# Patient Record
Sex: FEMALE | Race: WHITE | Employment: OTHER | URBAN - METROPOLITAN AREA
[De-identification: names, ages, dates, MRNs, and addresses within clinical notes are randomized per-mention and may not be internally consistent; named-entity substitution may affect disease eponyms.]

---

## 2017-01-04 ENCOUNTER — TELEPHONE (OUTPATIENT)
Dept: GASTROENTEROLOGY | Facility: CLINIC | Age: 72
End: 2017-01-04

## 2017-01-04 DIAGNOSIS — R19.7 DIARRHEA, UNSPECIFIED TYPE: Primary | ICD-10-CM

## 2017-01-04 NOTE — TELEPHONE ENCOUNTER
GLENYS called pt to resched appt on 3-3-17 for Dr Margie Bai- pt stated she has been having severe diarrhea since Sat and has lost 7 lbs - pt asking to be seen soon- pl call pt

## 2017-01-04 NOTE — TELEPHONE ENCOUNTER
Dr. Dov Will,     Pt contacted and she states that for the last 7 days she has had uncontrollable diarrhea to the extent where she has \"had to throw out her underwear\". She says that the episodes range from 10x per day to 3x per day.  She states she has dec

## 2017-01-05 NOTE — TELEPHONE ENCOUNTER
Pt contacted and notified that she must complete the stool test as soon as she is able to. Pt states she is 5 minutes away from the hospital and will complete that today.   She states that she is able to come in tomorrow, Friday (1/6/17) @ 10:30am.

## 2017-01-05 NOTE — TELEPHONE ENCOUNTER
She may have cdiff. Please call patient and have her obtain stool testing which I ordered. She should do that test ASAP.      I can see her this Friday at 10:30AM.

## 2017-01-06 ENCOUNTER — OFFICE VISIT (OUTPATIENT)
Dept: GASTROENTEROLOGY | Facility: CLINIC | Age: 72
End: 2017-01-06

## 2017-01-06 ENCOUNTER — TELEPHONE (OUTPATIENT)
Dept: GASTROENTEROLOGY | Facility: CLINIC | Age: 72
End: 2017-01-06

## 2017-01-06 ENCOUNTER — LAB ENCOUNTER (OUTPATIENT)
Dept: LAB | Age: 72
End: 2017-01-06
Attending: INTERNAL MEDICINE
Payer: MEDICARE

## 2017-01-06 VITALS
HEIGHT: 65.5 IN | HEART RATE: 56 BPM | SYSTOLIC BLOOD PRESSURE: 137 MMHG | WEIGHT: 275 LBS | BODY MASS INDEX: 45.27 KG/M2 | DIASTOLIC BLOOD PRESSURE: 66 MMHG

## 2017-01-06 DIAGNOSIS — K52.9 CHRONIC DIARRHEA: Primary | ICD-10-CM

## 2017-01-06 DIAGNOSIS — R19.7 DIARRHEA, UNSPECIFIED TYPE: ICD-10-CM

## 2017-01-06 DIAGNOSIS — R19.7 DIARRHEA: Primary | ICD-10-CM

## 2017-01-06 DIAGNOSIS — K21.9 GASTROESOPHAGEAL REFLUX DISEASE WITHOUT ESOPHAGITIS: ICD-10-CM

## 2017-01-06 LAB — C DIFF TOX B STL QL: NEGATIVE

## 2017-01-06 PROCEDURE — 87427 SHIGA-LIKE TOXIN AG IA: CPT

## 2017-01-06 PROCEDURE — 87077 CULTURE AEROBIC IDENTIFY: CPT

## 2017-01-06 PROCEDURE — 99214 OFFICE O/P EST MOD 30 MIN: CPT | Performed by: INTERNAL MEDICINE

## 2017-01-06 PROCEDURE — 87493 C DIFF AMPLIFIED PROBE: CPT

## 2017-01-06 PROCEDURE — 87045 FECES CULTURE AEROBIC BACT: CPT

## 2017-01-06 PROCEDURE — 87335 E COLI 0157 AG IA: CPT

## 2017-01-06 PROCEDURE — 87015 SPECIMEN INFECT AGNT CONCNTJ: CPT

## 2017-01-06 PROCEDURE — G0463 HOSPITAL OUTPT CLINIC VISIT: HCPCS | Performed by: INTERNAL MEDICINE

## 2017-01-06 PROCEDURE — 87046 STOOL CULTR AEROBIC BACT EA: CPT

## 2017-01-06 RX ORDER — DIPHENOXYLATE HYDROCHLORIDE AND ATROPINE SULFATE 2.5; .025 MG/1; MG/1
1 TABLET ORAL 3 TIMES DAILY PRN
Qty: 90 TABLET | Refills: 1 | Status: SHIPPED | OUTPATIENT
Start: 2017-01-06 | End: 2017-05-08

## 2017-01-06 RX ORDER — POLYETHYLENE GLYCOL 3350, SODIUM CHLORIDE, SODIUM BICARBONATE, POTASSIUM CHLORIDE 420; 11.2; 5.72; 1.48 G/4L; G/4L; G/4L; G/4L
POWDER, FOR SOLUTION ORAL
Qty: 1 BOTTLE | Refills: 0 | Status: SHIPPED | OUTPATIENT
Start: 2017-01-06 | End: 2017-01-17

## 2017-01-06 NOTE — TELEPHONE ENCOUNTER
Scheduled for:  Colonoscopy 45128  Date:  1/25/17  Location:  [facility]  Sedation:  MAC  Time:  1145  Prep:  Split dose Trilyte  Meds/Allergies Reconciled?:  Yes  Diagnosis with codes:  Colon cancer screening Z12.11  EM or [facility] notified?:  Notified Humera Myers in End

## 2017-01-06 NOTE — PATIENT INSTRUCTIONS
1. Schedule colonoscopy with MAC    2.  bowel prep from pharmacy     3. Continue all medications for procedure    4. Read all bowel prep instructions carefully    5.  AVOID seeds, nuts, popcorn, raw fruits and vegetables (cooked is okay) for 2-3 days

## 2017-01-06 NOTE — PROGRESS NOTES
[facility] Follow-up Visit    Deborah blood in sputum. Feels well otherwise. Lives with daughter.     Prior endoscopies:  5/2016 EGD--->hiatal hernia 9 cm, sliding type, marked reflux, LA CLass C esophagitis    High resolution esophageal manometry (7/2016)-->Due to the large hiatal hernia, ther caused   • Cancer Father      Skin cancer   • Heart Attack Mother    • Heart Disorder Mother       Social History:   Smoking Status: Former Smoker                   Packs/Day: 0.00  Years:         Comment: QUIT 32 YRS AGO    Alcohol Use: No +weight loss  EYES:  negative for change in vision  RESPIRATORY:  negative for  shortness of breath  CARDIOVASCULAR:  negative for  chest pain  GASTROINTESTINAL:  see HPI  GENITOURINARY:  negative for dysuria  INTEGUMENT/BREAST:  SKIN:  negative for  rash quite well. However over last few months had more burning in chest, feels difficulty swallowing foods, having weight loss.  I performed another EGD few weeks ago in Dec 2016 showing stasis like changes in the esophagus but no stricture in the GE junction, a Prescriptions Disp Refills    diphenoxylate-atropine (LOMOTIL) 2.5-0.025 MG Oral Tab 90 tablet 1      Sig: Take 1 tablet by mouth 3 (three) times daily as needed for Diarrhea.       PEG 3350-KCl-Na Bicarb-NaCl (TRILYTE) 420 G Oral Recon Soln 1 Bottle 0

## 2017-01-10 ENCOUNTER — TELEPHONE (OUTPATIENT)
Dept: GASTROENTEROLOGY | Facility: CLINIC | Age: 72
End: 2017-01-10

## 2017-01-10 NOTE — TELEPHONE ENCOUNTER
Attempted to notify patient with the message below. Called her home number and it just rang, no answer, no voicemail to leave a message.

## 2017-01-10 NOTE — TELEPHONE ENCOUNTER
GI staff-can you please inform Andre Sun that her stool tests are negative, no infection to treat. i will see her for her colonoscopy.

## 2017-01-12 NOTE — TELEPHONE ENCOUNTER
Second attempt to provide the patient with the information from below. No answer/no option to leave a VM.

## 2017-01-16 ENCOUNTER — TELEPHONE (OUTPATIENT)
Dept: GASTROENTEROLOGY | Facility: CLINIC | Age: 72
End: 2017-01-16

## 2017-01-16 ENCOUNTER — OFFICE VISIT (OUTPATIENT)
Dept: FAMILY MEDICINE CLINIC | Facility: CLINIC | Age: 72
End: 2017-01-16

## 2017-01-16 VITALS
BODY MASS INDEX: 45 KG/M2 | WEIGHT: 277 LBS | HEART RATE: 75 BPM | TEMPERATURE: 98 F | SYSTOLIC BLOOD PRESSURE: 110 MMHG | DIASTOLIC BLOOD PRESSURE: 70 MMHG

## 2017-01-16 DIAGNOSIS — I99.8 VASCULAR INSUFFICIENCY OF LIMB: ICD-10-CM

## 2017-01-16 DIAGNOSIS — I10 ESSENTIAL HYPERTENSION WITH GOAL BLOOD PRESSURE LESS THAN 130/80: ICD-10-CM

## 2017-01-16 DIAGNOSIS — L03.115 CELLULITIS OF RIGHT LOWER EXTREMITY: Primary | ICD-10-CM

## 2017-01-16 PROCEDURE — G0463 HOSPITAL OUTPT CLINIC VISIT: HCPCS | Performed by: FAMILY MEDICINE

## 2017-01-16 PROCEDURE — 99214 OFFICE O/P EST MOD 30 MIN: CPT | Performed by: FAMILY MEDICINE

## 2017-01-16 RX ORDER — CEFUROXIME AXETIL 500 MG/1
500 TABLET ORAL 2 TIMES DAILY
Qty: 20 TABLET | Refills: 0 | Status: SHIPPED | OUTPATIENT
Start: 2017-01-16 | End: 2017-02-06 | Stop reason: ALTCHOICE

## 2017-01-16 NOTE — PROGRESS NOTES
Started last Tues/Wed with swelling. \"I've stayed off my feet since then and its looking better. \"  \"It gets mind blowing itchy at times. \"      Hx of venous status with cellulitis recurrent. Has Colonoscopy scheduled for next wed.   Vomiting is an is insufficiency of limb  referral  - Interventional Radiology [facility]    3. Essential hypertension with goal blood pressure less than 130/80  As well controlled.

## 2017-01-16 NOTE — TELEPHONE ENCOUNTER
Pt contacted regarding her CLN scheduled 1/25/17. She states that she picked up her trilyte preparation from the pharmacy and she wants Dr. Severa Abed to know that \"there is no way\" she can drink the prep.    She states that she vomits on a daily basis from fo

## 2017-01-17 RX ORDER — POLYETHYLENE GLYCOL 3350, SODIUM CHLORIDE, SODIUM BICARBONATE, POTASSIUM CHLORIDE 420; 11.2; 5.72; 1.48 G/4L; G/4L; G/4L; G/4L
POWDER, FOR SOLUTION ORAL
Qty: 1 BOTTLE | Refills: 0 | Status: SHIPPED | OUTPATIENT
Start: 2017-01-17 | End: 2017-03-20

## 2017-01-17 RX ORDER — SUCRALFATE ORAL 1 G/10ML
1 SUSPENSION ORAL
Qty: 1 BOTTLE | Refills: 2 | Status: SHIPPED | OUTPATIENT
Start: 2017-01-17 | End: 2017-02-20

## 2017-01-17 NOTE — TELEPHONE ENCOUNTER
Spoke with Kristin Zamora. She will do as much of Trilyte as she can and space it out. She started prepping today accidentally, I informed her case is next week. Will send new Trilyte to pharmacy. She agrees to plan.

## 2017-01-23 RX ORDER — NIFEDIPINE 90 MG/1
TABLET, FILM COATED, EXTENDED RELEASE ORAL
Qty: 90 TABLET | Refills: 11 | Status: ON HOLD | OUTPATIENT
Start: 2017-01-23 | End: 2017-05-02

## 2017-01-25 ENCOUNTER — HOSPITAL ENCOUNTER (OUTPATIENT)
Facility: HOSPITAL | Age: 72
Setting detail: HOSPITAL OUTPATIENT SURGERY
Discharge: HOME | End: 2017-01-25
Attending: INTERNAL MEDICINE | Admitting: INTERNAL MEDICINE
Payer: MEDICARE

## 2017-01-25 ENCOUNTER — ANESTHESIA (OUTPATIENT)
Dept: ENDOSCOPY | Facility: HOSPITAL | Age: 72
End: 2017-01-25

## 2017-01-25 ENCOUNTER — ANESTHESIA EVENT (OUTPATIENT)
Dept: ENDOSCOPY | Facility: HOSPITAL | Age: 72
End: 2017-01-25

## 2017-01-25 ENCOUNTER — SURGERY (OUTPATIENT)
Age: 72
End: 2017-01-25

## 2017-01-25 PROCEDURE — 0DBF8ZX EXCISION OF RIGHT LARGE INTESTINE, VIA NATURAL OR ARTIFICIAL OPENING ENDOSCOPIC, DIAGNOSTIC: ICD-10-PCS | Performed by: INTERNAL MEDICINE

## 2017-01-25 PROCEDURE — 0DBB8ZX EXCISION OF ILEUM, VIA NATURAL OR ARTIFICIAL OPENING ENDOSCOPIC, DIAGNOSTIC: ICD-10-PCS | Performed by: INTERNAL MEDICINE

## 2017-01-25 PROCEDURE — 45385 COLONOSCOPY W/LESION REMOVAL: CPT | Performed by: INTERNAL MEDICINE

## 2017-01-25 PROCEDURE — 45380 COLONOSCOPY AND BIOPSY: CPT | Performed by: INTERNAL MEDICINE

## 2017-01-25 PROCEDURE — 99153 MOD SED SAME PHYS/QHP EA: CPT | Performed by: INTERNAL MEDICINE

## 2017-01-25 PROCEDURE — 0DBK8ZX EXCISION OF ASCENDING COLON, VIA NATURAL OR ARTIFICIAL OPENING ENDOSCOPIC, DIAGNOSTIC: ICD-10-PCS | Performed by: INTERNAL MEDICINE

## 2017-01-25 PROCEDURE — 0DBG8ZX EXCISION OF LEFT LARGE INTESTINE, VIA NATURAL OR ARTIFICIAL OPENING ENDOSCOPIC, DIAGNOSTIC: ICD-10-PCS | Performed by: INTERNAL MEDICINE

## 2017-01-25 PROCEDURE — 99152 MOD SED SAME PHYS/QHP 5/>YRS: CPT | Performed by: INTERNAL MEDICINE

## 2017-01-25 RX ORDER — SODIUM CHLORIDE 0.9 % (FLUSH) 0.9 %
10 SYRINGE (ML) INJECTION AS NEEDED
Status: DISCONTINUED | OUTPATIENT
Start: 2017-01-25 | End: 2017-01-25

## 2017-01-25 RX ORDER — MIDAZOLAM HYDROCHLORIDE 1 MG/ML
1 INJECTION INTRAMUSCULAR; INTRAVENOUS EVERY 5 MIN PRN
Status: DISCONTINUED | OUTPATIENT
Start: 2017-01-25 | End: 2017-01-25

## 2017-01-25 NOTE — OPERATIVE REPORT
COLONOSCOPY REPORT    Atif Agarwal     1945 Age 66year old   PCP Samuel Michelle.  Ganesh Hart DO Endoscopist Jennie Cabrera MD     Date of procedure: 2017    Procedure: Colonoscopy with hot snare polypectomy, control of bleeding and cold biops retrieved. B. 10 mm polyp in the ascending colon; sessile morphology; hot snare (30W) polypectomy and retrieved. Persistent oozing controlled with a single endoclip x1.     2. Diverticulosis: mild in the sigmoid.     3. Terminal ileum: the visualized m

## 2017-01-25 NOTE — PLAN OF CARE
Focus: CVData: PT IN A-FIB WITH A RATE OF 80. NO C/O PAIN OR DICOMFORT  Action:VICKIE BELL NOTIFIED. EKG ORDERED.   Response: 12 LEAD EKG DONE AT 1140

## 2017-01-25 NOTE — H&P
History & Physical Examination    Patient Name: Winston Schrader  MRN: [id number]  CSN: [id number]  YOB: 1945    Diagnosis: chronic diarrhea; patient found to be in afib, says she was told she has this in the past but not seen a cardiologist. She Cholecalciferol (VITAMIN D) 2000 UNITS Oral Cap Take 4,000 Units by mouth daily. Disp:  Rfl:  Not Taking       No current facility-administered medications for this encounter.     Allergies:   Adhesive Tape               Past Medical History   Diagnosis D Kate Fuentes [ Jim Breen      I have discussed the risks and benefits and alternatives of the procedure with the patient/family. They understand and agree to proceed with plan of care.    I have reviewed the History and Physical done within the last 30 day

## 2017-01-26 VITALS
SYSTOLIC BLOOD PRESSURE: 134 MMHG | HEIGHT: 66 IN | DIASTOLIC BLOOD PRESSURE: 113 MMHG | BODY MASS INDEX: 43.39 KG/M2 | RESPIRATION RATE: 22 BRPM | WEIGHT: 270 LBS | OXYGEN SATURATION: 98 % | HEART RATE: 80 BPM

## 2017-01-27 ENCOUNTER — TELEPHONE (OUTPATIENT)
Dept: GASTROENTEROLOGY | Facility: CLINIC | Age: 72
End: 2017-01-27

## 2017-01-27 RX ORDER — CHOLESTYRAMINE LIGHT 4 G/5.7G
4 POWDER, FOR SUSPENSION ORAL 2 TIMES DAILY
Qty: 60 EACH | Refills: 3 | Status: SHIPPED | OUTPATIENT
Start: 2017-01-27 | End: 2017-02-26

## 2017-01-27 NOTE — TELEPHONE ENCOUNTER
Left msg for Sabrina Pillar -- biopsies show no microscopic colitis. Tubular adenomas removed. Suspect bile-acid induced diarrhea 2/2 gallbladder removal in 2016. Start cholestyramine packets BID. Left msg.

## 2017-01-30 ENCOUNTER — TELEPHONE (OUTPATIENT)
Dept: FAMILY MEDICINE CLINIC | Facility: CLINIC | Age: 72
End: 2017-01-30

## 2017-01-30 DIAGNOSIS — I50.20 SYSTOLIC CONGESTIVE HEART FAILURE, UNSPECIFIED CONGESTIVE HEART FAILURE CHRONICITY: Primary | ICD-10-CM

## 2017-01-30 DIAGNOSIS — I48.91 ATRIAL FIBRILLATION, UNSPECIFIED TYPE (HCC): ICD-10-CM

## 2017-01-30 NOTE — TELEPHONE ENCOUNTER
Notes Recorded by Su Polk DO on 1/29/2017 at 1:34 PM  Also referral to Dr. Simona Arango dx cad chf and afib.     Notes Recorded by Su Polk DO on 1/29/2017 at 1:32 PM  Please have patient f/u Monday in office due to afib seen on ekg dur

## 2017-01-30 NOTE — TELEPHONE ENCOUNTER
----- Message from Karin Chao DO sent at 1/29/2017  1:32 PM CST -----  Please have patient f/u Monday in office due to afib seen on ekg during colonoscopy.   Repeat ekg in office upon arrival.

## 2017-01-31 NOTE — TELEPHONE ENCOUNTER
Contacted pt and has appt for right venous ultrasound on Wed and appt with Dr Mo Cuevas on Thursday. Offered [facility], but getting ready to leave to [place] due to family illness.  She will call back to schedule appt with [facility] when she retur

## 2017-02-06 ENCOUNTER — OFFICE VISIT (OUTPATIENT)
Dept: FAMILY MEDICINE CLINIC | Facility: CLINIC | Age: 72
End: 2017-02-06

## 2017-02-06 VITALS
HEART RATE: 78 BPM | TEMPERATURE: 98 F | SYSTOLIC BLOOD PRESSURE: 122 MMHG | WEIGHT: 270 LBS | DIASTOLIC BLOOD PRESSURE: 88 MMHG | BODY MASS INDEX: 44 KG/M2

## 2017-02-06 DIAGNOSIS — I48.91 ATRIAL FIBRILLATION, UNSPECIFIED TYPE (HCC): Primary | ICD-10-CM

## 2017-02-06 DIAGNOSIS — L30.9 DERMATITIS: ICD-10-CM

## 2017-02-06 DIAGNOSIS — G47.33 OBSTRUCTIVE SLEEP APNEA: ICD-10-CM

## 2017-02-06 DIAGNOSIS — R60.9 EDEMA, UNSPECIFIED TYPE: ICD-10-CM

## 2017-02-06 DIAGNOSIS — R25.1 TREMOR: ICD-10-CM

## 2017-02-06 DIAGNOSIS — K31.89 GASTRIC DYSMOTILITY: ICD-10-CM

## 2017-02-06 DIAGNOSIS — I10 ESSENTIAL HYPERTENSION WITH GOAL BLOOD PRESSURE LESS THAN 130/80: ICD-10-CM

## 2017-02-06 PROCEDURE — G0463 HOSPITAL OUTPT CLINIC VISIT: HCPCS | Performed by: FAMILY MEDICINE

## 2017-02-06 PROCEDURE — 99214 OFFICE O/P EST MOD 30 MIN: CPT | Performed by: FAMILY MEDICINE

## 2017-02-06 NOTE — PROGRESS NOTES
\"This is no life with this throat thing. I just felt full after a little dip yesterday. I just feel full and I feel like one more piece of food and I'll throw up. \"  Has gagging   Burping constantly. Coughing  Heartburn  Pain lower chest center.     Lennox Shone Tremor  Offered referral to neuro  Pt declined at the time. Tayler Nj

## 2017-02-08 ENCOUNTER — HOSPITAL ENCOUNTER (OUTPATIENT)
Dept: ULTRASOUND IMAGING | Facility: HOSPITAL | Age: 72
Discharge: HOME | End: 2017-02-08
Attending: RADIOLOGY
Payer: MEDICARE

## 2017-02-08 DIAGNOSIS — I83.899 VARICOSE VEINS WITH SWELLING: ICD-10-CM

## 2017-02-08 DIAGNOSIS — I83.813 VARICOSE VEINS WITH PAIN, BILATERAL: ICD-10-CM

## 2017-02-08 PROCEDURE — 93971 EXTREMITY STUDY: CPT

## 2017-02-20 RX ORDER — SUCRALFATE 1 G/10ML
SUSPENSION ORAL
Qty: 420 ML | Refills: 0 | OUTPATIENT
Start: 2017-02-20

## 2017-02-20 RX ORDER — SUCRALFATE 1 G/10ML
SUSPENSION ORAL
Qty: 420 ML | Refills: 0 | Status: SHIPPED | OUTPATIENT
Start: 2017-02-20 | End: 2017-04-04

## 2017-02-27 RX ORDER — ATORVASTATIN CALCIUM 40 MG/1
TABLET, FILM COATED ORAL
Qty: 90 TABLET | Refills: 0 | Status: SHIPPED | OUTPATIENT
Start: 2017-02-27 | End: 2017-05-29

## 2017-02-27 NOTE — TELEPHONE ENCOUNTER
Rx request for Atorvastatin Calcium 40 mg, PASSED Cholesterol Protocol. Rx filled per protocol.      Cholesterol Medications  Protocol Criteria:  · Appointment scheduled in the past 12 months or in the next 3 months  · ALT & LDL on file in the past 12 month

## 2017-03-20 ENCOUNTER — TELEPHONE (OUTPATIENT)
Dept: GASTROENTEROLOGY | Facility: CLINIC | Age: 72
End: 2017-03-20

## 2017-03-20 ENCOUNTER — TELEPHONE (OUTPATIENT)
Dept: FAMILY MEDICINE CLINIC | Facility: CLINIC | Age: 72
End: 2017-03-20

## 2017-03-20 ENCOUNTER — OFFICE VISIT (OUTPATIENT)
Dept: FAMILY MEDICINE CLINIC | Facility: CLINIC | Age: 72
End: 2017-03-20

## 2017-03-20 VITALS
HEART RATE: 89 BPM | BODY MASS INDEX: 41 KG/M2 | WEIGHT: 251 LBS | DIASTOLIC BLOOD PRESSURE: 86 MMHG | SYSTOLIC BLOOD PRESSURE: 149 MMHG | TEMPERATURE: 98 F

## 2017-03-20 DIAGNOSIS — N19 RENAL FAILURE: ICD-10-CM

## 2017-03-20 DIAGNOSIS — I48.91 ATRIAL FIBRILLATION, UNSPECIFIED TYPE (HCC): Primary | ICD-10-CM

## 2017-03-20 DIAGNOSIS — I10 ESSENTIAL HYPERTENSION WITH GOAL BLOOD PRESSURE LESS THAN 130/80: ICD-10-CM

## 2017-03-20 DIAGNOSIS — K31.89 GASTRIC DYSMOTILITY: ICD-10-CM

## 2017-03-20 PROCEDURE — 99214 OFFICE O/P EST MOD 30 MIN: CPT | Performed by: FAMILY MEDICINE

## 2017-03-20 PROCEDURE — G0463 HOSPITAL OUTPT CLINIC VISIT: HCPCS | Performed by: FAMILY MEDICINE

## 2017-03-20 NOTE — PROGRESS NOTES
Has been using her thigh high stockings. Has had diarrhea is bad. 5x a day. \"Every time I eat. \"  \"Everymorning I throw up a clear liquid. \"    Down another 19 lbs. Lost 59 lbs total since this summer. \"I can't live my life this way. \"    Pt co tender non distended bowel sounds were present  Heart was irregular rate and irregular rhythm. Not tachy.  normal S1-S2 no S3 no S4 there were no murmurs appreciated  Lymphatic: There were no anterior or posterior cervical adenopathy there was no supraclav

## 2017-03-20 NOTE — TELEPHONE ENCOUNTER
Spoke to Dr. Debra Carmen   Updated him with her symptoms. He reminded me she had lots of burping prior to her surgery. ? gastroparesis  Possible repeat manometry  \"Maybe she needs to have the nissen taken down, but we have to make sure we see the evidenc

## 2017-03-21 ENCOUNTER — TELEPHONE (OUTPATIENT)
Dept: GASTROENTEROLOGY | Facility: CLINIC | Age: 72
End: 2017-03-21

## 2017-03-21 NOTE — TELEPHONE ENCOUNTER
D/w Dr. Zonia Villarreal re: continued weight loss and ongoing upper GI sx. She may have underlying gastric motility disorder. Left msg for patient to call me back.

## 2017-03-22 ENCOUNTER — TELEPHONE (OUTPATIENT)
Dept: GASTROENTEROLOGY | Facility: CLINIC | Age: 72
End: 2017-03-22

## 2017-03-22 ENCOUNTER — LAB ENCOUNTER (OUTPATIENT)
Dept: LAB | Age: 72
End: 2017-03-22
Attending: FAMILY MEDICINE
Payer: MEDICARE

## 2017-03-22 DIAGNOSIS — R11.2 NAUSEA AND VOMITING, INTRACTABILITY OF VOMITING NOT SPECIFIED, UNSPECIFIED VOMITING TYPE: Primary | ICD-10-CM

## 2017-03-22 DIAGNOSIS — I10 ESSENTIAL HYPERTENSION WITH GOAL BLOOD PRESSURE LESS THAN 130/80: ICD-10-CM

## 2017-03-22 DIAGNOSIS — I48.91 ATRIAL FIBRILLATION, UNSPECIFIED TYPE (HCC): ICD-10-CM

## 2017-03-22 DIAGNOSIS — K31.89 GASTRIC DYSMOTILITY: ICD-10-CM

## 2017-03-22 LAB
ALBUMIN SERPL BCP-MCNC: 3.3 G/DL (ref 3.5–4.8)
ALBUMIN/GLOB SERPL: 1.1 {RATIO} (ref 1–2)
ALP SERPL-CCNC: 72 U/L (ref 32–100)
ALT SERPL-CCNC: 9 U/L (ref 14–54)
ANION GAP SERPL CALC-SCNC: 10 MMOL/L (ref 0–18)
AST SERPL-CCNC: 13 U/L (ref 15–41)
BASOPHILS # BLD: 0.1 K/UL (ref 0–0.2)
BASOPHILS NFR BLD: 1 %
BILIRUB SERPL-MCNC: 0.7 MG/DL (ref 0.3–1.2)
BUN SERPL-MCNC: 14 MG/DL (ref 8–20)
BUN/CREAT SERPL: 11.3 (ref 10–20)
CALCIUM SERPL-MCNC: 9.4 MG/DL (ref 8.5–10.5)
CHLORIDE SERPL-SCNC: 108 MMOL/L (ref 95–110)
CHOLEST SERPL-MCNC: 115 MG/DL (ref 110–200)
CO2 SERPL-SCNC: 26 MMOL/L (ref 22–32)
CREAT SERPL-MCNC: 1.24 MG/DL (ref 0.5–1.5)
EOSINOPHIL # BLD: 0.1 K/UL (ref 0–0.7)
EOSINOPHIL NFR BLD: 2 %
ERYTHROCYTE [DISTWIDTH] IN BLOOD BY AUTOMATED COUNT: 13.9 % (ref 11–15)
GLOBULIN PLAS-MCNC: 3 G/DL (ref 2.5–3.7)
GLUCOSE SERPL-MCNC: 86 MG/DL (ref 70–99)
HCT VFR BLD AUTO: 40 % (ref 35–48)
HDLC SERPL-MCNC: 45 MG/DL
HGB BLD-MCNC: 13.2 G/DL (ref 12–16)
LDLC SERPL CALC-MCNC: 51 MG/DL (ref 0–99)
LYMPHOCYTES # BLD: 1 K/UL (ref 1–4)
LYMPHOCYTES NFR BLD: 16 %
MAGNESIUM SERPL-MCNC: 1.7 MG/DL (ref 1.8–2.5)
MCH RBC QN AUTO: 32.3 PG (ref 27–32)
MCHC RBC AUTO-ENTMCNC: 32.9 G/DL (ref 32–37)
MCV RBC AUTO: 98.1 FL (ref 80–100)
MONOCYTES # BLD: 0.5 K/UL (ref 0–1)
MONOCYTES NFR BLD: 9 %
NEUTROPHILS # BLD AUTO: 4.3 K/UL (ref 1.8–7.7)
NEUTROPHILS NFR BLD: 71 %
NONHDLC SERPL-MCNC: 70 MG/DL
OSMOLALITY UR CALC.SUM OF ELEC: 298 MOSM/KG (ref 275–295)
PLATELET # BLD AUTO: 140 K/UL (ref 140–400)
PMV BLD AUTO: 10.7 FL (ref 7.4–10.3)
POTASSIUM SERPL-SCNC: 3.9 MMOL/L (ref 3.3–5.1)
PROT SERPL-MCNC: 6.3 G/DL (ref 5.9–8.4)
RBC # BLD AUTO: 4.08 M/UL (ref 3.7–5.4)
SODIUM SERPL-SCNC: 144 MMOL/L (ref 136–144)
TRIGL SERPL-MCNC: 96 MG/DL (ref 1–149)
TSH SERPL-ACNC: 2.4 UIU/ML (ref 0.34–5.6)
WBC # BLD AUTO: 6 K/UL (ref 4–11)

## 2017-03-22 PROCEDURE — 83735 ASSAY OF MAGNESIUM: CPT

## 2017-03-22 PROCEDURE — 36415 COLL VENOUS BLD VENIPUNCTURE: CPT

## 2017-03-22 PROCEDURE — 80061 LIPID PANEL: CPT

## 2017-03-22 PROCEDURE — 84443 ASSAY THYROID STIM HORMONE: CPT

## 2017-03-22 PROCEDURE — 85025 COMPLETE CBC W/AUTO DIFF WBC: CPT

## 2017-03-22 PROCEDURE — 80053 COMPREHEN METABOLIC PANEL: CPT

## 2017-03-22 NOTE — TELEPHONE ENCOUNTER
Discussed with Duyen Hou, she has a hard time eating, not so much dysphagia but feels food backs up easily. Additionally, she had bile-acid induced diarrhea, and alternates between 2 mushy stools to 10 mushy stools a day.  I asked her to continue cholestyramine

## 2017-03-23 NOTE — TELEPHONE ENCOUNTER
Rocky Lopez in managed care and pt does not require pre-certification to schedule this test. I contacted the pt and informed her she can go ahead and call central scheduling at [phone] to schedule at her convenience, she verbalized understanding.

## 2017-03-27 ENCOUNTER — TELEPHONE (OUTPATIENT)
Dept: FAMILY MEDICINE CLINIC | Facility: CLINIC | Age: 72
End: 2017-03-27

## 2017-03-27 DIAGNOSIS — N18.30 RENAL FAILURE, CHRONIC, STAGE 3 (MODERATE) (HCC): ICD-10-CM

## 2017-03-27 DIAGNOSIS — E78.5 HYPERLIPIDEMIA, UNSPECIFIED HYPERLIPIDEMIA TYPE: Primary | ICD-10-CM

## 2017-03-27 DIAGNOSIS — E83.42 HYPOMAGNESEMIA: ICD-10-CM

## 2017-03-27 RX ORDER — ATORVASTATIN CALCIUM 10 MG/1
10 TABLET, FILM COATED ORAL NIGHTLY
Qty: 90 TABLET | Refills: 3 | Status: SHIPPED | OUTPATIENT
Start: 2017-03-27 | End: 2017-09-28

## 2017-03-27 NOTE — TELEPHONE ENCOUNTER
Reviewed 3/22/17 lab results with pt along with Dr [facility] recommendations. Nephrology referral generated and tel# [phone] given to pt. Generated new lab orders and mailed to pts' home address on file.

## 2017-03-27 NOTE — TELEPHONE ENCOUNTER
----- Message from Mahad Jules DO sent at 3/22/2017  6:58 PM CDT -----  Dr. Rakan Burgess wrote me a letter on her o/v so let her know I am aware of their discussion. Chol excellent. Stop the 40mg lipitor switch to lipitor 10mg refills x 1 year.   Thyroid

## 2017-03-27 NOTE — TELEPHONE ENCOUNTER
----- Message from Isabel Yee DO sent at 3/22/2017  6:58 PM CDT -----  Dr. Phi Costello wrote me a letter on her o/v so let her know I am aware of their discussion. Chol excellent. Stop the 40mg lipitor switch to lipitor 10mg refills x 1 year.   Thyroid

## 2017-03-29 ENCOUNTER — HOSPITAL ENCOUNTER (OUTPATIENT)
Dept: NUCLEAR MEDICINE | Facility: HOSPITAL | Age: 72
Discharge: HOME | End: 2017-03-29
Attending: INTERNAL MEDICINE
Payer: MEDICARE

## 2017-03-29 DIAGNOSIS — R11.2 NAUSEA AND VOMITING, INTRACTABILITY OF VOMITING NOT SPECIFIED, UNSPECIFIED VOMITING TYPE: ICD-10-CM

## 2017-03-29 PROCEDURE — 78264 GASTRIC EMPTYING IMG STUDY: CPT

## 2017-03-30 ENCOUNTER — TELEPHONE (OUTPATIENT)
Dept: GASTROENTEROLOGY | Facility: CLINIC | Age: 72
End: 2017-03-30

## 2017-03-30 DIAGNOSIS — K31.89 DYSMOTILITY OF STOMACH: Primary | ICD-10-CM

## 2017-03-30 DIAGNOSIS — K22.4 ESOPHAGEAL DYSMOTILITY: Primary | ICD-10-CM

## 2017-03-30 RX ORDER — METOCLOPRAMIDE 10 MG/1
10 TABLET ORAL
Qty: 90 TABLET | Refills: 1 | Status: ON HOLD | OUTPATIENT
Start: 2017-03-30 | End: 2017-05-02

## 2017-03-30 NOTE — TELEPHONE ENCOUNTER
Please call patient   Ok to go to [facility] or [facility] wherever they accept her insurance.   [facility]

## 2017-03-30 NOTE — TELEPHONE ENCOUNTER
GI staff: can you please see if patient can be seen at [facility]/ Dr. Mansi Dunbar, or Dr. Jose Angel or Dr. Chelsea Carey has first appt. For evaluation of esophageal dysmotility after fundoplication surgery.  Please let me know when appt is ma

## 2017-03-30 NOTE — TELEPHONE ENCOUNTER
Discussed with Dez Marking re: gastric emptying scan. There is significant dysmotility of the esophagus and stomach. Unclear etiology for this dysmotility. No achalasia was seen on pre-operative manometry. I discussed w/Dr. Eboni Ramírez as well.  We are in agreement t

## 2017-03-30 NOTE — TELEPHONE ENCOUNTER
Spoke to pt regarding gastro referral as instructed by [facility]. Stated she had an upcoming appt with [facility] and she would like to discuss it further with him. No further action necessary.

## 2017-03-31 NOTE — TELEPHONE ENCOUNTER
Contacted Dr. Caitlin Fernandez office and I spoke with Benjy Mason who confirms having received the records I sent yesterday. He states that once the office reviews the records, they will call the patient and assist in scheduling the appointment.  He states that more

## 2017-04-03 ENCOUNTER — OFFICE VISIT (OUTPATIENT)
Dept: FAMILY MEDICINE CLINIC | Facility: CLINIC | Age: 72
End: 2017-04-03

## 2017-04-03 VITALS
SYSTOLIC BLOOD PRESSURE: 144 MMHG | DIASTOLIC BLOOD PRESSURE: 82 MMHG | HEART RATE: 76 BPM | BODY MASS INDEX: 40.17 KG/M2 | HEIGHT: 65.5 IN | WEIGHT: 244 LBS

## 2017-04-03 DIAGNOSIS — K31.89 GASTRIC DYSMOTILITY: ICD-10-CM

## 2017-04-03 DIAGNOSIS — I48.91 ATRIAL FIBRILLATION, UNSPECIFIED TYPE (HCC): Primary | ICD-10-CM

## 2017-04-03 DIAGNOSIS — N19 RENAL FAILURE: ICD-10-CM

## 2017-04-03 DIAGNOSIS — R63.4 WEIGHT LOSS, NON-INTENTIONAL: ICD-10-CM

## 2017-04-03 PROCEDURE — 99214 OFFICE O/P EST MOD 30 MIN: CPT | Performed by: FAMILY MEDICINE

## 2017-04-03 PROCEDURE — G0463 HOSPITAL OUTPT CLINIC VISIT: HCPCS | Performed by: FAMILY MEDICINE

## 2017-04-03 NOTE — PROGRESS NOTES
Lost 100lbs so far. Its been 25 years ago. Has appts scheduled  Kristy Del Rio. Has been eating more lately   \"Had a whole omelet. \"  Not burping as much. Spoke to Dr. Keerthi Lima. Since we last had appt.     Patient's past medical surgical f

## 2017-04-04 ENCOUNTER — TELEPHONE (OUTPATIENT)
Dept: GASTROENTEROLOGY | Facility: CLINIC | Age: 72
End: 2017-04-04

## 2017-04-04 RX ORDER — SUCRALFATE ORAL 1 G/10ML
1 SUSPENSION ORAL
Qty: 420 ML | Refills: 2 | Status: ON HOLD | OUTPATIENT
Start: 2017-04-04 | End: 2017-05-02

## 2017-04-04 NOTE — TELEPHONE ENCOUNTER
Called Dr. Caitlin Fernandez office and spoke to Rica De Jesus to check when the patient was scheduled for an appt.  She transferred me to [facility] (Dr. Ronel Watts) and I LM for her explaining to please call the patient to add her to the schedule and to call me

## 2017-04-04 NOTE — TELEPHONE ENCOUNTER
Received phone call from Susanne at [facility] and she states that she reviewed the records and referral I sent with Dr. Adali Sunshine yesterday and they recommend she schedule her consultation with Dr. Geeta Villafana.    She states that she will call the patient and see w

## 2017-04-04 NOTE — TELEPHONE ENCOUNTER
Current Outpatient Prescriptions:  CARAFATE 1 GM/10ML Oral Suspension TAKE 10 ML BY MOUTH FOUR TIMES DAILY BEFORE A MEAL AND NIGHTLY Disp: 420 mL Rfl: 0     Refill

## 2017-04-07 NOTE — TELEPHONE ENCOUNTER
[address] and spoke with Corbin Michelle who states that the patient is scheduled for an appt with Dr. Geeta Villafana on 5/19/17 @1:30pm.

## 2017-04-17 ENCOUNTER — OFFICE VISIT (OUTPATIENT)
Dept: GASTROENTEROLOGY | Facility: CLINIC | Age: 72
End: 2017-04-17

## 2017-04-17 VITALS
HEART RATE: 92 BPM | BODY MASS INDEX: 37.61 KG/M2 | DIASTOLIC BLOOD PRESSURE: 89 MMHG | SYSTOLIC BLOOD PRESSURE: 136 MMHG | WEIGHT: 236.81 LBS | HEIGHT: 66.5 IN

## 2017-04-17 DIAGNOSIS — K44.9 HIATAL HERNIA WITH GERD AND ESOPHAGITIS: Primary | ICD-10-CM

## 2017-04-17 DIAGNOSIS — K52.9 CHRONIC DIARRHEA: ICD-10-CM

## 2017-04-17 DIAGNOSIS — K21.9 GASTROESOPHAGEAL REFLUX DISEASE WITHOUT ESOPHAGITIS: ICD-10-CM

## 2017-04-17 DIAGNOSIS — K21.00 HIATAL HERNIA WITH GERD AND ESOPHAGITIS: Primary | ICD-10-CM

## 2017-04-17 DIAGNOSIS — R63.4 WEIGHT LOSS: ICD-10-CM

## 2017-04-17 PROCEDURE — 99214 OFFICE O/P EST MOD 30 MIN: CPT | Performed by: INTERNAL MEDICINE

## 2017-04-17 PROCEDURE — G0463 HOSPITAL OUTPT CLINIC VISIT: HCPCS | Performed by: INTERNAL MEDICINE

## 2017-04-17 NOTE — PROGRESS NOTES
[facility] Follow-up Visit    Deborah at the GE junction (8mm diameter), no reflux and severe esophageal dysmotility with absence of primary peristalsis, and poor drainage of barium from esophagus.      On 12/1416 I repeated an EGD to ensure the wrap was not too tight and found stasis related e Back problem    • Renal disorder    • Basal cell carcinoma of nose    • Cellulitis    • A-fib (HCC)    • Tubular adenoma of colon 1/2017     repeat c-scope 1/2020          Past Surgical History    BSO, OMENTECTOMY W/BRANDO      EXCISION OF NOSE      Comment B Rfl: 11   triamcinolone acetonide 0.1 % External Cream Apply topically 2 (two) times daily as needed. Disp: 60 g Rfl: 3   diphenoxylate-atropine (LOMOTIL) 2.5-0.025 MG Oral Tab Take 1 tablet by mouth 3 (three) times daily as needed for Diarrhea.  Disp: 90 t having movement of all 4 extremities     Labs/Imaging:     Patient's labs and imaging were reviewed and discussed with patient today.      .  ASSESSMENT/PLAN:   Marcos Jacobs is a 66year old year-old female with history of morbid obesity, depression, acid r

## 2017-04-18 ENCOUNTER — HOSPITAL ENCOUNTER (INPATIENT)
Facility: HOSPITAL | Age: 72
LOS: 14 days | Discharge: HOME HEALTH | DRG: 673 | End: 2017-05-02
Attending: HOSPITALIST | Admitting: HOSPITALIST
Payer: MEDICARE

## 2017-04-18 ENCOUNTER — TELEPHONE (OUTPATIENT)
Dept: GASTROENTEROLOGY | Facility: CLINIC | Age: 72
End: 2017-04-18

## 2017-04-18 ENCOUNTER — APPOINTMENT (OUTPATIENT)
Dept: GENERAL RADIOLOGY | Facility: HOSPITAL | Age: 72
DRG: 673 | End: 2017-04-18
Attending: INTERNAL MEDICINE
Payer: MEDICARE

## 2017-04-18 DIAGNOSIS — K55.9 MESENTERIC ISCHEMIA (HCC): ICD-10-CM

## 2017-04-18 DIAGNOSIS — K55.9 ISCHEMIC COLITIS (HCC): ICD-10-CM

## 2017-04-18 DIAGNOSIS — K25.9 MULTIPLE GASTRIC ULCERS: Primary | ICD-10-CM

## 2017-04-18 PROCEDURE — 71010 XR CHEST AP PORTABLE  (CPT=71010): CPT

## 2017-04-18 PROCEDURE — 99222 1ST HOSP IP/OBS MODERATE 55: CPT | Performed by: HOSPITALIST

## 2017-04-18 PROCEDURE — 0DH63UZ INSERTION OF FEEDING DEVICE INTO STOMACH, PERCUTANEOUS APPROACH: ICD-10-PCS | Performed by: RADIOLOGY

## 2017-04-18 PROCEDURE — 99222 1ST HOSP IP/OBS MODERATE 55: CPT | Performed by: INTERNAL MEDICINE

## 2017-04-18 RX ORDER — SODIUM CHLORIDE 9 MG/ML
INJECTION, SOLUTION INTRAVENOUS CONTINUOUS
Status: DISCONTINUED | OUTPATIENT
Start: 2017-04-18 | End: 2017-04-21

## 2017-04-18 RX ORDER — SUCRALFATE ORAL 1 G/10ML
1 SUSPENSION ORAL
Status: DISCONTINUED | OUTPATIENT
Start: 2017-04-18 | End: 2017-04-18

## 2017-04-18 RX ORDER — ONDANSETRON 2 MG/ML
4 INJECTION INTRAMUSCULAR; INTRAVENOUS EVERY 6 HOURS PRN
Status: DISCONTINUED | OUTPATIENT
Start: 2017-04-18 | End: 2017-04-18

## 2017-04-18 RX ORDER — METOPROLOL TARTRATE 50 MG/1
50 TABLET, FILM COATED ORAL 2 TIMES DAILY
Status: DISCONTINUED | OUTPATIENT
Start: 2017-04-18 | End: 2017-04-22

## 2017-04-18 RX ORDER — SUCRALFATE ORAL 1 G/10ML
1 SUSPENSION ORAL
Status: DISCONTINUED | OUTPATIENT
Start: 2017-04-18 | End: 2017-04-20

## 2017-04-18 RX ORDER — ONDANSETRON 2 MG/ML
4 INJECTION INTRAMUSCULAR; INTRAVENOUS EVERY 6 HOURS PRN
Status: DISCONTINUED | OUTPATIENT
Start: 2017-04-18 | End: 2017-04-21

## 2017-04-18 RX ORDER — DIPHENOXYLATE HYDROCHLORIDE AND ATROPINE SULFATE 2.5; .025 MG/1; MG/1
1 TABLET ORAL 3 TIMES DAILY PRN
Status: DISCONTINUED | OUTPATIENT
Start: 2017-04-18 | End: 2017-05-02

## 2017-04-18 RX ORDER — CHOLESTYRAMINE LIGHT 4 G/5.7G
4 POWDER, FOR SUSPENSION ORAL 2 TIMES DAILY
Status: DISCONTINUED | OUTPATIENT
Start: 2017-04-18 | End: 2017-04-22

## 2017-04-18 RX ORDER — HEPARIN SODIUM 5000 [USP'U]/ML
5000 INJECTION, SOLUTION INTRAVENOUS; SUBCUTANEOUS EVERY 8 HOURS
Status: DISCONTINUED | OUTPATIENT
Start: 2017-04-18 | End: 2017-04-21

## 2017-04-18 RX ORDER — METOCLOPRAMIDE 10 MG/1
5 TABLET ORAL
Status: DISCONTINUED | OUTPATIENT
Start: 2017-04-18 | End: 2017-04-22

## 2017-04-18 RX ORDER — SODIUM CHLORIDE 9 MG/ML
INJECTION, SOLUTION INTRAVENOUS
Status: COMPLETED
Start: 2017-04-18 | End: 2017-04-18

## 2017-04-18 RX ORDER — DEXTROSE MONOHYDRATE 100 MG/ML
INJECTION, SOLUTION INTRAVENOUS CONTINUOUS PRN
Status: DISCONTINUED | OUTPATIENT
Start: 2017-04-18 | End: 2017-04-26

## 2017-04-18 RX ORDER — ACETAMINOPHEN 325 MG/1
650 TABLET ORAL EVERY 6 HOURS PRN
Status: DISCONTINUED | OUTPATIENT
Start: 2017-04-18 | End: 2017-04-23

## 2017-04-18 RX ORDER — ATORVASTATIN CALCIUM 10 MG/1
10 TABLET, FILM COATED ORAL NIGHTLY
Status: DISCONTINUED | OUTPATIENT
Start: 2017-04-18 | End: 2017-04-22

## 2017-04-18 NOTE — H&P
[facility] Patient Status:  Inpatient    1945 MRN [id number]   [address] Tahir Harvey, [facility] Day # 0 PCP Julieta Horowitz.  DO Jemal     Date:  2017  Date Procedure: ESOPHAGOGASTRODUODENOSCOPY (EGD);   Surgeon: Bri Mcnally MD;  Location: [address] [place] [address] ENDOSCOPY    COLONOSCOPY N/A 1/25/2017    Comment Procedure: COLONOSCOPY;  Surgeon: Bri Mcnally MD;  Location: [address] [place] [address] ENDOSCOPY     Family History   Problem Relati negative  Respiratory: negative for cough and dyspnea on exertion  Cardiovascular: negative for chest pain and dyspnea  Gastrointestinal: pos for diarrhea and vomiting  Genitourinary:negative for dysuria and hematuria  Integument/breast: negative for rash 04/18/2017   ALKPHO 102* 04/18/2017   BILT 0.5 04/18/2017   TP 7.1 04/18/2017   AST 17 04/18/2017   ALT 15 04/18/2017   INR 1.1 04/18/2017   TSH 2.40 03/22/2017   * 06/10/2011   MG 1.7* 03/22/2017       Xr Chest Ap Portable  (cpt=71010)    4/18/2017

## 2017-04-18 NOTE — TELEPHONE ENCOUNTER
GI staff: patient needs to be directly admitted today; I already made arrangements. Can you call admitting and then patient to see when she should come in, thanks.

## 2017-04-18 NOTE — DIETARY NOTE
ADULT NUTRITION INITIAL ASSESSMENT    Pt is at high nutrition risk. Pt does not meet malnutrition criteria. RECOMMENDATIONS TO MD: RD to order and manage tube feeding. To start with bolus feeding of 1 can ensure(240 ml) TID.   Will monitor tolerance • PE (pulmonary embolism)    • Leg DVT (deep venous thromboembolism), acute ([facility] [address] 2005     Left   • Osteoarthritis    • Heel spur      Right   • Bilateral carpal tunnel syndrome    • Hiatal hernia    • Lumbar disc disease 2012   • Esophageal reflux    • Nutrient Administration:      Monitor: tolerance to enteral nutrition  - Anthropometric Measurement:      Monitor: wt and wt change   - Nutrition Goals:      PO and tube feeding to meet greater than 80% of needs, labs WNL, euglycemia and prevent skin break

## 2017-04-18 NOTE — PROGRESS NOTES
[facility] Pharmacy Note:  Renal Dose Adjustment for Metoclopramide (REGLAN)    Chanel Palma has been prescribed Metoclopramide (REGLAN) 10 mg three times daily. Estimated Creatinine Clearance: 25.5 mL/min (based on Cr of 1.81).     Her calculated creatinine cl

## 2017-04-18 NOTE — CONSULTS
[facility]   Gastroenterology Consultation Note    Panchito Landis  Patient Status:    Inpatient  Date of Admission:         4/18/2017, Hospital day #0  Attending:   Pillo Huertas DO  PCP:     Kat Berry.  DO Jemal    Reason for Cons reflux and severe esophageal dysmotility with absence of primary peristalsis, and poor drainage of barium from esophagus.      On 12/1416 I repeated an EGD to ensure the wrap was not too tight and found stasis related esophagitis changes, but no resistance narcotics, POD Bartuci    ELECTROCARDIOGRAM, COMPLETE  09/26/2013    Comment Scanned to Media Tab    CHOLECYSTECTOMY  9/28/16     EGD N/A 12/14/2016    Comment Procedure: ESOPHAGOGASTRODUODENOSCOPY (EGD);   Surgeon: Rhonda Goltz, MD;  Location: [address] [place] [address] °F (36.6 °C), temperature source Oral, resp. rate 18, height 5' 6\" (1.676 m), weight 237 lb (107.502 kg), SpO2 98 %. Body mass index is 38.27 kg/(m^2).     Gen- Patient appears fatigued, more weight loss noted on face  HEENT: the sclera appears anicteric, BID  -DVT    Discussed with Dr. Asif Gallego.      Farzaneh Leach, [address] [place] [address] Gastroenterology  4/18/2017

## 2017-04-18 NOTE — TELEPHONE ENCOUNTER
Patrick Donahue from admitting contacted and she states that the patient is physically present in the [facility].

## 2017-04-18 NOTE — TELEPHONE ENCOUNTER
Maria Dolores Harper in admitting (ext [phone]) and she states that they already have a bed available for her and are expecting the patient.      I attempted to reach the patient and LM on her VM for her to present to the hospital, go to [facility] on the

## 2017-04-19 PROCEDURE — 99232 SBSQ HOSP IP/OBS MODERATE 35: CPT | Performed by: INTERNAL MEDICINE

## 2017-04-19 PROCEDURE — 99233 SBSQ HOSP IP/OBS HIGH 50: CPT | Performed by: HOSPITALIST

## 2017-04-19 RX ORDER — MAGNESIUM OXIDE 400 MG (241.3 MG MAGNESIUM) TABLET
800 TABLET ONCE
Status: COMPLETED | OUTPATIENT
Start: 2017-04-19 | End: 2017-04-19

## 2017-04-19 NOTE — PROGRESS NOTES
[facility]     Gastroenterology Progress Note    Daly Brian Patient Status:  Inpatient    1945 MRN [id number]   Location [facility] 4W/SW/SE Attending Zain Echavarria Day # 1 PCP Kesha Arguelles.  Jemal, If she tolerates gastric feeding via [address], then can consider G-tube. If does not tolerate, then will need PICC/TPN.      # esophageal and gastric dysmotility  # weight loss  # chronic diarrhea - bile acid induced  # chronic eructation  # poor PO intake  # AK

## 2017-04-19 NOTE — PROGRESS NOTES
[facility]    Progress Note    Cheyenne Meyers Patient Status:  Inpatient    1945 MRN [id number]   Location [facility] 4W/SW/SE Attending Zain Echavarria Day # 1 PCP Eric Joann.  DO Jemal     Subjective: Results  Component Value Date   WBC 5.9 04/19/2017   HGB 11.9* 04/19/2017   HCT 37.4 04/19/2017    04/19/2017   CREATSERUM 1.38 04/19/2017   BUN 21* 04/19/2017    04/19/2017   K 4.6 04/19/2017   * 04/19/2017   CO2 18* 04/19/2017   GLU 93

## 2017-04-19 NOTE — DIETARY NOTE
NUTRITION UPDATE NOTE     PATIENT HAD 3 CANS  ENSURE BOLUS VIA FEEDING TUBE OVER PAST 24 HOURS. TOLERATING. DIET ADVANCE TO SOFT/LOW FIBER. PATIENT VISITED AFTER LUNCH. SO FAR GOOD TOLERANCE.     [address], [address] [facility]  Ext [phone]

## 2017-04-19 NOTE — PLAN OF CARE
Patient/Family Long Term Goal Not Progressing    Patient's goal is to find out why she has been losing weight, continue with ensure via keofeed TID plus a soft diet. Pt continues to have diarrhea. Plan for a CT scan tomorrow when BUN/CR improve.

## 2017-04-20 ENCOUNTER — ANESTHESIA EVENT (OUTPATIENT)
Dept: ENDOSCOPY | Facility: HOSPITAL | Age: 72
DRG: 673 | End: 2017-04-20
Payer: MEDICARE

## 2017-04-20 ENCOUNTER — APPOINTMENT (OUTPATIENT)
Dept: CT IMAGING | Facility: HOSPITAL | Age: 72
DRG: 673 | End: 2017-04-20
Attending: INTERNAL MEDICINE
Payer: MEDICARE

## 2017-04-20 PROCEDURE — 99232 SBSQ HOSP IP/OBS MODERATE 35: CPT | Performed by: HOSPITALIST

## 2017-04-20 PROCEDURE — 71260 CT THORAX DX C+: CPT

## 2017-04-20 PROCEDURE — 74177 CT ABD & PELVIS W/CONTRAST: CPT

## 2017-04-20 RX ORDER — MORPHINE SULFATE 2 MG/ML
3 INJECTION, SOLUTION INTRAMUSCULAR; INTRAVENOUS EVERY 2 HOUR PRN
Status: DISCONTINUED | OUTPATIENT
Start: 2017-04-20 | End: 2017-04-21

## 2017-04-20 RX ORDER — 0.9 % SODIUM CHLORIDE 0.9 %
VIAL (ML) INJECTION
Status: COMPLETED
Start: 2017-04-20 | End: 2017-04-20

## 2017-04-20 RX ORDER — METOCLOPRAMIDE HYDROCHLORIDE 5 MG/ML
10 INJECTION INTRAMUSCULAR; INTRAVENOUS EVERY 6 HOURS PRN
Status: DISCONTINUED | OUTPATIENT
Start: 2017-04-20 | End: 2017-05-02

## 2017-04-20 RX ORDER — MORPHINE SULFATE 2 MG/ML
2 INJECTION, SOLUTION INTRAMUSCULAR; INTRAVENOUS EVERY 2 HOUR PRN
Status: DISCONTINUED | OUTPATIENT
Start: 2017-04-20 | End: 2017-04-21

## 2017-04-20 RX ORDER — MORPHINE SULFATE 2 MG/ML
1 INJECTION, SOLUTION INTRAMUSCULAR; INTRAVENOUS EVERY 2 HOUR PRN
Status: DISCONTINUED | OUTPATIENT
Start: 2017-04-20 | End: 2017-04-21

## 2017-04-20 NOTE — PROGRESS NOTES
[facility]    Progress Note    Cheyenne Meyers Patient Status:  Inpatient    1945 MRN [id number]   Location [facility] 4W/SW/SE Attending Zain Echavarria Day # 2 PCP [facility].  DO Jemal     Subjective:  04/20/2017   CREATSERUM 1.20 04/20/2017   BUN 19 04/20/2017    04/20/2017   K 4.8 04/20/2017   * 04/20/2017   CO2 18* 04/20/2017   * 04/20/2017   CA 8.8 04/20/2017   ALB 3.6 04/18/2017   ALKPHO 102* 04/18/2017   BILT 0.5 04/18/

## 2017-04-20 NOTE — PROGRESS NOTES
04/20/17 4846   Clinical Encounter Type   Visited With Patient   Routine Visit Introduction   Continue Visiting Yes   Surgical Visit Pre-op   Patient Spiritual Encounters   Spiritual Needs Empathic listening and support.  Patient reported no needs at [facility]

## 2017-04-20 NOTE — PLAN OF CARE
Malu Reyes continues to have diarrhea/vomit/stomach cramping despite medication. CT scan showed no malignancy. Plan for PEG tube placement tomorrow morning. Will continue to monitor.

## 2017-04-20 NOTE — PLAN OF CARE
GASTROINTESTINAL - ADULT    • Minimal or absence of nausea and vomiting Not Progressing    • Maintains or returns to baseline bowel function Not Progressing          Patient/Family Goals    • Patient/Family Long Term Goal Progressing    • Patient/Family Sh

## 2017-04-21 ENCOUNTER — APPOINTMENT (OUTPATIENT)
Dept: GENERAL RADIOLOGY | Facility: HOSPITAL | Age: 72
DRG: 673 | End: 2017-04-21
Attending: HOSPITALIST
Payer: MEDICARE

## 2017-04-21 ENCOUNTER — APPOINTMENT (OUTPATIENT)
Dept: CT IMAGING | Facility: HOSPITAL | Age: 72
DRG: 673 | End: 2017-04-21
Attending: INTERNAL MEDICINE
Payer: MEDICARE

## 2017-04-21 ENCOUNTER — ANESTHESIA (OUTPATIENT)
Dept: ENDOSCOPY | Facility: HOSPITAL | Age: 72
DRG: 673 | End: 2017-04-21
Payer: MEDICARE

## 2017-04-21 PROCEDURE — 99223 1ST HOSP IP/OBS HIGH 75: CPT | Performed by: INTERNAL MEDICINE

## 2017-04-21 PROCEDURE — 74020 XR ABDOMEN, OBSTRUCTIVE SERIES (CPT=74020): CPT

## 2017-04-21 PROCEDURE — 0T9B70Z DRAINAGE OF BLADDER WITH DRAINAGE DEVICE, VIA NATURAL OR ARTIFICIAL OPENING: ICD-10-PCS | Performed by: RADIOLOGY

## 2017-04-21 PROCEDURE — 99233 SBSQ HOSP IP/OBS HIGH 50: CPT | Performed by: HOSPITALIST

## 2017-04-21 PROCEDURE — 74176 CT ABD & PELVIS W/O CONTRAST: CPT

## 2017-04-21 PROCEDURE — 71020 XR CHEST PA + LAT CHEST (CPT=71020): CPT

## 2017-04-21 PROCEDURE — 99232 SBSQ HOSP IP/OBS MODERATE 35: CPT | Performed by: INTERNAL MEDICINE

## 2017-04-21 RX ORDER — ONDANSETRON 2 MG/ML
8 INJECTION INTRAMUSCULAR; INTRAVENOUS EVERY 4 HOURS PRN
Status: DISCONTINUED | OUTPATIENT
Start: 2017-04-21 | End: 2017-05-02

## 2017-04-21 RX ORDER — HYDROMORPHONE HYDROCHLORIDE 1 MG/ML
1 INJECTION, SOLUTION INTRAMUSCULAR; INTRAVENOUS; SUBCUTANEOUS EVERY 2 HOUR PRN
Status: DISCONTINUED | OUTPATIENT
Start: 2017-04-21 | End: 2017-05-02

## 2017-04-21 RX ORDER — DEXTROSE AND SODIUM CHLORIDE 5; .45 G/100ML; G/100ML
INJECTION, SOLUTION INTRAVENOUS CONTINUOUS
Status: DISCONTINUED | OUTPATIENT
Start: 2017-04-21 | End: 2017-04-21

## 2017-04-21 RX ORDER — SODIUM CHLORIDE 9 MG/ML
INJECTION, SOLUTION INTRAVENOUS CONTINUOUS
Status: DISCONTINUED | OUTPATIENT
Start: 2017-04-21 | End: 2017-04-22

## 2017-04-21 RX ORDER — HEPARIN SODIUM 5000 [USP'U]/ML
5000 INJECTION, SOLUTION INTRAVENOUS; SUBCUTANEOUS EVERY 12 HOURS SCHEDULED
Status: DISCONTINUED | OUTPATIENT
Start: 2017-04-21 | End: 2017-04-23

## 2017-04-21 RX ORDER — HYDROMORPHONE HYDROCHLORIDE 1 MG/ML
0.5 INJECTION, SOLUTION INTRAMUSCULAR; INTRAVENOUS; SUBCUTANEOUS EVERY 2 HOUR PRN
Status: DISCONTINUED | OUTPATIENT
Start: 2017-04-21 | End: 2017-05-02

## 2017-04-21 NOTE — CONSULTS
[facility]    Report of Consultation    David Patterson Patient Status:  Inpatient    1945 MRN [id number]   Location [facility] 4W/SW/SE Attending Zain Echavarria Day # 3 PCP Sofia Joaquin,      Date of A Bilateral     OTHER SURGICAL HISTORY      Comment Injections and narcotics, POD Bartuci    ELECTROCARDIOGRAM, COMPLETE  09/26/2013    Comment Scanned to Media Tab    CHOLECYSTECTOMY  9/28/16     EGD N/A 12/14/2016    Comment Procedure: IBCYROCBERVTJMMOLHBV Intravenous Q6H PRN   cholestyramine light (PREVALITE) powder packet 4 g 4 g Oral BID   acetaminophen (TYLENOL) tab 650 mg 650 mg Oral Q6H PRN   Heparin Sodium (Porcine) 5000 UNIT/ML injection 5,000 Units 5,000 Units Subcutaneous Q8H   Atorvastatin Calcium palpitations  Respiratory: denies dyspnea, cough, wheezing, hemoptysis   GI: Right upper quadrant abdominal pain, nausea  : denies dysuria, hematuria  Musculoskeletal: denies arthralgia, myalgia  Integumentary: denies rash, itching  Neurological: denies residual contrast within the colon from recent CT examination FREE AIR:   No pneumoperitoneum. Metallic tipped feeding tube extends into the distal gastric antrum.  Surgical clips in right upper quadrant right midabdomen SOFT TISSUES: Residual bladder contr CHEST ABDOMEN PELVIS (ALL CONTRAST ONLY) (CPT=71260/39417)  COMPARISON: [facility], [facility]. for [facility], XR UPPER GI TRACT WITH KUB (AIR CONTRAST STOMACH) (CPT=74247), 12/08/2016, 7:52.   [facility], X UPPER GI H2O SOLUBLE, 9/29/2016, 7: STOMACH: There is suggestion of luminal narrowing of the consistent with a junction with evidence of prior hiatal hernia surgery. A nasogastric tube is present extending through the gastroesophageal junction with tip in the distal gastric body.   The esoph soft tissue thickening and luminal narrowing centered at the gastroesophageal junction. The majority of soft tissue thickening is also located in the proximal gastric body and fundus.   The esophagus is dilated with debris and contrast material although so for dose reduction was employed. FINDINGS: COMMENT: Evaluation of the vasculature and of the abdominal viscera for the presence of intraparenchymal pathology is suboptimal in the absence of contrast infusion. LUNG BASES: The heart is mildly enlarged.  Biat has also developed in the interim. Again seen is a linear radiodensity which appears to be embedded in the cecal wall (series 5, image 50).   Foci of mesenteric gas are present in the right lower quadrant, intervally new, and discussed in the VASCULATURE se subcutaneous edema is apparent dependently. OTHER: No definite free air is seen in the abdomen or pelvis, although there is mesenteric gas as above-described.     Dictated by (CST): Albert Austin MD on 4/21/2017 at 13:27     Approved by (CST): Margarita Chu Xr Chest Ap Portable  (cpt=71010)    4/18/2017  PROCEDURE: XR CHEST AP PORTABLE (CPT=71010) TIME: 11 AM.   COMPARISON: [facility] for [facility], XR UPPER GI TRACT WITH KUB (AIR CONTRAST STOMACH) (CPT=74247), 12/08/2016, 7:52.   [facility]. At 45 minutes into the study, there was minimal if any emptying of the gastric contents into the small intestine. The patient vomited a 45 minutes and the test was terminated.      Dictated by (CST): Edelmira Lentz M.D. on 3/29/2017 at 18:24

## 2017-04-21 NOTE — PLAN OF CARE
GASTROINTESTINAL - ADULT    • Minimal or absence of nausea and vomiting Not Progressing        PAIN - ADULT    • Verbalizes/displays adequate comfort level or patient's stated pain goal Not Progressing          GASTROINTESTINAL - ADULT    • Maintains or re

## 2017-04-21 NOTE — PROGRESS NOTES
[facility] Dosing Service  Antibiotic Dosing    Javan Lyn is a 74year old female for whom pharmacy is dosing zosyn for treatment of intra-abdominal infection.  Pharmacy to dose consult requested by [facility] MD.    Allergies: is allergic to adhesive t

## 2017-04-21 NOTE — PROGRESS NOTES
[facility] dosing service    Initial Pharmacokinetic Consult for Vancomycin Dosing     Florian Schwab is a 74year old female admitted on 4/21, who is being treated for sepsis. Pharmacy has been asked to dose Vancomycin by Dr. Javi CHAN.     She is all ug/mL.    3.  Pharmacy will need BUN/Scr daily while on Vancomycin to assess renal function. 4.  Pharmacy will follow and monitor renal function changes, toxicity and efficacy. Pharmacy will continue to follow her.   We appreciate the opportunity to a

## 2017-04-21 NOTE — CONSULTS
[address]  LZB:3/98/5492  GWX:[id number]  LOS:3    Date of Admission:  4/18/2017  Date of Consult:  4/21/2017     Reason for Consultation: Abdominal pain      History of Present Illness:  Emily Thomson EGD to ensure the wrap was not too tight and found stasis related esophagitis changes, but no resistance at the GE junction. He empirically dilated with a CRE-balloon to 15mm. Biopsies of the esophagus showed no infectious esophagitis.  She was started on c OTHER SURGICAL HISTORY      Comment Injections and narcotics, POD Bartuci    ELECTROCARDIOGRAM, COMPLETE  09/26/2013    Comment Scanned to Media Tab    CHOLECYSTECTOMY  9/28/16     EGD N/A 12/14/2016    Comment Procedure: ESOPHAGOGASTRODUODENOSCOPY (EGD PRN  •  Metoclopramide HCl (REGLAN) tab 5 mg, 5 mg, Oral, TID AC  •  Metoprolol Tartrate (LOPRESSOR) tab 50 mg, 50 mg, Oral, BID  •  NIFEdipine (PROCARDIA-XL) 24 hr tab, 90 mg, Oral, Daily  •  triamcinolone acetonide (KENALOG) 0.1 % cream, , Topical, BID P 1. 20  1.16  1.29   --    GFRAA  53*  55*  49*   --    GFRNAA  44*  46*  40*   --    CA  8.8  8.8  8.6   --    NA  141  138  138   --    K  4.8  4.1   --   4.0   CL  119*  116*  115*   --    CO2  18*  16*  16*   --          Lab Results  Component Value Simeon gastric body and fundus, correlate with endoscopy for possible gastritis or other inflammatory process. 2.  Thyroid nodules. Largest nodule is in the right lobe measuring 11 x 24 mm. Correlate with thyroid ultrasound.  3.  Stable 3 mm right upper lobe nallely No clinical changes. Will transfer to ICU for better monitoring. IVF bolus given and started on Vanco and Zosyn. D/w pt and her son. They understood possible need for surgery in am if no clinical improvement.   It is worthwhile to try conservative joseph

## 2017-04-21 NOTE — PROGRESS NOTES
[facility] [facility] - [facility]    Progress Note    Emsertreasure Aquinot Patient Status:  Inpatient    1945 MRN [id number]   Location [facility] 4W/SW/SE Attending Zain Echavarria Day # 3 PCP Liliya Rosa.  DO Jemal     Subjective: 04/21/2017    04/21/2017   K 4.0 04/21/2017   * 04/21/2017   CO2 16* 04/21/2017   * 04/21/2017   CA 8.6 04/21/2017   ALB 3.6 04/18/2017   ALKPHO 102* 04/18/2017   BILT 0.5 04/18/2017   TP 7.1 04/18/2017   AST 17 04/18/2017   ALT 15 04/18 measuring 11 x 24 mm. Correlate with thyroid ultrasound. 3.  Stable 3 mm right upper lobe micronodule, similar to prior imaging from 2006 compatible with scar. 4.  Post cholecystectomy. No biliary ductal dilatation.  5.  Colonic diverticulosis without inf

## 2017-04-21 NOTE — PROGRESS NOTES
[facility] - [facility]      Sepsis Reassessment Note    /47 mmHg  Pulse 106  Temp(Src) 97.9 °F (36.6 °C) (Oral)  Resp 20  Ht 5' 6\" (1.676 m)  Wt 251 lb (113.853 kg)  BMI 40.53 kg/m2  SpO2 99%     3:05 PM  Cor nl si s2 tachy    Lungs: clear

## 2017-04-21 NOTE — PLAN OF CARE
RN spoke with MD, Dr. Shantel Ferreira and Anthony CHAN regarding CT scan results, lactic acid results. MD wants patient to transfer to PCCU for closer supervision and for further sepsis workup. Patient started on ABX, 3.4 L bolus initiated.  VSS at this leah

## 2017-04-21 NOTE — PROGRESS NOTES
[facility]     Gastroenterology Progress Note    Florian Schwab Patient Status:  Inpatient    1945 MRN [id number]   Location [facility] 4W/SW/SE Attending Zain Echavarria [place] Day # 3 PCP Mauri Cuellar.  Jemal, via [address], then can consider G-tube. If does not tolerate, then will need PICC/TPN.      # esophageal and gastric dysmotility  # weight loss  # chronic diarrhea - bile acid induced  # chronic eructation  # poor PO intake  # TATIANA  # Luekocytosis  # Thyroid nodu 18.4* 04/21/2017   HGB 12.8 04/21/2017   HCT 39.8 04/21/2017    04/21/2017   CREATSERUM 1.29 04/21/2017   BUN 16 04/21/2017    04/21/2017   K  04/21/2017   Comment:   Test not reported due to hemolysis. Test reordered by laboratory.      CL 11

## 2017-04-21 NOTE — PROGRESS NOTES
[place] [facility] - [facility]    Progress Note    [facility] Patient Status:  Inpatient    1945 MRN [id number]   Location [facility] 2W/SW Attending Zain Echavarria Day # 3 PCP Jovany Hunt.  DO Jemal     Subjective:     HEN trevni suárez hussain, rads, nurse, ms denton and counseling pt and family  About seriousness illness; sepsis reassessment done            Results:     Lab Results  Component Value Date   WBC 18.4* 04/21/2017   HGB 12.8 04/21/2017   HCT 39.8 04/21/2017 gastric body. Correlate for possible scar/achalasia at the gastroesophageal junction.   Because there is nonspecific soft tissue thickening in the proximal gastric body and fundus, correlate with endoscopy for possible gastritis or other inflammatory proce morphology of the hepatobiliary tree are likely related to the postcholecystectomy state. 9. Distal colonic diverticulosis without CT evidence of acute complication. 10. Lesser incidental findings as above.     Results of this examination with respect to

## 2017-04-22 ENCOUNTER — APPOINTMENT (OUTPATIENT)
Dept: CT IMAGING | Facility: HOSPITAL | Age: 72
DRG: 673 | End: 2017-04-22
Attending: INTERNAL MEDICINE
Payer: MEDICARE

## 2017-04-22 ENCOUNTER — APPOINTMENT (OUTPATIENT)
Dept: GENERAL RADIOLOGY | Facility: HOSPITAL | Age: 72
DRG: 673 | End: 2017-04-22
Attending: INTERNAL MEDICINE
Payer: MEDICARE

## 2017-04-22 ENCOUNTER — APPOINTMENT (OUTPATIENT)
Dept: INTERVENTIONAL RADIOLOGY/VASCULAR | Facility: HOSPITAL | Age: 72
DRG: 673 | End: 2017-04-22
Attending: SURGERY
Payer: MEDICARE

## 2017-04-22 ENCOUNTER — APPOINTMENT (OUTPATIENT)
Dept: CV DIAGNOSTICS | Facility: HOSPITAL | Age: 72
DRG: 673 | End: 2017-04-22
Attending: HOSPITALIST
Payer: MEDICARE

## 2017-04-22 ENCOUNTER — APPOINTMENT (OUTPATIENT)
Dept: GENERAL RADIOLOGY | Facility: HOSPITAL | Age: 72
DRG: 673 | End: 2017-04-22
Attending: SURGERY
Payer: MEDICARE

## 2017-04-22 PROBLEM — K55.039 ACUTE ISCHEMIC COLITIS (HCC): Status: ACTIVE | Noted: 2017-04-22

## 2017-04-22 PROCEDURE — 93306 TTE W/DOPPLER COMPLETE: CPT

## 2017-04-22 PROCEDURE — 71010 XR CHEST AP PORTABLE  (CPT=71010): CPT

## 2017-04-22 PROCEDURE — 74174 CTA ABD&PLVS W/CONTRAST: CPT

## 2017-04-22 PROCEDURE — B4241ZZ COMPUTERIZED TOMOGRAPHY (CT SCAN) OF SUPERIOR MESENTERIC ARTERY USING LOW OSMOLAR CONTRAST: ICD-10-PCS | Performed by: RADIOLOGY

## 2017-04-22 PROCEDURE — B4211ZZ COMPUTERIZED TOMOGRAPHY (CT SCAN) OF CELIAC ARTERY USING LOW OSMOLAR CONTRAST: ICD-10-PCS | Performed by: RADIOLOGY

## 2017-04-22 PROCEDURE — 99233 SBSQ HOSP IP/OBS HIGH 50: CPT | Performed by: HOSPITALIST

## 2017-04-22 PROCEDURE — 99232 SBSQ HOSP IP/OBS MODERATE 35: CPT | Performed by: INTERNAL MEDICINE

## 2017-04-22 PROCEDURE — B4201ZZ COMPUTERIZED TOMOGRAPHY (CT SCAN) OF ABDOMINAL AORTA USING LOW OSMOLAR CONTRAST: ICD-10-PCS | Performed by: RADIOLOGY

## 2017-04-22 PROCEDURE — 02H633Z INSERTION OF INFUSION DEVICE INTO RIGHT ATRIUM, PERCUTANEOUS APPROACH: ICD-10-PCS | Performed by: HOSPITALIST

## 2017-04-22 PROCEDURE — 93306 TTE W/DOPPLER COMPLETE: CPT | Performed by: INTERNAL MEDICINE

## 2017-04-22 PROCEDURE — 99233 SBSQ HOSP IP/OBS HIGH 50: CPT | Performed by: INTERNAL MEDICINE

## 2017-04-22 PROCEDURE — B4281ZZ COMPUTERIZED TOMOGRAPHY (CT SCAN) OF BILATERAL RENAL ARTERIES USING LOW OSMOLAR CONTRAST: ICD-10-PCS | Performed by: RADIOLOGY

## 2017-04-22 RX ORDER — HEPARIN SODIUM AND DEXTROSE 10000; 5 [USP'U]/100ML; G/100ML
1000 INJECTION INTRAVENOUS ONCE
Status: DISCONTINUED | OUTPATIENT
Start: 2017-04-22 | End: 2017-04-27

## 2017-04-22 RX ORDER — SODIUM CHLORIDE 9 MG/ML
INJECTION, SOLUTION INTRAVENOUS CONTINUOUS
Status: DISCONTINUED | OUTPATIENT
Start: 2017-04-22 | End: 2017-04-23

## 2017-04-22 RX ORDER — LIDOCAINE HYDROCHLORIDE 20 MG/ML
INJECTION, SOLUTION EPIDURAL; INFILTRATION; INTRACAUDAL; PERINEURAL
Status: COMPLETED
Start: 2017-04-22 | End: 2017-04-22

## 2017-04-22 RX ORDER — HEPARIN SODIUM AND DEXTROSE 10000; 5 [USP'U]/100ML; G/100ML
INJECTION INTRAVENOUS CONTINUOUS
Status: DISCONTINUED | OUTPATIENT
Start: 2017-04-22 | End: 2017-04-27

## 2017-04-22 RX ORDER — SODIUM CHLORIDE 9 MG/ML
INJECTION, SOLUTION INTRAVENOUS
Status: DISPENSED
Start: 2017-04-22 | End: 2017-04-23

## 2017-04-22 RX ORDER — NITROGLYCERIN 0.4 MG/1
0.4 TABLET SUBLINGUAL EVERY 5 MIN PRN
Status: DISCONTINUED | OUTPATIENT
Start: 2017-04-22 | End: 2017-05-02

## 2017-04-22 RX ORDER — LIDOCAINE HYDROCHLORIDE 10 MG/ML
0.5 INJECTION, SOLUTION INFILTRATION; PERINEURAL ONCE AS NEEDED
Status: ACTIVE | OUTPATIENT
Start: 2017-04-22 | End: 2017-04-22

## 2017-04-22 RX ORDER — HEPARIN SODIUM 1000 [USP'U]/ML
5000 INJECTION, SOLUTION INTRAVENOUS; SUBCUTANEOUS ONCE
Status: COMPLETED | OUTPATIENT
Start: 2017-04-22 | End: 2017-04-22

## 2017-04-22 RX ORDER — SODIUM CHLORIDE 0.9 % (FLUSH) 0.9 %
10 SYRINGE (ML) INJECTION AS NEEDED
Status: DISCONTINUED | OUTPATIENT
Start: 2017-04-22 | End: 2017-05-02

## 2017-04-22 RX ORDER — SODIUM CHLORIDE 9 MG/ML
INJECTION, SOLUTION INTRAVENOUS
Status: COMPLETED
Start: 2017-04-22 | End: 2017-04-22

## 2017-04-22 RX ORDER — 0.9 % SODIUM CHLORIDE 0.9 %
VIAL (ML) INJECTION
Status: COMPLETED
Start: 2017-04-22 | End: 2017-04-22

## 2017-04-22 NOTE — CONSULTS
[facility]    Report of Consultation    Radha Michel Patient Status:  Inpatient    1945 MRN [id number]   [facility] 2W/SW Attending Zain Echavarriaissa Day # 4 PCP Rossana Upton.  DO Jemal     Date of Ad NOSE      Comment Basal cell carcinoma    REMOVAL OF HEEL SPUR      KNEE ARTHROSCOPY Right     CARPAL TUNNEL RELEASE Bilateral     OTHER SURGICAL HISTORY      Comment Injections and narcotics, POD Bartuci    ELECTROCARDIOGRAM, COMPLETE  09/26/2013    Comme R) 100 UNIT/ML injection 1-5 Units, 1-5 Units, Subcutaneous, Q6H  •  Piperacillin Sod-Tazobactam So (ZOSYN) 4.5 g in dextrose 5 % 100 mL IVPB, 4.5 g, Intravenous, Q8H  •  Vancomycin HCl (VANCOCIN) 1,000 mg in sodium chloride 0.9 % 250 mL IVPB add-vantage, Laboratory Data:    Lab Results  Component Value Date   WBC 14.5 04/22/2017   HGB 11.5 04/22/2017   HCT 36.0 04/22/2017    04/22/2017   CREATSERUM 1.18 04/22/2017   BUN 18 04/22/2017    04/22/2017   K 4.4 04/22/2017    04/22/2017 likely, either thromboembolism (cardiac source) or atheroembolism has occurred resulting in distal branch ischemia of the right colic artery.   In light of this and the fact that the patient cannot comfortably remain supine without inducing bilious emesis,

## 2017-04-22 NOTE — PROGRESS NOTES
[facility]    General Surgery Progress Note  Lexa Tom Green  UR  HD# 4       Subjective:   Feels same as yesterday c/o abdominal pain, no N/V  Passing flatus      Exam:     General: awake and alert and in no acute distress  Pulmona CONCLUSION:  1. Nonspecific nonobstructive abdominal gas pattern. No pneumoperitoneum 2. Small amount of residual contrast within the colon from recent CT. 3. Residual intravenous contrast in the bladder lumen.  4. Metallic tipped feeding tube extends into complication. 10. Lesser incidental findings as above. Results of this examination with respect to conclusion #1-3 were discussed with the patient's physician, Dr. Dot Morse, by Dr. Brinda Alvarado at [phone] on 04/21/2017.  These were also discussed with th

## 2017-04-22 NOTE — PROGRESS NOTES
[place] [facility] - [facility]    Progress Note    Huber Acosta Patient Status:  Inpatient    1945 MRN [id number]   Location [facility] 2W/SW Attending Zain Echavarria Day # 4 PCP Farzana Joaquin DO     Subjective:     Res seriousness illness cardiology eval now ccu  Dr Maryellen Smith primary      Results:     Lab Results  Component Value Date   WBC 14.5* 04/22/2017   HGB 11.5* 04/22/2017   HCT 36.0 04/22/2017    04/22/2017   CREATSERUM 1.18 04/22/2017   BUN 18 04/2 gastroesophageal junction. Because there is nonspecific soft tissue thickening in the proximal gastric body and fundus, correlate with endoscopy for possible gastritis or other inflammatory process. 2.  Thyroid nodules.   Largest nodule is in the right lob postcholecystectomy state. 9. Distal colonic diverticulosis without CT evidence of acute complication. 10. Lesser incidental findings as above.     Results of this examination with respect to conclusion #1-3 were discussed with the patient's physician,

## 2017-04-22 NOTE — PLAN OF CARE
GASTROINTESTINAL - ADULT    • Minimal or absence of nausea and vomiting Not Progressing    • Maintains or returns to baseline bowel function Not Progressing          Diabetes/Glucose Control    • Glucose maintained within prescribed range Progressing

## 2017-04-22 NOTE — CONSULTS
[facility]    PATIENT'S NAME: Ru DAVIS 53 PHYSICIAN: Arturo Echavarria MD   CONSULTING PHYSICIAN: Arnoldo Todd DO   PATIENT ACCOUNT#:   [de-identified]    LOCATION:  [address] [place] [address] #:   [id number]       DATE OF BIRTH:  [birth year] GENERAL:  No acute distress. VITAL SIGNS:  Blood pressure 123/65, pulse 100 and irregular. Patient is afebrile currently, yesterday up to 101. Respirations are 17.   Weight is 265 pounds, unclear if this is accurate; yesterday's weight is listed at 251 scan of abdomen with contrast and further workup for her ischemic bowel is pending. If there are no urgent procedures planned, we could start heparin weight-based protocol for now and monitor her clinical status.   If there is any confirmatory testing that

## 2017-04-22 NOTE — PROGRESS NOTES
[facility]  Progress Note    Ton Ann Patient Status:  Inpatient    1945 MRN [id number]   [facility] 2W/SW Attending Zain Echavarriaissa Day # 4 PCP Segundo Terry.  Zonia Lindstrom, DO     Subjective:  Ton Ann is a(n without nausea     Acute ischemic colitis [facility])      The patient is a 66-year-old female who has acute ischemic colitis in the cecal region. Undergoing evaluation and treatment at this time. She seems to stabilize to some extent in the ICU.   Pain is cont

## 2017-04-22 NOTE — PROGRESS NOTES
[place] [facility] - [facility]     Progress Note        Ajay Rosario Patient Status:  Inpatient    1945 MRN [id number]   Location [facility] 2W/SW Attending Zain Echavarriaissa Day # 4 PCP Anastasia Joaquin DO       Subjective: 10 mg 10 mg Intravenous Q6H PRN   acetaminophen (TYLENOL) tab 650 mg 650 mg Oral Q6H PRN   diphenoxylate-atropine (LOMOTIL) 2.5-0.025 MG per tab 1 tablet 1 tablet Oral TID PRN   triamcinolone acetonide (KENALOG) 0.1 % cream  Topical BID PRN   dextrose 10 % laminectomy changes L5-S1 OTHER: Negative. Dictated by (CST): Elmer Arango M.D. on 4/21/2017 at 9:10     Approved by (CST): Elmer Arango M.D. on 4/21/2017 at 9:15          4/21/2017  CONCLUSION:  1.  Nonspecific nonobstructive abdominal ga 10:56. [facility], [facility]. [place], [place] PF CHEST W CONTRAST*, 3/21/2006, 10:55. [facility], CT CHEST PAIN/PE W CONTRAST, 6/18/2014, 17:57. INDICATIONS: Weight loss, vomiting.   TECHNIQUE: CT images of the chest, abdomen and pelvis and contrast material.  There is nonspecific soft tissue thickening in the proximal gastric body and fundus, and the rest the stomach is distended with oral contrast material. PANCREAS: No lesion, fluid collection, ductal dilatation, or atrophy.   BILIARY: is present with tip in the distal gastric body. Correlate for possible scar/achalasia at the gastroesophageal junction.   Because there is nonspecific soft tissue thickening in the proximal gastric body and fundus, correlate with endoscopy for possible ga is dependent subsegmental atelectasis bilaterally. Scattered partially confluent reticular bibasilar opacities are likely  atelectatic. KIDNEYS:   No renal or ureteral calculi are identified. There is no hydronephrosis or hydroureter.  No perinephric or per lower quadrant, which is new from the prior exam.  A normal caliber appendix is seen without inflammatory manifestations. Extensive colonic diverticula are present throughout the descending colon and in the sigmoid colon.  There is no suspicious primary col extensive pericecal wall thickening, new pericecal fat stranding, small volume ascites, and mesenteric venous gas which is predominantly in the right lower quadrant. Collectively, these findings are highly suspicious for bowel ischemia.   2. Extensive marizol line placement confirmation. TECHNIQUE:   Single view. FINDINGS:  CARDIAC/VASC: Mild stable cardiomegaly. Central vascular congestion MEDIAST/CAMACHO:   No visible mass or adenopathy.   Atherosclerotic aorta without aneurysm LUNGS/PLEURA: Poor inspiratory e redistribution. MEDIAST/CAMACHO:   Camacho are prominent. No visible mass or adenopathy. Aorta is calcified. LUNGS/PLEURA: There are coarse perihilar and bibasilar bronchovascular lung markings, chronic. No acute infiltrates or effusions. BONES: Stable.  OTHER: Fe -------------------------- Atrial fibrillation - Diffuse nonspecific T-abnormality. Low voltage -possible pulmonary disease.  ABNORMAL When compared with ECG of 01/25/2017 11:47:36 No significant changes have occurred Electronically signed on 04/21/2017 at

## 2017-04-23 PROCEDURE — 99233 SBSQ HOSP IP/OBS HIGH 50: CPT | Performed by: INTERNAL MEDICINE

## 2017-04-23 PROCEDURE — 99232 SBSQ HOSP IP/OBS MODERATE 35: CPT | Performed by: INTERNAL MEDICINE

## 2017-04-23 RX ORDER — ACETAMINOPHEN 325 MG/1
650 TABLET ORAL EVERY 6 HOURS PRN
Status: DISCONTINUED | OUTPATIENT
Start: 2017-04-23 | End: 2017-05-02

## 2017-04-23 RX ORDER — METOPROLOL TARTRATE 50 MG/1
50 TABLET, FILM COATED ORAL
Status: DISCONTINUED | OUTPATIENT
Start: 2017-04-23 | End: 2017-05-02

## 2017-04-23 RX ORDER — DEXTROSE, SODIUM CHLORIDE, SODIUM LACTATE, POTASSIUM CHLORIDE, AND CALCIUM CHLORIDE 5; .6; .31; .03; .02 G/100ML; G/100ML; G/100ML; G/100ML; G/100ML
INJECTION, SOLUTION INTRAVENOUS CONTINUOUS
Status: DISCONTINUED | OUTPATIENT
Start: 2017-04-23 | End: 2017-04-30 | Stop reason: ALTCHOICE

## 2017-04-23 NOTE — PROGRESS NOTES
[facility] dosing service    Follow-up Pharmacokinetic Consult for Vancomycin Dosing     Rossana Jack is a 74year old female admitted on 4/18 who is being treated for pneumonia and sepsis. Patient is on day 3 of Vancomycin 1 gm IV Q 12 hours.   Goal tr chronic CHF. 3. Small effusions. Bibasilar atelectasis and or partial consolidation. Findings may reflect sequela of acute exacerbation of chronic CHF although appearance on the left is also suspicious for pneumonia. Consider aspiration pneumonia.  Jeff Bojorquez

## 2017-04-23 NOTE — PROGRESS NOTES
[facility]  Progress Note    Mindy Pantoja Patient Status:  Inpatient    1945 MRN [id number]   Location [facility] 2W/SW Attending Zain Echavarria [place] Day # 5 PCP Nina Jeff.  Bebeto Quiros DO     Subjective:  Mindy Pantoja is a(n Acute ischemic colitis [facility])      The patient is a 78-year-old female who has acute ischemic colitis in the cecal region. Undergoing evaluation and treatment at this time. She seems to stabilize to some extent in the ICU. Surgery is following.     Plan c

## 2017-04-23 NOTE — PROGRESS NOTES
[place] [facility] - [facility]     Progress Note        [facility] Patient Status:  Inpatient    1945 MRN [id number]   Location [facility] 2W/SW Attending Zain Echavarria Day # 5 PCP Julieta Horowitz.  DO Jemal       Subjective: (VANCOCIN) 1,000 mg in sodium chloride 0.9 % 250 mL IVPB add-vantage 1,000 mg Intravenous Q12H   Heparin Sodium (Porcine) 5000 UNIT/ML injection 5,000 Units 5,000 Units Subcutaneous 2 times per day   Metoclopramide HCl (REGLAN) injection 10 mg 10 mg Joy Cain abdominal gas pattern. Small amount of residual contrast within the colon from recent CT examination FREE AIR:   No pneumoperitoneum. Metallic tipped feeding tube extends into the distal gastric antrum.  Surgical clips in right upper quadrant right midabdom Only)(hll=20187/68098)    4/20/2017  PROCEDURE: CT CHEST ABDOMEN PELVIS (ALL CONTRAST ONLY) (CPT=71260/96173)  COMPARISON: [facility], [facility]. for [facility], XR UPPER GI TRACT WITH KUB (AIR CONTRAST STOMACH) (CPT=74247), 12/08/2016, 7:52.   [place] [facility] lesion. SPLEEN: No enlargement or focal lesion. STOMACH: There is suggestion of luminal narrowing of the consistent with a junction with evidence of prior hiatal hernia surgery.   A nasogastric tube is present extending through the gastroesophageal juncti gastroesophageal junction. However, there is nonspecific soft tissue thickening and luminal narrowing centered at the gastroesophageal junction. The majority of soft tissue thickening is also located in the proximal gastric body and fundus.   The esophagu on the patient's size. Iterative reconstruction technique for dose reduction was employed.   FINDINGS: COMMENT: Evaluation of the vasculature and of the abdominal viscera for the presence of intraparenchymal pathology is suboptimal in the absence of contras thickening of the cecum. Extensive pericecal fat stranding has also developed in the interim. Again seen is a linear radiodensity which appears to be embedded in the cecal wall (series 5, image 50).   Foci of mesenteric gas are present in the right lower qu is fatty atrophy of the rectus musculature. Diffuse subcutaneous edema is apparent dependently. OTHER: No definite free air is seen in the abdomen or pelvis, although there is mesenteric gas as above-described.     Dictated by (CST): Branden Dean MD on 4 Dr. Farzaneh Leach, at [phone], on the same day.        Xr Chest Ap Portable  (cpt=71010)    4/22/2017  PROCEDURE: XR CHEST AP PORTABLE (CPT=71010) TIME: 0923 hours  COMPARISON: [facility], CT ABDOMEN + PELVIS KIDNEYSTONE 2D RNDR (NO IV NO ORA STOMACH) (CPT=74247), 12/08/2016, 7:52. [facility], X CHEST PA LAT ROUTINE, 9/22/2016, 9:35. [facility], XR CHEST PA + LAT CHEST (CPT=71020), 12/19/2016, 8:52.   INDICATIONS: Dobhoff tube by (CST): Supa Fernandez M.D. on 3/29/2017 at 18:24     Approved by (CST): Supa Fernandez M.D. on 3/29/2017 at 18:39          3/29/2017  CONCLUSION: Incomplete but abnormal gastric emptying study demonstrating delayed emptying of the esophagus into the for possible underlying ischemia.  Continue empiric Zosyn and vancomycin at this time.  Lactic acidosis currently resolved .   -Antiemetic therapy  -Rate control for atrial fibrillation at this time. Will need to hold anticougulation if surgery planned.

## 2017-04-23 NOTE — PROGRESS NOTES
[facility]    General Surgery Progress Note  Mardel Market  MCE:[id number]  # 5       Subjective:   Feels better, less abdominal pain, no N/V, very thirsty  Passing flatus      Exam:     General: awake and alert and in no acute distress  [id number]  Last data filed at 04/23/17 0600   Gross per 24 hour   Intake   1257 ml   Output   1820 ml   Net   -563 ml       Xr Chest Ap Portable  (cpt=71010)    4/22/2017  CONCLUSION:  1. PICC catheter has been withdrawn.  The tip is now seen at the cavoatrial j exacerbation of chronic CHF although appearance on the left is also suspicious for pneumonia. Consider aspiration pneumonia. Followup studies are recommended.      Dictated by (CST): Bert Magana MD on 4/22/2017 at 10:18     Approved by (CST): [place] diverticulosis. 9. Previously described high density material in vaginal cuff is no longer detected. 10. Garber catheter decompressing the bladder. 11. Postcholecystectomy and hysterectomy.  12. Atherosclerotic vascular calcification including coronary arter

## 2017-04-23 NOTE — PLAN OF CARE
GASTROINTESTINAL - ADULT    • Minimal or absence of nausea and vomiting Not Progressing    • Maintains or returns to baseline bowel function Not Progressing          CARDIOVASCULAR - ADULT    • Maintains optimal cardiac output and hemodynamic stability Pro

## 2017-04-23 NOTE — PROGRESS NOTES
Patient seen in follow up. Patient denies any chest pain or sob. Denies palpitations. Denies abdominal pain. Vomting earlier of her clears.     04/23/17  1400   BP: 154/60   Pulse: 131   Temp:    Resp: 21       Intake/Output Summary (Last 24 hours) at Insulin Regular Human (NOVOLIN R) 100 UNIT/ML injection 1-5 Units 1-5 Units Subcutaneous Q6H   Piperacillin Sod-Tazobactam So (ZOSYN) 4.5 g in dextrose 5 % 100 mL IVPB 4.5 g Intravenous Q8H   Metoclopramide HCl (REGLAN) injection 10 mg 10 mg Intravenous Q6 4/22/2017  This report includes an Addendum and supersedes previous reports for this exam.    PROCEDURE: XR CHEST AP PORTABLE (CPT=71010) TIME: 0923 hours  COMPARISON: [facility], CT ABDOMEN + PELVIS KIDNEYSTONE 2D RNDR (NO IV NO ORAL) (CPT 4/22/2017  CONCLUSION:  1. Interval placement of left-sided PICC catheter with the tip in the superior vena cava. 2. Cardiomegaly. Central vascular congestion with probable chronic CHF. 3. Small effusions.  Bibasilar atelectasis and or partial consolidation HGB 10.6 04/23/2017   HCT 32.2 04/23/2017    04/23/2017   CREATSERUM 1.19 04/23/2017   BUN 19 04/23/2017    04/23/2017   K 3.6 04/23/2017    04/23/2017   CO2 17 04/23/2017    04/23/2017   CA 8.2 04/23/2017   ALB 2.1 04/23/2017   A Restart oral metoprolol for better HR control. Monitor Hgb which is downtrending. She is in CHF by exam and CXR. She is only on clear liquids recently recovering from dehydration. Will need diuresis but needs to be delayed due to above.  Will start whenever

## 2017-04-24 PROCEDURE — 99232 SBSQ HOSP IP/OBS MODERATE 35: CPT | Performed by: INTERNAL MEDICINE

## 2017-04-24 PROCEDURE — 99233 SBSQ HOSP IP/OBS HIGH 50: CPT | Performed by: INTERNAL MEDICINE

## 2017-04-24 NOTE — DISCHARGE PLANNING
PEMA following up on d/c planning for the patient. PEMA met with her and her family at bedside. She lives with her daughter and has been independent and driving. She does use a cane in the community and has a h/o outpatient therapy.   Patient is in CCU at th

## 2017-04-24 NOTE — PROGRESS NOTES
[facility]    General Surgery Progress Note  Javan Lyn  EUGENIO:[id number]  # 6       Subjective:   Feels better, less abdominal pain, no N/V, in good spirits  Passing flatus      Exam:     General: awake and alert and in no acute distress 3999   Gross per 24 hour   Intake   2465 ml   Output   1200 ml   Net   1265 ml             Results:     Recent Labs   Lab  04/22/17   1824  04/23/17   0518  04/24/17   0534   RBC  3.38*  3.30*  3.49*   HGB  10.7*  10.6*  11.3*   HCT  33.2*  32.2*  33.9*

## 2017-04-24 NOTE — PROGRESS NOTES
[facility]    Progress Note      Assessment and Plan:   1. Ischemic colitis–conservative approach is advised. The patient also has gastric motility problems and may benefit from PEG tube insertion. There is no active nausea and vomiting. 04/24/2017   PHOS 2.2 04/24/2017     Chest x-ray–mild edema    Chyrl Boas, MD  Medical Director, Critical Care, [address] [place] [address]  Medical Director, [facility]  Pager: [phone]

## 2017-04-24 NOTE — PHYSICAL THERAPY NOTE
PHYSICAL THERAPY EVALUATION - INPATIENT     Room Number: 216/216-A  Evaluation Date: 4/24/2017  Type of Evaluation: Initial  Physician Order: PT Eval and Treat    Presenting Problem: acute ischemic colitis  Reason for Therapy: Mobility Dysfunction and since has had vomiting and weight loss. She has lost 130 lbs over the last year. She also developed chronic diarrhea 4-5 x per day over the last several months.     Problem List  Principal Problem:    Acute ischemic colitis [facility])  Active Problems:    Dehydr like to get OOB\"    PHYSICAL THERAPY EXAMINATION     OBJECTIVE     Fall Risk: High fall risk    WEIGHT BEARING RESTRICTION  Weight Bearing Restriction: None                PAIN ASSESSMENT  Ratin          COGNITION  · Overall Cognitive Status:  WFL - w session. Bed Mobility: min A for bed mobility with tactile cues to lift to sitting position and self assist with bed rails to lift to sitting position at the EOB.     Transfers:CGA for transfers with assist to facilitate forward lean and hip extension

## 2017-04-24 NOTE — DIETARY NOTE
ADULT NUTRITION RE-ASSESSMENT    Pt is at high nutrition risk. Pt does not meet malnutrition criteria. RECOMMENDATIONS TO MD: RD to order and manage tube feeding. MD ordered begin at 5 ml/hr and not to advance per RN at rounds today.  Replace Mg++ and and transfer of nutrition care to new setting or provider: monitor plans    ADMITTING DIAGNOSIS: Dehydration, weight loss, Esophagitis    PERTINENT PAST MEDICAL HISTORY:   Past Medical History   Diagnosis Date   • Depression    • HTN (hypertension)    • Ac 04/22/17  1824 04/23/17  0518 04/24/17  0534   GLU  --  125* 122* 120* 98   BUN  --  18 21* 19 15   CREATSERUM  --  1.18 1.19 1.19 1.25   CA  --  8.4* 8.3* 8.2* 8.3*   MG 2.3 1.9  --  1.7*  --    NA  --  140 139 139 139   K 4.0 4.4 3.9 3.6 3.4   CL  --  11

## 2017-04-24 NOTE — PROGRESS NOTES
04/24/17 1617   Clinical Encounter Type   Visited With Patient   Routine Visit Introduction   Continue Visiting Yes   Surgical Visit Pre-op   Patient's Supportive Strategies/Resources children, grandchildren, great grandchildren, friends   Patient Dilcia Gilliam

## 2017-04-24 NOTE — PROGRESS NOTES
[facility]     Gastroenterology Progress Note    Yany Jason Patient Status:  Inpatient    1945 MRN [id number]   Location [facility] 4W/SW/SE Attending [place], [facility] Day # 6 PCP Asha Joaquin, induced  # chronic eructation  # poor PO intake  # TATIANA  # Luekocytosis  # Thyroid nodules -- needs US  # Afib/CHF    4/24 - ab pain resolved, she feels better from two days ago when I saw her. She is on heparin gtt. Lactic acid normalized.  I would Greg Mccrary cecum with ulcerations and posterior wall of the cecum associated with focal cecal pneumatosis either related to cecal ischemia or infarct, and a distal ileal ischemia similar to previous exam. 4. Small amount of generalized ascites has increased.  5. Small

## 2017-04-24 NOTE — PROGRESS NOTES
Patient seen in follow up. Patient denies any chest pain or sob. Denies palpitations. Denies abdominal pain. Vomting earlier of her clears. Discussed with nurse  Chart reviewed.    04/24/17  1200   BP: 139/78   Pulse: 96   Temp: 97.7 °F (36.5 °C)   R Insulin Regular Human (NOVOLIN R) 100 UNIT/ML injection 1-5 Units 1-5 Units Subcutaneous Q6H   Piperacillin Sod-Tazobactam So (ZOSYN) 4.5 g in dextrose 5 % 100 mL IVPB 4.5 g Intravenous Q8H   Metoclopramide HCl (REGLAN) injection 10 mg 10 mg Intravenous Q6 4/22/2017  CONCLUSION:  1. Patent celiac, superior mesenteric, inferior mesenteric arteries. Mild-moderate celiac artery origin stenosis. Mild superior mesenteric artery origin stenosis. Major SMA branches patent.  2. Resolution of portal venous gas and mes 1.      Atrial fibrillation noted first on an EKG in January 2017.  Patient was previously on metoprolol succinate 100 mg daily.  Patient is currently n.p.o.  Prior to this, the patient had dysmotility disorder and she would \"spit up\" her medications.  P Mardene Goodpasture, MD [facility] [address] [address] [facility]  Phone: [phone]  [email or web address]

## 2017-04-25 ENCOUNTER — SURGERY (OUTPATIENT)
Age: 72
End: 2017-04-25

## 2017-04-25 ENCOUNTER — APPOINTMENT (OUTPATIENT)
Dept: GENERAL RADIOLOGY | Facility: HOSPITAL | Age: 72
DRG: 673 | End: 2017-04-25
Attending: CLINICAL NURSE SPECIALIST
Payer: MEDICARE

## 2017-04-25 PROBLEM — K25.9 MULTIPLE GASTRIC ULCERS: Status: ACTIVE | Noted: 2017-04-25

## 2017-04-25 PROCEDURE — 71010 XR CHEST AP PORTABLE  (CPT=71010): CPT

## 2017-04-25 PROCEDURE — 99233 SBSQ HOSP IP/OBS HIGH 50: CPT | Performed by: INTERNAL MEDICINE

## 2017-04-25 PROCEDURE — 43239 EGD BIOPSY SINGLE/MULTIPLE: CPT | Performed by: INTERNAL MEDICINE

## 2017-04-25 PROCEDURE — 0DB68ZX EXCISION OF STOMACH, VIA NATURAL OR ARTIFICIAL OPENING ENDOSCOPIC, DIAGNOSTIC: ICD-10-PCS | Performed by: INTERNAL MEDICINE

## 2017-04-25 RX ORDER — POTASSIUM CHLORIDE 14.9 MG/ML
20 INJECTION INTRAVENOUS ONCE
Status: COMPLETED | OUTPATIENT
Start: 2017-04-25 | End: 2017-04-25

## 2017-04-25 RX ORDER — SODIUM CHLORIDE, SODIUM LACTATE, POTASSIUM CHLORIDE, CALCIUM CHLORIDE 600; 310; 30; 20 MG/100ML; MG/100ML; MG/100ML; MG/100ML
INJECTION, SOLUTION INTRAVENOUS CONTINUOUS PRN
Status: DISCONTINUED | OUTPATIENT
Start: 2017-04-25 | End: 2017-04-25 | Stop reason: SURG

## 2017-04-25 RX ORDER — NALOXONE HYDROCHLORIDE 0.4 MG/ML
80 INJECTION, SOLUTION INTRAMUSCULAR; INTRAVENOUS; SUBCUTANEOUS AS NEEDED
Status: DISCONTINUED | OUTPATIENT
Start: 2017-04-25 | End: 2017-04-25 | Stop reason: HOSPADM

## 2017-04-25 RX ORDER — LIDOCAINE HYDROCHLORIDE 10 MG/ML
INJECTION, SOLUTION EPIDURAL; INFILTRATION; INTRACAUDAL; PERINEURAL AS NEEDED
Status: DISCONTINUED | OUTPATIENT
Start: 2017-04-25 | End: 2017-04-25 | Stop reason: SURG

## 2017-04-25 RX ORDER — SODIUM CHLORIDE, SODIUM LACTATE, POTASSIUM CHLORIDE, CALCIUM CHLORIDE 600; 310; 30; 20 MG/100ML; MG/100ML; MG/100ML; MG/100ML
INJECTION, SOLUTION INTRAVENOUS CONTINUOUS
Status: DISCONTINUED | OUTPATIENT
Start: 2017-04-25 | End: 2017-04-28

## 2017-04-25 RX ORDER — ONDANSETRON 2 MG/ML
4 INJECTION INTRAMUSCULAR; INTRAVENOUS ONCE AS NEEDED
Status: DISCONTINUED | OUTPATIENT
Start: 2017-04-25 | End: 2017-04-25 | Stop reason: HOSPADM

## 2017-04-25 RX ORDER — POTASSIUM CHLORIDE 29.8 MG/ML
40 INJECTION INTRAVENOUS ONCE
Status: COMPLETED | OUTPATIENT
Start: 2017-04-25 | End: 2017-04-25

## 2017-04-25 RX ADMIN — SODIUM CHLORIDE, SODIUM LACTATE, POTASSIUM CHLORIDE, CALCIUM CHLORIDE: 600; 310; 30; 20 INJECTION, SOLUTION INTRAVENOUS at 15:30:00

## 2017-04-25 RX ADMIN — LIDOCAINE HYDROCHLORIDE 50 MG: 10 INJECTION, SOLUTION EPIDURAL; INFILTRATION; INTRACAUDAL; PERINEURAL at 15:05:00

## 2017-04-25 RX ADMIN — SODIUM CHLORIDE, SODIUM LACTATE, POTASSIUM CHLORIDE, CALCIUM CHLORIDE: 600; 310; 30; 20 INJECTION, SOLUTION INTRAVENOUS at 15:04:00

## 2017-04-25 NOTE — ANESTHESIA PREPROCEDURE EVALUATION
Anesthesia PreOp Note    HPI:     Daly Torres is a 74year old female who presents for preoperative consultation requested by: Keri Gupta MD    Date of Surgery: 4/18/2017 - 4/25/2017    Procedure(s):  ESOPHAGOGASTRODUODENOSCOPY (EGD)  PERCUTANEOUS E venous throm[facility]embolism), [facility]     Left   • Osteoarthritis    • Heel spur      Right   • Bilateral carpal tunnel syndrome    • Hiatal hernia    • Lumbar disc disease 2012   • Esophageal reflux    • High blood pressure    • High cholesterol    • Tartrate 50 MG Oral Tab Take 1 tablet (50 mg total) by mouth 2 (two) times daily. Disp: 90 tablet Rfl: 3 Taking   spironolactone 50 MG Oral Tab 1 1/2 tabs day (Patient taking differently: Take 50 mg by mouth daily.  1 1/2 tabs day ) Disp: 135 tablet Rfl: 3 200-3,000 Units/hr Intravenous Continuous Arnoldo Corado DO Stopped at 04/25/17 0800    HYDROmorphone HCl PF (DILAUDID) 1 MG/ML injection 0.5 mg 0.5 mg Intravenous Q2H PRN Lizbeth Woodruff MD 0.5 mg at 04/21/17 1416    Or         HYDROmorphone HCl PF (DIL Not on file    Comment: QUIT 26 YRS AGO    Alcohol Use: No    Comment: VERY RARELY LAST CONSUMED WAS 6 WEEKS AGO    Drug Use: No    Sexual Activity: Not on file   Not on file  Other Topics Concern    Caffeine Concern Yes    Comment: 8 cups coffee/sod daily MAC  Post-op Pain Management: IV analgesics  Informed Consent Plan and Risks Discussed With:  Patient  Discussed plan with:  Surgeon      I have informed Siri Rico  of the nature of the anesthetic plan, benefits, risks, major complications, and any alte

## 2017-04-25 NOTE — INTERVAL H&P NOTE
Pre-op Diagnosis: Dysphagia & Weight Loss    The above referenced H&P was reviewed by Deborah Bence, MD on 4/25/2017, the patient was examined and no significant changes have occurred in the patient's condition since the H&P was performed.   I discussed wi

## 2017-04-25 NOTE — H&P (VIEW-ONLY)
[facility]   Gastroenterology Consultation Note    Mindy Scarlett  Patient Status:    Inpatient  Date of Admission:         4/18/2017, Hospital day #0  Attending:   Elier Rivera DO  PCP:     Nina Aguilar.  DO Jemal    Reason for Cons reflux and severe esophageal dysmotility with absence of primary peristalsis, and poor drainage of barium from esophagus.      On 12/1416 I repeated an EGD to ensure the wrap was not too tight and found stasis related esophagitis changes, but no resistance narcotics, POD Bartuci    ELECTROCARDIOGRAM, COMPLETE  09/26/2013    Comment Scanned to Media Tab    CHOLECYSTECTOMY  9/28/16     EGD N/A 12/14/2016    Comment Procedure: ESOPHAGOGASTRODUODENOSCOPY (EGD);   Surgeon: Luc Cazares MD;  Location: [address] [place] [address] °F (36.6 °C), temperature source Oral, resp. rate 18, height 5' 6\" (1.676 m), weight 237 lb (107.502 kg), SpO2 98 %. Body mass index is 38.27 kg/(m^2).     Gen- Patient appears fatigued, more weight loss noted on face  HEENT: the sclera appears anicteric, BID  -DVT    Discussed with Dr. John Newberry.      Fay Joe, [address] [place] [address] Gastroenterology  4/18/2017

## 2017-04-25 NOTE — ANESTHESIA POSTPROCEDURE EVALUATION
Patient: Huber Acosta    Procedure Summary     Date Anesthesia Start Anesthesia Stop Room / Location    04/25/17 1504 1533 300 [facility] ENDOSCOPY 01 / 300 [facility] ENDOSCOPY       Procedure Diagnosis Surgeon Responsible Provider    ESOPHAGOGASTRODUODENOSCOPY (EGD) (N/A ) (G

## 2017-04-25 NOTE — OPERATIVE REPORT
ESOPHAGOGASTRODUODENOSCOPY (EGD) REPORT    Ingrid Nolan     1945 Age 74year old   PCP Mirna Childress.  Jesse Swenson DO Endoscopist Jennie Martínez MD     Date of procedure: 2017    Procedure: EGD w/biopsy    Pre-operative diagnosis: Weight loss, appeared necrotic. In a darkened room, transillumination was attempted however despite repeated attempts no translumination was seen through the skin. As such PEG tube was not placed as no adequate site was able to be identified.      3. Duodenum: The d

## 2017-04-25 NOTE — PROGRESS NOTES
120 [facility] dosing service    Follow-up Pharmacokinetic Consult for Vancomycin Dosing     Radha Chavarria is a 74year old female who is being treated for pneumonia and sepsis. Patient is on day 6 of Vancomycin 2 gm IV Q 24 hours.   Goal trough is 15-20 ug/ re-check Vancomycin trough levels prior to 4th dose. Goal trough level 15-20 ug/mL. 3.  Pharmacy will need BUN/Scr daily while on Vancomycin to assess renal function.     4.  Pharmacy will follow and monitor renal function changes, toxicity and efficacy

## 2017-04-25 NOTE — PROGRESS NOTES
[place] [facility]     Progress Note        Iker Khan Patient Status:  Inpatient    1945 MRN [id number]   Location [facility] 2W/SW Attending Zain Echavarria Day # 7 PCP Eliana Cedeno.  DO Jemal       Subjective: [facility]) injection 8 mg 8 mg Intravenous Q4H PRN   Insulin Regular Human (NOVOLIN R) 100 UNIT/ML injection 1-5 Units 1-5 Units Subcutaneous Q6H   Piperacillin Sod-Tazobactam So (ZOSYN) 4.5 g in dextrose 5 % 100 mL IVPB 4.5 g Intravenous Q8H   Metoclopramid gastric antrum. Surgical clips in right upper quadrant right midabdomen SOFT TISSUES: Residual bladder contrast.. CALCIFICATIONS: None significant. BONES: Dorsal lumbar spondylosis. Lumbar laminectomy changes L5-S1 OTHER: Negative.    Dictated by (CST): [place] STOMACH) (CPT=74247), 12/08/2016, 7:52. [facility], X UPPER GI H2O SOLUBLE, 9/29/2016, 7:37. [place], [place] GI GASTRIC EMTYING STUDY (OYC=33894), 3/29/2017, 10:56.   [facility], [facility] CHEST W CONTRA present extending through the gastroesophageal junction with tip in the distal gastric body.   The esophagus is dilated proximal to the gastroesophageal junction and is distended with debris and contrast material.  There is nonspecific soft tissue thickenin proximal gastric body and fundus. The esophagus is dilated with debris and contrast material although some contrast material is also present in the stomach and small bowel. Nasogastric tube  is present with tip in the distal gastric body.   Correlate for pathology is suboptimal in the absence of contrast infusion. LUNG BASES: The heart is mildly enlarged. Biatrial dilatation is noted. Aortic and mitral annular calcifications are observed. There is dependent subsegmental atelectasis bilaterally.  Scattered p mesenteric gas are present in the right lower quadrant, intervally new, and discussed in the VASCULATURE section below.  There is small alignment compared in ascites, predominantly in the right lower quadrant, which is new from the prior exam.  A normal josephine above-described. Dictated by (CST): Jamie Rogel MD on 4/21/2017 at 13:27     Approved by (CST): Jamie Rogel MD on 4/21/2017 at 13:55          4/21/2017  CONCLUSION:  1.  Interval development of extensive pericecal wall thickening, new pericecal f [facility], CT ABDOMEN + PELVIS KIDNEYSTONE 2D RNDR (NO IV NO ORAL) (CPT=741, 4/21/2017, 11:30. [facility], XR CHEST AP PORTABLE (CPT=71010), 4/18/2017, 11:00. INDICATIONS: Left side PICC line placement confirmation.   TECHNIQUE:   Single vi (CPT=71020), 12/19/2016, 8:52. INDICATIONS: Dobhoff tube placement. TECHNIQUE:   Single view. FINDINGS:  CARDIAC/VASC: There is mild cardiomegaly. There is central vascular congestion without redistribution. MEDIAST/CAMACHO:   Camacho are prominent.  No visib demonstrating delayed emptying of the esophagus into the stomach and minimal if any emptying at 45 minutes.                  Assessment   1.  Abdominal pain    2.  Lactic acidosis, resolved  3.  Chronic diarrhea  4.  Gastric dysmotility  5.  Leukocytosis  7

## 2017-04-25 NOTE — PROGRESS NOTES
Patient seen in follow up. No overnight events. Patient not feeling today due to abdominal pain. No chest pain or shortness of breath. Vital signs reviewed and stable. BP stable.  Sating 90-100s   Labs reviewed K 3 Creatinine 1.15 WBC 7.6 Hgb 10.9 Metoprolol Tartrate (LOPRESSOR) 1 MG/ML injection 2.5 mg 2.5 mg Intravenous PRN   heparin (PORCINE) 44820anmaj/250mL infusion INITIAL DOSE 1,000 Units/hr Intravenous Once   heparin (PORCINE) drip 55603jwoac/250mL infusion CONTINUOUS 200-3,000 Units/hr Intr verapamil HCl  MG Oral Tab CR Take 1 tablet (180 mg total) by mouth nightly. Xr Chest Ap Portable  (cpt=71010)    4/25/2017  CONCLUSION:  1.  Interval improvement of the chest with better aeration and clearing of the pulmonary congestion and bibas 6.      Bowel ischemia, due to dehydration. No acute cardioembolism. PLAN:     1. PEG tube planned for today. 2. On metoprolol, heparin drip. Heparin drip held for PEG tube placement. Rate controlled  3. Positive fluid balance.  CXR still showing some

## 2017-04-25 NOTE — PROGRESS NOTES
[facility]    General Surgery Progress Note  Marcos Jacobs  OUD:[id number]  HD# 7       Subjective:   Feels better, denies abdominal pain, no N/V but spits up her clear liquids, in good spirits  Passing flatus     Exam:     General: awake and Recent Labs   Lab  04/23/17   0518  04/24/17   0534  04/25/17   0605   GLU  120*  98  120*   BUN  19  15  12   CREATSERUM  1.19  1.25  1.15   GFRAA  54*  51*  56*   GFRNAA  44*  42*  46*   CA  8.2*  8.3*  8.0*   NA  139  139  137   K  3.6  3.4  3.0*

## 2017-04-25 NOTE — PLAN OF CARE
Problem: GASTROINTESTINAL - ADULT  Goal: Minimal or absence of nausea and vomiting  INTERVENTIONS:  - Maintain adequate hydration with IV or PO as ordered and tolerated  - Nasogastric tube to low intermittent suction as ordered  - Evaluate effectiveness of Problem: RESPIRATORY - ADULT  Goal: Achieves optimal ventilation and oxygenation  INTERVENTIONS:  - Assess for changes in respiratory status  - Assess for changes in mentation and behavior  - Position to facilitate oxygenation and minimize respiratory

## 2017-04-25 NOTE — PHYSICAL THERAPY NOTE
Pt refused therapy treatment today as she states that she feels \"crummy today\" The pt wants to wait until after her PEG tube placement which is schedule for this afternoon. Will attempt to see pt tomorrow as appropriate.

## 2017-04-25 NOTE — PLAN OF CARE
For pending PEG placement - heparin and tube feeding are to be turned off at 0800.  Patient is NPO at that time    Confirming that the Heparin and Tube feedings were turned off at 0800

## 2017-04-26 ENCOUNTER — ANESTHESIA (OUTPATIENT)
Dept: INTERVENTIONAL RADIOLOGY/VASCULAR | Facility: HOSPITAL | Age: 72
DRG: 673 | End: 2017-04-26
Payer: MEDICARE

## 2017-04-26 ENCOUNTER — APPOINTMENT (OUTPATIENT)
Dept: INTERVENTIONAL RADIOLOGY/VASCULAR | Facility: HOSPITAL | Age: 72
DRG: 673 | End: 2017-04-26
Attending: INTERNAL MEDICINE
Payer: MEDICARE

## 2017-04-26 PROBLEM — K55.9 MESENTERIC ISCHEMIA (HCC): Status: ACTIVE | Noted: 2017-04-26

## 2017-04-26 PROCEDURE — 04713DZ DILATION OF CELIAC ARTERY WITH INTRALUMINAL DEVICE, PERCUTANEOUS APPROACH: ICD-10-PCS | Performed by: RADIOLOGY

## 2017-04-26 PROCEDURE — 99232 SBSQ HOSP IP/OBS MODERATE 35: CPT | Performed by: INTERNAL MEDICINE

## 2017-04-26 PROCEDURE — 99233 SBSQ HOSP IP/OBS HIGH 50: CPT | Performed by: INTERNAL MEDICINE

## 2017-04-26 PROCEDURE — 0DH67UZ INSERTION OF FEEDING DEVICE INTO STOMACH, VIA NATURAL OR ARTIFICIAL OPENING: ICD-10-PCS | Performed by: RADIOLOGY

## 2017-04-26 PROCEDURE — B4141ZZ FLUOROSCOPY OF SUPERIOR MESENTERIC ARTERY USING LOW OSMOLAR CONTRAST: ICD-10-PCS | Performed by: RADIOLOGY

## 2017-04-26 RX ORDER — MORPHINE SULFATE 2 MG/ML
2 INJECTION, SOLUTION INTRAMUSCULAR; INTRAVENOUS EVERY 10 MIN PRN
Status: DISCONTINUED | OUTPATIENT
Start: 2017-04-26 | End: 2017-04-28

## 2017-04-26 RX ORDER — ONDANSETRON 2 MG/ML
4 INJECTION INTRAMUSCULAR; INTRAVENOUS ONCE AS NEEDED
Status: ACTIVE | OUTPATIENT
Start: 2017-04-26 | End: 2017-04-26

## 2017-04-26 RX ORDER — 0.9 % SODIUM CHLORIDE 0.9 %
VIAL (ML) INJECTION
Status: DISPENSED
Start: 2017-04-26 | End: 2017-04-26

## 2017-04-26 RX ORDER — ROCURONIUM BROMIDE 10 MG/ML
INJECTION, SOLUTION INTRAVENOUS AS NEEDED
Status: DISCONTINUED | OUTPATIENT
Start: 2017-04-26 | End: 2017-04-26 | Stop reason: SURG

## 2017-04-26 RX ORDER — MIDAZOLAM HYDROCHLORIDE 1 MG/ML
INJECTION INTRAMUSCULAR; INTRAVENOUS
Status: COMPLETED
Start: 2017-04-26 | End: 2017-04-26

## 2017-04-26 RX ORDER — METOPROLOL TARTRATE 5 MG/5ML
2.5 INJECTION INTRAVENOUS ONCE
Status: DISCONTINUED | OUTPATIENT
Start: 2017-04-26 | End: 2017-04-28

## 2017-04-26 RX ORDER — HYDROCODONE BITARTRATE AND ACETAMINOPHEN 5; 325 MG/1; MG/1
1 TABLET ORAL AS NEEDED
Status: DISCONTINUED | OUTPATIENT
Start: 2017-04-26 | End: 2017-04-28

## 2017-04-26 RX ORDER — CHLORHEXIDINE GLUCONATE 0.12 MG/ML
15 RINSE ORAL
Status: DISCONTINUED | OUTPATIENT
Start: 2017-04-26 | End: 2017-05-01

## 2017-04-26 RX ORDER — HYDROMORPHONE HYDROCHLORIDE 1 MG/ML
0.4 INJECTION, SOLUTION INTRAMUSCULAR; INTRAVENOUS; SUBCUTANEOUS EVERY 5 MIN PRN
Status: DISCONTINUED | OUTPATIENT
Start: 2017-04-26 | End: 2017-04-28

## 2017-04-26 RX ORDER — HYDROMORPHONE HYDROCHLORIDE 1 MG/ML
0.6 INJECTION, SOLUTION INTRAMUSCULAR; INTRAVENOUS; SUBCUTANEOUS EVERY 5 MIN PRN
Status: DISCONTINUED | OUTPATIENT
Start: 2017-04-26 | End: 2017-04-28

## 2017-04-26 RX ORDER — HEPARIN SODIUM 1000 [USP'U]/ML
3000 INJECTION, SOLUTION INTRAVENOUS; SUBCUTANEOUS ONCE
Status: COMPLETED | OUTPATIENT
Start: 2017-04-26 | End: 2017-04-26

## 2017-04-26 RX ORDER — HYDROCODONE BITARTRATE AND ACETAMINOPHEN 5; 325 MG/1; MG/1
2 TABLET ORAL AS NEEDED
Status: DISCONTINUED | OUTPATIENT
Start: 2017-04-26 | End: 2017-04-28

## 2017-04-26 RX ORDER — LIDOCAINE HYDROCHLORIDE 10 MG/ML
INJECTION, SOLUTION EPIDURAL; INFILTRATION; INTRACAUDAL; PERINEURAL AS NEEDED
Status: DISCONTINUED | OUTPATIENT
Start: 2017-04-26 | End: 2017-04-26 | Stop reason: SURG

## 2017-04-26 RX ORDER — DEXAMETHASONE SODIUM PHOSPHATE 4 MG/ML
VIAL (ML) INJECTION AS NEEDED
Status: DISCONTINUED | OUTPATIENT
Start: 2017-04-26 | End: 2017-04-26 | Stop reason: SURG

## 2017-04-26 RX ORDER — SODIUM CHLORIDE, SODIUM LACTATE, POTASSIUM CHLORIDE, CALCIUM CHLORIDE 600; 310; 30; 20 MG/100ML; MG/100ML; MG/100ML; MG/100ML
INJECTION, SOLUTION INTRAVENOUS CONTINUOUS
Status: DISCONTINUED | OUTPATIENT
Start: 2017-04-26 | End: 2017-04-28

## 2017-04-26 RX ORDER — DEXTROSE MONOHYDRATE 100 MG/ML
INJECTION, SOLUTION INTRAVENOUS CONTINUOUS PRN
Status: DISCONTINUED | OUTPATIENT
Start: 2017-04-26 | End: 2017-04-29

## 2017-04-26 RX ORDER — SODIUM CHLORIDE, SODIUM LACTATE, POTASSIUM CHLORIDE, CALCIUM CHLORIDE 600; 310; 30; 20 MG/100ML; MG/100ML; MG/100ML; MG/100ML
INJECTION, SOLUTION INTRAVENOUS CONTINUOUS PRN
Status: DISCONTINUED | OUTPATIENT
Start: 2017-04-26 | End: 2017-04-26 | Stop reason: SURG

## 2017-04-26 RX ORDER — MIDAZOLAM HYDROCHLORIDE 1 MG/ML
1 INJECTION INTRAMUSCULAR; INTRAVENOUS ONCE
Status: COMPLETED | OUTPATIENT
Start: 2017-04-26 | End: 2017-04-26

## 2017-04-26 RX ORDER — LIDOCAINE HYDROCHLORIDE 20 MG/ML
INJECTION, SOLUTION EPIDURAL; INFILTRATION; INTRACAUDAL; PERINEURAL
Status: COMPLETED
Start: 2017-04-26 | End: 2017-04-26

## 2017-04-26 RX ORDER — SODIUM CHLORIDE 9 MG/ML
INJECTION, SOLUTION INTRAVENOUS
Status: COMPLETED
Start: 2017-04-26 | End: 2017-04-26

## 2017-04-26 RX ORDER — HYDROMORPHONE HYDROCHLORIDE 1 MG/ML
0.2 INJECTION, SOLUTION INTRAMUSCULAR; INTRAVENOUS; SUBCUTANEOUS EVERY 5 MIN PRN
Status: DISCONTINUED | OUTPATIENT
Start: 2017-04-26 | End: 2017-04-28

## 2017-04-26 RX ORDER — HEPARIN SODIUM 5000 [USP'U]/ML
5000 INJECTION, SOLUTION INTRAVENOUS; SUBCUTANEOUS EVERY 8 HOURS
Status: DISCONTINUED | OUTPATIENT
Start: 2017-04-26 | End: 2017-04-28

## 2017-04-26 RX ORDER — HEPARIN SODIUM 1000 [USP'U]/ML
INJECTION, SOLUTION INTRAVENOUS; SUBCUTANEOUS
Status: COMPLETED
Start: 2017-04-26 | End: 2017-04-26

## 2017-04-26 RX ORDER — MORPHINE SULFATE 10 MG/ML
6 INJECTION, SOLUTION INTRAMUSCULAR; INTRAVENOUS EVERY 10 MIN PRN
Status: DISCONTINUED | OUTPATIENT
Start: 2017-04-26 | End: 2017-04-28

## 2017-04-26 RX ORDER — NALOXONE HYDROCHLORIDE 0.4 MG/ML
80 INJECTION, SOLUTION INTRAMUSCULAR; INTRAVENOUS; SUBCUTANEOUS AS NEEDED
Status: ACTIVE | OUTPATIENT
Start: 2017-04-26 | End: 2017-04-26

## 2017-04-26 RX ORDER — FUROSEMIDE 10 MG/ML
20 INJECTION INTRAMUSCULAR; INTRAVENOUS ONCE
Status: COMPLETED | OUTPATIENT
Start: 2017-04-26 | End: 2017-04-26

## 2017-04-26 RX ORDER — ONDANSETRON 2 MG/ML
INJECTION INTRAMUSCULAR; INTRAVENOUS AS NEEDED
Status: DISCONTINUED | OUTPATIENT
Start: 2017-04-26 | End: 2017-04-26 | Stop reason: SURG

## 2017-04-26 RX ORDER — SUCCINYLCHOLINE CHLORIDE 20 MG/ML
INJECTION INTRAMUSCULAR; INTRAVENOUS AS NEEDED
Status: DISCONTINUED | OUTPATIENT
Start: 2017-04-26 | End: 2017-04-26 | Stop reason: SURG

## 2017-04-26 RX ORDER — MORPHINE SULFATE 4 MG/ML
4 INJECTION, SOLUTION INTRAMUSCULAR; INTRAVENOUS EVERY 10 MIN PRN
Status: DISCONTINUED | OUTPATIENT
Start: 2017-04-26 | End: 2017-04-28

## 2017-04-26 RX ADMIN — DEXAMETHASONE SODIUM PHOSPHATE 4 MG: 4 MG/ML VIAL (ML) INJECTION at 09:28:00

## 2017-04-26 RX ADMIN — ONDANSETRON 4 MG: 2 INJECTION INTRAMUSCULAR; INTRAVENOUS at 10:33:00

## 2017-04-26 RX ADMIN — SUCCINYLCHOLINE CHLORIDE 120 MG: 20 INJECTION INTRAMUSCULAR; INTRAVENOUS at 09:24:00

## 2017-04-26 RX ADMIN — SODIUM CHLORIDE, SODIUM LACTATE, POTASSIUM CHLORIDE, CALCIUM CHLORIDE: 600; 310; 30; 20 INJECTION, SOLUTION INTRAVENOUS at 11:01:00

## 2017-04-26 RX ADMIN — SODIUM CHLORIDE, SODIUM LACTATE, POTASSIUM CHLORIDE, CALCIUM CHLORIDE: 600; 310; 30; 20 INJECTION, SOLUTION INTRAVENOUS at 09:21:00

## 2017-04-26 RX ADMIN — SODIUM CHLORIDE, SODIUM LACTATE, POTASSIUM CHLORIDE, CALCIUM CHLORIDE: 600; 310; 30; 20 INJECTION, SOLUTION INTRAVENOUS at 11:00:00

## 2017-04-26 RX ADMIN — ROCURONIUM BROMIDE 10 MG: 10 INJECTION, SOLUTION INTRAVENOUS at 09:23:00

## 2017-04-26 RX ADMIN — LIDOCAINE HYDROCHLORIDE 50 MG: 10 INJECTION, SOLUTION EPIDURAL; INFILTRATION; INTRACAUDAL; PERINEURAL at 09:23:00

## 2017-04-26 NOTE — PROGRESS NOTES
[facility]    General Surgery Progress Note  Ton Ann  AOD:[id number]  HD# 8       Subjective:    Intubated post IR procedure - had mesenteric angiogram with stenting of celiac artery due to gradient present     Exam:     General: in no a CL  113*  115*  116*   CO2  20*  20*  20*         Lab Results  Component Value Date   WBC 6.5 04/26/2017   HGB 10.8 04/26/2017   HCT 33.1 04/26/2017    04/26/2017    04/26/2017   K 3.7 04/26/2017   K 3.7 04/26/2017    04/26/2017   CO2

## 2017-04-26 NOTE — PROGRESS NOTES
[facility]     Gastroenterology Progress Note    Yanyjr Jason Patient Status:  Inpatient    1945 MRN [id number]   Location [facility] 4W/SW/SE Attending Zain Echavarria [place] Day # 8 PCP Asha Joaquin, patent. EGD/PEG attempted on 4/25; there were two large ulcerations in the stomach, stress ulcer vs ischemic ulceration. No stricture noted at GE junction. PEG attempted but due to body habitus, unable to place (lack of transillumination).  D/w IR re: me

## 2017-04-26 NOTE — PROGRESS NOTES
1700 [facility]    CDI Prediction Tool Protocol    OVP (oral vancomycin prophylaxis) 125 mg PO BID is being started in this patient based on a score of 13.   This patient is currently at high risk for developing CDI due to his/her score being >/=13 po

## 2017-04-26 NOTE — DIETARY NOTE
NUTRITION NOTE UPDATE:   Pt to begin TPN: - Parenteral Nutrition: 1600 ml volume, 90g protein, 1020non protein calories (700 calories  from dextrose and 320calories from fat).   Provides 1380 total calories with 9

## 2017-04-26 NOTE — PHYSICAL THERAPY NOTE
The pt received a mesenteric angiogram with stenting of celiac artery on 4/25 and intubated after procedure. The pt will require new orders to resume PT activity when appropriate. RN notified.

## 2017-04-26 NOTE — ANESTHESIA POSTPROCEDURE EVALUATION
Patient: Marcos Jacobs    Procedure Summary     Date Anesthesia Start Anesthesia Stop Room / Location    04/26/17 0921 [address] [facility] Interventional Suites       Procedure Diagnosis Scheduled Providers Responsible Provider    IR ARTERIOGRAM MESENTER

## 2017-04-26 NOTE — ANESTHESIA PREPROCEDURE EVALUATION
Anesthesia PreOp Note    HPI:     Sarmad Fan is a 74year old female who presents for preoperative consultation requested by: * No surgeons listed *    Date of Surgery: 4/26/2017    * No procedures listed *  Indication: * No pre-op diagnosis entered * [facility]embol[facility]), [facility]     Left   • Osteoarthritis    • Heel spur      Right   • Bilateral carpal tunnel syndrome    • Hiatal hernia    • Lumbar disc disease 2012   • Esophageal reflux    • High blood pressure    • High cholesterol    • Visual MG Oral Tab Take 1 tablet (50 mg total) by mouth 2 (two) times daily. Disp: 90 tablet Rfl: 3 Taking   spironolactone 50 MG Oral Tab 1 1/2 tabs day (Patient taking differently: Take 50 mg by mouth daily.  1 1/2 tabs day ) Disp: 135 tablet Rfl: 3 Taking   manuelito (LOPRESSOR) tab 50 mg 50 mg Oral 2x Daily(Beta Blocker) Arnoldo Corado DO 50 mg at 04/26/17 0640    Normal Saline Flush 0.9 % injection 10 mL 10 mL Intravenous PRN Lin Brenner MD 10 mL at 04/25/17 0550    Atropine Sulfate 0.1 MG/ML injection 0.5 mg 1 tablet at 04/20/17 1434    triamcinolone acetonide (KENALOG) 0.1 % cream  Topical BID PRN Uriel Grimes DO     dextrose 10 % infusion  Intravenous Continuous PRN Wendy ELY DO Last Rate: 42 mL/hr at 04/23/17 0600      No current Epic-ordered 97.5 °F (36.4 °C). Her blood pressure is 146/69 and her pulse is 91. Her respiration is 17 and oxygen saturation is 97%.     04/26/17  0300 04/26/17  0400 04/26/17  0600 04/26/17  0700   BP:  150/76 145/93 146/69   Pulse:  86 88 91   Temp:  98.2 °F (36.8 °C

## 2017-04-26 NOTE — PROGRESS NOTES
Patient seen in follow up. POD #0 Stent to celiac artery   Vitals reviewed BP stable   Feeling better   Labs reviewed K 3.7, Creatinine 1.18, Hgb 10.8   Feeling much better.  Currently no chest pain or shortness of breath      04/26/17  1300   BP: 1 Atropine Sulfate 0.1 MG/ML injection 0.5 mg 0.5 mg Intravenous PRN   ondansetron HCl (ZOFRAN) injection 4 mg 4 mg Intravenous Once PRN   Prochlorperazine Edisylate (COMPAZINE) injection 5 mg 5 mg Intravenous Once PRN   Naloxone HCl (NARCAN) 0.4 MG/ML injec Metoprolol Tartrate (LOPRESSOR) 1 MG/ML injection 5 mg 5 mg Intravenous PRN   Or      Metoprolol Tartrate (LOPRESSOR) 1 MG/ML injection 2.5 mg 2.5 mg Intravenous PRN   heparin (PORCINE) 04373lobya/250mL infusion INITIAL DOSE 1,000 Units/hr Intravenous Once verapamil HCl  MG Oral Tab CR Take 1 tablet (180 mg total) by mouth nightly. Xr Chest Ap Portable  (cpt=71010)    4/25/2017  CONCLUSION:  1.  Interval improvement of the chest with better aeration and clearing of the pulmonary congestion and bibas 4.      History of acute renal failure due to dehydration.  Spironolactone was held. 5.      Anemia. Progressive, monitor while on antiocoagulation. 6.      Bowel ischemia, due to dehydration. No acute cardioembolism. PLAN:      1.  POD #0 stent to eboni

## 2017-04-26 NOTE — PROGRESS NOTES
[facility]     Progress Note        [facility] Patient Status:  Inpatient    1945 MRN [id number]   Location [facility] 2W/SW Attending Zain Echavarria Day # 8 PCP Kiya Waldron.  DO Jemal       Subjective: (DILAUDID) 1 MG/ML injection 0.5 mg 0.5 mg Intravenous Q2H PRN   Or      HYDROmorphone HCl PF (DILAUDID) 1 MG/ML injection 1 mg 1 mg Intravenous Q2H PRN   ondansetron HCl (ZOFRAN) injection 8 mg 8 mg Intravenous Q4H PRN   Insulin Regular Human (NOVOLIN R) 04/26/2017   CA 8.4 04/26/2017   ALB 2.1 04/26/2017   ALKPHO 70 04/26/2017   BILT 0.6 04/26/2017   TP 4.8 04/26/2017   AST 16 04/26/2017   ALT 14 04/26/2017   PTT 35.2 04/26/2017   INR 1.1 04/25/2017       Xr Abdomen, Obstructive Series (cpt=74020)    4/21 LUNGS/PLEURA: Minimal bibasilar atelectasis/scarring. Small bibasilar effusions. No pneumoperitoneum BONES: Mild scoliosis with mild degenerative disc disease and spondylosis.   OTHER: Feeding tube extends into the stomach  Dictated by (CST): Harriett Hoyt hilar adenopathy. LUNGS/PLEURA: Central airways are patent. Minimal dependent subsegmental atelectasis in the lung bases. 3 mm micronodule in the left upper lobe anterior segment, stable since prior CT from 3/21/06, compatible with scar.  No pleural effus collection, lymphadenopathy, or free air. ABDOMINAL WALL: Small fat containing umbilical hernia. Pelvis: URINARY BLADDER: No visible focal wall thickening, lesion, or calculus. PELVIC NODES: No adenopathy. PELVIC ORGANS: Post hysterectomy.   No adnexal m (PEW=[id number])  COMPARISON: [facility], [place] GI GASTRIC EMTYING STUDY (IXO=[id number]), 3/29/2017, 10:56. 1619 K 66*, 8/09/2005, 15:03.   [facility], CT CHEST+ABDOMEN+PELVIS(ALL CNTRST ONLY)(CPT=7 Negative unenhanced appearance for fluid collection or ductal dilatation. SPLEEN: No enlargement. ADRENALS:   No defined mass or abnormal enlargement. Nonspecific left adrenal thickening is noted.  GI/MESENTERY:  The distal esophagus is markedly dilated a day previously (series 2, images 72, 80, and 83; series 5, images 40, 45, 49). Curvilinear gas is also seen in the mesenteric venous branches adjacent to the greater curvature of the stomach.   As discussed above, there is portal venous gas throughout the disease is most likely atelectatic, although the possibility of aspiration pneumonitis in the setting of extensive esophageal debris cannot be excluded with certainty. 8. Status post cholecystectomy.  The caliber and morphology of the hepatobiliary tree ar 4/22/2017  CONCLUSION:  1. Interval placement of left-sided PICC catheter with the tip in the superior vena cava. 2. Cardiomegaly. Central vascular congestion with probable chronic CHF. 3. Small effusions.  Bibasilar atelectasis and or partial consolida 12/08/2016, 7:52.   INDICATIONS: Vomiting and constant burping status post Nissen fundoplication  TECHNIQUE: After obtaining the patient's consent, images were obtained subsequent to the oral administration of 1.0 millicuries of [id number] sulfur colloi

## 2017-04-26 NOTE — RESPIRATORY THERAPY NOTE
KENNETH ASSESSMENT:    Pt DOES NOT have a previous diagnosis of KENNETH. Patient does not routinely use a CPAP device at home.

## 2017-04-27 ENCOUNTER — APPOINTMENT (OUTPATIENT)
Dept: GENERAL RADIOLOGY | Facility: HOSPITAL | Age: 72
DRG: 673 | End: 2017-04-27
Attending: INTERNAL MEDICINE
Payer: MEDICARE

## 2017-04-27 PROCEDURE — 71010 XR CHEST AP PORTABLE  (CPT=71010): CPT

## 2017-04-27 PROCEDURE — 99232 SBSQ HOSP IP/OBS MODERATE 35: CPT | Performed by: INTERNAL MEDICINE

## 2017-04-27 RX ORDER — POTASSIUM CHLORIDE 29.8 MG/ML
40 INJECTION INTRAVENOUS ONCE
Status: DISCONTINUED | OUTPATIENT
Start: 2017-04-27 | End: 2017-04-27

## 2017-04-27 RX ORDER — MAGNESIUM SULFATE HEPTAHYDRATE 40 MG/ML
2 INJECTION, SOLUTION INTRAVENOUS ONCE
Status: COMPLETED | OUTPATIENT
Start: 2017-04-27 | End: 2017-04-27

## 2017-04-27 RX ORDER — 0.9 % SODIUM CHLORIDE 0.9 %
VIAL (ML) INJECTION
Status: DISPENSED
Start: 2017-04-27 | End: 2017-04-28

## 2017-04-27 RX ORDER — DEXTROSE MONOHYDRATE 25 G/50ML
INJECTION, SOLUTION INTRAVENOUS
Status: COMPLETED
Start: 2017-04-27 | End: 2017-04-27

## 2017-04-27 RX ORDER — POTASSIUM CHLORIDE 29.8 MG/ML
40 INJECTION INTRAVENOUS ONCE
Status: COMPLETED | OUTPATIENT
Start: 2017-04-27 | End: 2017-04-27

## 2017-04-27 RX ORDER — SODIUM CHLORIDE 9 MG/ML
INJECTION, SOLUTION INTRAVENOUS
Status: COMPLETED
Start: 2017-04-27 | End: 2017-04-27

## 2017-04-27 NOTE — PROGRESS NOTES
[facility]     Gastroenterology Progress Note    Yanyjr Jason Patient Status:  Inpatient    1945 MRN [id number]   Location [facility] 4W/SW/SE Attending Zain Echavarria [place] Day # 9 PCP Asha Joaquin, there were two large ulcerations in the stomach, stress ulcer vs ischemic ulceration. No stricture noted at GE junction. PEG attempted but due to body habitus, unable to place (lack of transillumination).      # esophageal and gastric dysmotility  # weight

## 2017-04-27 NOTE — DIETARY NOTE
Nutrition Note Update: Marlene TPN well. Blood sugar 70, RN infused D5. Labs & meds reviewed. RD increased TPN to full nutrition.  Orders as follows  1600 ml vol, 100 g protein, 950 kcal dextrose and 320 kcal lipid= 1670 total kcal. Additives/Trini

## 2017-04-27 NOTE — PLAN OF CARE
Blood sugar at noon was 70. This RN administered D50 amp X 1. Took blood sugar 15 minutes later, per protocol, blood sugar up to 143. Notified Dr. Maria De Jesus López and Dr. Wen Hyde. No further orders at this time. Will continue to monitor. Addendem: [id number]:   Too

## 2017-04-27 NOTE — PROGRESS NOTES
[facility]    General Surgery Progress Note  Miguel Guerra  [id number]  # 9       Subjective:     Awake and alert, in good spirits, denies abdominal pain  Tolerating full liquid diet with no spitting or burping, wants to eat soft boiled 142  141  139   K  3.6  3.6  3.7  3.7  3.5   CL  115*  116*  111*   CO2  20*  20*  22         Lab Results  Component Value Date   WBC 7.4 04/27/2017   HGB 10.9 04/27/2017   HCT 33.0 04/27/2017    04/27/2017    04/27/2017   K 3.5 04/27/2017

## 2017-04-27 NOTE — PROGRESS NOTES
Patient seen in follow up. No overnight events. Feeling better   Vital signs reviewed. BP stable sating in 94-96%  Labs reviewed.  K 3.5, Creatinine 1.16, Hgb 10.9   On heparin drip      04/27/17  1300   BP: 131/71   Pulse: 77   Temp: 97.9 °F (36.6 °C morphINE sulfate (PF) 10 MG/ML injection 6 mg 6 mg Intravenous Q10 Min PRN   metoprolol Tartrate (LOPRESSOR) 5 MG/5ML injection 2.5 mg 2.5 mg Intravenous Once   dextrose 10 % infusion  Intravenous Continuous PRN   adult 3 in 1 TPN  Intravenous Continuous T HYDROmorphone HCl PF (DILAUDID) 1 MG/ML injection 0.5 mg 0.5 mg Intravenous Q2H PRN   Or      HYDROmorphone HCl PF (DILAUDID) 1 MG/ML injection 1 mg 1 mg Intravenous Q2H PRN   ondansetron HCl (ZOFRAN) injection 8 mg 8 mg Intravenous Q4H PRN   Insulin Regul  04/27/2017   K 3.5 04/27/2017    04/27/2017   CO2 22 04/27/2017    04/27/2017   CA 8.7 04/27/2017   ALB 2.2 04/27/2017   ALKPHO 72 04/27/2017   BILT 0.6 04/27/2017   TP 5.2 04/27/2017   AST 20 04/27/2017   ALT 19 04/27/2017   PTT 71.2 0

## 2017-04-27 NOTE — PROGRESS NOTES
[facility]     Progress Note        Marcos Jacobs Patient Status:  Inpatient    1945 MRN [id number]   Location [facility] 2W/SW Attending Zain Echavarria Day # 9 PCP Rina Rivera.  DO Jemal       Subjective: (LOPRESSOR) 5 MG/5ML injection 2.5 mg 2.5 mg Intravenous Once   dextrose 10 % infusion  Intravenous Continuous PRN   adult 3 in 1 TPN  Intravenous Continuous TPN   fentaNYL citrate (SUBLIMAZE) 0.05 MG/ML injection 25 mcg 25 mcg Intravenous Q30 Min PRN   Or CONTINUOUS 200-3,000 Units/hr Intravenous Continuous   HYDROmorphone HCl PF (DILAUDID) 1 MG/ML injection 0.5 mg 0.5 mg Intravenous Q2H PRN   Or      HYDROmorphone HCl PF (DILAUDID) 1 MG/ML injection 1 mg 1 mg Intravenous Q2H PRN   ondansetron HCl (ZOFRAN) Obstructive Series (cpt=74020)    4/21/2017  PROCEDURE: XR ABDOMEN, OBSTRUCTIVE SERIES (CPT=74020)  COMPARISON: [facility], CT CHEST+ABDOMEN+PELVIS(ALL CNTRST ONLY)(CPT=71260/48607), 4/20/2017, 11:39.   INDICATIONS: Abdominal pain and vomiti Dictated by (CST): Sammy Parekh M.D. on 4/21/2017 at 9:05     Approved by (CST): Sammy Parekh M.D. on 4/21/2017 at 9:10          4/21/2017  CONCLUSION:  1. Borderline cardiomegaly. Prominent central vasculature. 2. Hypoinflation.  Bibasilar compatible with scar. No pleural effusion or pneumothorax. VASCULATURE: Main pulmonary artery is not enlarged. No central pulmonary embolus. There is calcific aortic atherosclerosis, without aneurysm. CHEST WALL: No axillary or clavicular lymphadenopathy. hysterectomy. No adnexal mass. Abdomen and pelvis: BONES: Scattered mild degenerative endplate changes in the visualized thoracolumbar spine. OTHER: Negative.    Dictated by (CST): Yuliana Lackey MD on 4/20/2017 at 12:35     Approved by (CST): Yuliana Lackey, CHEST+ABDOMEN+PELVIS(ALL CNTRST ONLY)(CPT=71260/45535), 4/20/2017, 11:39. INDICATIONS: Right lower quadrant abdominal pain with rebound and guarding on physical examination; leukocytosis.   TECHNIQUE: Multidetector CT images of the abdomen and pelvis were The distal esophagus is markedly dilated and fluid-filled. There is pronounced focal narrowing of the gastroesophageal junction with hyperdense material at the GE junction.  A weighted tipped Dobbhoff type feeding tube is seen with tip extending into the d there is portal venous gas throughout the left hepatic lobe. RETROPERITONEUM: No mass or lymphadenopathy is apparent. BONES:   Multilevel degenerative changes are seen throughout the spine. Advanced vacuum disc phenomenon is appreciated.  There is severe v morphology of the hepatobiliary tree are likely related to the postcholecystectomy state. 9. Distal colonic diverticulosis without CT evidence of acute complication. 10. Lesser incidental findings as above.     Results of this examination with respect to atelectasis and or partial consolidation. Findings may reflect sequela of acute exacerbation of chronic CHF although appearance on the left is also suspicious for pneumonia. Consider aspiration pneumonia. Followup studies are recommended.          Xr Chest of Technetium-99m sulfur colloid labeled to a standard meal of scrambled eggs and toast followed by Elvis How. water. The patient vomited at 45 minutes into the study. FINDINGS:  STOMACH: There was significant retention of radiotracer/food in the esophagus.  At

## 2017-04-28 ENCOUNTER — ANESTHESIA EVENT (OUTPATIENT)
Dept: INTERVENTIONAL RADIOLOGY/VASCULAR | Facility: HOSPITAL | Age: 72
DRG: 673 | End: 2017-04-28
Payer: MEDICARE

## 2017-04-28 ENCOUNTER — APPOINTMENT (OUTPATIENT)
Dept: INTERVENTIONAL RADIOLOGY/VASCULAR | Facility: HOSPITAL | Age: 72
DRG: 673 | End: 2017-04-28
Attending: INTERNAL MEDICINE
Payer: MEDICARE

## 2017-04-28 PROCEDURE — 99232 SBSQ HOSP IP/OBS MODERATE 35: CPT | Performed by: INTERNAL MEDICINE

## 2017-04-28 PROCEDURE — 99233 SBSQ HOSP IP/OBS HIGH 50: CPT | Performed by: HOSPITALIST

## 2017-04-28 RX ORDER — HALOPERIDOL 5 MG/ML
0.25 INJECTION INTRAMUSCULAR ONCE AS NEEDED
Status: ACTIVE | OUTPATIENT
Start: 2017-04-28 | End: 2017-04-28

## 2017-04-28 RX ORDER — HYDROMORPHONE HYDROCHLORIDE 1 MG/ML
0.2 INJECTION, SOLUTION INTRAMUSCULAR; INTRAVENOUS; SUBCUTANEOUS EVERY 5 MIN PRN
Status: DISCONTINUED | OUTPATIENT
Start: 2017-04-28 | End: 2017-04-30 | Stop reason: HOSPADM

## 2017-04-28 RX ORDER — NALOXONE HYDROCHLORIDE 0.4 MG/ML
80 INJECTION, SOLUTION INTRAMUSCULAR; INTRAVENOUS; SUBCUTANEOUS AS NEEDED
Status: ACTIVE | OUTPATIENT
Start: 2017-04-28 | End: 2017-04-28

## 2017-04-28 RX ORDER — DEXTROSE MONOHYDRATE 25 G/50ML
50 INJECTION, SOLUTION INTRAVENOUS AS NEEDED
Status: DISCONTINUED | OUTPATIENT
Start: 2017-04-28 | End: 2017-05-02

## 2017-04-28 RX ORDER — SODIUM CHLORIDE, SODIUM LACTATE, POTASSIUM CHLORIDE, CALCIUM CHLORIDE 600; 310; 30; 20 MG/100ML; MG/100ML; MG/100ML; MG/100ML
INJECTION, SOLUTION INTRAVENOUS CONTINUOUS
Status: DISCONTINUED | OUTPATIENT
Start: 2017-04-28 | End: 2017-04-30 | Stop reason: HOSPADM

## 2017-04-28 RX ORDER — LIDOCAINE HYDROCHLORIDE 10 MG/ML
INJECTION, SOLUTION EPIDURAL; INFILTRATION; INTRACAUDAL; PERINEURAL AS NEEDED
Status: DISCONTINUED | OUTPATIENT
Start: 2017-04-28 | End: 2017-04-28 | Stop reason: SURG

## 2017-04-28 RX ORDER — METOPROLOL TARTRATE 5 MG/5ML
2.5 INJECTION INTRAVENOUS ONCE
Status: DISCONTINUED | OUTPATIENT
Start: 2017-04-28 | End: 2017-05-02

## 2017-04-28 RX ORDER — MORPHINE SULFATE 4 MG/ML
4 INJECTION, SOLUTION INTRAMUSCULAR; INTRAVENOUS EVERY 10 MIN PRN
Status: DISCONTINUED | OUTPATIENT
Start: 2017-04-28 | End: 2017-04-30 | Stop reason: HOSPADM

## 2017-04-28 RX ORDER — FUROSEMIDE 10 MG/ML
20 INJECTION INTRAMUSCULAR; INTRAVENOUS
Status: DISCONTINUED | OUTPATIENT
Start: 2017-04-28 | End: 2017-04-29

## 2017-04-28 RX ORDER — SODIUM CHLORIDE 9 MG/ML
INJECTION, SOLUTION INTRAVENOUS
Status: COMPLETED
Start: 2017-04-28 | End: 2017-04-28

## 2017-04-28 RX ORDER — HYDROMORPHONE HYDROCHLORIDE 1 MG/ML
0.4 INJECTION, SOLUTION INTRAMUSCULAR; INTRAVENOUS; SUBCUTANEOUS EVERY 5 MIN PRN
Status: DISCONTINUED | OUTPATIENT
Start: 2017-04-28 | End: 2017-04-30 | Stop reason: HOSPADM

## 2017-04-28 RX ORDER — HYDROMORPHONE HYDROCHLORIDE 1 MG/ML
0.6 INJECTION, SOLUTION INTRAMUSCULAR; INTRAVENOUS; SUBCUTANEOUS EVERY 5 MIN PRN
Status: DISCONTINUED | OUTPATIENT
Start: 2017-04-28 | End: 2017-04-30 | Stop reason: HOSPADM

## 2017-04-28 RX ORDER — SODIUM CHLORIDE 9 MG/ML
INJECTION, SOLUTION INTRAVENOUS CONTINUOUS PRN
Status: DISCONTINUED | OUTPATIENT
Start: 2017-04-28 | End: 2017-04-28 | Stop reason: SURG

## 2017-04-28 RX ORDER — MORPHINE SULFATE 2 MG/ML
2 INJECTION, SOLUTION INTRAMUSCULAR; INTRAVENOUS EVERY 10 MIN PRN
Status: DISCONTINUED | OUTPATIENT
Start: 2017-04-28 | End: 2017-04-30 | Stop reason: HOSPADM

## 2017-04-28 RX ORDER — HYDROCODONE BITARTRATE AND ACETAMINOPHEN 5; 325 MG/1; MG/1
2 TABLET ORAL AS NEEDED
Status: DISCONTINUED | OUTPATIENT
Start: 2017-04-28 | End: 2017-04-30 | Stop reason: HOSPADM

## 2017-04-28 RX ORDER — LIDOCAINE HYDROCHLORIDE 20 MG/ML
INJECTION, SOLUTION EPIDURAL; INFILTRATION; INTRACAUDAL; PERINEURAL
Status: COMPLETED
Start: 2017-04-28 | End: 2017-04-28

## 2017-04-28 RX ORDER — HYDROCODONE BITARTRATE AND ACETAMINOPHEN 5; 325 MG/1; MG/1
1 TABLET ORAL AS NEEDED
Status: DISCONTINUED | OUTPATIENT
Start: 2017-04-28 | End: 2017-04-30 | Stop reason: HOSPADM

## 2017-04-28 RX ORDER — MORPHINE SULFATE 10 MG/ML
6 INJECTION, SOLUTION INTRAMUSCULAR; INTRAVENOUS EVERY 10 MIN PRN
Status: DISCONTINUED | OUTPATIENT
Start: 2017-04-28 | End: 2017-04-30 | Stop reason: HOSPADM

## 2017-04-28 RX ORDER — ONDANSETRON 2 MG/ML
4 INJECTION INTRAMUSCULAR; INTRAVENOUS ONCE AS NEEDED
Status: ACTIVE | OUTPATIENT
Start: 2017-04-28 | End: 2017-04-28

## 2017-04-28 RX ADMIN — SODIUM CHLORIDE: 9 INJECTION, SOLUTION INTRAVENOUS at 15:16:00

## 2017-04-28 RX ADMIN — SODIUM CHLORIDE: 9 INJECTION, SOLUTION INTRAVENOUS at 14:55:00

## 2017-04-28 RX ADMIN — LIDOCAINE HYDROCHLORIDE 50 MG: 10 INJECTION, SOLUTION EPIDURAL; INFILTRATION; INTRACAUDAL; PERINEURAL at 15:00:00

## 2017-04-28 NOTE — PROGRESS NOTES
[facility]     Gastroenterology Progress Note    Elian Toscano Patient Status:  Inpatient    1945 MRN [id number]   Location [facility] 4W/SW/SE Attending Zain Echavarria [place] Day # 10 PCP Maria Esther Joaquin superior mesenteric artery origin stenosis. Major SMA branches patent. EGD/PEG attempted on 4/25; there were two large ulcerations in the stomach, stress ulcer vs ischemic ulceration. No stricture noted at GE junction.  PEG attempted but due to body habi

## 2017-04-28 NOTE — PLAN OF CARE
Problem: Patient/Family Goals  Goal: Patient/Family Long Term Goal  Patient’s Long Term Goal: ’figure out what’s going on’    Interventions:  - CT scan  -md rounding    - See additional Care Plan goals for specific interventions   Outcome: Progressing  Get activity based on assessment  - Modify environment to reduce risk of injury  - Provide assistive devices as appropriate  - Consider OT/PT consult to assist with strengthening/mobility  - Encourage toileting schedule   Outcome: Progressing  No falls, pt sampson coronary artery perfusion - ex.  Angina  - Evaluate fluid balance, assess for edema, trend weights   Outcome: Progressing  Stable hemodynamically  Goal: Absence of cardiac arrhythmias or at baseline  INTERVENTIONS:  - Continuous cardiac monitoring, monitor

## 2017-04-28 NOTE — ANESTHESIA POSTPROCEDURE EVALUATION
Patient: Alfonso Situ    Procedure Summary     Date Anesthesia Start Anesthesia Stop Room / Location    04/28/17 [address] [place] [address] Interventional Suites       Procedure Diagnosis Scheduled Providers Responsible Provider    IR G-TUBE PROCEDURE (IS

## 2017-04-28 NOTE — PROGRESS NOTES
[facility]    General Surgery Progress Note  Ly Lee  [id number]:[id number]  # 10       Subjective:     Awake and alert, in good spirits, denies abdominal pain  Tolerated clear liquids yesterday     Exam:     General: in no acute distress  P 55*  58*   GFRNAA  45*  46*  48*   CA  8.4*  8.7  8.4*   NA  141  139  139   K  3.7  3.7  3.5  3.8  3.8   CL  116*  111*  110   CO2  20*  22  24         Lab Results  Component Value Date   WBC 6.5 04/28/2017   HGB 11.2 04/28/2017   HCT 33.7 04/28/2017   PL

## 2017-04-28 NOTE — PROGRESS NOTES
[facility] - [facility]     Progress Note        [facility] Patient Status:  Inpatient    1945 MRN [id number]   [facility] 2W/SW Attending Zain Echavarria Day # 10 PCP Kiya Waldron.  DO Jemal       Subjective: 2.5 mg Intravenous Once   dextrose 10 % infusion  Intravenous Continuous PRN   fentaNYL citrate (SUBLIMAZE) 0.05 MG/ML injection 25 mcg 25 mcg Intravenous Q30 Min PRN   Or      fentaNYL citrate (SUBLIMAZE) 0.05 MG/ML injection 50 mcg 50 mcg Intravenous Q30 Intravenous Q4H PRN   Insulin Regular Human (NOVOLIN R) 100 UNIT/ML injection 1-5 Units 1-5 Units Subcutaneous Q6H   Piperacillin Sod-Tazobactam So (ZOSYN) 4.5 g in dextrose 5 % 100 mL IVPB 4.5 g Intravenous Q8H   Metoclopramide HCl (REGLAN) injection 10 m performed. FINDINGS:  BOWEL GAS PATTERN: Nonspecific nonobstructive abdominal gas pattern. Small amount of residual contrast within the colon from recent CT examination FREE AIR:   No pneumoperitoneum.  Metallic tipped feeding tube extends into the distal pneumoperitoneum        Ct Chest+abdomen+pelvis(all Cntrst Only)(cpt=71260/21262)    4/20/2017  PROCEDURE: CT CHEST ABDOMEN PELVIS (ALL CONTRAST ONLY) (CPT=71260/06860)  COMPARISON: [facility], XR UPPER GI TRACT WITH KUB (AIR CONTR enlargement, atrophy, abnormal density, or significant focal lesion. SPLEEN: No enlargement or focal lesion. STOMACH: There is suggestion of luminal narrowing of the consistent with a junction with evidence of prior hiatal hernia surgery.   A nasogastric fundoplication as there are clips/sutures around the gastroesophageal junction. However, there is nonspecific soft tissue thickening and luminal narrowing centered at the gastroesophageal junction.   The majority of soft tissue thickening is also located i used. Adjustment of the mA and/or kV was done based on the patient's size. Iterative reconstruction technique for dose reduction was employed.   FINDINGS: COMMENT: Evaluation of the vasculature and of the abdominal viscera for the presence of intraparenchym performed 1 day previously, there is extensive wall thickening of the cecum. Extensive pericecal fat stranding has also developed in the interim. Again seen is a linear radiodensity which appears to be embedded in the cecal wall (series 5, image 50).   Foci changes of the hips bilaterally. ABDOMINAL WALL: There is fatty atrophy of the rectus musculature. Diffuse subcutaneous edema is apparent dependently.  OTHER: No definite free air is seen in the abdomen or pelvis, although there is mesenteric gas as above-d also discussed with the patient's gastroenterologist, Dr. Aga Cyr, at [phone], on the same day.        Xr Chest Ap Portable  (cpt=71010)    4/22/2017  PROCEDURE: XR CHEST AP PORTABLE (CPT=71010) TIME: 0923 hours  COMPARISON: [facility], XR UPPER GI TRACT WITH KUB (AIR CONTRAST STOMACH) (CPT=74247), 12/08/2016, 7:52. [facility] [place], X CHEST PA LAT ROUTINE, 9/22/2016, 9:35.   [facility] [place], XR CHEST PA + LAT CHEST (CPT=7102 minutes and the test was terminated.      Dictated by (CST): Marquis Reynaldo M.D. on 3/29/2017 at 18:24     Approved by (HERLINDA): Marquis Reynaldo M.D. on 3/29/2017 at 18:39          3/29/2017  CONCLUSION: Incomplete but abnormal gastric emptying study basilio

## 2017-04-28 NOTE — ANESTHESIA PREPROCEDURE EVALUATION
Anesthesia PreOp Note    HPI:     Radha Michel is a 74year old female who presents for preoperative consultation requested by: * No surgeons listed *    Date of Surgery: 4/28/2017    * No procedures listed *  Indication: * No pre-op diagnosis entered * • PE (pulmonary embolism)    • Leg DVT (deep venous thromboembolism), acute ([facility] [address] 2005     Left   • Osteoarthritis    • Heel spur      Right   • Bilateral carpal tunnel syndrome    • Hiatal hernia    • Lumbar disc disease 2012   • Esophageal reflux    • Disp: 90 tablet Rfl: 1 Taking   Metoprolol Tartrate 50 MG Oral Tab Take 1 tablet (50 mg total) by mouth 2 (two) times daily.  Disp: 90 tablet Rfl: 3 Taking   spironolactone 50 MG Oral Tab 1 1/2 tabs day (Patient taking differently: Take 50 mg by mouth daily Intravenous Q10 Min PRN Channing Duarte MD     morphINE sulfate (PF) 4 MG/ML injection 4 mg 4 mg Intravenous Q10 Min PRN Channing Duarte MD     morphINE sulfate (PF) 10 MG/ML injection 6 mg 6 mg Intravenous Q10 Min PRN Channing Duarte MD per day Lukas Márquez MD 40 mg at 04/28/17 0912    dextrose 5 % /lactated ringers infusion  Intravenous Continuous Siavelis, Jessica Yancey MD Last Rate: 75 mL/hr at 04/24/17 2216    acetaminophen (TYLENOL) tab 650 mg 650 mg Per NG Tube Q6H PRN Siavelis, Anjali triamcinolone acetonide (KENALOG) 0.1 % cream  Topical BID PRN Cherelle Book, DO       No current [facility]-ordered outpatient prescriptions on file.       Adhesive Tape               Family History   Problem Relation Age of Onset   • Hypertension       Fam 04/28/17  1200 04/28/17  1300 04/28/17  1343   BP: 137/72 147/73 119/66 128/88   Pulse: 79 91 85 79   Temp:  98.7 °F (37.1 °C)  97.6 °F (36.4 °C)   TempSrc:  Temporal  Oral   Resp: 21 23 21 19   Height:       Weight:       SpO2: 100%  95% 96%        Anesth

## 2017-04-28 NOTE — PROGRESS NOTES
[facility]    Progress Note    Yany Jason Patient Status:  Inpatient    1945 MRN [id number]   Location [facility] Attending Uriel Grimes, [address] [facility] Day # 10 PCP Asha Mrax.  Jemal, DO       Subject PRN   metoprolol Tartrate (LOPRESSOR) 5 MG/5ML injection 2.5 mg 2.5 mg Intravenous Once   Atropine Sulfate 0.1 MG/ML injection 0.5 mg 0.5 mg Intravenous PRN   ondansetron HCl (ZOFRAN) injection 4 mg 4 mg Intravenous Once PRN   Prochlorperazine Edisylate (C (LOPRESSOR) tab 50 mg 50 mg Oral 2x Daily(Beta Blocker)   Normal Saline Flush 0.9 % injection 10 mL 10 mL Intravenous PRN   Atropine Sulfate 0.1 MG/ML injection 0.5 mg 0.5 mg Intravenous PRN   nitroGLYCERIN (NITROSTAT) SL tab 0.4 mg 0.4 mg Sublingual Q5 Mi Results  Component Value Date   HGB 11.2* 04/28/2017   HGB 10.9* 04/27/2017   HGB 10.8* 04/26/2017      Lab Results  Component Value Date    04/28/2017    04/27/2017    04/26/2017       Recent Labs   Lab  04/26/17   0415  04/27/17   05

## 2017-04-29 ENCOUNTER — APPOINTMENT (OUTPATIENT)
Dept: GENERAL RADIOLOGY | Facility: HOSPITAL | Age: 72
DRG: 673 | End: 2017-04-29
Attending: RADIOLOGY
Payer: MEDICARE

## 2017-04-29 PROCEDURE — 99232 SBSQ HOSP IP/OBS MODERATE 35: CPT | Performed by: INTERNAL MEDICINE

## 2017-04-29 PROCEDURE — 74000 XR ABDOMEN (1 VIEW) (CPT=74000): CPT

## 2017-04-29 PROCEDURE — 99233 SBSQ HOSP IP/OBS HIGH 50: CPT | Performed by: HOSPITALIST

## 2017-04-29 RX ORDER — LEVOFLOXACIN 500 MG/1
500 TABLET, FILM COATED ORAL DAILY
Status: DISCONTINUED | OUTPATIENT
Start: 2017-04-30 | End: 2017-04-29

## 2017-04-29 RX ORDER — DEXTROSE MONOHYDRATE 100 MG/ML
INJECTION, SOLUTION INTRAVENOUS CONTINUOUS PRN
Status: DISCONTINUED | OUTPATIENT
Start: 2017-04-29 | End: 2017-05-02

## 2017-04-29 RX ORDER — FUROSEMIDE 10 MG/ML
20 INJECTION INTRAMUSCULAR; INTRAVENOUS DAILY
Status: DISCONTINUED | OUTPATIENT
Start: 2017-04-30 | End: 2017-05-01

## 2017-04-29 RX ORDER — LEVOFLOXACIN 750 MG/1
750 TABLET ORAL
Status: DISCONTINUED | OUTPATIENT
Start: 2017-04-29 | End: 2017-05-02

## 2017-04-29 RX ORDER — PANTOPRAZOLE SODIUM 40 MG/1
40 TABLET, DELAYED RELEASE ORAL
Status: DISCONTINUED | OUTPATIENT
Start: 2017-04-30 | End: 2017-05-02

## 2017-04-29 NOTE — DIETARY NOTE
NUTRITION NOTE UPDATE:   Tube Feed via G-tube to start at low rate 20 ml/hr Promote formula; do not advance- to montor and MD increase 4/20 if tolerates well.  Goal TF; PROMOTE 75 ML/HR if no or very limted po [place]

## 2017-04-29 NOTE — PROGRESS NOTES
[facility]    Progress Note    Mindy Pantoja Patient Status:  Inpatient    1945 MRN [id number]   Location [facility] Attending Elier Rivera, [address] Day # 6 PCP Nina Living.  Jemal, DO       Subject Q5 Min PRN   morphINE sulfate (PF) 2 MG/ML injection 2 mg 2 mg Intravenous Q10 Min PRN   morphINE sulfate (PF) 4 MG/ML injection 4 mg 4 mg Intravenous Q10 Min PRN   morphINE sulfate (PF) 10 MG/ML injection 6 mg 6 mg Intravenous Q10 Min PRN   metoprolol Tar mg 2.5 mg Intravenous PRN   HYDROmorphone HCl PF (DILAUDID) 1 MG/ML injection 0.5 mg 0.5 mg Intravenous Q2H PRN   Or      HYDROmorphone HCl PF (DILAUDID) 1 MG/ML injection 1 mg 1 mg Intravenous Q2H PRN   ondansetron HCl (ZOFRAN) injection 8 mg 8 mg Estee Dobson 202 04/28/2017    04/27/2017       Recent Labs   Lab  04/27/17   0550  04/28/17   0509  04/29/17   0538   GLU  132*  130*  118*   BUN  10  13  19   CREATSERUM  1.16  1.12  1.15   GFRAA  55*  58*  56*   GFRNAA  46*  48*  46*   CA  8.7  8.4*  8.4*   N

## 2017-04-29 NOTE — PROGRESS NOTES
[facility] Pharmacy Note:  Renal Adjustment for Levaquin (levofloxacin)    Cheyenne Meyers is a 74year old female who has been prescribed Levaquin (levofloxacin) 500 mg every 24 hrs. CrCl is estimated creatinine clearance is 40.2 mL/min (based on Cr of 1.15).  so t

## 2017-04-29 NOTE — PROGRESS NOTES
[facility]    General Surgery Progress Note  Ky Li  [id number]  # 11       Subjective:     Awake and alert, in good spirits, denies abdominal pain, c/o burping  Insertion of g tube yesterday     Exam:     General: in no acute di 55*  58*  56*   GFRNAA  46*  48*  46*   CA  8.7  8.4*  8.4*   NA  139  139  142   K  3.5  3.8  3.8  4.3   CL  111*  110  106   CO2  22  24  28         Lab Results  Component Value Date   WBC 9.0 04/29/2017   HGB 11.6 04/29/2017   HCT 35.3 04/29/2017   PLT

## 2017-04-29 NOTE — PROGRESS NOTES
[facility]     Gastroenterology Progress Note    Balbina Barrientos Patient Status:  Inpatient    1945 MRN [id number]   Location [facility] 4W/SW/SE Attending Zain Echavarria [place] Day # 6 PCP Delisa Khan.  Jemal attempted on 4/25; there were two large ulcerations in the stomach, stress ulcer vs ischemic ulceration. No stricture noted at GE junction. PEG attempted but due to body habitus, unable to place (lack of transillumination).      Mesenteric angiogram done on G-tube Procedure    4/28/2017  CONCLUSION:  Successful placement of balloon retention gastrostomy tube. The gastrostomy tube is not to be used until it has been checked by IR in approximately 24 hours.

## 2017-04-29 NOTE — PROGRESS NOTES
Patient seen in follow up. Edema improving. Feeling bloated. G tube checked and ok for use. \  Vital signs reviewed. BP elevated this morning but patient reports that she was having incision pain from the site.    Labs reviewed K 4.3, Creatinine 1.15, HYDROmorphone HCl PF (DILAUDID) 1 MG/ML injection 0.6 mg 0.6 mg Intravenous Q5 Min PRN   morphINE sulfate (PF) 2 MG/ML injection 2 mg 2 mg Intravenous Q10 Min PRN   morphINE sulfate (PF) 4 MG/ML injection 4 mg 4 mg Intravenous Q10 Min PRN   morphINE sulfat Metoprolol Tartrate (LOPRESSOR) 1 MG/ML injection 5 mg 5 mg Intravenous PRN   Or      Metoprolol Tartrate (LOPRESSOR) 1 MG/ML injection 2.5 mg 2.5 mg Intravenous PRN   HYDROmorphone HCl PF (DILAUDID) 1 MG/ML injection 0.5 mg 0.5 mg Intravenous Q2H PRN   Or 4/27/2017  CONCLUSION:  1. Successful placement of nasogastric feeding tube into the stomach. Ir G-tube Procedure    4/28/2017  CONCLUSION:  Successful placement of balloon retention gastrostomy tube.   The gastrostomy tube is not to be used until i 6.      Bowel ischemia, due to dehydration. No acute cardioembolism. PLAN:      1. POD stent to celiac artery   2. On metoprolol for rate control for atrial fibrillation. On Eliquis 2.5mg BID for anticoagulation. Hemoglobin stable.    3. Monitor BP if co

## 2017-04-29 NOTE — RESPIRATORY THERAPY NOTE
Talked to the patient about the BIPAP and patient said never had CPAP/BIPAP before, so do not want to use.

## 2017-04-29 NOTE — PROGRESS NOTES
[place] [facility]  Progress Note      Ky Li Patient Status:  Inpatient    1945 MRN [id number]   Location [facility] 3W/SW Attending Urmila Garcia, [address] [facility] Day # 6 PCP Mario Cao.  DO Jemal       Subjective:   Denies ab

## 2017-04-29 NOTE — PROGRESS NOTES
[facility]     Progress Note        Yomaira Schmid Patient Status:  Inpatient    1945 MRN [id number]   [facility] 2W/SW Attending Zain Echavarria Day # 11 PCP Yemi Schwab.  DO Jemal       Subjective: chloride 0.9 % 500 mL IVPB 1,750 mg Intravenous Q24H   dextrose injection 50 mL 50 mL Intravenous PRN   Glucose-Vitamin C (DEX-4) 4-0.006 g chewable tab 4 tablet 4 tablet Oral Q15 Min PRN   apixaban (ELIQUIS) tab 2.5 mg 2.5 mg Oral BID   vancomycin (FIRST- HYDROmorphone HCl PF (DILAUDID) 1 MG/ML injection 0.5 mg 0.5 mg Intravenous Q2H PRN   Or      HYDROmorphone HCl PF (DILAUDID) 1 MG/ML injection 1 mg 1 mg Intravenous Q2H PRN   ondansetron HCl (ZOFRAN) injection 8 mg 8 mg Intravenous Q4H PRN   Insulin Reg 11:39.  INDICATIONS: Abdominal pain and vomiting. Evaluate free air  TECHNIQUE: Supine, prone, and upright (or decubitus) radiographs of the abdomen were performed. FINDINGS:  BOWEL GAS PATTERN: Nonspecific nonobstructive abdominal gas pattern.  Small zeynep vasculature. 2. Hypoinflation. Bibasilar atelectatic changes have developed. Small bilateral effusions. 3. Feeding tube extends into the stomach 4.  No pneumoperitoneum        Ct Chest+abdomen+pelvis(all Cntrst Only)(cpt=71260/99525)    4/20/2017  PROCEDURE axillary or clavicular lymphadenopathy. BONES: Scattered mild degenerative endplate changes in the visualized thoracolumbar spine. Abdomen: LIVER: No enlargement, atrophy, abnormal density, or significant focal lesion.   SPLEEN: No enlargement or focal le 12:35     Approved by (CST): Gordo Acosta MD on 4/20/2017 at 13:04          4/20/2017  CONCLUSION:  1. There is history of prior hiatal hernia repair and fundoplication as there are clips/sutures around the gastroesophageal junction.   However, there is no the abdomen and pelvis were obtained without intravenous contrast material. No oral contrast was ingested. Automated exposure control for dose reduction was used. Adjustment of the mA and/or kV was done based on the patient's size.  Iterative reconstruction tip extending into the distal gastric body. The stomach is mildly distended. There is no evidence of bowel obstruction. Intervally, since the examination performed 1 day previously, there is extensive wall thickening of the cecum.  Extensive pericecal fat appreciated. There is severe vacuum phenomenon at the sacroiliac joints bilaterally. Pubic symphyseal degeneration is seen, and there are moderate degenerative changes of the hips bilaterally.  ABDOMINAL WALL: There is fatty atrophy of the rectus musculatur examination with respect to conclusion #1-3 were discussed with the patient's physician, Dr. Ebony Carmen, by Dr. Andrew Ellis at [phone] on 04/21/2017.  These were also discussed with the patient's gastroenterologist, Dr. Laron Miramontes, at [phone], on the same d recommended.          Xr Chest Ap Portable  (cpt=71010)    4/18/2017  PROCEDURE: XR CHEST AP PORTABLE (CPT=71010) TIME: 11 AM.   COMPARISON: [address] [place] [address], XR UPPER GI TRACT WITH KUB (AIR CONTRAST STOMACH) (CPT=74247), 12/08/2016, 7:52 radiotracer/food in the esophagus. At 45 minutes into the study, there was minimal if any emptying of the gastric contents into the small intestine. The patient vomited a 45 minutes and the test was terminated.      Dictated by (CST): Edelmira Lentz M.D.

## 2017-04-30 PROCEDURE — 99232 SBSQ HOSP IP/OBS MODERATE 35: CPT | Performed by: INTERNAL MEDICINE

## 2017-04-30 PROCEDURE — 99233 SBSQ HOSP IP/OBS HIGH 50: CPT | Performed by: HOSPITALIST

## 2017-04-30 NOTE — PROGRESS NOTES
[facility]     Gastroenterology Progress Note    Chanel Palma Patient Status:  Inpatient    1945 MRN [id number]   Location [facility] 4W/SW/SE Attending Zain Echavarriaissa Day # 12 PCP Lauro Vogt.  Jemal superior mesenteric artery origin stenosis. Major SMA branches patent. EGD/PEG attempted on 4/25; there were two large ulcerations in the stomach, stress ulcer vs ischemic ulceration. No stricture noted at GE junction.  PEG attempted but due to body habi Ir G-tube Procedure    4/28/2017  CONCLUSION:  Successful placement of balloon retention gastrostomy tube. The gastrostomy tube is not to be used until it has been checked by IR in approximately 24 hours.

## 2017-04-30 NOTE — PROGRESS NOTES
Patient seen in follow up. Vitals reviewed. BP better this afternoon. Sating 98%   Labs reviewed K 4.1, Creatinine 1.06, WBC 8.9, Hgb 10.8, plt 218  Diuresing well. On lasix 20mg IV daily  No chest pain or shortness of breath.  Still having abdomina Normal Saline Flush 0.9 % injection 10 mL 10 mL Intravenous PRN   nitroGLYCERIN (NITROSTAT) SL tab 0.4 mg 0.4 mg Sublingual Q5 Min PRN   Metoprolol Tartrate (LOPRESSOR) 1 MG/ML injection 5 mg 5 mg Intravenous PRN   Or      Metoprolol Tartrate (LOPRESSOR) 1 4/29/2017  CONCLUSION: Technically suboptimal view of the abdomen, with extensive motion artifact. Ir G-tube Procedure    4/28/2017  CONCLUSION:  Successful placement of balloon retention gastrostomy tube.   The gastrostomy tube is not to be used un 2. On metoprolol for rate control for atrial fibrillation. On Eliquis 2.5mg BID for anticoagulation. Hemoglobin stable. 3. Monitor BP  4. Diuresing very well. On lasix 20mg IV daily. Will continue. Creatinine stable.  Electrolytes stable       Mohammad Sh

## 2017-04-30 NOTE — PROGRESS NOTES
[facility]    Progress Note    [facility] Patient Status:  Inpatient    1945 MRN [id number]   Location [facility] Attending Tahir Harvey, [address] [facility] Day # 12 PCP Julieta Horowitz.  Jemal, DO       Subject solution 15 mL 15 mL Mouth/Throat [email or web address]   acetaminophen (TYLENOL) tab 650 mg 650 mg Per NG Tube Q6H PRN   Metoprolol Tartrate (LOPRESSOR) tab 50 mg 50 mg Oral 2x Daily(Beta Blocker)   Normal Saline Flush 0.9 % injection 10 mL 10 mL Intravenous PRN 04/30/2017   HGB 11.6* 04/29/2017   HGB 11.2* 04/28/2017        Lab Results  Component Value Date    04/30/2017    04/29/2017    04/28/2017       Recent Labs   Lab  04/28/17   0509  04/29/17   0538  04/30/17   0544   GLU  130*  118*  1

## 2017-04-30 NOTE — PROGRESS NOTES
[facility]    General Surgery Progress Note  Lexa Carey  BCA:[id number]  HD# 12       Subjective:     Awake and alert, denies abdominal pain, high residuals with bolus TFs     Exam:     General: in no acute distress  Pulmonary:lungs clear 142  138   K  3.8  3.8  4.3  4.1   CL  110  106  103   CO2  24  28  30         Lab Results  Component Value Date   WBC 8.9 04/30/2017   HGB 10.8 04/30/2017   HCT 32.2 04/30/2017    04/30/2017    04/30/2017   K 4.1 04/30/2017    04/30/201

## 2017-04-30 NOTE — DIETARY NOTE
NUTRITION NOTE UPDATE:  Pt received 1 can Ensure via bolus/g-tube yesterday 4/29 and 200 ml h20 flush BID total for day.  Prior to -Gtube feeds pt was taking clear liquids and having dry heaving, nausea but r

## 2017-04-30 NOTE — PHYSICAL THERAPY NOTE
PHYSICAL THERAPY EVALUATION - INPATIENT     Room Number: 347/347-A  Evaluation Date: 4/30/2017  Type of Evaluation: Initial  Physician Order: PT Eval and Treat    Presenting Problem: acute ischemic colitis and celiac artery stent placed as well as G tu ulcers    Mesenteric ischemia [facility])      Past Medical History  Past Medical History   Diagnosis Date   • Depression    • HTN (hypertension)    • Acid reflux    • PE (pulmonary embolism)    • Leg DVT (deep venous thromboembolism), acute ([facility] [address] 2005     Left ASSESSMENT  Ratin  Location: G tube incision site  Management Techniques: Activity promotion; Body mechanics;Repositioning    COGNITION  · Overall Cognitive Status:  WFL - within functional limits    RANGE OF MOTION AND STRENGTH ASSESSMENT    Lower extr mechanics  Gait training  Transfer training    Patient End of Session: Up in chair;Needs met;Call light within reach;RN aware of session/findings; All patient questions and concerns addressed    CURRENT GOALS    Goals to be met by: 5/10/17    Patient Goal P

## 2017-05-01 PROCEDURE — 99232 SBSQ HOSP IP/OBS MODERATE 35: CPT | Performed by: INTERNAL MEDICINE

## 2017-05-01 PROCEDURE — 99233 SBSQ HOSP IP/OBS HIGH 50: CPT | Performed by: HOSPITALIST

## 2017-05-01 RX ORDER — DEXTROSE MONOHYDRATE 25 G/50ML
INJECTION, SOLUTION INTRAVENOUS
Status: COMPLETED
Start: 2017-05-01 | End: 2017-05-01

## 2017-05-01 RX ORDER — 0.9 % SODIUM CHLORIDE 0.9 %
VIAL (ML) INJECTION
Status: DISPENSED
Start: 2017-05-01 | End: 2017-05-02

## 2017-05-01 RX ORDER — FUROSEMIDE 10 MG/ML
20 INJECTION INTRAMUSCULAR; INTRAVENOUS
Status: DISCONTINUED | OUTPATIENT
Start: 2017-05-01 | End: 2017-05-02

## 2017-05-01 NOTE — DISCHARGE PLANNING
5/1CM-MD orders received in regards to discharge planning and HHC. The Patient was seen at beside in regards to discharge planning. The Patient declined rehab and plans on discharging home with no needs.  This Writer was informed that the Patient's will nee

## 2017-05-01 NOTE — DIETARY NOTE
ADULT NUTRITION RE-ASSESSMENT    Pt is at high nutrition risk. Pt does not meet malnutrition criteria. RECOMMENDATIONS TO MD: RD to order and manage tube feeding. Discussed nocturnal feeding with Dr Kayleigh Keys .   He is requesting feeding rate no higher 12 hours will provide 600 kcal, 31 gm protein & 420 ml free fluid. This will only meet 37% of kcal & 35% of minimum protein needs.     NUTRITION INTERVENTION:  - Meals and snacks:  Low fiber/soft  - Enteral Nutrition:   Promote via G-Tube/PEG @ 40 ml/hr x 119.523 kg (263 lb 8 oz)   04/30/17 0521 120.067 kg (264 lb 11.2 oz)   04/29/17 0611 124.966 kg (275 lb 8 oz)   04/28/17 1800 126.3 kg (278 lb 7.1 oz)   04/28/17 0600 126.962 kg (279 lb 14.4 oz)   04/26/17 0300 127. 053 kg (280 lb 1.6 oz)   04/24/17 0500 12 low fiber/soft and TF.   Nutrition needs:   Calories: 1605 calories/day (15 calories per kg for safe weight loss)  Protein:  grams protein/day (1.5-2.0 grams protein per kg IBW)  Estimated Fluid needs: 7750-9459 (~1ml per kcal)    MONITOR AND EVALUATE

## 2017-05-01 NOTE — PROGRESS NOTES
Patient seen in follow up. No chest pain or shortness of breath.    Feels better today  Chart reviewed  Discussed with nurse     05/01/17  0855   BP: 119/60   Pulse: 95   Temp: 98.4 °F (36.9 °C)   Resp: 18       Intake/Output Summary (Last 24 hours) HYDROmorphone HCl PF (DILAUDID) 1 MG/ML injection 1 mg 1 mg Intravenous Q2H PRN   ondansetron HCl (ZOFRAN) injection 8 mg 8 mg Intravenous Q4H PRN   Insulin Regular Human (NOVOLIN R) 100 UNIT/ML injection 1-5 Units 1-5 Units Subcutaneous Q6H   Metocloprami 1.      Atrial fibrillation noted first on an EKG in January 2017.  Patient was previously on metoprolol succinate 100 mg daily.  Patient is currently n.p.o.  Prior to this, the patient had dysmotility disorder and she would \"spit up\" her medications.  P

## 2017-05-01 NOTE — CM/SW NOTE
Met with patient to deliver IM. Per patient, still not sure on medical/supplemental feeding plan. Patient currently on TPN and attempting to eat more solid food. Patient states she wants to go home on discharge and does not want rehab.   CM/SW will jonny

## 2017-05-01 NOTE — PROGRESS NOTES
[facility]    General Surgery Progress Note  [facility]  NFS:[id number]  # 13       Subjective:     Awake and alert, denies abdominal pain, high residuals with bolus TFs but now tolerating continuous TFs - not at goal rate yet  States no 28  30  27         Lab Results  Component Value Date   WBC 9.0 05/01/2017   HGB 10.1 05/01/2017   HCT 30.3 05/01/2017    05/01/2017    05/01/2017   K 4.1 05/01/2017    05/01/2017   CO2 27 05/01/2017   BUN 22 05/01/2017   CREATSERUM 1.15

## 2017-05-01 NOTE — PROGRESS NOTES
[facility]    Progress Note    Mracos Jacobs Patient Status:  Inpatient    1945 MRN [id number]   Location [facility] Attending Maryln [place], [address] [facility] [address] Day # 15 PCP Rina Rivera.  DO Jemal       Subject 5 mg Intravenous PRN   Or      Metoprolol Tartrate (LOPRESSOR) 1 MG/ML injection 2.5 mg 2.5 mg Intravenous PRN   HYDROmorphone HCl PF (DILAUDID) 1 MG/ML injection 0.5 mg 0.5 mg Intravenous Q2H PRN   Or      HYDROmorphone HCl PF (DILAUDID) 1 MG/ML injection Abdominal pain  - 2/2 mesenteric ischemia . S/p celiac stent. Abd pain now resolved  - cont abx - vanco and zosyn changed to levaquin for 3 days   - tolerating tube feeds. Change to nightly.  If tolerates, plan for DC tomorrow with HH.   - monitor blood sug

## 2017-05-01 NOTE — PROGRESS NOTES
[facility]     Gastroenterology Progress Note    Iker Khan Patient Status:  Inpatient    1945 MRN [id number]   Location [facility] 4W/SW/SE Attending Zain Echavarria [place] Day # 15 PCP Eliana Cedeno.  Jemal attempted on 4/25; there were two large ulcerations in the stomach, stress ulcer vs ischemic ulceration. No stricture noted at GE junction. PEG attempted but due to body habitus, unable to place (lack of transillumination).      Mesenteric angiogram done on

## 2017-05-02 VITALS
DIASTOLIC BLOOD PRESSURE: 86 MMHG | WEIGHT: 267.81 LBS | BODY MASS INDEX: 43.04 KG/M2 | SYSTOLIC BLOOD PRESSURE: 118 MMHG | RESPIRATION RATE: 19 BRPM | OXYGEN SATURATION: 9 % | HEIGHT: 66 IN | TEMPERATURE: 98 F

## 2017-05-02 PROCEDURE — 99232 SBSQ HOSP IP/OBS MODERATE 35: CPT | Performed by: INTERNAL MEDICINE

## 2017-05-02 PROCEDURE — 99239 HOSP IP/OBS DSCHRG MGMT >30: CPT | Performed by: HOSPITALIST

## 2017-05-02 RX ORDER — FUROSEMIDE 40 MG/1
40 TABLET ORAL DAILY
Status: DISCONTINUED | OUTPATIENT
Start: 2017-05-02 | End: 2017-05-02

## 2017-05-02 RX ORDER — LEVOFLOXACIN 750 MG/1
750 TABLET ORAL
Qty: 2 TABLET | Refills: 0 | Status: SHIPPED | OUTPATIENT
Start: 2017-05-02 | End: 2017-05-08 | Stop reason: ALTCHOICE

## 2017-05-02 RX ORDER — FUROSEMIDE 40 MG/1
40 TABLET ORAL DAILY
Qty: 30 TABLET | Refills: 0 | Status: SHIPPED | OUTPATIENT
Start: 2017-05-02 | End: 2017-06-11

## 2017-05-02 RX ORDER — 0.9 % SODIUM CHLORIDE 0.9 %
VIAL (ML) INJECTION
Status: DISCONTINUED
Start: 2017-05-02 | End: 2017-05-02

## 2017-05-02 RX ORDER — PANTOPRAZOLE SODIUM 40 MG/1
40 TABLET, DELAYED RELEASE ORAL
Qty: 30 TABLET | Refills: 0 | Status: SHIPPED | OUTPATIENT
Start: 2017-05-02 | End: 2017-06-11

## 2017-05-02 RX ORDER — METOCLOPRAMIDE 10 MG/1
10 TABLET ORAL
Qty: 90 TABLET | Refills: 1 | Status: SHIPPED | OUTPATIENT
Start: 2017-05-02 | End: 2017-06-01

## 2017-05-02 NOTE — PROGRESS NOTES
Patient seen in follow up. Vitals reviewed. BP stable. Sating 95-99%, Heart rate controlled   Labs reviewed creatinine 1.15, K 4.1, hemoglobin 10.1, platelet 599  +I/O, but diuresing well.  Weight stable      05/02/17  0757   BP: 118/86   Pulse: 90 HYDROmorphone HCl PF (DILAUDID) 1 MG/ML injection 1 mg 1 mg Intravenous Q2H PRN   ondansetron HCl (ZOFRAN) injection 8 mg 8 mg Intravenous Q4H PRN   Insulin Regular Human (NOVOLIN R) 100 UNIT/ML injection 1-5 Units 1-5 Units Subcutaneous Q6H   Metocloprami 1.      Atrial fibrillation noted first on an EKG in January 2017.  Patient was previously on metoprolol succinate 100 mg daily.  Patient is currently n.p.o.  Prior to this, the patient had dysmotility disorder and she would \"spit up\" her medications.  P

## 2017-05-02 NOTE — DISCHARGE PLANNING
5/2CM-The Patient's RN informed this Writer that the Patient is medically stable for discharge.  This Writer informed the Patient's RN that the I left the Enteral Feeding Prescription that is on the Patient's chart and will need to be completed prior to del

## 2017-05-02 NOTE — PROGRESS NOTES
[facility]    General Surgery Progress Note  Atif Agarwal  OEU:[id number]  # 14       Subjective:     Awake and alert, denies abdominal pain, in good spirits  Tolerating continuous TFs  Tolerating full liquids      Exam:     General: in no --    CA  8.5  8.4*   --    --    --    NA  138  138   --    --    --    K  4.1  4.1   --    --    --    CL  103  102   --    --    --    CO2  30  27   --    --    --          Lab Results  Component Value Date    05/02/2017     Roselia Holley MD FA

## 2017-05-02 NOTE — PHYSICAL THERAPY NOTE
Chart reviewed    RN approve participation    Pt received on tricia   Pt states to therapist   \"I dont need any PT  I am leaving\"    \"I don't need your help\"   \"I am not concerned about the stairs entering my home\" ( as therapy notes 5 stairs to ente

## 2017-05-02 NOTE — PLAN OF CARE
1430; left msg for dr Dagoberto Shrestha he need to fill out form for d/c with fee info, will then call jas to confirm so to arrange for home feedings Pavel Richardson

## 2017-05-02 NOTE — DIETARY NOTE
NUTRITION NOTE    Oral intake 5/1/17: 1008 kcal & 34 gm protein. Tolerated well. Est needs: 1605 kcal &  gm protein  Met ~63% of kcal and ~39% of minimum protein needs. TF provides 600 kcal 31 gm protein, 420 ml free fluid & no fiber.     If patien

## 2017-05-02 NOTE — PROGRESS NOTES
[facility]     Gastroenterology Progress Note    Ton Ann Patient Status:  Inpatient    1945 MRN [id number]   Location [facility] 4W/SW/SE Attending Zain Echavarria [place] Day # 15 PCP Segundo Joaquin 4/25; there were two large ulcerations in the stomach, stress ulcer vs ischemic ulceration. No stricture noted at GE junction. PEG attempted but due to body habitus, unable to place (lack of transillumination).      Mesenteric angiogram done on 4/26 showing

## 2017-05-02 NOTE — DISCHARGE SUMMARY
[facility]    Discharge Summary    Siri Rico Patient Status:  Inpatient    1945 MRN [id number]   Location [facility] 3W/SW Attending Anil Schafer, [address] [facility] Day # 15 PCP Justin Green.  DO Jemal     Date of Admission: Mesenteric ischemia ([facility] [address]      Reason for Admission:nausea and vomiting    Physical Exam:   General appearance: alert, appears stated age and cooperative  Pulmonary:  clear to auscultation bilaterally  Cardiovascular: S1, S2 normal, no murmur, click, rub o rescanning showed improvement of portal gas and resolution of ab pain. Lactic acid normalized. Course c/b Afib and CHF.  Echo showed no thrombus. CTA shows patent celiac, SMA, ABE. Mild-moderate celiac artery origin stenosis.  Mild superior mesenteric arter daily.    Quantity:  60 tablet   Refills:  0       furosemide 40 MG Tabs   Last time this was given:  40 mg on 5/2/2017  2:04 PM   Commonly known as:  LASIX        Take 1 tablet (40 mg total) by mouth daily.     Quantity:  30 tablet   Refills:  0       levo ER 90 MG Tb24   Commonly known as:  ADALAT CC           spironolactone 50 MG Tabs   Commonly known as:  ALDACTONE           sucralfate 1 GM/10ML Susp   Commonly known as:  CARAFATE           verapamil HCl  MG Tbcr   Commonly known as:  CALAN-SR

## 2017-05-02 NOTE — DISCHARGE PLANNING
5/2CM- [facility] informed this Writer that Medicare will not cover the Patient's tube feeding because she is eating. This Writer met with the Patient and the Patient's MD at bedside in regards to the above.  The Patient understood stating that he

## 2017-05-03 ENCOUNTER — PATIENT OUTREACH (OUTPATIENT)
Dept: FAMILY MEDICINE CLINIC | Facility: CLINIC | Age: 72
End: 2017-05-03

## 2017-05-03 RX ORDER — ACETAMINOPHEN 325 MG/1
325 TABLET ORAL EVERY 4 HOURS PRN
COMMUNITY
End: 2017-05-08

## 2017-05-03 NOTE — PROGRESS NOTES
Call back to pt to inform her that she will be receiving Home Health through [address] arranged by [facility] at hospital.  Pt verbalized understanding.

## 2017-05-03 NOTE — PROGRESS NOTES
Initial Post Discharge Follow Up   Discharge Date: 5/2/17  Contact Date: 5/3/2017    Consent Verification:  Assessment Completed With: Patient  HIPAA Verified? Yes    1.  Tell me why you were in the hospital?  \"Rather complicated, my esophagus and stomach Do you ever stop taking your medicine because you feel worse after taking it? No    8. Which issues keep you from taking your medicine as prescribed? No issues keep me from taking my medicine    9.  Does the doctor who prescribed the medicine know about her peg site, and her needing to drink the ensure. Referral/Contact:  TCM placed call to hospital discharge planner, Roni Lane,  to inquire about referral to home health for visiting nurse to see pt.      TCM spoke with Crystal Peraza and she will inve

## 2017-05-04 ENCOUNTER — TELEPHONE (OUTPATIENT)
Dept: FAMILY MEDICINE CLINIC | Facility: CLINIC | Age: 72
End: 2017-05-04

## 2017-05-04 NOTE — DISCHARGE PLANNING
5/3CM- This Writer received a call from Roseanna Weston RN in regards to following up with the Patient. Emanuel Pandya inquired about [facility] for the Patient because of pain feet and around her Gtube. Emanuel Pandya also informed this Writer that the Patient stated that she was given

## 2017-05-05 ENCOUNTER — TELEPHONE (OUTPATIENT)
Dept: GASTROENTEROLOGY | Facility: CLINIC | Age: 72
End: 2017-05-05

## 2017-05-05 ENCOUNTER — TELEPHONE (OUTPATIENT)
Dept: FAMILY MEDICINE CLINIC | Facility: CLINIC | Age: 72
End: 2017-05-05

## 2017-05-05 RX ORDER — TRAMADOL HYDROCHLORIDE 50 MG/1
25 TABLET ORAL EVERY 6 HOURS PRN
Qty: 5 TABLET | Refills: 0 | OUTPATIENT
Start: 2017-05-05 | End: 2017-06-27 | Stop reason: ALTCHOICE

## 2017-05-05 NOTE — TELEPHONE ENCOUNTER
Pt states just got out of hospital Tuesday and still having severe pain near cut in stomach and need [facility] to prescribe some pain medicine. Please advise, transferred to RN.

## 2017-05-05 NOTE — TELEPHONE ENCOUNTER
Actions Requested: Pt calling to request pain medication but during out conversation stated she will just contact Dr Chavez [facility] office since they will know what is going on.  Routed to [facility] and  to note/advise  Situation/Background   Problem: severe pain in G

## 2017-05-05 NOTE — TELEPHONE ENCOUNTER
Spoke with Amira Martinez--- she had pain last night from G-tube site, likely moving around more and causing pain. Still fresh incision. Her dtr is a RN and says no erythema or drainage. Will give tramadol prn 25mg BID for pain. Called into pharmacy.

## 2017-05-05 NOTE — TELEPHONE ENCOUNTER
Need to clarify if pain at gube site or abdominal pain/gas pains in general.  Any drainage or irritation at the site?     Intense gas pains across her back    Last night 7-8/10    Moving more and every time she moves she states the tube rubs and causes pain

## 2017-05-05 NOTE — TELEPHONE ENCOUNTER
Pt asking  If pain medication can be prescribed. Pt states when she moves incision pain gets aggravated  gone up to 7 , pt states severe pain started last night  Pt states is  Across her back, believes is gas pain, still holds a lot of fluids.  Please Advi

## 2017-05-05 NOTE — TELEPHONE ENCOUNTER
Abbey Vazquez from [facility] contacted and MD message below relayed to her regarding approval for Speech Therapy. Norma verbalized understanding.

## 2017-05-08 ENCOUNTER — OFFICE VISIT (OUTPATIENT)
Dept: FAMILY MEDICINE CLINIC | Facility: CLINIC | Age: 72
End: 2017-05-08

## 2017-05-08 VITALS
HEART RATE: 92 BPM | DIASTOLIC BLOOD PRESSURE: 70 MMHG | SYSTOLIC BLOOD PRESSURE: 139 MMHG | TEMPERATURE: 98 F | BODY MASS INDEX: 41 KG/M2 | WEIGHT: 256 LBS

## 2017-05-08 DIAGNOSIS — K31.89 GASTRIC DYSMOTILITY: ICD-10-CM

## 2017-05-08 DIAGNOSIS — K12.0 CANKER SORE: ICD-10-CM

## 2017-05-08 DIAGNOSIS — R60.9 EDEMA, UNSPECIFIED TYPE: ICD-10-CM

## 2017-05-08 DIAGNOSIS — T85.848A PAIN AROUND PEG TUBE SITE, INITIAL ENCOUNTER: ICD-10-CM

## 2017-05-08 DIAGNOSIS — R63.4 WEIGHT LOSS, NON-INTENTIONAL: ICD-10-CM

## 2017-05-08 DIAGNOSIS — I48.91 ATRIAL FIBRILLATION, UNSPECIFIED TYPE (HCC): Primary | ICD-10-CM

## 2017-05-08 DIAGNOSIS — E83.42 HYPOMAGNESEMIA: ICD-10-CM

## 2017-05-08 DIAGNOSIS — N18.30 STAGE 3 CHRONIC KIDNEY DISEASE (HCC): ICD-10-CM

## 2017-05-08 PROCEDURE — 99215 OFFICE O/P EST HI 40 MIN: CPT | Performed by: FAMILY MEDICINE

## 2017-05-08 PROCEDURE — G0463 HOSPITAL OUTPT CLINIC VISIT: HCPCS | Performed by: FAMILY MEDICINE

## 2017-05-08 RX ORDER — ARIPIPRAZOLE 15 MG/1
20 TABLET ORAL DAILY
Qty: 473 ML | Refills: 11 | Status: SHIPPED | OUTPATIENT
Start: 2017-05-08 | End: 2017-06-27 | Stop reason: ALTCHOICE

## 2017-05-08 NOTE — PROGRESS NOTES
Burping is down  wretching is better  Has g tube (blocked today.)  \"I can't use it and it hurts. \"    Has ulcers on tongue \"I get I always get canker sores when I get stressed\" she had a history of a DVT.   patient reports she started getting ulcers in h dysmotility  Stable. 5. Weight loss, non-intentional  stable    6. Edema, unspecified type  Add potassium  Continue lasix  Check labs. - CBC W Differential W Platelet [E]; Future  - Comp Metabolic Panel (14) [E]; Future      7.  Pain around PEG tube sit

## 2017-05-08 NOTE — TELEPHONE ENCOUNTER
I was called by Dr. Carlos Reno regarding patient G-tube dysfunction. She is able to tolerate some oral nutrition and liquids. Dr. Ra Rodriguez would like a sooner appointment with Dr. Albert Choi, preferably Wednesday or Thursday of this week.   Please schedul

## 2017-05-09 NOTE — TELEPHONE ENCOUNTER
[facility] called pt and offered appt to see SDK on Friday, 5/12/2017 at 10am - pt accepted appt. [facility] informed SDK's RN - since appt ok'ed per SDK. THank you.

## 2017-05-09 NOTE — TELEPHONE ENCOUNTER
Dr Brenda Augusitn RN called and requested CC to contact pt to add pt to [facility] schedule for Friday, 5/12/2017 at 10am.

## 2017-05-09 NOTE — TELEPHONE ENCOUNTER
Dr. Isabella Felty,    Pt contacted and she states that since Saturday she was unable to administer her ensure through her g-tube b/c when she flushed it w/ water to clean it, it was very painful, the water \"did not move\", and it is clogged.    She states for 1 we

## 2017-05-09 NOTE — TELEPHONE ENCOUNTER
Isis, can you find out what the issue with the G_tube is? It was placed by IR and if there is an issue I may have to reach out to them.

## 2017-05-12 ENCOUNTER — OFFICE VISIT (OUTPATIENT)
Dept: GASTROENTEROLOGY | Facility: CLINIC | Age: 72
End: 2017-05-12

## 2017-05-12 ENCOUNTER — TELEPHONE (OUTPATIENT)
Dept: FAMILY MEDICINE CLINIC | Facility: CLINIC | Age: 72
End: 2017-05-12

## 2017-05-12 VITALS
WEIGHT: 255 LBS | DIASTOLIC BLOOD PRESSURE: 73 MMHG | BODY MASS INDEX: 40.98 KG/M2 | SYSTOLIC BLOOD PRESSURE: 140 MMHG | HEART RATE: 89 BPM | HEIGHT: 66 IN

## 2017-05-12 DIAGNOSIS — K55.039 ACUTE ISCHEMIC COLITIS (HCC): Primary | ICD-10-CM

## 2017-05-12 DIAGNOSIS — T50.2X5A DIURETICS CAUSING ADVERSE EFFECT IN THERAPEUTIC USE, INITIAL ENCOUNTER: Primary | ICD-10-CM

## 2017-05-12 DIAGNOSIS — R63.4 WEIGHT LOSS: ICD-10-CM

## 2017-05-12 PROCEDURE — 99213 OFFICE O/P EST LOW 20 MIN: CPT | Performed by: INTERNAL MEDICINE

## 2017-05-12 PROCEDURE — G0463 HOSPITAL OUTPT CLINIC VISIT: HCPCS | Performed by: INTERNAL MEDICINE

## 2017-05-12 NOTE — TELEPHONE ENCOUNTER
[facility]. [facility], please see message below and advise.

## 2017-05-12 NOTE — PROGRESS NOTES
[facility] Follow-up Visit    Deborah Tubular adenoma of colon 1/2017     repeat c-scope 1/2020          Past Surgical History    BSO, OMENTECTOMY W/BRANDO      EXCISION OF NOSE      Comment Basal cell carcinoma    REMOVAL OF HEEL SPUR      KNEE ARTHROSCOPY Right     CARPAL TUNNEL RELEASE Bilater Metoprolol Tartrate 50 MG Oral Tab Take 1 tablet (50 mg total) by mouth 2 (two) times daily. Disp: 90 tablet Rfl: 3   potassium chloride 40 meq/30 ml (10%) Oral Solution 15 mL (20 mEq total) by Per G Tube route daily.  Disp: 473 mL Rfl: 11   TraMADol HCl morbid obesity, depression, acid reflux, basal cell skin cancer, hx of PE, OA, hiatal hernia s/p fundoplication (4130), GERD, who was admitted for weight loss due to inability to tolerate PO easily. She may have achalasia vs esophageal dysmotility issue.  Jaswinder Ceron Thyroid nodules  # Afib/CHF - on apixaban   # Gastric ulcers (ischemic)    Recommend:  -G-tube: will d/w IR re: tube-check and possibly replacement of G-tube   -she has appointment at [facility] with motility specialist (GI) Dr. Radha Page on 5/19/17 @1:30p

## 2017-05-12 NOTE — TELEPHONE ENCOUNTER
Rosie Chaudhari from pharmacy is calling to inform MD pt's medication Potassium Chloride interacts with spironolactone.  Please advise

## 2017-05-15 ENCOUNTER — HOSPITAL ENCOUNTER (OUTPATIENT)
Dept: INTERVENTIONAL RADIOLOGY/VASCULAR | Facility: HOSPITAL | Age: 72
Discharge: HOME | End: 2017-05-15
Attending: INTERNAL MEDICINE | Admitting: INTERNAL MEDICINE
Payer: MEDICARE

## 2017-05-15 ENCOUNTER — TELEPHONE (OUTPATIENT)
Dept: FAMILY MEDICINE CLINIC | Facility: CLINIC | Age: 72
End: 2017-05-15

## 2017-05-15 VITALS
HEART RATE: 89 BPM | OXYGEN SATURATION: 95 % | DIASTOLIC BLOOD PRESSURE: 58 MMHG | SYSTOLIC BLOOD PRESSURE: 132 MMHG | HEIGHT: 66 IN | WEIGHT: 254 LBS | BODY MASS INDEX: 40.82 KG/M2 | RESPIRATION RATE: 16 BRPM

## 2017-05-15 DIAGNOSIS — E46 MALNUTRITION (HCC): ICD-10-CM

## 2017-05-15 DIAGNOSIS — R63.4 UNINTENTIONAL WEIGHT LOSS: Primary | ICD-10-CM

## 2017-05-15 PROCEDURE — 49465 FLUORO EXAM OF G/COLON TUBE: CPT

## 2017-05-15 PROCEDURE — 0DW63UZ REVISION OF FEEDING DEVICE IN STOMACH, PERCUTANEOUS APPROACH: ICD-10-PCS | Performed by: RADIOLOGY

## 2017-05-15 RX ORDER — LIDOCAINE HYDROCHLORIDE 20 MG/ML
INJECTION, SOLUTION EPIDURAL; INFILTRATION; INTRACAUDAL; PERINEURAL
Status: COMPLETED
Start: 2017-05-15 | End: 2017-05-15

## 2017-05-15 NOTE — TELEPHONE ENCOUNTER
for Joey Aguiar [address] calling to inform [facility] there is a interaction with pt medication. She was prescribed Potassium Chloride interacts with Spironolactone . Please Advise Spironolactone retains potassium will pt be monitor closely?

## 2017-05-15 NOTE — TELEPHONE ENCOUNTER
[id number]  89[facility], [address], [place] [facility], verbal order given for intial dietary assessment, will send f/u fax to [facility] for further orders

## 2017-05-15 NOTE — PROCEDURES
[facility]  Procedure Note    Balbina Barrientos Patient Status:  Outpatient in a Bed    1945 MRN [id number]   Location [facility] Attending Lukas Márquez, [address] [facility] Day # 0 PCP Delisa Khan.  DO Jemal     Pr

## 2017-05-15 NOTE — TELEPHONE ENCOUNTER
[facility] states pt has delay in care last week due to scheduling conflics. Requesting verbal orders to see pt this week for unintentional weight loss issue.

## 2017-05-15 NOTE — TELEPHONE ENCOUNTER
[facility] please sign pending [facility] dietician referral initial assessment, unintentional weight loss

## 2017-05-16 NOTE — TELEPHONE ENCOUNTER
[facility] I spoke to pt and she stated that there is no way she can come in today to have the blood work done. She stated that she does not have a home health RN and her knee gave out today.  She can come tomorrow and have the blood work done as she has a ride

## 2017-05-16 NOTE — TELEPHONE ENCOUNTER
Spoke to Kris Freeman, gave her order for the BMP, she was unable to see the patient for first visit on Monday, patient went to [address] [place] [address] and had the gtube repositioned, she is waiting for a call back from the patient to reschedule home visit

## 2017-05-17 ENCOUNTER — TELEPHONE (OUTPATIENT)
Dept: GASTROENTEROLOGY | Facility: CLINIC | Age: 72
End: 2017-05-17

## 2017-05-17 ENCOUNTER — LAB ENCOUNTER (OUTPATIENT)
Dept: LAB | Age: 72
End: 2017-05-17
Attending: FAMILY MEDICINE
Payer: MEDICARE

## 2017-05-17 DIAGNOSIS — N18.30 STAGE 3 CHRONIC KIDNEY DISEASE (HCC): ICD-10-CM

## 2017-05-17 DIAGNOSIS — E83.42 HYPOMAGNESEMIA: ICD-10-CM

## 2017-05-17 DIAGNOSIS — N19 RENAL FAILURE: ICD-10-CM

## 2017-05-17 DIAGNOSIS — R60.9 EDEMA, UNSPECIFIED TYPE: ICD-10-CM

## 2017-05-17 DIAGNOSIS — E78.5 HYPERLIPIDEMIA, UNSPECIFIED HYPERLIPIDEMIA TYPE: ICD-10-CM

## 2017-05-17 DIAGNOSIS — T50.2X5A DIURETICS CAUSING ADVERSE EFFECT IN THERAPEUTIC USE, INITIAL ENCOUNTER: ICD-10-CM

## 2017-05-17 PROCEDURE — 80061 LIPID PANEL: CPT

## 2017-05-17 PROCEDURE — 36415 COLL VENOUS BLD VENIPUNCTURE: CPT

## 2017-05-17 PROCEDURE — 83735 ASSAY OF MAGNESIUM: CPT

## 2017-05-17 PROCEDURE — 80053 COMPREHEN METABOLIC PANEL: CPT

## 2017-05-17 PROCEDURE — 85025 COMPLETE CBC W/AUTO DIFF WBC: CPT

## 2017-05-17 NOTE — TELEPHONE ENCOUNTER
Fany Brown - can you please fax this letter to Dr. Radha Luis (GI at [facility]); I don't know his fax, but his contact number is [phone]. Thx    I faxed over letter to Dr Radha Luis as per Dr Albert Choi request @ [facility],   fax number [phone].  Office numb

## 2017-05-19 ENCOUNTER — TELEPHONE (OUTPATIENT)
Dept: FAMILY MEDICINE CLINIC | Facility: CLINIC | Age: 72
End: 2017-05-19

## 2017-05-19 NOTE — TELEPHONE ENCOUNTER
Pharm is calling state that there is a drug interaction with medication potassium chloride 40 meq/30 ml (10%) Oral Solution and Spironolactone

## 2017-05-22 ENCOUNTER — TELEPHONE (OUTPATIENT)
Dept: GASTROENTEROLOGY | Facility: CLINIC | Age: 72
End: 2017-05-22

## 2017-05-22 ENCOUNTER — TELEPHONE (OUTPATIENT)
Dept: FAMILY MEDICINE CLINIC | Facility: CLINIC | Age: 72
End: 2017-05-22

## 2017-05-22 NOTE — TELEPHONE ENCOUNTER
Dr [facility]- please see message below and advise if ok for pt to be taking both?

## 2017-05-22 NOTE — TELEPHONE ENCOUNTER
Received office notes from Dr Jeremy Morales @ [facility].  Placed on MD desk for review

## 2017-05-23 ENCOUNTER — TELEPHONE (OUTPATIENT)
Dept: FAMILY MEDICINE CLINIC | Facility: CLINIC | Age: 72
End: 2017-05-23

## 2017-05-23 DIAGNOSIS — N18.9 ACUTE RENAL FAILURE SUPERIMPOSED ON CHRONIC KIDNEY DISEASE, UNSPECIFIED CKD STAGE, UNSPECIFIED ACUTE RENAL FAILURE TYPE (HCC): Primary | ICD-10-CM

## 2017-05-23 DIAGNOSIS — N17.9 ACUTE RENAL FAILURE SUPERIMPOSED ON CHRONIC KIDNEY DISEASE, UNSPECIFIED CKD STAGE, UNSPECIFIED ACUTE RENAL FAILURE TYPE (HCC): Primary | ICD-10-CM

## 2017-05-23 NOTE — TELEPHONE ENCOUNTER
Dr. Mirna Joaquin would like patient to be seen sooner due to decreasing kidney function. Patient is scheduled to see Dr. Flori Obando on 06/16/2017. Please advise.

## 2017-05-23 NOTE — TELEPHONE ENCOUNTER
Mynor Villasenor from [facility] is calling stating that she contacted the pt to schedule for tomorrow. Pt is refusing HH for nursing.  Pt stts that she does not need the service any longer

## 2017-05-24 NOTE — TELEPHONE ENCOUNTER
The 1PM appointment has been taken. Will try to reach patient to move appointment to 5/31/17 as Dr. Sandra Daniel advised. Tried to call patient again today. Phone rang this time but no answer and no vm--unable to leave a message.

## 2017-05-24 NOTE — TELEPHONE ENCOUNTER
Patient contacted.  Appointment moved up per Dr. Ilas Mullen request to Wednesday 5/31/17 at 4:40PM.

## 2017-05-29 RX ORDER — ATORVASTATIN CALCIUM 40 MG/1
TABLET, FILM COATED ORAL
Qty: 90 TABLET | Refills: 0 | Status: SHIPPED | OUTPATIENT
Start: 2017-05-29 | End: 2017-07-19 | Stop reason: DRUGHIGH

## 2017-05-29 NOTE — TELEPHONE ENCOUNTER
Cholesterol Medications: Refilled per protocol    Protocol Criteria:  · Appointment scheduled in the past 12 months or in the next 3 months  · ALT & LDL on file in the past 12 months  · ALT result < 80  · LDL result <130   Recent Visits       Provider Emily

## 2017-05-31 ENCOUNTER — TELEPHONE (OUTPATIENT)
Dept: GASTROENTEROLOGY | Facility: CLINIC | Age: 72
End: 2017-05-31

## 2017-05-31 ENCOUNTER — OFFICE VISIT (OUTPATIENT)
Dept: NEPHROLOGY | Facility: CLINIC | Age: 72
End: 2017-05-31

## 2017-05-31 VITALS
BODY MASS INDEX: 37.71 KG/M2 | WEIGHT: 234.63 LBS | HEART RATE: 84 BPM | DIASTOLIC BLOOD PRESSURE: 86 MMHG | SYSTOLIC BLOOD PRESSURE: 146 MMHG | TEMPERATURE: 98 F | HEIGHT: 66 IN

## 2017-05-31 DIAGNOSIS — D64.9 ANEMIA, UNSPECIFIED TYPE: ICD-10-CM

## 2017-05-31 DIAGNOSIS — N18.30 CKD (CHRONIC KIDNEY DISEASE) STAGE 3, GFR 30-59 ML/MIN (HCC): Primary | ICD-10-CM

## 2017-05-31 PROCEDURE — 99205 OFFICE O/P NEW HI 60 MIN: CPT | Performed by: INTERNAL MEDICINE

## 2017-05-31 PROCEDURE — G0463 HOSPITAL OUTPT CLINIC VISIT: HCPCS | Performed by: INTERNAL MEDICINE

## 2017-05-31 NOTE — PROGRESS NOTES
05/31/2017        Patient: Jonathan Goldman   YOB: 1945   Date of Visit: 5/31/2017       Dear  Dr. Helen Andrade,      Thank you for referring Jonathan Goldman to my practice. Please find my assessment and plan below.       As you know she is a 72-ye history past with kidney stones but otherwise no other renal pathology. Review of systems her primary concern is that of dysphagia. Denies any chest pain, shortness of breath, GI or urinary tract symptoms.   She has trace lower extremity edema which is st please feel free to call.            Sincerely,   Georgia Ram MD   [facility]  [address] [address]    Document electronically generated by:  Georgia Ram

## 2017-05-31 NOTE — TELEPHONE ENCOUNTER
Reviewed letter from Dr. David Miller at [facility] (GI)--- he feels belching is primary complaint and likely supragastric in nature, esophageal outflow obstruction possibly exacerbation.  Second complaint is dysphagia--\"possibly related to wrap, no clear failur

## 2017-06-12 ENCOUNTER — TELEPHONE (OUTPATIENT)
Dept: GASTROENTEROLOGY | Facility: CLINIC | Age: 72
End: 2017-06-12

## 2017-06-12 DIAGNOSIS — R13.10 DYSPHAGIA, UNSPECIFIED TYPE: Primary | ICD-10-CM

## 2017-06-12 DIAGNOSIS — Z48.03 ENCOUNTER FOR CHANGE OR REMOVAL OF DRAINS: ICD-10-CM

## 2017-06-12 DIAGNOSIS — IMO0001 REGURGITATION: ICD-10-CM

## 2017-06-12 RX ORDER — PANTOPRAZOLE SODIUM 40 MG/1
TABLET, DELAYED RELEASE ORAL
Qty: 30 TABLET | Refills: 11 | Status: SHIPPED | OUTPATIENT
Start: 2017-06-12 | End: 2017-07-19

## 2017-06-12 RX ORDER — FUROSEMIDE 40 MG/1
TABLET ORAL
Qty: 30 TABLET | Refills: 11 | Status: SHIPPED | OUTPATIENT
Start: 2017-06-12 | End: 2017-06-27 | Stop reason: ALTCHOICE

## 2017-06-12 NOTE — TELEPHONE ENCOUNTER
Going on vacation middle of July and would like this done before she leaves. Call transferred to me. SHe is doing well and eating normally by mouth. No issues swallowing (she states she never had issues).   She states she did go to [facility] and when

## 2017-06-13 NOTE — TELEPHONE ENCOUNTER
Contacted Kimberly (mabel) in IR and she asked me to send a referral for IR for the g-tube removal. I sent the referral and she confirmed she received it. She will call the patient and help her arrange the g-tube removal procedure.      I contacted the pt

## 2017-06-13 NOTE — TELEPHONE ENCOUNTER
Spoke with Sabrina Weldon:    1. She had EGD at [facility], had fluid in lower third of esophagus. She will bring me report. 2. She is to get a upper GI at [address] [place] [address] (ordered).   3. She wants G-tube removed, not using it, Her last weight was 232 and says she is able to

## 2017-06-14 ENCOUNTER — HOSPITAL ENCOUNTER (OUTPATIENT)
Dept: ULTRASOUND IMAGING | Age: 72
Discharge: HOME | End: 2017-06-14
Attending: INTERNAL MEDICINE
Payer: MEDICARE

## 2017-06-14 ENCOUNTER — LAB ENCOUNTER (OUTPATIENT)
Dept: LAB | Age: 72
End: 2017-06-14
Attending: INTERNAL MEDICINE
Payer: MEDICARE

## 2017-06-14 DIAGNOSIS — N18.30 CKD (CHRONIC KIDNEY DISEASE) STAGE 3, GFR 30-59 ML/MIN (HCC): ICD-10-CM

## 2017-06-14 DIAGNOSIS — D64.9 ANEMIA, UNSPECIFIED TYPE: ICD-10-CM

## 2017-06-14 PROCEDURE — 86431 RHEUMATOID FACTOR QUANT: CPT

## 2017-06-14 PROCEDURE — 86140 C-REACTIVE PROTEIN: CPT

## 2017-06-14 PROCEDURE — 84466 ASSAY OF TRANSFERRIN: CPT

## 2017-06-14 PROCEDURE — 76770 US EXAM ABDO BACK WALL COMP: CPT | Performed by: INTERNAL MEDICINE

## 2017-06-14 PROCEDURE — 36415 COLL VENOUS BLD VENIPUNCTURE: CPT

## 2017-06-14 PROCEDURE — 86160 COMPLEMENT ANTIGEN: CPT

## 2017-06-14 PROCEDURE — 83540 ASSAY OF IRON: CPT

## 2017-06-14 PROCEDURE — 84165 PROTEIN E-PHORESIS SERUM: CPT

## 2017-06-14 PROCEDURE — 86039 ANTINUCLEAR ANTIBODIES (ANA): CPT

## 2017-06-14 PROCEDURE — 80069 RENAL FUNCTION PANEL: CPT

## 2017-06-14 PROCEDURE — 82728 ASSAY OF FERRITIN: CPT

## 2017-06-14 PROCEDURE — 85652 RBC SED RATE AUTOMATED: CPT

## 2017-06-14 PROCEDURE — 86038 ANTINUCLEAR ANTIBODIES: CPT

## 2017-06-15 ENCOUNTER — LAB ENCOUNTER (OUTPATIENT)
Dept: LAB | Age: 72
End: 2017-06-15
Attending: INTERNAL MEDICINE
Payer: MEDICARE

## 2017-06-15 ENCOUNTER — TELEPHONE (OUTPATIENT)
Dept: NEPHROLOGY | Facility: CLINIC | Age: 72
End: 2017-06-15

## 2017-06-15 DIAGNOSIS — D64.9 ANEMIA, UNSPECIFIED TYPE: ICD-10-CM

## 2017-06-15 DIAGNOSIS — N28.9 ABNORMAL KIDNEY FUNCTION: Primary | ICD-10-CM

## 2017-06-15 DIAGNOSIS — N18.30 CKD (CHRONIC KIDNEY DISEASE) STAGE 3, GFR 30-59 ML/MIN (HCC): ICD-10-CM

## 2017-06-15 PROCEDURE — 84166 PROTEIN E-PHORESIS/URINE/CSF: CPT

## 2017-06-15 PROCEDURE — 81003 URINALYSIS AUTO W/O SCOPE: CPT

## 2017-06-15 PROCEDURE — 86335 IMMUNFIX E-PHORSIS/URINE/CSF: CPT

## 2017-06-15 PROCEDURE — 82570 ASSAY OF URINE CREATININE: CPT

## 2017-06-15 PROCEDURE — 82043 UR ALBUMIN QUANTITATIVE: CPT

## 2017-06-15 NOTE — TELEPHONE ENCOUNTER
Patient contacted. Results message from Dr. Denis Catherine read. Patient knows to stop taking furosemide and Potassium Chloride. She will follow a low salt diet carefully. She has the urine at home and will bring it in to the lab today or tomorrow (she was not able to urinate at the time she got the blood drawn. She will do Renal panel in 1 weeks Dr. Denis Catherine as advised (ordered). Appointment booked for 6/26/17 at 2:40PM. She will weigh herself daily and bring in the readings to the office visit.

## 2017-06-15 NOTE — TELEPHONE ENCOUNTER
Kidney function continues to get worse. She needs to place Lasix and potassium chloride on hold. Follow low-salt diet carefully. The ultrasound was okay. Not all of the blood tests are in yet and it looks like she did not do the urine studies. She needs to do the urine studies now and  in 1 week repeat a renal panel and then see me for follow-up. Weigh herself daily and bring in her weights.

## 2017-06-23 ENCOUNTER — APPOINTMENT (OUTPATIENT)
Dept: LAB | Age: 72
End: 2017-06-23
Attending: INTERNAL MEDICINE
Payer: MEDICARE

## 2017-06-23 DIAGNOSIS — N28.9 ABNORMAL KIDNEY FUNCTION: ICD-10-CM

## 2017-06-23 PROCEDURE — 36415 COLL VENOUS BLD VENIPUNCTURE: CPT

## 2017-06-23 PROCEDURE — 80069 RENAL FUNCTION PANEL: CPT

## 2017-06-26 ENCOUNTER — OFFICE VISIT (OUTPATIENT)
Dept: NEPHROLOGY | Facility: CLINIC | Age: 72
End: 2017-06-26

## 2017-06-26 ENCOUNTER — TELEPHONE (OUTPATIENT)
Dept: ADMINISTRATIVE | Age: 72
End: 2017-06-26

## 2017-06-26 VITALS
WEIGHT: 225 LBS | HEIGHT: 66 IN | DIASTOLIC BLOOD PRESSURE: 88 MMHG | SYSTOLIC BLOOD PRESSURE: 154 MMHG | BODY MASS INDEX: 36.16 KG/M2 | HEART RATE: 81 BPM

## 2017-06-26 DIAGNOSIS — N18.30 CKD (CHRONIC KIDNEY DISEASE) STAGE 3, GFR 30-59 ML/MIN (HCC): Primary | ICD-10-CM

## 2017-06-26 PROCEDURE — 99213 OFFICE O/P EST LOW 20 MIN: CPT | Performed by: INTERNAL MEDICINE

## 2017-06-26 PROCEDURE — G0463 HOSPITAL OUTPT CLINIC VISIT: HCPCS | Performed by: INTERNAL MEDICINE

## 2017-06-26 RX ORDER — METOPROLOL SUCCINATE 100 MG/1
TABLET, EXTENDED RELEASE ORAL
Refills: 3 | COMMUNITY
Start: 2017-06-10 | End: 2017-10-15

## 2017-06-26 NOTE — TELEPHONE ENCOUNTER
Received EGD discharge summary for pt from [facility] 6/9/17.      Placed on MD desk to review

## 2017-06-26 NOTE — TELEPHONE ENCOUNTER
Good afternoon Dr. Kirill Domingo form pending in [place] for patient's daughter, Norma Saini. She is requesting intermittent time off, 1-3 days per month to take patient to doctor's appts and tests, do you approve? Please advise.     Thanks  Miguel Antonio

## 2017-06-27 ENCOUNTER — OFFICE VISIT (OUTPATIENT)
Dept: FAMILY MEDICINE CLINIC | Facility: CLINIC | Age: 72
End: 2017-06-27

## 2017-06-27 VITALS
BODY MASS INDEX: 35.68 KG/M2 | DIASTOLIC BLOOD PRESSURE: 81 MMHG | HEART RATE: 69 BPM | HEIGHT: 66 IN | WEIGHT: 222 LBS | SYSTOLIC BLOOD PRESSURE: 140 MMHG

## 2017-06-27 DIAGNOSIS — R76.8 ANA POSITIVE: Primary | ICD-10-CM

## 2017-06-27 DIAGNOSIS — N18.30 STAGE 3 CHRONIC KIDNEY DISEASE (HCC): ICD-10-CM

## 2017-06-27 DIAGNOSIS — F32.1 MAJOR DEPRESSIVE DISORDER, SINGLE EPISODE, MODERATE (HCC): ICD-10-CM

## 2017-06-27 DIAGNOSIS — R63.4 UNINTENTIONAL WEIGHT LOSS: ICD-10-CM

## 2017-06-27 DIAGNOSIS — K31.89 GASTRIC DYSMOTILITY: ICD-10-CM

## 2017-06-27 PROCEDURE — 99214 OFFICE O/P EST MOD 30 MIN: CPT | Performed by: FAMILY MEDICINE

## 2017-06-27 PROCEDURE — G0463 HOSPITAL OUTPT CLINIC VISIT: HCPCS | Performed by: FAMILY MEDICINE

## 2017-06-27 RX ORDER — FLUOXETINE 10 MG/1
10 CAPSULE ORAL DAILY
Qty: 30 CAPSULE | Refills: 1 | Status: SHIPPED | OUTPATIENT
Start: 2017-06-27 | End: 2017-08-15

## 2017-06-27 NOTE — PATIENT INSTRUCTIONS
Please repeat your kidney blood test in 1 month.   Let me know if your weight should increase significantly

## 2017-06-27 NOTE — PROGRESS NOTES
06/26/17        Patient: Alfonso Gomez   YOB: 1945   Date of Visit: 6/26/2017       Dear  Dr. Usman Baugh,      Thank you for referring Alfonso Gomez to my practice. Please find my assessment and plan below.       As you know she is a 69-year Unfortunately she does eat out a fair amount. Leg elevation and support hose were also recommended. Daily weights. Recommend repeating a CBC and renal panel in 1 month. The patient also has this positive JESSICA with an anticentromere pattern.   This is s

## 2017-06-27 NOTE — PROGRESS NOTES
Pt still with weightloss  Pos belching   \"Its when I eat. \"    \"So its better than all the time. \"    No palpitations. No sob    Lost another 25 lbs    Added beef to her diet. \"I've learned what I can eat.   I would like to be under 200lbs they recomme loss  Stable. 3. Stage 3 chronic kidney disease  Stable. 4. Gastric dysmotility  F/u gi  - RHEUMATOLOGY - EXTERNAL    5. Major depressive disorder, single episode, moderate (HCC)  Restart meds  F/u 1 mo.

## 2017-06-28 ENCOUNTER — HOSPITAL ENCOUNTER (OUTPATIENT)
Dept: GENERAL RADIOLOGY | Facility: HOSPITAL | Age: 72
Discharge: HOME | End: 2017-06-28
Attending: INTERNAL MEDICINE
Payer: MEDICARE

## 2017-06-28 DIAGNOSIS — IMO0001 REGURGITATION: ICD-10-CM

## 2017-06-28 DIAGNOSIS — R13.10 DYSPHAGIA, UNSPECIFIED TYPE: ICD-10-CM

## 2017-06-28 PROCEDURE — 74246 X-RAY XM UPR GI TRC 2CNTRST: CPT | Performed by: INTERNAL MEDICINE

## 2017-06-29 NOTE — TELEPHONE ENCOUNTER
Spoke with Prabhakar Barton re: UGI xray- no significant improvements noted. JESSICA that Dr. Enmanuel Soliz ordered noted to be positive. Possible rheumatologic connection to her dysphagia? Scleroderma? She is scheduled to see rheumatology.     She has G-tube schedule for remova

## 2017-07-19 ENCOUNTER — OFFICE VISIT (OUTPATIENT)
Dept: RHEUMATOLOGY | Facility: CLINIC | Age: 72
End: 2017-07-19

## 2017-07-19 VITALS
HEIGHT: 66 IN | HEART RATE: 77 BPM | WEIGHT: 201 LBS | BODY MASS INDEX: 32.3 KG/M2 | SYSTOLIC BLOOD PRESSURE: 118 MMHG | DIASTOLIC BLOOD PRESSURE: 80 MMHG

## 2017-07-19 DIAGNOSIS — M79.10 MYALGIA: ICD-10-CM

## 2017-07-19 DIAGNOSIS — R76.8 POSITIVE ANA (ANTINUCLEAR ANTIBODY): Primary | ICD-10-CM

## 2017-07-19 DIAGNOSIS — M34.1 CREST SYNDROME (HCC): ICD-10-CM

## 2017-07-19 PROCEDURE — 99204 OFFICE O/P NEW MOD 45 MIN: CPT | Performed by: INTERNAL MEDICINE

## 2017-07-19 PROCEDURE — G0463 HOSPITAL OUTPT CLINIC VISIT: HCPCS | Performed by: INTERNAL MEDICINE

## 2017-07-19 NOTE — PROGRESS NOTES
Ajay Rosario is a 74year old female who presents for Patient presents with:  Consult: lab results  . HPI:     I had the pleasure of seeing Ajay Rosario on 7/19/2017 for evaluation. She sees Dr. Tex Naidu and Dr. Haroon Wallace.  She recnetly had a posistive esophageal outflow obstruction - . She thkns she had a motility study in [place] but not able to lay down. She also has diarrhea. She has trouble with Raynaud's. Her fingers turn blue, and purple. aobut 6 months she has noticed that.    She can't r History:  No date: BSO, OMENTECTOMY W/BRANDO  No date: CARPAL TUNNEL RELEASE Bilateral  9/28/16 : CHOLECYSTECTOMY  1/25/2017: COLONOSCOPY N/A      Comment: Procedure: COLONOSCOPY;  Surgeon: Shayy Young MD;  Location: [facility] ENDOSCOPY  12/14/2 pain, no temple pain  Eyes: No visual changes,   CVS: No chest pain, no heart disease  RS: No SOB, has dry  Cough, No Pleurtic pain,   GI: No nausea, no vomiiting, no abominal pain, no hx of ulcer, no gastritis, no heartburn, has dyshpagia, no BRBPR or rayne 4.7 mg/dL 3.9   BUN/CREA RATIO      10.0 - 20.0 19.5   ANION GAP      0 - 18 mmol/L 11   CALCULATED OSMOLALITY      275 - 295 mOsm/kg 297 (H)   GFR, Non-      >=60 29 (L)   GFR, -American      >=60 35 (L)   URINE-COLOR      Yellow mg/dL    Albumin      3.5 - 4.8 g/dL    PHOSPHORUS      2.4 - 4.7 mg/dL    BUN/CREA RATIO      10.0 - 20.0    ANION GAP      0 - 18 mmol/L    CALCULATED OSMOLALITY      275 - 295 mOsm/kg    GFR, Non-      >=60    GFR, -American cavity size was below normal. Wall thickness was     increased in a pattern of mild LVH. Systolic function was normal. The     estimated ejection fraction was 65-70%. Wall motion was normal; there were     no regional wall motion abnormalities.  Doppler par -given to pt. 5. Knee surgeries in sept/ocotber - righ tknee and then left knee. - no knee pain - she has severe arthirtis in both knees - she had synvisc injection 3-4 weeks. - - haven't had them b/c of cortisone shots -     Summary:  1.  Check labs wit

## 2017-07-19 NOTE — PATIENT INSTRUCTIONS
1. Check labs with next blood draw - ok to get it with dr. Muriel Cast  2. Information on rayanud's and CREST  3.  Follow up in 6 months

## 2017-08-07 ENCOUNTER — HOSPITAL ENCOUNTER (OUTPATIENT)
Dept: INTERVENTIONAL RADIOLOGY/VASCULAR | Facility: HOSPITAL | Age: 72
Discharge: HOME | End: 2017-08-07
Attending: INTERNAL MEDICINE | Admitting: INTERNAL MEDICINE
Payer: MEDICARE

## 2017-08-07 VITALS
OXYGEN SATURATION: 97 % | WEIGHT: 222 LBS | DIASTOLIC BLOOD PRESSURE: 94 MMHG | BODY MASS INDEX: 36 KG/M2 | RESPIRATION RATE: 22 BRPM | SYSTOLIC BLOOD PRESSURE: 166 MMHG | HEART RATE: 119 BPM

## 2017-08-07 DIAGNOSIS — Z48.03 ENCOUNTER FOR CHANGE OR REMOVAL OF DRAINS: ICD-10-CM

## 2017-08-07 PROCEDURE — 99211 OFF/OP EST MAY X REQ PHY/QHP: CPT

## 2017-08-07 RX ORDER — SODIUM CHLORIDE 9 MG/ML
INJECTION, SOLUTION INTRAVENOUS
Status: COMPLETED
Start: 2017-08-07 | End: 2017-08-07

## 2017-08-07 RX ORDER — SODIUM CHLORIDE 9 MG/ML
INJECTION, SOLUTION INTRAVENOUS CONTINUOUS
Status: DISCONTINUED | OUTPATIENT
Start: 2017-08-07 | End: 2017-08-07

## 2017-08-07 RX ORDER — DOXEPIN HYDROCHLORIDE 50 MG/1
1 CAPSULE ORAL DAILY
COMMUNITY

## 2017-08-07 RX ORDER — LIDOCAINE HYDROCHLORIDE 20 MG/ML
INJECTION, SOLUTION EPIDURAL; INFILTRATION; INTRACAUDAL; PERINEURAL
Status: COMPLETED
Start: 2017-08-07 | End: 2017-08-07

## 2017-08-07 RX ORDER — MIDAZOLAM HYDROCHLORIDE 1 MG/ML
INJECTION INTRAMUSCULAR; INTRAVENOUS
Status: DISCONTINUED
Start: 2017-08-07 | End: 2017-08-07 | Stop reason: WASHOUT

## 2017-08-07 RX ORDER — SODIUM CHLORIDE 9 MG/ML
INJECTION, SOLUTION INTRAVENOUS
Status: DISCONTINUED
Start: 2017-08-07 | End: 2017-08-07

## 2017-08-07 RX ADMIN — SODIUM CHLORIDE: 9 INJECTION, SOLUTION INTRAVENOUS at 08:00:00

## 2017-08-08 RX ORDER — METOPROLOL SUCCINATE 100 MG/1
TABLET, EXTENDED RELEASE ORAL
Qty: 180 TABLET | Refills: 0 | Status: SHIPPED | OUTPATIENT
Start: 2017-08-08 | End: 2017-08-15

## 2017-08-08 NOTE — TELEPHONE ENCOUNTER
Hypertensive Medications: Refilled per protocol    Protocol Criteria:  · Appointment scheduled in the past 6 months or in the next 3 months  · BMP or CMP in the past 12 months  · Creatinine result < 2  Recent Outpatient Visits            2 weeks ago Positi

## 2017-08-11 DIAGNOSIS — E78.5 HYPERLIPIDEMIA, UNSPECIFIED HYPERLIPIDEMIA TYPE: ICD-10-CM

## 2017-08-11 RX ORDER — ATORVASTATIN CALCIUM 10 MG/1
10 TABLET, FILM COATED ORAL NIGHTLY
Qty: 90 TABLET | Refills: 3 | Status: CANCELLED
Start: 2017-08-11

## 2017-08-15 ENCOUNTER — OFFICE VISIT (OUTPATIENT)
Dept: FAMILY MEDICINE CLINIC | Facility: CLINIC | Age: 72
End: 2017-08-15

## 2017-08-15 VITALS
SYSTOLIC BLOOD PRESSURE: 126 MMHG | BODY MASS INDEX: 35.52 KG/M2 | HEART RATE: 74 BPM | HEIGHT: 66 IN | WEIGHT: 221 LBS | DIASTOLIC BLOOD PRESSURE: 78 MMHG

## 2017-08-15 DIAGNOSIS — M34.1 CREST SYNDROME (HCC): ICD-10-CM

## 2017-08-15 DIAGNOSIS — K31.89 GASTRIC DYSMOTILITY: ICD-10-CM

## 2017-08-15 DIAGNOSIS — F32.1 MAJOR DEPRESSIVE DISORDER, SINGLE EPISODE, MODERATE (HCC): ICD-10-CM

## 2017-08-15 DIAGNOSIS — M17.11 PRIMARY OSTEOARTHRITIS OF RIGHT KNEE: ICD-10-CM

## 2017-08-15 DIAGNOSIS — N18.30 STAGE 3 CHRONIC KIDNEY DISEASE (HCC): ICD-10-CM

## 2017-08-15 DIAGNOSIS — I50.20 SYSTOLIC CONGESTIVE HEART FAILURE, UNSPECIFIED CONGESTIVE HEART FAILURE CHRONICITY: ICD-10-CM

## 2017-08-15 DIAGNOSIS — E78.5 HYPERLIPIDEMIA, UNSPECIFIED HYPERLIPIDEMIA TYPE: Primary | ICD-10-CM

## 2017-08-15 PROCEDURE — 99214 OFFICE O/P EST MOD 30 MIN: CPT | Performed by: FAMILY MEDICINE

## 2017-08-15 PROCEDURE — G0463 HOSPITAL OUTPT CLINIC VISIT: HCPCS | Performed by: FAMILY MEDICINE

## 2017-08-15 NOTE — PROGRESS NOTES
Has recently had gtube removed. Cataracts have been removed. \"When I am feeling better I still have to watch what I eat. If I overeat I feel tired and my throat backs up. \"  One meal a day. Then a light snack. Right knee is still very painful. chronicity ([facility] [address]  Recheck ekg  - EKG 12-LEAD; Future  - PTT, ACTIVATED; Future  - PROTHROMBIN TIME (PT); Future    5. Primary osteoarthritis of right knee  F/u for clearance. - MRSA CULTURE ONLY; Future  - PTT, ACTIVATED;  Future  - PROTHROMBIN TIME (PT);

## 2017-08-15 NOTE — TELEPHONE ENCOUNTER
From: Marcos Jacobs  Sent: 8/11/2017 2:46 PM CDT  Subject: Medication Renewal Request    Marcos Jacobs would like a refill of the following medications:  Atorvastatin Calcium 10 MG Oral Tab [Tommy Loja    Preferred pharmacy: [facility]

## 2017-08-25 NOTE — PROGRESS NOTES
Patient has continues nausea  and vomiting. She vomiting out clear liquids and saliva. But refusing to take her prn medications Zofran and Reglan. Patient also has more residuals via G-tube. Therefore RN held  her G- tube feed ( ensure CAN)  Temporary and weight-bearing as tolerated

## 2017-08-30 ENCOUNTER — TELEPHONE (OUTPATIENT)
Dept: FAMILY MEDICINE CLINIC | Facility: CLINIC | Age: 72
End: 2017-08-30

## 2017-08-30 NOTE — TELEPHONE ENCOUNTER
Spoke to Dr. Neena Brown  Pt with afib in office   Wanted to start on anticoag. I agreed. Has f/u o/v with me soon.   valerie started    fyi to Dr. Osei Catherine

## 2017-08-30 NOTE — TELEPHONE ENCOUNTER
Was to be on atorvastatin 10mg according to my records confirm with pt. Renew the 10 for a year or the 40 of atorvastatin if that's what she's on   Let her know I spoke with Dr. Shani Cabrera. Will need appt soon. Check labs before.

## 2017-09-05 ENCOUNTER — LAB ENCOUNTER (OUTPATIENT)
Dept: LAB | Age: 72
End: 2017-09-05
Attending: INTERNAL MEDICINE
Payer: MEDICARE

## 2017-09-05 DIAGNOSIS — N18.30 CKD (CHRONIC KIDNEY DISEASE) STAGE 3, GFR 30-59 ML/MIN (HCC): ICD-10-CM

## 2017-09-05 DIAGNOSIS — I48.91 ATRIAL FIBRILLATION (HCC): Primary | ICD-10-CM

## 2017-09-05 DIAGNOSIS — M34.1 CREST SYNDROME (HCC): ICD-10-CM

## 2017-09-05 DIAGNOSIS — E78.5 HYPERLIPIDEMIA, UNSPECIFIED HYPERLIPIDEMIA TYPE: ICD-10-CM

## 2017-09-05 DIAGNOSIS — R76.8 POSITIVE ANA (ANTINUCLEAR ANTIBODY): ICD-10-CM

## 2017-09-05 DIAGNOSIS — M79.10 MYALGIA: ICD-10-CM

## 2017-09-05 LAB
ALBUMIN SERPL BCP-MCNC: 3.1 G/DL (ref 3.5–4.8)
ANION GAP SERPL CALC-SCNC: 7 MMOL/L (ref 0–18)
BASOPHILS # BLD: 0 K/UL (ref 0–0.2)
BASOPHILS NFR BLD: 1 %
BUN SERPL-MCNC: 32 MG/DL (ref 8–20)
BUN/CREAT SERPL: 24.4 (ref 10–20)
CALCIUM SERPL-MCNC: 8.8 MG/DL (ref 8.5–10.5)
CHLORIDE SERPL-SCNC: 109 MMOL/L (ref 95–110)
CK SERPL-CCNC: 20 U/L (ref 38–234)
CO2 SERPL-SCNC: 24 MMOL/L (ref 22–32)
CREAT SERPL-MCNC: 1.31 MG/DL (ref 0.5–1.5)
EOSINOPHIL # BLD: 0.2 K/UL (ref 0–0.7)
EOSINOPHIL NFR BLD: 4 %
ERYTHROCYTE [DISTWIDTH] IN BLOOD BY AUTOMATED COUNT: 13.2 % (ref 11–15)
GLUCOSE SERPL-MCNC: 89 MG/DL (ref 70–99)
HCT VFR BLD AUTO: 35.7 % (ref 35–48)
HGB BLD-MCNC: 11.8 G/DL (ref 12–16)
LYMPHOCYTES # BLD: 0.8 K/UL (ref 1–4)
LYMPHOCYTES NFR BLD: 19 %
MCH RBC QN AUTO: 32.9 PG (ref 27–32)
MCHC RBC AUTO-ENTMCNC: 33 G/DL (ref 32–37)
MCV RBC AUTO: 99.8 FL (ref 80–100)
MONOCYTES # BLD: 0.3 K/UL (ref 0–1)
MONOCYTES NFR BLD: 8 %
NEUTROPHILS # BLD AUTO: 2.8 K/UL (ref 1.8–7.7)
NEUTROPHILS NFR BLD: 69 %
OSMOLALITY UR CALC.SUM OF ELEC: 296 MOSM/KG (ref 275–295)
PHOSPHATE SERPL-MCNC: 4.2 MG/DL (ref 2.4–4.7)
PLATELET # BLD AUTO: 241 K/UL (ref 140–400)
PMV BLD AUTO: 8.9 FL (ref 7.4–10.3)
POTASSIUM SERPL-SCNC: 4.5 MMOL/L (ref 3.3–5.1)
RBC # BLD AUTO: 3.58 M/UL (ref 3.7–5.4)
SODIUM SERPL-SCNC: 140 MMOL/L (ref 136–144)
WBC # BLD AUTO: 4.1 K/UL (ref 4–11)

## 2017-09-05 PROCEDURE — 36415 COLL VENOUS BLD VENIPUNCTURE: CPT

## 2017-09-05 PROCEDURE — 86235 NUCLEAR ANTIGEN ANTIBODY: CPT

## 2017-09-05 PROCEDURE — 82550 ASSAY OF CK (CPK): CPT

## 2017-09-05 PROCEDURE — 82085 ASSAY OF ALDOLASE: CPT

## 2017-09-05 PROCEDURE — 80069 RENAL FUNCTION PANEL: CPT

## 2017-09-05 PROCEDURE — 86225 DNA ANTIBODY NATIVE: CPT

## 2017-09-05 PROCEDURE — 85025 COMPLETE CBC W/AUTO DIFF WBC: CPT

## 2017-09-05 RX ORDER — ATORVASTATIN CALCIUM 40 MG/1
TABLET, FILM COATED ORAL
Qty: 90 TABLET | Refills: 11 | OUTPATIENT
Start: 2017-09-05

## 2017-09-05 RX ORDER — ATORVASTATIN CALCIUM 10 MG/1
10 TABLET, FILM COATED ORAL NIGHTLY
Qty: 90 TABLET | Refills: 3 | Status: CANCELLED
Start: 2017-09-05

## 2017-09-06 LAB
ALDOLASE, SERUM: 4.6 U/L
DSDNA AB TITR SER: <10 {TITER}
SCLERODERMA (SCL-70) (ENA) AB, IGG: 0 AU/ML

## 2017-09-07 ENCOUNTER — MED REC SCAN ONLY (OUTPATIENT)
Dept: FAMILY MEDICINE CLINIC | Facility: CLINIC | Age: 72
End: 2017-09-07

## 2017-09-07 NOTE — TELEPHONE ENCOUNTER
Cholesterol Medications  Protocol Criteria:  · Appointment scheduled in the past 12 months or in the next 3 months  · ALT & LDL on file in the past 12 months  · ALT result < 80  · LDL result <130   Recent Outpatient Visits            3 weeks ago Hyperlipid

## 2017-09-07 NOTE — TELEPHONE ENCOUNTER
From: Lexa Carey  Sent: 9/5/2017 10:11 AM CDT  Subject: Medication Renewal Request    Lexa Carey would like a refill of the following medications:  Atorvastatin Calcium 10 MG Oral Tab [Tommy Valdes    Preferred pharmacy: [place] [id number]

## 2017-09-12 LAB
ENA SM IGG SER QL: NEGATIVE
ENA SM+RNP AB SER QL: NEGATIVE
ENA SS-A AB SER QL IA: NEGATIVE
ENA SS-B AB SER QL IA: NEGATIVE

## 2017-09-14 DIAGNOSIS — E78.5 HYPERLIPIDEMIA, UNSPECIFIED HYPERLIPIDEMIA TYPE: ICD-10-CM

## 2017-09-15 NOTE — TELEPHONE ENCOUNTER
Needs to be on 10mg not 40 of atorvastatin  Check with patient. May have refills x1 yr of atorvastatin 10mg 1 po qday.

## 2017-09-18 NOTE — TELEPHONE ENCOUNTER
Dr [facility],    Chart reviewed and atorvastatin 10mg nightly was refilled on 3/27/17 for quantity 90+3 refills good until 3/27/18 by you.

## 2017-09-19 RX ORDER — ATORVASTATIN CALCIUM 40 MG/1
TABLET, FILM COATED ORAL
Qty: 90 TABLET | Refills: 11 | OUTPATIENT
Start: 2017-09-19

## 2017-09-21 ENCOUNTER — MED REC SCAN ONLY (OUTPATIENT)
Dept: FAMILY MEDICINE CLINIC | Facility: CLINIC | Age: 72
End: 2017-09-21

## 2017-09-28 ENCOUNTER — OFFICE VISIT (OUTPATIENT)
Dept: FAMILY MEDICINE CLINIC | Facility: CLINIC | Age: 72
End: 2017-09-28

## 2017-09-28 VITALS
HEART RATE: 90 BPM | WEIGHT: 222 LBS | HEIGHT: 66 IN | DIASTOLIC BLOOD PRESSURE: 82 MMHG | BODY MASS INDEX: 35.68 KG/M2 | SYSTOLIC BLOOD PRESSURE: 148 MMHG | TEMPERATURE: 98 F

## 2017-09-28 DIAGNOSIS — I26.99 PULMONARY EMBOLISM AND INFARCTION (HCC): ICD-10-CM

## 2017-09-28 DIAGNOSIS — K25.9 MULTIPLE GASTRIC ULCERS: ICD-10-CM

## 2017-09-28 DIAGNOSIS — Z87.19 HISTORY OF ISCHEMIC COLITIS: ICD-10-CM

## 2017-09-28 DIAGNOSIS — K55.9 MESENTERIC ISCHEMIA (HCC): ICD-10-CM

## 2017-09-28 DIAGNOSIS — Z23 NEED FOR VACCINATION: ICD-10-CM

## 2017-09-28 DIAGNOSIS — E78.5 HYPERLIPIDEMIA, UNSPECIFIED HYPERLIPIDEMIA TYPE: Primary | ICD-10-CM

## 2017-09-28 DIAGNOSIS — R11.11 VOMITING WITHOUT NAUSEA, INTRACTABILITY OF VOMITING NOT SPECIFIED, UNSPECIFIED VOMITING TYPE: ICD-10-CM

## 2017-09-28 DIAGNOSIS — F32.1 MAJOR DEPRESSIVE DISORDER, SINGLE EPISODE, MODERATE (HCC): ICD-10-CM

## 2017-09-28 DIAGNOSIS — N18.4 STAGE 4 CHRONIC KIDNEY DISEASE (HCC): ICD-10-CM

## 2017-09-28 DIAGNOSIS — I48.20 CHRONIC ATRIAL FIBRILLATION (HCC): ICD-10-CM

## 2017-09-28 DIAGNOSIS — E55.9 VITAMIN D DEFICIENCY: ICD-10-CM

## 2017-09-28 DIAGNOSIS — M17.11 PRIMARY OSTEOARTHRITIS OF RIGHT KNEE: ICD-10-CM

## 2017-09-28 DIAGNOSIS — G47.33 OBSTRUCTIVE SLEEP APNEA SYNDROME: ICD-10-CM

## 2017-09-28 DIAGNOSIS — R63.4 WEIGHT LOSS: ICD-10-CM

## 2017-09-28 DIAGNOSIS — K43.9 VENTRAL HERNIA WITHOUT OBSTRUCTION OR GANGRENE: ICD-10-CM

## 2017-09-28 DIAGNOSIS — I73.9 PERIPHERAL VASCULAR DISEASE (HCC): ICD-10-CM

## 2017-09-28 PROBLEM — K55.039 ACUTE ISCHEMIC COLITIS (HCC): Status: RESOLVED | Noted: 2017-04-22 | Resolved: 2017-09-28

## 2017-09-28 PROCEDURE — G0463 HOSPITAL OUTPT CLINIC VISIT: HCPCS | Performed by: FAMILY MEDICINE

## 2017-09-28 PROCEDURE — 99214 OFFICE O/P EST MOD 30 MIN: CPT | Performed by: FAMILY MEDICINE

## 2017-09-28 PROCEDURE — 90653 IIV ADJUVANT VACCINE IM: CPT | Performed by: FAMILY MEDICINE

## 2017-09-28 PROCEDURE — G0008 ADMIN INFLUENZA VIRUS VAC: HCPCS | Performed by: FAMILY MEDICINE

## 2017-09-28 RX ORDER — ATORVASTATIN CALCIUM 10 MG/1
10 TABLET, FILM COATED ORAL NIGHTLY
Qty: 90 TABLET | Refills: 3 | Status: SHIPPED | OUTPATIENT
Start: 2017-09-28 | End: 2018-10-09

## 2017-09-28 NOTE — PROGRESS NOTES
Pre op  Dr. Susi aLdd   Right knee total arthroscopy. Feels bad due to diarrhea  Nausea  Had for about 5 weeks    Extensive bruising rt arm. \"I carry bags on my left arm. I don't have bruising on the right. \"      Card nuc test for Monday left arm. Assessment/ Plan    1. Hyperlipidemia, unspecified hyperlipidemia type  Stable. - atorvastatin 10 MG Oral Tab; Take 1 tablet (10 mg total) by mouth nightly. Dispense: 90 tablet; Refill: 3    2. Chronic atrial fibrillation (HCC)  Stable.     3

## 2017-10-02 ENCOUNTER — TELEPHONE (OUTPATIENT)
Dept: GASTROENTEROLOGY | Facility: CLINIC | Age: 72
End: 2017-10-02

## 2017-10-02 ENCOUNTER — HOSPITAL ENCOUNTER (OUTPATIENT)
Dept: NUCLEAR MEDICINE | Facility: HOSPITAL | Age: 72
Discharge: HOME | End: 2017-10-02
Attending: INTERNAL MEDICINE
Payer: MEDICARE

## 2017-10-02 ENCOUNTER — HOSPITAL ENCOUNTER (OUTPATIENT)
Dept: CV DIAGNOSTICS | Facility: HOSPITAL | Age: 72
Discharge: HOME | End: 2017-10-02
Attending: INTERNAL MEDICINE
Payer: MEDICARE

## 2017-10-02 DIAGNOSIS — Z01.810 PRE-OPERATIVE CARDIOVASCULAR EXAMINATION: ICD-10-CM

## 2017-10-02 PROCEDURE — 93017 CV STRESS TEST TRACING ONLY: CPT | Performed by: INTERNAL MEDICINE

## 2017-10-02 PROCEDURE — 78452 HT MUSCLE IMAGE SPECT MULT: CPT | Performed by: INTERNAL MEDICINE

## 2017-10-02 PROCEDURE — 93018 CV STRESS TEST I&R ONLY: CPT | Performed by: INTERNAL MEDICINE

## 2017-10-02 PROCEDURE — 93016 CV STRESS TEST SUPVJ ONLY: CPT | Performed by: INTERNAL MEDICINE

## 2017-10-02 RX ORDER — 0.9 % SODIUM CHLORIDE 0.9 %
VIAL (ML) INJECTION
Status: COMPLETED
Start: 2017-10-02 | End: 2017-10-02

## 2017-10-02 RX ADMIN — 0.9 % SODIUM CHLORIDE 10 ML: 0.9 % VIAL (ML) INJECTION at 10:13:00

## 2017-10-02 NOTE — TELEPHONE ENCOUNTER
Fax received from [facility] (A:[phone]) requesting colonoscopy operative report and pathology with pt signed consent to release. I faxed [facility]N operative report and surgical pathology as requested to [phone].  Sent t

## 2017-10-03 ENCOUNTER — HOSPITAL ENCOUNTER (OUTPATIENT)
Dept: GENERAL RADIOLOGY | Age: 72
Discharge: HOME | End: 2017-10-03
Attending: FAMILY MEDICINE
Payer: MEDICARE

## 2017-10-03 ENCOUNTER — LAB ENCOUNTER (OUTPATIENT)
Dept: LAB | Age: 72
End: 2017-10-03
Attending: FAMILY MEDICINE
Payer: MEDICARE

## 2017-10-03 DIAGNOSIS — M19.90 OSTEOARTHRITIS: ICD-10-CM

## 2017-10-03 DIAGNOSIS — I50.20 SYSTOLIC CONGESTIVE HEART FAILURE, UNSPECIFIED CONGESTIVE HEART FAILURE CHRONICITY: ICD-10-CM

## 2017-10-03 DIAGNOSIS — E78.5 HYPERLIPIDEMIA, UNSPECIFIED HYPERLIPIDEMIA TYPE: ICD-10-CM

## 2017-10-03 DIAGNOSIS — M17.11 PRIMARY OSTEOARTHRITIS OF RIGHT KNEE: ICD-10-CM

## 2017-10-03 PROCEDURE — 86900 BLOOD TYPING SEROLOGIC ABO: CPT

## 2017-10-03 PROCEDURE — 87641 MR-STAPH DNA AMP PROBE: CPT

## 2017-10-03 PROCEDURE — 71020 XR CHEST PA + LAT CHEST (CPT=71020): CPT | Performed by: FAMILY MEDICINE

## 2017-10-03 PROCEDURE — 86850 RBC ANTIBODY SCREEN: CPT

## 2017-10-03 PROCEDURE — 85730 THROMBOPLASTIN TIME PARTIAL: CPT

## 2017-10-03 PROCEDURE — 80061 LIPID PANEL: CPT

## 2017-10-03 PROCEDURE — 86901 BLOOD TYPING SEROLOGIC RH(D): CPT

## 2017-10-03 PROCEDURE — 85610 PROTHROMBIN TIME: CPT

## 2017-10-03 PROCEDURE — 36415 COLL VENOUS BLD VENIPUNCTURE: CPT

## 2017-10-05 ENCOUNTER — TELEPHONE (OUTPATIENT)
Dept: FAMILY MEDICINE CLINIC | Facility: CLINIC | Age: 72
End: 2017-10-05

## 2017-10-05 ENCOUNTER — APPOINTMENT (OUTPATIENT)
Dept: PHYSICAL THERAPY | Facility: HOSPITAL | Age: 72
End: 2017-10-05
Attending: ORTHOPAEDIC SURGERY
Payer: MEDICARE

## 2017-10-05 DIAGNOSIS — J98.4 SCARRING OF LUNG: Primary | ICD-10-CM

## 2017-10-05 NOTE — TELEPHONE ENCOUNTER
----- Message from Rosy Whitlock DO sent at 10/3/2017  9:15 AM CDT -----  Chest xray showed old scarring. Repeat cxr in 3 mo. Dx scarring of lung. Wont prevent surgery.   Awaiting labs etc.

## 2017-10-12 ENCOUNTER — ANESTHESIA EVENT (OUTPATIENT)
Dept: SURGERY | Facility: HOSPITAL | Age: 72
DRG: 470 | End: 2017-10-12
Payer: MEDICARE

## 2017-10-12 ENCOUNTER — ANESTHESIA (OUTPATIENT)
Dept: SURGERY | Facility: HOSPITAL | Age: 72
DRG: 470 | End: 2017-10-12
Payer: MEDICARE

## 2017-10-12 ENCOUNTER — HOSPITAL ENCOUNTER (INPATIENT)
Facility: HOSPITAL | Age: 72
LOS: 3 days | Discharge: NURSING, REHAB OR LONG-TERM CARE | DRG: 470 | End: 2017-10-15
Attending: ORTHOPAEDIC SURGERY | Admitting: ORTHOPAEDIC SURGERY
Payer: MEDICARE

## 2017-10-12 ENCOUNTER — SURGERY (OUTPATIENT)
Age: 72
End: 2017-10-12

## 2017-10-12 ENCOUNTER — APPOINTMENT (OUTPATIENT)
Dept: GENERAL RADIOLOGY | Facility: HOSPITAL | Age: 72
DRG: 470 | End: 2017-10-12
Attending: ORTHOPAEDIC SURGERY
Payer: MEDICARE

## 2017-10-12 DIAGNOSIS — M19.90 OSTEOARTHRITIS: Primary | ICD-10-CM

## 2017-10-12 PROBLEM — M17.11 OSTEOARTHRITIS OF RIGHT KNEE: Status: ACTIVE | Noted: 2017-10-12

## 2017-10-12 PROCEDURE — 3E0T3BZ INTRODUCTION OF ANESTHETIC AGENT INTO PERIPHERAL NERVES AND PLEXI, PERCUTANEOUS APPROACH: ICD-10-PCS | Performed by: ANESTHESIOLOGY

## 2017-10-12 PROCEDURE — 73560 X-RAY EXAM OF KNEE 1 OR 2: CPT | Performed by: ORTHOPAEDIC SURGERY

## 2017-10-12 PROCEDURE — 99232 SBSQ HOSP IP/OBS MODERATE 35: CPT | Performed by: HOSPITALIST

## 2017-10-12 PROCEDURE — 0SRC0J9 REPLACEMENT OF RIGHT KNEE JOINT WITH SYNTHETIC SUBSTITUTE, CEMENTED, OPEN APPROACH: ICD-10-PCS | Performed by: ORTHOPAEDIC SURGERY

## 2017-10-12 DEVICE — PSN TIB STM 5 DEG SZ E R: Type: IMPLANTABLE DEVICE | Site: KNEE | Status: FUNCTIONAL

## 2017-10-12 DEVICE — PSN FEM PS CMT CCR NRW SZ8 R: Type: IMPLANTABLE DEVICE | Site: KNEE | Status: FUNCTIONAL

## 2017-10-12 DEVICE — CEMENT BONE ZIM PALICOS R: Type: IMPLANTABLE DEVICE | Site: KNEE | Status: FUNCTIONAL

## 2017-10-12 DEVICE — PSN ASF PS 10MM PLY R 6-9EF: Type: IMPLANTABLE DEVICE | Site: KNEE | Status: FUNCTIONAL

## 2017-10-12 DEVICE — PSN ALL POLY PAT PLY 29MM: Type: IMPLANTABLE DEVICE | Site: KNEE | Status: FUNCTIONAL

## 2017-10-12 RX ORDER — SODIUM CHLORIDE 0.9 % (FLUSH) 0.9 %
10 SYRINGE (ML) INJECTION AS NEEDED
Status: DISCONTINUED | OUTPATIENT
Start: 2017-10-12 | End: 2017-10-15

## 2017-10-12 RX ORDER — OXYCODONE HYDROCHLORIDE 5 MG/1
5 TABLET ORAL EVERY 4 HOURS PRN
Status: ACTIVE | OUTPATIENT
Start: 2017-10-12 | End: 2017-10-13

## 2017-10-12 RX ORDER — FAMOTIDINE 20 MG/1
20 TABLET ORAL ONCE
Status: COMPLETED | OUTPATIENT
Start: 2017-10-12 | End: 2017-10-12

## 2017-10-12 RX ORDER — POLYETHYLENE GLYCOL 3350 17 G/17G
17 POWDER, FOR SOLUTION ORAL DAILY PRN
Status: DISCONTINUED | OUTPATIENT
Start: 2017-10-12 | End: 2017-10-15

## 2017-10-12 RX ORDER — DOCUSATE SODIUM 100 MG/1
100 CAPSULE, LIQUID FILLED ORAL 2 TIMES DAILY
Status: DISCONTINUED | OUTPATIENT
Start: 2017-10-12 | End: 2017-10-15

## 2017-10-12 RX ORDER — ATORVASTATIN CALCIUM 10 MG/1
10 TABLET, FILM COATED ORAL NIGHTLY
Status: DISCONTINUED | OUTPATIENT
Start: 2017-10-12 | End: 2017-10-15

## 2017-10-12 RX ORDER — SENNOSIDES 8.6 MG
17.2 TABLET ORAL NIGHTLY
Status: DISCONTINUED | OUTPATIENT
Start: 2017-10-12 | End: 2017-10-15

## 2017-10-12 RX ORDER — HYDROMORPHONE HYDROCHLORIDE 1 MG/ML
0.2 INJECTION, SOLUTION INTRAMUSCULAR; INTRAVENOUS; SUBCUTANEOUS EVERY 5 MIN PRN
Status: DISCONTINUED | OUTPATIENT
Start: 2017-10-12 | End: 2017-10-12 | Stop reason: HOSPADM

## 2017-10-12 RX ORDER — METOCLOPRAMIDE 10 MG/1
10 TABLET ORAL ONCE
Status: COMPLETED | OUTPATIENT
Start: 2017-10-12 | End: 2017-10-12

## 2017-10-12 RX ORDER — SODIUM PHOSPHATE, DIBASIC AND SODIUM PHOSPHATE, MONOBASIC 7; 19 G/133ML; G/133ML
1 ENEMA RECTAL ONCE AS NEEDED
Status: DISCONTINUED | OUTPATIENT
Start: 2017-10-12 | End: 2017-10-15

## 2017-10-12 RX ORDER — DIPHENHYDRAMINE HYDROCHLORIDE 50 MG/ML
25 INJECTION INTRAMUSCULAR; INTRAVENOUS ONCE AS NEEDED
Status: ACTIVE | OUTPATIENT
Start: 2017-10-12 | End: 2017-10-12

## 2017-10-12 RX ORDER — HALOPERIDOL 5 MG/ML
0.25 INJECTION INTRAMUSCULAR ONCE AS NEEDED
Status: DISCONTINUED | OUTPATIENT
Start: 2017-10-12 | End: 2017-10-12 | Stop reason: HOSPADM

## 2017-10-12 RX ORDER — METOCLOPRAMIDE HYDROCHLORIDE 5 MG/ML
10 INJECTION INTRAMUSCULAR; INTRAVENOUS EVERY 6 HOURS PRN
Status: ACTIVE | OUTPATIENT
Start: 2017-10-12 | End: 2017-10-14

## 2017-10-12 RX ORDER — SODIUM CHLORIDE, SODIUM LACTATE, POTASSIUM CHLORIDE, CALCIUM CHLORIDE 600; 310; 30; 20 MG/100ML; MG/100ML; MG/100ML; MG/100ML
INJECTION, SOLUTION INTRAVENOUS CONTINUOUS
Status: DISCONTINUED | OUTPATIENT
Start: 2017-10-12 | End: 2017-10-13

## 2017-10-12 RX ORDER — DIPHENHYDRAMINE HCL 25 MG
25 CAPSULE ORAL EVERY 4 HOURS PRN
Status: DISCONTINUED | OUTPATIENT
Start: 2017-10-12 | End: 2017-10-15

## 2017-10-12 RX ORDER — NALOXONE HYDROCHLORIDE 0.4 MG/ML
80 INJECTION, SOLUTION INTRAMUSCULAR; INTRAVENOUS; SUBCUTANEOUS AS NEEDED
Status: DISCONTINUED | OUTPATIENT
Start: 2017-10-12 | End: 2017-10-12 | Stop reason: HOSPADM

## 2017-10-12 RX ORDER — HYDROMORPHONE HYDROCHLORIDE 1 MG/ML
0.4 INJECTION, SOLUTION INTRAMUSCULAR; INTRAVENOUS; SUBCUTANEOUS EVERY 5 MIN PRN
Status: DISCONTINUED | OUTPATIENT
Start: 2017-10-12 | End: 2017-10-12 | Stop reason: HOSPADM

## 2017-10-12 RX ORDER — MORPHINE SULFATE 4 MG/ML
4 INJECTION, SOLUTION INTRAMUSCULAR; INTRAVENOUS EVERY 2 HOUR PRN
Status: ACTIVE | OUTPATIENT
Start: 2017-10-12 | End: 2017-10-13

## 2017-10-12 RX ORDER — HYDROMORPHONE HYDROCHLORIDE 1 MG/ML
0.6 INJECTION, SOLUTION INTRAMUSCULAR; INTRAVENOUS; SUBCUTANEOUS EVERY 5 MIN PRN
Status: DISCONTINUED | OUTPATIENT
Start: 2017-10-12 | End: 2017-10-12 | Stop reason: HOSPADM

## 2017-10-12 RX ORDER — MORPHINE SULFATE 15 MG/1
15 TABLET ORAL EVERY 4 HOURS PRN
Status: ACTIVE | OUTPATIENT
Start: 2017-10-12 | End: 2017-10-13

## 2017-10-12 RX ORDER — BISACODYL 10 MG
10 SUPPOSITORY, RECTAL RECTAL
Status: DISCONTINUED | OUTPATIENT
Start: 2017-10-12 | End: 2017-10-15

## 2017-10-12 RX ORDER — OXYCODONE HYDROCHLORIDE 5 MG/1
10 TABLET ORAL EVERY 4 HOURS PRN
Status: DISPENSED | OUTPATIENT
Start: 2017-10-12 | End: 2017-10-13

## 2017-10-12 RX ORDER — ONDANSETRON 2 MG/ML
4 INJECTION INTRAMUSCULAR; INTRAVENOUS ONCE AS NEEDED
Status: DISCONTINUED | OUTPATIENT
Start: 2017-10-12 | End: 2017-10-12 | Stop reason: HOSPADM

## 2017-10-12 RX ORDER — ONDANSETRON 2 MG/ML
INJECTION INTRAMUSCULAR; INTRAVENOUS AS NEEDED
Status: DISCONTINUED | OUTPATIENT
Start: 2017-10-12 | End: 2017-10-12 | Stop reason: SURG

## 2017-10-12 RX ORDER — ACETAMINOPHEN 500 MG
1000 TABLET ORAL EVERY 8 HOURS
Status: DISCONTINUED | OUTPATIENT
Start: 2017-10-12 | End: 2017-10-13

## 2017-10-12 RX ORDER — DIPHENHYDRAMINE HYDROCHLORIDE 50 MG/ML
12.5 INJECTION INTRAMUSCULAR; INTRAVENOUS EVERY 4 HOURS PRN
Status: DISCONTINUED | OUTPATIENT
Start: 2017-10-12 | End: 2017-10-15

## 2017-10-12 RX ORDER — DEXAMETHASONE SODIUM PHOSPHATE 4 MG/ML
VIAL (ML) INJECTION AS NEEDED
Status: DISCONTINUED | OUTPATIENT
Start: 2017-10-12 | End: 2017-10-12 | Stop reason: SURG

## 2017-10-12 RX ORDER — MORPHINE SULFATE 2 MG/ML
2 INJECTION, SOLUTION INTRAMUSCULAR; INTRAVENOUS EVERY 10 MIN PRN
Status: DISCONTINUED | OUTPATIENT
Start: 2017-10-12 | End: 2017-10-12 | Stop reason: HOSPADM

## 2017-10-12 RX ORDER — PREDNISOLONE ACETATE 10 MG/ML
1 SUSPENSION/ DROPS OPHTHALMIC 3 TIMES DAILY
Status: DISCONTINUED | OUTPATIENT
Start: 2017-10-12 | End: 2017-10-15

## 2017-10-12 RX ORDER — PREDNISOLONE ACETATE 10 MG/ML
1 SUSPENSION/ DROPS OPHTHALMIC 3 TIMES DAILY
COMMUNITY
End: 2017-11-16 | Stop reason: ALTCHOICE

## 2017-10-12 RX ORDER — HYDROCODONE BITARTRATE AND ACETAMINOPHEN 5; 325 MG/1; MG/1
2 TABLET ORAL AS NEEDED
Status: DISCONTINUED | OUTPATIENT
Start: 2017-10-12 | End: 2017-10-12 | Stop reason: HOSPADM

## 2017-10-12 RX ORDER — HYDROCODONE BITARTRATE AND ACETAMINOPHEN 5; 325 MG/1; MG/1
1 TABLET ORAL AS NEEDED
Status: DISCONTINUED | OUTPATIENT
Start: 2017-10-12 | End: 2017-10-12 | Stop reason: HOSPADM

## 2017-10-12 RX ORDER — SODIUM CHLORIDE, SODIUM LACTATE, POTASSIUM CHLORIDE, CALCIUM CHLORIDE 600; 310; 30; 20 MG/100ML; MG/100ML; MG/100ML; MG/100ML
INJECTION, SOLUTION INTRAVENOUS CONTINUOUS
Status: DISCONTINUED | OUTPATIENT
Start: 2017-10-12 | End: 2017-10-15

## 2017-10-12 RX ORDER — ACETAMINOPHEN 325 MG/1
650 TABLET ORAL ONCE
Status: COMPLETED | OUTPATIENT
Start: 2017-10-12 | End: 2017-10-12

## 2017-10-12 RX ORDER — MORPHINE SULFATE 4 MG/ML
4 INJECTION, SOLUTION INTRAMUSCULAR; INTRAVENOUS EVERY 10 MIN PRN
Status: DISCONTINUED | OUTPATIENT
Start: 2017-10-12 | End: 2017-10-12 | Stop reason: HOSPADM

## 2017-10-12 RX ORDER — MORPHINE SULFATE 10 MG/ML
6 INJECTION, SOLUTION INTRAMUSCULAR; INTRAVENOUS EVERY 10 MIN PRN
Status: DISCONTINUED | OUTPATIENT
Start: 2017-10-12 | End: 2017-10-12 | Stop reason: HOSPADM

## 2017-10-12 RX ORDER — MORPHINE SULFATE 2 MG/ML
2 INJECTION, SOLUTION INTRAMUSCULAR; INTRAVENOUS EVERY 2 HOUR PRN
Status: DISPENSED | OUTPATIENT
Start: 2017-10-12 | End: 2017-10-13

## 2017-10-12 RX ORDER — ONDANSETRON 2 MG/ML
4 INJECTION INTRAMUSCULAR; INTRAVENOUS EVERY 4 HOURS PRN
Status: DISCONTINUED | OUTPATIENT
Start: 2017-10-12 | End: 2017-10-15

## 2017-10-12 RX ORDER — METOPROLOL SUCCINATE 100 MG/1
100 TABLET, EXTENDED RELEASE ORAL
Status: DISCONTINUED | OUTPATIENT
Start: 2017-10-12 | End: 2017-10-12

## 2017-10-12 RX ORDER — MORPHINE SULFATE 4 MG/ML
6 INJECTION, SOLUTION INTRAMUSCULAR; INTRAVENOUS EVERY 2 HOUR PRN
Status: ACTIVE | OUTPATIENT
Start: 2017-10-12 | End: 2017-10-13

## 2017-10-12 RX ADMIN — DEXAMETHASONE SODIUM PHOSPHATE 4 MG: 4 MG/ML VIAL (ML) INJECTION at 07:57:00

## 2017-10-12 RX ADMIN — ONDANSETRON 4 MG: 2 INJECTION INTRAMUSCULAR; INTRAVENOUS at 07:57:00

## 2017-10-12 RX ADMIN — SODIUM CHLORIDE, SODIUM LACTATE, POTASSIUM CHLORIDE, CALCIUM CHLORIDE: 600; 310; 30; 20 INJECTION, SOLUTION INTRAVENOUS at 07:35:00

## 2017-10-12 RX ADMIN — SODIUM CHLORIDE, SODIUM LACTATE, POTASSIUM CHLORIDE, CALCIUM CHLORIDE: 600; 310; 30; 20 INJECTION, SOLUTION INTRAVENOUS at 09:06:00

## 2017-10-12 NOTE — ANESTHESIA PROCEDURE NOTES
Peripheral Block    Anesthesiologist:  Meli Garcia  Performed by:   Anesthesiologist  Patient Location:  PACU  Start Time:  10/12/2017 7:00 AM  End Time:  10/12/2017 7:34 AM  Site Identification: ultrasound guided, real time ultrasound guided, static ultras

## 2017-10-12 NOTE — HISTORICAL OFFICE NOTE
Zenaida Mark Anthony  [phone]  : 1945  Admission: 05  Discharge: 05       DISCHARGE SUMMARY:  This is a 57-year old patient of Dr. Mitchell Lopez admitted to [facility] on 05 with a large bilaterally pulmonary embolus.   An echocardiog

## 2017-10-12 NOTE — H&P
[facility] Patient Status:  Surgery Admit    1945 MRN [id number]   Location [facility] Attending Giovanna Berry MD   Hosp Day # 0 PCP Kiya Waldron.  DO Jemal     Date of ARTHROSCOPY Right  No date: OTHER SURGICAL HISTORY      Comment: Injections and narcotics, POD Bartuci  No date: REMOVAL OF HEEL SPUR  Family History   Problem Relation Age of Onset   • Other[other] [OTHER] Father 80     old age,unknown caused   • Cancer F palpitation. Full range of motion to flexion and extension, lateral rotation and lateral flexion of cervical spine. No JVD. Supple. Lungs: Clear to auscultation bilaterally. Cardiac: Regular rate and rhythm. No murmur.   Abdomen:  Soft, non-distended,

## 2017-10-12 NOTE — OPERATIVE REPORT
[facility]    PATIENT'S NAME: Nimesh Sanz   ATTENDING PHYSICIAN: Jose Bonilla MD   OPERATING PHYSICIAN: Rogelio Landaverde MD   PATIENT ACCOUNT#:   [id number]    LOCATION:  [address] [place] [address] RECORD #:   [id number]       DATE OF BIRTH:  0 aspect, and the oscillating saw was used to resect the articular surface. Bone was removed. The articular surface shown to be severely degenerative. Attention was made to the femur. Supracondylar flare was debrided of soft tissue.   Center hole was made placed exiting out the anterolateral thigh. Medial parapatellar arthrotomy was repaired with 1 Ethibond suture. Then 0 Vicryl and 2-0 Vicryl were used to close the subcutaneous tissue. Staples used on the skin. Sterile dressing was applied.     The mau

## 2017-10-12 NOTE — PROGRESS NOTES
[facility]    Progress Note    [place] Kalp[place] Patient Status:  Inpatient    1945 MRN [id number]   Location [facility] 4W/SW/SE Attending Lynn Martin MD   Hosp Day # 0 PCP Julieta Horowitz.  DO Jemal     Subjective:     Sonia hypertension  CONT HOME MEDS, MONITOR. A-fib ([address]  STABLE, CONT HOME MEDS. CKD (chronic kidney disease) stage 3, GFR 30-59 ml/min  MONITOR POST OP.      I WAS NOTIFIED BY TELE THAT PATIENT HAS MULTIPLE PAUSES, FOR FEW SECONDS EACH, WILL HOLD ME

## 2017-10-12 NOTE — PHYSICAL THERAPY NOTE
PHYSICAL THERAPY KNEE EVALUATION - INPATIENT     Room Number: 427/427-A  Evaluation Date: 10/12/2017  Type of Evaluation: Initial  Physician Order: PT Eval and Treat    Presenting Problem: R TKA  Reason for Therapy: Mobility Dysfunction and Discharge Plann right knee  Active Problems:    Essential hypertension    A-fib (HCC)    CKD (chronic kidney disease) stage 3, GFR 30-59 ml/min      Past Medical History  Past Medical History:   Diagnosis Date   • A-fib (HCC)    • Acid reflux    • Arrhythmia     afib   • your hands near me because I am not sure what you are going to do\"    PHYSICAL THERAPY EXAMINATION     OBJECTIVE  Precautions: None  Fall Risk: High fall risk    WEIGHT BEARING RESTRICTION  Weight Bearing Restriction: R lower extremity        R Lower Extr training    Patient End of Session: Up in chair;Needs met;Call light within reach; All patient questions and concerns addressed;RN aware of session/findings    CURRENT GOALS    Goals to be met by: 10/25/17  Patient Goal Patient's self-stated goal is: to go

## 2017-10-12 NOTE — ANESTHESIA PREPROCEDURE EVALUATION
Anesthesia PreOp Note    HPI:     Yany Jason is a 74year old female who presents for preoperative consultation requested by: Michelle Law MD    Date of Surgery: 10/12/2017    Procedure(s):  KNEE TOTAL REPLACEMENT  Indication: Osteoarthritis      Hist Cellulitis    • Deep vein thrombosis (HCC)    • Esophageal reflux    • Heel spur     Right   • Hiatal hernia    • High blood pressure    • High cholesterol    • HTN (hypertension)    • Lumbar disc disease 2012   • Osteoarthritis    • PE (pulmonary embolism (CYKLOKAPRON) 1,000 mg in sodium chloride 0.9 % 60 mL IVPB 1,000 mg Intravenous Once Amor Oh MD     No current [facility]-ordered outpatient prescriptions on file.       Adhesive Tape               Comment:Skin off    Family History   Problem Relation Age respiration is 18 and oxygen saturation is 98%.     10/02/17  1845 10/12/17  0601 10/12/17  0652   BP:  152/80 156/92   BP Location:  Right arm Right arm   Pulse:  81 80   Resp:  18 18   Temp:  (!) 97.1 °F (36.2 °C)    TempSrc:  Oral    SpO2:  98% 98%   Clint Walden

## 2017-10-12 NOTE — PLAN OF CARE
CARDIOVASCULAR - ADULT    • Maintains optimal cardiac output and hemodynamic stability Progressing    • Absence of cardiac arrhythmias or at baseline Progressing        DISCHARGE PLANNING    • Discharge to home or other facility with appropriate resources Patient's room tidy and clear from potential fall risks.

## 2017-10-12 NOTE — ANESTHESIA POSTPROCEDURE EVALUATION
Patient: Miguel Guerra    Procedure Summary     Date:  10/12/17 Room / Location:  [address] [place] [address] MAIN OR 12 / [address] [place] [address] MAIN OR    Anesthesia Start:  9715 Anesthesia Stop:      Procedure:  KNEE TOTAL REPLACEMENT (Right Knee) Diagnosis:  (Osteoarthritis)    Surgeon:  Alexx Oneill

## 2017-10-12 NOTE — PLAN OF CARE
Mult calls from Tele. PVC's as well as v couplet and single V-triplet. Called  on 3 (telemetry). Discussed possible reasons for ectopy and what they mean.   Advised to keep monitoring and not worrisome r/t patient's a-fib diagnosis

## 2017-10-12 NOTE — BRIEF OP NOTE
Pre-Operative Diagnosis: Osteoarthritis     Post-Operative Diagnosis: Osteoarthritis     Procedure Performed:   Procedure(s):  Right total knee arthroplasty    Surgeon(s) and Role:     Clemente Alvarenga MD - Primary    Assistant(s):  Surgical Assistant.

## 2017-10-13 ENCOUNTER — PATIENT OUTREACH (OUTPATIENT)
Dept: FAMILY MEDICINE CLINIC | Facility: CLINIC | Age: 72
End: 2017-10-13

## 2017-10-13 PROCEDURE — 99233 SBSQ HOSP IP/OBS HIGH 50: CPT | Performed by: HOSPITALIST

## 2017-10-13 RX ORDER — HYDROCODONE BITARTRATE AND ACETAMINOPHEN 5; 325 MG/1; MG/1
1 TABLET ORAL EVERY 4 HOURS PRN
Status: DISCONTINUED | OUTPATIENT
Start: 2017-10-13 | End: 2017-10-15

## 2017-10-13 RX ORDER — HYDROCODONE BITARTRATE AND ACETAMINOPHEN 10; 325 MG/1; MG/1
1 TABLET ORAL EVERY 6 HOURS PRN
Status: DISCONTINUED | OUTPATIENT
Start: 2017-10-13 | End: 2017-10-15

## 2017-10-13 NOTE — PHYSICAL THERAPY NOTE
Spoke with RN Shanna who approves participation in therapy. However, upon therapist entering room patient has just received her breakfast tray and requesting to eat.  Checked back 30 min later and RN reports transport is coming now to take patient to [address] [place] [address]

## 2017-10-13 NOTE — PLAN OF CARE
CARDIOVASCULAR - ADULT    • Maintains optimal cardiac output and hemodynamic stability Progressing    • Absence of cardiac arrhythmias or at baseline Progressing        Patient is Afib and vital signs are stable, no pauses or letha episodes.    Will continu

## 2017-10-13 NOTE — CONSULTS
[facility]    PATIENT'S NAME: Leroy Cornejo   ATTENDING PHYSICIAN: Katelyn Burleson MD   CONSULTING PHYSICIAN: Arnoldo [address] [place] [address],    PATIENT ACCOUNT#:   [de-identified]    LOCATION:  [address] [place] [address] RECORD #:   [id number]       DATE OF BIRTH:  [birth year]. NEUROLOGIC:  Alert and appropriate. PSYCHIATRIC:  The patient is not delusional.    Telemetry strips are reviewed, demonstrating pauses, the longest one at 12 seconds.   Microperfusion imaging stress test in 10/2017 with normal myocardial perfu

## 2017-10-13 NOTE — PROGRESS NOTES
Cardiology consult dictated. Patient known to me. Imp: Long pauses during episodes of nausea. Patient on large dose of metoprolol at home. Rec: PCU monitoring. Pacer pad. Dobutamine if needed. Monitor after metoprolol washes out for further pauses.  No

## 2017-10-13 NOTE — PROGRESS NOTES
[facility]    Progress Note    Miguel Guerra Patient Status:  Inpatient    1945 MRN [id number]   Location [facility] 2W/SW Attending Rigo Medina MD   Hosp Day # 1 PCP Re Correia.  Pam Lin DO     Subjective:  Miguel Guerra planning.         [facility]  10/13/2017  8:44 AM

## 2017-10-13 NOTE — ANESTHESIA POST-OP FOLLOW-UP NOTE
Day one after R TKA under GA and R Fem. Nerve block. Adequate pain control, no residual motor block.   Placed in ICU for rate control, better now

## 2017-10-13 NOTE — PROGRESS NOTES
Patient seen in follow up. Patient transferred to ICU last night for prolong pauses.   ICU tele showed no more significant pauses  Patient with no cp, sob, dizziness or N/V     10/13/17  0800   BP: 131/66   Pulse:    Resp:    Temp: 98.4 °F (36.9 °C) magnesium hydroxide (MILK OF MAGNESIA) 400 MG/5ML suspension 30 mL 30 mL Oral Daily PRN   bisacodyl (DULCOLAX) rectal suppository 10 mg 10 mg Rectal Daily PRN   FLEET ENEMA (FLEET) 7-19 GM/118ML enema 133 mL 1 enema Rectal Once PRN   ondansetron HCl (ZOFRA Ventricular pause, the longest being 12 seconds, occurring during episodes of nausea, other episodes with the patient feeling sick to her stomach but not with nausea. No previous history of syncope or near-syncope.   The patient is on metoprolol 100 mg twi

## 2017-10-13 NOTE — PROGRESS NOTES
[facility]  Progress Note     Zuleika Duffy  : 1945    Status: Inpatient  Day #: 1    Attending: Carlos Eduardo Howe MD  PCP: Vitaliy Joaquin DO      Assessment and Plan     Osteoarthritis of the right knee status post right knee arthropla -------------------------- Atrial fibrillation - Diffuse nonspecific T-abnormality. Poor R Wave Progression  Low voltage -possible pulmonary disease.  ABNORMAL When compared with ECG of 04/22/2017 12:09:58 Heart rate has decreased Electronically signed on 1

## 2017-10-13 NOTE — PHYSICAL THERAPY NOTE
PHYSICAL THERAPY KNEE TREATMENT NOTE - INPATIENT     Room Number: 961/153-U             Presenting Problem: R TKA    Problem List  Principal Problem:    Osteoarthritis of right knee  Active Problems:    Essential hypertension    A-fib (HCC)    CKD (chroni care/supervision    PLAN  PT Treatment Plan: Bed mobility; Patient education;Gait training;Neuromuscular re-educate;Range of motion;Strengthening;Stair training;Transfer training;Balance training    SUBJECTIVE  \"i am ready to get up\"    OBJECTIVE  Precaut Information: patient reports with Raynaud's disease it is sometimes difficult to assess HR/SaO2 using pulse oximeter.  No problems during today's session    Exercises AM Session PM Session   Ankle Pumps reps 20 reps   Quad Sets reps 20 reps   Glut Sets  rep performs home exercise program for ROM/strengthening per the instructions provided in preparation for discharge.    Goal #6  Current Status  Progressing, continued training today, will need further training

## 2017-10-14 PROCEDURE — 99232 SBSQ HOSP IP/OBS MODERATE 35: CPT | Performed by: HOSPITALIST

## 2017-10-14 NOTE — PROGRESS NOTES
Patient seen in follow up. Tele no pauses overnight   Heart rate elevated   Vitals reviewed  SBP   130-150  Labs reviewed K 4.7, creatinine 1.34, Hgb 104., Platelet  633       75/49/03  0956   BP: 139/75   Pulse: 102   Resp: 18   Temp: 98.8 °F (37. Prescriptions Prior to Admission:  prednisoLONE acetate 1 % Ophthalmic Suspension Place 1 drop into the right eye 3 (three) times daily. TID today. Start BID tomorrow. Weaning down per MD orders   apixaban (ELIQUIS) 2.5 MG Oral Tab Take 2.5 mg by mouth 2 (tw

## 2017-10-14 NOTE — PROGRESS NOTES
[facility]    Progress Note    Cheyenne Meyers Patient Status:  Inpatient    1945 MRN [id number]   Location [facility] 4W/SW/SE Attending Jesus Stuart MD   Hosp Day # 2 PCP [facility].  Mimi Brito DO     Subjective:  Cheyenne Meyers

## 2017-10-14 NOTE — OCCUPATIONAL THERAPY NOTE
OCCUPATIONAL THERAPY EVALUATION - INPATIENT      Room Number: 849/198-H  Evaluation Date: 10/14/2017  Type of Evaluation: Initial  Presenting Problem: R TKA    Physician Order: IP Consult to Occupational Therapy  Reason for Therapy: ADL/IADL Dysfunction an colon 1/2017    repeat c-scope 1/2020   • Visual impairment     glasses       Past Surgical History  Past Surgical History:  No date: BSO, OMENTECTOMY W/BRANOD  No date: CARPAL TUNNEL RELEASE Bilateral  9/28/16 : CHOLECYSTECTOMY  1/25/2017: COLONOSCOPY N/A is within functional limits     STRENGTH ASSESSMENT  Upper extremity strength is 4/5, patient reports arthritis in shoulders    ADDITIONAL TESTS                                    NEUROLOGICAL FINDINGS                   ACTIVITIES OF DAILY LIVING ASSESSMEN MOD I   Comment:    Patient will verbally recall transfer techniques for shower/tub.    Comment:         Goals  on:  Frequency:

## 2017-10-14 NOTE — PROGRESS NOTES
[place] [facility]  Progress Note     Lexa Carey  : 1945    Status: Inpatient  Day #: 2    Attending: Andre Hayden MD  PCP: Eva Comment.  Jemal,       Assessment and Plan     Osteoarthritis of the right knee status post right knee arthrop Low voltage -possible pulmonary disease. ABNORMAL When compared with ECG of 04/22/2017 12:09:58 Heart rate has decreased Electronically signed on 10/13/2017 at 07:56 by Ghassan Arredondo      Medications     • Senna  17.2 mg Oral Nightly   • docusate sodium

## 2017-10-14 NOTE — PHYSICAL THERAPY NOTE
PHYSICAL THERAPY KNEE TREATMENT NOTE - INPATIENT     Room Number: 615/277-R             Presenting Problem: R TKA    Problem List  Principal Problem:    Osteoarthritis of right knee  Active Problems:    Essential hypertension    A-fib (HCC)    CKD (chroni Approx Degree of Impairment Score: 57.7%   Standardized Score (AM-PAC Scale): 39.45   CMS Modifier (G-Code): CK    FUNCTIONAL ABILITY STATUS  Gait Assessment   Gait Assistance: Minimum assistance  Distance (ft): 20' x 2  Assistive Device: Rolling walker  P extension to 90 degrees flexion     Goal #5   Current Status  Progressin-86* today, pt able to bend knee with minimal increase in pain   Goal #6 Patient independently performs home exercise program for ROM/strengthening per the instructions provided in

## 2017-10-15 VITALS
HEIGHT: 66 IN | BODY MASS INDEX: 35.52 KG/M2 | WEIGHT: 221 LBS | SYSTOLIC BLOOD PRESSURE: 125 MMHG | OXYGEN SATURATION: 93 % | TEMPERATURE: 98 F | DIASTOLIC BLOOD PRESSURE: 69 MMHG | RESPIRATION RATE: 18 BRPM | HEART RATE: 90 BPM

## 2017-10-15 PROCEDURE — 99239 HOSP IP/OBS DSCHRG MGMT >30: CPT | Performed by: HOSPITALIST

## 2017-10-15 RX ORDER — METOPROLOL TARTRATE 50 MG/1
50 TABLET, FILM COATED ORAL
Status: DISCONTINUED | OUTPATIENT
Start: 2017-10-15 | End: 2017-10-15

## 2017-10-15 RX ORDER — HYDROCODONE BITARTRATE AND ACETAMINOPHEN 5; 325 MG/1; MG/1
1-2 TABLET ORAL EVERY 4 HOURS PRN
Qty: 40 TABLET | Refills: 0 | Status: SHIPPED | OUTPATIENT
Start: 2017-10-15 | End: 2017-11-16

## 2017-10-15 RX ORDER — PSEUDOEPHEDRINE HCL 30 MG
100 TABLET ORAL 2 TIMES DAILY
Qty: 60 CAPSULE | Refills: 0 | Status: SHIPPED
Start: 2017-10-15 | End: 2017-11-16

## 2017-10-15 NOTE — DISCHARGE SUMMARY
[facility]  Discharge Summary     Heidi Abraham  : 1945    Status: Inpatient  Day #: 3    Attending: Gayle Jenkins MD  PCP: Dickson Boyd.  DO Jemal     Date of Admission: 10/12/2017  Date of Discharge: 10/15/2017     [facility] Discharge Kervin Omer sounds  Musculoskeletal:  No joint swelling  Extremities:  No edema, no cyanosis, no clubbing  Neurologic:  nonfocal  Psychiatric:  Normal affect, calm and appropriate  Skin:  No rash, no lesion         Discharge Medications      START taking these medicat medications  · HYDROcodone-acetaminophen 5-325 MG Tabs     Information about where to get these medications is not yet available    Ask your nurse or doctor about these medications  · docusate sodium 100 MG Caps  · metoprolol Tartrate 25 MG Tabs  · Sennosi

## 2017-10-15 NOTE — CM/SW NOTE
SW was informed that pt is able to d/c today and is requesting to go to [facility]. SW met w/ pt to inform that [facility] currently did not have any beds available. Pt requested referral to [facility]. PEMA contacted on-call DON screener for DON screen.  PEMA loi

## 2017-10-15 NOTE — PROGRESS NOTES
Patient seen in follow up. No overnight events.    No chest pain or shortness of breath   Still having pain   Vitals reviewed  Labs reviewed    10/15/17  1347   BP: 125/69   Pulse: 90   Resp: 18   Temp: 98.2 °F (36.8 °C)       Intake/Output Summary (L prednisoLONE acetate (PRED FORTE) 1 % ophthalmic suspension 1 drop 1 drop Right Eye TID       Prescriptions Prior to Admission:  prednisoLONE acetate 1 % Ophthalmic Suspension Place 1 drop into the right eye 3 (three) times daily. TID today. Start BID tomor

## 2017-10-15 NOTE — PROGRESS NOTES
[place] [facility] [place] [facility]  Progress Note     Iker Khan  : 1945    Status: Inpatient  Day #: 3    Attending: Simon Antoine MD  PCP: Eliana Cedeno.  DO Jemal      Assessment and Plan     Osteoarthritis of the right knee status post right knee arthropla Oral 2x Daily(Beta Blocker)   • Senna  17.2 mg Oral Nightly   • docusate sodium  100 mg Oral BID   • apixaban  2.5 mg Oral BID   • atorvastatin  10 mg Oral Nightly   • prednisoLONE acetate  1 drop Right Eye TID      PRN Meds: HYDROcodone-acetaminophen, HYD

## 2017-10-15 NOTE — OCCUPATIONAL THERAPY NOTE
OCCUPATIONAL THERAPY TREATMENT NOTE - INPATIENT     Room Number: 192/796-O         Presenting Problem:  (RT TKA)    Problem List  Principal Problem:    Osteoarthritis of right knee  Active Problems:    Essential hypertension    A-fib (HCC)    CKD (chronic : None  -   Putting on and taking off regular upper body clothing?: None  -   Taking care of personal grooming such as brushing teeth?: None  -   Eating meals?: None    AM-PAC Score:  Score: 22  Approx Degree of Impairment: 25.8%  Standardized Score (AM-PA

## 2017-10-15 NOTE — PROGRESS NOTES
[facility]    Progress Note    Marcos Jacobs Patient Status:  Inpatient    1945 MRN [id number]   Location [facility] 4W/SW/SE Attending Ganga Edward MD   Hosp Day # 3 PCP Rina Rivera.  Jessica Robin DO     Subjective:  Marcos Jacobs is

## 2017-10-17 NOTE — CM/SW NOTE
CTL note:  Late entry:  Pt was discharged to [facility] on 10/15/17. CTL did not enroll in [facility] program prior to discharge on Sunday. Addendum:  [facility] did not have available bed. Pt went to [facility].     BPCI eligible under

## 2017-10-18 PROCEDURE — 99307 SBSQ NF CARE SF MDM 10: CPT | Performed by: NURSE PRACTITIONER

## 2017-10-22 ENCOUNTER — SNF VISIT (OUTPATIENT)
Dept: INTERNAL MEDICINE CLINIC | Facility: SKILLED NURSING FACILITY | Age: 72
End: 2017-10-22

## 2017-10-22 DIAGNOSIS — M17.11 PRIMARY OSTEOARTHRITIS OF RIGHT KNEE: ICD-10-CM

## 2017-10-22 DIAGNOSIS — Z96.651 S/P TOTAL KNEE ARTHROPLASTY, RIGHT: ICD-10-CM

## 2017-10-22 DIAGNOSIS — I48.20 CHRONIC ATRIAL FIBRILLATION (HCC): ICD-10-CM

## 2017-10-22 NOTE — PROGRESS NOTES
APN visit 10/18/17 at [facility]    Patient has been enrolled in the [facility] BPCI program for Right Knee Replacement    Patient was seen at bedside and during therapy for progression of care and discharge planning.  She is tolerating therapy well in no acute dis

## 2017-10-23 ENCOUNTER — TELEPHONE (OUTPATIENT)
Dept: INTERNAL MEDICINE CLINIC | Facility: CLINIC | Age: 72
End: 2017-10-23

## 2017-10-24 ENCOUNTER — TELEPHONE (OUTPATIENT)
Dept: INTERNAL MEDICINE CLINIC | Facility: CLINIC | Age: 72
End: 2017-10-24

## 2017-10-24 NOTE — TELEPHONE ENCOUNTER
Dr. Wendy Dupree message relayed to pharmacy and pt, understanding verbalized. Pharmacy will call [facility] to  who wrote Rx. Pt has appt with ortho on Thursday - what is she to do in the meanitime?  Advised pt to call Dr. Camron Hui

## 2017-10-24 NOTE — TELEPHONE ENCOUNTER
Nursing please call the pharmacy, I think she was supposed to take a leftover medicine from [facility], I did not write her a prescription for the [address].  The nurse practitioner may have, try to get your hands on the prescription to investigate, call the

## 2017-10-24 NOTE — TELEPHONE ENCOUNTER
Patient was given a script at [facility] for Laineyhemant Comer, but it is not dated. They will not fill it until we verify date. Patient left script at pharmacy. Call Silas in [facility] at [phone].       Patient has not had any pain meds since s

## 2017-10-26 ENCOUNTER — OFFICE VISIT (OUTPATIENT)
Dept: FAMILY MEDICINE CLINIC | Facility: CLINIC | Age: 72
End: 2017-10-26

## 2017-10-26 VITALS
HEIGHT: 66 IN | SYSTOLIC BLOOD PRESSURE: 159 MMHG | HEART RATE: 105 BPM | WEIGHT: 216 LBS | DIASTOLIC BLOOD PRESSURE: 84 MMHG | BODY MASS INDEX: 34.72 KG/M2 | TEMPERATURE: 98 F

## 2017-10-26 DIAGNOSIS — R63.4 WEIGHT LOSS: ICD-10-CM

## 2017-10-26 DIAGNOSIS — R11.11 VOMITING WITHOUT NAUSEA, INTRACTABILITY OF VOMITING NOT SPECIFIED, UNSPECIFIED VOMITING TYPE: ICD-10-CM

## 2017-10-26 DIAGNOSIS — I48.20 CHRONIC ATRIAL FIBRILLATION (HCC): Primary | ICD-10-CM

## 2017-10-26 DIAGNOSIS — M79.89 RIGHT LEG SWELLING: ICD-10-CM

## 2017-10-26 PROCEDURE — G0463 HOSPITAL OUTPT CLINIC VISIT: HCPCS | Performed by: FAMILY MEDICINE

## 2017-10-26 PROCEDURE — 99214 OFFICE O/P EST MOD 30 MIN: CPT | Performed by: FAMILY MEDICINE

## 2017-10-26 NOTE — PROGRESS NOTES
\"I burped so much in rehab [facility] for rehab ) for 1 week. I think it's because they used spices. \"  Has improved since going home. I'm not nauseous I just cough and I know I'm going to spit up. \"    Status post right knee replacement.   Has

## 2017-11-08 RX ORDER — METOPROLOL SUCCINATE 100 MG/1
TABLET, EXTENDED RELEASE ORAL
Qty: 180 TABLET | Refills: 0 | Status: SHIPPED | OUTPATIENT
Start: 2017-11-08 | End: 2017-11-16 | Stop reason: ALTCHOICE

## 2017-11-16 ENCOUNTER — OFFICE VISIT (OUTPATIENT)
Dept: FAMILY MEDICINE CLINIC | Facility: CLINIC | Age: 72
End: 2017-11-16

## 2017-11-16 VITALS
HEIGHT: 66 IN | DIASTOLIC BLOOD PRESSURE: 88 MMHG | HEART RATE: 105 BPM | WEIGHT: 213 LBS | BODY MASS INDEX: 34.23 KG/M2 | SYSTOLIC BLOOD PRESSURE: 154 MMHG

## 2017-11-16 DIAGNOSIS — K44.9 HIATAL HERNIA WITH GERD AND ESOPHAGITIS: ICD-10-CM

## 2017-11-16 DIAGNOSIS — M34.1 CREST SYNDROME (HCC): ICD-10-CM

## 2017-11-16 DIAGNOSIS — I10 HYPERTENSION, BENIGN: ICD-10-CM

## 2017-11-16 DIAGNOSIS — R63.4 WEIGHT LOSS: ICD-10-CM

## 2017-11-16 DIAGNOSIS — E78.00 HIGH CHOLESTEROL: ICD-10-CM

## 2017-11-16 DIAGNOSIS — I48.20 CHRONIC ATRIAL FIBRILLATION (HCC): Primary | ICD-10-CM

## 2017-11-16 DIAGNOSIS — R11.11 VOMITING WITHOUT NAUSEA, INTRACTABILITY OF VOMITING NOT SPECIFIED, UNSPECIFIED VOMITING TYPE: ICD-10-CM

## 2017-11-16 DIAGNOSIS — N18.30 STAGE 3 CHRONIC KIDNEY DISEASE (HCC): ICD-10-CM

## 2017-11-16 DIAGNOSIS — K21.00 HIATAL HERNIA WITH GERD AND ESOPHAGITIS: ICD-10-CM

## 2017-11-16 PROCEDURE — 99214 OFFICE O/P EST MOD 30 MIN: CPT | Performed by: FAMILY MEDICINE

## 2017-11-16 PROCEDURE — G0463 HOSPITAL OUTPT CLINIC VISIT: HCPCS | Performed by: FAMILY MEDICINE

## 2017-11-16 NOTE — PROGRESS NOTES
Still with the stomach. The burping is back. Eye issues \"Ther is film over my eyes. \"    Weigh loss continues.

## 2017-11-19 DIAGNOSIS — E78.5 HYPERLIPIDEMIA, UNSPECIFIED HYPERLIPIDEMIA TYPE: ICD-10-CM

## 2017-11-19 RX ORDER — ATORVASTATIN CALCIUM 10 MG/1
TABLET, FILM COATED ORAL
Qty: 30 TABLET | Refills: 0 | OUTPATIENT
Start: 2017-11-19

## 2017-12-20 ENCOUNTER — OFFICE VISIT (OUTPATIENT)
Dept: RHEUMATOLOGY | Facility: CLINIC | Age: 72
End: 2017-12-20

## 2017-12-20 ENCOUNTER — TELEPHONE (OUTPATIENT)
Dept: RHEUMATOLOGY | Facility: CLINIC | Age: 72
End: 2017-12-20

## 2017-12-20 VITALS
BODY MASS INDEX: 33.43 KG/M2 | HEART RATE: 73 BPM | SYSTOLIC BLOOD PRESSURE: 137 MMHG | DIASTOLIC BLOOD PRESSURE: 85 MMHG | WEIGHT: 208 LBS | HEIGHT: 66 IN

## 2017-12-20 DIAGNOSIS — M34.1 CREST SYNDROME (HCC): Primary | ICD-10-CM

## 2017-12-20 DIAGNOSIS — E55.9 VITAMIN D DEFICIENCY: ICD-10-CM

## 2017-12-20 DIAGNOSIS — I73.01 RAYNAUD'S DISEASE WITH GANGRENE (HCC): ICD-10-CM

## 2017-12-20 DIAGNOSIS — Z51.81 THERAPEUTIC DRUG MONITORING: ICD-10-CM

## 2017-12-20 PROCEDURE — G0463 HOSPITAL OUTPT CLINIC VISIT: HCPCS | Performed by: INTERNAL MEDICINE

## 2017-12-20 PROCEDURE — 99214 OFFICE O/P EST MOD 30 MIN: CPT | Performed by: INTERNAL MEDICINE

## 2017-12-20 RX ORDER — AMLODIPINE BESYLATE 10 MG/1
10 TABLET ORAL DAILY
Qty: 90 TABLET | Refills: 1 | Status: SHIPPED | OUTPATIENT
Start: 2017-12-20 | End: 2018-03-02

## 2017-12-20 RX ORDER — SILDENAFIL CITRATE 20 MG/1
20 TABLET ORAL 3 TIMES DAILY
Qty: 90 TABLET | Refills: 1 | Status: SHIPPED | OUTPATIENT
Start: 2017-12-20 | End: 2018-03-19

## 2017-12-20 RX ORDER — APIXABAN 5 MG/1
TABLET, FILM COATED ORAL
Refills: 2 | Status: ON HOLD | COMMUNITY
Start: 2017-12-07 | End: 2019-02-03

## 2017-12-20 RX ORDER — AMLODIPINE BESYLATE 5 MG/1
TABLET ORAL DAILY
Refills: 1 | COMMUNITY
Start: 2017-12-10 | End: 2018-03-02

## 2017-12-20 NOTE — PATIENT INSTRUCTIONS
1. Increase amlodipine to 10mg a day   2. Plan to start on sildenifile 20mg three times  Day   3. Return to clinic in 1 -2month -  4. Cont.  Eating with tums and gas x  - just check phosphorous with next labs     Sildenafil tablets (Revatio)  Brand Name: Saniya · antiviral medicines for HIV or AIDS  · bosentan  · certain medicines for benign prostatic hyperplasia (BPH)  · certain medicines for blood pressure  · certain medicines for fungal infections like ketoconazole and itraconazole  · cimetidine  · erythromyci NOTE:This sheet is a summary. It may not cover all possible information. If you have questions about this medicine, talk to your doctor, pharmacist, or health care provider.  Copyright© 2017 [employer]vier

## 2017-12-20 NOTE — PROGRESS NOTES
Carlotta Mesa is a 74year old female who presents for Patient presents with:  Osteoarthritis: hand and finger pain  Weight Loss: Crest syndrome  . HPI:     I had the pleasure of seeing Carlotta Mesa on 7/19/2017 for evaluation.      She sees Dr. Aubrey menchaca possibly vagal nerve surgery causing diarrhea. And esophageal outflow obstruction - . She thkns she had a motility study in [place] but not able to lay down. She also has diarrhea. She has trouble with Raynaud's. Her fingers turn blue, and purple. atorvastatin 10 MG Oral Tab Take 1 tablet (10 mg total) by mouth nightly. Disp: 90 tablet Rfl: 3   multivitamin Oral Tab Take 1 tablet by mouth daily.  Disp:  Rfl:    triamcinolone acetonide 0.1 % External Cream Apply topically 2 (two) times daily as need Family H/O   • Arthritis Other      Close relative  unsure which type      Social History:  Smoking status: Former Smoker                                                              Packs/day: 0.00      Years: 0.00      Smokeless tobacco: Never Used ulcers, EOM intact, clear sclear, PERRLA, pleasant, no acute distress, no CAD, no neck tendnerness, good ROM,   No rashes  CVS: RRR, no murmurs  RS: CTAB, no crackles, no rhonchi  ABD: Soft Non tender, no HSM felt, BS positive  Joint exam:   No joint tendn FINAL DIAGNOSIS           CLINICAL INFORMATION           GROSS DESCRIPTION           Interpretation           Anti-Sjogren's A      Negative  Negative   Anti-Sjogren's B      Negative  Negative   PT      11.8 - 14.5 seconds 12.8    INR      0.9 - 1.2 1.0 1.00 g/dL    BETA GLOBULINS      0.70 - 1.30 g/dL    GAMMA GLOBULINS      0.50 - 1.70 g/dL    ALBUMIN/GLOBULIN RATIO      1.00 - 2.00    SPE INTERPRETATION          Reviewed By:          Iron, Serum      28 - 170 mcg/dL    Iron Bind. Cap.(TIBC)      228 - 4 Atherosclerosis. ASSESSMENT AND PLAN:   Ky Li is a 74year old female who presents for Patient presents with:  Osteoarthritis: hand and finger pain  Weight Loss: Crest syndrome      1.  JESSICA 1:2560 centromere -c/w CREST -  Has severe issues with Esph

## 2017-12-20 NOTE — TELEPHONE ENCOUNTER
Can we get revatio covered for this patient - she has mod pulmonary hypertension and rayanud's -   Will send to local pharmacy -

## 2018-02-10 RX ORDER — METOPROLOL SUCCINATE 100 MG/1
TABLET, EXTENDED RELEASE ORAL
Qty: 180 TABLET | Refills: 0 | Status: SHIPPED | OUTPATIENT
Start: 2018-02-10 | End: 2018-03-02

## 2018-02-10 NOTE — TELEPHONE ENCOUNTER
Hypertensive Medications  Protocol Criteria:  · Appointment scheduled in the past 6 months or in the next 3 months  · BMP or CMP in the past 12 months  · Creatinine result < 2  Recent Outpatient Visits            1 month ago CREST syndrome ([facility] [address]    Carey

## 2018-02-28 ENCOUNTER — LAB ENCOUNTER (OUTPATIENT)
Dept: LAB | Age: 73
End: 2018-02-28
Attending: FAMILY MEDICINE
Payer: MEDICARE

## 2018-02-28 DIAGNOSIS — I48.91 ATRIAL FIBRILLATION (HCC): ICD-10-CM

## 2018-02-28 DIAGNOSIS — E78.00 HIGH CHOLESTEROL: ICD-10-CM

## 2018-02-28 DIAGNOSIS — I48.91 A-FIB (HCC): Primary | ICD-10-CM

## 2018-02-28 DIAGNOSIS — E55.9 VITAMIN D DEFICIENCY: ICD-10-CM

## 2018-02-28 DIAGNOSIS — I48.20 CHRONIC ATRIAL FIBRILLATION (HCC): ICD-10-CM

## 2018-02-28 DIAGNOSIS — Z51.81 THERAPEUTIC DRUG MONITORING: ICD-10-CM

## 2018-02-28 DIAGNOSIS — N18.30 CKD (CHRONIC KIDNEY DISEASE) STAGE 3, GFR 30-59 ML/MIN (HCC): ICD-10-CM

## 2018-02-28 LAB
ALBUMIN SERPL BCP-MCNC: 3.9 G/DL (ref 3.5–4.8)
ALBUMIN/GLOB SERPL: 1.2 {RATIO} (ref 1–2)
ALP SERPL-CCNC: 48 U/L (ref 32–100)
ALT SERPL-CCNC: 16 U/L (ref 14–54)
ANION GAP SERPL CALC-SCNC: 9 MMOL/L (ref 0–18)
AST SERPL-CCNC: 19 U/L (ref 15–41)
BASOPHILS # BLD: 0 K/UL (ref 0–0.2)
BASOPHILS NFR BLD: 1 %
BILIRUB SERPL-MCNC: 0.7 MG/DL (ref 0.3–1.2)
BUN SERPL-MCNC: 34 MG/DL (ref 8–20)
BUN/CREAT SERPL: 29.8 (ref 10–20)
CALCIUM SERPL-MCNC: 10.2 MG/DL (ref 8.5–10.5)
CHLORIDE SERPL-SCNC: 108 MMOL/L (ref 95–110)
CHOLEST SERPL-MCNC: 168 MG/DL (ref 110–200)
CO2 SERPL-SCNC: 26 MMOL/L (ref 22–32)
CREAT SERPL-MCNC: 1.14 MG/DL (ref 0.5–1.5)
EOSINOPHIL # BLD: 0 K/UL (ref 0–0.7)
EOSINOPHIL NFR BLD: 1 %
ERYTHROCYTE [DISTWIDTH] IN BLOOD BY AUTOMATED COUNT: 14 % (ref 11–15)
GLOBULIN PLAS-MCNC: 3.2 G/DL (ref 2.5–3.7)
GLUCOSE SERPL-MCNC: 96 MG/DL (ref 70–99)
HCT VFR BLD AUTO: 35.1 % (ref 35–48)
HDLC SERPL-MCNC: 73 MG/DL
HGB BLD-MCNC: 11.9 G/DL (ref 12–16)
LDLC SERPL CALC-MCNC: 85 MG/DL (ref 0–99)
LYMPHOCYTES # BLD: 0.8 K/UL (ref 1–4)
LYMPHOCYTES NFR BLD: 16 %
MCH RBC QN AUTO: 32.8 PG (ref 27–32)
MCHC RBC AUTO-ENTMCNC: 33.9 G/DL (ref 32–37)
MCV RBC AUTO: 96.7 FL (ref 80–100)
MONOCYTES # BLD: 0.3 K/UL (ref 0–1)
MONOCYTES NFR BLD: 6 %
NEUTROPHILS # BLD AUTO: 3.7 K/UL (ref 1.8–7.7)
NEUTROPHILS NFR BLD: 77 %
NONHDLC SERPL-MCNC: 95 MG/DL
OSMOLALITY UR CALC.SUM OF ELEC: 303 MOSM/KG (ref 275–295)
PATIENT FASTING: YES
PHOSPHATE SERPL-MCNC: 3.4 MG/DL (ref 2.4–4.7)
PLATELET # BLD AUTO: 191 K/UL (ref 140–400)
PMV BLD AUTO: 9.1 FL (ref 7.4–10.3)
POTASSIUM SERPL-SCNC: 4.8 MMOL/L (ref 3.3–5.1)
PROT SERPL-MCNC: 7.1 G/DL (ref 5.9–8.4)
RBC # BLD AUTO: 3.63 M/UL (ref 3.7–5.4)
SODIUM SERPL-SCNC: 143 MMOL/L (ref 136–144)
TRIGL SERPL-MCNC: 52 MG/DL (ref 1–149)
WBC # BLD AUTO: 4.9 K/UL (ref 4–11)

## 2018-02-28 PROCEDURE — 80061 LIPID PANEL: CPT

## 2018-02-28 PROCEDURE — 80053 COMPREHEN METABOLIC PANEL: CPT

## 2018-02-28 PROCEDURE — 36415 COLL VENOUS BLD VENIPUNCTURE: CPT

## 2018-02-28 PROCEDURE — 82306 VITAMIN D 25 HYDROXY: CPT

## 2018-02-28 PROCEDURE — 84100 ASSAY OF PHOSPHORUS: CPT

## 2018-02-28 PROCEDURE — 85025 COMPLETE CBC W/AUTO DIFF WBC: CPT

## 2018-03-02 ENCOUNTER — OFFICE VISIT (OUTPATIENT)
Dept: FAMILY MEDICINE CLINIC | Facility: CLINIC | Age: 73
End: 2018-03-02

## 2018-03-02 VITALS
HEIGHT: 66 IN | BODY MASS INDEX: 31.18 KG/M2 | HEART RATE: 87 BPM | DIASTOLIC BLOOD PRESSURE: 76 MMHG | WEIGHT: 194 LBS | SYSTOLIC BLOOD PRESSURE: 146 MMHG

## 2018-03-02 DIAGNOSIS — I48.20 CHRONIC ATRIAL FIBRILLATION (HCC): Primary | ICD-10-CM

## 2018-03-02 DIAGNOSIS — K44.9 HIATAL HERNIA WITH GERD AND ESOPHAGITIS: ICD-10-CM

## 2018-03-02 DIAGNOSIS — R19.7 DIARRHEA, UNSPECIFIED TYPE: ICD-10-CM

## 2018-03-02 DIAGNOSIS — I73.00 RAYNAUD'S DISEASE WITHOUT GANGRENE: ICD-10-CM

## 2018-03-02 DIAGNOSIS — K21.00 HIATAL HERNIA WITH GERD AND ESOPHAGITIS: ICD-10-CM

## 2018-03-02 DIAGNOSIS — E55.9 VITAMIN D DEFICIENCY: ICD-10-CM

## 2018-03-02 DIAGNOSIS — I10 HYPERTENSION, BENIGN: ICD-10-CM

## 2018-03-02 DIAGNOSIS — M34.1 CREST SYNDROME (HCC): ICD-10-CM

## 2018-03-02 DIAGNOSIS — Z23 NEED FOR VACCINATION: ICD-10-CM

## 2018-03-02 LAB — 25(OH)D3 SERPL-MCNC: 45.2 NG/ML

## 2018-03-02 PROCEDURE — 99215 OFFICE O/P EST HI 40 MIN: CPT | Performed by: FAMILY MEDICINE

## 2018-03-02 PROCEDURE — G0463 HOSPITAL OUTPT CLINIC VISIT: HCPCS | Performed by: FAMILY MEDICINE

## 2018-03-02 PROCEDURE — G0009 ADMIN PNEUMOCOCCAL VACCINE: HCPCS | Performed by: FAMILY MEDICINE

## 2018-03-02 PROCEDURE — 90670 PCV13 VACCINE IM: CPT | Performed by: FAMILY MEDICINE

## 2018-03-02 RX ORDER — AMLODIPINE BESYLATE 10 MG/1
10 TABLET ORAL DAILY
Qty: 90 TABLET | Refills: 1 | Status: SHIPPED | OUTPATIENT
Start: 2018-03-02 | End: 2019-01-21

## 2018-03-02 RX ORDER — HYDROCHLOROTHIAZIDE 12.5 MG/1
12.5 TABLET ORAL DAILY
Qty: 90 TABLET | Refills: 3 | Status: SHIPPED | OUTPATIENT
Start: 2018-03-02 | End: 2019-01-24

## 2018-03-02 RX ORDER — DIPHENOXYLATE HYDROCHLORIDE AND ATROPINE SULFATE 2.5; .025 MG/1; MG/1
1 TABLET ORAL 4 TIMES DAILY PRN
Qty: 20 TABLET | Refills: 1 | Status: SHIPPED | OUTPATIENT
Start: 2018-03-02 | End: 2019-02-14

## 2018-03-02 NOTE — PROGRESS NOTES
Diarrhea x 2 weeks  \"My stomach sucks. \"  No sob  Weight continues to loose. No fever  No blood in stool  Anemia improved.     Renal failure improving.    htn still there  bblockers no more than 25 mb metoprolol bid        Patient's past medical surgical STIM HORMONE; Future  - VITAMIN D, 25-HYDROXY; Future    3. Hiatal hernia with GERD and esophagitis  Stable. - LIPID PANEL; Future  - COMP METABOLIC PANEL (14); Future  - CBC WITH DIFFERENTIAL WITH PLATELET;  Future  - ASSAY, THYROID STIM HORMONE; Future

## 2018-03-16 ENCOUNTER — OFFICE VISIT (OUTPATIENT)
Dept: RHEUMATOLOGY | Facility: CLINIC | Age: 73
End: 2018-03-16

## 2018-03-16 ENCOUNTER — TELEPHONE (OUTPATIENT)
Dept: RHEUMATOLOGY | Facility: CLINIC | Age: 73
End: 2018-03-16

## 2018-03-16 VITALS
BODY MASS INDEX: 31.18 KG/M2 | SYSTOLIC BLOOD PRESSURE: 171 MMHG | DIASTOLIC BLOOD PRESSURE: 81 MMHG | HEART RATE: 79 BPM | HEIGHT: 66 IN | WEIGHT: 194 LBS

## 2018-03-16 DIAGNOSIS — I27.20 PULMONARY HYPERTENSION (HCC): ICD-10-CM

## 2018-03-16 DIAGNOSIS — M34.1 CREST SYNDROME (HCC): Primary | ICD-10-CM

## 2018-03-16 DIAGNOSIS — I73.01 RAYNAUD'S DISEASE WITH GANGRENE (HCC): ICD-10-CM

## 2018-03-16 PROCEDURE — G0463 HOSPITAL OUTPT CLINIC VISIT: HCPCS | Performed by: INTERNAL MEDICINE

## 2018-03-16 PROCEDURE — 99214 OFFICE O/P EST MOD 30 MIN: CPT | Performed by: INTERNAL MEDICINE

## 2018-03-16 RX ORDER — FLUOXETINE HYDROCHLORIDE 20 MG/1
20 CAPSULE ORAL DAILY
Qty: 30 CAPSULE | Refills: 1 | Status: SHIPPED | OUTPATIENT
Start: 2018-03-16 | End: 2018-06-18

## 2018-03-16 NOTE — PATIENT INSTRUCTIONS
1. I amlodipine 10mg a day   2.  sildenifile 20mg three times  Day   3. Return to clinic in 1-2  month -  4. Start fluoxetine 20mg a day   5.  Will talk to dr. Rachel Bright about topical nitroglycerin for fingers or losartan

## 2018-03-16 NOTE — PROGRESS NOTES
Iker Khan is a 74year old female who presents for Patient presents with:  Raynaud's Syndrome  . HPI:     I had the pleasure of seeing Iker Khan on 7/19/2017 for evaluation. She sees Dr. Emily Hartley and Dr. Shraddha Coto.  She recnetly had a posistive AN And esophageal outflow obstruction - . She thkns she had a motility study in [place] but not able to lay down. She also has diarrhea. She has trouble with Raynaud's. Her fingers turn blue, and purple. aobut 6 months she has noticed that.    She can Gerard Nieves wants her weight down. She gets violent diarrhea where she can't go out of the hourse. bryce has stomahc issues. - esophageal dysmoliity. She hsa ups and downs with what she can eat. No recent echo -   Able to climb a few stairs without sob.    He • Tubular adenoma of colon 1/2017    repeat c-scope 1/2020   • Visual impairment     glasses      Past Surgical History:  No date: BSO, OMENTECTOMY W/BRANDO  No date: CARPAL TUNNEL RELEASE Bilateral  9/28/16 : CHOLECYSTECTOMY  1/25/2017: COLONOSCOPY N/A sun exposure. No skin tightness -   Has itching a lot. Gets little bumps on hands. - telengectasa on legs and palms occl.    HEENT: No dry eyes, no dry mouth, Raynaud's, no nasal ulcers, no parotid swelling, no neck pain, no jaw pain, no temple pain  Eyes infeciton   Fingers cool to touch  EXTREMITIES: has cyanosis,   No clubbing or edema    Component      Latest Ref Rng & Units 10/15/2017 10/14/2017 10/13/2017   Glucose      70 - 99 mg/dL   110 (H)   Sodium      136 - 144 mmol/L   140   Potassium      3.3 IgG      0 - 40 AU/mL  0   Anti-Ocampo/RNP Antibody      Negative  Negative   Anti Double Strand DNA      <10  <10   APTT      23.2 - 35.3 seconds 30.3    MRSA SCREEN BY PCR      Negative Negative    ANTIBODY SCREEN       Negative      Component      Latest , <40    RAND URINE BENCE HAILE PROTEIN          SED RATE      0 - 30 mm/Hr    RHEUMATOID FACTOR      <11 IU/mL    COMPLEMENT C3      88 - 201 mg/dL    COMPLEMENT C4      18 - 55 mg/dL    C-REACTIVE PROTEIN      0.0 - 0.9 mg/dL    JESSICA SCREEN      Negative HDL Cholesterol      mg/dL 73   CHOLESTEROL, TOTAL      110 - 200 mg/dL 168   Triglycerides      1 - 149 mg/dL 52   NON HDL CHOL      <130 mg/dL 95   LDL Cholesterol Calc      0 - 99 mg/dL 85   Vitamin D, 25OH, Total      ng/mL 45.2   PHOSPHORUS      2.4 the day starting in 1/2016 and multiple complications - has had [facility] 2nd opinion in 5/2017 -   - overall she's off PPI - shes' on tums and gasx -    - her rayauds' is really bad with digial ulceration forming in right 3rd finger  ,, ulceration i

## 2018-03-17 ENCOUNTER — TELEPHONE (OUTPATIENT)
Dept: FAMILY MEDICINE CLINIC | Facility: CLINIC | Age: 73
End: 2018-03-17

## 2018-03-17 NOTE — TELEPHONE ENCOUNTER
Spoke with patient and states she saw Dr. Anupam Osborn yesterday-bp was high despite taking Amlodipine 3 tabs.        After Visit Summary (Printed 3/16/2018)   Additional Documentation     Vitals:    BP  171/81 (BP Location: Right arm, Patient Position: Sitt

## 2018-03-17 NOTE — TELEPHONE ENCOUNTER
Pt calling because her prescription was filled incorrectly. Pt states that Dr. Nasim Garza ordered medication (amlodipine) and the script states that she is to take one per day. Pt states that her script Dr. Nick Hernandez states 3 per day.  Pt states that

## 2018-03-19 RX ORDER — SILDENAFIL CITRATE 20 MG/1
20 TABLET ORAL 3 TIMES DAILY
Qty: 90 TABLET | Refills: 1 | Status: SHIPPED | OUTPATIENT
Start: 2018-03-19 | End: 2018-06-16

## 2018-03-19 NOTE — TELEPHONE ENCOUNTER
LOV:3-16  Last Filled:12-20, #90 with 1 refill  Labs:   Future Appointments  Date Time Provider Zain Lyons   4/16/2018 8:40 AM Carlos Saldana MD [facility]       Please Advise

## 2018-04-16 ENCOUNTER — OFFICE VISIT (OUTPATIENT)
Dept: RHEUMATOLOGY | Facility: CLINIC | Age: 73
End: 2018-04-16

## 2018-04-16 VITALS
WEIGHT: 196 LBS | DIASTOLIC BLOOD PRESSURE: 73 MMHG | SYSTOLIC BLOOD PRESSURE: 125 MMHG | HEIGHT: 66 IN | HEART RATE: 80 BPM | BODY MASS INDEX: 31.5 KG/M2

## 2018-04-16 DIAGNOSIS — I27.20 PULMONARY HYPERTENSION (HCC): ICD-10-CM

## 2018-04-16 DIAGNOSIS — I73.01 RAYNAUD'S DISEASE WITH GANGRENE (HCC): Primary | ICD-10-CM

## 2018-04-16 DIAGNOSIS — M34.1 CREST SYNDROME (HCC): ICD-10-CM

## 2018-04-16 PROCEDURE — 99214 OFFICE O/P EST MOD 30 MIN: CPT | Performed by: INTERNAL MEDICINE

## 2018-04-16 PROCEDURE — G0463 HOSPITAL OUTPT CLINIC VISIT: HCPCS | Performed by: INTERNAL MEDICINE

## 2018-04-16 RX ORDER — METOPROLOL SUCCINATE 100 MG/1
TABLET, EXTENDED RELEASE ORAL
Refills: 0 | COMMUNITY
Start: 2018-04-13 | End: 2018-08-10

## 2018-04-16 NOTE — PATIENT INSTRUCTIONS
1. Cont.  amlodipine to 10mg a day   2. Cont. sildenifile 20mg three times  Day   3. Return to clinic in 3-4  Months   4. Cont. fluoxetine 20mg a day   5. appt.  With cards in - please let us know any contraindications to use   topicl nitrogylcerain ointme

## 2018-04-16 NOTE — PROGRESS NOTES
Alfonso Gomez is a 74year old female who presents for Patient presents with:  Raynaud's Syndrome  . HPI:     I had the pleasure of seeing Alfonso Gomez on 7/19/2017 for evaluation. She sees Dr. Catarina Morrissey and Dr. Reinaldo Preciado.  She recnetly had a posistive AN And esophageal outflow obstruction - . She thkns she had a motility study in [place] but not able to lay down. She also has diarrhea. She has trouble with Raynaud's. Her fingers turn blue, and purple. aobut 6 months she has noticed that.    She can Katrina Lv wants her weight down. She gets violent diarrhea where she can't go out of the hourse. bryce has stomahc issues. - esophageal dysmoliity. She hsa ups and downs with what she can eat. No recent echo -   Able to climb a few stairs without sob.    He mouth daily. Disp: 90 tablet Rfl: 3   ELIQUIS 5 MG Oral Tab TK 1 T PO  BID Disp:  Rfl: 2   atorvastatin 10 MG Oral Tab Take 1 tablet (10 mg total) by mouth nightly. Disp: 90 tablet Rfl: 3   multivitamin Oral Tab Take 1 tablet by mouth daily.  Disp:  Rfl: Jimmie Client Father 80     old age,unknown caused   • Cancer Father      Skin cancer   • Heart Attack Mother    • Heart Disorder Mother    • Hypertension Other      Family H/O   • Cancer Other      Lymphoma  Family H/O   • Heart Disease Other      Family H/O hands, no joint pains. Gets leg swelling with humidity. Mostly the right,   All other ROS are negative.      EXAM:   /73 (BP Location: Right arm, Patient Position: Sitting, Cuff Size: adult)   Pulse 80   Ht 5' 6\" (1.676 m)   Wt 196 lb (88.9 kg)   B pg 32.8 (H) 32.5 (H) 32.7 (H)   MCHC      32.0 - 37.0 g/dl 33.5 32.9 33.1   RDW      11.0 - 15.0 % 14.2 14.7 14.5   Platelet Count      876 - 400 K/ 169 171   MEAN PLATELET VOLUME      7.4 - 10.3 fL 9.2 9.1 9.2     Component      Latest Ref Rng & Uni PH, URINE      5.0 - 8.0    PROTEIN (URINE DIPSTICK)      Negative mg/dL    GLUCOSE (URINE DIPSTICK)      Negative mg/dL    KETONES (URINE DIPSTICK)      Negative mg/dL    BILIRUBIN      Negative    OCCULT BLOOD      Negative    NITRITE, URINE      Nega 1. 2   ANION GAP      0 - 18 mmol/L 9   BUN/CREA RATIO      10.0 - 20.0 29.8 (H)   CALCULATED OSMOLALITY      275 - 295 mOsm/kg 303 (H)   GFR, Non-      >=60 47 (L)   GFR, -American      >=60 54 (L)   Patient Fasting?        Yes   WBC junction status post fundoplication which appears somewhat longer and more narrower than December 8, 2016.  2. Mild to moderate distention of the proximal esophagus with limited peristaltic activity.   3. Delayed in gastric emptying with extensive food and had them b/c of cortisone shots -   6. righ tknee dvt - on eliquis -   7. Was on fluoxetine in the past for depression - restart for her raynau'ds  8. afib - on eliqus   9.  GERD - she will follow up with dr. Desmond Brown to restart H2 blocer - or PPI -   -     Ibarra

## 2018-04-16 NOTE — PROCEDURES
[facility]  Procedure Note    Balbina Barrientos Patient Status:  Inpatient    1945 MRN [id number]   Location [facility] Attending Zain Echavarria Day # 8 PCP Delisa Khan.  DO Jemal     Proced
[facility]  Procedure Note    Ajay Rosario Patient Status:  Inpatient    1945 MRN [id number]   Location [facility] Attending Zora Monreal, [address] [facility] Day # 10 PCP Anastasia Joaquin DO     Procedure:
No

## 2018-05-14 RX ORDER — METOPROLOL SUCCINATE 100 MG/1
TABLET, EXTENDED RELEASE ORAL
Qty: 180 TABLET | Refills: 11 | Status: SHIPPED | OUTPATIENT
Start: 2018-05-14 | End: 2018-08-10

## 2018-05-14 NOTE — TELEPHONE ENCOUNTER
please advise as does not meet RN protocol for refill criteria    Only listed as historical.  Hypertensive Medications  Protocol Criteria:  · Appointment scheduled in the past 6 months or in the next 3 months  · BMP or CMP in the past 12 months  · Creatin

## 2018-06-16 RX ORDER — SILDENAFIL CITRATE 20 MG/1
20 TABLET ORAL 3 TIMES DAILY
Qty: 90 TABLET | Refills: 5 | Status: SHIPPED | OUTPATIENT
Start: 2018-06-16 | End: 2020-03-09

## 2018-06-16 NOTE — TELEPHONE ENCOUNTER
LOV:4-16  Last Filled:3-19, #90 with 1 refill  Labs:   Future Appointments  Date Time Provider Zain Lyons   9/17/2018 8:40 AM Ju Ulloa MD 2014 [facility]       Please Advise

## 2018-06-18 RX ORDER — FLUOXETINE HYDROCHLORIDE 20 MG/1
20 CAPSULE ORAL DAILY
Qty: 30 CAPSULE | Refills: 3 | Status: SHIPPED | OUTPATIENT
Start: 2018-06-18 | End: 2019-01-21

## 2018-06-18 NOTE — TELEPHONE ENCOUNTER
LOV:4-16  Last Filled:3-16, #30 with 1 refill  Labs:   Future Appointments  Date Time Provider Zain Lyons   9/17/2018 8:40 AM Segundo Grady MD 2014 [facility]       Please Advise

## 2018-07-20 ENCOUNTER — HOSPITAL ENCOUNTER (EMERGENCY)
Facility: HOSPITAL | Age: 73
Discharge: HOME | End: 2018-07-20
Attending: EMERGENCY MEDICINE
Payer: MEDICARE

## 2018-07-20 ENCOUNTER — APPOINTMENT (OUTPATIENT)
Dept: GENERAL RADIOLOGY | Facility: HOSPITAL | Age: 73
End: 2018-07-20
Attending: EMERGENCY MEDICINE
Payer: MEDICARE

## 2018-07-20 VITALS
BODY MASS INDEX: 32.14 KG/M2 | HEIGHT: 66 IN | OXYGEN SATURATION: 100 % | WEIGHT: 200 LBS | RESPIRATION RATE: 19 BRPM | HEART RATE: 60 BPM | DIASTOLIC BLOOD PRESSURE: 69 MMHG | SYSTOLIC BLOOD PRESSURE: 178 MMHG | TEMPERATURE: 98 F

## 2018-07-20 DIAGNOSIS — I48.91 ATRIAL FIBRILLATION WITH CONTROLLED VENTRICULAR RESPONSE (HCC): ICD-10-CM

## 2018-07-20 DIAGNOSIS — R00.2 PALPITATIONS: Primary | ICD-10-CM

## 2018-07-20 LAB
ANION GAP SERPL CALC-SCNC: 6 MMOL/L (ref 0–18)
BACTERIA UR QL AUTO: NEGATIVE /HPF
BASOPHILS # BLD: 0.1 K/UL (ref 0–0.2)
BASOPHILS NFR BLD: 1 %
BILIRUB UR QL: NEGATIVE
BUN SERPL-MCNC: 37 MG/DL (ref 8–20)
BUN/CREAT SERPL: 28.7 (ref 10–20)
CALCIUM SERPL-MCNC: 9.5 MG/DL (ref 8.5–10.5)
CHLORIDE SERPL-SCNC: 107 MMOL/L (ref 95–110)
CLARITY UR: CLEAR
CO2 SERPL-SCNC: 27 MMOL/L (ref 22–32)
COLOR UR: YELLOW
CREAT SERPL-MCNC: 1.29 MG/DL (ref 0.5–1.5)
EOSINOPHIL # BLD: 0.1 K/UL (ref 0–0.7)
EOSINOPHIL NFR BLD: 3 %
ERYTHROCYTE [DISTWIDTH] IN BLOOD BY AUTOMATED COUNT: 13.9 % (ref 11–15)
GLUCOSE SERPL-MCNC: 119 MG/DL (ref 70–99)
GLUCOSE UR-MCNC: NEGATIVE MG/DL
HCT VFR BLD AUTO: 38.6 % (ref 35–48)
HGB BLD-MCNC: 12.5 G/DL (ref 12–16)
HGB UR QL STRIP.AUTO: NEGATIVE
KETONES UR-MCNC: NEGATIVE MG/DL
LYMPHOCYTES # BLD: 1.1 K/UL (ref 1–4)
LYMPHOCYTES NFR BLD: 25 %
MAGNESIUM SERPL-MCNC: 1.9 MG/DL (ref 1.8–2.5)
MCH RBC QN AUTO: 32.9 PG (ref 27–32)
MCHC RBC AUTO-ENTMCNC: 32.5 G/DL (ref 32–37)
MCV RBC AUTO: 101.1 FL (ref 80–100)
MONOCYTES # BLD: 0.4 K/UL (ref 0–1)
MONOCYTES NFR BLD: 8 %
NEUTROPHILS # BLD AUTO: 2.9 K/UL (ref 1.8–7.7)
NEUTROPHILS NFR BLD: 63 %
NITRITE UR QL STRIP.AUTO: NEGATIVE
OSMOLALITY UR CALC.SUM OF ELEC: 300 MOSM/KG (ref 275–295)
PH UR: 6 [PH] (ref 5–8)
PLATELET # BLD AUTO: 182 K/UL (ref 140–400)
PMV BLD AUTO: 8.1 FL (ref 7.4–10.3)
POTASSIUM SERPL-SCNC: 4 MMOL/L (ref 3.3–5.1)
PROT UR-MCNC: NEGATIVE MG/DL
RBC # BLD AUTO: 3.81 M/UL (ref 3.7–5.4)
RBC #/AREA URNS AUTO: 1 /HPF
SODIUM SERPL-SCNC: 140 MMOL/L (ref 136–144)
SP GR UR STRIP: 1.01 (ref 1–1.03)
TROPONIN I SERPL-MCNC: 0 NG/ML (ref ?–0.03)
TROPONIN I SERPL-MCNC: 0.01 NG/ML (ref ?–0.03)
TSH SERPL-ACNC: 1.3 UIU/ML (ref 0.45–5.33)
UROBILINOGEN UR STRIP-ACNC: <2
VIT C UR-MCNC: NEGATIVE MG/DL
WBC # BLD AUTO: 4.6 K/UL (ref 4–11)
WBC #/AREA URNS AUTO: 1 /HPF

## 2018-07-20 PROCEDURE — 83735 ASSAY OF MAGNESIUM: CPT | Performed by: EMERGENCY MEDICINE

## 2018-07-20 PROCEDURE — 93005 ELECTROCARDIOGRAM TRACING: CPT

## 2018-07-20 PROCEDURE — 85025 COMPLETE CBC W/AUTO DIFF WBC: CPT | Performed by: EMERGENCY MEDICINE

## 2018-07-20 PROCEDURE — 81001 URINALYSIS AUTO W/SCOPE: CPT | Performed by: EMERGENCY MEDICINE

## 2018-07-20 PROCEDURE — 93010 ELECTROCARDIOGRAM REPORT: CPT | Performed by: EMERGENCY MEDICINE

## 2018-07-20 PROCEDURE — 96361 HYDRATE IV INFUSION ADD-ON: CPT

## 2018-07-20 PROCEDURE — 80048 BASIC METABOLIC PNL TOTAL CA: CPT

## 2018-07-20 PROCEDURE — 84484 ASSAY OF TROPONIN QUANT: CPT

## 2018-07-20 PROCEDURE — 99285 EMERGENCY DEPT VISIT HI MDM: CPT

## 2018-07-20 PROCEDURE — 80048 BASIC METABOLIC PNL TOTAL CA: CPT | Performed by: EMERGENCY MEDICINE

## 2018-07-20 PROCEDURE — 84484 ASSAY OF TROPONIN QUANT: CPT | Performed by: EMERGENCY MEDICINE

## 2018-07-20 PROCEDURE — 84443 ASSAY THYROID STIM HORMONE: CPT | Performed by: EMERGENCY MEDICINE

## 2018-07-20 PROCEDURE — 71045 X-RAY EXAM CHEST 1 VIEW: CPT | Performed by: EMERGENCY MEDICINE

## 2018-07-20 PROCEDURE — 85025 COMPLETE CBC W/AUTO DIFF WBC: CPT

## 2018-07-20 PROCEDURE — 96360 HYDRATION IV INFUSION INIT: CPT

## 2018-07-20 NOTE — CONSULTS
[facility]    Report of Consultation    Iker Khan Patient Status:  Emergency    1945 MRN [id number]   Location [address] Attending [address] # 0 PCP Yelena Joaquin DO Injections and narcotics, POD Bartuci  No date: REMOVAL OF HEEL SPUR    Family History  Family History   Problem Relation Age of Onset   • Other[other] [OTHER] Father 80     old age,unknown caused   • Cancer Father      Skin cancer   • Heart Attack Mother WBC 4.6 07/20/2018   HGB 12.5 07/20/2018   HCT 38.6 07/20/2018    07/20/2018   CREATSERUM 1.29 07/20/2018   BUN 37 (H) 07/20/2018    07/20/2018   K 4.0 07/20/2018    07/20/2018   CO2 27 07/20/2018    (H) 07/20/2018   CA 9.5 07/2

## 2018-07-20 NOTE — ED PROVIDER NOTES
Patient Seen in: [facility] AND [facility] Emergency Department    History   Patient presents with:  Arrythmia/Palpitations (cardiovascular)    Stated Complaint: hx afib; \"feels shaky\" SOB    HPI    77-year-old female presents for complaint of palpitations and date: OTHER SURGICAL HISTORY      Comment: Injections and narcotics, POD Bartuci  No date: REMOVAL OF HEEL SPUR        Smoking status: Former Smoker                                                              Packs/day: 0.00      Years: 0.00      Smokeles and oriented to person, place, and time. No cranial nerve deficit. Coordination normal.   Skin: Skin is warm and dry. Psychiatric: She has a normal mood and affect. Her behavior is normal.   Nursing note and vitals reviewed.             ED Course     Labs flutter with no STEMI. She is rate controlled. TSH is normal.  She has elevated BUN and creatinine as well as her osmolality, I suspect that she is probably dehydrated. She is given IV fluids.   She is seen by cardiology who states that as long as her re precautions were given. Patient voices understanding and agreement with the treatment plan. All questions were addressed and answered.                   Disposition and Plan     Clinical Impression:  Palpitations  (primary encounter diagnosis)  Atrial fibri

## 2018-08-10 ENCOUNTER — OFFICE VISIT (OUTPATIENT)
Dept: FAMILY MEDICINE CLINIC | Facility: CLINIC | Age: 73
End: 2018-08-10
Payer: MEDICARE

## 2018-08-10 VITALS
TEMPERATURE: 98 F | HEART RATE: 102 BPM | SYSTOLIC BLOOD PRESSURE: 129 MMHG | WEIGHT: 201 LBS | DIASTOLIC BLOOD PRESSURE: 83 MMHG | BODY MASS INDEX: 32 KG/M2

## 2018-08-10 DIAGNOSIS — I10 HYPERTENSION, BENIGN: ICD-10-CM

## 2018-08-10 DIAGNOSIS — N18.30 CKD (CHRONIC KIDNEY DISEASE) STAGE 3, GFR 30-59 ML/MIN (HCC): ICD-10-CM

## 2018-08-10 DIAGNOSIS — L57.0 ACTINIC KERATOSIS: ICD-10-CM

## 2018-08-10 DIAGNOSIS — I48.20 CHRONIC ATRIAL FIBRILLATION (HCC): Primary | ICD-10-CM

## 2018-08-10 DIAGNOSIS — M34.1 CREST SYNDROME (HCC): ICD-10-CM

## 2018-08-10 DIAGNOSIS — E55.9 VITAMIN D DEFICIENCY: ICD-10-CM

## 2018-08-10 DIAGNOSIS — K25.9 MULTIPLE GASTRIC ULCERS: ICD-10-CM

## 2018-08-10 PROCEDURE — 99215 OFFICE O/P EST HI 40 MIN: CPT | Performed by: FAMILY MEDICINE

## 2018-08-10 PROCEDURE — G0463 HOSPITAL OUTPT CLINIC VISIT: HCPCS | Performed by: FAMILY MEDICINE

## 2018-08-10 RX ORDER — METOPROLOL TARTRATE 50 MG/1
50 TABLET, FILM COATED ORAL 2 TIMES DAILY
Qty: 180 TABLET | Refills: 3 | Status: SHIPPED | OUTPATIENT
Start: 2018-08-10 | End: 2021-05-04 | Stop reason: ALTCHOICE

## 2018-08-10 NOTE — PROGRESS NOTES
Pt with more stress due to granddaughter moving in with great grandchild  3 dogs are fighting    Had palpaitonas  Resolved  Er w/u reviewed  Labs showed kidney decreased. ekg was afib. \"I can eat better. So now I have to deal with overeating. \"  H A. fib is stable continue on metoprolol 50s 1 twice a day    (I10) Hypertension, benign  Plan: Metoprolol Tartrate 50 MG Oral Tab        Hypertension is stable    (N18.3) CKD (chronic kidney disease) stage 3, GFR 30-59 ml/min ([facility])  Plan: NEPHROLOGY - INT

## 2018-09-12 ENCOUNTER — OFFICE VISIT (OUTPATIENT)
Dept: INTERNAL MEDICINE CLINIC | Facility: CLINIC | Age: 73
End: 2018-09-12
Payer: MEDICARE

## 2018-09-12 VITALS
HEART RATE: 80 BPM | SYSTOLIC BLOOD PRESSURE: 145 MMHG | RESPIRATION RATE: 17 BRPM | TEMPERATURE: 99 F | HEIGHT: 66 IN | DIASTOLIC BLOOD PRESSURE: 80 MMHG | BODY MASS INDEX: 32.3 KG/M2 | WEIGHT: 201 LBS

## 2018-09-12 DIAGNOSIS — J02.0 PHARYNGITIS DUE TO STREPTOCOCCUS SPECIES: Primary | ICD-10-CM

## 2018-09-12 DIAGNOSIS — I10 HYPERTENSION, BENIGN: ICD-10-CM

## 2018-09-12 PROCEDURE — 99213 OFFICE O/P EST LOW 20 MIN: CPT | Performed by: INTERNAL MEDICINE

## 2018-09-12 PROCEDURE — G0463 HOSPITAL OUTPT CLINIC VISIT: HCPCS | Performed by: INTERNAL MEDICINE

## 2018-09-12 RX ORDER — BENZONATATE 100 MG/1
100 CAPSULE ORAL 2 TIMES DAILY PRN
Qty: 10 CAPSULE | Refills: 0 | Status: SHIPPED | OUTPATIENT
Start: 2018-09-12 | End: 2019-01-21

## 2018-09-12 RX ORDER — AMOXICILLIN 875 MG/1
875 TABLET, COATED ORAL 2 TIMES DAILY
Qty: 14 TABLET | Refills: 0 | Status: SHIPPED | OUTPATIENT
Start: 2018-09-12 | End: 2018-09-19

## 2018-09-12 NOTE — PROGRESS NOTES
Iker Khan is a 74year old female.   Patient presents with:  Sore Throat      HPI:   Pt comes as an urgent visit   C/c sore throat   C/o sore throat x3 days , real bad last night --, dysphagia  But has odynophagia and lost her voice   Ros as below     Pr Bilateral carpal tunnel syndrome    • Cataract    • Cellulitis    • Deep vein thrombosis (HCC)    • Esophageal reflux    • Heel spur     Right   • Hiatal hernia    • High blood pressure    • High cholesterol    • HTN (hypertension)    • Lumbar disc disease but no frontal sinus tenderness  NECK: supple,mild submandibular adenopathy, nontender  LUNGS: clear to auscultation except for some fine crackles at the bases, good air entry and no no wheeze  CARDIO: RRR irregular       ASSESSMENT AND PLAN:   Diagnoses a

## 2018-09-17 ENCOUNTER — OFFICE VISIT (OUTPATIENT)
Dept: RHEUMATOLOGY | Facility: CLINIC | Age: 73
End: 2018-09-17
Payer: MEDICARE

## 2018-09-17 VITALS
HEIGHT: 66 IN | WEIGHT: 202 LBS | SYSTOLIC BLOOD PRESSURE: 143 MMHG | BODY MASS INDEX: 32.47 KG/M2 | DIASTOLIC BLOOD PRESSURE: 83 MMHG | HEART RATE: 73 BPM

## 2018-09-17 DIAGNOSIS — I27.20 PULMONARY HYPERTENSION (HCC): ICD-10-CM

## 2018-09-17 DIAGNOSIS — M34.1 CREST SYNDROME (HCC): Primary | ICD-10-CM

## 2018-09-17 DIAGNOSIS — I73.01 RAYNAUD'S DISEASE WITH GANGRENE (HCC): ICD-10-CM

## 2018-09-17 DIAGNOSIS — Z51.81 THERAPEUTIC DRUG MONITORING: ICD-10-CM

## 2018-09-17 PROCEDURE — G0463 HOSPITAL OUTPT CLINIC VISIT: HCPCS | Performed by: INTERNAL MEDICINE

## 2018-09-17 PROCEDURE — 99214 OFFICE O/P EST MOD 30 MIN: CPT | Performed by: INTERNAL MEDICINE

## 2018-09-17 NOTE — PATIENT INSTRUCTIONS
1. Cont.  amlodipine 10mg a day   2. Cont. sildenifile 20mg three times  Day   3. Return to clinic in 4-6  Months   4. Cont. fluoxetine 20mg a day   5. appt.  With cards in - please let us know any contraindications to use   topicl nitrogylcerain ointment

## 2018-09-17 NOTE — PROGRESS NOTES
Zuleika Duffy is a 74year old female who presents for Patient presents with:  Raynaud's Syndrome  . HPI:     I had the pleasure of seeing Zuleika Duffy on 7/19/2017 for evaluation. She sees Dr. German Castillo and Dr. Tameka Heart.  She recnetly had a posistive AN And esophageal outflow obstruction - . She thkns she had a motility study in [place] but not able to lay down. She also has diarrhea. She has trouble with Raynaud's. Her fingers turn blue, and purple. aobut 6 months she has noticed that.    She can Rosalia Aguilar wants her weight down. She gets violent diarrhea where she can't go out of the hourse. seh has stomahc issues. - esophageal dysmoliity. She hsa ups and downs with what she can eat. No recent echo -   Able to climb a few stairs without sob.    He Citrate 20 MG Oral Tab Take 1 tablet (20 mg total) by mouth 3 (three) times daily. Disp: 90 tablet Rfl: 5   Omega-3 Fatty Acids (FISH OIL OR) Take by mouth. Disp:  Rfl:    AmLODIPine Besylate 10 MG Oral Tab Take 1 tablet (10 mg total) by mouth daily.  Disp: ENDOSCOPY  12/14/2016: EGD; N/A      Comment:  Procedure: ESOPHAGOGASTRODUODENOSCOPY (EGD);   Surgeon:                Janine Pike MD;  Location: [facility] ENDOSCOPY  09/26/2013: ELECTROCARDIOGRAM, COMPLETE      Comment:  Scanned to Media Tab  4/25/2017: Naomi Victor Has itching a lot. Gets little bumps on hands. - telengectasa on legs and palms occl.    HEENT: No dry eyes, no dry mouth, Raynaud's, no nasal ulcers, no parotid swelling, no neck pain, no jaw pain, no temple pain  Eyes: No visual changes,   CVS: No chest clubbing or edema    Component      Latest Ref Rng & Units 10/15/2017 10/14/2017 10/13/2017   Glucose      70 - 99 mg/dL   110 (H)   Sodium      136 - 144 mmol/L   140   Potassium      3.3 - 5.1 mmol/L   4.7   Chloride      95 - 110 mmol/L   106   Carbon D Negative   Anti Double Strand DNA      <10  <10   APTT      23.2 - 35.3 seconds 30.3    MRSA SCREEN BY PCR      Negative Negative    ANTIBODY SCREEN       Negative      Component      Latest Ref Rng & Units 6/23/2017             Glucose      70 - 99 mg/dL - 30 mm/Hr    RHEUMATOID FACTOR      <11 IU/mL    COMPLEMENT C3      88 - 201 mg/dL    COMPLEMENT C4      18 - 55 mg/dL    C-REACTIVE PROTEIN      0.0 - 0.9 mg/dL    JESSICA SCREEN      Negative    FERRITIN      11 - 307 ng/mL    TOTAL PROTEIN URINE RANDOM Chloride      95 - 110 mmol/L   107   Carbon Dioxide, Total      22 - 32 mmol/L   27   BUN      8 - 20 mg/dL   37 (H)   CREATININE      0.50 - 1.50 mg/dL   1.29   CALCIUM      8.5 - 10.5 mg/dL   9.5   BUN/CREA RATIO      10.0 - 20.0   28.7 (H)   ANION GA Also she has  Raynaud's and  Few telengecatasias  Hx of PEG tube in 4/2017 and removed in 8/2017.    Moderated pulmonary hypertension - consider follow up with her cardiolligst - sildenfil 20mg tid -   She is manageing her esophageal dysmotilty with eating to.       Belinda Blake MD  9/17/2018   9:01 AM  - Reviewed [place]- information  through Epic

## 2018-10-09 DIAGNOSIS — E78.5 HYPERLIPIDEMIA, UNSPECIFIED HYPERLIPIDEMIA TYPE: ICD-10-CM

## 2018-10-09 RX ORDER — ATORVASTATIN CALCIUM 10 MG/1
10 TABLET, FILM COATED ORAL NIGHTLY
Qty: 90 TABLET | Refills: 0 | Status: SHIPPED | OUTPATIENT
Start: 2018-10-09 | End: 2019-01-19

## 2018-10-09 NOTE — TELEPHONE ENCOUNTER
Cholesterol Medication Protocol [id number]/9 4:33 PM   ALT in past 12 months    LDL in past 12 months    Last ALT < 80    Last LDL < 130    Appointment in past 12 or next 3 months     .   Cholesterol Medications  Protocol Criteria:  · Appointment scheduled in

## 2018-12-19 ENCOUNTER — TELEPHONE (OUTPATIENT)
Dept: RHEUMATOLOGY | Facility: CLINIC | Age: 73
End: 2018-12-19

## 2018-12-19 NOTE — TELEPHONE ENCOUNTER
Fax received from [address] [place] [address],[address] (t: [phone]) for Sildenafil 20mg enrollment form. Form completed and placed on Dr. Nina Becerra desk for signature, will fax once signed (A:[phone]).

## 2019-01-19 DIAGNOSIS — E78.5 HYPERLIPIDEMIA, UNSPECIFIED HYPERLIPIDEMIA TYPE: ICD-10-CM

## 2019-01-19 RX ORDER — ATORVASTATIN CALCIUM 10 MG/1
TABLET, FILM COATED ORAL
Qty: 90 TABLET | Refills: 0 | Status: SHIPPED | OUTPATIENT
Start: 2019-01-19 | End: 2019-05-23

## 2019-01-21 ENCOUNTER — OFFICE VISIT (OUTPATIENT)
Dept: RHEUMATOLOGY | Facility: CLINIC | Age: 74
End: 2019-01-21
Payer: MEDICARE

## 2019-01-21 ENCOUNTER — TELEPHONE (OUTPATIENT)
Dept: RHEUMATOLOGY | Facility: CLINIC | Age: 74
End: 2019-01-21

## 2019-01-21 VITALS
WEIGHT: 218 LBS | HEART RATE: 79 BPM | BODY MASS INDEX: 35.03 KG/M2 | DIASTOLIC BLOOD PRESSURE: 79 MMHG | HEIGHT: 66 IN | SYSTOLIC BLOOD PRESSURE: 134 MMHG

## 2019-01-21 DIAGNOSIS — I27.20 PULMONARY HYPERTENSION (HCC): ICD-10-CM

## 2019-01-21 DIAGNOSIS — Z51.81 THERAPEUTIC DRUG MONITORING: ICD-10-CM

## 2019-01-21 DIAGNOSIS — M34.1 CREST SYNDROME (HCC): Primary | ICD-10-CM

## 2019-01-21 PROCEDURE — G0463 HOSPITAL OUTPT CLINIC VISIT: HCPCS | Performed by: INTERNAL MEDICINE

## 2019-01-21 PROCEDURE — 99214 OFFICE O/P EST MOD 30 MIN: CPT | Performed by: INTERNAL MEDICINE

## 2019-01-21 RX ORDER — AMLODIPINE BESYLATE 10 MG/1
10 TABLET ORAL DAILY
Qty: 90 TABLET | Refills: 1 | Status: SHIPPED | OUTPATIENT
Start: 2019-01-21 | End: 2019-02-14

## 2019-01-21 RX ORDER — APIXABAN 2.5 MG/1
TABLET, FILM COATED ORAL
Refills: 1 | Status: ON HOLD | COMMUNITY
Start: 2018-10-30 | End: 2019-02-03

## 2019-01-21 RX ORDER — ATORVASTATIN CALCIUM 40 MG/1
TABLET, FILM COATED ORAL
COMMUNITY
End: 2019-01-24 | Stop reason: DRUGHIGH

## 2019-01-21 RX ORDER — FLUOXETINE HYDROCHLORIDE 20 MG/1
20 CAPSULE ORAL 2 TIMES DAILY
Qty: 180 CAPSULE | Refills: 1 | Status: SHIPPED | OUTPATIENT
Start: 2019-01-21 | End: 2019-01-24

## 2019-01-21 NOTE — PROGRESS NOTES
Marcos Jacobs is a 74year old female who presents for Patient presents with:  Raynaud's Syndrome  . HPI:     I had the pleasure of seeing Marcos Jacobs on 7/19/2017 for evaluation. She sees Dr. Jessica Robin and Dr. Haley Purcell.  She recnetly had a posistive AN And esophageal outflow obstruction - . She thkns she had a motility study in [place] but not able to lay down. She also has diarrhea. She has trouble with Raynaud's. Her fingers turn blue, and purple. aobut 6 months she has noticed that.    She can Bigg Lin wants her weight down. She gets violent diarrhea where she can't go out of the hourse. bryce has stomahc issues. - esophageal dysmoliity. She hsa ups and downs with what she can eat. No recent echo -   Able to climb a few stairs without sob.    He Current Outpatient Medications:  ELIQUIS 2.5 MG Oral Tab TK 1 T PO BID Disp:  Rfl: 1   atorvastatin 40 MG Oral Tab atorvastatin 40 mg tablet Take 1 tablet every day by oral route at bedtime.  Disp:  Rfl:    Metoprolol Tartrate 50 MG Oral Tab Take 1 tabl OMENTECTOMY W/BRANDO     • CARPAL TUNNEL RELEASE Bilateral    • CHOLECYSTECTOMY  9/28/16    • COLONOSCOPY N/A 1/25/2017    Performed by Alexx Oconnor MD at [facility] ENDOSCOPY   • ELECTROCARDIOGRAM, COMPLETE  09/26/2013    Scanned to Media Tab   • ESOPHAGOGASTRO occl.   HEENT: No dry eyes, no dry mouth, Raynaud's, no nasal ulcers, no parotid swelling, no neck pain, no jaw pain, no temple pain  Eyes: No visual changes,   CVS: No chest pain, no heart disease  RS: No SOB, has dry  Cough, No Pleurtic pain,   GI: No na Glucose      70 - 99 mg/dL   110 (H)   Sodium      136 - 144 mmol/L   140   Potassium      3.3 - 5.1 mmol/L   4.7   Chloride      95 - 110 mmol/L   106   Carbon Dioxide, Total      22 - 32 mmol/L   27   BUN      8 - 20 mg/dL   21 (H)   CREATININE      0. SCREEN BY PCR      Negative Negative    ANTIBODY SCREEN       Negative      Component      Latest Ref Rng & Units 6/23/2017             Glucose      70 - 99 mg/dL 96   Sodium      136 - 144 mmol/L 140   Potassium      3.3 - 5.1 mmol/L 4.9   Chloride      9 COMPLEMENT C4      18 - 55 mg/dL    C-REACTIVE PROTEIN      0.0 - 0.9 mg/dL    JESSICA SCREEN      Negative    FERRITIN      11 - 307 ng/mL    TOTAL PROTEIN URINE RANDOM      mg/dL      Component      Latest Ref Rng & Units 7/20/2018 7/20/2018 7/20/2018 BUN      8 - 20 mg/dL   37 (H)   CREATININE      0.50 - 1.50 mg/dL   1.29   CALCIUM      8.5 - 10.5 mg/dL   9.5   BUN/CREA RATIO      10.0 - 20.0   28.7 (H)   ANION GAP      0 - 18 mmol/L   6   CALCULATED OSMOLALITY      275 - 295 mOsm/kg   300 (H)   GFR looking into leaving for [facility] in the winter. Has severe issues with Esphageal dysmotility. Also she has  Raynaud's and  Few telengecatasias  Hx of PEG tube in 4/2017 and removed in 8/2017.    Moderated pulmonary hypertension - consider follow up with eloisa three times  Day   3. Return to clinic in 4-6  Months   4. Cont. fluoxetine 20mg a day - increase to twice a day   5. appt.  With cards - please let us know any contraindications to use   topicl nitrogylcerain ointment or losaratan for Raynau'ds if we need

## 2019-01-21 NOTE — PATIENT INSTRUCTIONS
1. Cont.  amlodipine  10mg a day   2. Cont. sildenifile 20mg three times  Day   3. Return to clinic in 4-6  Months   4. Cont. fluoxetine 20mg a day - increase to twice a day   5. appt.  With cards - please let us know any contraindications to use   topicl

## 2019-01-24 ENCOUNTER — LAB ENCOUNTER (OUTPATIENT)
Dept: LAB | Age: 74
End: 2019-01-24
Attending: FAMILY MEDICINE
Payer: MEDICARE

## 2019-01-24 ENCOUNTER — OFFICE VISIT (OUTPATIENT)
Dept: FAMILY MEDICINE CLINIC | Facility: CLINIC | Age: 74
End: 2019-01-24
Payer: MEDICARE

## 2019-01-24 VITALS
HEIGHT: 72 IN | HEART RATE: 79 BPM | WEIGHT: 213 LBS | DIASTOLIC BLOOD PRESSURE: 73 MMHG | TEMPERATURE: 98 F | SYSTOLIC BLOOD PRESSURE: 124 MMHG | BODY MASS INDEX: 28.85 KG/M2

## 2019-01-24 DIAGNOSIS — I10 HYPERTENSION, BENIGN: ICD-10-CM

## 2019-01-24 DIAGNOSIS — K44.9 HIATAL HERNIA WITH GERD AND ESOPHAGITIS: ICD-10-CM

## 2019-01-24 DIAGNOSIS — F33.1 MODERATE EPISODE OF RECURRENT MAJOR DEPRESSIVE DISORDER (HCC): ICD-10-CM

## 2019-01-24 DIAGNOSIS — I73.00 RAYNAUD'S DISEASE WITHOUT GANGRENE: ICD-10-CM

## 2019-01-24 DIAGNOSIS — N18.30 CKD (CHRONIC KIDNEY DISEASE) STAGE 3, GFR 30-59 ML/MIN (HCC): ICD-10-CM

## 2019-01-24 DIAGNOSIS — K21.00 HIATAL HERNIA WITH GERD AND ESOPHAGITIS: ICD-10-CM

## 2019-01-24 DIAGNOSIS — Z87.19 HISTORY OF ISCHEMIC COLITIS: ICD-10-CM

## 2019-01-24 DIAGNOSIS — K63.5 POLYP OF COLON, UNSPECIFIED PART OF COLON, UNSPECIFIED TYPE: ICD-10-CM

## 2019-01-24 DIAGNOSIS — M34.1 CREST SYNDROME (HCC): ICD-10-CM

## 2019-01-24 DIAGNOSIS — I48.91 ATRIAL FIBRILLATION, UNSPECIFIED TYPE (HCC): ICD-10-CM

## 2019-01-24 DIAGNOSIS — M17.12 PRIMARY OSTEOARTHRITIS OF LEFT KNEE: Primary | ICD-10-CM

## 2019-01-24 DIAGNOSIS — E55.9 VITAMIN D DEFICIENCY: ICD-10-CM

## 2019-01-24 LAB
ALBUMIN SERPL BCP-MCNC: 3.8 G/DL (ref 3.5–4.8)
ALBUMIN/GLOB SERPL: 1.4 {RATIO} (ref 1–2)
ALP SERPL-CCNC: 53 U/L (ref 32–100)
ALT SERPL-CCNC: 15 U/L (ref 14–54)
ANION GAP SERPL CALC-SCNC: 10 MMOL/L (ref 0–18)
AST SERPL-CCNC: 23 U/L (ref 15–41)
BASOPHILS # BLD: 0 K/UL (ref 0–0.2)
BASOPHILS NFR BLD: 1 %
BILIRUB SERPL-MCNC: 0.7 MG/DL (ref 0.3–1.2)
BUN SERPL-MCNC: 26 MG/DL (ref 8–20)
BUN/CREAT SERPL: 22 (ref 10–20)
CALCIUM SERPL-MCNC: 9.5 MG/DL (ref 8.5–10.5)
CHLORIDE SERPL-SCNC: 106 MMOL/L (ref 95–110)
CHOLEST SERPL-MCNC: 153 MG/DL (ref 110–200)
CO2 SERPL-SCNC: 24 MMOL/L (ref 22–32)
CREAT SERPL-MCNC: 1.18 MG/DL (ref 0.5–1.5)
EOSINOPHIL # BLD: 0.2 K/UL (ref 0–0.7)
EOSINOPHIL NFR BLD: 5 %
ERYTHROCYTE [DISTWIDTH] IN BLOOD BY AUTOMATED COUNT: 13.2 % (ref 11–15)
GLOBULIN PLAS-MCNC: 2.8 G/DL (ref 2.5–3.7)
GLUCOSE SERPL-MCNC: 88 MG/DL (ref 70–99)
HCT VFR BLD AUTO: 37.8 % (ref 35–48)
HDLC SERPL-MCNC: 67 MG/DL
HGB BLD-MCNC: 12.6 G/DL (ref 12–16)
LDLC SERPL CALC-MCNC: 66 MG/DL (ref 0–99)
LYMPHOCYTES # BLD: 0.9 K/UL (ref 1–4)
LYMPHOCYTES NFR BLD: 30 %
MCH RBC QN AUTO: 33.1 PG (ref 27–32)
MCHC RBC AUTO-ENTMCNC: 33.3 G/DL (ref 32–37)
MCV RBC AUTO: 99.6 FL (ref 80–100)
MONOCYTES # BLD: 0.3 K/UL (ref 0–1)
MONOCYTES NFR BLD: 11 %
NEUTROPHILS # BLD AUTO: 1.7 K/UL (ref 1.8–7.7)
NEUTROPHILS NFR BLD: 53 %
NONHDLC SERPL-MCNC: 86 MG/DL
OSMOLALITY UR CALC.SUM OF ELEC: 294 MOSM/KG (ref 275–295)
PATIENT FASTING: YES
PLATELET # BLD AUTO: 190 K/UL (ref 140–400)
PMV BLD AUTO: 9.2 FL (ref 7.4–10.3)
POTASSIUM SERPL-SCNC: 4.1 MMOL/L (ref 3.3–5.1)
PROT SERPL-MCNC: 6.6 G/DL (ref 5.9–8.4)
RBC # BLD AUTO: 3.8 M/UL (ref 3.7–5.4)
SODIUM SERPL-SCNC: 140 MMOL/L (ref 136–144)
TRIGL SERPL-MCNC: 98 MG/DL (ref 1–149)
TSH SERPL-ACNC: 3.06 UIU/ML (ref 0.45–5.33)
WBC # BLD AUTO: 3.2 K/UL (ref 4–11)

## 2019-01-24 PROCEDURE — 36415 COLL VENOUS BLD VENIPUNCTURE: CPT

## 2019-01-24 PROCEDURE — 85025 COMPLETE CBC W/AUTO DIFF WBC: CPT

## 2019-01-24 PROCEDURE — G0008 ADMIN INFLUENZA VIRUS VAC: HCPCS | Performed by: FAMILY MEDICINE

## 2019-01-24 PROCEDURE — 80053 COMPREHEN METABOLIC PANEL: CPT

## 2019-01-24 PROCEDURE — 99215 OFFICE O/P EST HI 40 MIN: CPT | Performed by: FAMILY MEDICINE

## 2019-01-24 PROCEDURE — 80061 LIPID PANEL: CPT

## 2019-01-24 PROCEDURE — G0463 HOSPITAL OUTPT CLINIC VISIT: HCPCS | Performed by: FAMILY MEDICINE

## 2019-01-24 PROCEDURE — 84443 ASSAY THYROID STIM HORMONE: CPT

## 2019-01-24 PROCEDURE — 90653 IIV ADJUVANT VACCINE IM: CPT | Performed by: FAMILY MEDICINE

## 2019-01-24 PROCEDURE — 82306 VITAMIN D 25 HYDROXY: CPT

## 2019-01-24 RX ORDER — FLUOXETINE HYDROCHLORIDE 60 MG/1
60 TABLET, FILM COATED ORAL; ORAL DAILY
Qty: 30 TABLET | Refills: 2 | Status: SHIPPED | OUTPATIENT
Start: 2019-01-24 | End: 2019-02-23

## 2019-01-24 NOTE — PROGRESS NOTES
I can eat at times and other times I get diarrhea and bloating an burping and stomach cramps oh man! \"    Knee replacement with Dr. Regan Angelo.   Left knee   Had the right knee done 10/2017    Surgery scheduled for 1/31/2019    Ramana Melton and was releised for exceptional nevi. Dress was appropriate. Speech:  rate normal, volume was appropriate articulation normal, patient was coherent.    Language:  no paucity of language no perseveration     Thought  Processes: rate normal content normal.     No hallucinati

## 2019-01-25 LAB — 25(OH)D3 SERPL-MCNC: 28.5 NG/ML (ref 30–100)

## 2019-01-28 RX ORDER — ERGOCALCIFEROL 1.25 MG/1
50000 CAPSULE ORAL
Qty: 12 CAPSULE | Refills: 3 | Status: SHIPPED | OUTPATIENT
Start: 2019-01-28 | End: 2020-03-18 | Stop reason: ALTCHOICE

## 2019-01-29 ENCOUNTER — LAB ENCOUNTER (OUTPATIENT)
Dept: LAB | Age: 74
DRG: 470 | End: 2019-01-29
Attending: FAMILY MEDICINE
Payer: MEDICARE

## 2019-01-29 ENCOUNTER — APPOINTMENT (OUTPATIENT)
Dept: LAB | Age: 74
DRG: 470 | End: 2019-01-29
Attending: FAMILY MEDICINE
Payer: MEDICARE

## 2019-01-29 ENCOUNTER — HOSPITAL ENCOUNTER (OUTPATIENT)
Dept: GENERAL RADIOLOGY | Age: 74
Discharge: HOME | DRG: 470 | End: 2019-01-29
Attending: FAMILY MEDICINE
Payer: MEDICARE

## 2019-01-29 DIAGNOSIS — I48.91 ATRIAL FIBRILLATION, UNSPECIFIED TYPE (HCC): ICD-10-CM

## 2019-01-29 DIAGNOSIS — I10 HYPERTENSION, BENIGN: ICD-10-CM

## 2019-01-29 DIAGNOSIS — Z01.818 PREOP TESTING: ICD-10-CM

## 2019-01-29 DIAGNOSIS — M17.12 PRIMARY OSTEOARTHRITIS OF LEFT KNEE: ICD-10-CM

## 2019-01-29 LAB
ANTIBODY SCREEN: NEGATIVE
APTT PPP: 30 SECONDS (ref 23.2–35.3)
RH BLOOD TYPE: NEGATIVE

## 2019-01-29 PROCEDURE — 85730 THROMBOPLASTIN TIME PARTIAL: CPT

## 2019-01-29 PROCEDURE — 93010 ELECTROCARDIOGRAM REPORT: CPT | Performed by: FAMILY MEDICINE

## 2019-01-29 PROCEDURE — 86901 BLOOD TYPING SEROLOGIC RH(D): CPT

## 2019-01-29 PROCEDURE — 86850 RBC ANTIBODY SCREEN: CPT

## 2019-01-29 PROCEDURE — 93005 ELECTROCARDIOGRAM TRACING: CPT

## 2019-01-29 PROCEDURE — 71046 X-RAY EXAM CHEST 2 VIEWS: CPT | Performed by: FAMILY MEDICINE

## 2019-01-29 PROCEDURE — 86900 BLOOD TYPING SEROLOGIC ABO: CPT

## 2019-01-29 PROCEDURE — 87081 CULTURE SCREEN ONLY: CPT

## 2019-01-29 PROCEDURE — 36415 COLL VENOUS BLD VENIPUNCTURE: CPT

## 2019-01-30 ENCOUNTER — ANESTHESIA EVENT (OUTPATIENT)
Dept: SURGERY | Facility: HOSPITAL | Age: 74
DRG: 470 | End: 2019-01-30
Payer: MEDICARE

## 2019-01-31 ENCOUNTER — HOSPITAL ENCOUNTER (INPATIENT)
Facility: HOSPITAL | Age: 74
LOS: 3 days | Discharge: NURSING, REHAB OR LONG-TERM CARE | DRG: 470 | End: 2019-02-03
Attending: ORTHOPAEDIC SURGERY | Admitting: ORTHOPAEDIC SURGERY
Payer: MEDICARE

## 2019-01-31 ENCOUNTER — APPOINTMENT (OUTPATIENT)
Dept: GENERAL RADIOLOGY | Facility: HOSPITAL | Age: 74
DRG: 470 | End: 2019-01-31
Attending: ORTHOPAEDIC SURGERY
Payer: MEDICARE

## 2019-01-31 ENCOUNTER — ANESTHESIA (OUTPATIENT)
Dept: SURGERY | Facility: HOSPITAL | Age: 74
DRG: 470 | End: 2019-01-31
Payer: MEDICARE

## 2019-01-31 DIAGNOSIS — Z01.818 PREOP TESTING: Primary | ICD-10-CM

## 2019-01-31 PROBLEM — K55.1 MESENTERIC ISCHEMIA, CHRONIC (HCC): Status: ACTIVE | Noted: 2017-04-26

## 2019-01-31 PROBLEM — M17.12 OSTEOARTHRITIS OF LEFT KNEE: Status: ACTIVE | Noted: 2019-01-31

## 2019-01-31 PROCEDURE — 99232 SBSQ HOSP IP/OBS MODERATE 35: CPT | Performed by: HOSPITALIST

## 2019-01-31 PROCEDURE — 3E0T3BZ INTRODUCTION OF ANESTHETIC AGENT INTO PERIPHERAL NERVES AND PLEXI, PERCUTANEOUS APPROACH: ICD-10-PCS | Performed by: ANESTHESIOLOGY

## 2019-01-31 PROCEDURE — 73560 X-RAY EXAM OF KNEE 1 OR 2: CPT | Performed by: ORTHOPAEDIC SURGERY

## 2019-01-31 PROCEDURE — 0SRD0J9 REPLACEMENT OF LEFT KNEE JOINT WITH SYNTHETIC SUBSTITUTE, CEMENTED, OPEN APPROACH: ICD-10-PCS | Performed by: ORTHOPAEDIC SURGERY

## 2019-01-31 DEVICE — PSN ASF PS 11MM PLY L 6-9EF: Type: IMPLANTABLE DEVICE | Site: KNEE | Status: FUNCTIONAL

## 2019-01-31 DEVICE — PERSONA CEM FEM/CEM TIB/STD: Type: IMPLANTABLE DEVICE | Status: FUNCTIONAL

## 2019-01-31 DEVICE — PSN FEM PS CMT CCR NRW SZ9 L: Type: IMPLANTABLE DEVICE | Site: KNEE | Status: FUNCTIONAL

## 2019-01-31 DEVICE — BONE CEMENT HI VISCOSITY R: Type: IMPLANTABLE DEVICE | Site: KNEE | Status: FUNCTIONAL

## 2019-01-31 DEVICE — PSN TIB STM 5 DEG SZ E L: Type: IMPLANTABLE DEVICE | Site: KNEE | Status: FUNCTIONAL

## 2019-01-31 DEVICE — PSN ALL POLY PAT PLY 32MM: Type: IMPLANTABLE DEVICE | Site: KNEE | Status: FUNCTIONAL

## 2019-01-31 RX ORDER — MIDAZOLAM HYDROCHLORIDE 1 MG/ML
INJECTION INTRAMUSCULAR; INTRAVENOUS AS NEEDED
Status: DISCONTINUED | OUTPATIENT
Start: 2019-01-31 | End: 2019-01-31 | Stop reason: SURG

## 2019-01-31 RX ORDER — DEXAMETHASONE SODIUM PHOSPHATE 10 MG/ML
INJECTION, SOLUTION INTRAMUSCULAR; INTRAVENOUS AS NEEDED
Status: DISCONTINUED | OUTPATIENT
Start: 2019-01-31 | End: 2019-01-31 | Stop reason: SURG

## 2019-01-31 RX ORDER — ACETAMINOPHEN 325 MG/1
650 TABLET ORAL EVERY 4 HOURS PRN
Status: DISCONTINUED | OUTPATIENT
Start: 2019-01-31 | End: 2019-02-03

## 2019-01-31 RX ORDER — NALOXONE HYDROCHLORIDE 0.4 MG/ML
80 INJECTION, SOLUTION INTRAMUSCULAR; INTRAVENOUS; SUBCUTANEOUS AS NEEDED
Status: ACTIVE | OUTPATIENT
Start: 2019-01-31 | End: 2019-02-01

## 2019-01-31 RX ORDER — METOPROLOL TARTRATE 50 MG/1
50 TABLET, FILM COATED ORAL
Status: DISCONTINUED | OUTPATIENT
Start: 2019-01-31 | End: 2019-01-31 | Stop reason: SDUPTHER

## 2019-01-31 RX ORDER — ACETAMINOPHEN 500 MG
1000 TABLET ORAL EVERY 8 HOURS
Status: DISPENSED | OUTPATIENT
Start: 2019-01-31 | End: 2019-02-01

## 2019-01-31 RX ORDER — DIPHENOXYLATE HYDROCHLORIDE AND ATROPINE SULFATE 2.5; .025 MG/1; MG/1
1 TABLET ORAL 4 TIMES DAILY PRN
Status: DISCONTINUED | OUTPATIENT
Start: 2019-01-31 | End: 2019-02-03

## 2019-01-31 RX ORDER — NALOXONE HYDROCHLORIDE 0.4 MG/ML
80 INJECTION, SOLUTION INTRAMUSCULAR; INTRAVENOUS; SUBCUTANEOUS AS NEEDED
Status: ACTIVE | OUTPATIENT
Start: 2019-01-31 | End: 2019-01-31

## 2019-01-31 RX ORDER — NEOSTIGMINE METHYLSULFATE 0.5 MG/ML
INJECTION INTRAVENOUS AS NEEDED
Status: DISCONTINUED | OUTPATIENT
Start: 2019-01-31 | End: 2019-01-31 | Stop reason: SURG

## 2019-01-31 RX ORDER — DEXAMETHASONE SODIUM PHOSPHATE 4 MG/ML
VIAL (ML) INJECTION AS NEEDED
Status: DISCONTINUED | OUTPATIENT
Start: 2019-01-31 | End: 2019-01-31 | Stop reason: SURG

## 2019-01-31 RX ORDER — NALOXONE HYDROCHLORIDE 0.4 MG/ML
80 INJECTION, SOLUTION INTRAMUSCULAR; INTRAVENOUS; SUBCUTANEOUS AS NEEDED
Status: DISCONTINUED | OUTPATIENT
Start: 2019-01-31 | End: 2019-01-31

## 2019-01-31 RX ORDER — DIPHENHYDRAMINE HYDROCHLORIDE 50 MG/ML
25 INJECTION INTRAMUSCULAR; INTRAVENOUS ONCE AS NEEDED
Status: ACTIVE | OUTPATIENT
Start: 2019-01-31 | End: 2019-01-31

## 2019-01-31 RX ORDER — MORPHINE SULFATE 4 MG/ML
4 INJECTION, SOLUTION INTRAMUSCULAR; INTRAVENOUS EVERY 10 MIN PRN
Status: DISCONTINUED | OUTPATIENT
Start: 2019-01-31 | End: 2019-01-31

## 2019-01-31 RX ORDER — MORPHINE SULFATE 4 MG/ML
4 INJECTION, SOLUTION INTRAMUSCULAR; INTRAVENOUS EVERY 2 HOUR PRN
Status: DISCONTINUED | OUTPATIENT
Start: 2019-01-31 | End: 2019-02-03

## 2019-01-31 RX ORDER — HALOPERIDOL 5 MG/ML
0.25 INJECTION INTRAMUSCULAR ONCE AS NEEDED
Status: ACTIVE | OUTPATIENT
Start: 2019-01-31 | End: 2019-01-31

## 2019-01-31 RX ORDER — ROPIVACAINE HYDROCHLORIDE 5 MG/ML
INJECTION, SOLUTION EPIDURAL; INFILTRATION; PERINEURAL AS NEEDED
Status: DISCONTINUED | OUTPATIENT
Start: 2019-01-31 | End: 2019-01-31 | Stop reason: SURG

## 2019-01-31 RX ORDER — HALOPERIDOL 5 MG/ML
0.25 INJECTION INTRAMUSCULAR ONCE AS NEEDED
Status: DISCONTINUED | OUTPATIENT
Start: 2019-01-31 | End: 2019-01-31 | Stop reason: HOSPADM

## 2019-01-31 RX ORDER — SODIUM PHOSPHATE, DIBASIC AND SODIUM PHOSPHATE, MONOBASIC 7; 19 G/133ML; G/133ML
1 ENEMA RECTAL ONCE AS NEEDED
Status: DISCONTINUED | OUTPATIENT
Start: 2019-01-31 | End: 2019-02-03

## 2019-01-31 RX ORDER — FAMOTIDINE 20 MG/1
20 TABLET ORAL ONCE
Status: COMPLETED | OUTPATIENT
Start: 2019-01-31 | End: 2019-01-31

## 2019-01-31 RX ORDER — ONDANSETRON 2 MG/ML
INJECTION INTRAMUSCULAR; INTRAVENOUS AS NEEDED
Status: DISCONTINUED | OUTPATIENT
Start: 2019-01-31 | End: 2019-01-31 | Stop reason: SURG

## 2019-01-31 RX ORDER — ACETAMINOPHEN 500 MG
1000 TABLET ORAL ONCE
Status: COMPLETED | OUTPATIENT
Start: 2019-01-31 | End: 2019-01-31

## 2019-01-31 RX ORDER — MORPHINE SULFATE 4 MG/ML
6 INJECTION, SOLUTION INTRAMUSCULAR; INTRAVENOUS EVERY 10 MIN PRN
Status: ACTIVE | OUTPATIENT
Start: 2019-01-31 | End: 2019-01-31

## 2019-01-31 RX ORDER — HYDROCODONE BITARTRATE AND ACETAMINOPHEN 5; 325 MG/1; MG/1
2 TABLET ORAL EVERY 4 HOURS PRN
Status: DISCONTINUED | OUTPATIENT
Start: 2019-01-31 | End: 2019-02-03

## 2019-01-31 RX ORDER — METOPROLOL TARTRATE 50 MG/1
50 TABLET, FILM COATED ORAL 2 TIMES DAILY
Status: DISCONTINUED | OUTPATIENT
Start: 2019-01-31 | End: 2019-02-03

## 2019-01-31 RX ORDER — SODIUM CHLORIDE, SODIUM LACTATE, POTASSIUM CHLORIDE, CALCIUM CHLORIDE 600; 310; 30; 20 MG/100ML; MG/100ML; MG/100ML; MG/100ML
INJECTION, SOLUTION INTRAVENOUS CONTINUOUS
Status: DISCONTINUED | OUTPATIENT
Start: 2019-01-31 | End: 2019-02-03

## 2019-01-31 RX ORDER — MORPHINE SULFATE 2 MG/ML
2 INJECTION, SOLUTION INTRAMUSCULAR; INTRAVENOUS EVERY 10 MIN PRN
Status: ACTIVE | OUTPATIENT
Start: 2019-01-31 | End: 2019-01-31

## 2019-01-31 RX ORDER — SODIUM CHLORIDE, SODIUM LACTATE, POTASSIUM CHLORIDE, CALCIUM CHLORIDE 600; 310; 30; 20 MG/100ML; MG/100ML; MG/100ML; MG/100ML
INJECTION, SOLUTION INTRAVENOUS CONTINUOUS
Status: DISCONTINUED | OUTPATIENT
Start: 2019-01-31 | End: 2019-01-31

## 2019-01-31 RX ORDER — OXYCODONE HYDROCHLORIDE 5 MG/1
10 TABLET ORAL EVERY 4 HOURS PRN
Status: DISPENSED | OUTPATIENT
Start: 2019-01-31 | End: 2019-02-01

## 2019-01-31 RX ORDER — SENNOSIDES 8.6 MG
17.2 TABLET ORAL NIGHTLY
Status: DISCONTINUED | OUTPATIENT
Start: 2019-01-31 | End: 2019-02-03

## 2019-01-31 RX ORDER — SODIUM CHLORIDE 0.9 % (FLUSH) 0.9 %
10 SYRINGE (ML) INJECTION AS NEEDED
Status: DISCONTINUED | OUTPATIENT
Start: 2019-01-31 | End: 2019-02-03

## 2019-01-31 RX ORDER — MORPHINE SULFATE 2 MG/ML
2 INJECTION, SOLUTION INTRAMUSCULAR; INTRAVENOUS EVERY 2 HOUR PRN
Status: DISPENSED | OUTPATIENT
Start: 2019-01-31 | End: 2019-02-01

## 2019-01-31 RX ORDER — HALOPERIDOL 5 MG/ML
0.25 INJECTION INTRAMUSCULAR ONCE AS NEEDED
Status: DISCONTINUED | OUTPATIENT
Start: 2019-01-31 | End: 2019-01-31

## 2019-01-31 RX ORDER — OXYCODONE HYDROCHLORIDE 5 MG/1
5 TABLET ORAL EVERY 4 HOURS PRN
Status: ACTIVE | OUTPATIENT
Start: 2019-01-31 | End: 2019-02-01

## 2019-01-31 RX ORDER — AMLODIPINE BESYLATE 10 MG/1
10 TABLET ORAL DAILY
Status: DISCONTINUED | OUTPATIENT
Start: 2019-02-01 | End: 2019-02-03

## 2019-01-31 RX ORDER — MORPHINE SULFATE 2 MG/ML
1 INJECTION, SOLUTION INTRAMUSCULAR; INTRAVENOUS EVERY 2 HOUR PRN
Status: DISCONTINUED | OUTPATIENT
Start: 2019-01-31 | End: 2019-02-03

## 2019-01-31 RX ORDER — CEFAZOLIN SODIUM/WATER 2 G/20 ML
2 SYRINGE (ML) INTRAVENOUS EVERY 8 HOURS
Status: COMPLETED | OUTPATIENT
Start: 2019-01-31 | End: 2019-01-31

## 2019-01-31 RX ORDER — MORPHINE SULFATE 4 MG/ML
6 INJECTION, SOLUTION INTRAMUSCULAR; INTRAVENOUS EVERY 10 MIN PRN
Status: DISCONTINUED | OUTPATIENT
Start: 2019-01-31 | End: 2019-01-31

## 2019-01-31 RX ORDER — HYDROCODONE BITARTRATE AND ACETAMINOPHEN 5; 325 MG/1; MG/1
1 TABLET ORAL EVERY 4 HOURS PRN
Status: DISCONTINUED | OUTPATIENT
Start: 2019-01-31 | End: 2019-02-03

## 2019-01-31 RX ORDER — CYCLOBENZAPRINE HCL 5 MG
5 TABLET ORAL EVERY 8 HOURS PRN
Status: DISCONTINUED | OUTPATIENT
Start: 2019-01-31 | End: 2019-02-03

## 2019-01-31 RX ORDER — CEFAZOLIN SODIUM/WATER 2 G/20 ML
SYRINGE (ML) INTRAVENOUS AS NEEDED
Status: DISCONTINUED | OUTPATIENT
Start: 2019-01-31 | End: 2019-01-31 | Stop reason: SURG

## 2019-01-31 RX ORDER — MORPHINE SULFATE 4 MG/ML
6 INJECTION, SOLUTION INTRAMUSCULAR; INTRAVENOUS EVERY 2 HOUR PRN
Status: ACTIVE | OUTPATIENT
Start: 2019-01-31 | End: 2019-02-01

## 2019-01-31 RX ORDER — ATORVASTATIN CALCIUM 10 MG/1
10 TABLET, FILM COATED ORAL NIGHTLY
Status: DISCONTINUED | OUTPATIENT
Start: 2019-01-31 | End: 2019-02-03

## 2019-01-31 RX ORDER — ROCURONIUM BROMIDE 10 MG/ML
INJECTION, SOLUTION INTRAVENOUS AS NEEDED
Status: DISCONTINUED | OUTPATIENT
Start: 2019-01-31 | End: 2019-01-31 | Stop reason: SURG

## 2019-01-31 RX ORDER — MORPHINE SULFATE 4 MG/ML
2 INJECTION, SOLUTION INTRAMUSCULAR; INTRAVENOUS EVERY 10 MIN PRN
Status: DISCONTINUED | OUTPATIENT
Start: 2019-01-31 | End: 2019-01-31 | Stop reason: HOSPADM

## 2019-01-31 RX ORDER — MORPHINE SULFATE 4 MG/ML
4 INJECTION, SOLUTION INTRAMUSCULAR; INTRAVENOUS EVERY 10 MIN PRN
Status: DISCONTINUED | OUTPATIENT
Start: 2019-01-31 | End: 2019-01-31 | Stop reason: HOSPADM

## 2019-01-31 RX ORDER — METOPROLOL TARTRATE 5 MG/5ML
2.5 INJECTION INTRAVENOUS ONCE
Status: DISCONTINUED | OUTPATIENT
Start: 2019-01-31 | End: 2019-01-31 | Stop reason: HOSPADM

## 2019-01-31 RX ORDER — MORPHINE SULFATE 15 MG/1
15 TABLET ORAL EVERY 4 HOURS PRN
Status: ACTIVE | OUTPATIENT
Start: 2019-01-31 | End: 2019-02-01

## 2019-01-31 RX ORDER — MORPHINE SULFATE 4 MG/ML
4 INJECTION, SOLUTION INTRAMUSCULAR; INTRAVENOUS EVERY 2 HOUR PRN
Status: ACTIVE | OUTPATIENT
Start: 2019-01-31 | End: 2019-02-01

## 2019-01-31 RX ORDER — GLYCOPYRROLATE 0.2 MG/ML
INJECTION INTRAMUSCULAR; INTRAVENOUS AS NEEDED
Status: DISCONTINUED | OUTPATIENT
Start: 2019-01-31 | End: 2019-01-31 | Stop reason: SURG

## 2019-01-31 RX ORDER — MORPHINE SULFATE 10 MG/ML
6 INJECTION, SOLUTION INTRAMUSCULAR; INTRAVENOUS EVERY 10 MIN PRN
Status: DISCONTINUED | OUTPATIENT
Start: 2019-01-31 | End: 2019-01-31 | Stop reason: HOSPADM

## 2019-01-31 RX ORDER — ONDANSETRON 2 MG/ML
4 INJECTION INTRAMUSCULAR; INTRAVENOUS ONCE AS NEEDED
Status: ACTIVE | OUTPATIENT
Start: 2019-01-31 | End: 2019-01-31

## 2019-01-31 RX ORDER — CEFAZOLIN SODIUM/WATER 2 G/20 ML
2 SYRINGE (ML) INTRAVENOUS ONCE
Status: DISCONTINUED | OUTPATIENT
Start: 2019-01-31 | End: 2019-01-31 | Stop reason: HOSPADM

## 2019-01-31 RX ORDER — METOCLOPRAMIDE HYDROCHLORIDE 5 MG/ML
10 INJECTION INTRAMUSCULAR; INTRAVENOUS EVERY 6 HOURS PRN
Status: DISCONTINUED | OUTPATIENT
Start: 2019-01-31 | End: 2019-02-02

## 2019-01-31 RX ORDER — DIPHENHYDRAMINE HYDROCHLORIDE 50 MG/ML
12.5 INJECTION INTRAMUSCULAR; INTRAVENOUS EVERY 4 HOURS PRN
Status: DISCONTINUED | OUTPATIENT
Start: 2019-01-31 | End: 2019-02-03

## 2019-01-31 RX ORDER — SILDENAFIL CITRATE 20 MG/1
20 TABLET ORAL 3 TIMES DAILY
Status: DISCONTINUED | OUTPATIENT
Start: 2019-01-31 | End: 2019-02-03

## 2019-01-31 RX ORDER — DOCUSATE SODIUM 100 MG/1
100 CAPSULE, LIQUID FILLED ORAL 2 TIMES DAILY
Status: DISCONTINUED | OUTPATIENT
Start: 2019-01-31 | End: 2019-02-03

## 2019-01-31 RX ORDER — ONDANSETRON 2 MG/ML
4 INJECTION INTRAMUSCULAR; INTRAVENOUS ONCE AS NEEDED
Status: DISCONTINUED | OUTPATIENT
Start: 2019-01-31 | End: 2019-01-31 | Stop reason: HOSPADM

## 2019-01-31 RX ORDER — ONDANSETRON 2 MG/ML
4 INJECTION INTRAMUSCULAR; INTRAVENOUS ONCE AS NEEDED
Status: DISCONTINUED | OUTPATIENT
Start: 2019-01-31 | End: 2019-01-31

## 2019-01-31 RX ORDER — LABETALOL HYDROCHLORIDE 5 MG/ML
5 INJECTION, SOLUTION INTRAVENOUS EVERY 10 MIN PRN
Status: DISCONTINUED | OUTPATIENT
Start: 2019-01-31 | End: 2019-01-31 | Stop reason: HOSPADM

## 2019-01-31 RX ORDER — POLYETHYLENE GLYCOL 3350 17 G/17G
17 POWDER, FOR SOLUTION ORAL DAILY PRN
Status: DISCONTINUED | OUTPATIENT
Start: 2019-01-31 | End: 2019-02-03

## 2019-01-31 RX ORDER — BISACODYL 10 MG
10 SUPPOSITORY, RECTAL RECTAL
Status: DISCONTINUED | OUTPATIENT
Start: 2019-01-31 | End: 2019-02-03

## 2019-01-31 RX ORDER — LIDOCAINE HYDROCHLORIDE 10 MG/ML
INJECTION, SOLUTION EPIDURAL; INFILTRATION; INTRACAUDAL; PERINEURAL AS NEEDED
Status: DISCONTINUED | OUTPATIENT
Start: 2019-01-31 | End: 2019-01-31 | Stop reason: SURG

## 2019-01-31 RX ORDER — MORPHINE SULFATE 4 MG/ML
4 INJECTION, SOLUTION INTRAMUSCULAR; INTRAVENOUS EVERY 10 MIN PRN
Status: ACTIVE | OUTPATIENT
Start: 2019-01-31 | End: 2019-01-31

## 2019-01-31 RX ORDER — MORPHINE SULFATE 2 MG/ML
2 INJECTION, SOLUTION INTRAMUSCULAR; INTRAVENOUS EVERY 10 MIN PRN
Status: DISCONTINUED | OUTPATIENT
Start: 2019-01-31 | End: 2019-01-31

## 2019-01-31 RX ORDER — MORPHINE SULFATE 2 MG/ML
2 INJECTION, SOLUTION INTRAMUSCULAR; INTRAVENOUS EVERY 2 HOUR PRN
Status: DISCONTINUED | OUTPATIENT
Start: 2019-01-31 | End: 2019-02-03

## 2019-01-31 RX ORDER — ONDANSETRON 2 MG/ML
4 INJECTION INTRAMUSCULAR; INTRAVENOUS EVERY 4 HOURS PRN
Status: DISCONTINUED | OUTPATIENT
Start: 2019-01-31 | End: 2019-02-02

## 2019-01-31 RX ORDER — HYDROCODONE BITARTRATE AND ACETAMINOPHEN 5; 325 MG/1; MG/1
2 TABLET ORAL AS NEEDED
Status: COMPLETED | OUTPATIENT
Start: 2019-01-31 | End: 2019-01-31

## 2019-01-31 RX ORDER — HYDROCODONE BITARTRATE AND ACETAMINOPHEN 5; 325 MG/1; MG/1
1 TABLET ORAL AS NEEDED
Status: COMPLETED | OUTPATIENT
Start: 2019-01-31 | End: 2019-01-31

## 2019-01-31 RX ORDER — DIPHENHYDRAMINE HCL 25 MG
25 CAPSULE ORAL EVERY 4 HOURS PRN
Status: DISCONTINUED | OUTPATIENT
Start: 2019-01-31 | End: 2019-02-03

## 2019-01-31 RX ORDER — FLUOXETINE HYDROCHLORIDE 20 MG/5ML
60 LIQUID ORAL DAILY
Status: DISCONTINUED | OUTPATIENT
Start: 2019-01-31 | End: 2019-02-01

## 2019-01-31 RX ADMIN — SODIUM CHLORIDE, SODIUM LACTATE, POTASSIUM CHLORIDE, CALCIUM CHLORIDE: 600; 310; 30; 20 INJECTION, SOLUTION INTRAVENOUS at 06:47:00

## 2019-01-31 RX ADMIN — ROPIVACAINE HYDROCHLORIDE 30 ML: 5 INJECTION, SOLUTION EPIDURAL; INFILTRATION; PERINEURAL at 07:15:00

## 2019-01-31 RX ADMIN — GLYCOPYRROLATE 0.6 MG: 0.2 INJECTION INTRAMUSCULAR; INTRAVENOUS at 08:35:00

## 2019-01-31 RX ADMIN — DEXAMETHASONE SODIUM PHOSPHATE 10 MG: 10 INJECTION, SOLUTION INTRAMUSCULAR; INTRAVENOUS at 07:15:00

## 2019-01-31 RX ADMIN — ONDANSETRON 4 MG: 2 INJECTION INTRAMUSCULAR; INTRAVENOUS at 08:34:00

## 2019-01-31 RX ADMIN — LIDOCAINE HYDROCHLORIDE 50 MG: 10 INJECTION, SOLUTION EPIDURAL; INFILTRATION; INTRACAUDAL; PERINEURAL at 08:15:00

## 2019-01-31 RX ADMIN — LIDOCAINE HYDROCHLORIDE 50 MG: 10 INJECTION, SOLUTION EPIDURAL; INFILTRATION; INTRACAUDAL; PERINEURAL at 07:42:00

## 2019-01-31 RX ADMIN — LIDOCAINE HYDROCHLORIDE 1 ML: 10 INJECTION, SOLUTION EPIDURAL; INFILTRATION; INTRACAUDAL; PERINEURAL at 07:05:00

## 2019-01-31 RX ADMIN — LIDOCAINE HYDROCHLORIDE 4 ML: 10 INJECTION, SOLUTION EPIDURAL; INFILTRATION; INTRACAUDAL; PERINEURAL at 07:12:00

## 2019-01-31 RX ADMIN — NEOSTIGMINE METHYLSULFATE 4 MG: 0.5 INJECTION INTRAVENOUS at 08:35:00

## 2019-01-31 RX ADMIN — ROCURONIUM BROMIDE 40 MG: 10 INJECTION, SOLUTION INTRAVENOUS at 07:42:00

## 2019-01-31 RX ADMIN — SODIUM CHLORIDE, SODIUM LACTATE, POTASSIUM CHLORIDE, CALCIUM CHLORIDE: 600; 310; 30; 20 INJECTION, SOLUTION INTRAVENOUS at 08:40:00

## 2019-01-31 RX ADMIN — MIDAZOLAM HYDROCHLORIDE 1 MG: 1 INJECTION INTRAMUSCULAR; INTRAVENOUS at 07:00:00

## 2019-01-31 RX ADMIN — DEXAMETHASONE SODIUM PHOSPHATE 4 MG: 4 MG/ML VIAL (ML) INJECTION at 07:52:00

## 2019-01-31 RX ADMIN — CEFAZOLIN SODIUM/WATER 2 G: 2 G/20 ML SYRINGE (ML) INTRAVENOUS at 07:50:00

## 2019-01-31 RX ADMIN — SODIUM CHLORIDE, SODIUM LACTATE, POTASSIUM CHLORIDE, CALCIUM CHLORIDE: 600; 310; 30; 20 INJECTION, SOLUTION INTRAVENOUS at 09:15:00

## 2019-01-31 NOTE — ANESTHESIA PROCEDURE NOTES
ANESTHESIA INTUBATION  Date/Time: 1/31/2019 7:45 AM  Urgency: elective    Airway not difficult    General Information and Staff    Patient location during procedure: OR  Anesthesiologist: Vangie Roach MD  Resident/CRNA: Cody Cid CRNA  Performed: Ghassan De Jesus

## 2019-01-31 NOTE — ANESTHESIA POSTPROCEDURE EVALUATION
Patient: Panchito Landis    Procedure Summary     Date:  01/31/19 Room / Location:  [address] [place] [address] MAIN OR 06 / [address] [place] [address] MAIN OR    Anesthesia Start:  2929 Anesthesia Stop:  9768    Procedure:  KNEE TOTAL REPLACEMENT (Left Knee) Diagnosis:  (Osteoarthritis)    Surgeon:  Esthela Robles

## 2019-01-31 NOTE — ANESTHESIA PREPROCEDURE EVALUATION
Anesthesia PreOp Note    HPI:     Iker Khan is a 74year old female who presents for preoperative consultation requested by: Ellyn Dunham MD    Date of Surgery: 1/31/2019    Procedure(s):  KNEE TOTAL REPLACEMENT  Indication: Osteoarthritis    Kathi Mcleod Noted: 06/20/2011      Depressive disorder         Date Noted: 01/09/2006      Hypertension, benign         Date Noted: 11/18/2005      Pulmonary embolism and infarction [facility])         Date Noted: 09/07/2005        Past Medical History:   Diagnosis Date   • ATORVASTATIN 10 MG Oral Tab TAKE 1 TABLET(10 MG) BY MOUTH EVERY NIGHT Disp: 90 tablet Rfl: 0 1/30/2019 at 2100   Metoprolol Tartrate 50 MG Oral Tab Take 1 tablet (50 mg total) by mouth 2 (two) times daily.  Disp: 180 tablet Rfl: 3 1/31/2019 at 0500   Sild Number of children: Not on file      Years of education: Not on file      Highest education level: Not on file    Social Needs      Financial resource strain: Not on file      Food insecurity - worry: Not on file      Food insecurity - inability: Not on fi Her blood pressure is 163/106 (abnormal) and her pulse is 87. Her respiration is 14 and oxygen saturation is 98%.     01/31/19  0710 01/31/19  0720 01/31/19  0730 01/31/19  0916   BP: 141/70 140/82 139/62 (!) 163/106   BP Location:       Pulse: 65 62 62 87

## 2019-01-31 NOTE — PROGRESS NOTES
[facility] HOSP - [facility]    Progress Note    Neto Dick Patient Status:  Surgery Admit    1945 MRN [id number]   Location [address] [facility] Attending Sandra Truong MD   Hosp Day # 0 PCP Jayne Baez.  [address] [place] [address] HOME MEDS. Mesenteric ischemia, chronic (HCC)  HISTORY OF CELIAC ARTERY STENT. CKD (chronic kidney disease) stage 3, GFR 30-59 ml/min ([facility])  CONT TO MONITOR. Raynaud's disease without gangrene  CONT HOME MEDS.              Results:     Lab

## 2019-01-31 NOTE — H&P
[facility] Patient Status:  Surgery Admit    1945 MRN [id number]   Location [facility] UNIT Attending Jj Church MD   Hosp Day # 0 PCP Mauri Cuellar.  DO Jemal TOTAL REPLACEMENT Right 10/12/2017    Performed by Ellyn Dunham MD at [facility] MAIN OR   • OTHER SURGICAL HISTORY      Injections and narcotics, POD Bartuci   • REMOVAL OF HEEL SPUR       Family History   Problem Relation Age of Onset   • Cancer Father Oral Cap Take 1 capsule (50,000 Units total) by mouth every 7 days. Disp: 12 capsule Rfl: 3    ELIQUIS 5 MG Oral Tab TK 1 T PO  BID Disp:  Rfl: 2 Unknown   triamcinolone acetonide 0.1 % External Cream Apply topically 2 (two) times daily as needed.  Disp: 60 deficiency     Essential hypertension     Edema     Ventral hernia without obstruction or gangrene     Renal failure     Hiatal hernia with GERD and esophagitis     Gastroesophageal reflux disease without esophagitis     Chronic diarrhea     Cellulitis

## 2019-01-31 NOTE — OPERATIVE REPORT
[facility]    PATIENT'S NAME: Nura Latham   ATTENDING PHYSICIAN: Lee Calhoun MD   OPERATING PHYSICIAN: Lee Calhoun MD   PATIENT ACCOUNT#:   [de-identified]    LOCATION:  [facility] [address] PACU [address] [place] [address]  MEDICAL RECORD #:   [id number]       [address] [facility] aspect, and the oscillating saw was used to resect the articular surface. Bone was removed. Osteophytes were removed. Attention was made to the femur. Supracondylar flare was debrided of soft tissue.   Center hole was made anterior to the intercondylar size 11 poly was locked into position. The knee was then irrigated. A drain was placed exiting out the anterolateral thigh deep to extensor mechanism. Medial parapatellar arthrotomy was repaired with 1 Ethibond suture.   Then, 0 Vicryl and 2-0 Vicryl wer

## 2019-01-31 NOTE — ANESTHESIA PROCEDURE NOTES
Peripheral Block    Anesthesiologist:  Kamila Roach MD  Performed by:   Anesthesiologist  Patient Location:  PACU  Start Time:  1/31/2019 7:00 AM  End Time:  1/31/2019 7:15 AM  Site Identification: ultrasound guided, real time ultrasound guided and IMAG

## 2019-01-31 NOTE — BRIEF OP NOTE
Pre-Operative Diagnosis: Osteoarthritis     Post-Operative Diagnosis: Osteoarthritis      Procedure Performed:   Procedure(s):  Left total knee arthroplasty    Surgeon(s) and Role:     Junior Carmona MD - Primary    Assistant(s):  Surgical Assistant.: B

## 2019-02-01 LAB
DEPRECATED RDW RBC AUTO: 46.6 FL (ref 35.1–46.3)
ERYTHROCYTE [DISTWIDTH] IN BLOOD BY AUTOMATED COUNT: 12.4 % (ref 11–15)
HCT VFR BLD AUTO: 39.2 % (ref 35–48)
HGB BLD-MCNC: 12.5 G/DL (ref 12–16)
MCH RBC QN AUTO: 32.4 PG (ref 26–34)
MCHC RBC AUTO-ENTMCNC: 31.9 G/DL (ref 31–37)
MCV RBC AUTO: 101.6 FL (ref 80–100)
PLATELET # BLD AUTO: 200 10(3)UL (ref 150–450)
RBC # BLD AUTO: 3.86 X10(6)UL (ref 3.8–5.3)
WBC # BLD AUTO: 7.6 X10(3) UL (ref 4–11)

## 2019-02-01 PROCEDURE — 99233 SBSQ HOSP IP/OBS HIGH 50: CPT | Performed by: HOSPITALIST

## 2019-02-01 RX ORDER — CALCIUM CARBONATE 200(500)MG
500 TABLET,CHEWABLE ORAL
Status: DISCONTINUED | OUTPATIENT
Start: 2019-02-01 | End: 2019-02-03

## 2019-02-01 RX ORDER — FLUOXETINE HYDROCHLORIDE 20 MG/1
20 CAPSULE ORAL 3 TIMES DAILY
Status: DISCONTINUED | OUTPATIENT
Start: 2019-02-01 | End: 2019-02-03

## 2019-02-01 RX ORDER — HYDROCODONE BITARTRATE AND ACETAMINOPHEN 5; 325 MG/1; MG/1
2 TABLET ORAL EVERY 4 HOURS PRN
Qty: 40 TABLET | Refills: 0 | Status: SHIPPED | OUTPATIENT
Start: 2019-02-01 | End: 2019-03-28

## 2019-02-01 NOTE — OCCUPATIONAL THERAPY NOTE
OCCUPATIONAL THERAPY EVALUATION - INPATIENT      Room Number: 431/431-A  Evaluation Date: 2/1/2019  Type of Evaluation: Initial  Presenting Problem: (Juan Butler)    Physician Order: IP Consult to Occupational Therapy  Reason for Therapy: ADL/IADL Dysfunction and discharge to maximize independence, improve participation in [place], standing tolerance and balance retraining at optimal level  . DISCHARGE RECOMMENDATIONS  OT Discharge Recommendations: ([facility])       PLAN  OT Treatment Plan: Balance activities; Energy con [facility] ENDOSCOPY   • EXCISION OF NOSE      Basal cell carcinoma   • KNEE REPLACEMENT SURGERY Right    • KNEE TOTAL REPLACEMENT Left 1/31/2019    Performed by Jj Church MD at [facility] OR   • KNEE TOTAL REPLACEMENT Right 10/12/2017    Performed by Toby Nelson meals?: None    AM-PAC Score:  Score: 21  Approx Degree of Impairment: 32.79%  Standardized Score (AM-PAC Scale): 44.27  CMS Modifier (G-Code): CJ    FUNCTIONAL TRANSFER ASSESSMENT  Supine to Sit : (cga)  Sit to Stand: (cga)  Toilet Transfer: Tati FELIPE

## 2019-02-01 NOTE — PLAN OF CARE
DISCHARGE PLANNING    • Discharge to home or other facility with appropriate resources Progressing        HEMATOLOGIC - ADULT    • Maintains hematologic stability Progressing    • Free from bleeding injury Progressing        Impaired Activities of Daily Stefanie Jackson

## 2019-02-01 NOTE — CM/SW NOTE
Therapy indicates pt is ambulatory with assistance. Pt would be most appropriate for family transport vs [facility] at [facility]. SW completed PCS form, copy on chart for transport, copy for medical records.     HOLA Nichole, [address] [place] [address]

## 2019-02-01 NOTE — PLAN OF CARE
DISCHARGE PLANNING    • Discharge to home or other facility with appropriate resources Progressing        HEMATOLOGIC - ADULT    • Maintains hematologic stability Progressing    • Free from bleeding injury Progressing        Impaired Activities of Daily Juanito Patrick

## 2019-02-01 NOTE — PHYSICAL THERAPY NOTE
PHYSICAL THERAPY KNEE EVALUATION - INPATIENT       Room Number: 431/431-A  Evaluation Date: 2/1/2019  Type of Evaluation: Initial  Physician Order: PT Eval and Treat    Presenting Problem: L TKR  Reason for Therapy: Mobility Dysfunction and Discharge Plann [facility]PT vs. [facility]. Pt wanting to d/c to [facility] at this time-she would benefit from con'td PT at this level to increase her independence prior to going home.      DISCHARGE RECOMMENDATIONS  PT Discharge Recommendations: Sub-acute rehabilitation    PLAN  PT Treatment Mahesh Garay MD at [facility]   • EXCISION OF NOSE      Basal cell carcinoma   • KNEE REPLACEMENT SURGERY Right    • KNEE TOTAL REPLACEMENT Left 1/31/2019    Performed by Mikel Person MD at [address] [place] [address] OR   • [address] [place] [address] Right 10/12/2017 MOBILITY  How much difficulty does the patient currently have. ..  -   Turning over in bed (including adjusting bedclothes, sheets and blankets)?: A Little   -   Sitting down on and standing up from a chair with arms (e.g., wheelchair, bedside commode, etc. will negotiate 5 stairs/one curb w/ assistive device and supervision   Goal #4   Current Status    Goal #5  AROM 0 degrees extension to 95 degrees flexion     Goal #5   Current Status    Goal #6 Patient independently performs home exercise program for ROM/

## 2019-02-01 NOTE — CM/SW NOTE
SW met with the patient at bedside. The patient lives in 1 level home with her family . The patient responds being independent prior to admission with all ADL's, ambulation and driving. Patient denies use of any DME.      Patient would like to go to rehab u

## 2019-02-01 NOTE — ANESTHESIA POST-OP FOLLOW-UP NOTE
S/P L TKA under GA and saphenous block. More or less comfortable through the night, tolerable pain now. Site of block intact, no signs of bleeding or infection.   Will be out of bed today, VSS

## 2019-02-01 NOTE — PROGRESS NOTES
[facility]    Progress Note    Heidi Abraham Patient Status:  Inpatient    1945 MRN [id number]   Location [facility] 4W/SW/SE Attending Lyric Garcia, [address] [facility] Day # 1 PCP Dickson Boyd.  Radhames Wade DO     Subjective:  Nia Root

## 2019-02-01 NOTE — CM/SW NOTE
Care Management/BPCI:    Met with patient at bedside to explain the BPCI/Medicare program and determine her discharge planning needs. . Patient did not agree with phone f/u for 3 months from [facility] discharge from [facility].  However, Patient

## 2019-02-02 LAB
ANION GAP SERPL CALC-SCNC: 9 MMOL/L (ref 0–18)
BASOPHILS # BLD AUTO: 0.02 X10(3) UL (ref 0–0.2)
BASOPHILS NFR BLD AUTO: 0.3 %
BUN SERPL-MCNC: 17 MG/DL (ref 8–20)
BUN/CREAT SERPL: 17 (ref 10–20)
CALCIUM SERPL-MCNC: 9.4 MG/DL (ref 8.5–10.5)
CHLORIDE SERPL-SCNC: 100 MMOL/L (ref 95–110)
CO2 SERPL-SCNC: 25 MMOL/L (ref 22–32)
CREAT SERPL-MCNC: 1 MG/DL (ref 0.5–1.5)
DEPRECATED RDW RBC AUTO: 47.8 FL (ref 35.1–46.3)
EOSINOPHIL # BLD AUTO: 0.02 X10(3) UL (ref 0–0.7)
EOSINOPHIL NFR BLD AUTO: 0.3 %
ERYTHROCYTE [DISTWIDTH] IN BLOOD BY AUTOMATED COUNT: 12.6 % (ref 11–15)
GLUCOSE SERPL-MCNC: 107 MG/DL (ref 70–99)
HCT VFR BLD AUTO: 38.4 % (ref 35–48)
HGB BLD-MCNC: 12.2 G/DL (ref 12–16)
IMM GRANULOCYTES # BLD AUTO: 0.02 X10(3) UL (ref 0–1)
IMM GRANULOCYTES NFR BLD: 0.3 %
LYMPHOCYTES # BLD AUTO: 0.99 X10(3) UL (ref 1–4)
LYMPHOCYTES NFR BLD AUTO: 14.4 %
MCH RBC QN AUTO: 32.8 PG (ref 26–34)
MCHC RBC AUTO-ENTMCNC: 31.8 G/DL (ref 31–37)
MCV RBC AUTO: 103.2 FL (ref 80–100)
MONOCYTES # BLD AUTO: 0.81 X10(3) UL (ref 0.1–1)
MONOCYTES NFR BLD AUTO: 11.8 %
NEUTROPHILS # BLD AUTO: 5.02 X10 (3) UL (ref 1.5–7.7)
NEUTROPHILS # BLD AUTO: 5.02 X10(3) UL (ref 1.5–7.7)
NEUTROPHILS NFR BLD AUTO: 72.9 %
OSMOLALITY UR CALC.SUM OF ELEC: 280 MOSM/KG (ref 275–295)
PLATELET # BLD AUTO: 196 10(3)UL (ref 150–450)
POTASSIUM SERPL-SCNC: 4.2 MMOL/L (ref 3.3–5.1)
RBC # BLD AUTO: 3.72 X10(6)UL (ref 3.8–5.3)
SODIUM SERPL-SCNC: 134 MMOL/L (ref 136–144)
WBC # BLD AUTO: 6.9 X10(3) UL (ref 4–11)

## 2019-02-02 PROCEDURE — 99233 SBSQ HOSP IP/OBS HIGH 50: CPT | Performed by: HOSPITALIST

## 2019-02-02 NOTE — PROGRESS NOTES
Sarmad Fan        Wt Readings from Last 6 Encounters:  01/31/19 : 213 lb (96.6 kg)  01/24/19 : 213 lb (96.6 kg)  01/21/19 : 218 lb (98.9 kg)  09/17/18 : 202 lb (91.6 kg)  09/12/18 : 201 lb (91.2 kg)  08/10/18 : 201 lb (91.2 kg)    74year old  Surgical/P current facility-administered medications on file prior to encounter. Current Outpatient Medications on File Prior to Encounter:  Metoprolol Tartrate 50 MG Oral Tab Take 1 tablet (50 mg total) by mouth 2 (two) times daily.  Disp: 180 tablet Rfl: 3   Park Asked        Stress Concern: Not Asked        Weight Concern: Not Asked        Special Diet: Not Asked        Back Care: Not Asked        Exercise: Not Asked        Bike Helmet: Not Asked        Seat Belt: Not Asked        Self-Exams: Not Asked    Social H ELOISA 2-3 WEEKS  PAIN SCRIPT IN CHART

## 2019-02-02 NOTE — OCCUPATIONAL THERAPY NOTE
OCCUPATIONAL THERAPY TREATMENT NOTE - INPATIENT    Room Number: 431/431-A         Presenting Problem: (LTKA)     Problem List  Principal Problem:    Osteoarthritis of left knee  Active Problems:    Essential hypertension    A-fib (HCC)    Mesenteric ischem Activity Short Form  How much help from another person does the patient currently need…  -   Putting on and taking off regular lower body clothing?: A Little  -   Bathing (including washing, rinsing, drying)?: A Little  -   Toileting, which includes using

## 2019-02-02 NOTE — PHYSICAL THERAPY NOTE
PHYSICAL THERAPY KNEE TREATMENT NOTE - INPATIENT     Room Number: 431/431-A             Presenting Problem: L TKR    Problem List  Principal Problem:    Osteoarthritis of left knee  Active Problems:    Essential hypertension    A-fib (HCC)    Mesenteric is lower extremity           L Lower Extremity: Weight Bearing as Tolerated    PAIN ASSESSMENT   Rating: (did not rate)  Location: LLE  Management Techniques: Activity promotion; Body mechanics;Repositioning    BALANCE  Static Sitting: Good  Dynamic Sitting: F independent      Goal #2  Current Status SBA   Goal #3 Patient is able to ambulate 300 feet with assistive device at assistance level: modified independent    Goal #3   Current Status Amb 250 ft w/ RW & supervision   Goal #4 Patient will negotiate 5 stairs

## 2019-02-02 NOTE — PROGRESS NOTES
[facility]    Progress Note    Cheyenne Meyers Patient Status:  Inpatient    1945 MRN [id number]   Location [facility] 4W/SW/SE Attending Rigoberto Oh, [address] [facility] Day # [address] [facility].  [address] [facility],        Subjective:   Laura Bonner DiphenhydrAMINE HCl (BENADRYL) injection 12.5 mg 12.5 mg Intravenous Q4H PRN   acetaminophen (TYLENOL) tab 650 mg 650 mg Oral Q4H PRN   Or      HYDROcodone-acetaminophen (NORCO) 5-325 MG per tab 1 tablet 1 tablet Oral Q4H PRN   Or      HYDROcodone-acetam place. * No obvious complication. Dictated by (CST): Helena Oliver MD on 1/31/2019 at 9:40     Approved by (CST):  Helena Oliver MD on 1/31/2019 at 9:45                Results:     CBC:    Lab Results   Component Value Date    WBC 7.6 02/01/2019

## 2019-02-02 NOTE — PLAN OF CARE
DISCHARGE PLANNING    • Discharge to home or other facility with appropriate resources Progressing        HEMATOLOGIC - ADULT    • Maintains hematologic stability Progressing    • Free from bleeding injury Progressing        Impaired Activities of Daily Natan Bazzi

## 2019-02-02 NOTE — PROGRESS NOTES
[facility]    Progress Note    Huber Acosta Patient Status:  Inpatient    1945 MRN [id number]   Location [facility] 4W/SW/SE Attending Maged Whitten, [address] [facility] Day # 2 PCP Farzana Mcdonnell.  Emily Calvillo,        Subjective:   Harmony Lin injection 1 mg 1 mg Intravenous Q2H PRN   Or      morphINE sulfate (PF) 2 MG/ML injection 2 mg 2 mg Intravenous Q2H PRN   Or      morphINE sulfate (PF) 4 MG/ML injection 4 mg 4 mg Intravenous Q2H PRN   Cyclobenzaprine HCl (FLEXERIL) tab 5 mg 5 mg Oral Q8H L KNEE ARTHROPLASTY post-op day #2, PAIN CONTROL, NEURO VASCULAR CHECKS, CONT ELIQUIS FOR DVT PROPHYLAXIS, PT/OT        Essential hypertension  CONT HOME MEDS, MONITOR.        A-fib (HCC)  CONT HOME MEDS.        Mesenteric ischemia, chronic (HCC)  HISTORY

## 2019-02-02 NOTE — PLAN OF CARE
DISCHARGE PLANNING    • Discharge to home or other facility with appropriate resources Progressing        HEMATOLOGIC - ADULT    • Maintains hematologic stability Progressing    • Free from bleeding injury Progressing        Impaired Activities of Daily Maya Lose

## 2019-02-03 VITALS
WEIGHT: 213 LBS | DIASTOLIC BLOOD PRESSURE: 50 MMHG | BODY MASS INDEX: 34.23 KG/M2 | SYSTOLIC BLOOD PRESSURE: 119 MMHG | OXYGEN SATURATION: 97 % | TEMPERATURE: 98 F | HEART RATE: 67 BPM | RESPIRATION RATE: 18 BRPM | HEIGHT: 66 IN

## 2019-02-03 LAB
DEPRECATED RDW RBC AUTO: 47.8 FL (ref 35.1–46.3)
ERYTHROCYTE [DISTWIDTH] IN BLOOD BY AUTOMATED COUNT: 12.8 % (ref 11–15)
HCT VFR BLD AUTO: 34.5 % (ref 35–48)
HGB BLD-MCNC: 10.9 G/DL (ref 12–16)
MCH RBC QN AUTO: 32.4 PG (ref 26–34)
MCHC RBC AUTO-ENTMCNC: 31.6 G/DL (ref 31–37)
MCV RBC AUTO: 102.7 FL (ref 80–100)
PLATELET # BLD AUTO: 192 10(3)UL (ref 150–450)
RBC # BLD AUTO: 3.36 X10(6)UL (ref 3.8–5.3)
WBC # BLD AUTO: 5.8 X10(3) UL (ref 4–11)

## 2019-02-03 PROCEDURE — 99239 HOSP IP/OBS DSCHRG MGMT >30: CPT | Performed by: HOSPITALIST

## 2019-02-03 NOTE — CM/SW NOTE
Pt able to d/c today    [facility] requesting 2p d/c time  RN aware of d/c time.  Per RN, pt will take Medicar  Pt aware of d/c and Medicar  [facility] contacted [facility] for [address] (39$)    [facility] # [id number] , [facility] [address]

## 2019-02-03 NOTE — PROGRESS NOTES
Florian Schwab        Wt Readings from Last 6 Encounters:  01/31/19 : 213 lb (96.6 kg)  01/24/19 : 213 lb (96.6 kg)  01/21/19 : 218 lb (98.9 kg)  09/17/18 : 202 lb (91.6 kg)  09/12/18 : 201 lb (91.2 kg)  08/10/18 : 201 lb (91.2 kg)    74year old  Surgical/P current facility-administered medications on file prior to encounter. Current Outpatient Medications on File Prior to Encounter:  Metoprolol Tartrate 50 MG Oral Tab Take 1 tablet (50 mg total) by mouth 2 (two) times daily.  Disp: 180 tablet Rfl: 3   Park Asked        Stress Concern: Not Asked        Weight Concern: Not Asked        Special Diet: Not Asked        Back Care: Not Asked        Exercise: Not Asked        Bike Helmet: Not Asked        Seat Belt: Not Asked        Self-Exams: Not Asked    Social H

## 2019-02-03 NOTE — PHYSICAL THERAPY NOTE
PHYSICAL THERAPY KNEE TREATMENT NOTE - INPATIENT     Room Number: 431/431-A             Presenting Problem: L TKR    Problem List  Principal Problem:    Osteoarthritis of left knee  Active Problems:    Essential hypertension    A-fib (HCC)    Mesenteric is INPATIENT SHORT FORM - BASIC MOBILITY  How much difficulty does the patient currently have. ..  -   Turning over in bed (including adjusting bedclothes, sheets and blankets)?: A Little   -   Sitting down on and standing up from a chair with arms (e.g., whee degrees flexion     Goal #5   Current Status IN PROGRESS   Goal #6 Patient independently performs home exercise program for ROM/strengthening per the instructions provided in preparation for discharge.    Goal #6  Current Status IN PROGRESS

## 2019-02-03 NOTE — PLAN OF CARE
PAIN - ADULT    • Verbalizes/displays adequate comfort level or patient's stated pain goal Progressing    Pain is controlled with [place] PRN.     Patient Centered Care    • Patient preferences are identified and integrated in the patient's plan of care Progr

## 2019-02-04 ENCOUNTER — TELEPHONE (OUTPATIENT)
Dept: OTHER | Age: 74
End: 2019-02-04

## 2019-02-04 NOTE — TELEPHONE ENCOUNTER
Received call from [address] at this facility- Rep need to know if Pt received Prevnar - advised 13 3/2/18- not 23-stated will offer to Pt

## 2019-02-12 ENCOUNTER — PATIENT OUTREACH (OUTPATIENT)
Dept: CASE MANAGEMENT | Age: 74
End: 2019-02-12

## 2019-02-12 ENCOUNTER — PATIENT MESSAGE (OUTPATIENT)
Dept: CASE MANAGEMENT | Age: 74
End: 2019-02-12

## 2019-02-12 DIAGNOSIS — Z02.9 ENCOUNTERS FOR ADMINISTRATIVE PURPOSE: ICD-10-CM

## 2019-02-12 NOTE — PROGRESS NOTES
Attempted to contact the patient for SNF follow up. Phone rang for over a minute with no option to leave a VM. NCM will try again later.

## 2019-02-14 ENCOUNTER — OFFICE VISIT (OUTPATIENT)
Dept: FAMILY MEDICINE CLINIC | Facility: CLINIC | Age: 74
End: 2019-02-14
Payer: MEDICARE

## 2019-02-14 VITALS
WEIGHT: 209.19 LBS | DIASTOLIC BLOOD PRESSURE: 80 MMHG | BODY MASS INDEX: 34 KG/M2 | SYSTOLIC BLOOD PRESSURE: 144 MMHG | HEART RATE: 73 BPM

## 2019-02-14 DIAGNOSIS — Z87.19 HISTORY OF ISCHEMIC COLITIS: ICD-10-CM

## 2019-02-14 DIAGNOSIS — I10 HYPERTENSION, BENIGN: ICD-10-CM

## 2019-02-14 DIAGNOSIS — N18.9 ANEMIA DUE TO CHRONIC KIDNEY DISEASE, UNSPECIFIED CKD STAGE: ICD-10-CM

## 2019-02-14 DIAGNOSIS — D63.1 ANEMIA DUE TO CHRONIC KIDNEY DISEASE, UNSPECIFIED CKD STAGE: ICD-10-CM

## 2019-02-14 DIAGNOSIS — M17.12 PRIMARY OSTEOARTHRITIS OF LEFT KNEE: Primary | ICD-10-CM

## 2019-02-14 DIAGNOSIS — N18.30 CKD (CHRONIC KIDNEY DISEASE) STAGE 3, GFR 30-59 ML/MIN (HCC): ICD-10-CM

## 2019-02-14 DIAGNOSIS — I48.91 ATRIAL FIBRILLATION, UNSPECIFIED TYPE (HCC): ICD-10-CM

## 2019-02-14 PROCEDURE — 1111F DSCHRG MED/CURRENT MED MERGE: CPT | Performed by: FAMILY MEDICINE

## 2019-02-14 PROCEDURE — 99214 OFFICE O/P EST MOD 30 MIN: CPT | Performed by: FAMILY MEDICINE

## 2019-02-14 PROCEDURE — G0463 HOSPITAL OUTPT CLINIC VISIT: HCPCS | Performed by: FAMILY MEDICINE

## 2019-02-14 RX ORDER — DILTIAZEM HYDROCHLORIDE 120 MG/1
CAPSULE, EXTENDED RELEASE ORAL
COMMUNITY
Start: 2019-02-08 | End: 2020-03-09

## 2019-02-14 NOTE — PROGRESS NOTES
Had a lot of swelling left leg initially   Now resolving  \"It felt like carring a watermelon around. \"    bp has been ok. Patient's past medical surgical family social history was reviewed.       Review of Systems    Allergic: no environmental aller

## 2019-02-20 NOTE — PROGRESS NOTES
Several attempts made to reach the patient with no return call. Patient completed HFU on 2/14/2019. Closing encounter.

## 2019-02-21 NOTE — DISCHARGE SUMMARY
[facility]    Discharge Summary    Panchito Landis Patient Status:  Inpatient    1945 MRN [id number]   Location [facility] 4W/SW/SE Attending No att. providers found   Hosp Day # 3 PCP Kat Berry.  Jemal,      Date of Admiss auscultation bilaterally  Cardiovascular: S1, S2 normal, no murmur, click, rub or gallop, regular rate and rhythm  Abdominal: soft, non-tender; bowel sounds normal; no masses,  no organomegaly  Extremities: extremities normal, atraumatic, no cyanosis or ed mouth every 4 (four) hours as needed. Quantity:  40 tablet  Refills:  0        CHANGE how you take these medications      Instructions Prescription details   ELIQUIS 2.5 MG Tabs  Generic drug:  apixaban  What changed:    · See the new instructions.   · An

## 2019-03-22 ENCOUNTER — LAB ENCOUNTER (OUTPATIENT)
Dept: LAB | Age: 74
End: 2019-03-22
Attending: FAMILY MEDICINE
Payer: MEDICARE

## 2019-03-22 DIAGNOSIS — D63.1 ANEMIA DUE TO CHRONIC KIDNEY DISEASE, UNSPECIFIED CKD STAGE: ICD-10-CM

## 2019-03-22 DIAGNOSIS — N18.9 ANEMIA DUE TO CHRONIC KIDNEY DISEASE, UNSPECIFIED CKD STAGE: ICD-10-CM

## 2019-03-22 DIAGNOSIS — I10 HYPERTENSION, BENIGN: ICD-10-CM

## 2019-03-22 DIAGNOSIS — I48.91 ATRIAL FIBRILLATION, UNSPECIFIED TYPE (HCC): ICD-10-CM

## 2019-03-22 LAB
ALBUMIN SERPL-MCNC: 3.5 G/DL (ref 3.4–5)
ALBUMIN/GLOB SERPL: 1 {RATIO} (ref 1–2)
ALP LIVER SERPL-CCNC: 72 U/L (ref 55–142)
ALT SERPL-CCNC: 21 U/L (ref 13–56)
ANION GAP SERPL CALC-SCNC: 6 MMOL/L (ref 0–18)
AST SERPL-CCNC: 16 U/L (ref 15–37)
BASOPHILS # BLD AUTO: 0.06 X10(3) UL (ref 0–0.2)
BASOPHILS NFR BLD AUTO: 1.2 %
BILIRUB SERPL-MCNC: 0.4 MG/DL (ref 0.1–2)
BUN BLD-MCNC: 33 MG/DL (ref 7–18)
BUN/CREAT SERPL: 28 (ref 10–20)
CALCIUM BLD-MCNC: 9.4 MG/DL (ref 8.5–10.1)
CHLORIDE SERPL-SCNC: 108 MMOL/L (ref 98–107)
CO2 SERPL-SCNC: 30 MMOL/L (ref 21–32)
CREAT BLD-MCNC: 1.18 MG/DL (ref 0.55–1.02)
DEPRECATED RDW RBC AUTO: 54.2 FL (ref 35.1–46.3)
EOSINOPHIL # BLD AUTO: 0.18 X10(3) UL (ref 0–0.7)
EOSINOPHIL NFR BLD AUTO: 3.6 %
ERYTHROCYTE [DISTWIDTH] IN BLOOD BY AUTOMATED COUNT: 14 % (ref 11–15)
GLOBULIN PLAS-MCNC: 3.6 G/DL (ref 2.8–4.4)
GLUCOSE BLD-MCNC: 98 MG/DL (ref 70–99)
HCT VFR BLD AUTO: 42.8 % (ref 35–48)
HGB BLD-MCNC: 13 G/DL (ref 12–16)
IMM GRANULOCYTES # BLD AUTO: 0.01 X10(3) UL (ref 0–1)
IMM GRANULOCYTES NFR BLD: 0.2 %
LYMPHOCYTES # BLD AUTO: 1.04 X10(3) UL (ref 1–4)
LYMPHOCYTES NFR BLD AUTO: 20.6 %
M PROTEIN MFR SERPL ELPH: 7.1 G/DL (ref 6.4–8.2)
MCH RBC QN AUTO: 32.1 PG (ref 26–34)
MCHC RBC AUTO-ENTMCNC: 30.4 G/DL (ref 31–37)
MCV RBC AUTO: 105.7 FL (ref 80–100)
MONOCYTES # BLD AUTO: 0.42 X10(3) UL (ref 0.1–1)
MONOCYTES NFR BLD AUTO: 8.3 %
NEUTROPHILS # BLD AUTO: 3.33 X10 (3) UL (ref 1.5–7.7)
NEUTROPHILS # BLD AUTO: 3.33 X10(3) UL (ref 1.5–7.7)
NEUTROPHILS NFR BLD AUTO: 66.1 %
OSMOLALITY SERPL CALC.SUM OF ELEC: 305 MOSM/KG (ref 275–295)
PLATELET # BLD AUTO: 235 10(3)UL (ref 150–450)
POTASSIUM SERPL-SCNC: 4.6 MMOL/L (ref 3.5–5.1)
RBC # BLD AUTO: 4.05 X10(6)UL (ref 3.8–5.3)
SODIUM SERPL-SCNC: 144 MMOL/L (ref 136–145)
WBC # BLD AUTO: 5 X10(3) UL (ref 4–11)

## 2019-03-22 PROCEDURE — 36415 COLL VENOUS BLD VENIPUNCTURE: CPT

## 2019-03-22 PROCEDURE — 85025 COMPLETE CBC W/AUTO DIFF WBC: CPT

## 2019-03-22 PROCEDURE — 80053 COMPREHEN METABOLIC PANEL: CPT

## 2019-03-23 ENCOUNTER — TELEPHONE (OUTPATIENT)
Dept: OTHER | Age: 74
End: 2019-03-23

## 2019-03-23 DIAGNOSIS — N18.30 CHRONIC RENAL FAILURE IN PEDIATRIC PATIENT, STAGE 3 (MODERATE) (HCC): Primary | ICD-10-CM

## 2019-03-23 NOTE — PROGRESS NOTES
See TE 3-23-19.     Future Appointments  6/17/2019  10:20 AM   Steph Mann MD    [address] [place] [address]

## 2019-03-23 NOTE — TELEPHONE ENCOUNTER
Pt informed of lab result & MD recommendation, pt stated understanding. Pt would like to see Dr Maude Joaquin next Tuesday 3-26-19, can we squeeze pt in on that day, only available slot is for 10 mins viist?   Pt is going to [address] [place] [address] next Thursday.    pls advise,

## 2019-03-26 NOTE — TELEPHONE ENCOUNTER
Per patient she is unable to come in today, requesting an appt for Thursday at [facility]. Appt booked.

## 2019-03-28 ENCOUNTER — OFFICE VISIT (OUTPATIENT)
Dept: FAMILY MEDICINE CLINIC | Facility: CLINIC | Age: 74
End: 2019-03-28
Payer: MEDICARE

## 2019-03-28 VITALS
TEMPERATURE: 97 F | HEIGHT: 66 IN | SYSTOLIC BLOOD PRESSURE: 160 MMHG | DIASTOLIC BLOOD PRESSURE: 83 MMHG | BODY MASS INDEX: 34.23 KG/M2 | HEART RATE: 73 BPM | WEIGHT: 213 LBS

## 2019-03-28 DIAGNOSIS — I10 ESSENTIAL HYPERTENSION: ICD-10-CM

## 2019-03-28 DIAGNOSIS — Z85.828 HISTORY OF BASAL CELL CARCINOMA OF SKIN: ICD-10-CM

## 2019-03-28 DIAGNOSIS — N18.30 STAGE 3 CHRONIC KIDNEY DISEASE (HCC): ICD-10-CM

## 2019-03-28 DIAGNOSIS — E86.0 DEHYDRATION: Primary | ICD-10-CM

## 2019-03-28 PROCEDURE — G0463 HOSPITAL OUTPT CLINIC VISIT: HCPCS | Performed by: FAMILY MEDICINE

## 2019-03-28 PROCEDURE — 99214 OFFICE O/P EST MOD 30 MIN: CPT | Performed by: FAMILY MEDICINE

## 2019-03-28 NOTE — PROGRESS NOTES
Has been drink  Kidney function was donw  Had diarhea yest and last week  And then abd syptoms per usual    Leaving for [facility] for 2 weeks tomorrow.     Gfr 55 2/2 and 45 2/22    Shakes in the left hand are back    bp up. 160 /83      Exam  Has shakes in r

## 2019-05-10 ENCOUNTER — HOSPITAL ENCOUNTER (EMERGENCY)
Facility: HOSPITAL | Age: 74
Discharge: HOME | End: 2019-05-10
Attending: EMERGENCY MEDICINE
Payer: MEDICARE

## 2019-05-10 ENCOUNTER — APPOINTMENT (OUTPATIENT)
Dept: GENERAL RADIOLOGY | Facility: HOSPITAL | Age: 74
End: 2019-05-10
Attending: EMERGENCY MEDICINE
Payer: MEDICARE

## 2019-05-10 VITALS
HEIGHT: 66 IN | WEIGHT: 210 LBS | SYSTOLIC BLOOD PRESSURE: 144 MMHG | HEART RATE: 69 BPM | TEMPERATURE: 97 F | OXYGEN SATURATION: 96 % | BODY MASS INDEX: 33.75 KG/M2 | DIASTOLIC BLOOD PRESSURE: 100 MMHG | RESPIRATION RATE: 17 BRPM

## 2019-05-10 DIAGNOSIS — E16.2 HYPOGLYCEMIA: Primary | ICD-10-CM

## 2019-05-10 PROCEDURE — 99284 EMERGENCY DEPT VISIT MOD MDM: CPT

## 2019-05-10 PROCEDURE — 84484 ASSAY OF TROPONIN QUANT: CPT | Performed by: EMERGENCY MEDICINE

## 2019-05-10 PROCEDURE — 96361 HYDRATE IV INFUSION ADD-ON: CPT

## 2019-05-10 PROCEDURE — 85025 COMPLETE CBC W/AUTO DIFF WBC: CPT | Performed by: EMERGENCY MEDICINE

## 2019-05-10 PROCEDURE — 93005 ELECTROCARDIOGRAM TRACING: CPT

## 2019-05-10 PROCEDURE — 96360 HYDRATION IV INFUSION INIT: CPT

## 2019-05-10 PROCEDURE — 81001 URINALYSIS AUTO W/SCOPE: CPT | Performed by: EMERGENCY MEDICINE

## 2019-05-10 PROCEDURE — 82962 GLUCOSE BLOOD TEST: CPT

## 2019-05-10 PROCEDURE — 71045 X-RAY EXAM CHEST 1 VIEW: CPT | Performed by: EMERGENCY MEDICINE

## 2019-05-10 PROCEDURE — 80048 BASIC METABOLIC PNL TOTAL CA: CPT | Performed by: EMERGENCY MEDICINE

## 2019-05-10 PROCEDURE — 93010 ELECTROCARDIOGRAM REPORT: CPT | Performed by: EMERGENCY MEDICINE

## 2019-05-10 NOTE — ED PROVIDER NOTES
Patient Seen in: [facility] AND [facility] Emergency Department    History   Patient presents with:  Hypoglycemia (metabolic)    Stated Complaint: hypoglycemia     HPI    67 yo F with PMH VTE/afib on eliquis therapy, HTN, HL presenting for evaluation of dyspnea/ge REPLACEMENT Left 1/31/2019    Performed by Estrella Adkins MD at [address] [place] [address] MAIN OR   • KNEE TOTAL REPLACEMENT Right 10/12/2017    Performed by Estrella Adkins MD at [address] [place] [address] OR   • OTHER SURGICAL HISTORY      Injections and narcotics, POD Bartuci   • REMOVAL OF swelling and arthralgias. Positive for stated complaint: hypoglycemia  Other systems are as noted in HPI. Constitutional and vital signs reviewed. All other systems reviewed and negative except as noted above.     PSFH elements reviewed from today Neutrophil Absolute Prelim 7.74 (*)     Neutrophil Absolute 7.74 (*)     Lymphocyte Absolute 0.75 (*)     All other components within normal limits   TROPONIN I - Normal   CBC WITH DIFFERENTIAL WITH PLATELET    Narrative:      The following orders were c history and physical exam differential diagnosis includes but is not limited to hypoglycemia, UTI, PNA, ACS.     Pulse ox: 98%:Normal on RA, as interpreted by myself    Cardiac Monitor Interpretation: Pulse Readings from Last 1 Encounters:  05/10/19 : 76  ,

## 2019-05-10 NOTE — ED INITIAL ASSESSMENT (HPI)
Via EMS from home, patient felt weak and diaphoretic. Patient thought her afib was acting up--wearing a heart monitor from Dr. Agnes Serrano office    Blood sugar 30 per medics , glucose gel given.  Patient always Maddison Amy   Denies being diabetic

## 2019-05-23 DIAGNOSIS — E78.5 HYPERLIPIDEMIA, UNSPECIFIED HYPERLIPIDEMIA TYPE: ICD-10-CM

## 2019-05-23 RX ORDER — ATORVASTATIN CALCIUM 10 MG/1
TABLET, FILM COATED ORAL
Qty: 90 TABLET | Refills: 1 | Status: SHIPPED | OUTPATIENT
Start: 2019-05-23 | End: 2019-06-20

## 2019-05-30 ENCOUNTER — APPOINTMENT (OUTPATIENT)
Dept: LAB | Age: 74
End: 2019-05-30
Attending: FAMILY MEDICINE
Payer: MEDICARE

## 2019-05-30 DIAGNOSIS — N18.30 CHRONIC RENAL FAILURE IN PEDIATRIC PATIENT, STAGE 3 (MODERATE) (HCC): ICD-10-CM

## 2019-05-30 PROCEDURE — 36415 COLL VENOUS BLD VENIPUNCTURE: CPT

## 2019-05-30 PROCEDURE — 80048 BASIC METABOLIC PNL TOTAL CA: CPT

## 2019-06-03 ENCOUNTER — OFFICE VISIT (OUTPATIENT)
Dept: FAMILY MEDICINE CLINIC | Facility: CLINIC | Age: 74
End: 2019-06-03
Payer: MEDICARE

## 2019-06-03 VITALS
WEIGHT: 218 LBS | SYSTOLIC BLOOD PRESSURE: 134 MMHG | BODY MASS INDEX: 35 KG/M2 | DIASTOLIC BLOOD PRESSURE: 71 MMHG | TEMPERATURE: 98 F | HEART RATE: 79 BPM

## 2019-06-03 DIAGNOSIS — I26.99 PULMONARY EMBOLISM AND INFARCTION (HCC): ICD-10-CM

## 2019-06-03 DIAGNOSIS — E55.9 VITAMIN D DEFICIENCY: ICD-10-CM

## 2019-06-03 DIAGNOSIS — N18.30 CKD (CHRONIC KIDNEY DISEASE) STAGE 3, GFR 30-59 ML/MIN (HCC): ICD-10-CM

## 2019-06-03 DIAGNOSIS — K21.00 HIATAL HERNIA WITH GERD AND ESOPHAGITIS: ICD-10-CM

## 2019-06-03 DIAGNOSIS — I10 ESSENTIAL HYPERTENSION: Primary | ICD-10-CM

## 2019-06-03 DIAGNOSIS — K44.9 HIATAL HERNIA WITH GERD AND ESOPHAGITIS: ICD-10-CM

## 2019-06-03 DIAGNOSIS — R19.05 PERIUMBILICAL MASS: ICD-10-CM

## 2019-06-03 DIAGNOSIS — F32.1 MAJOR DEPRESSIVE DISORDER, SINGLE EPISODE, MODERATE (HCC): ICD-10-CM

## 2019-06-03 PROCEDURE — 99215 OFFICE O/P EST HI 40 MIN: CPT | Performed by: FAMILY MEDICINE

## 2019-06-03 PROCEDURE — G0463 HOSPITAL OUTPT CLINIC VISIT: HCPCS | Performed by: FAMILY MEDICINE

## 2019-06-03 RX ORDER — CARVEDILOL 12.5 MG/1
TABLET ORAL
COMMUNITY
End: 2020-03-09

## 2019-06-03 NOTE — PROGRESS NOTES
\"May 10th had low blood sugar of 30  Since begin of April I've had diarrhea stomach cramps burping and gas and gaining weight. Yesterday I had 3 eggs that was it. And today I gained a pound. \"  Cristy Allred July 15th.     Stopped eating most gluten   Not complet ct.    - CT ABDOMEN (W+WO)/PELVIS (CNTRST ONLY) (CPT=74178);  Future

## 2019-06-10 ENCOUNTER — HOSPITAL ENCOUNTER (OUTPATIENT)
Dept: CT IMAGING | Age: 74
Discharge: HOME | End: 2019-06-10
Attending: FAMILY MEDICINE
Payer: MEDICARE

## 2019-06-10 DIAGNOSIS — K21.00 HIATAL HERNIA WITH GERD AND ESOPHAGITIS: ICD-10-CM

## 2019-06-10 DIAGNOSIS — R19.05 PERIUMBILICAL MASS: ICD-10-CM

## 2019-06-10 DIAGNOSIS — K44.9 HIATAL HERNIA WITH GERD AND ESOPHAGITIS: ICD-10-CM

## 2019-06-10 PROCEDURE — 82565 ASSAY OF CREATININE: CPT

## 2019-06-10 PROCEDURE — 74177 CT ABD & PELVIS W/CONTRAST: CPT | Performed by: FAMILY MEDICINE

## 2019-06-20 DIAGNOSIS — E78.5 HYPERLIPIDEMIA, UNSPECIFIED HYPERLIPIDEMIA TYPE: ICD-10-CM

## 2019-06-21 RX ORDER — ATORVASTATIN CALCIUM 10 MG/1
TABLET, FILM COATED ORAL
Qty: 90 TABLET | Refills: 3 | Status: SHIPPED | OUTPATIENT
Start: 2019-06-21 | End: 2020-02-12

## 2019-07-26 ENCOUNTER — TELEPHONE (OUTPATIENT)
Dept: GASTROENTEROLOGY | Facility: CLINIC | Age: 74
End: 2019-07-26

## 2019-07-26 ENCOUNTER — OFFICE VISIT (OUTPATIENT)
Dept: GASTROENTEROLOGY | Facility: CLINIC | Age: 74
End: 2019-07-26
Payer: MEDICARE

## 2019-07-26 VITALS
SYSTOLIC BLOOD PRESSURE: 129 MMHG | HEART RATE: 86 BPM | HEIGHT: 66 IN | BODY MASS INDEX: 36.16 KG/M2 | WEIGHT: 225 LBS | DIASTOLIC BLOOD PRESSURE: 79 MMHG

## 2019-07-26 DIAGNOSIS — R10.9 ABDOMINAL CRAMPING: ICD-10-CM

## 2019-07-26 DIAGNOSIS — Z86.010 ENCOUNTER FOR COLONOSCOPY DUE TO HISTORY OF ADENOMATOUS COLONIC POLYPS: ICD-10-CM

## 2019-07-26 DIAGNOSIS — R63.5 WEIGHT GAIN: ICD-10-CM

## 2019-07-26 DIAGNOSIS — Z86.010 HISTORY OF COLON POLYPS: ICD-10-CM

## 2019-07-26 DIAGNOSIS — R13.10 DYSPHAGIA, UNSPECIFIED TYPE: ICD-10-CM

## 2019-07-26 DIAGNOSIS — Z86.010 ENCOUNTER FOR COLONOSCOPY DUE TO HISTORY OF COLONIC POLYP: ICD-10-CM

## 2019-07-26 DIAGNOSIS — R19.5 LOOSE STOOLS: ICD-10-CM

## 2019-07-26 DIAGNOSIS — Z12.11 ENCOUNTER FOR COLONOSCOPY DUE TO HISTORY OF ADENOMATOUS COLONIC POLYPS: ICD-10-CM

## 2019-07-26 DIAGNOSIS — R12 HEARTBURN: Primary | ICD-10-CM

## 2019-07-26 DIAGNOSIS — Z12.11 ENCOUNTER FOR COLONOSCOPY DUE TO HISTORY OF COLONIC POLYP: ICD-10-CM

## 2019-07-26 PROCEDURE — 99215 OFFICE O/P EST HI 40 MIN: CPT | Performed by: PHYSICIAN ASSISTANT

## 2019-07-26 PROCEDURE — G0463 HOSPITAL OUTPT CLINIC VISIT: HCPCS | Performed by: PHYSICIAN ASSISTANT

## 2019-07-26 RX ORDER — POLYETHYLENE GLYCOL 3350, SODIUM CHLORIDE, SODIUM BICARBONATE, POTASSIUM CHLORIDE 420; 11.2; 5.72; 1.48 G/4L; G/4L; G/4L; G/4L
POWDER, FOR SOLUTION ORAL
Qty: 1 BOTTLE | Refills: 0 | Status: SHIPPED | OUTPATIENT
Start: 2019-07-26 | End: 2020-03-18 | Stop reason: ALTCHOICE

## 2019-07-26 RX ORDER — DICYCLOMINE HYDROCHLORIDE 10 MG/1
10 CAPSULE ORAL
Qty: 120 CAPSULE | Refills: 0 | Status: SHIPPED | OUTPATIENT
Start: 2019-07-26 | End: 2020-03-09

## 2019-07-26 RX ORDER — PANTOPRAZOLE SODIUM 40 MG/1
40 TABLET, DELAYED RELEASE ORAL
Qty: 90 TABLET | Refills: 0 | Status: SHIPPED | OUTPATIENT
Start: 2019-07-26 | End: 2019-11-11

## 2019-07-26 NOTE — PROGRESS NOTES
[address] [facility] Follow-up Visit    Deborah sildenafil for Raynaud's   - going to [facility] for relief of her Raynaud's for a few months  - seems as if the symptoms have been chronic but they are bothering her more now  - cannot eat fruit because it \"gets clogged in my system\" - unsure what this Colonel Benjamin MD at [facility] ENDOSCOPY   • ESOPHAGOGASTRODUODENOSCOPY (EGD) N/A 12/14/2016    Performed by Colonel Benjamin MD at [facility] ENDOSCOPY   • EXCISION OF NOSE      Basal cell carcinoma   • HERNIA SURGERY     • HYSTERECTOMY     • KNEE REPLACEMENT SURGER twice a day by oral route. Disp:  Rfl:    CARTIA  MG Oral Capsule SR 24 Hr  Disp:  Rfl:    apixaban (ELIQUIS) 2.5 MG Oral Tab Take 2 tablets (5 mg total) by mouth 2 (two) times daily.  Disp:  Rfl: 0   Metoprolol Tartrate 50 MG Oral Tab Take 1 tablet ( and discussed with patient today. ASSESSMENT/PLAN:   Miguel Guerra is a 66year old female patient known to Dr. Abhi Gusman of our GI clinic.  Complex PMHx including hx of morbid obesity, a-fib (on ELIQUIS), depression, acid reflux, basal cell skin cancer, hx Dysphagia: start with UGI with esophagogram. Appears patient seen in 2018 by [facility] and recommended for motility scans/studies but it is unclear if the patient complied with recommendations at that time.  I recommend starting PPI once daily and awaiting imaging Signed Prescriptions Disp Refills   • Dicyclomine HCl 10 MG Oral Cap 120 capsule 0     Sig: Take 1 capsule (10 mg total) by mouth 4 (four) times daily before meals and nightly.    • PEG 3350-KCl-Na Bicarb-NaCl (TRILYTE) 420 g Oral Recon Soln 1 Bottle 0

## 2019-07-26 NOTE — PATIENT INSTRUCTIONS
1. Schedule colonoscopy with Dr. Chelsie Ghotra with MAC sedation @ [facility] or [facility] with random biopsies to exclude microscopic colitis. 2.  bowel prep from pharmacy - I have prescribed Trilyte split dose preparation    3.  Medication Changes:  Recommendations

## 2019-07-26 NOTE — TELEPHONE ENCOUNTER
Request to hold eliquis and qty of days faxed to Dr. Osuna File at [phone], fax confirmation received 7/26/19 @ 8244. [facility] 9/18/19.    -Awaiting orders at this time.

## 2019-07-26 NOTE — TELEPHONE ENCOUNTER
Please obtain recommendations from either Dr. Anival Dickson or Shani Cabrera re: Shelbi Burnett for CLN with random bxs with MAC sedation with Dr. Jhonathan Coronado in the near future.     Thank you

## 2019-07-26 NOTE — TELEPHONE ENCOUNTER
Scheduled for:  Colonoscopy- [address]  Provider Name:  Dorcas Barajas  Date:  09/18/19  Location:  [facility]  Sedation:  Mac  Time:  10:00 -----Arrival 0900  Prep: Jeoffrey Mortimer  Meds/Allergies Reconciled?:  Physician reviewed   Diagnosis with codes:  Heartburn R12   Weight ga

## 2019-08-02 NOTE — TELEPHONE ENCOUNTER
Fax received from Dr. Matt Root office stating the following: \"She may hold Eliquis 2 days prior to the procedure. Resume the next day. \" Sent to scanning. LMTCB need to review with patient.

## 2019-08-22 NOTE — TELEPHONE ENCOUNTER
Multiple attempts made to reach pt- no answer and no VM set up.  Left another message for pt daughter, Etta Shipley requesting she inform pt to c/b to review med instructions below for her procedure on 9/18/19, otherwise her procedure will be cancelled/reschedul

## 2019-09-05 NOTE — TELEPHONE ENCOUNTER
Yes, please. Ms. Urmila Aviles described quite a bit of change in her GI health at our last [address] [facility] appointment. Please have her see me in December/January.  Thank you

## 2019-09-05 NOTE — TELEPHONE ENCOUNTER
VAMSHI    Pt contacted and reviewed Dr. Hurst Speaks orders to hold Eliquis x 2 days prior to Naz Valencia 63 on 9/18/19. Pt apologized for missing our phone calls and not calling the office, states she had an issue with her voicemail.      She states she was planning on

## 2019-10-07 ENCOUNTER — LAB ENCOUNTER (OUTPATIENT)
Dept: LAB | Age: 74
End: 2019-10-07
Attending: FAMILY MEDICINE
Payer: MEDICARE

## 2019-10-07 DIAGNOSIS — E11.9 DIABETES MELLITUS TYPE 2, DIET-CONTROLLED (HCC): ICD-10-CM

## 2019-10-07 DIAGNOSIS — N18.30 CKD (CHRONIC KIDNEY DISEASE) STAGE 3, GFR 30-59 ML/MIN (HCC): ICD-10-CM

## 2019-10-07 PROCEDURE — 36415 COLL VENOUS BLD VENIPUNCTURE: CPT

## 2019-10-07 PROCEDURE — 80061 LIPID PANEL: CPT

## 2019-10-07 PROCEDURE — 80053 COMPREHEN METABOLIC PANEL: CPT

## 2019-10-07 PROCEDURE — 83036 HEMOGLOBIN GLYCOSYLATED A1C: CPT

## 2019-10-07 PROCEDURE — 84100 ASSAY OF PHOSPHORUS: CPT

## 2019-11-12 ENCOUNTER — TELEPHONE (OUTPATIENT)
Dept: GASTROENTEROLOGY | Facility: CLINIC | Age: 74
End: 2019-11-12

## 2019-11-12 RX ORDER — PANTOPRAZOLE SODIUM 40 MG/1
TABLET, DELAYED RELEASE ORAL
Qty: 90 TABLET | Refills: 0 | Status: SHIPPED | OUTPATIENT
Start: 2019-11-12 | End: 2020-02-12

## 2019-11-12 NOTE — TELEPHONE ENCOUNTER
Requested Prescriptions     Pending Prescriptions Disp Refills   • PANTOPRAZOLE SODIUM 40 MG Oral Tab EC [Pharmacy Med Name: PANTOPRAZOLE 40MG TABLETS] 90 tablet 0     Sig: TAKE 1 TABLET(40 MG) BY MOUTH EVERY MORNING BEFORE BREAKFAST FOR 90 DOSES     Lr: 7

## 2019-11-12 NOTE — TELEPHONE ENCOUNTER
Please see 7/26 OV recommendations - RN to call patient and check in for 1) overdue imaging 2) colonoscopy cancelled for what reason 3) how is she doing?

## 2019-12-03 ENCOUNTER — TELEPHONE (OUTPATIENT)
Dept: GASTROENTEROLOGY | Facility: CLINIC | Age: 74
End: 2019-12-03

## 2019-12-03 NOTE — TELEPHONE ENCOUNTER
----- Message from Dulce Quach RN sent at 9/5/2019  9:46 AM CDT -----  Regarding: OV/CLN recall  Entered into Epic: Recall for OV/CLN 1/2020 with PB d/t hx of polyps.

## 2020-01-23 ENCOUNTER — TELEPHONE (OUTPATIENT)
Dept: GASTROENTEROLOGY | Facility: CLINIC | Age: 75
End: 2020-01-23

## 2020-01-23 NOTE — TELEPHONE ENCOUNTER
Patient returning nurse call and received letter, patient was out of town and will be leaving out of town again. Patient not able to schedule appointment at this time, ansley

## 2020-02-12 DIAGNOSIS — E78.5 HYPERLIPIDEMIA, UNSPECIFIED HYPERLIPIDEMIA TYPE: ICD-10-CM

## 2020-02-12 RX ORDER — PANTOPRAZOLE SODIUM 40 MG/1
TABLET, DELAYED RELEASE ORAL
Qty: 90 TABLET | Refills: 0 | Status: SHIPPED | OUTPATIENT
Start: 2020-02-12 | End: 2020-05-15

## 2020-02-12 RX ORDER — ATORVASTATIN CALCIUM 10 MG/1
TABLET, FILM COATED ORAL
Qty: 90 TABLET | Refills: 0 | Status: SHIPPED | OUTPATIENT
Start: 2020-02-12 | End: 2020-10-19

## 2020-02-12 NOTE — TELEPHONE ENCOUNTER
Patient has 3 months of medication. This is ample time for her to schedule an appointment to see us - she is overdue in regards to multiple recommendations. If no GI follow up, advise patient to please obtain refills from PCP.  Thanks

## 2020-02-12 NOTE — TELEPHONE ENCOUNTER
Requested Prescriptions     Pending Prescriptions Disp Refills   • PANTOPRAZOLE SODIUM 40 MG Oral Tab EC [Pharmacy Med Name: PANTOPRAZOLE 40MG TABLETS] 90 tablet 0     Sig: TAKE 1 TABLET(40 MG) BY MOUTH EVERY MORNING BEFORE BREAKFAST     Lr:11/12/2019

## 2020-02-13 NOTE — TELEPHONE ENCOUNTER
Routed to [facility] to schedule f/u.   Pt does not check mychart messages (per misc reports) Thank you

## 2020-03-09 ENCOUNTER — OFFICE VISIT (OUTPATIENT)
Dept: FAMILY MEDICINE CLINIC | Facility: CLINIC | Age: 75
End: 2020-03-09
Payer: MEDICARE

## 2020-03-09 VITALS — TEMPERATURE: 98 F | SYSTOLIC BLOOD PRESSURE: 149 MMHG | DIASTOLIC BLOOD PRESSURE: 84 MMHG | HEART RATE: 80 BPM

## 2020-03-09 DIAGNOSIS — F33.1 MODERATE EPISODE OF RECURRENT MAJOR DEPRESSIVE DISORDER (HCC): ICD-10-CM

## 2020-03-09 DIAGNOSIS — K44.9 HIATAL HERNIA WITH GERD AND ESOPHAGITIS: ICD-10-CM

## 2020-03-09 DIAGNOSIS — N18.30 CKD (CHRONIC KIDNEY DISEASE) STAGE 3, GFR 30-59 ML/MIN (HCC): Primary | ICD-10-CM

## 2020-03-09 DIAGNOSIS — K21.00 HIATAL HERNIA WITH GERD AND ESOPHAGITIS: ICD-10-CM

## 2020-03-09 DIAGNOSIS — I48.20 CHRONIC ATRIAL FIBRILLATION (HCC): ICD-10-CM

## 2020-03-09 PROCEDURE — 99215 OFFICE O/P EST HI 40 MIN: CPT | Performed by: FAMILY MEDICINE

## 2020-03-09 PROCEDURE — G0463 HOSPITAL OUTPT CLINIC VISIT: HCPCS | Performed by: FAMILY MEDICINE

## 2020-03-09 RX ORDER — SERTRALINE HYDROCHLORIDE 25 MG/1
25 TABLET, FILM COATED ORAL DAILY
Qty: 30 TABLET | Refills: 1 | Status: SHIPPED | OUTPATIENT
Start: 2020-03-09 | End: 2020-04-21

## 2020-03-09 RX ORDER — DILTIAZEM HYDROCHLORIDE 180 MG/1
180 CAPSULE, COATED, EXTENDED RELEASE ORAL DAILY
Qty: 90 CAPSULE | Refills: 1 | Status: SHIPPED | OUTPATIENT
Start: 2020-03-09 | End: 2021-06-08

## 2020-03-09 NOTE — PROGRESS NOTES
Laly Prescott  \"I need something for depression. Has to support her daughter grand daughter and great granddaughter. \"  daughter has psoriatic arthritis and misses a lot of work. Has been able to eat more    Weight up. Dress was appropriate.     Speech:  rat rales  Heart was regular rate and rhythm normal S1-S2 no S3 no S4 there were no murmurs appreciated  Abdomen was soft non tender non distended bowel sounds were present  Extremities there was no cyanosis or edema  Pulses the dorsalis pedis pulse the poplit

## 2020-03-17 ENCOUNTER — NURSE TRIAGE (OUTPATIENT)
Dept: FAMILY MEDICINE CLINIC | Facility: CLINIC | Age: 75
End: 2020-03-17

## 2020-03-17 NOTE — TELEPHONE ENCOUNTER
Advised patient of Stephanie Padilla PA-C note. Patient verbalized understanding  Patient will call tomorrow morning with blood pressure reading.

## 2020-03-17 NOTE — TELEPHONE ENCOUNTER
Please advise patient continue to monitor BP at this time. Will schedule OV if high BP tomorrow and remind patient to bring BP machine at home.

## 2020-03-17 NOTE — TELEPHONE ENCOUNTER
Action Requested: Summary for Provider     []  Critical Lab, Recommendations Needed  [x] Need Additional Advice  []   FYI    []   Need Orders  [] Need Medications Sent to Pharmacy  []  Other     SUMMARY: Patient reporting high BP for the last 2 days, yeste

## 2020-03-18 ENCOUNTER — OFFICE VISIT (OUTPATIENT)
Dept: FAMILY MEDICINE CLINIC | Facility: CLINIC | Age: 75
End: 2020-03-18
Payer: MEDICARE

## 2020-03-18 VITALS
HEART RATE: 76 BPM | HEIGHT: 66 IN | RESPIRATION RATE: 20 BRPM | BODY MASS INDEX: 36 KG/M2 | DIASTOLIC BLOOD PRESSURE: 84 MMHG | SYSTOLIC BLOOD PRESSURE: 156 MMHG

## 2020-03-18 DIAGNOSIS — I10 ESSENTIAL HYPERTENSION: Primary | ICD-10-CM

## 2020-03-18 PROCEDURE — G0463 HOSPITAL OUTPT CLINIC VISIT: HCPCS | Performed by: PHYSICIAN ASSISTANT

## 2020-03-18 PROCEDURE — 99213 OFFICE O/P EST LOW 20 MIN: CPT | Performed by: PHYSICIAN ASSISTANT

## 2020-03-18 RX ORDER — LISINOPRIL 10 MG/1
10 TABLET ORAL DAILY
Qty: 90 TABLET | Refills: 1 | Status: SHIPPED | OUTPATIENT
Start: 2020-03-18 | End: 2020-09-22

## 2020-03-18 NOTE — PROGRESS NOTES
HPI:     HPI  66year-old female is here in the office complaining of elevated BP for the past 3 days. Patient states that she has mild headache. Patient takes her medications as directed.   Patient denies of chest pain, SOB, N/V/C/D, fever, dizziness, sync reflux    • Heel spur     Right   • Hiatal hernia    • High blood pressure    • High cholesterol    • Incontinence    • Lumbar disc disease 2012   • Osteoarthritis    • Pulmonary embolism (HCC)    • Renal disorder    • Tubular adenoma of colon 1/2017    re file      Transportation needs:        Medical: Not on file        Non-medical: Not on file    Tobacco Use      Smoking status: Former Smoker        Quit date:         Years since quittin.2      Smokeless tobacco: Never Used    Substance and Sexua of breath and wheezing. Cardiovascular: Negative for chest pain and palpitations. Gastrointestinal: Negative for nausea, vomiting, abdominal pain, diarrhea, constipation, blood in stool and abdominal distention. Skin: Negative for rash.    Neurologic

## 2020-03-18 NOTE — TELEPHONE ENCOUNTER
Patient calling ,reported  early morning NT=836/101 and now down to QV=610/83 HR=72, just took metoprolol and diltiazem,asymptomatic. Cal Patricia=do you want office visit today?     Please reply to pool: ALLEN Hirsch

## 2020-03-18 NOTE — TELEPHONE ENCOUNTER
Patient contacted office. Informed of message below. Appointment scheduled for today at 145pm.      Routed as ANDREE.

## 2020-04-21 ENCOUNTER — TELEPHONE (OUTPATIENT)
Dept: FAMILY MEDICINE CLINIC | Facility: CLINIC | Age: 75
End: 2020-04-21

## 2020-04-21 DIAGNOSIS — F33.1 MODERATE EPISODE OF RECURRENT MAJOR DEPRESSIVE DISORDER (HCC): ICD-10-CM

## 2020-04-21 RX ORDER — SERTRALINE HYDROCHLORIDE 25 MG/1
25 TABLET, FILM COATED ORAL DAILY
Qty: 30 TABLET | Refills: 0 | Status: SHIPPED | OUTPATIENT
Start: 2020-04-21 | End: 2020-05-28

## 2020-04-21 NOTE — TELEPHONE ENCOUNTER
Refill request:    Current Outpatient Medications   Medication Sig Dispense Refill   • Sertraline HCl 25 MG Oral Tab Take 1 tablet (25 mg total) by mouth daily.  30 tablet 1

## 2020-05-15 RX ORDER — PANTOPRAZOLE SODIUM 40 MG/1
40 TABLET, DELAYED RELEASE ORAL
Qty: 90 TABLET | Refills: 0 | Status: SHIPPED | OUTPATIENT
Start: 2020-05-15 | End: 2020-08-26

## 2020-05-15 NOTE — TELEPHONE ENCOUNTER
Please advise on refill request below. Thank you. LV 07/26/19 with Lynn Grider    LR 02/12/20 #90 with 0 refill    No future appointment noted in system-    Per 02/12/2020 refill note from [facility]-  Patient has 3 months of medication.  This is ample ti

## 2020-05-15 NOTE — TELEPHONE ENCOUNTER
I have reviewed - patient should have PCP. I will refill for 3 months however if no follow up, I am unable to assist going forward.

## 2020-05-15 NOTE — TELEPHONE ENCOUNTER
Current Outpatient Medications   • PANTOPRAZOLE SODIUM 40 MG Oral Tab EC TAKE 1 TABLET(40 MG) BY MOUTH EVERY MORNING BEFORE BREAKFAST 90 tablet 0

## 2020-05-19 NOTE — PROGRESS NOTES
[facility] Telemedicine Visit    Parent/Caregiver verbally consents to a Telephone Check-In service on 5/20/2020    Patient understands and accepts financial responsibility for any deductible, co-insurance and/or co-pays associated with this service.     This visi pain  + \"I do not feel sick\"  - no CP or SOB  - no fevers/chills  + has an intermittent dry cough which she attributes to her acid reflux    7/26/2019 OV  - reports loose stools at least 3-4 times at maximum - muddy BMs, no overt diarrhea; some days wher Biopsies were obtained from the ulcers. These ulcers may be stress ulcers vs ischemic ulcers. No ulcers were noted on EGD in Dec 2016.  3. Despite repeated attempts at transillumination, an adequate site for PEG placement could not be identified.  PEG was n CHOLECYSTECTOMY  9/28/16    • COLONOSCOPY N/A 1/25/2017    Performed by Bri Mcnally MD at [address] [place] [address], COMPLETE  09/26/2013    Scanned to Media Tab   • ESOPHAGOGASTRODUODENOSCOPY (EGD) N/A 4/25/2017    Performed by Nazia Monreal 1   • dilTIAZem HCl ER Coated Beads (CARTIA XT) 180 MG Oral Capsule SR 24 Hr Take 1 capsule (180 mg total) by mouth daily.  90 capsule 1   • ATORVASTATIN 10 MG Oral Tab TAKE 1 TABLET(10 MG) BY MOUTH EVERY NIGHT 90 tablet 0   • apixaban (ELIQUIS) 2.5 MG Oral inconsistent bowel habits, similar to her 7/2019 office visit.  She reports \"I know I need to come in to have tests done\" however her daughter with psoriatic arthritis who lives with her has COVID-19 and the patient is unable to come into the hospital cur colonoscopy  - complete TSH (thyroid test) ordered by PCP  - consideration for dynamic imaging at next appointment with me   - see above     Colonoscopy consent: I have discussed the risks, benefits, and alternatives to colonoscopy with the patient [who de

## 2020-05-20 ENCOUNTER — VIRTUAL PHONE E/M (OUTPATIENT)
Dept: GASTROENTEROLOGY | Facility: CLINIC | Age: 75
End: 2020-05-20
Payer: MEDICARE

## 2020-05-20 ENCOUNTER — TELEPHONE (OUTPATIENT)
Dept: GASTROENTEROLOGY | Facility: CLINIC | Age: 75
End: 2020-05-20

## 2020-05-20 DIAGNOSIS — Z86.010 HISTORY OF COLON POLYPS: ICD-10-CM

## 2020-05-20 DIAGNOSIS — Z09 FOLLOW UP: ICD-10-CM

## 2020-05-20 DIAGNOSIS — Z79.899 CURRENT USE OF PROTON PUMP INHIBITOR: ICD-10-CM

## 2020-05-20 DIAGNOSIS — Z86.010 HX OF COLONIC POLYPS: Primary | ICD-10-CM

## 2020-05-20 DIAGNOSIS — R19.4 ALTERED BOWEL HABITS: ICD-10-CM

## 2020-05-20 DIAGNOSIS — R12 HEARTBURN: ICD-10-CM

## 2020-05-20 DIAGNOSIS — Z12.11 COLON CANCER SCREENING: ICD-10-CM

## 2020-05-20 DIAGNOSIS — Z12.11 ENCOUNTER FOR SCREENING COLONOSCOPY: Primary | ICD-10-CM

## 2020-05-20 PROCEDURE — 99214 OFFICE O/P EST MOD 30 MIN: CPT | Performed by: PHYSICIAN ASSISTANT

## 2020-05-20 NOTE — TELEPHONE ENCOUNTER
Recommend:  -Schedule colonoscopy with MAC sedation with Dr. Osei Catherine for July 2020 (Dx: altered bowel habits, history colon polyps, screening colonoscopy)  -Prep: Trilyte split dose preparation   -Medication Changes:  +obtain recommendations for ELIQUIS   **

## 2020-05-20 NOTE — PATIENT INSTRUCTIONS
Recommend:  -Schedule colonoscopy with MAC sedation with Dr. Isabella Felty for July 2020 (Dx: altered bowel habits, history colon polyps, screening colonoscopy)  -Prep: Trilyte split dose preparation   -Medication Changes:  +obtain recommendations for ELIQUIS   **

## 2020-05-27 DIAGNOSIS — F33.1 MODERATE EPISODE OF RECURRENT MAJOR DEPRESSIVE DISORDER (HCC): ICD-10-CM

## 2020-05-27 NOTE — TELEPHONE ENCOUNTER
This is being sent to you without review by the Triage staff due to the high call volumes created by the COVID-19 virus, per the email sent by Dr. Abbie Carpio on 3/19/20. Thank you for your support.     Centralized Nurse Triage Team

## 2020-05-28 RX ORDER — SERTRALINE HYDROCHLORIDE 25 MG/1
25 TABLET, FILM COATED ORAL DAILY
Qty: 90 TABLET | Refills: 1 | Status: SHIPPED | OUTPATIENT
Start: 2020-05-28 | End: 2020-08-26

## 2020-06-04 NOTE — TELEPHONE ENCOUNTER
Patient will call us back late Summer in August to schedule her Procedure for Colonoscopy with  ,leaving to [place] until further noticed.  [email or web address] .

## 2020-07-22 ENCOUNTER — TELEPHONE (OUTPATIENT)
Dept: FAMILY MEDICINE CLINIC | Facility: CLINIC | Age: 75
End: 2020-07-22

## 2020-07-22 ENCOUNTER — NURSE TRIAGE (OUTPATIENT)
Dept: FAMILY MEDICINE CLINIC | Facility: CLINIC | Age: 75
End: 2020-07-22

## 2020-07-22 NOTE — TELEPHONE ENCOUNTER
SEE MESSAGE BELOW. PLEASE CONTACT PT TO ESTABLISH CARE WITH NEW PROVIDER-NEEDS PHYSICAL    PT CAN SEE:  Shan MUSE,TA-Y  [address] [facility]

## 2020-07-22 NOTE — TELEPHONE ENCOUNTER
JUAN Mcintyre routed conversation to [facility] Lpn/Cma 2 minutes ago (11:22 AM)      JUAN Mcintyre 2 minutes ago (11:22 AM)         ptis due for bp check and Annual Wellness Visit     Please schedule appt w [facility]

## 2020-08-06 NOTE — TELEPHONE ENCOUNTER
I contacted patient and scheduled her CLN. Patient denies taking Lisinopril.     Scheduled for:  Colonoscopy [address]  Provider Name: Dr Cristy Allred  Date: Wed 10/07/2020   Location: [facility]  Sedation: MAC   Time: 9:00 am  Prep:  Split dose trilyte  Meds/Allergies Recon

## 2020-08-06 NOTE — TELEPHONE ENCOUNTER
Patient is scheduled for a CLN with Dr Shirin Cabrera on 10/07/2020 @ [address] [place] [address] and is Eliquis, per pt her cardiologist is Dr Shlomo Dockery [phone] see note below.       +obtain recommendations for [facility]

## 2020-08-06 NOTE — TELEPHONE ENCOUNTER
Request for Eliquis orders faxed to Dr. Corado/Cardiology at , confirmations received 8/6/2020 @ [phone].     -Awaiting orders at this time. DOS 10/7/2020.

## 2020-08-26 ENCOUNTER — OFFICE VISIT (OUTPATIENT)
Dept: FAMILY MEDICINE CLINIC | Facility: CLINIC | Age: 75
End: 2020-08-26
Payer: MEDICARE

## 2020-08-26 ENCOUNTER — TELEPHONE (OUTPATIENT)
Dept: GASTROENTEROLOGY | Facility: CLINIC | Age: 75
End: 2020-08-26

## 2020-08-26 VITALS
BODY MASS INDEX: 38.87 KG/M2 | HEART RATE: 75 BPM | SYSTOLIC BLOOD PRESSURE: 139 MMHG | HEIGHT: 66 IN | TEMPERATURE: 98 F | WEIGHT: 241.88 LBS | DIASTOLIC BLOOD PRESSURE: 81 MMHG

## 2020-08-26 DIAGNOSIS — I48.11 LONGSTANDING PERSISTENT ATRIAL FIBRILLATION (HCC): ICD-10-CM

## 2020-08-26 DIAGNOSIS — I10 ESSENTIAL HYPERTENSION: ICD-10-CM

## 2020-08-26 DIAGNOSIS — I50.9 CONGESTIVE HEART FAILURE, UNSPECIFIED HF CHRONICITY, UNSPECIFIED HEART FAILURE TYPE (HCC): Primary | ICD-10-CM

## 2020-08-26 DIAGNOSIS — E16.2 HYPOGLYCEMIA: ICD-10-CM

## 2020-08-26 DIAGNOSIS — E66.01 CLASS 2 SEVERE OBESITY DUE TO EXCESS CALORIES WITH SERIOUS COMORBIDITY AND BODY MASS INDEX (BMI) OF 39.0 TO 39.9 IN ADULT (HCC): ICD-10-CM

## 2020-08-26 PROCEDURE — G0463 HOSPITAL OUTPT CLINIC VISIT: HCPCS | Performed by: FAMILY MEDICINE

## 2020-08-26 PROCEDURE — 99214 OFFICE O/P EST MOD 30 MIN: CPT | Performed by: FAMILY MEDICINE

## 2020-08-26 RX ORDER — PANTOPRAZOLE SODIUM 40 MG/1
40 TABLET, DELAYED RELEASE ORAL
Qty: 30 TABLET | Refills: 1 | Status: SHIPPED | OUTPATIENT
Start: 2020-08-26 | End: 2020-10-22

## 2020-08-26 NOTE — TELEPHONE ENCOUNTER
This is Lynn AYERS and Dr Sarah Armas patient. Karina Terence no longer doing outpatient. Forwarded to [facility]. Patient has colon with Dr Sarah Armas 10/7 and last televisit with Lynn 5/20. Would you be able to refill below? Last filled at [facility]. Thanks.

## 2020-08-26 NOTE — TELEPHONE ENCOUNTER
Current Outpatient Medications   Medication Sig Dispense Refill   • Pantoprazole Sodium 40 MG Oral Tab EC Take 1 tablet (40 mg total) by mouth every morning before breakfast. 90 tablet 0

## 2020-08-26 NOTE — PROGRESS NOTES
Lupe Lucas is a 66year old female. HPI:   Patient here for follow up on blood pressure.  States she has been compliant with prescriptions as provided, she denies any chest pains, no palpitations, has noted increasing edema lower extremity and mild decre • Pulmonary embolism (HCC)    • Renal disorder    • Tubular adenoma of colon 1/2017    repeat c-scope 1/2020   • Visual impairment     glasses      Social History:  Social History    Tobacco Use      Smoking status: Former Smoker        Quit date: 1997 CARD ECHO 2D DOPPLER (CPT=93306); Future    2. Longstanding persistent atrial fibrillation ([address]  Rate controlled, follow-up with cardiology    3.  Essential hypertension  Controlled, continue present medication, patient to discuss with cardiology medicatio

## 2020-08-26 NOTE — PATIENT INSTRUCTIONS
Low Blood Sugar (Hypoglycemia)     Fast-acting sugar can be a glass of nonfat milk.        Having too little sugar (glucose) in your blood is called low blood sugar (hypoglycemia). Low blood sugar often means anything lower than 70 mg/dL.  Talk with your · Once your blood sugar is back at target range, eat a snack or meal.  Preventing low blood sugar  Things you can do include the following:   · If your condition needs a strict treatment plan, eat meals and snacks at the same times each day.  Don’t skip je © 4022-0902 [employer] [address]. [address] [facility], [facility] [place]. All rights reserved. This information is not intended as a substitute for professional medical care. Always follow your healthcare professional's instructions.

## 2020-08-31 NOTE — TELEPHONE ENCOUNTER
Request for Eliquis orders faxed to Dr. Corado/Cardiology at , confirmations received 8/31/2020 @ 0940.      -Awaiting orders at this time. DOS 10/7/2020.

## 2020-09-04 ENCOUNTER — LAB ENCOUNTER (OUTPATIENT)
Dept: LAB | Age: 75
End: 2020-09-04
Attending: FAMILY MEDICINE
Payer: MEDICARE

## 2020-09-04 DIAGNOSIS — I10 ESSENTIAL HYPERTENSION: ICD-10-CM

## 2020-09-04 DIAGNOSIS — E16.2 HYPOGLYCEMIA: ICD-10-CM

## 2020-09-04 LAB
ALBUMIN SERPL-MCNC: 3.4 G/DL (ref 3.4–5)
ALBUMIN/GLOB SERPL: 0.9 {RATIO} (ref 1–2)
ALP LIVER SERPL-CCNC: 61 U/L (ref 55–142)
ALT SERPL-CCNC: 18 U/L (ref 13–56)
ANION GAP SERPL CALC-SCNC: 5 MMOL/L (ref 0–18)
AST SERPL-CCNC: 15 U/L (ref 15–37)
BILIRUB SERPL-MCNC: 0.5 MG/DL (ref 0.1–2)
BUN BLD-MCNC: 31 MG/DL (ref 7–18)
BUN/CREAT SERPL: 24.6 (ref 10–20)
CALCIUM BLD-MCNC: 9.3 MG/DL (ref 8.5–10.1)
CHLORIDE SERPL-SCNC: 111 MMOL/L (ref 98–112)
CHOLEST SMN-MCNC: 177 MG/DL (ref ?–200)
CO2 SERPL-SCNC: 28 MMOL/L (ref 21–32)
CREAT BLD-MCNC: 1.26 MG/DL (ref 0.55–1.02)
EST. AVERAGE GLUCOSE BLD GHB EST-MCNC: 120 MG/DL (ref 68–126)
GLOBULIN PLAS-MCNC: 3.7 G/DL (ref 2.8–4.4)
GLUCOSE BLD-MCNC: 95 MG/DL (ref 70–99)
HBA1C MFR BLD HPLC: 5.8 % (ref ?–5.7)
HDLC SERPL-MCNC: 64 MG/DL (ref 40–59)
LDLC SERPL CALC-MCNC: 91 MG/DL (ref ?–100)
M PROTEIN MFR SERPL ELPH: 7.1 G/DL (ref 6.4–8.2)
NONHDLC SERPL-MCNC: 113 MG/DL (ref ?–130)
OSMOLALITY SERPL CALC.SUM OF ELEC: 304 MOSM/KG (ref 275–295)
PATIENT FASTING Y/N/NP: YES
PATIENT FASTING Y/N/NP: YES
POTASSIUM SERPL-SCNC: 4.8 MMOL/L (ref 3.5–5.1)
SODIUM SERPL-SCNC: 144 MMOL/L (ref 136–145)
TRIGL SERPL-MCNC: 108 MG/DL (ref 30–149)
TSI SER-ACNC: 2.64 MIU/ML (ref 0.36–3.74)
VLDLC SERPL CALC-MCNC: 22 MG/DL (ref 0–30)

## 2020-09-04 PROCEDURE — 83036 HEMOGLOBIN GLYCOSYLATED A1C: CPT

## 2020-09-04 PROCEDURE — 80053 COMPREHEN METABOLIC PANEL: CPT

## 2020-09-04 PROCEDURE — 36415 COLL VENOUS BLD VENIPUNCTURE: CPT

## 2020-09-04 PROCEDURE — 84443 ASSAY THYROID STIM HORMONE: CPT

## 2020-09-04 PROCEDURE — 80061 LIPID PANEL: CPT

## 2020-09-14 NOTE — TELEPHONE ENCOUNTER
Request for Eliquis orders faxed to Dr. Corado/Cardiology at , confirmations received 9/14/2020 @ 1406.      -Awaiting orders at this time. DOS 10/7/2020.

## 2020-09-17 NOTE — TELEPHONE ENCOUNTER
Received fax from Dr Kassy Castillo office below, stating patient has to be seen in office first. Their office currently closed--patient contacted, she just found out yesterday that her granddaughter who lives with her has Covid 19 so she is quarantined for 15

## 2020-09-18 NOTE — TELEPHONE ENCOUNTER
Left message on both voicemails to call back as soon as she has spoken to Dr Hannah Diamond office per below conversation.

## 2020-09-22 RX ORDER — LISINOPRIL 10 MG/1
TABLET ORAL
Qty: 90 TABLET | Refills: 1 | Status: SHIPPED | OUTPATIENT
Start: 2020-09-22 | End: 2021-03-17

## 2020-10-01 RX ORDER — SERTRALINE HYDROCHLORIDE 25 MG/1
25 TABLET, FILM COATED ORAL DAILY
COMMUNITY
End: 2020-11-23

## 2020-10-02 ENCOUNTER — MED REC SCAN ONLY (OUTPATIENT)
Dept: FAMILY MEDICINE CLINIC | Facility: CLINIC | Age: 75
End: 2020-10-02

## 2020-10-02 NOTE — TELEPHONE ENCOUNTER
Spoke to [employer] at Dr. Flex Lynne office ([phone]) and reviewed we are awaiting cardiac clearance and eliquis orders at this time. She states to fax the request to [phone].      I faxed again and emphasized we need to know ASAP as the

## 2020-10-02 NOTE — TELEPHONE ENCOUNTER
CSS PLS TRANSFER TO  RN, TY! Attempted to reach Dr. Simona Arango staff but answering service states on lunch. LM for pt to confirm she has received Eliquis orders from Dr. Simona Arango at her [address] on 10/1/2020.    -Awaiting c/b at this time. DOS 10/7/2020.

## 2020-10-04 ENCOUNTER — APPOINTMENT (OUTPATIENT)
Dept: LAB | Facility: HOSPITAL | Age: 75
End: 2020-10-04
Attending: INTERNAL MEDICINE
Payer: MEDICARE

## 2020-10-04 DIAGNOSIS — Z01.818 PREOP TESTING: ICD-10-CM

## 2020-10-05 NOTE — TELEPHONE ENCOUNTER
Eliquis orders to hold 2 days prior to CLN were received from Dr. Kayla Miller via fax. However, he checked \"no cardiac clearance. \" I spoke to [facility] ([phone]) to clarify and she spoke to MD and he states he meant to check the \"yes\" to cardiac clearan

## 2020-10-05 NOTE — TELEPHONE ENCOUNTER
Swapnil Smith MD routed conversation to [facility] Clinical Staff Just now (10:56 AM)      Swapnil Smith MD Just now (10:56 AM)        Noted patient has procedure Wednesday this week, awaiting to see if Eliquis can be held for 2 days before procedure.

## 2020-10-05 NOTE — TELEPHONE ENCOUNTER
Updated eliquis orders and cardiac clearance received from Dr. Corado/Cardiology office via fax, sent to scanning. Pt may proceed as scheduled.

## 2020-10-07 ENCOUNTER — ANESTHESIA EVENT (OUTPATIENT)
Dept: ENDOSCOPY | Facility: HOSPITAL | Age: 75
End: 2020-10-07
Payer: MEDICARE

## 2020-10-07 ENCOUNTER — HOSPITAL ENCOUNTER (OUTPATIENT)
Facility: HOSPITAL | Age: 75
Setting detail: HOSPITAL OUTPATIENT SURGERY
Discharge: HOME | End: 2020-10-07
Attending: INTERNAL MEDICINE | Admitting: INTERNAL MEDICINE
Payer: MEDICARE

## 2020-10-07 ENCOUNTER — ANESTHESIA (OUTPATIENT)
Dept: ENDOSCOPY | Facility: HOSPITAL | Age: 75
End: 2020-10-07
Payer: MEDICARE

## 2020-10-07 DIAGNOSIS — Z12.11 COLON CANCER SCREENING: ICD-10-CM

## 2020-10-07 DIAGNOSIS — R19.4 ALTERED BOWEL HABITS: ICD-10-CM

## 2020-10-07 DIAGNOSIS — Z01.818 PREOP TESTING: Primary | ICD-10-CM

## 2020-10-07 DIAGNOSIS — Z86.010 HX OF COLONIC POLYPS: ICD-10-CM

## 2020-10-07 PROCEDURE — 0DBE8ZX EXCISION OF LARGE INTESTINE, VIA NATURAL OR ARTIFICIAL OPENING ENDOSCOPIC, DIAGNOSTIC: ICD-10-PCS | Performed by: INTERNAL MEDICINE

## 2020-10-07 PROCEDURE — 45385 COLONOSCOPY W/LESION REMOVAL: CPT | Performed by: INTERNAL MEDICINE

## 2020-10-07 RX ORDER — SODIUM CHLORIDE, SODIUM LACTATE, POTASSIUM CHLORIDE, CALCIUM CHLORIDE 600; 310; 30; 20 MG/100ML; MG/100ML; MG/100ML; MG/100ML
INJECTION, SOLUTION INTRAVENOUS CONTINUOUS
Status: DISCONTINUED | OUTPATIENT
Start: 2020-10-07 | End: 2020-10-07

## 2020-10-07 RX ORDER — NALOXONE HYDROCHLORIDE 0.4 MG/ML
80 INJECTION, SOLUTION INTRAMUSCULAR; INTRAVENOUS; SUBCUTANEOUS AS NEEDED
Status: DISCONTINUED | OUTPATIENT
Start: 2020-10-07 | End: 2020-10-07

## 2020-10-07 RX ORDER — LIDOCAINE HYDROCHLORIDE 10 MG/ML
INJECTION, SOLUTION EPIDURAL; INFILTRATION; INTRACAUDAL; PERINEURAL AS NEEDED
Status: DISCONTINUED | OUTPATIENT
Start: 2020-10-07 | End: 2020-10-07 | Stop reason: SURG

## 2020-10-07 RX ADMIN — SODIUM CHLORIDE, SODIUM LACTATE, POTASSIUM CHLORIDE, CALCIUM CHLORIDE: 600; 310; 30; 20 INJECTION, SOLUTION INTRAVENOUS at 09:33:00

## 2020-10-07 RX ADMIN — LIDOCAINE HYDROCHLORIDE 50 MG: 10 INJECTION, SOLUTION EPIDURAL; INFILTRATION; INTRACAUDAL; PERINEURAL at 09:11:00

## 2020-10-07 RX ADMIN — SODIUM CHLORIDE, SODIUM LACTATE, POTASSIUM CHLORIDE, CALCIUM CHLORIDE: 600; 310; 30; 20 INJECTION, SOLUTION INTRAVENOUS at 09:07:00

## 2020-10-07 NOTE — OPERATIVE REPORT
COLONOSCOPY REPORT    Rossana Jack     1945 Age 66year old   PCP Jovany Hunt.  [address] [facility] Endoscopist Jennie Mckeon MD     Date of procedure: 10/07/20    Procedure: Colonoscopy w/cold biopsy    Pre-operative diagnosis: Screening, chronic britney without evidence of angioectasias or inflammation. 6. ERYN: normal rectal tone, no masses palpated. Impression:   · Normal colonoscopy to the terminal ileum, other than small internal hemorrhoids and pan-colonic diverticulosis.   · No colitis or ilei

## 2020-10-07 NOTE — ANESTHESIA POSTPROCEDURE EVALUATION
Patient: Panchito Landis    Procedure Summary     Date: 10/07/20 Room / Location: [address] [place] [address] ENDOSCOPY 01 / [address] [place] [address] ENDOSCOPY    Anesthesia Start: 0172 Anesthesia Stop: 0640    Procedure: COLONOSCOPY (N/A ) Diagnosis:       Hx of colonic polyps      Altered bowel habits

## 2020-10-07 NOTE — ANESTHESIA PREPROCEDURE EVALUATION
Anesthesia PreOp Note    HPI:     Marcos Jacobs is a 66year old female who presents for preoperative consultation requested by: Bri Mcnally MD    Date of Surgery: 10/7/2020    Procedure(s):  COLONOSCOPY  Indication: Hx of colonic polyps   Altered prudence disorder, single episode, moderate ([facility] [address]         Date Noted: 06/20/2011      Vitamin D deficiency         Date Noted: 06/20/2011      Depressive disorder         Date Noted: 01/09/2006      Hypertension, benign         Date Noted: 11/18/2005        Past Me Pantoprazole Sodium 40 MG Oral Tab EC, Take 1 tablet (40 mg total) by mouth every morning before breakfast., Disp: 30 tablet, Rfl: 1    •  apixaban (ELIQUIS) 2.5 MG Oral Tab, Take 2 tablets (5 mg total) by mouth 2 (two) times daily. , Disp: , Rfl: 0    • Used    Substance and Sexual Activity      Alcohol use: No        Alcohol/week: 0.0 standard drinks        Comment: VERY RARELY LAST CONSUMED WAS 6 WEEKS AGO      Drug use: No      Sexual activity: Not on file    Lifestyle      Physical activity        Day I  TM distance: >3 FB  Neck ROM: full  Dental          Pulmonary - normal exam   (+) sleep apnea,     ROS comment: Former smoker, PE  Cardiovascular - normal exam  (+) hypertension, dysrhythmias, CHF,     ROS comment: Afib, saw cards and was cleared for pr

## 2020-10-07 NOTE — H&P
History & Physical Examination    Patient Name: Jonathan Goldman  MRN: [id number]  CSN: [id number]  YOB: 1945    Diagnosis: screening for colon cancer      •  Sertraline HCl 25 MG Oral Tab, Take 25 mg by mouth daily. , Disp: , Rfl: , 10/5/2020 Pulmonary embolism (HCC)    • Raynaud disease    • Renal disorder    • Tubular adenoma of colon 1/2017    repeat c-scope 1/2020   • Visual impairment     glasses     Past Surgical History:   Procedure Laterality Date   • APPENDECTOMY     • ARTHROSCOPY OF J HEART Noreen.Azinab ] [ Leo Sorto.Zainab ] [ Liam Nageotte Juliette.Zainab ] [ Andres Sorto.Zainab ] [ Lanre David        I have discussed the risks and benefits and alternatives of the procedure with the patient/family. They understand and agree to proceed with plan of care.

## 2020-10-08 VITALS
HEIGHT: 67 IN | OXYGEN SATURATION: 100 % | TEMPERATURE: 97 F | SYSTOLIC BLOOD PRESSURE: 148 MMHG | WEIGHT: 240 LBS | HEART RATE: 74 BPM | BODY MASS INDEX: 37.67 KG/M2 | RESPIRATION RATE: 16 BRPM | DIASTOLIC BLOOD PRESSURE: 72 MMHG

## 2020-10-09 ENCOUNTER — TELEPHONE (OUTPATIENT)
Dept: GASTROENTEROLOGY | Facility: CLINIC | Age: 75
End: 2020-10-09

## 2020-10-09 DIAGNOSIS — K52.9 CHRONIC DIARRHEA: Primary | ICD-10-CM

## 2020-10-09 NOTE — TELEPHONE ENCOUNTER
Discussed with Michelle Navas regarding colon biopsy showing no microscopic colitis. We will need to obtain further stool testing to evaluate for eval chronic diarrhea. Stool testing ordered.

## 2020-10-19 DIAGNOSIS — E78.5 HYPERLIPIDEMIA, UNSPECIFIED HYPERLIPIDEMIA TYPE: ICD-10-CM

## 2020-10-20 DIAGNOSIS — E78.5 HYPERLIPIDEMIA, UNSPECIFIED HYPERLIPIDEMIA TYPE: ICD-10-CM

## 2020-10-20 RX ORDER — ATORVASTATIN CALCIUM 10 MG/1
10 TABLET, FILM COATED ORAL NIGHTLY
Qty: 90 TABLET | Refills: 3 | Status: SHIPPED | OUTPATIENT
Start: 2020-10-20 | End: 2021-10-11

## 2020-10-21 ENCOUNTER — TELEPHONE (OUTPATIENT)
Dept: NEPHROLOGY | Facility: CLINIC | Age: 75
End: 2020-10-21

## 2020-10-21 RX ORDER — ATORVASTATIN CALCIUM 10 MG/1
TABLET, FILM COATED ORAL
Qty: 90 TABLET | Refills: 0 | OUTPATIENT
Start: 2020-10-21

## 2020-10-21 NOTE — TELEPHONE ENCOUNTER
Forwarded to Sean Chris CHAN--would you be able to refill below. Last filed by Stefani AYERS 8/26. Colon per Dr Wen Hyde 10/7. Thanks.

## 2020-10-21 NOTE — TELEPHONE ENCOUNTER
•  Pantoprazole Sodium 40 MG Oral Tab EC, Take 1 tablet (40 mg total) by mouth every morning before breakfast., Disp: 30 tablet, Rfl: 1

## 2020-10-22 RX ORDER — PANTOPRAZOLE SODIUM 40 MG/1
40 TABLET, DELAYED RELEASE ORAL
Qty: 30 TABLET | Refills: 3 | Status: SHIPPED | OUTPATIENT
Start: 2020-10-22 | End: 2020-11-18

## 2020-10-22 NOTE — TELEPHONE ENCOUNTER
I left Jasmyne's message below on voicemail, instructed to call if any further questions, and that Sherlyn Friedman had sent a mychart message regarding this.

## 2020-10-22 NOTE — TELEPHONE ENCOUNTER
rx refilled. She can follow-up with me in clinic going forward if she is okay with that. I sent her a my-chart message.  Thanks,  C

## 2020-11-15 NOTE — PROGRESS NOTES
[address] [facility] [address] Gastroenterology                                                                                                               Reason for consult: med refills    Requesting physician or provider: No primary care provider on file.     Luciano Huerta carcinoma of nose    • Bilateral carpal tunnel syndrome    • Cataract    • Cellulitis    • Deep vein thrombosis (HCC)     unsure which leg, possibly left   • Depression    • Esophageal reflux    • Heel spur     Right   • Hiatal hernia    • High blood press Close relative  unsure which type   • No Known Problems Brother       Social History: Social History    Tobacco Use      Smoking status: Former Smoker        Quit date:         Years since quittin.8      Smokeless tobacco: Never Used    Alc myalgias  NEUROLOGICAL: Negative for dizziness and headaches  BEHAVIOR/PSYCH: Negative for anxiety and poor appetite    PHYSICAL EXAM:   Blood pressure 146/84, pulse 81, height 5' 7\" (1.702 m), weight 241 lb (109.3 kg).     GEN: WD/WN, NAD  HEENT: Supple s Discontinue Dobhoff tube. 2. Restart heparin gtt. 3. Start IV protonix 40mg BID  4. Clears diet. 5. Start TPN via PICC line. 6. Await pathology - I am concerned that these ulcers are ischemic in nature.  I will discuss with IR re: mesenteric angiogra Review. Collected:  10/4/2020  8:05 AM Status:  Final result Dx:  Preop testing  Specimen Information: Nares;  Other        Component   Ref Range & Units    SARS-CoV-2 (COVID-19)   Not Detected Not Detected            Component   Ref Range & Units 5/10/ etiology of complaints and encouraged her to do so now. Consider empiric trial of xifaxan vs bile acid sequestrant if stool studies negative and on-going issues. Suggested benefiber once daily until stool studies completed.   -benefiber once daily  -avoid d

## 2020-11-18 ENCOUNTER — OFFICE VISIT (OUTPATIENT)
Dept: GASTROENTEROLOGY | Facility: CLINIC | Age: 75
End: 2020-11-18
Payer: MEDICARE

## 2020-11-18 VITALS
HEART RATE: 81 BPM | WEIGHT: 241 LBS | DIASTOLIC BLOOD PRESSURE: 84 MMHG | BODY MASS INDEX: 37.83 KG/M2 | HEIGHT: 67 IN | SYSTOLIC BLOOD PRESSURE: 146 MMHG

## 2020-11-18 DIAGNOSIS — K21.9 GASTROESOPHAGEAL REFLUX DISEASE, UNSPECIFIED WHETHER ESOPHAGITIS PRESENT: ICD-10-CM

## 2020-11-18 DIAGNOSIS — R19.7 DIARRHEA, UNSPECIFIED TYPE: Primary | ICD-10-CM

## 2020-11-18 PROCEDURE — 99214 OFFICE O/P EST MOD 30 MIN: CPT | Performed by: NURSE PRACTITIONER

## 2020-11-18 PROCEDURE — G0463 HOSPITAL OUTPT CLINIC VISIT: HCPCS | Performed by: NURSE PRACTITIONER

## 2020-11-18 RX ORDER — METOPROLOL SUCCINATE 100 MG/1
TABLET, EXTENDED RELEASE ORAL
COMMUNITY

## 2020-11-18 RX ORDER — AMLODIPINE BESYLATE 10 MG/1
TABLET ORAL
COMMUNITY
End: 2021-06-08

## 2020-11-18 RX ORDER — PANTOPRAZOLE SODIUM 40 MG/1
40 TABLET, DELAYED RELEASE ORAL
Qty: 30 TABLET | Refills: 3 | Status: SHIPPED | OUTPATIENT
Start: 2020-11-18 | End: 2021-09-22

## 2020-11-18 NOTE — PATIENT INSTRUCTIONS
-pantoprazole 40 mg/daily  -benefiber once daily  -stool studies  -avoid dairy, wheat  Tips to Control Acid Reflux    To control acid reflux, you’ll need to make some basic diet and lifestyle changes.  The simple steps outlined below may be all you’ll need

## 2020-11-23 RX ORDER — SERTRALINE HYDROCHLORIDE 25 MG/1
25 TABLET, FILM COATED ORAL DAILY
Qty: 90 TABLET | Refills: 1 | Status: SHIPPED | OUTPATIENT
Start: 2020-11-23 | End: 2021-06-30

## 2021-01-08 ENCOUNTER — TELEPHONE (OUTPATIENT)
Dept: GASTROENTEROLOGY | Facility: CLINIC | Age: 76
End: 2021-01-08

## 2021-01-08 NOTE — TELEPHONE ENCOUNTER
I mailed out 1st overdue labs letter/ Mychart message sent to patient.       CDIFFICILE TOXIGENIC PCR (OPT)   GIARDIA + CRYPTO ANTIGEN, STOOL   PANCREATIC ELASTASE, FECAL   STOOL CULTURE W/SHIGATOXIN-

## 2021-02-01 DIAGNOSIS — Z23 NEED FOR VACCINATION: ICD-10-CM

## 2021-03-17 RX ORDER — LISINOPRIL 10 MG/1
10 TABLET ORAL DAILY
Qty: 90 TABLET | Refills: 1 | Status: SHIPPED | OUTPATIENT
Start: 2021-03-17 | End: 2021-08-23

## 2021-03-17 NOTE — TELEPHONE ENCOUNTER
Fax received.  Refill request:    •  LISINOPRIL 10 MG Oral Tab, TAKE 1 TABLET(10 MG) BY MOUTH DAILY, Disp: 90 tablet, Rfl: 1

## 2021-03-19 NOTE — TELEPHONE ENCOUNTER
Zain Santana requesting refill for Pantoprazole 40 MG. Please review and sign pended orders if appropriate.      LOV: 11/18/2020 (reason for consult: Pantoprazole refill)   LR: 11/18/2020   Future Appts: None     Pantoprazole Sodium 40 MG Oral Tab

## 2021-03-19 NOTE — TELEPHONE ENCOUNTER
Current Outpatient Medications   Medication Sig Dispense Refill   • Pantoprazole Sodium 40 MG Oral Tab EC Take 1 tablet (40 mg total) by mouth every morning before breakfast. 30 tablet 3

## 2021-03-21 RX ORDER — PANTOPRAZOLE SODIUM 40 MG/1
40 TABLET, DELAYED RELEASE ORAL
Qty: 90 TABLET | Refills: 1 | Status: SHIPPED | OUTPATIENT
Start: 2021-03-21 | End: 2021-04-20

## 2021-03-25 ENCOUNTER — NURSE TRIAGE (OUTPATIENT)
Dept: FAMILY MEDICINE CLINIC | Facility: CLINIC | Age: 76
End: 2021-03-25

## 2021-03-25 DIAGNOSIS — J39.9 UPPER RESPIRATORY DISEASE: Primary | ICD-10-CM

## 2021-03-25 NOTE — TELEPHONE ENCOUNTER
Spoke with patient ( verified) and relayed Dr. Curran Backer message below and scheduling # for lab appt--patient verbalizes understanding and agreement. No further questions/concerns at this time.

## 2021-03-25 NOTE — TELEPHONE ENCOUNTER
Action Requested: Summary for Provider     []  Critical Lab, Recommendations Needed  [] Need Additional Advice  []   FYI    []   Need Orders  [] Need Medications Sent to Pharmacy  []  Other     SUMMARY: Patient reports common cold symptoms x one week--patito

## 2021-04-01 ENCOUNTER — LAB ENCOUNTER (OUTPATIENT)
Dept: LAB | Age: 76
End: 2021-04-01
Attending: INTERNAL MEDICINE
Payer: MEDICARE

## 2021-04-01 DIAGNOSIS — K52.9 CHRONIC DIARRHEA: ICD-10-CM

## 2021-04-01 PROCEDURE — 82656 EL-1 FECAL QUAL/SEMIQ: CPT | Performed by: INTERNAL MEDICINE

## 2021-04-01 PROCEDURE — 87046 STOOL CULTR AEROBIC BACT EA: CPT

## 2021-04-01 PROCEDURE — 87493 C DIFF AMPLIFIED PROBE: CPT

## 2021-04-01 PROCEDURE — 87077 CULTURE AEROBIC IDENTIFY: CPT

## 2021-04-01 PROCEDURE — 87427 SHIGA-LIKE TOXIN AG IA: CPT

## 2021-04-01 PROCEDURE — 87329 GIARDIA AG IA: CPT

## 2021-04-01 PROCEDURE — 87045 FECES CULTURE AEROBIC BACT: CPT

## 2021-04-01 PROCEDURE — 87272 CRYPTOSPORIDIUM AG IF: CPT

## 2021-04-19 ENCOUNTER — IMMUNIZATION (OUTPATIENT)
Dept: LAB | Facility: HOSPITAL | Age: 76
End: 2021-04-19
Attending: EMERGENCY MEDICINE
Payer: MEDICARE

## 2021-04-19 DIAGNOSIS — Z23 NEED FOR VACCINATION: Primary | ICD-10-CM

## 2021-04-19 PROCEDURE — 0011A SARSCOV2 VAC 100MCG/0.5ML IM: CPT

## 2021-05-04 ENCOUNTER — OFFICE VISIT (OUTPATIENT)
Dept: FAMILY MEDICINE CLINIC | Facility: CLINIC | Age: 76
End: 2021-05-04
Payer: MEDICARE

## 2021-05-04 ENCOUNTER — LAB ENCOUNTER (OUTPATIENT)
Dept: LAB | Age: 76
End: 2021-05-04
Attending: FAMILY MEDICINE
Payer: MEDICARE

## 2021-05-04 VITALS
SYSTOLIC BLOOD PRESSURE: 115 MMHG | HEIGHT: 67 IN | WEIGHT: 240 LBS | RESPIRATION RATE: 17 BRPM | HEART RATE: 71 BPM | DIASTOLIC BLOOD PRESSURE: 76 MMHG | BODY MASS INDEX: 37.67 KG/M2

## 2021-05-04 DIAGNOSIS — I48.11 LONGSTANDING PERSISTENT ATRIAL FIBRILLATION (HCC): ICD-10-CM

## 2021-05-04 DIAGNOSIS — E66.01 CLASS 2 SEVERE OBESITY DUE TO EXCESS CALORIES WITH SERIOUS COMORBIDITY AND BODY MASS INDEX (BMI) OF 39.0 TO 39.9 IN ADULT (HCC): ICD-10-CM

## 2021-05-04 DIAGNOSIS — Z78.0 POSTMENOPAUSAL: ICD-10-CM

## 2021-05-04 DIAGNOSIS — E11.9 TYPE 2 DIABETES MELLITUS WITHOUT COMPLICATION, WITHOUT LONG-TERM CURRENT USE OF INSULIN (HCC): ICD-10-CM

## 2021-05-04 DIAGNOSIS — R63.5 ABNORMAL WEIGHT GAIN: ICD-10-CM

## 2021-05-04 DIAGNOSIS — N18.30 STAGE 3 CHRONIC KIDNEY DISEASE, UNSPECIFIED WHETHER STAGE 3A OR 3B CKD (HCC): ICD-10-CM

## 2021-05-04 DIAGNOSIS — F33.1 MODERATE EPISODE OF RECURRENT MAJOR DEPRESSIVE DISORDER (HCC): ICD-10-CM

## 2021-05-04 DIAGNOSIS — I50.9 CONGESTIVE HEART FAILURE, UNSPECIFIED HF CHRONICITY, UNSPECIFIED HEART FAILURE TYPE (HCC): ICD-10-CM

## 2021-05-04 DIAGNOSIS — Z00.00 ENCOUNTER FOR ANNUAL HEALTH EXAMINATION: Primary | ICD-10-CM

## 2021-05-04 DIAGNOSIS — K55.1 MESENTERIC ISCHEMIA, CHRONIC (HCC): ICD-10-CM

## 2021-05-04 DIAGNOSIS — Z13.1 SCREENING FOR DIABETES MELLITUS (DM): ICD-10-CM

## 2021-05-04 DIAGNOSIS — Z13.6 SCREENING FOR CARDIOVASCULAR CONDITION: ICD-10-CM

## 2021-05-04 DIAGNOSIS — M77.50 ANKLE TENDINITIS: ICD-10-CM

## 2021-05-04 PROBLEM — R63.4 WEIGHT LOSS: Status: RESOLVED | Noted: 2017-04-17 | Resolved: 2021-05-04

## 2021-05-04 PROBLEM — Z01.818 PREOP TESTING: Status: RESOLVED | Noted: 2019-01-31 | Resolved: 2021-05-04

## 2021-05-04 PROBLEM — K25.9 MULTIPLE GASTRIC ULCERS: Status: RESOLVED | Noted: 2017-04-25 | Resolved: 2021-05-04

## 2021-05-04 PROCEDURE — 84443 ASSAY THYROID STIM HORMONE: CPT

## 2021-05-04 PROCEDURE — 83036 HEMOGLOBIN GLYCOSYLATED A1C: CPT

## 2021-05-04 PROCEDURE — 80061 LIPID PANEL: CPT

## 2021-05-04 PROCEDURE — G0439 PPPS, SUBSEQ VISIT: HCPCS | Performed by: FAMILY MEDICINE

## 2021-05-04 PROCEDURE — 80053 COMPREHEN METABOLIC PANEL: CPT

## 2021-05-04 PROCEDURE — 36415 COLL VENOUS BLD VENIPUNCTURE: CPT

## 2021-05-04 RX ORDER — APIXABAN 5 MG/1
TABLET, FILM COATED ORAL
COMMUNITY
Start: 2021-05-03

## 2021-05-04 NOTE — PATIENT INSTRUCTIONS
Berna Ocampo's SCREENING SCHEDULE   Tests on this list are recommended by your physician but may not be covered, or covered at this frequency, by your insurer. Please check with your insurance carrier before scheduling to verify coverage.    PREVENTATIVE up to Age 76     Colonoscopy Screen   Covered every 10 years- more often if abnormal Colonoscopy due on 10/07/2023 Update [facility] if applicable    Flex Sigmoidoscopy Screen  Covered every 5 years No results found for this or any previous visit. Orders placed or performed in visit on 03/02/18   • PNEUMOCOCCAL VACC, 13 ERICKSON IM    Please get once after your 65th birthday    Pneumococcal 23 (Pneumovax)  Covered Once after 65 No orders found for this or any previous visit.  Please get once after your 65

## 2021-05-17 ENCOUNTER — IMMUNIZATION (OUTPATIENT)
Dept: LAB | Facility: HOSPITAL | Age: 76
End: 2021-05-17
Attending: EMERGENCY MEDICINE
Payer: MEDICARE

## 2021-05-17 DIAGNOSIS — Z23 NEED FOR VACCINATION: Primary | ICD-10-CM

## 2021-05-17 PROCEDURE — 0012A SARSCOV2 VAC 100MCG/0.5ML IM: CPT

## 2021-05-17 RX ORDER — SERTRALINE HYDROCHLORIDE 25 MG/1
TABLET, FILM COATED ORAL
Qty: 90 TABLET | Refills: 1 | OUTPATIENT
Start: 2021-05-17

## 2021-05-25 ENCOUNTER — HOSPITAL ENCOUNTER (OUTPATIENT)
Dept: ENDOCRINOLOGY | Facility: HOSPITAL | Age: 76
Discharge: HOME | End: 2021-05-25
Attending: FAMILY MEDICINE
Payer: MEDICARE

## 2021-05-25 DIAGNOSIS — E11.9 TYPE 2 DIABETES MELLITUS WITHOUT COMPLICATION, WITHOUT LONG-TERM CURRENT USE OF INSULIN (HCC): ICD-10-CM

## 2021-05-25 NOTE — PROGRESS NOTES
Patient states she does not have diabetes. She is interested in talking with Dietician regarding dairy and citrus intolerance. Informed her that Medicare does not provide coverage for pre diabetes.  Provided her with [employer] chronic disease pro

## 2021-06-01 ENCOUNTER — HOSPITAL ENCOUNTER (OUTPATIENT)
Dept: BONE DENSITY | Age: 76
Discharge: HOME | End: 2021-06-01
Attending: FAMILY MEDICINE
Payer: MEDICARE

## 2021-06-01 DIAGNOSIS — Z78.0 POSTMENOPAUSAL: ICD-10-CM

## 2021-06-01 PROCEDURE — 77080 DXA BONE DENSITY AXIAL: CPT | Performed by: FAMILY MEDICINE

## 2021-06-08 ENCOUNTER — OFFICE VISIT (OUTPATIENT)
Dept: RHEUMATOLOGY | Facility: CLINIC | Age: 76
End: 2021-06-08
Payer: MEDICARE

## 2021-06-08 VITALS
BODY MASS INDEX: 37.98 KG/M2 | HEART RATE: 81 BPM | RESPIRATION RATE: 16 BRPM | DIASTOLIC BLOOD PRESSURE: 84 MMHG | WEIGHT: 242 LBS | HEIGHT: 67 IN | SYSTOLIC BLOOD PRESSURE: 141 MMHG

## 2021-06-08 DIAGNOSIS — I73.01 RAYNAUD'S DISEASE WITH GANGRENE (HCC): ICD-10-CM

## 2021-06-08 DIAGNOSIS — I27.20 PULMONARY HYPERTENSION (HCC): ICD-10-CM

## 2021-06-08 DIAGNOSIS — M34.1 CREST SYNDROME (HCC): Primary | ICD-10-CM

## 2021-06-08 PROCEDURE — 99214 OFFICE O/P EST MOD 30 MIN: CPT | Performed by: INTERNAL MEDICINE

## 2021-06-08 RX ORDER — AMLODIPINE BESYLATE 10 MG/1
10 TABLET ORAL DAILY
Qty: 30 TABLET | Refills: 1 | Status: SHIPPED | OUTPATIENT
Start: 2021-06-08 | End: 2021-08-09

## 2021-06-08 NOTE — PATIENT INSTRUCTIONS
1. Cont.  amlodipine  10mg a day   2. Plan to restart . sildenifile 20mg three times  Day but will get 2decho first   3. Return to clinic in 1 month. 4. Cont.   Sertraline

## 2021-06-08 NOTE — PROGRESS NOTES
Chanel Palma is a 64year old female who presents for Patient presents with: Follow - Up: CREST  Neck Pain  . HPI:     I had the pleasure of seeing Chanel Palma on 7/19/2017 for evaluation. She sees Dr. Gaston Franklin and Dr. Cheryl Pickard.  She recnetly had a p diarrhea. And esophageal outflow obstruction - . She thkns she had a motility study in [place] but not able to lay down. She also has diarrhea. She has trouble with Raynaud's. Her fingers turn blue, and purple. aobut 6 months she has noticed that. weight. Dr. Woodrow Mittal wants her weight down. She gets violent diarrhea where she can't go out of the hourse. seh has stomahc issues. - esophageal dysmoliity. She hsa ups and downs with what she can eat.    No recent echo -   Able to climb a few stairs witho really terrible for her Raynaud's. She had infections in her fingers. Her right 3rd finer as well. It was mostly her righ thand. Her right and left thumbs. The cuticles get so dry . Then they crack and her nails are brittle.    If a fan blows on her fisher Hiatal hernia    • High blood pressure    • High cholesterol    • Incontinence    • Lumbar disc disease 2012   • Multiple gastric ulcers 4/25/2017   • Osteoarthritis    • Pulmonary embolism (HCC)    • Raynaud disease    • Renal disorder    • Tubular adenom Cannabis      Comment: edibles occasionally for sleep          REVIEW OF SYSTEMS:   Review Of Systems:  Fatigue  Constitutional:No fever, no change in weight or appetitie  Derm: No rashes, she was getting mouth ulcers in apirl 2-17, - but they went away, d have good cap refill but +2=3 sec. In hands and feet  telengctasia noted over fingers - yuniel left fingers.    Left 5th finger senstive to touch as well - no ganrene - no paronhychia better -   Fingers cool to touchf -   Fingers white   Feet purpslish disclor 1. 0    ABO GROUPING       A    RH FACTOR       Negative    ALDOLASE, SERUM      1.5 - 8.1 U/L  4.6   CK      38 - 234 U/L  20 (L)   Anti-Smith Antibody      Negative  Negative   Scleroderma (Scl-70) (REDD) Antibody, IgG      0 - 40 AU/mL  0   Anti-Smith/RNP - 428 mcg/dL    IRON SATURATION      15 - 50 %    TRANSFERRIN      192 - 382 mg/dL    CREATININE UR RANDOM      mg/dL    MALB URINE      0.0 - 1.8 mg/dL    MALB/CRE CALC      0.0 - 20.0    JESSICA Titer/Pattern      40 , <40    RAND URINE BENCE HAILE PROTEIN Negative mg/dL  Negative    SQUAM EPI CELLS UR      /HPF  Few    WBC      0 - 5 /HPF  1    RBC      0 - 3 /HPF  1    BACTERIA      None Seen /HPF  Negative    Glucose      70 - 99 mg/dL   119 (H)   Sodium      136 - 144 mmol/L   140   Potassium      3.3 quadrant. 9. Atherosclerosis. ASSESSMENT AND PLAN:   Neto Dick is a 64year old female who presents for Patient presents with: Follow - Up: CREST  Neck Pain      1.  JESSICA 1:2560 centromere -c/w CREST -  rAyandu's not well controlled -   On amlodipine 1 knee in 11/2018 -   6. righ tknee dvt - on eliquis -   7. Was on fluoxetine in the past for depression - restarted for her raynau'ds  8. afib - on eliqus   9. GERD - she will follow up with dr. Aaron Hyde to restart H2 blocer - or PPI -    -     Summary:  1.  Co

## 2021-06-30 PROBLEM — R63.5 ABNORMAL WEIGHT GAIN: Status: ACTIVE | Noted: 2021-06-30

## 2021-06-30 PROBLEM — R73.03 PREDIABETES: Status: ACTIVE | Noted: 2021-06-30

## 2021-06-30 NOTE — PATIENT INSTRUCTIONS
Behavioral Health Assessment and Treatment    SETTING UP A BEHAVIORAL HEALTH ASSESSMENT    To schedule an assessment at any of our below locations call:   [phone]. Emergency walk-in assessments are available 24/7 at our [facility].

## 2021-06-30 NOTE — PROGRESS NOTES
Preet Martin is a 64year old female.   HPI:   Is here to discuss worsening of her depression, she does report that over the last few months she has somebody new living at home that is producing a lot of stress, this person is not able to live her house a Right   • Hiatal hernia    • High blood pressure    • High cholesterol    • Incontinence    • Lumbar disc disease 2012   • Multiple gastric ulcers 4/25/2017   • Osteoarthritis    • Pulmonary embolism (HCC)    • Raynaud disease    • Renal disorder    • T counseling as well, patient agree, referral for [facility] provided  - Sertraline HCl 50 MG Oral Tab; Take 1 tablet (50 mg total) by mouth daily. Dispense: 30 tablet; Refill: 0  -  NAVIGATOR    2.  Abnormal weight gain  Likely related to increased depression, em

## 2021-07-06 ENCOUNTER — TELEPHONE (OUTPATIENT)
Dept: GASTROENTEROLOGY | Facility: CLINIC | Age: 76
End: 2021-07-06

## 2021-07-06 NOTE — TELEPHONE ENCOUNTER
Reviewed recommendations provided below per aprn. Patient voiced understanding on instructions given with no further questions or concerns at this time.

## 2021-07-06 NOTE — TELEPHONE ENCOUNTER
Pt states that acid reflux has been worse in last week and acid is coming up into her throat and it is swollen. Please call.

## 2021-07-06 NOTE — TELEPHONE ENCOUNTER
Sneha Brown-    Patient last seen in clinic 11/18/2020. Christina Lima states reflex is not controlled with utilization of pantoprazole 40mg/daily. Relfex occurring intermittently and not aggravated by food or lying down.  Currently throat is swollen and acid uncont

## 2021-07-06 NOTE — TELEPHONE ENCOUNTER
Recommend:  Reflux diet modification  May add pepcid at night    If throat swelling, lip swelling, sob, etc-report to er  F/u as scheduled.

## 2021-07-20 DIAGNOSIS — R63.5 ABNORMAL WEIGHT GAIN: ICD-10-CM

## 2021-07-20 DIAGNOSIS — R73.03 PREDIABETES: ICD-10-CM

## 2021-07-20 DIAGNOSIS — F32.1 MAJOR DEPRESSIVE DISORDER, SINGLE EPISODE, MODERATE (HCC): ICD-10-CM

## 2021-07-21 ENCOUNTER — OFFICE VISIT (OUTPATIENT)
Dept: FAMILY MEDICINE CLINIC | Facility: CLINIC | Age: 76
End: 2021-07-21
Payer: MEDICARE

## 2021-07-21 VITALS
SYSTOLIC BLOOD PRESSURE: 111 MMHG | HEART RATE: 61 BPM | HEIGHT: 67 IN | RESPIRATION RATE: 17 BRPM | WEIGHT: 237 LBS | BODY MASS INDEX: 37.2 KG/M2 | DIASTOLIC BLOOD PRESSURE: 57 MMHG

## 2021-07-21 DIAGNOSIS — K21.9 GASTROESOPHAGEAL REFLUX DISEASE WITHOUT ESOPHAGITIS: ICD-10-CM

## 2021-07-21 DIAGNOSIS — F32.1 MAJOR DEPRESSIVE DISORDER, SINGLE EPISODE, MODERATE (HCC): ICD-10-CM

## 2021-07-21 DIAGNOSIS — R73.03 PREDIABETES: Primary | ICD-10-CM

## 2021-07-21 PROCEDURE — 99214 OFFICE O/P EST MOD 30 MIN: CPT | Performed by: FAMILY MEDICINE

## 2021-07-21 RX ORDER — SEMAGLUTIDE 1.34 MG/ML
INJECTION, SOLUTION SUBCUTANEOUS
Qty: 1.5 ML | Refills: 0 | OUTPATIENT
Start: 2021-07-21

## 2021-07-21 RX ORDER — METOCLOPRAMIDE 5 MG/1
5 TABLET ORAL NIGHTLY
Qty: 30 TABLET | Refills: 0 | Status: SHIPPED | OUTPATIENT
Start: 2021-07-21 | End: 2021-08-20

## 2021-07-21 NOTE — PROGRESS NOTES
Zuleika Duffy is a 64year old female.   HPI:   Here to follow-up in last visit, she reports that she is doing much better with medication adjustment, she feels more motivated in the morning to get up, she also reports that she has been tolerating Ozempic we hernia    • High blood pressure    • High cholesterol    • Incontinence    • Lumbar disc disease 2012   • Multiple gastric ulcers 4/25/2017   • Osteoarthritis    • Pulmonary embolism (HCC)    • Raynaud disease    • Renal disorder    • Tubular adenoma of co start trial of Reglan nightly, continue PPI, follow-up with GI       The patient indicates understanding of these issues and agrees to the plan. The patient is asked to return in 5w.      The above note was creating using Dragon speech recognition technolo

## 2021-07-29 ENCOUNTER — HOSPITAL ENCOUNTER (OUTPATIENT)
Dept: CV DIAGNOSTICS | Facility: HOSPITAL | Age: 76
Discharge: HOME | End: 2021-07-29
Attending: INTERNAL MEDICINE
Payer: MEDICARE

## 2021-07-29 DIAGNOSIS — I27.20 PULMONARY HYPERTENSION (HCC): ICD-10-CM

## 2021-07-29 DIAGNOSIS — I73.01 RAYNAUD'S DISEASE WITH GANGRENE (HCC): ICD-10-CM

## 2021-07-29 PROCEDURE — 93306 TTE W/DOPPLER COMPLETE: CPT | Performed by: INTERNAL MEDICINE

## 2021-08-09 RX ORDER — AMLODIPINE BESYLATE 10 MG/1
10 TABLET ORAL DAILY
Qty: 30 TABLET | Refills: 5 | Status: SHIPPED | OUTPATIENT
Start: 2021-08-09

## 2021-08-09 NOTE — TELEPHONE ENCOUNTER
Requested Prescriptions     Pending Prescriptions Disp Refills   • AMLODIPINE BESYLATE 10 MG Oral Tab [Pharmacy Med Name: AMLODIPINE BESYLATE 10MG TABLETS] 30 tablet 1     Sig: TAKE 1 TABLET(10 MG) BY MOUTH DAILY     LF: 6/8/21 #30 TAB W/ 1 RF  LOV: 6/8/21

## 2021-08-21 DIAGNOSIS — E11.9 TYPE 2 DIABETES MELLITUS WITHOUT COMPLICATION, WITHOUT LONG-TERM CURRENT USE OF INSULIN (HCC): Primary | ICD-10-CM

## 2021-08-21 DIAGNOSIS — R73.03 PREDIABETES: ICD-10-CM

## 2021-08-22 NOTE — TELEPHONE ENCOUNTER
Please review. Protocol failed or does not have a protocol.      Requested Prescriptions   Pending Prescriptions Disp Refills    OZEMPIC, 0.25 OR 0.5 MG/DOSE, 2 MG/1.5ML Subcutaneous Solution Pen-injector [Pharmacy Med Name: OZEMPIC 0.25 OR 0.5MG/DOS 1X2MG PEN] 1.5 mL 0     Sig: INJECT 0.5 MG INTO THE SKIN ONCE A WEEK FOR 4 DOSES        Diabetes Medication Protocol Failed - 8/21/2021  8:05 PM        Failed - GFR Non- > 50     Lab Results   Component Value Date    GFRNAA 38 (L) 05/04/2021               Passed - Last A1C < 7.5 and within past 6 months     Lab Results   Component Value Date    A1C 5.9 (H) 05/04/2021             Passed - Appointment in past 6 or next 3 months        Passed - GFR in the past 12 months           LISINOPRIL 10 MG Oral Tab [Pharmacy Med Name: LISINOPRIL 10MG TABLETS] 90 tablet 1     Sig: TAKE 1 TABLET(10 MG) BY MOUTH DAILY        Hypertensive Medications Protocol Failed - 8/21/2021  8:05 PM        Failed - GFR Non- > 50     Lab Results   Component Value Date    GFRNAA 38 (L) 05/04/2021               Passed - CMP or BMP in past 12 months        Passed - Appointment in past 6 or next 3 months              Recent Outpatient Visits              1 month ago Prediabetes    [address] MD    Office Visit    1 month ago Major depressive disorder, single episode, moderate [facility])    [address] MD    Office Visit    2 months ago CREST syndrome [facility])    [facility], [address], Olga Toscano MD    Office Visit    3 months ago Encounter for annual health examination    [address] MD    Office Visit    9 months ago Diarrhea, unspecified type    [facility], [address] [facility], Bro Zeng APRN    Office Visit

## 2021-08-23 RX ORDER — LISINOPRIL 10 MG/1
TABLET ORAL
Qty: 90 TABLET | Refills: 1 | Status: SHIPPED | OUTPATIENT
Start: 2021-08-23 | End: 2022-03-09

## 2021-08-23 RX ORDER — SEMAGLUTIDE 1.34 MG/ML
INJECTION, SOLUTION SUBCUTANEOUS
Qty: 1.5 ML | Refills: 0 | Status: SHIPPED | OUTPATIENT
Start: 2021-08-23 | End: 2021-09-20

## 2021-09-01 ENCOUNTER — TELEPHONE (OUTPATIENT)
Dept: GASTROENTEROLOGY | Facility: CLINIC | Age: 76
End: 2021-09-01

## 2021-09-01 ENCOUNTER — OFFICE VISIT (OUTPATIENT)
Dept: GASTROENTEROLOGY | Facility: CLINIC | Age: 76
End: 2021-09-01
Payer: MEDICARE

## 2021-09-01 VITALS
HEIGHT: 67 IN | DIASTOLIC BLOOD PRESSURE: 70 MMHG | HEART RATE: 66 BPM | WEIGHT: 239 LBS | BODY MASS INDEX: 37.51 KG/M2 | SYSTOLIC BLOOD PRESSURE: 110 MMHG

## 2021-09-01 DIAGNOSIS — R05.9 COUGH: ICD-10-CM

## 2021-09-01 DIAGNOSIS — K21.9 GASTROESOPHAGEAL REFLUX DISEASE, UNSPECIFIED WHETHER ESOPHAGITIS PRESENT: Primary | ICD-10-CM

## 2021-09-01 DIAGNOSIS — R19.7 DIARRHEA, UNSPECIFIED TYPE: ICD-10-CM

## 2021-09-01 DIAGNOSIS — R49.0 VOICE HOARSENESS: ICD-10-CM

## 2021-09-01 DIAGNOSIS — R05.9 COUGH: Primary | ICD-10-CM

## 2021-09-01 DIAGNOSIS — K21.9 GASTROESOPHAGEAL REFLUX DISEASE WITHOUT ESOPHAGITIS: ICD-10-CM

## 2021-09-01 DIAGNOSIS — R49.0 HOARSENESS: ICD-10-CM

## 2021-09-01 PROCEDURE — 99215 OFFICE O/P EST HI 40 MIN: CPT | Performed by: NURSE PRACTITIONER

## 2021-09-01 RX ORDER — CHLORHEXIDINE GLUCONATE 0.12 MG/ML
RINSE ORAL
COMMUNITY
Start: 2021-08-31

## 2021-09-01 RX ORDER — AMOXICILLIN 500 MG/1
TABLET, FILM COATED ORAL
COMMUNITY
Start: 2021-08-31 | End: 2021-10-11

## 2021-09-01 NOTE — TELEPHONE ENCOUNTER
Nursing:  Planning for egd w/ Dr. Lisa Grant  She had echo ordered by Dr. Kassandra Corbin and then Dr. Christa Haile. Exam 7/2021 remarkable for multiple findings reported to represent worsening.  The aortic stenosis is new versus 2017 echocardiogram.   The pulmonary artery

## 2021-09-01 NOTE — PATIENT INSTRUCTIONS
-imodium as needed for diarrhea  -avoid dairy, wheat  -increased pantoprazole to 40 mg twice daily x 4 weeks  -then attempt to reduce dose back to once daily  -reflux diet modification  egd w/ mac w/ Dr. Norman Hanson  Dx: gerd, vocal hoarseness, cough  Hold lisin

## 2021-09-01 NOTE — TELEPHONE ENCOUNTER
Dr. Chrystal Paniagua,     Patient is scheduled for egd with Dr. Sierra Chambers 11/10/2021. Please see aprn message below in regards to recent cardiac testing/results and clearance being requested.      Does patient have cardiac clearance (based on w/u) to proceed with sche

## 2021-09-01 NOTE — TELEPHONE ENCOUNTER
Scheduled for:  EGD [id number]  Provider Name:  Dr Reina Grewal  Date:  11/10/21  Location:  [facility] - due to health history   Sedation:  MAC  Time:  9:00am (pt is aware to arrive at 8:00am)  Prep:  NPO after midnight  Meds/Allergies Reconciled?:  Jasmyne/NP reviewed.

## 2021-09-01 NOTE — PROGRESS NOTES
[address] [facility] [address] Gastroenterology                                                                                                               Reason for consult: f (108.4 kg)  07/21/21 : 237 lb (107.5 kg)  06/08/21 : 242 lb (109.8 kg)  05/04/21 : 240 lb (108.9 kg)  11/18/20 : 241 lb (109.3 kg)  10/07/20 : 240 lb (108.9 kg)       History, Medications, Allergies, ROS:      Past Medical History:   Diagnosis Date   • Arr Mother    • Hypertension Other         Family H/O   • Cancer Other         Lymphoma  Family H/O   • Heart Disease Other         Family H/O   • Arthritis Other         Close relative  unsure which type   • No Known Problems Brother       Social History: Soc frequency  MUSCULOSKELETAL: Negative for arthralgias and myalgias  NEUROLOGICAL: Negative for dizziness and headaches  BEHAVIOR/PSYCH: Negative for anxiety and poor appetite    PHYSICAL EXAM:   Blood pressure 110/70, pulse 66, height 5' 7\" (1.702 m), wilfrido attempt to reduce dose back to once daily  -reflux diet modification  egd w/ mac w/ Dr. Kayleigh Keys  Dx: gerd, vocal hoarseness, cough  Hold lisinopril day of procedure  can continue eliquis    Orders This Visit:  No orders of the defined types were placed in th

## 2021-09-02 NOTE — TELEPHONE ENCOUNTER
Reviewed echo, patient was supposed to follow-up with cardiology last month. Called patient, left voicemail. She will need to have a cardiac clearance for her GI procedure, please inform patient.

## 2021-09-08 NOTE — TELEPHONE ENCOUNTER
No phone response letter sent to patient via [address] [facility] mail.

## 2021-09-08 NOTE — TELEPHONE ENCOUNTER
Contacted Yamini to follow up. Verified . Reviewed Dr. Villagran  message below and the importance of seeing cardiology. In order to not cause any delays in proceeding with GI procedure 2/2 cardiac clearance required.      Understands explanation give

## 2021-09-10 NOTE — TELEPHONE ENCOUNTER
Contacted [facility] directly to follow up on cardiac clearance request. P: [phone]. Spoke with Latanya Wright. Verified patient name and .      Relayed reasoning for clearance and IF risk low from cardiac standpoint (based on recent cardiac

## 2021-09-14 ENCOUNTER — LAB ENCOUNTER (OUTPATIENT)
Dept: LAB | Age: 76
End: 2021-09-14
Attending: FAMILY MEDICINE
Payer: MEDICARE

## 2021-09-14 DIAGNOSIS — E11.9 TYPE 2 DIABETES MELLITUS WITHOUT COMPLICATION, WITHOUT LONG-TERM CURRENT USE OF INSULIN (HCC): ICD-10-CM

## 2021-09-14 LAB
ALBUMIN SERPL-MCNC: 3.5 G/DL (ref 3.4–5)
ALBUMIN/GLOB SERPL: 1 {RATIO} (ref 1–2)
ALP LIVER SERPL-CCNC: 63 U/L
ALT SERPL-CCNC: 15 U/L
ANION GAP SERPL CALC-SCNC: 3 MMOL/L (ref 0–18)
AST SERPL-CCNC: 15 U/L (ref 15–37)
BILIRUB SERPL-MCNC: 0.5 MG/DL (ref 0.1–2)
BUN BLD-MCNC: 29 MG/DL (ref 7–18)
BUN/CREAT SERPL: 21.6 (ref 10–20)
CALCIUM BLD-MCNC: 8.7 MG/DL (ref 8.5–10.1)
CHLORIDE SERPL-SCNC: 112 MMOL/L (ref 98–112)
CO2 SERPL-SCNC: 28 MMOL/L (ref 21–32)
CREAT BLD-MCNC: 1.34 MG/DL
EST. AVERAGE GLUCOSE BLD GHB EST-MCNC: 114 MG/DL (ref 68–126)
GLOBULIN PLAS-MCNC: 3.4 G/DL (ref 2.8–4.4)
GLUCOSE BLD-MCNC: 92 MG/DL (ref 70–99)
HBA1C MFR BLD HPLC: 5.6 % (ref ?–5.7)
OSMOLALITY SERPL CALC.SUM OF ELEC: 301 MOSM/KG (ref 275–295)
PATIENT FASTING Y/N/NP: YES
POTASSIUM SERPL-SCNC: 4.8 MMOL/L (ref 3.5–5.1)
PROT SERPL-MCNC: 6.9 G/DL (ref 6.4–8.2)
SODIUM SERPL-SCNC: 143 MMOL/L (ref 136–145)

## 2021-09-14 PROCEDURE — 36415 COLL VENOUS BLD VENIPUNCTURE: CPT

## 2021-09-14 PROCEDURE — 80053 COMPREHEN METABOLIC PANEL: CPT

## 2021-09-14 PROCEDURE — 83036 HEMOGLOBIN GLYCOSYLATED A1C: CPT

## 2021-09-15 NOTE — TELEPHONE ENCOUNTER
Returned [employer] Reviewed below eliquis adjustment instructions. Patient read-back correctly and wrote down on prep instructions as reference. Patient understands medication and clearance with no further questions/concerns.

## 2021-09-15 NOTE — TELEPHONE ENCOUNTER
Received below clearance letter dictated on behalf of Dr. Martina Telles (cardiology). Patient granted cardiac clearance and OK to hold eliquis 2 days prior to egd scheduled with Dr. Omid Winn 11/10/2021.      Albin Wood directly to review the following and taylor

## 2021-09-19 DIAGNOSIS — E11.9 TYPE 2 DIABETES MELLITUS WITHOUT COMPLICATION, WITHOUT LONG-TERM CURRENT USE OF INSULIN (HCC): ICD-10-CM

## 2021-09-20 ENCOUNTER — TELEPHONE (OUTPATIENT)
Dept: FAMILY MEDICINE CLINIC | Facility: CLINIC | Age: 76
End: 2021-09-20

## 2021-09-20 RX ORDER — SEMAGLUTIDE 1.34 MG/ML
INJECTION, SOLUTION SUBCUTANEOUS
Qty: 1.5 ML | Refills: 0 | Status: SHIPPED | OUTPATIENT
Start: 2021-09-20 | End: 2021-10-27

## 2021-09-20 NOTE — TELEPHONE ENCOUNTER
Please review Protocol Failed/No Protocol        Requested Prescriptions   Pending Prescriptions Disp Refills    OZEMPIC, 0.25 OR 0.5 MG/DOSE, 2 MG/1.5ML Subcutaneous Solution Pen-injector [Pharmacy Med Name: OZEMPIC 0.25 OR 0.5MG/DOS 1X2MG PEN] 1.5 mL 0

## 2021-09-20 NOTE — TELEPHONE ENCOUNTER
Message from Plan  This medication or product is on your plan's list of covered drugs. Prior authorization is not required at this time.  If your pharmacy has questions regarding the processing of your prescription, please have them call the [facility] pharmac

## 2021-09-20 NOTE — TELEPHONE ENCOUNTER
Prior authorization for ozempic has been initiated through [employer] using keycode: [id number] It takes about 1-5 business days for a decision to come back.

## 2021-09-20 NOTE — TELEPHONE ENCOUNTER
Fax request for prior authorization for      OZEMPIC, 0.25 OR 0.5 MG/DOSE, 2 MG/1.5ML  Pen injectors    Key: [id number]  Last name: Tyrone Rodriguez  : 1945

## 2021-09-22 ENCOUNTER — TELEPHONE (OUTPATIENT)
Dept: GASTROENTEROLOGY | Facility: CLINIC | Age: 76
End: 2021-09-22

## 2021-09-22 RX ORDER — PANTOPRAZOLE SODIUM 40 MG/1
40 TABLET, DELAYED RELEASE ORAL
Qty: 60 TABLET | Refills: 3 | Status: SHIPPED | OUTPATIENT
Start: 2021-09-22 | End: 2022-02-03

## 2021-09-22 NOTE — TELEPHONE ENCOUNTER
Codey Douglas--    Patient requesting new prescription for pantoprazole since she was instructed at tov--40mg/bid X 4 weeks and ran out of medication. Symptoms are under control with above regimen and curious if she can continue with bid dosage.      Pended

## 2021-10-06 ENCOUNTER — HOSPITAL ENCOUNTER (EMERGENCY)
Facility: HOSPITAL | Age: 76
Discharge: HOME | End: 2021-10-06
Attending: EMERGENCY MEDICINE
Payer: MEDICARE

## 2021-10-06 ENCOUNTER — APPOINTMENT (OUTPATIENT)
Dept: GENERAL RADIOLOGY | Facility: HOSPITAL | Age: 76
End: 2021-10-06
Attending: EMERGENCY MEDICINE
Payer: MEDICARE

## 2021-10-06 VITALS
OXYGEN SATURATION: 98 % | TEMPERATURE: 97 F | BODY MASS INDEX: 37 KG/M2 | RESPIRATION RATE: 18 BRPM | DIASTOLIC BLOOD PRESSURE: 78 MMHG | WEIGHT: 235 LBS | SYSTOLIC BLOOD PRESSURE: 146 MMHG | HEART RATE: 76 BPM

## 2021-10-06 DIAGNOSIS — S61.452A DOG BITE OF LEFT HAND, INITIAL ENCOUNTER: Primary | ICD-10-CM
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
## 2021-10-06 PROCEDURE — 12004 RPR S/N/AX/GEN/TRK7.6-12.5CM: CPT

## 2021-10-06 PROCEDURE — 73110 X-RAY EXAM OF WRIST: CPT | Performed by: EMERGENCY MEDICINE

## 2021-10-06 PROCEDURE — 12001 RPR S/N/AX/GEN/TRNK 2.5CM/<: CPT

## 2021-10-06 PROCEDURE — 99283 EMERGENCY DEPT VISIT LOW MDM: CPT

## 2021-10-06 RX ORDER — LIDOCAINE HYDROCHLORIDE AND EPINEPHRINE 20; 5 MG/ML; UG/ML
INJECTION, SOLUTION EPIDURAL; INFILTRATION; INTRACAUDAL; PERINEURAL
Status: COMPLETED
Start: 2021-10-06 | End: 2021-10-06

## 2021-10-06 RX ORDER — LIDOCAINE HYDROCHLORIDE AND EPINEPHRINE 20; 5 MG/ML; UG/ML
10 INJECTION, SOLUTION EPIDURAL; INFILTRATION; INTRACAUDAL; PERINEURAL ONCE
Status: COMPLETED | OUTPATIENT
Start: 2021-10-06 | End: 2021-10-06

## 2021-10-06 RX ORDER — AMOXICILLIN AND CLAVULANATE POTASSIUM 875; 125 MG/1; MG/1
1 TABLET, FILM COATED ORAL 2 TIMES DAILY
Qty: 20 TABLET | Refills: 0 | Status: SHIPPED | OUTPATIENT
Start: 2021-10-06 | End: 2021-10-11

## 2021-10-06 RX ORDER — HYDROCODONE BITARTRATE AND ACETAMINOPHEN 5; 325 MG/1; MG/1
2 TABLET ORAL ONCE
Status: COMPLETED | OUTPATIENT
Start: 2021-10-06 | End: 2021-10-06

## 2021-10-06 RX ORDER — HYDROCODONE BITARTRATE AND ACETAMINOPHEN 5; 325 MG/1; MG/1
2 TABLET ORAL EVERY 6 HOURS PRN
Qty: 16 TABLET | Refills: 0 | Status: SHIPPED | OUTPATIENT
Start: 2021-10-06 | End: 2021-12-14 | Stop reason: ALTCHOICE

## 2021-10-06 NOTE — ED QUICK NOTES
Steri strips and tefla dressing applies, gauze wrapped, pt understand to follow up with PMD for suture removal

## 2021-10-06 NOTE — ED PROVIDER NOTES
Patient Seen in: [facility] AND [facility] Emergency Department      History   Patient presents with:  Laceration/Abrasion    Stated Complaint: scratched by dog, laceration on Lt forearm     Subjective:   HPI    64year old female with a past medical history of N/A 12/14/2016    Procedure: ESOPHAGOGASTRODUODENOSCOPY (EGD);   Surgeon: Keri Gupta MD;  Location: [address] [place] [address] ENDOSCOPY   • ELECTROCARDIOGRAM, COMPLETE  09/26/2013    Scanned to Media Tab   • EXCISION OF NOSE      Basal cell carcinoma   • HERNIA SURGERY and dry. Findings: No rash. Neurological:      Mental Status: She is alert and oriented to person, place, and time.    Psychiatric:         Behavior: Behavior normal.           ED Course   Labs Reviewed - No data to display           MDM      Pulse O initial encounter  (primary encounter diagnosis)     Disposition:  Discharge  10/6/2021  3:25 pm    Follow-up:  Tara Rowell MD  [address] [place] [address]  [phone]    Schedule an appointment as soon as possible for a visit

## 2021-10-06 NOTE — ED QUICK NOTES
64year old female here with laceration to left wrist after playing with dog, went to reach for toy and dog put her paw on patient and pulled back, pt on eliquis

## 2021-10-10 DIAGNOSIS — E78.5 HYPERLIPIDEMIA, UNSPECIFIED HYPERLIPIDEMIA TYPE: ICD-10-CM

## 2021-10-11 ENCOUNTER — OFFICE VISIT (OUTPATIENT)
Dept: FAMILY MEDICINE CLINIC | Facility: CLINIC | Age: 76
End: 2021-10-11
Payer: MEDICARE

## 2021-10-11 VITALS
SYSTOLIC BLOOD PRESSURE: 134 MMHG | BODY MASS INDEX: 37.37 KG/M2 | RESPIRATION RATE: 17 BRPM | HEIGHT: 67 IN | HEART RATE: 87 BPM | DIASTOLIC BLOOD PRESSURE: 76 MMHG | WEIGHT: 238.13 LBS

## 2021-10-11 DIAGNOSIS — S61.412D LACERATION OF LEFT HAND, FOREIGN BODY PRESENCE UNSPECIFIED, SUBSEQUENT ENCOUNTER: Primary | ICD-10-CM

## 2021-10-11 PROCEDURE — 99213 OFFICE O/P EST LOW 20 MIN: CPT | Performed by: FAMILY MEDICINE

## 2021-10-11 RX ORDER — ATORVASTATIN CALCIUM 10 MG/1
TABLET, FILM COATED ORAL
Qty: 90 TABLET | Refills: 3 | Status: SHIPPED | OUTPATIENT
Start: 2021-10-11

## 2021-10-11 RX ORDER — AMOXICILLIN AND CLAVULANATE POTASSIUM 875; 125 MG/1; MG/1
1 TABLET, FILM COATED ORAL 2 TIMES DAILY
Qty: 8 TABLET | Refills: 0 | Status: SHIPPED | OUTPATIENT
Start: 2021-10-11 | End: 2021-10-15

## 2021-10-11 NOTE — PROGRESS NOTES
Ingrid Nolan is a 64year old female.   HPI:   Patient is here to follow-up after laceration that happened 5 days ago after her fully vaccinated dog it scratched her with her fall, patient has been compliant taking Augmentin as prescribed, she reports that Multiple gastric ulcers 4/25/2017   • Osteoarthritis    • Pulmonary embolism (HCC)    • Raynaud disease    • Renal disorder    • Tubular adenoma of colon 1/2017    repeat c-scope 1/2020   • Visual impairment     glasses      Social History:  Social History

## 2021-10-25 DIAGNOSIS — E11.9 TYPE 2 DIABETES MELLITUS WITHOUT COMPLICATION, WITHOUT LONG-TERM CURRENT USE OF INSULIN (HCC): ICD-10-CM

## 2021-10-25 DIAGNOSIS — F32.1 MAJOR DEPRESSIVE DISORDER, SINGLE EPISODE, MODERATE (HCC): ICD-10-CM

## 2021-10-26 NOTE — TELEPHONE ENCOUNTER
Please review; protocol failed/no protocol    Sertraline not on current medication list--now     Please send, if appropriate      Requested Prescriptions   Pending Prescriptions Disp Refills    SERTRALINE 50 MG Oral Tab [Pharmacy Med Name: Phill Cowan Provider Department Appt Notes    In 2 weeks VICKIE, RANCHO Alcocer. Henry [address] GI PROCEDURE [email or web address]

## 2021-10-27 RX ORDER — SEMAGLUTIDE 1.34 MG/ML
INJECTION, SOLUTION SUBCUTANEOUS
Qty: 1.5 ML | Refills: 0 | Status: SHIPPED | OUTPATIENT
Start: 2021-10-27 | End: 2021-11-22

## 2021-11-07 ENCOUNTER — LAB ENCOUNTER (OUTPATIENT)
Dept: LAB | Facility: HOSPITAL | Age: 76
End: 2021-11-07
Attending: INTERNAL MEDICINE
Payer: MEDICARE

## 2021-11-07 DIAGNOSIS — Z01.818 PRE-OP TESTING: ICD-10-CM

## 2021-11-10 ENCOUNTER — ANESTHESIA EVENT (OUTPATIENT)
Dept: ENDOSCOPY | Facility: HOSPITAL | Age: 76
End: 2021-11-10
Payer: MEDICARE

## 2021-11-10 ENCOUNTER — ANESTHESIA (OUTPATIENT)
Dept: ENDOSCOPY | Facility: HOSPITAL | Age: 76
End: 2021-11-10
Payer: MEDICARE

## 2021-11-10 ENCOUNTER — HOSPITAL ENCOUNTER (OUTPATIENT)
Facility: HOSPITAL | Age: 76
Setting detail: HOSPITAL OUTPATIENT SURGERY
Discharge: HOME | End: 2021-11-10
Attending: INTERNAL MEDICINE | Admitting: INTERNAL MEDICINE
Payer: MEDICARE

## 2021-11-10 VITALS
SYSTOLIC BLOOD PRESSURE: 118 MMHG | TEMPERATURE: 98 F | BODY MASS INDEX: 36.88 KG/M2 | HEIGHT: 67 IN | OXYGEN SATURATION: 88 % | WEIGHT: 235 LBS | HEART RATE: 66 BPM | DIASTOLIC BLOOD PRESSURE: 74 MMHG | RESPIRATION RATE: 15 BRPM

## 2021-11-10 DIAGNOSIS — K21.9 GASTROESOPHAGEAL REFLUX DISEASE WITHOUT ESOPHAGITIS: ICD-10-CM

## 2021-11-10 DIAGNOSIS — Z01.818 PRE-OP TESTING: Primary | ICD-10-CM

## 2021-11-10 DIAGNOSIS — R49.0 HOARSENESS: ICD-10-CM

## 2021-11-10 DIAGNOSIS — R05.9 COUGH: ICD-10-CM

## 2021-11-10 PROCEDURE — 43239 EGD BIOPSY SINGLE/MULTIPLE: CPT | Performed by: INTERNAL MEDICINE

## 2021-11-10 PROCEDURE — 0DB38ZX EXCISION OF LOWER ESOPHAGUS, VIA NATURAL OR ARTIFICIAL OPENING ENDOSCOPIC, DIAGNOSTIC: ICD-10-PCS | Performed by: INTERNAL MEDICINE

## 2021-11-10 PROCEDURE — 0DB68ZX EXCISION OF STOMACH, VIA NATURAL OR ARTIFICIAL OPENING ENDOSCOPIC, DIAGNOSTIC: ICD-10-PCS | Performed by: INTERNAL MEDICINE

## 2021-11-10 RX ORDER — SODIUM CHLORIDE, SODIUM LACTATE, POTASSIUM CHLORIDE, CALCIUM CHLORIDE 600; 310; 30; 20 MG/100ML; MG/100ML; MG/100ML; MG/100ML
INJECTION, SOLUTION INTRAVENOUS CONTINUOUS
Status: DISCONTINUED | OUTPATIENT
Start: 2021-11-10 | End: 2021-11-10

## 2021-11-10 RX ORDER — LIDOCAINE HYDROCHLORIDE 10 MG/ML
INJECTION, SOLUTION EPIDURAL; INFILTRATION; INTRACAUDAL; PERINEURAL AS NEEDED
Status: DISCONTINUED | OUTPATIENT
Start: 2021-11-10 | End: 2021-11-10 | Stop reason: SURG

## 2021-11-10 RX ADMIN — SODIUM CHLORIDE, SODIUM LACTATE, POTASSIUM CHLORIDE, CALCIUM CHLORIDE: 600; 310; 30; 20 INJECTION, SOLUTION INTRAVENOUS at 08:58:00

## 2021-11-10 RX ADMIN — LIDOCAINE HYDROCHLORIDE 50 MG: 10 INJECTION, SOLUTION EPIDURAL; INFILTRATION; INTRACAUDAL; PERINEURAL at 09:03:00

## 2021-11-10 NOTE — ANESTHESIA POSTPROCEDURE EVALUATION
Patient: Javan Lyn    Procedure Summary     Date: 11/10/21 Room / Location: [address] [place] [address] ENDOSCOPY 01 / [address] [place] [address] ENDOSCOPY    Anesthesia Start: 4115 Anesthesia Stop: 4321    Procedure: ESOPHAGOGASTRODUODENOSCOPY (N/A ) Diagnosis:       Cough      Hoarseness      Rod

## 2021-11-10 NOTE — ANESTHESIA PREPROCEDURE EVALUATION
Anesthesia PreOp Note    HPI:     Miguel Guerra is a 64year old female who presents for preoperative consultation requested by: aMrc Lee MD    Date of Surgery: 11/10/2021    Procedure(s):  ESOPHAGOGASTRODUODENOSCOPY  Indication: Cough, Hoarseness, obesity due to excess calories with serious comorbidity and body mass index (BMI) of 39.0 to 39.9 in adult [facility])         Date Noted: 03/05/2009        Past Medical History:   Diagnosis Date   • Arrhythmia     afib   • Back problem     lumbar disc disease MG Oral Tab, TAKE 1 TABLET(10 MG) BY MOUTH EVERY NIGHT, Disp: 90 tablet, Rfl: 3  HYDROcodone-acetaminophen 5-325 MG Oral Tab, Take 2 tablets by mouth every 6 (six) hours as needed for Pain (severe pain). , Disp: 16 tablet, Rfl: 0  pantoprazole 40 MG Oral Ta 0.0 standard drinks        Comment: rare      Drug use: Yes        Types: Cannabis        Comment: edibles occasionally for sleep      Sexual activity: Not on file    Other Topics      Concerns:         Service: Not Asked        Blood Transfusions: cavity size was normal. Wall thickness was      at the upper limits of normal. Systolic function was normal. The      estimated ejection fraction was 55-60%, by biplane method of      disks.  Wall motion was normal; there were no regional wall      motion a

## 2021-11-10 NOTE — OPERATIVE REPORT
ESOPHAGOGASTRODUODENOSCOPY (EGD) REPORT    Radha Co     1945 Age 64year old   PCP Zeus Pugh.  Aubrey Mendoza MD Endoscopist Amadeo Welch MD     Date of procedure: 11/10/21    Procedure: EGD w/cold biopsy    Pre-operative diagnosis: Dysp duodenum. Impression:  1. Suspect dysphagia is secondary to candida esophagitis. 2. Mild gastric erythema. 3. No stricture or mass noted. Recommend:  1. Await pathology. 2. Soft foods. 3. Restart Eliquis tomorrow.  Monitor for melena or GI bleedi

## 2021-11-10 NOTE — H&P
History & Physical Examination    Patient Name: Atif Agarwal  MRN: [id number]  CSN: [id number]  YOB: 1945    Diagnosis: Dysphagia, GERD    SERTRALINE 50 MG Oral Tab, TAKE 1 TABLET(50 MG) BY MOUTH DAILY, Disp: 90 tablet, Rfl: 0, 11/10/2021 at • Multiple gastric ulcers 4/25/2017   • Osteoarthritis    • Pulmonary embolism (HCC)    • Raynaud disease    • Renal disorder    • Tubular adenoma of colon 1/2017    repeat c-scope 1/2020   • Visual impairment     glasses     Past Surgical History:   Pro EXTREMITIES Kishore.Clinmarc ] [ X]    OTHER        I have discussed the risks and benefits and alternatives of the procedure with the patient/family. They understand and agree to proceed with plan of care.    I have reviewed the History and Physical done within the l

## 2021-11-11 ENCOUNTER — TELEPHONE (OUTPATIENT)
Dept: GASTROENTEROLOGY | Facility: CLINIC | Age: 76
End: 2021-11-11

## 2021-11-11 RX ORDER — FLUCONAZOLE 200 MG/1
200 TABLET ORAL DAILY
Qty: 14 TABLET | Refills: 0 | Status: SHIPPED | OUTPATIENT
Start: 2021-11-11 | End: 2021-11-25

## 2021-11-11 NOTE — TELEPHONE ENCOUNTER
Contacted patient and reviewed medication instructions, she verbalized understanding. Patient states she took atorvastatin today, I advised patient to start fluconazole tomorrow.      Contacted pharmacist and informed her I reviewed instructions with ruel

## 2021-11-11 NOTE — TELEPHONE ENCOUNTER
D/w patient re: path -- no obvious fungus noted but high suspicion for fungal infection. Plan for diflucan 200mg/day 14 days. She will notify me how she is doing.

## 2021-11-11 NOTE — TELEPHONE ENCOUNTER
Noted, please notify patient to HOLD atorvastatin while on diflucan and resume after diflucan is finished. Thanks.

## 2021-11-11 NOTE — TELEPHONE ENCOUNTER
Pharmacy called in stating there may be a drug interaction with medication fluconazole 200 MG Oral Tab and atorvastatin Please follow up

## 2021-11-11 NOTE — TELEPHONE ENCOUNTER
Dr. Nikki Herbert,    Per pharmacy, there is an interaction between atorvastatin and fluconazole. Please advise, thank you.

## 2021-11-15 ENCOUNTER — TELEPHONE (OUTPATIENT)
Dept: GASTROENTEROLOGY | Facility: CLINIC | Age: 76
End: 2021-11-15

## 2021-11-21 DIAGNOSIS — E11.9 TYPE 2 DIABETES MELLITUS WITHOUT COMPLICATION, WITHOUT LONG-TERM CURRENT USE OF INSULIN (HCC): ICD-10-CM

## 2021-11-22 RX ORDER — SEMAGLUTIDE 1.34 MG/ML
INJECTION, SOLUTION SUBCUTANEOUS
Qty: 1.5 ML | Refills: 0 | Status: SHIPPED | OUTPATIENT
Start: 2021-11-22 | End: 2021-12-17

## 2021-12-14 ENCOUNTER — LAB ENCOUNTER (OUTPATIENT)
Dept: LAB | Age: 76
End: 2021-12-14
Attending: FAMILY MEDICINE
Payer: MEDICARE

## 2021-12-14 ENCOUNTER — OFFICE VISIT (OUTPATIENT)
Dept: FAMILY MEDICINE CLINIC | Facility: CLINIC | Age: 76
End: 2021-12-14
Payer: MEDICARE

## 2021-12-14 ENCOUNTER — HOSPITAL ENCOUNTER (OUTPATIENT)
Dept: GENERAL RADIOLOGY | Age: 76
Discharge: HOME | End: 2021-12-14
Attending: FAMILY MEDICINE
Payer: MEDICARE

## 2021-12-14 VITALS
HEIGHT: 67 IN | DIASTOLIC BLOOD PRESSURE: 75 MMHG | WEIGHT: 230.5 LBS | RESPIRATION RATE: 18 BRPM | HEART RATE: 73 BPM | SYSTOLIC BLOOD PRESSURE: 112 MMHG | BODY MASS INDEX: 36.18 KG/M2

## 2021-12-14 DIAGNOSIS — N18.32 TYPE 2 DIABETES MELLITUS WITH STAGE 3B CHRONIC KIDNEY DISEASE, WITHOUT LONG-TERM CURRENT USE OF INSULIN (HCC): ICD-10-CM

## 2021-12-14 DIAGNOSIS — E11.22 TYPE 2 DIABETES MELLITUS WITH STAGE 3B CHRONIC KIDNEY DISEASE, WITHOUT LONG-TERM CURRENT USE OF INSULIN (HCC): ICD-10-CM

## 2021-12-14 DIAGNOSIS — E78.5 HYPERLIPIDEMIA, UNSPECIFIED HYPERLIPIDEMIA TYPE: ICD-10-CM

## 2021-12-14 DIAGNOSIS — N18.32 STAGE 3B CHRONIC KIDNEY DISEASE (HCC): ICD-10-CM

## 2021-12-14 DIAGNOSIS — R09.82 POSTNASAL DRIP: ICD-10-CM

## 2021-12-14 DIAGNOSIS — R05.9 COUGH: Primary | ICD-10-CM

## 2021-12-14 DIAGNOSIS — R05.9 COUGH: ICD-10-CM

## 2021-12-14 PROCEDURE — 71046 X-RAY EXAM CHEST 2 VIEWS: CPT | Performed by: FAMILY MEDICINE

## 2021-12-14 PROCEDURE — 83036 HEMOGLOBIN GLYCOSYLATED A1C: CPT

## 2021-12-14 PROCEDURE — 99214 OFFICE O/P EST MOD 30 MIN: CPT | Performed by: FAMILY MEDICINE

## 2021-12-14 PROCEDURE — 36415 COLL VENOUS BLD VENIPUNCTURE: CPT

## 2021-12-14 PROCEDURE — 80053 COMPREHEN METABOLIC PANEL: CPT

## 2021-12-14 PROCEDURE — 80061 LIPID PANEL: CPT

## 2021-12-14 RX ORDER — FLUTICASONE PROPIONATE 50 MCG
2 SPRAY, SUSPENSION (ML) NASAL DAILY
Qty: 16 G | Refills: 0 | Status: SHIPPED | OUTPATIENT
Start: 2021-12-14 | End: 2022-01-13

## 2021-12-14 RX ORDER — BENZONATATE 100 MG/1
100 CAPSULE ORAL 3 TIMES DAILY PRN
Qty: 30 CAPSULE | Refills: 0 | Status: SHIPPED | OUTPATIENT
Start: 2021-12-14

## 2021-12-14 RX ORDER — METOCLOPRAMIDE 5 MG/1
1 TABLET ORAL DAILY
COMMUNITY

## 2021-12-14 NOTE — PROGRESS NOTES
Iker Khan is a 64year old female.   HPI:   Patient presenting with 2-week history of cough with sputum production, she also reports that she has pain having sinus pressure but no significant pain, no shortness of breath reported, no fever reported, she disease   • Basal cell carcinoma of nose    • Bilateral carpal tunnel syndrome    • Cataract    • Cellulitis    • Deep vein thrombosis (HCC)     unsure which leg, possibly left   • Depression    • Esophageal reflux    • Heel spur     Right   • Hiatal herni regular rhythm. Pulses: Normal pulses. Heart sounds: S1 normal and S2 normal. Murmur heard. Pulmonary:      Effort: Pulmonary effort is normal. No respiratory distress. Breath sounds: Rales (scattered ) present.    Abdominal:      [employer] recognition technology. Please excuse any typos.

## 2021-12-16 DIAGNOSIS — E11.9 TYPE 2 DIABETES MELLITUS WITHOUT COMPLICATION, WITHOUT LONG-TERM CURRENT USE OF INSULIN (HCC): ICD-10-CM

## 2021-12-17 RX ORDER — SEMAGLUTIDE 1.34 MG/ML
0.5 INJECTION, SOLUTION SUBCUTANEOUS WEEKLY
Qty: 1.5 ML | Refills: 2 | Status: SHIPPED | OUTPATIENT
Start: 2021-12-17 | End: 2022-11-08 | Stop reason: ALTCHOICE

## 2022-01-12 DIAGNOSIS — R09.82 POSTNASAL DRIP: ICD-10-CM

## 2022-01-13 RX ORDER — FLUTICASONE PROPIONATE 50 MCG
SPRAY, SUSPENSION (ML) NASAL
Qty: 16 G | Refills: 0 | Status: SHIPPED | OUTPATIENT
Start: 2022-01-13

## 2022-01-23 DIAGNOSIS — F32.1 MAJOR DEPRESSIVE DISORDER, SINGLE EPISODE, MODERATE (HCC): ICD-10-CM

## 2022-01-24 NOTE — TELEPHONE ENCOUNTER
Refill passed per [employer] protocol.     Requested Prescriptions   Pending Prescriptions Disp Refills    SERTRALINE 50 MG Oral Tab [Pharmacy Med Name: SERTRALINE 50MG TABLETS] 90 tablet 0     Sig: TAKE 1 TABLET(50 MG) BY MOUTH DAILY        Psychiatric Non-Scheduled (Anti-Anxiety) Passed - 1/23/2022  5:53 PM        Passed - Appointment in last 6 or next 3 months                  Recent Outpatient Visits              1 month ago Cough    [address] Tony Phelan MD    Office Visit    3 months ago Laceration of left hand, foreign body presence unspecified, subsequent encounter    [address] Tony Phelan MD    Office Visit    4 months ago Gastroesophageal reflux disease, unspecified whether esophagitis present    [place] [employer], [address] [facility], Lanre Zeng APRN    Office Visit    6 months ago Prediabetes    [address] [facility] Marie Burton MD    Office Visit    6 months ago Major depressive disorder, single episode, moderate [facility])    [address] [facility] Marie Burton MD    Office Visit

## 2022-02-03 ENCOUNTER — TELEPHONE (OUTPATIENT)
Dept: GASTROENTEROLOGY | Facility: CLINIC | Age: 77
End: 2022-02-03

## 2022-02-03 NOTE — TELEPHONE ENCOUNTER
Please advise on refill request below. Thank you. LV EGD with Dr. Jennifer Jang on 11/10/2021, virtual visit with Uriel Bocanegra on 05/20/2020    LR 09/22/2021 #60 with 3 refills    No future visit scheduled    Rx pended.

## 2022-02-07 RX ORDER — PANTOPRAZOLE SODIUM 40 MG/1
40 TABLET, DELAYED RELEASE ORAL
Qty: 60 TABLET | Refills: 1 | Status: SHIPPED | OUTPATIENT
Start: 2022-02-07

## 2022-02-07 NOTE — TELEPHONE ENCOUNTER
Spoke with Socorro Frank. Explained rationale for call and need for follow up given x 2 years since seen last.     Understands reasoning but is currently undergoing dental work and [facility] of appointments\". Planning to check out her schedule and set up follow up via [employer]. Aware to contact our office if she needs assistance with scheduling. No further questions or concerns.

## 2022-02-07 NOTE — TELEPHONE ENCOUNTER
Nursing:  Rx approved. Do recommend f/u in clinic to see how she is doing. Please have her schedule with me.    Thanks,  Rodríguez Ferguson

## 2022-02-08 RX ORDER — FLUTICASONE PROPIONATE 50 MCG
2 SPRAY, SUSPENSION (ML) NASAL DAILY
Qty: 16 G | Refills: 1 | Status: SHIPPED | OUTPATIENT
Start: 2022-02-08

## 2022-02-08 NOTE — TELEPHONE ENCOUNTER
Refill passed per [facility] protocol   Requested Prescriptions   Pending Prescriptions Disp Refills    FLUTICASONE PROPIONATE 50 MCG/ACT Nasal Suspension [Pharmacy Med Name: FLUTICASONE 50MCG NASAL SP (120) RX] 16 g 0     Sig: SHAKE LIQUID AND USE 2 SPRAYS IN EACH NOSTRIL DAILY FOR 10 DAYS        Allergy Medication Protocol Passed - 2/8/2022  1:35 PM        Passed - Appoinment in past 12 or next 3 months

## 2022-03-09 ENCOUNTER — TELEPHONE (OUTPATIENT)
Dept: RHEUMATOLOGY | Facility: CLINIC | Age: 77
End: 2022-03-09

## 2022-03-09 RX ORDER — AMLODIPINE BESYLATE 10 MG/1
TABLET ORAL
Qty: 30 TABLET | Refills: 5 | Status: SHIPPED | OUTPATIENT
Start: 2022-03-09

## 2022-03-09 NOTE — TELEPHONE ENCOUNTER
Please review. Protocol failed / No Protocol.     Requested Prescriptions   Pending Prescriptions Disp Refills    LISINOPRIL 10 MG Oral Tab [Pharmacy Med Name: LISINOPRIL 10MG TABLETS] 90 tablet 1     Sig: TAKE 1 TABLET(10 MG) BY MOUTH DAILY        Hypertensive Medications Protocol Failed - 3/9/2022  8:23 AM        Failed - GFR Non- > 50     Lab Results   Component Value Date    GFRNAA 34 (L) 12/14/2021                 Passed - CMP or BMP in past 12 months        Passed - Appointment in past 6 or next 3 months               Recent Outpatient Visits              2 months ago Cough    [address] Michelle Locke MD    Office Visit    4 months ago Laceration of left hand, foreign body presence unspecified, subsequent encounter    [address] [facility] Alfonzo Garcia MD    Office Visit    6 months ago Gastroesophageal reflux disease, unspecified whether esophagitis present    [facility], [address] [facility] JUAN Cardoza    Office Visit    7 months ago Prediabetes    [address] [facility] Alfonzo Garcia MD    Office Visit    8 months ago Major depressive disorder, single episode, [facility]    [address] [facility] Alfonzo Garcia MD    Office Visit              Future Appointments         Provider Department Appt Notes    In 2 weeks JUAN Delagdo [facility], [address] [facility] problems with acid in thoart

## 2022-03-09 NOTE — TELEPHONE ENCOUNTER
Left message for Diego Healy,   She was supposed to have started sildenafil - and return in July. But no return made,   Refilled amlodipine - asked her to make a follow up appt.

## 2022-03-10 RX ORDER — LISINOPRIL 10 MG/1
10 TABLET ORAL DAILY
Qty: 90 TABLET | Refills: 1 | Status: SHIPPED | OUTPATIENT
Start: 2022-03-10

## 2022-03-10 NOTE — TELEPHONE ENCOUNTER
Called and Left patient a , inform patient to please call office and schedule a follow up visit. Per Dr. Ronald Edmondson  She was supposed to have started sildenafil - and return in July. But no return made,   Refilled amlodipine - asked her to make a follow up appt. CSS, please assist patient with appt.

## 2022-03-29 ENCOUNTER — OFFICE VISIT (OUTPATIENT)
Dept: GASTROENTEROLOGY | Facility: CLINIC | Age: 77
End: 2022-03-29
Payer: MEDICARE

## 2022-03-29 VITALS
BODY MASS INDEX: 35.16 KG/M2 | SYSTOLIC BLOOD PRESSURE: 128 MMHG | HEART RATE: 68 BPM | HEIGHT: 67 IN | WEIGHT: 224 LBS | DIASTOLIC BLOOD PRESSURE: 64 MMHG

## 2022-03-29 DIAGNOSIS — R05.9 COUGH: ICD-10-CM

## 2022-03-29 DIAGNOSIS — K21.00 GASTROESOPHAGEAL REFLUX DISEASE WITH ESOPHAGITIS WITHOUT HEMORRHAGE: Primary | ICD-10-CM

## 2022-03-29 DIAGNOSIS — K31.A0 INTESTINAL METAPLASIA OF STOMACH: ICD-10-CM

## 2022-03-29 PROCEDURE — 99215 OFFICE O/P EST HI 40 MIN: CPT | Performed by: NURSE PRACTITIONER

## 2022-03-29 RX ORDER — FLUCONAZOLE 200 MG/1
200 TABLET ORAL DAILY
Qty: 14 TABLET | Refills: 0 | Status: SHIPPED | OUTPATIENT
Start: 2022-03-29 | End: 2022-04-12

## 2022-03-29 RX ORDER — METOPROLOL TARTRATE 50 MG/1
50 TABLET, FILM COATED ORAL 2 TIMES DAILY
COMMUNITY
Start: 2022-02-21

## 2022-04-11 RX ORDER — PANTOPRAZOLE SODIUM 40 MG/1
TABLET, DELAYED RELEASE ORAL
Qty: 60 TABLET | Refills: 1 | Status: SHIPPED | OUTPATIENT
Start: 2022-04-11

## 2022-04-11 RX ORDER — FLUTICASONE PROPIONATE 50 MCG
2 SPRAY, SUSPENSION (ML) NASAL DAILY
Qty: 16 G | Refills: 3 | Status: SHIPPED | OUTPATIENT
Start: 2022-04-11

## 2022-04-11 NOTE — TELEPHONE ENCOUNTER
Refill passed per [place] [employer] [facility] protocol.   Requested Prescriptions   Pending Prescriptions Disp Refills    FLUTICASONE PROPIONATE 50 MCG/ACT Nasal Suspension [Pharmacy Med Name: FLUTICASONE 50MCG NASAL SP (120) RX] 16 g 1     Sig: SHAKE LIQUID AND USE 2 SPRAYS IN EACH NOSTRIL DAILY        Allergy Medication Protocol Passed - 4/10/2022 11:15 AM        Passed - Appoinment in past 12 or next 3 months            Recent Outpatient Visits              1 week ago Gastroesophageal reflux disease with esophagitis without hemorrhage    [facility] [facility], Azucena Weiss Arlee Garter, APRN    Office Visit    3 months ago Cough    [address] Eugenie Stoll MD    Office Visit    6 months ago Laceration of left hand, foreign body presence unspecified, subsequent encounter    1200 [facility] Allyssa Engle MD    Office Visit    7 months ago Gastroesophageal reflux disease, unspecified whether esophagitis present    [facility] [facility], Azucena Weiss Arlee Garter, APRN    Office Visit    8 months ago Prediabetes    1200 [facility] Allyssa Engle MD    Office Visit          Future Appointments         Provider Department Appt Notes    Tomorrow Allyssa Engle MD [address] [facility],[address] Floor Family Medicine legs are swelling    In 1 month Ruben Chase MD [facility], [address], [place] review meds   (policy informed)

## 2022-04-12 ENCOUNTER — LAB ENCOUNTER (OUTPATIENT)
Dept: LAB | Age: 77
End: 2022-04-12
Attending: FAMILY MEDICINE
Payer: MEDICARE

## 2022-04-12 ENCOUNTER — OFFICE VISIT (OUTPATIENT)
Dept: FAMILY MEDICINE CLINIC | Facility: CLINIC | Age: 77
End: 2022-04-12
Payer: MEDICARE

## 2022-04-12 ENCOUNTER — HOSPITAL ENCOUNTER (OUTPATIENT)
Dept: ULTRASOUND IMAGING | Facility: HOSPITAL | Age: 77
Discharge: HOME | End: 2022-04-12
Attending: FAMILY MEDICINE
Payer: MEDICARE

## 2022-04-12 VITALS
BODY MASS INDEX: 34.06 KG/M2 | SYSTOLIC BLOOD PRESSURE: 113 MMHG | HEART RATE: 71 BPM | RESPIRATION RATE: 17 BRPM | HEIGHT: 67 IN | WEIGHT: 217 LBS | DIASTOLIC BLOOD PRESSURE: 61 MMHG

## 2022-04-12 DIAGNOSIS — M34.1 CREST SYNDROME (HCC): ICD-10-CM

## 2022-04-12 DIAGNOSIS — J01.00 ACUTE NON-RECURRENT MAXILLARY SINUSITIS: ICD-10-CM

## 2022-04-12 DIAGNOSIS — R60.0 EDEMA OF LEFT LOWER EXTREMITY: Primary | ICD-10-CM

## 2022-04-12 DIAGNOSIS — K55.1 MESENTERIC ISCHEMIA, CHRONIC (HCC): ICD-10-CM

## 2022-04-12 DIAGNOSIS — E11.22 TYPE 2 DIABETES MELLITUS WITH STAGE 3B CHRONIC KIDNEY DISEASE, WITHOUT LONG-TERM CURRENT USE OF INSULIN (HCC): ICD-10-CM

## 2022-04-12 DIAGNOSIS — R60.0 EDEMA OF LEFT LOWER EXTREMITY: ICD-10-CM

## 2022-04-12 DIAGNOSIS — D53.9 MACROCYTIC ANEMIA: ICD-10-CM

## 2022-04-12 DIAGNOSIS — N18.32 TYPE 2 DIABETES MELLITUS WITH STAGE 3B CHRONIC KIDNEY DISEASE, WITHOUT LONG-TERM CURRENT USE OF INSULIN (HCC): ICD-10-CM

## 2022-04-12 DIAGNOSIS — R42 LIGHTHEADED: ICD-10-CM

## 2022-04-12 DIAGNOSIS — N18.32 STAGE 3B CHRONIC KIDNEY DISEASE (HCC): ICD-10-CM

## 2022-04-12 DIAGNOSIS — I27.20 PULMONARY HYPERTENSION (HCC): ICD-10-CM

## 2022-04-12 DIAGNOSIS — I48.11 LONGSTANDING PERSISTENT ATRIAL FIBRILLATION (HCC): ICD-10-CM

## 2022-04-12 DIAGNOSIS — E66.01 CLASS 2 SEVERE OBESITY DUE TO EXCESS CALORIES WITH SERIOUS COMORBIDITY AND BODY MASS INDEX (BMI) OF 37.0 TO 37.9 IN ADULT (HCC): ICD-10-CM

## 2022-04-12 LAB
ALBUMIN SERPL-MCNC: 4.2 G/DL (ref 3.4–5)
ALBUMIN/GLOB SERPL: 1.4 {RATIO} (ref 1–2)
ALP LIVER SERPL-CCNC: 80 U/L
ALT SERPL-CCNC: 19 U/L
ANION GAP SERPL CALC-SCNC: 7 MMOL/L (ref 0–18)
AST SERPL-CCNC: 22 U/L (ref 15–37)
BASOPHILS # BLD AUTO: 0.04 X10(3) UL (ref 0–0.2)
BASOPHILS NFR BLD AUTO: 1.2 %
BILIRUB SERPL-MCNC: 0.5 MG/DL (ref 0.1–2)
BUN BLD-MCNC: 23 MG/DL (ref 7–18)
BUN/CREAT SERPL: 17.4 (ref 10–20)
CALCIUM BLD-MCNC: 9.4 MG/DL (ref 8.5–10.1)
CHLORIDE SERPL-SCNC: 107 MMOL/L (ref 98–112)
CO2 SERPL-SCNC: 26 MMOL/L (ref 21–32)
CREAT BLD-MCNC: 1.32 MG/DL
DEPRECATED RDW RBC AUTO: 57.3 FL (ref 35.1–46.3)
EOSINOPHIL # BLD AUTO: 0.06 X10(3) UL (ref 0–0.7)
EOSINOPHIL NFR BLD AUTO: 1.8 %
ERYTHROCYTE [DISTWIDTH] IN BLOOD BY AUTOMATED COUNT: 14.9 % (ref 11–15)
FASTING STATUS PATIENT QL REPORTED: YES
FOLATE SERPL-MCNC: >20 NG/ML (ref 8.7–?)
GLOBULIN PLAS-MCNC: 3.1 G/DL (ref 2.8–4.4)
GLUCOSE BLD-MCNC: 89 MG/DL (ref 70–99)
HCT VFR BLD AUTO: 38.7 %
HGB BLD-MCNC: 11.9 G/DL
IMM GRANULOCYTES # BLD AUTO: 0.01 X10(3) UL (ref 0–1)
IMM GRANULOCYTES NFR BLD: 0.3 %
LYMPHOCYTES # BLD AUTO: 0.73 X10(3) UL (ref 1–4)
LYMPHOCYTES NFR BLD AUTO: 21.6 %
MCH RBC QN AUTO: 31.8 PG (ref 26–34)
MCHC RBC AUTO-ENTMCNC: 30.7 G/DL (ref 31–37)
MCV RBC AUTO: 103.5 FL
MONOCYTES # BLD AUTO: 0.4 X10(3) UL (ref 0.1–1)
MONOCYTES NFR BLD AUTO: 11.8 %
NEUTROPHILS # BLD AUTO: 2.14 X10 (3) UL (ref 1.5–7.7)
NEUTROPHILS # BLD AUTO: 2.14 X10(3) UL (ref 1.5–7.7)
NEUTROPHILS NFR BLD AUTO: 63.3 %
OSMOLALITY SERPL CALC.SUM OF ELEC: 293 MOSM/KG (ref 275–295)
PLATELET # BLD AUTO: 237 10(3)UL (ref 150–450)
POTASSIUM SERPL-SCNC: 4.3 MMOL/L (ref 3.5–5.1)
PROT SERPL-MCNC: 7.3 G/DL (ref 6.4–8.2)
RBC # BLD AUTO: 3.74 X10(6)UL
SODIUM SERPL-SCNC: 140 MMOL/L (ref 136–145)
VIT B12 SERPL-MCNC: 200 PG/ML (ref 193–986)
WBC # BLD AUTO: 3.4 X10(3) UL (ref 4–11)

## 2022-04-12 PROCEDURE — 93971 EXTREMITY STUDY: CPT | Performed by: FAMILY MEDICINE

## 2022-04-12 PROCEDURE — 82607 VITAMIN B-12: CPT

## 2022-04-12 PROCEDURE — 85025 COMPLETE CBC W/AUTO DIFF WBC: CPT

## 2022-04-12 PROCEDURE — 99214 OFFICE O/P EST MOD 30 MIN: CPT | Performed by: FAMILY MEDICINE

## 2022-04-12 PROCEDURE — 36415 COLL VENOUS BLD VENIPUNCTURE: CPT

## 2022-04-12 PROCEDURE — 82746 ASSAY OF FOLIC ACID SERUM: CPT

## 2022-04-12 PROCEDURE — 80053 COMPREHEN METABOLIC PANEL: CPT

## 2022-04-12 RX ORDER — AMOXICILLIN AND CLAVULANATE POTASSIUM 875; 125 MG/1; MG/1
1 TABLET, FILM COATED ORAL 2 TIMES DAILY
Qty: 14 TABLET | Refills: 0 | Status: SHIPPED | OUTPATIENT
Start: 2022-04-12 | End: 2022-04-19

## 2022-04-12 RX ORDER — METOPROLOL SUCCINATE 100 MG/1
100 TABLET, EXTENDED RELEASE ORAL 2 TIMES DAILY
Qty: 180 TABLET | Refills: 0 | Status: SHIPPED | OUTPATIENT
Start: 2022-04-12

## 2022-04-12 RX ORDER — APIXABAN 5 MG/1
5 TABLET, FILM COATED ORAL 2 TIMES DAILY
Qty: 180 TABLET | Refills: 0 | Status: SHIPPED | OUTPATIENT
Start: 2022-04-12

## 2022-04-20 ENCOUNTER — PATIENT MESSAGE (OUTPATIENT)
Dept: FAMILY MEDICINE CLINIC | Facility: CLINIC | Age: 77
End: 2022-04-20

## 2022-04-21 ENCOUNTER — LAB ENCOUNTER (OUTPATIENT)
Dept: LAB | Age: 77
End: 2022-04-21
Attending: FAMILY MEDICINE
Payer: MEDICARE

## 2022-04-21 DIAGNOSIS — D53.9 MACROCYTIC ANEMIA: ICD-10-CM

## 2022-04-21 LAB
BASOPHILS # BLD AUTO: 0.06 X10(3) UL (ref 0–0.2)
BASOPHILS NFR BLD AUTO: 1.5 %
DEPRECATED RDW RBC AUTO: 55.5 FL (ref 35.1–46.3)
EOSINOPHIL # BLD AUTO: 0.07 X10(3) UL (ref 0–0.7)
EOSINOPHIL NFR BLD AUTO: 1.8 %
ERYTHROCYTE [DISTWIDTH] IN BLOOD BY AUTOMATED COUNT: 14.6 % (ref 11–15)
HCT VFR BLD AUTO: 39.1 %
HGB BLD-MCNC: 11.9 G/DL
IMM GRANULOCYTES # BLD AUTO: 0.01 X10(3) UL (ref 0–1)
IMM GRANULOCYTES NFR BLD: 0.3 %
LYMPHOCYTES # BLD AUTO: 0.86 X10(3) UL (ref 1–4)
LYMPHOCYTES NFR BLD AUTO: 22.2 %
MCH RBC QN AUTO: 31.4 PG (ref 26–34)
MCHC RBC AUTO-ENTMCNC: 30.4 G/DL (ref 31–37)
MCV RBC AUTO: 103.2 FL
MONOCYTES # BLD AUTO: 0.42 X10(3) UL (ref 0.1–1)
MONOCYTES NFR BLD AUTO: 10.8 %
NEUTROPHILS # BLD AUTO: 2.46 X10 (3) UL (ref 1.5–7.7)
NEUTROPHILS # BLD AUTO: 2.46 X10(3) UL (ref 1.5–7.7)
NEUTROPHILS NFR BLD AUTO: 63.4 %
PLATELET # BLD AUTO: 246 10(3)UL (ref 150–450)
RBC # BLD AUTO: 3.79 X10(6)UL
WBC # BLD AUTO: 3.9 X10(3) UL (ref 4–11)

## 2022-04-21 PROCEDURE — 36415 COLL VENOUS BLD VENIPUNCTURE: CPT

## 2022-04-21 PROCEDURE — 85025 COMPLETE CBC W/AUTO DIFF WBC: CPT

## 2022-04-21 NOTE — TELEPHONE ENCOUNTER
Called and left message for patient to call back regarding her Eliquis. Please transfer [phone].

## 2022-04-21 NOTE — TELEPHONE ENCOUNTER
Any anticoagulation alternatives that insurance will cover, does she have medication or she's missing 7 days only? Please note I'm out of the office, if patient need medication adjustment today please contact [address] who is my covering provider, thanks.

## 2022-04-21 NOTE — TELEPHONE ENCOUNTER
From: Candido Hernandez  To: Patsy Patel.  Royal Jay MD  Sent: 4/20/2022 5:31 PM CDT  Subject: eliquis    my insurance company will not pay for eliquis until 4/28

## 2022-04-22 NOTE — TELEPHONE ENCOUNTER
Please note Min:   I spoke to patient to make sure how she is currently taking her Elquis 5mg. She is taking 5mg BID (picked up 4/5/22 #60)  She is aware she was to increase to 10mg BID as of 4/12/22 result notes: HOWEVER, patient did not make any changes yet because she didn't want any problems running out. Patient is still taking 5mg BID. She asked if you can send a new RX with different instructions to be taking 10mg BID for one week only and then resume 5mg tablets BID.

## 2022-04-24 DIAGNOSIS — F32.1 MAJOR DEPRESSIVE DISORDER, SINGLE EPISODE, MODERATE (HCC): ICD-10-CM

## 2022-04-25 NOTE — TELEPHONE ENCOUNTER
Refill passed per [address] [place] [address] protocol. Requested Prescriptions   Pending Prescriptions Disp Refills    SERTRALINE 50 MG Oral Tab [Pharmacy Med Name: SERTRALINE 50MG TABLETS] 90 tablet 0     Sig: TAKE 1 TABLET(50 MG) BY MOUTH DAILY        Psychiatric Non-Scheduled (Anti-Anxiety) Passed - 4/24/2022  5:10 PM        Passed - Appointment in last 6 or next 3 months              Recent Outpatient Visits              1 week ago Edema of left lower extremity    [address], Author MD Ayaka    Office Visit    3 weeks ago Gastroesophageal reflux disease with esophagitis without hemorrhage    [address] [place] [address], Azucena Weiss Daivd Burrs, APRN    Office Visit    4 months ago Cough    [address] Brandon Goyal MD    Office Visit    6 months ago Laceration of left hand, foreign body presence unspecified, subsequent encounter    [address] [facility] Lauri Blake MD    Office Visit    7 months ago Gastroesophageal reflux disease, unspecified whether esophagitis present    [address] [place], [place] [address]Olivia Olney, Daivd Burrs, APRN    Office Visit            Future Appointments         Provider Department Appt Notes    Tomorrow Tobin Campos 19 Hematology Oncology consult Macrocytic anemia Odette Bazan MD REFERRING.  [address]    In 3 days MD Elian Davis 34 my hands

## 2022-04-26 ENCOUNTER — NURSE ONLY (OUTPATIENT)
Dept: HEMATOLOGY/ONCOLOGY | Facility: HOSPITAL | Age: 77
End: 2022-04-26
Attending: FAMILY MEDICINE
Payer: MEDICARE

## 2022-04-26 ENCOUNTER — TELEPHONE (OUTPATIENT)
Dept: FAMILY MEDICINE CLINIC | Facility: CLINIC | Age: 77
End: 2022-04-26

## 2022-04-26 VITALS
SYSTOLIC BLOOD PRESSURE: 120 MMHG | HEIGHT: 67 IN | TEMPERATURE: 99 F | RESPIRATION RATE: 18 BRPM | BODY MASS INDEX: 35 KG/M2 | HEART RATE: 62 BPM | OXYGEN SATURATION: 97 % | WEIGHT: 223 LBS | DIASTOLIC BLOOD PRESSURE: 60 MMHG

## 2022-04-26 DIAGNOSIS — D53.9 MACROCYTIC ANEMIA: Primary | ICD-10-CM

## 2022-04-26 DIAGNOSIS — D53.9 MACROCYTIC ANEMIA: ICD-10-CM

## 2022-04-26 DIAGNOSIS — L81.9 HYPERPIGMENTED SKIN LESION: ICD-10-CM

## 2022-04-26 LAB
BASOPHILS # BLD AUTO: 0.05 X10(3) UL (ref 0–0.2)
BASOPHILS NFR BLD AUTO: 1.2 %
DEPRECATED HBV CORE AB SER IA-ACNC: 22.1 NG/ML
DEPRECATED RDW RBC AUTO: 56.3 FL (ref 35.1–46.3)
DIRECT COOMBS POLY: NEGATIVE
EOSINOPHIL # BLD AUTO: 0.09 X10(3) UL (ref 0–0.7)
EOSINOPHIL NFR BLD AUTO: 2.2 %
ERYTHROCYTE [DISTWIDTH] IN BLOOD BY AUTOMATED COUNT: 14.8 % (ref 11–15)
HAPTOGLOB SERPL-MCNC: 137 MG/DL (ref 30–200)
HCT VFR BLD AUTO: 37 %
HGB BLD-MCNC: 11.2 G/DL
HGB RETIC QN AUTO: 35.5 PG (ref 28.2–36.6)
IMM GRANULOCYTES # BLD AUTO: 0.01 X10(3) UL (ref 0–1)
IMM GRANULOCYTES NFR BLD: 0.2 %
IMM RETICS NFR: 0.16 RATIO (ref 0.1–0.3)
IRON SATN MFR SERPL: 15 %
IRON SERPL-MCNC: 67 UG/DL
LDH SERPL L TO P-CCNC: 180 U/L
LYMPHOCYTES # BLD AUTO: 0.93 X10(3) UL (ref 1–4)
LYMPHOCYTES NFR BLD AUTO: 22.2 %
MCH RBC QN AUTO: 30.9 PG (ref 26–34)
MCHC RBC AUTO-ENTMCNC: 30.3 G/DL (ref 31–37)
MCV RBC AUTO: 101.9 FL
MONOCYTES # BLD AUTO: 0.52 X10(3) UL (ref 0.1–1)
MONOCYTES NFR BLD AUTO: 12.4 %
NEUTROPHILS # BLD AUTO: 2.58 X10 (3) UL (ref 1.5–7.7)
NEUTROPHILS # BLD AUTO: 2.58 X10(3) UL (ref 1.5–7.7)
NEUTROPHILS NFR BLD AUTO: 61.8 %
PLATELET # BLD AUTO: 213 10(3)UL (ref 150–450)
RBC # BLD AUTO: 3.63 X10(6)UL
RETICS # AUTO: 39.6 X10(3) UL (ref 22.5–147.5)
RETICS/RBC NFR AUTO: 1.1 %
TIBC SERPL-MCNC: 434 UG/DL (ref 240–450)
TRANSFERRIN SERPL-MCNC: 291 MG/DL (ref 200–360)
WBC # BLD AUTO: 4.2 X10(3) UL (ref 4–11)

## 2022-04-26 PROCEDURE — 83921 ORGANIC ACID SINGLE QUANT: CPT

## 2022-04-26 PROCEDURE — 85045 AUTOMATED RETICULOCYTE COUNT: CPT

## 2022-04-26 PROCEDURE — 82728 ASSAY OF FERRITIN: CPT

## 2022-04-26 PROCEDURE — 83540 ASSAY OF IRON: CPT

## 2022-04-26 PROCEDURE — 85025 COMPLETE CBC W/AUTO DIFF WBC: CPT

## 2022-04-26 PROCEDURE — 83010 ASSAY OF HAPTOGLOBIN QUANT: CPT

## 2022-04-26 PROCEDURE — 86880 COOMBS TEST DIRECT: CPT

## 2022-04-26 PROCEDURE — 99204 OFFICE O/P NEW MOD 45 MIN: CPT | Performed by: INTERNAL MEDICINE

## 2022-04-26 PROCEDURE — 83615 LACTATE (LD) (LDH) ENZYME: CPT

## 2022-04-26 PROCEDURE — 84466 ASSAY OF TRANSFERRIN: CPT

## 2022-04-26 PROCEDURE — 36415 COLL VENOUS BLD VENIPUNCTURE: CPT

## 2022-04-26 NOTE — TELEPHONE ENCOUNTER
Pharmacy calling to request prior authorization for the following medication. Insurance will not pay without prior authorization because she has been on before.    apixaban (ELIQUIS) 5 MG Oral Tab

## 2022-04-27 NOTE — TELEPHONE ENCOUNTER
Received response on PA for Eliquis. PA is not required. Medication is covered on patient's plan. Left message with pharmacy to call if there are any issues with dispensing medication.

## 2022-04-28 ENCOUNTER — TELEPHONE (OUTPATIENT)
Dept: RHEUMATOLOGY | Facility: CLINIC | Age: 77
End: 2022-04-28

## 2022-04-28 ENCOUNTER — OFFICE VISIT (OUTPATIENT)
Dept: RHEUMATOLOGY | Facility: CLINIC | Age: 77
End: 2022-04-28
Payer: MEDICARE

## 2022-04-28 VITALS
DIASTOLIC BLOOD PRESSURE: 68 MMHG | HEART RATE: 73 BPM | SYSTOLIC BLOOD PRESSURE: 104 MMHG | HEIGHT: 67 IN | BODY MASS INDEX: 35 KG/M2 | WEIGHT: 223 LBS | RESPIRATION RATE: 16 BRPM

## 2022-04-28 DIAGNOSIS — I27.20 PULMONARY HYPERTENSION (HCC): ICD-10-CM

## 2022-04-28 DIAGNOSIS — I73.01 RAYNAUD'S DISEASE WITH GANGRENE (HCC): ICD-10-CM

## 2022-04-28 DIAGNOSIS — M34.1 CREST SYNDROME (HCC): Primary | ICD-10-CM

## 2022-04-28 PROCEDURE — 99214 OFFICE O/P EST MOD 30 MIN: CPT | Performed by: INTERNAL MEDICINE

## 2022-04-28 RX ORDER — SILDENAFIL CITRATE 20 MG/1
20 TABLET ORAL 3 TIMES DAILY
Qty: 90 TABLET | Refills: 5 | Status: SHIPPED | OUTPATIENT
Start: 2022-04-28

## 2022-04-28 NOTE — PATIENT INSTRUCTIONS
1. Cont.  amlodipine  10mg a day - refilled   2. Plan to restart . sildenifile 20mg three times a day  3. Return to clinic in 6 months. .   4. Cont. Sertraline   5.f.u with dr. Mikie Vizcarra   6. Cont. meds for acid reflux   7. Cont.  Fluoxetine 20mg a day     Check good RX if sildenifil is too expensive

## 2022-04-29 LAB — MMA: 0.48 UMOL/L

## 2022-05-02 NOTE — TELEPHONE ENCOUNTER
Fax received from [employer]  PA cancelled  It was previously approved  [id number]  01/01/2019 - 12/31/2039  Questions, call [phone]    Left message to call back. Please make sure patient has received sildenafil.

## 2022-05-23 ENCOUNTER — LAB ENCOUNTER (OUTPATIENT)
Dept: LAB | Age: 77
End: 2022-05-23
Attending: FAMILY MEDICINE
Payer: MEDICARE

## 2022-05-23 ENCOUNTER — OFFICE VISIT (OUTPATIENT)
Dept: FAMILY MEDICINE CLINIC | Facility: CLINIC | Age: 77
End: 2022-05-23
Payer: MEDICARE

## 2022-05-23 VITALS
DIASTOLIC BLOOD PRESSURE: 56 MMHG | HEIGHT: 67 IN | RESPIRATION RATE: 17 BRPM | SYSTOLIC BLOOD PRESSURE: 124 MMHG | WEIGHT: 218.88 LBS | HEART RATE: 72 BPM | BODY MASS INDEX: 34.35 KG/M2

## 2022-05-23 DIAGNOSIS — N18.32 TYPE 2 DIABETES MELLITUS WITH STAGE 3B CHRONIC KIDNEY DISEASE, WITHOUT LONG-TERM CURRENT USE OF INSULIN (HCC): ICD-10-CM

## 2022-05-23 DIAGNOSIS — E11.22 TYPE 2 DIABETES MELLITUS WITH STAGE 3B CHRONIC KIDNEY DISEASE, WITHOUT LONG-TERM CURRENT USE OF INSULIN (HCC): ICD-10-CM

## 2022-05-23 DIAGNOSIS — N18.32 STAGE 3B CHRONIC KIDNEY DISEASE (HCC): ICD-10-CM

## 2022-05-23 DIAGNOSIS — I50.9 CONGESTIVE HEART FAILURE, UNSPECIFIED HF CHRONICITY, UNSPECIFIED HEART FAILURE TYPE (HCC): ICD-10-CM

## 2022-05-23 DIAGNOSIS — I10 ESSENTIAL HYPERTENSION: ICD-10-CM

## 2022-05-23 DIAGNOSIS — E78.5 HYPERLIPIDEMIA, UNSPECIFIED HYPERLIPIDEMIA TYPE: ICD-10-CM

## 2022-05-23 DIAGNOSIS — R60.0 LOWER EXTREMITY EDEMA: Primary | ICD-10-CM

## 2022-05-23 DIAGNOSIS — I82.512 CHRONIC DEEP VEIN THROMBOSIS (DVT) OF FEMORAL VEIN OF LEFT LOWER EXTREMITY (HCC): ICD-10-CM

## 2022-05-23 DIAGNOSIS — R60.0 LOWER EXTREMITY EDEMA: ICD-10-CM

## 2022-05-23 LAB
ALBUMIN SERPL-MCNC: 3.4 G/DL (ref 3.4–5)
ALBUMIN/GLOB SERPL: 0.9 {RATIO} (ref 1–2)
ALP LIVER SERPL-CCNC: 72 U/L
ALT SERPL-CCNC: 14 U/L
ANION GAP SERPL CALC-SCNC: 2 MMOL/L (ref 0–18)
AST SERPL-CCNC: 14 U/L (ref 15–37)
BILIRUB SERPL-MCNC: 0.5 MG/DL (ref 0.1–2)
BILIRUB UR QL: NEGATIVE
BUN BLD-MCNC: 24 MG/DL (ref 7–18)
BUN/CREAT SERPL: 20.5 (ref 10–20)
CALCIUM BLD-MCNC: 9 MG/DL (ref 8.5–10.1)
CHLORIDE SERPL-SCNC: 112 MMOL/L (ref 98–112)
CHOLEST SERPL-MCNC: 144 MG/DL (ref ?–200)
CO2 SERPL-SCNC: 30 MMOL/L (ref 21–32)
COLOR UR: YELLOW
CREAT BLD-MCNC: 1.17 MG/DL
CREAT UR-SCNC: 85.8 MG/DL
EST. AVERAGE GLUCOSE BLD GHB EST-MCNC: 111 MG/DL (ref 68–126)
FASTING PATIENT LIPID ANSWER: YES
FASTING STATUS PATIENT QL REPORTED: YES
GLOBULIN PLAS-MCNC: 3.6 G/DL (ref 2.8–4.4)
GLUCOSE BLD-MCNC: 87 MG/DL (ref 70–99)
GLUCOSE UR-MCNC: NEGATIVE MG/DL
HBA1C MFR BLD: 5.5 % (ref ?–5.7)
HDLC SERPL-MCNC: 66 MG/DL (ref 40–59)
HGB UR QL STRIP.AUTO: NEGATIVE
HYALINE CASTS #/AREA URNS AUTO: PRESENT /LPF
KETONES UR-MCNC: NEGATIVE MG/DL
LDLC SERPL CALC-MCNC: 65 MG/DL (ref ?–100)
MICROALBUMIN UR-MCNC: 1.34 MG/DL
MICROALBUMIN/CREAT 24H UR-RTO: 15.6 UG/MG (ref ?–30)
NITRITE UR QL STRIP.AUTO: NEGATIVE
NONHDLC SERPL-MCNC: 78 MG/DL (ref ?–130)
NT-PROBNP SERPL-MCNC: 2932 PG/ML (ref ?–450)
OSMOLALITY SERPL CALC.SUM OF ELEC: 301 MOSM/KG (ref 275–295)
PH UR: 6 [PH] (ref 5–8)
POTASSIUM SERPL-SCNC: 4.4 MMOL/L (ref 3.5–5.1)
PROT SERPL-MCNC: 7 G/DL (ref 6.4–8.2)
PROT UR-MCNC: NEGATIVE MG/DL
SODIUM SERPL-SCNC: 144 MMOL/L (ref 136–145)
SP GR UR STRIP: 1.02 (ref 1–1.03)
TRIGL SERPL-MCNC: 62 MG/DL (ref 30–149)
UROBILINOGEN UR STRIP-ACNC: 2
VIT C UR-MCNC: NEGATIVE MG/DL
VLDLC SERPL CALC-MCNC: 9 MG/DL (ref 0–30)

## 2022-05-23 PROCEDURE — 82570 ASSAY OF URINE CREATININE: CPT

## 2022-05-23 PROCEDURE — 99214 OFFICE O/P EST MOD 30 MIN: CPT | Performed by: FAMILY MEDICINE

## 2022-05-23 PROCEDURE — 36415 COLL VENOUS BLD VENIPUNCTURE: CPT

## 2022-05-23 PROCEDURE — 80053 COMPREHEN METABOLIC PANEL: CPT

## 2022-05-23 PROCEDURE — 82043 UR ALBUMIN QUANTITATIVE: CPT

## 2022-05-23 PROCEDURE — 83036 HEMOGLOBIN GLYCOSYLATED A1C: CPT

## 2022-05-23 PROCEDURE — 80061 LIPID PANEL: CPT

## 2022-05-23 PROCEDURE — 83880 ASSAY OF NATRIURETIC PEPTIDE: CPT

## 2022-05-23 PROCEDURE — 81001 URINALYSIS AUTO W/SCOPE: CPT

## 2022-05-23 RX ORDER — FUROSEMIDE 20 MG/1
20 TABLET ORAL DAILY
Qty: 30 TABLET | Refills: 0 | Status: SHIPPED | OUTPATIENT
Start: 2022-05-23

## 2022-05-24 ENCOUNTER — TELEPHONE (OUTPATIENT)
Dept: FAMILY MEDICINE CLINIC | Facility: CLINIC | Age: 77
End: 2022-05-24

## 2022-05-24 NOTE — TELEPHONE ENCOUNTER
Pt called back, verified name and . Went over test results below. Pt verbalized understanding. Will complete bloodwork 48 hours after starting furosemide, and f/u with cardio.

## 2022-05-27 ENCOUNTER — HOSPITAL ENCOUNTER (OUTPATIENT)
Dept: ULTRASOUND IMAGING | Facility: HOSPITAL | Age: 77
Discharge: HOME | End: 2022-05-27
Attending: FAMILY MEDICINE
Payer: MEDICARE

## 2022-05-27 ENCOUNTER — TELEPHONE (OUTPATIENT)
Dept: FAMILY MEDICINE CLINIC | Facility: CLINIC | Age: 77
End: 2022-05-27

## 2022-05-27 DIAGNOSIS — I82.512 CHRONIC DEEP VEIN THROMBOSIS (DVT) OF FEMORAL VEIN OF LEFT LOWER EXTREMITY (HCC): ICD-10-CM

## 2022-05-27 DIAGNOSIS — I48.11 LONGSTANDING PERSISTENT ATRIAL FIBRILLATION (HCC): ICD-10-CM

## 2022-05-27 PROCEDURE — 93971 EXTREMITY STUDY: CPT | Performed by: FAMILY MEDICINE

## 2022-05-27 NOTE — TELEPHONE ENCOUNTER
Dr. Carla Carlson,   Patient is requesting new prescription for Eliquis to ensure she does not run out of the medication.      Medication pended for your review and approval.

## 2022-05-27 NOTE — TELEPHONE ENCOUNTER
Patient with thrombus in lower extremities. Per the record she is taking Eliquis 5 mg twice daily please contact patient and verify she is compliant with his medication. Patient to go to ER immediately if she has dyspnea.

## 2022-05-27 NOTE — TELEPHONE ENCOUNTER
[employer] message sent to patient to notify her that prescription was sent to pharmacy.

## 2022-05-27 NOTE — TELEPHONE ENCOUNTER
Verified name and . Patient states that she has been compliant with Eliquis 5 mg twice daily. She states that Dr. Canas Neither advised her to take 10 mg of Eliquis twice daily for 7 days in regards to thrombus. She states she will run out of current prescription early if she is to take it that way and is requesting a new prescription to be sent to the pharmacy. Medication pended for your review and approval.    She was advised of emergency room precautions- verbalized understanding.

## 2022-05-27 NOTE — TELEPHONE ENCOUNTER
Patient calling regarding concerns with recent US venous doppler results. US dept confirms they have been trying to reach PCP for follow up, please advise recommendations.

## 2022-06-08 RX ORDER — PANTOPRAZOLE SODIUM 40 MG/1
TABLET, DELAYED RELEASE ORAL
Qty: 60 TABLET | Refills: 1 | Status: SHIPPED | OUTPATIENT
Start: 2022-06-08

## 2022-06-15 ENCOUNTER — OFFICE VISIT (OUTPATIENT)
Dept: RHEUMATOLOGY | Facility: CLINIC | Age: 77
End: 2022-06-15
Payer: MEDICARE

## 2022-06-15 VITALS
DIASTOLIC BLOOD PRESSURE: 68 MMHG | RESPIRATION RATE: 16 BRPM | HEART RATE: 65 BPM | HEIGHT: 67 IN | BODY MASS INDEX: 33.59 KG/M2 | SYSTOLIC BLOOD PRESSURE: 105 MMHG | WEIGHT: 214 LBS

## 2022-06-15 DIAGNOSIS — I27.20 PULMONARY HYPERTENSION (HCC): ICD-10-CM

## 2022-06-15 DIAGNOSIS — M79.7 FIBROMYALGIA: ICD-10-CM

## 2022-06-15 DIAGNOSIS — M34.1 CREST SYNDROME (HCC): Primary | ICD-10-CM

## 2022-06-15 PROCEDURE — 99214 OFFICE O/P EST MOD 30 MIN: CPT | Performed by: INTERNAL MEDICINE

## 2022-06-15 PROCEDURE — 1126F AMNT PAIN NOTED NONE PRSNT: CPT | Performed by: INTERNAL MEDICINE

## 2022-06-15 NOTE — PATIENT INSTRUCTIONS
1. Cont.  amlodipine  10mg a day - refilled   2. Cont. sildenifil 20mg three times a day  3. Return to clinic in 6 months. .   4. Cont. Sertraline   5. f/u with dr. Hari Fernandez   6. Cont. meds for acid reflux   7. Cont. Fluoxetine 20mg a day   8.  Cont. eliquis

## 2022-06-21 DIAGNOSIS — R60.0 LOWER EXTREMITY EDEMA: ICD-10-CM

## 2022-06-22 RX ORDER — FUROSEMIDE 20 MG/1
TABLET ORAL
Qty: 30 TABLET | Refills: 0 | Status: SHIPPED | OUTPATIENT
Start: 2022-06-22

## 2022-07-07 DIAGNOSIS — I48.11 LONGSTANDING PERSISTENT ATRIAL FIBRILLATION (HCC): ICD-10-CM

## 2022-07-07 RX ORDER — METOPROLOL SUCCINATE 100 MG/1
TABLET, EXTENDED RELEASE ORAL
Qty: 180 TABLET | Refills: 0 | Status: SHIPPED | OUTPATIENT
Start: 2022-07-07

## 2022-07-15 ENCOUNTER — OFFICE VISIT (OUTPATIENT)
Dept: DERMATOLOGY CLINIC | Facility: CLINIC | Age: 77
End: 2022-07-15
Payer: MEDICARE

## 2022-07-15 DIAGNOSIS — D18.01 HEMANGIOMA OF SKIN AND SUBCUTANEOUS TISSUE: Primary | ICD-10-CM

## 2022-07-15 PROCEDURE — 99213 OFFICE O/P EST LOW 20 MIN: CPT | Performed by: PHYSICIAN ASSISTANT

## 2022-07-15 PROCEDURE — 1126F AMNT PAIN NOTED NONE PRSNT: CPT | Performed by: PHYSICIAN ASSISTANT

## 2022-07-17 DIAGNOSIS — R60.0 LOWER EXTREMITY EDEMA: ICD-10-CM

## 2022-07-18 RX ORDER — FUROSEMIDE 20 MG/1
TABLET ORAL
Qty: 30 TABLET | Refills: 0 | Status: SHIPPED | OUTPATIENT
Start: 2022-07-18

## 2022-07-29 DIAGNOSIS — F32.1 MAJOR DEPRESSIVE DISORDER, SINGLE EPISODE, MODERATE (HCC): ICD-10-CM

## 2022-08-01 ENCOUNTER — NURSE ONLY (OUTPATIENT)
Dept: HEMATOLOGY/ONCOLOGY | Facility: HOSPITAL | Age: 77
End: 2022-08-01
Attending: INTERNAL MEDICINE
Payer: MEDICARE

## 2022-08-01 VITALS
DIASTOLIC BLOOD PRESSURE: 61 MMHG | WEIGHT: 216 LBS | HEART RATE: 69 BPM | HEIGHT: 67 IN | TEMPERATURE: 98 F | BODY MASS INDEX: 33.9 KG/M2 | SYSTOLIC BLOOD PRESSURE: 138 MMHG | OXYGEN SATURATION: 97 % | RESPIRATION RATE: 18 BRPM

## 2022-08-01 DIAGNOSIS — D53.9 MACROCYTIC ANEMIA: ICD-10-CM

## 2022-08-01 DIAGNOSIS — D51.9 ANEMIA DUE TO VITAMIN B12 DEFICIENCY, UNSPECIFIED B12 DEFICIENCY TYPE: ICD-10-CM

## 2022-08-01 DIAGNOSIS — D53.9 MACROCYTIC ANEMIA: Primary | ICD-10-CM

## 2022-08-01 LAB
BASOPHILS # BLD AUTO: 0.05 X10(3) UL (ref 0–0.2)
BASOPHILS NFR BLD AUTO: 1.3 %
DEPRECATED RDW RBC AUTO: 56.3 FL (ref 35.1–46.3)
EOSINOPHIL # BLD AUTO: 0.1 X10(3) UL (ref 0–0.7)
EOSINOPHIL NFR BLD AUTO: 2.6 %
ERYTHROCYTE [DISTWIDTH] IN BLOOD BY AUTOMATED COUNT: 14.9 % (ref 11–15)
HCT VFR BLD AUTO: 37.7 %
HGB BLD-MCNC: 11.4 G/DL
IMM GRANULOCYTES # BLD AUTO: 0.01 X10(3) UL (ref 0–1)
IMM GRANULOCYTES NFR BLD: 0.3 %
LYMPHOCYTES # BLD AUTO: 0.82 X10(3) UL (ref 1–4)
LYMPHOCYTES NFR BLD AUTO: 21.3 %
MCH RBC QN AUTO: 31 PG (ref 26–34)
MCHC RBC AUTO-ENTMCNC: 30.2 G/DL (ref 31–37)
MCV RBC AUTO: 102.4 FL
MONOCYTES # BLD AUTO: 0.42 X10(3) UL (ref 0.1–1)
MONOCYTES NFR BLD AUTO: 10.9 %
NEUTROPHILS # BLD AUTO: 2.45 X10 (3) UL (ref 1.5–7.7)
NEUTROPHILS # BLD AUTO: 2.45 X10(3) UL (ref 1.5–7.7)
NEUTROPHILS NFR BLD AUTO: 63.6 %
PLATELET # BLD AUTO: 208 10(3)UL (ref 150–450)
RBC # BLD AUTO: 3.68 X10(6)UL
WBC # BLD AUTO: 3.9 X10(3) UL (ref 4–11)

## 2022-08-01 PROCEDURE — 99213 OFFICE O/P EST LOW 20 MIN: CPT | Performed by: INTERNAL MEDICINE

## 2022-08-01 PROCEDURE — 85025 COMPLETE CBC W/AUTO DIFF WBC: CPT

## 2022-08-01 PROCEDURE — 36415 COLL VENOUS BLD VENIPUNCTURE: CPT

## 2022-08-09 DIAGNOSIS — R60.0 LOWER EXTREMITY EDEMA: ICD-10-CM

## 2022-08-10 RX ORDER — FUROSEMIDE 20 MG/1
TABLET ORAL
Qty: 30 TABLET | Refills: 0 | Status: SHIPPED | OUTPATIENT
Start: 2022-08-10

## 2022-08-24 RX ORDER — PANTOPRAZOLE SODIUM 40 MG/1
TABLET, DELAYED RELEASE ORAL
Qty: 60 TABLET | Refills: 3 | Status: SHIPPED | OUTPATIENT
Start: 2022-08-24

## 2022-09-01 DIAGNOSIS — E11.9 TYPE 2 DIABETES MELLITUS WITHOUT COMPLICATION, WITHOUT LONG-TERM CURRENT USE OF INSULIN (HCC): ICD-10-CM

## 2022-09-01 RX ORDER — AMLODIPINE BESYLATE 10 MG/1
TABLET ORAL
Qty: 30 TABLET | Refills: 5 | Status: SHIPPED | OUTPATIENT
Start: 2022-09-01

## 2022-09-02 RX ORDER — LISINOPRIL 10 MG/1
10 TABLET ORAL DAILY
Qty: 90 TABLET | Refills: 1 | Status: SHIPPED | OUTPATIENT
Start: 2022-09-02

## 2022-09-02 NOTE — TELEPHONE ENCOUNTER
Refill passed per [facility] protocol. Requested Prescriptions   Pending Prescriptions Disp Refills    LISINOPRIL 10 MG Oral Tab [Pharmacy Med Name: LISINOPRIL 10MG TABLETS] 90 tablet 1     Sig: TAKE 1 TABLET(10 MG) BY MOUTH DAILY        Hypertensive Medications Protocol Passed - 9/1/2022  1:57 PM        Passed - In person appointment in the past 12 or next 3 months       Recent Outpatient Visits              1 month ago Macrocytic anemia    [facility] Hematology Oncology Karlene Rich MD    Office Visit    1 month ago Macrocytic anemia    [facility] Hematology Oncology    Nurse Only    1 month ago Hemangioma of skin and subcutaneous tissue    [facility] Dermatology Aurora Lopez PA-C    Office Visit    2 months ago CREST syndrome [facility])    [facility] Armando Ferguson [facility]    Office Visit    3 months ago Lower extremity edema    [address] Lise Kellogg MD    Office Visit     Future Appointments         Provider Department Appt Notes    In 2 months Lorin-Hanna 25 Hematology Oncology FT LAB PIV. CL cbc,B12    In 2 months Tobin Hamilton 19 Hematology Oncology FOLLOW UP VISIT. CL  3M               Passed - Last BP reading less than 140/90     BP Readings from Last 1 Encounters:  08/01/22 : 138/61                Passed - CMP or BMP in past 6 months     Recent Results (from the past 4392 hour(s))   COMP METABOLIC PANEL (14)    Collection Time: 05/23/22  9:34 AM   Result Value Ref Range    Glucose 87 70 - 99 mg/dL    Sodium 144 136 - 145 mmol/L    Potassium 4.4 3.5 - 5.1 mmol/L    Chloride 112 98 - 112 mmol/L    CO2 30.0 21.0 - 32.0 mmol/L    Anion Gap 2 0 - 18 mmol/L    BUN 24 (H) 7 - 18 mg/dL    Creatinine 1.17 (H) 0.55 - 1.02 mg/dL    BUN/CREA Ratio 20.5 (H) 10.0 - 20.0    Calcium, Total 9.0 8.5 - 10.1 mg/dL    Calculated Osmolality 301 (H) 275 - 295 mOsm/kg GFR, Non- 44 (L) >=60    GFR, -American 51 (L) >=60    ALT 14 13 - 56 U/L    AST 14 (L) 15 - 37 U/L    Alkaline Phosphatase 72 55 - 142 U/L    Bilirubin, Total 0.5 0.1 - 2.0 mg/dL    Total Protein 7.0 6.4 - 8.2 g/dL    Albumin 3.4 3.4 - 5.0 g/dL    Globulin  3.6 2.8 - 4.4 g/dL    A/G Ratio 0.9 (L) 1.0 - 2.0    Patient Fasting for CMP? Yes      *Note: Due to a large number of results and/or encounters for the requested time period, some results have not been displayed. A complete set of results can be found in Results Review. Passed - In person appointment or virtual visit in the past 6 months       Recent Outpatient Visits              1 month ago Macrocytic anemia    [facility] Hematology Oncology Marcos Hawkins MD    Office Visit    1 month ago Macrocytic anemia    [facility] Hematology Oncology    Nurse Only    1 month ago Hemangioma of skin and subcutaneous tissue    [facility] Dermatology Manjit Jang PA-C    Office Visit    2 months ago CREST syndrome [facility])    [facility] Londa Alpers, MD    Office Visit    3 months ago Lower extremity edema    [address] Mona Toro MD    Office Visit     Future Appointments         Provider Department Appt Notes    In 2 months Lorin-Viru 25 Hematology Oncology FT LAB PIV. CL cbc,B12    In 2 months Tobin Can 19 Hematology Oncology FOLLOW UP VISIT. CL  3M               Passed - GFR > 50     No results found for: [facility]                    Recent Outpatient Visits              1 month ago Macrocytic anemia    [facility] Hematology Oncology Marcos Hawkins MD    Office Visit    1 month ago Macrocytic anemia    [facility] Hematology Oncology    Nurse Only    1 month ago Hemangioma of skin and subcutaneous tissue    [facility] Dermatology Manjit Jang EVER    Office Visit    2 months ago CREST syndrome [facility])    [place] [facility] [place] [facility] for Mayuri Espinoza MD    Office Visit    3 months ago Lower extremity edema    [address] Ernst Jaime MD    Office Visit            Future Appointments         Provider Department Appt Notes    In 2 months Taco 25 Hematology Oncology FT LAB PIV. CL cbc,B12    In 2 months Tobin Obando 19 Hematology Oncology FOLLOW UP VISIT. CL  3M

## 2022-09-06 DIAGNOSIS — R60.0 LOWER EXTREMITY EDEMA: ICD-10-CM

## 2022-09-06 RX ORDER — FUROSEMIDE 20 MG/1
20 TABLET ORAL DAILY
Qty: 90 TABLET | Refills: 1 | Status: SHIPPED | OUTPATIENT
Start: 2022-09-06 | End: 2023-03-20

## 2022-09-07 NOTE — TELEPHONE ENCOUNTER
Refill passed per [employer] protocol. Requested Prescriptions   Pending Prescriptions Disp Refills    FUROSEMIDE 20 MG Oral Tab [Pharmacy Med Name: FUROSEMIDE 20MG TABLETS] 30 tablet 0     Sig: TAKE 1 TABLET(20 MG) BY MOUTH DAILY        Hypertensive Medications Protocol Passed - 9/6/2022 10:11 PM        Passed - In person appointment in the past 12 or next 3 months       Recent Outpatient Visits              1 month ago Macrocytic anemia    [facility] Hematology Oncology Brea Saha MD    Office Visit    1 month ago Macrocytic anemia    [facility] Hematology Oncology    Nurse Only    1 month ago Hemangioma of skin and subcutaneous tissue    [facility] Dermatology Philly Sinha PA-C    Office Visit    2 months ago CREST syndrome [facility])    [facility] for Carmel Mccall MD    Office Visit    3 months ago Lower extremity edema    [address] Sebastien Gay MD    Office Visit     Future Appointments         Provider Department Appt Notes    In 1 month Taco 25 Hematology Oncology FT LAB PIV. CL cbc,B12    In 1 month Tobin Ross 19 Hematology Oncology FOLLOW UP VISIT. CL  3M               Passed - Last BP reading less than 140/90     BP Readings from Last 1 Encounters:  08/01/22 : 138/61                Passed - CMP or BMP in past 6 months     Recent Results (from the past 4392 hour(s))   COMP METABOLIC PANEL (14)    Collection Time: 05/23/22  9:34 AM   Result Value Ref Range    Glucose 87 70 - 99 mg/dL    Sodium 144 136 - 145 mmol/L    Potassium 4.4 3.5 - 5.1 mmol/L    Chloride 112 98 - 112 mmol/L    CO2 30.0 21.0 - 32.0 mmol/L    Anion Gap 2 0 - 18 mmol/L    BUN 24 (H) 7 - 18 mg/dL    Creatinine 1.17 (H) 0.55 - 1.02 mg/dL    BUN/CREA Ratio 20.5 (H) 10.0 - 20.0    Calcium, Total 9.0 8.5 - 10.1 mg/dL    Calculated Osmolality 301 (H) 275 - 295 mOsm/kg GFR, Non- 44 (L) >=60    GFR, -American 51 (L) >=60    ALT 14 13 - 56 U/L    AST 14 (L) 15 - 37 U/L    Alkaline Phosphatase 72 55 - 142 U/L    Bilirubin, Total 0.5 0.1 - 2.0 mg/dL    Total Protein 7.0 6.4 - 8.2 g/dL    Albumin 3.4 3.4 - 5.0 g/dL    Globulin  3.6 2.8 - 4.4 g/dL    A/G Ratio 0.9 (L) 1.0 - 2.0    Patient Fasting for CMP? Yes      *Note: Due to a large number of results and/or encounters for the requested time period, some results have not been displayed. A complete set of results can be found in Results Review. Passed - In person appointment or virtual visit in the past 6 months       Recent Outpatient Visits              1 month ago Macrocytic anemia    [facility] Hematology Oncology Alon Dixon MD    Office Visit    1 month ago Macrocytic anemia    [facility] Hematology Oncology    Nurse Only    1 month ago Hemangioma of skin and subcutaneous tissue    [facility] Dermatology Ilya Han PA-C    Office Visit    2 months ago CREST syndrome [facility])    [place] [facility] for Deysi North MD    Office Visit    3 months ago Lower extremity edema    [address] Ismael Badillo MD    Office Visit     Future Appointments         Provider Department Appt Notes    In 1 month Lorin-Viru 25 Hematology Oncology FT LAB PIV. CL cbc,B12    In 1 month Tobin Kwon 19 Hematology Oncology FOLLOW UP VISIT. CL  3M               Passed - GFR > 50     No results found for: [facility]                     Future Appointments         Provider Department Appt Notes    In 1 month Lorin-Viru 25 Hematology Oncology FT LAB PIV. CL cbc,B12    In 1 month AlonTobin Denise Equador 19 Hematology Oncology FOLLOW UP VISIT. CL  3M             Recent Outpatient Visits              1 month ago Macrocytic anemia    [facility] AND [facility] Hematology Oncology Lazaro Mortensen MD    Office Visit    1 month ago Macrocytic anemia    [facility] Hematology Oncology    Nurse Only    1 month ago Hemangioma of skin and subcutaneous tissue    [facility] David Toscano PA-C    Office Visit    2 months ago CREST syndrome [facility])    [place] [facility] [place] [facility] for Mayuri Espinoza MD    Office Visit    3 months ago Lower extremity edema    [address] Ernst Jaime MD    Office Visit

## 2022-09-21 DIAGNOSIS — I48.11 LONGSTANDING PERSISTENT ATRIAL FIBRILLATION (HCC): ICD-10-CM

## 2022-09-21 DIAGNOSIS — R09.82 POSTNASAL DRIP: ICD-10-CM

## 2022-09-21 DIAGNOSIS — E78.5 HYPERLIPIDEMIA, UNSPECIFIED HYPERLIPIDEMIA TYPE: ICD-10-CM

## 2022-09-21 RX ORDER — FLUTICASONE PROPIONATE 50 MCG
SPRAY, SUSPENSION (ML) NASAL
Qty: 16 G | Refills: 3 | Status: SHIPPED | OUTPATIENT
Start: 2022-09-21

## 2022-09-21 RX ORDER — ATORVASTATIN CALCIUM 10 MG/1
TABLET, FILM COATED ORAL
Qty: 90 TABLET | Refills: 1 | Status: SHIPPED | OUTPATIENT
Start: 2022-09-21

## 2022-09-21 RX ORDER — METOPROLOL SUCCINATE 100 MG/1
TABLET, EXTENDED RELEASE ORAL
Qty: 180 TABLET | Refills: 0 | Status: SHIPPED | OUTPATIENT
Start: 2022-09-21

## 2022-09-21 NOTE — TELEPHONE ENCOUNTER
Please review. Protocol failed/ No protocol      Requested Prescriptions   Pending Prescriptions Disp Refills    METOPROLOL SUCCINATE  MG Oral Tablet 24 Hr [Pharmacy Med Name: METOPROLOL ER SUCCINATE 100MG TABS] 180 tablet 0     Sig: TAKE 1 TABLET(100 MG) BY MOUTH TWICE DAILY        Hypertensive Medications Protocol Failed - 9/21/2022  2:18 PM        Failed - EGFRCR or GFRNAA > 50     GFR Evaluation  GFRNAA: 44 , resulted on 5/23/2022            Passed - In person appointment in the past 12 or next 3 months       Recent Outpatient Visits              1 month ago Macrocytic anemia    [facility] Hematology Oncology Lynn Shah MD    Office Visit    1 month ago Macrocytic anemia    [facility] Hematology Oncology    Nurse Only    2 months ago Hemangioma of skin and subcutaneous tissue    [facility] Dermatology Leo Alba PA-C    Office Visit    3 months ago CREST syndrome [facility])    [facility] Sebastian Best MD    Office Visit    4 months ago Lower extremity edema    [address] Collin Scott MD    Office Visit     Future Appointments         Provider Department Appt Notes    In 1 month Taco 25 Hematology Oncology FT LAB PIV. CL cbc,B12    In 1 month Tobin Tomas 19 Hematology Oncology FOLLOW UP VISIT. GR  3M               Passed - Last BP reading less than 140/90     BP Readings from Last 1 Encounters:  08/01/22 : 138/61                Passed - CMP or BMP in past 6 months     Recent Results (from the past 4392 hour(s))   COMP METABOLIC PANEL (14)    Collection Time: 05/23/22  9:34 AM   Result Value Ref Range    Glucose 87 70 - 99 mg/dL    Sodium 144 136 - 145 mmol/L    Potassium 4.4 3.5 - 5.1 mmol/L    Chloride 112 98 - 112 mmol/L    CO2 30.0 21.0 - 32.0 mmol/L    Anion Gap 2 0 - 18 mmol/L    BUN 24 (H) 7 - 18 mg/dL    Creatinine 1.17 (H) 0.55 - 1.02 mg/dL    BUN/CREA Ratio 20.5 (H) 10.0 - 20.0    Calcium, Total 9.0 8.5 - 10.1 mg/dL    Calculated Osmolality 301 (H) 275 - 295 mOsm/kg    GFR, Non- 44 (L) >=60    GFR, -American 51 (L) >=60    ALT 14 13 - 56 U/L    AST 14 (L) 15 - 37 U/L    Alkaline Phosphatase 72 55 - 142 U/L    Bilirubin, Total 0.5 0.1 - 2.0 mg/dL    Total Protein 7.0 6.4 - 8.2 g/dL    Albumin 3.4 3.4 - 5.0 g/dL    Globulin  3.6 2.8 - 4.4 g/dL    A/G Ratio 0.9 (L) 1.0 - 2.0    Patient Fasting for CMP? Yes      *Note: Due to a large number of results and/or encounters for the requested time period, some results have not been displayed. A complete set of results can be found in Results Review. Passed - In person appointment or virtual visit in the past 6 months       Recent Outpatient Visits              1 month ago Macrocytic anemia    [facility] Hematology Oncology Megan Hunt MD    Office Visit    1 month ago Macrocytic anemia    [facility] Hematology Oncology    Nurse Only    2 months ago Hemangioma of skin and subcutaneous tissue    [facility] Nery Sandy PA-C    Office Visit    3 months ago CREST syndrome [facility])    [place] [facility] [place] [facility] for Virgil Mcguire MD    Office Visit    4 months ago Lower extremity edema    [address] Myles Bacon MD    Office Visit     Future Appointments         Provider Department Appt Notes    In 1 month Taco 25 Hematology Oncology FT LAB PIV. CL cbc,B12    In 1 month Tobin Armstrong 19 Hematology Oncology FOLLOW UP VISIT. GR  3M                 Signed Prescriptions Disp Refills    FLUTICASONE PROPIONATE 50 MCG/ACT Nasal Suspension 16 g 3     Sig: SHAKE LIQUID AND USE 2 SPRAYS IN EACH NOSTRIL DAILY        Allergy Medication Protocol Passed - 9/21/2022  5:54 AM        Passed - In person appointment or virtual visit in the past 12 mos or appointment in next 3 mos       Recent Outpatient Visits              1 month ago Macrocytic anemia    [facility] Hematology Oncology Amy Tobar MD    Office Visit    1 month ago Macrocytic anemia    [facility] Hematology Oncology    Nurse Only    2 months ago Hemangioma of skin and subcutaneous tissue    [facility] D[facility] Leann Mcknight PA-C    Office Visit    3 months ago CREST syndrome [facility])    [facility]HAVIORAL for Shayy Jacob MD    Office Visit    4 months ago Lower extremity edema    [address] So Dan MD    Office Visit     Future Appointments         Provider Department Appt Notes    In 1 month Taco 25 Hematology Oncology FT LAB PIV. CL cbc,B12    In 1 month Tobin Jaimes 19 Hematology Oncology FOLLOW UP VISIT. GR  3M                  ATORVASTATIN 10 MG Oral Tab 90 tablet 1     Sig: TAKE 1 TABLET(10 MG) BY MOUTH EVERY NIGHT        Cholesterol Medication Protocol Passed - 9/21/2022  5:54 AM        Passed - ALT in past 12 months        Passed - LDL in past 12 months        Passed - Last ALT < 80       Lab Results   Component Value Date    ALT 14 05/23/2022             Passed - Last LDL < 130     Lab Results   Component Value Date    LDL 65 05/23/2022               Passed - In person appointment or virtual visit in the past 12 mos or appointment in next 3 mos       Recent Outpatient Visits              1 month ago Macrocytic anemia    [facility] AND [facility] Hematology Oncology Amy Tobar MD    Office Visit    1 month ago Macrocytic anemia    [facility] Hematology Oncology    Nurse Only    2 months ago Hemangioma of skin and subcutaneous tissue    [facility] Derm[facility] Leann Mcknight PA-C    Office Visit    3 months ago CREST syndrome [facility])    [facility] Ghulam Escobar MD    Office Visit    4 months ago Lower extremity edema    [address] Angel Gonsalez MD    Office Visit     Future Appointments         Provider Department Appt Notes    In 1 month Lorin-Viru 25 Hematology Oncology FT LAB PIV. CL cbc,B12    In 1 month Rinda Dose, Tobin Equador 19 Hematology Oncology FOLLOW UP VISIT. GR  3M                     Future Appointments         Provider Department Appt Notes    In 1 month Lorin-Viru 25 Hematology Oncology FT LAB PIV. CL cbc,B12    In 1 month Rinda Dose, Tobin Equador 19 Hematology Oncology FOLLOW UP VISIT. [address] [facility] [address]  3M             Recent Outpatient Visits              1 month ago Macrocytic anemia    [facility] AND [facility] Hematology Oncology Kevin Leyav MD    Office Visit    1 month ago Macrocytic anemia    [facility] Hematology Oncology    Nurse Only    2 months ago Hemangioma of skin and subcutaneous tissue    [facility] Dermatology Kenroy Sun PA-C    Office Visit    3 months ago CREST syndrome [facility])    [place] [facility] [place] [facility] for Franklin Friedman MD    Office Visit    4 months ago Lower extremity edema    [facility] Angel Gonsalez MD    Office Visit

## 2022-09-21 NOTE — TELEPHONE ENCOUNTER
Refill passed per [facility] protocol. Requested Prescriptions   Pending Prescriptions Disp Refills    FLUTICASONE PROPIONATE 50 MCG/ACT Nasal Suspension [Pharmacy Med Name: FLUTICASONE 50MCG NASAL SP (120) RX] 16 g 3     Sig: SHAKE LIQUID AND USE 2 SPRAYS IN EACH NOSTRIL DAILY        Allergy Medication Protocol Passed - 9/21/2022  5:54 AM        Passed - In person appointment or virtual visit in the past 12 mos or appointment in next 3 mos       Recent Outpatient Visits              1 month ago Macrocytic anemia    [facility] Hematology Oncology Christy Tobar MD    Office Visit    1 month ago Macrocytic anemia    [facility] Hematology Oncology    Nurse Only    2 months ago Hemangioma of skin and subcutaneous tissue    [facility] Joseline Blas PA-C    Office Visit    3 months ago CREST syndrome [facility])    [facility] for Daija Laurent MD    Office Visit    4 months ago Lower extremity edema    [address] Ana Saldivar MD    Office Visit     Future Appointments         Provider Department Appt Notes    In 1 month Taco 25 Hematology Oncology FT LAB PIV. CL cbc,B12    In 1 month Tobin Nogueira 19 Hematology Oncology FOLLOW UP VISIT. GR  3M                  ATORVASTATIN 10 MG Oral Tab [Pharmacy Med Name: ATORVASTATIN 10MG TABLETS] 90 tablet 3     Sig: TAKE 1 TABLET(10 MG) BY MOUTH EVERY NIGHT        Cholesterol Medication Protocol Passed - 9/21/2022  5:54 AM        Passed - ALT in past 12 months        Passed - LDL in past 12 months        Passed - Last ALT < 80       Lab Results   Component Value Date    ALT 14 05/23/2022             Passed - Last LDL < 130     Lab Results   Component Value Date    LDL 65 05/23/2022               Passed - In person appointment or virtual visit in the past 12 mos or appointment in next 3 mos       Recent Outpatient Visits              1 month ago Macrocytic anemia    [facility] AND [facility] Hematology Oncology Marcy Barrett MD    Office Visit    1 month ago Macrocytic anemia    [facility] Hematology Oncology    Nurse Only    2 months ago Hemangioma of skin and subcutaneous tissue    [facility] Dermatology Beaulah Cranker, PA-C    Office Visit    3 months ago CREST syndrome [facility])    [facility] BEHAVIORAL for Texie Patience, MD    Office Visit    4 months ago Lower extremity edema    [address] Emili Kruger MD    Office Visit     Future Appointments         Provider Department Appt Notes    In 1 month Taco 25 Hematology Oncology FT LAB PIV. CL cbc,B12    In 1 month Tobin Rodriguez 19 Hematology Oncology FOLLOW UP VISIT. GR  3M                  METOPROLOL SUCCINATE  MG Oral Tablet 24 Hr [Pharmacy Med Name: METOPROLOL ER SUCCINATE 100MG TABS] 180 tablet 0     Sig: TAKE 1 TABLET(100 MG) BY MOUTH TWICE DAILY        Hypertensive Medications Protocol Failed - 9/21/2022  5:54 AM        Failed - EGFRCR or GFRNAA > 50     GFR Evaluation  GFRNAA: 44 , resulted on 5/23/2022            Passed - In person appointment in the past 12 or next 3 months       Recent Outpatient Visits              1 month ago Macrocytic anemia    [facility] AND [facility] Hematology Oncology Marcy Barrett MD    Office Visit    1 month ago Macrocytic anemia    [facility] Hematology Oncology    Nurse Only    2 months ago Hemangioma of skin and subcutaneous tissue    [facility] Dermatology Beaulah Cranker, PA-C    Office Visit    3 months ago CREST syndrome [facility])    [facility] BEHAVIORAL for Francisco Pepe MD    Office Visit    4 months ago Lower extremity edema    [address] Emili Kruger MD    Office Visit     Future Appointments Provider Department Appt Notes    In 1 month Lorin-Hanna 25 Hematology Oncology FT LAB PIV. CL cbc,B12    In 1 month Tobin Soliz 19 Hematology Oncology FOLLOW UP VISIT. GR  3M               Passed - Last BP reading less than 140/90     BP Readings from Last 1 Encounters:  08/01/22 : 138/61                Passed - CMP or BMP in past 6 months     Recent Results (from the past 4392 hour(s))   COMP METABOLIC PANEL (14)    Collection Time: 05/23/22  9:34 AM   Result Value Ref Range    Glucose 87 70 - 99 mg/dL    Sodium 144 136 - 145 mmol/L    Potassium 4.4 3.5 - 5.1 mmol/L    Chloride 112 98 - 112 mmol/L    CO2 30.0 21.0 - 32.0 mmol/L    Anion Gap 2 0 - 18 mmol/L    BUN 24 (H) 7 - 18 mg/dL    Creatinine 1.17 (H) 0.55 - 1.02 mg/dL    BUN/CREA Ratio 20.5 (H) 10.0 - 20.0    Calcium, Total 9.0 8.5 - 10.1 mg/dL    Calculated Osmolality 301 (H) 275 - 295 mOsm/kg    GFR, Non- 44 (L) >=60    GFR, -American 51 (L) >=60    ALT 14 13 - 56 U/L    AST 14 (L) 15 - 37 U/L    Alkaline Phosphatase 72 55 - 142 U/L    Bilirubin, Total 0.5 0.1 - 2.0 mg/dL    Total Protein 7.0 6.4 - 8.2 g/dL    Albumin 3.4 3.4 - 5.0 g/dL    Globulin  3.6 2.8 - 4.4 g/dL    A/G Ratio 0.9 (L) 1.0 - 2.0    Patient Fasting for CMP? Yes      *Note: Due to a large number of results and/or encounters for the requested time period, some results have not been displayed. A complete set of results can be found in Results Review.                  Passed - In person appointment or virtual visit in the past 6 months       Recent Outpatient Visits              1 month ago Macrocytic anemia    [facility] Hematology Oncology Kevin Leyva MD    Office Visit    1 month ago Macrocytic anemia    [facility] Hematology Oncology    Nurse Only    2 months ago Hemangioma of skin and subcutaneous tissue    [facility] Dermatology Kenroy Sun PA-C    Office Visit    3 months ago CREST syndrome [facility])    [place] [facility] Khushi Darby MD    Office Visit    4 months ago Lower extremity edema    [address] Marcelo Cisneros MD    Office Visit     Future Appointments         Provider Department Appt Notes    In 1 month Lorin-Viru 25 Hematology Oncology FT LAB PIV. CL cbc,B12    In 1 month Almita Shine, Tobin Equador 19 Hematology Oncology FOLLOW UP VISIT. GR  3M                      Future Appointments         Provider Department Appt Notes    In 1 month Lorin-Viru 25 Hematology Oncology FT LAB PIV. CL cbc,B12    In 1 month Almita Shine, Tobin Equador 19 Hematology Oncology FOLLOW UP VISIT. [address] [facility] [address]  3M            Recent Outpatient Visits              1 month ago Macrocytic anemia    [facility] Hematology Oncology Almita Weems MD    Office Visit    1 month ago Macrocytic anemia    [facility] Hematology Oncology    Nurse Only    2 months ago Hemangioma of skin and subcutaneous tissue    [facility] Gus Singleton PA-C    Office Visit    3 months ago CREST syndrome [facility])    [facility] BEHAVIORAL for Khushi Darby MD    Office Visit    4 months ago Lower extremity edema    [address] Marcelo Cisneros MD    Office Visit Dr. Paige Valerio

## 2022-11-03 ENCOUNTER — APPOINTMENT (OUTPATIENT)
Dept: HEMATOLOGY/ONCOLOGY | Facility: HOSPITAL | Age: 77
End: 2022-11-03
Attending: INTERNAL MEDICINE
Payer: MEDICARE

## 2022-11-08 ENCOUNTER — NURSE ONLY (OUTPATIENT)
Dept: HEMATOLOGY/ONCOLOGY | Facility: HOSPITAL | Age: 77
End: 2022-11-08
Attending: INTERNAL MEDICINE
Payer: MEDICARE

## 2022-11-08 VITALS
HEART RATE: 65 BPM | TEMPERATURE: 98 F | HEIGHT: 67 IN | WEIGHT: 212.81 LBS | BODY MASS INDEX: 33.4 KG/M2 | RESPIRATION RATE: 18 BRPM | SYSTOLIC BLOOD PRESSURE: 120 MMHG | DIASTOLIC BLOOD PRESSURE: 63 MMHG

## 2022-11-08 DIAGNOSIS — D51.9 ANEMIA DUE TO VITAMIN B12 DEFICIENCY, UNSPECIFIED B12 DEFICIENCY TYPE: ICD-10-CM

## 2022-11-08 DIAGNOSIS — D53.9 MACROCYTIC ANEMIA: ICD-10-CM

## 2022-11-08 DIAGNOSIS — D53.9 MACROCYTIC ANEMIA: Primary | ICD-10-CM

## 2022-11-08 LAB
BASOPHILS # BLD AUTO: 0.05 X10(3) UL (ref 0–0.2)
BASOPHILS NFR BLD AUTO: 1.3 %
DEPRECATED RDW RBC AUTO: 54 FL (ref 35.1–46.3)
EOSINOPHIL # BLD AUTO: 0.1 X10(3) UL (ref 0–0.7)
EOSINOPHIL NFR BLD AUTO: 2.7 %
ERYTHROCYTE [DISTWIDTH] IN BLOOD BY AUTOMATED COUNT: 14.1 % (ref 11–15)
HCT VFR BLD AUTO: 36.7 %
HGB BLD-MCNC: 11.2 G/DL
IMM GRANULOCYTES # BLD AUTO: 0.01 X10(3) UL (ref 0–1)
IMM GRANULOCYTES NFR BLD: 0.3 %
LYMPHOCYTES # BLD AUTO: 0.64 X10(3) UL (ref 1–4)
LYMPHOCYTES NFR BLD AUTO: 17 %
MCH RBC QN AUTO: 31.5 PG (ref 26–34)
MCHC RBC AUTO-ENTMCNC: 30.5 G/DL (ref 31–37)
MCV RBC AUTO: 103.4 FL
MONOCYTES # BLD AUTO: 0.46 X10(3) UL (ref 0.1–1)
MONOCYTES NFR BLD AUTO: 12.2 %
NEUTROPHILS # BLD AUTO: 2.51 X10 (3) UL (ref 1.5–7.7)
NEUTROPHILS # BLD AUTO: 2.51 X10(3) UL (ref 1.5–7.7)
NEUTROPHILS NFR BLD AUTO: 66.5 %
PLATELET # BLD AUTO: 232 10(3)UL (ref 150–450)
RBC # BLD AUTO: 3.55 X10(6)UL
VIT B12 SERPL-MCNC: 604 PG/ML (ref 193–986)
WBC # BLD AUTO: 3.8 X10(3) UL (ref 4–11)

## 2022-11-08 PROCEDURE — 99213 OFFICE O/P EST LOW 20 MIN: CPT | Performed by: INTERNAL MEDICINE

## 2022-11-08 PROCEDURE — 82607 VITAMIN B-12: CPT

## 2022-11-08 PROCEDURE — 36415 COLL VENOUS BLD VENIPUNCTURE: CPT

## 2022-11-08 PROCEDURE — 85025 COMPLETE CBC W/AUTO DIFF WBC: CPT

## 2022-11-08 RX ORDER — FOLIC ACID 1 MG/1
1 TABLET ORAL DAILY
Qty: 90 TABLET | Refills: 3 | Status: SHIPPED | OUTPATIENT
Start: 2022-11-08

## 2022-11-28 ENCOUNTER — LAB ENCOUNTER (OUTPATIENT)
Dept: LAB | Age: 77
End: 2022-11-28
Attending: FAMILY MEDICINE
Payer: MEDICARE

## 2022-11-28 DIAGNOSIS — N18.32 STAGE 3B CHRONIC KIDNEY DISEASE (HCC): Primary | ICD-10-CM

## 2022-11-28 DIAGNOSIS — R60.0 LOWER EXTREMITY EDEMA: ICD-10-CM

## 2022-11-28 LAB
ANION GAP SERPL CALC-SCNC: 8 MMOL/L (ref 0–18)
BUN BLD-MCNC: 41 MG/DL (ref 7–18)
BUN/CREAT SERPL: 25 (ref 10–20)
CALCIUM BLD-MCNC: 9.6 MG/DL (ref 8.5–10.1)
CHLORIDE SERPL-SCNC: 107 MMOL/L (ref 98–112)
CO2 SERPL-SCNC: 28 MMOL/L (ref 21–32)
CREAT BLD-MCNC: 1.64 MG/DL
FASTING STATUS PATIENT QL REPORTED: YES
GFR SERPLBLD BASED ON 1.73 SQ M-ARVRAT: 32 ML/MIN/1.73M2 (ref 60–?)
GLUCOSE BLD-MCNC: 99 MG/DL (ref 70–99)
OSMOLALITY SERPL CALC.SUM OF ELEC: 306 MOSM/KG (ref 275–295)
POTASSIUM SERPL-SCNC: 4.9 MMOL/L (ref 3.5–5.1)
SODIUM SERPL-SCNC: 143 MMOL/L (ref 136–145)

## 2022-11-28 PROCEDURE — 36415 COLL VENOUS BLD VENIPUNCTURE: CPT

## 2022-11-28 PROCEDURE — 80048 BASIC METABOLIC PNL TOTAL CA: CPT

## 2022-11-29 ENCOUNTER — LAB ENCOUNTER (OUTPATIENT)
Dept: LAB | Facility: HOSPITAL | Age: 77
End: 2022-11-29
Attending: NURSE PRACTITIONER
Payer: MEDICARE

## 2022-11-29 ENCOUNTER — OFFICE VISIT (OUTPATIENT)
Facility: CLINIC | Age: 77
End: 2022-11-29
Payer: MEDICARE

## 2022-11-29 VITALS — HEART RATE: 68 BPM | SYSTOLIC BLOOD PRESSURE: 134 MMHG | DIASTOLIC BLOOD PRESSURE: 70 MMHG

## 2022-11-29 DIAGNOSIS — K31.A0 INTESTINAL METAPLASIA OF STOMACH: ICD-10-CM

## 2022-11-29 DIAGNOSIS — K21.9 GASTROESOPHAGEAL REFLUX DISEASE, UNSPECIFIED WHETHER ESOPHAGITIS PRESENT: ICD-10-CM

## 2022-11-29 DIAGNOSIS — K58.2 IRRITABLE BOWEL SYNDROME WITH BOTH CONSTIPATION AND DIARRHEA: ICD-10-CM

## 2022-11-29 DIAGNOSIS — R10.13 DYSPEPSIA: ICD-10-CM

## 2022-11-29 DIAGNOSIS — R10.13 DYSPEPSIA: Primary | ICD-10-CM

## 2022-11-29 LAB
IGA SERPL-MCNC: 295 MG/DL (ref 70–312)
TTG IGA SER-ACNC: 0.6 U/ML (ref ?–7)

## 2022-11-29 PROCEDURE — 36415 COLL VENOUS BLD VENIPUNCTURE: CPT

## 2022-11-29 PROCEDURE — 82784 ASSAY IGA/IGD/IGG/IGM EACH: CPT

## 2022-11-29 PROCEDURE — 99215 OFFICE O/P EST HI 40 MIN: CPT | Performed by: NURSE PRACTITIONER

## 2022-11-29 PROCEDURE — 86364 TISS TRNSGLTMNASE EA IG CLAS: CPT

## 2022-11-29 RX ORDER — FLUCONAZOLE 200 MG/1
200 TABLET ORAL DAILY
Qty: 14 TABLET | Refills: 0 | Status: SHIPPED | OUTPATIENT
Start: 2022-11-29 | End: 2022-12-13

## 2022-11-30 ENCOUNTER — OFFICE VISIT (OUTPATIENT)
Dept: FAMILY MEDICINE CLINIC | Facility: CLINIC | Age: 77
End: 2022-11-30
Payer: MEDICARE

## 2022-11-30 VITALS
DIASTOLIC BLOOD PRESSURE: 54 MMHG | HEART RATE: 55 BPM | WEIGHT: 211 LBS | SYSTOLIC BLOOD PRESSURE: 91 MMHG | HEIGHT: 67 IN | RESPIRATION RATE: 17 BRPM | BODY MASS INDEX: 33.12 KG/M2

## 2022-11-30 DIAGNOSIS — Z23 NEED FOR VACCINATION: ICD-10-CM

## 2022-11-30 DIAGNOSIS — R42 LIGHTHEADED: ICD-10-CM

## 2022-11-30 DIAGNOSIS — Z00.00 ENCOUNTER FOR ANNUAL HEALTH EXAMINATION: Primary | ICD-10-CM

## 2022-11-30 DIAGNOSIS — E11.9 TYPE 2 DIABETES MELLITUS WITHOUT COMPLICATION, WITHOUT LONG-TERM CURRENT USE OF INSULIN (HCC): ICD-10-CM

## 2022-11-30 PROBLEM — E11.22 TYPE 2 DIABETES MELLITUS WITH DIABETIC CHRONIC KIDNEY DISEASE (HCC): Status: ACTIVE | Noted: 2022-11-30

## 2022-11-30 PROCEDURE — 99213 OFFICE O/P EST LOW 20 MIN: CPT | Performed by: FAMILY MEDICINE

## 2022-11-30 PROCEDURE — 90732 PPSV23 VACC 2 YRS+ SUBQ/IM: CPT | Performed by: FAMILY MEDICINE

## 2022-11-30 PROCEDURE — G0008 ADMIN INFLUENZA VIRUS VAC: HCPCS | Performed by: FAMILY MEDICINE

## 2022-11-30 PROCEDURE — G0439 PPPS, SUBSEQ VISIT: HCPCS | Performed by: FAMILY MEDICINE

## 2022-11-30 PROCEDURE — G0009 ADMIN PNEUMOCOCCAL VACCINE: HCPCS | Performed by: FAMILY MEDICINE

## 2022-11-30 PROCEDURE — 90662 IIV NO PRSV INCREASED AG IM: CPT | Performed by: FAMILY MEDICINE

## 2022-12-16 ENCOUNTER — HOSPITAL ENCOUNTER (OUTPATIENT)
Dept: CT IMAGING | Facility: HOSPITAL | Age: 77
Discharge: HOME | End: 2022-12-16
Attending: NURSE PRACTITIONER
Payer: MEDICARE

## 2022-12-16 DIAGNOSIS — K21.9 GASTROESOPHAGEAL REFLUX DISEASE, UNSPECIFIED WHETHER ESOPHAGITIS PRESENT: ICD-10-CM

## 2022-12-16 DIAGNOSIS — K58.2 IRRITABLE BOWEL SYNDROME WITH BOTH CONSTIPATION AND DIARRHEA: ICD-10-CM

## 2022-12-16 DIAGNOSIS — R10.13 DYSPEPSIA: ICD-10-CM

## 2022-12-16 LAB
CREAT BLD-MCNC: 2 MG/DL
GFR SERPLBLD BASED ON 1.73 SQ M-ARVRAT: 25 ML/MIN/1.73M2 (ref 60–?)

## 2022-12-16 PROCEDURE — 82565 ASSAY OF CREATININE: CPT

## 2022-12-16 PROCEDURE — 74176 CT ABD & PELVIS W/O CONTRAST: CPT | Performed by: NURSE PRACTITIONER

## 2022-12-20 ENCOUNTER — TELEPHONE (OUTPATIENT)
Dept: NEPHROLOGY | Facility: CLINIC | Age: 77
End: 2022-12-20

## 2022-12-21 ENCOUNTER — OFFICE VISIT (OUTPATIENT)
Dept: NEPHROLOGY | Facility: CLINIC | Age: 77
End: 2022-12-21
Payer: MEDICARE

## 2022-12-21 VITALS
DIASTOLIC BLOOD PRESSURE: 55 MMHG | WEIGHT: 206 LBS | SYSTOLIC BLOOD PRESSURE: 98 MMHG | HEART RATE: 66 BPM | HEIGHT: 67 IN | BODY MASS INDEX: 32.33 KG/M2

## 2022-12-21 DIAGNOSIS — I10 ESSENTIAL HYPERTENSION: ICD-10-CM

## 2022-12-21 DIAGNOSIS — N18.32 STAGE 3B CHRONIC KIDNEY DISEASE (HCC): Primary | ICD-10-CM

## 2022-12-21 DIAGNOSIS — I50.9 CONGESTIVE HEART FAILURE, UNSPECIFIED HF CHRONICITY, UNSPECIFIED HEART FAILURE TYPE (HCC): ICD-10-CM

## 2022-12-21 PROCEDURE — 1126F AMNT PAIN NOTED NONE PRSNT: CPT | Performed by: INTERNAL MEDICINE

## 2022-12-21 PROCEDURE — 99204 OFFICE O/P NEW MOD 45 MIN: CPT | Performed by: INTERNAL MEDICINE

## 2022-12-21 NOTE — PATIENT INSTRUCTIONS
Cut amlodipine to 5mg day only      Do labs later in jan  See me last week of jan    Blood and urine    Othrewise same meds     Have a great holiday w your family Floydene Kinds!

## 2022-12-28 DIAGNOSIS — I48.11 LONGSTANDING PERSISTENT ATRIAL FIBRILLATION (HCC): ICD-10-CM

## 2022-12-28 RX ORDER — PANTOPRAZOLE SODIUM 40 MG/1
TABLET, DELAYED RELEASE ORAL
Qty: 60 TABLET | Refills: 3 | Status: SHIPPED | OUTPATIENT
Start: 2022-12-28

## 2022-12-29 RX ORDER — METOPROLOL SUCCINATE 100 MG/1
100 TABLET, EXTENDED RELEASE ORAL DAILY
Qty: 180 TABLET | Refills: 0 | Status: SHIPPED | OUTPATIENT
Start: 2022-12-29

## 2023-01-12 ENCOUNTER — TELEPHONE (OUTPATIENT)
Dept: GASTROENTEROLOGY | Facility: CLINIC | Age: 78
End: 2023-01-12

## 2023-01-27 ENCOUNTER — LAB ENCOUNTER (OUTPATIENT)
Dept: LAB | Age: 78
End: 2023-01-27
Attending: INTERNAL MEDICINE
Payer: MEDICARE

## 2023-01-27 DIAGNOSIS — E11.9 TYPE 2 DIABETES MELLITUS WITHOUT COMPLICATION, WITHOUT LONG-TERM CURRENT USE OF INSULIN (HCC): ICD-10-CM

## 2023-01-27 DIAGNOSIS — R09.82 POSTNASAL DRIP: ICD-10-CM

## 2023-01-27 DIAGNOSIS — N18.32 STAGE 3B CHRONIC KIDNEY DISEASE (HCC): ICD-10-CM

## 2023-01-27 LAB
ALBUMIN SERPL-MCNC: 3.5 G/DL (ref 3.4–5)
ANION GAP SERPL CALC-SCNC: 5 MMOL/L (ref 0–18)
BASOPHILS # BLD AUTO: 0.06 X10(3) UL (ref 0–0.2)
BASOPHILS NFR BLD AUTO: 1.8 %
BUN BLD-MCNC: 30 MG/DL (ref 7–18)
BUN/CREAT SERPL: 20.4 (ref 10–20)
CALCIUM BLD-MCNC: 9.2 MG/DL (ref 8.5–10.1)
CHLORIDE SERPL-SCNC: 104 MMOL/L (ref 98–112)
CO2 SERPL-SCNC: 32 MMOL/L (ref 21–32)
CREAT BLD-MCNC: 1.47 MG/DL
DEPRECATED RDW RBC AUTO: 58 FL (ref 35.1–46.3)
EOSINOPHIL # BLD AUTO: 0.11 X10(3) UL (ref 0–0.7)
EOSINOPHIL NFR BLD AUTO: 3.2 %
ERYTHROCYTE [DISTWIDTH] IN BLOOD BY AUTOMATED COUNT: 15.2 % (ref 11–15)
EST. AVERAGE GLUCOSE BLD GHB EST-MCNC: 117 MG/DL (ref 68–126)
GFR SERPLBLD BASED ON 1.73 SQ M-ARVRAT: 36 ML/MIN/1.73M2 (ref 60–?)
GLUCOSE BLD-MCNC: 95 MG/DL (ref 70–99)
HBA1C MFR BLD: 5.7 % (ref ?–5.7)
HCT VFR BLD AUTO: 34.6 %
HGB BLD-MCNC: 10.4 G/DL
IMM GRANULOCYTES # BLD AUTO: 0.01 X10(3) UL (ref 0–1)
IMM GRANULOCYTES NFR BLD: 0.3 %
LYMPHOCYTES # BLD AUTO: 0.71 X10(3) UL (ref 1–4)
LYMPHOCYTES NFR BLD AUTO: 20.8 %
MCH RBC QN AUTO: 31 PG (ref 26–34)
MCHC RBC AUTO-ENTMCNC: 30.1 G/DL (ref 31–37)
MCV RBC AUTO: 103 FL
MONOCYTES # BLD AUTO: 0.38 X10(3) UL (ref 0.1–1)
MONOCYTES NFR BLD AUTO: 11.1 %
NEUTROPHILS # BLD AUTO: 2.15 X10 (3) UL (ref 1.5–7.7)
NEUTROPHILS # BLD AUTO: 2.15 X10(3) UL (ref 1.5–7.7)
NEUTROPHILS NFR BLD AUTO: 62.8 %
OSMOLALITY SERPL CALC.SUM OF ELEC: 298 MOSM/KG (ref 275–295)
PHOSPHATE SERPL-MCNC: 3.1 MG/DL (ref 2.5–4.9)
PLATELET # BLD AUTO: 240 10(3)UL (ref 150–450)
POTASSIUM SERPL-SCNC: 3.9 MMOL/L (ref 3.5–5.1)
PROT UR-MCNC: 7.2 MG/DL
PTH-INTACT SERPL-MCNC: 150.8 PG/ML (ref 18.5–88)
RBC # BLD AUTO: 3.36 X10(6)UL
SODIUM SERPL-SCNC: 141 MMOL/L (ref 136–145)
WBC # BLD AUTO: 3.4 X10(3) UL (ref 4–11)

## 2023-01-27 PROCEDURE — 80069 RENAL FUNCTION PANEL: CPT

## 2023-01-27 PROCEDURE — 83970 ASSAY OF PARATHORMONE: CPT

## 2023-01-27 PROCEDURE — 86335 IMMUNFIX E-PHORSIS/URINE/CSF: CPT

## 2023-01-27 PROCEDURE — 84166 PROTEIN E-PHORESIS/URINE/CSF: CPT

## 2023-01-27 PROCEDURE — 84156 ASSAY OF PROTEIN URINE: CPT

## 2023-01-27 PROCEDURE — 86334 IMMUNOFIX E-PHORESIS SERUM: CPT

## 2023-01-27 PROCEDURE — 82784 ASSAY IGA/IGD/IGG/IGM EACH: CPT

## 2023-01-27 PROCEDURE — 85025 COMPLETE CBC W/AUTO DIFF WBC: CPT

## 2023-01-27 PROCEDURE — 36415 COLL VENOUS BLD VENIPUNCTURE: CPT

## 2023-01-27 PROCEDURE — 83036 HEMOGLOBIN GLYCOSYLATED A1C: CPT

## 2023-01-27 PROCEDURE — 84165 PROTEIN E-PHORESIS SERUM: CPT

## 2023-01-30 ENCOUNTER — OFFICE VISIT (OUTPATIENT)
Dept: NEPHROLOGY | Facility: CLINIC | Age: 78
End: 2023-01-30

## 2023-01-30 VITALS
HEART RATE: 77 BPM | WEIGHT: 210 LBS | SYSTOLIC BLOOD PRESSURE: 114 MMHG | BODY MASS INDEX: 32.96 KG/M2 | DIASTOLIC BLOOD PRESSURE: 60 MMHG | HEIGHT: 67 IN

## 2023-01-30 DIAGNOSIS — I50.9 CONGESTIVE HEART FAILURE, UNSPECIFIED HF CHRONICITY, UNSPECIFIED HEART FAILURE TYPE (HCC): Primary | ICD-10-CM

## 2023-01-30 DIAGNOSIS — N18.32 STAGE 3B CHRONIC KIDNEY DISEASE (HCC): ICD-10-CM

## 2023-01-30 DIAGNOSIS — I48.11 LONGSTANDING PERSISTENT ATRIAL FIBRILLATION (HCC): ICD-10-CM

## 2023-01-30 LAB
ALBUMIN SERPL ELPH-MCNC: 4.11 G/DL (ref 3.75–5.21)
ALBUMIN/GLOB SERPL: 1.42 {RATIO} (ref 1–2)
ALPHA1 GLOB SERPL ELPH-MCNC: 0.29 G/DL (ref 0.19–0.46)
ALPHA2 GLOB SERPL ELPH-MCNC: 0.81 G/DL (ref 0.48–1.05)
B-GLOBULIN SERPL ELPH-MCNC: 0.85 G/DL (ref 0.68–1.23)
GAMMA GLOB SERPL ELPH-MCNC: 0.95 G/DL (ref 0.62–1.7)
IGA SERPL-MCNC: 260 MG/DL (ref 70–312)
IGM SERPL-MCNC: 131 MG/DL (ref 43–279)
IMMUNOGLOBULIN PNL SER-MCNC: 844 MG/DL (ref 791–1643)
PROT SERPL-MCNC: 7 G/DL (ref 6.4–8.2)

## 2023-01-30 PROCEDURE — 99213 OFFICE O/P EST LOW 20 MIN: CPT | Performed by: INTERNAL MEDICINE

## 2023-01-30 RX ORDER — FLUTICASONE PROPIONATE 50 MCG
SPRAY, SUSPENSION (ML) NASAL
Qty: 16 G | Refills: 3 | OUTPATIENT
Start: 2023-01-30

## 2023-01-30 NOTE — PATIENT INSTRUCTIONS
Labs are stable     Continue same meds    See me four months    Please do labs prior to appt  They are in computer    Good to see you Aida Emanuel

## 2023-03-02 ENCOUNTER — TELEPHONE (OUTPATIENT)
Dept: FAMILY MEDICINE CLINIC | Facility: CLINIC | Age: 78
End: 2023-03-02

## 2023-03-07 ENCOUNTER — HOSPITAL ENCOUNTER (INPATIENT)
Facility: HOSPITAL | Age: 78
LOS: 4 days | Discharge: HOME | End: 2023-03-11
Attending: STUDENT IN AN ORGANIZED HEALTH CARE EDUCATION/TRAINING PROGRAM | Admitting: HOSPITALIST
Payer: MEDICARE

## 2023-03-07 ENCOUNTER — APPOINTMENT (OUTPATIENT)
Dept: GENERAL RADIOLOGY | Facility: HOSPITAL | Age: 78
End: 2023-03-07
Attending: STUDENT IN AN ORGANIZED HEALTH CARE EDUCATION/TRAINING PROGRAM
Payer: MEDICARE

## 2023-03-07 DIAGNOSIS — I50.9 ACUTE ON CHRONIC CONGESTIVE HEART FAILURE, UNSPECIFIED HEART FAILURE TYPE (HCC): Primary | ICD-10-CM

## 2023-03-07 DIAGNOSIS — I48.11 LONGSTANDING PERSISTENT ATRIAL FIBRILLATION (HCC): ICD-10-CM

## 2023-03-07 LAB
ANION GAP SERPL CALC-SCNC: 6 MMOL/L (ref 0–18)
BASOPHILS # BLD AUTO: 0.05 X10(3) UL (ref 0–0.2)
BASOPHILS NFR BLD AUTO: 1.5 %
BUN BLD-MCNC: 22 MG/DL (ref 7–18)
BUN/CREAT SERPL: 15.2 (ref 10–20)
CALCIUM BLD-MCNC: 9.3 MG/DL (ref 8.5–10.1)
CHLORIDE SERPL-SCNC: 112 MMOL/L (ref 98–112)
CO2 SERPL-SCNC: 25 MMOL/L (ref 21–32)
CREAT BLD-MCNC: 1.45 MG/DL
DEPRECATED HBV CORE AB SER IA-ACNC: 20.3 NG/ML
DEPRECATED RDW RBC AUTO: 53.1 FL (ref 35.1–46.3)
EOSINOPHIL # BLD AUTO: 0.11 X10(3) UL (ref 0–0.7)
EOSINOPHIL NFR BLD AUTO: 3.3 %
ERYTHROCYTE [DISTWIDTH] IN BLOOD BY AUTOMATED COUNT: 14.6 % (ref 11–15)
FLUAV + FLUBV RNA SPEC NAA+PROBE: NEGATIVE
FLUAV + FLUBV RNA SPEC NAA+PROBE: NEGATIVE
GFR SERPLBLD BASED ON 1.73 SQ M-ARVRAT: 36 ML/MIN/1.73M2 (ref 60–?)
GLUCOSE BLD-MCNC: 105 MG/DL (ref 70–99)
HCT VFR BLD AUTO: 27.2 %
HGB BLD-MCNC: 8.2 G/DL
IMM GRANULOCYTES # BLD AUTO: 0.01 X10(3) UL (ref 0–1)
IMM GRANULOCYTES NFR BLD: 0.3 %
IRON SATN MFR SERPL: 5 %
IRON SERPL-MCNC: 24 UG/DL
LYMPHOCYTES # BLD AUTO: 0.61 X10(3) UL (ref 1–4)
LYMPHOCYTES NFR BLD AUTO: 18.5 %
MCH RBC QN AUTO: 29.7 PG (ref 26–34)
MCHC RBC AUTO-ENTMCNC: 30.1 G/DL (ref 31–37)
MCV RBC AUTO: 98.6 FL
MONOCYTES # BLD AUTO: 0.38 X10(3) UL (ref 0.1–1)
MONOCYTES NFR BLD AUTO: 11.5 %
NEUTROPHILS # BLD AUTO: 2.14 X10 (3) UL (ref 1.5–7.7)
NEUTROPHILS # BLD AUTO: 2.14 X10(3) UL (ref 1.5–7.7)
NEUTROPHILS NFR BLD AUTO: 64.9 %
NT-PROBNP SERPL-MCNC: 2631 PG/ML (ref ?–450)
OSMOLALITY SERPL CALC.SUM OF ELEC: 300 MOSM/KG (ref 275–295)
PLATELET # BLD AUTO: 246 10(3)UL (ref 150–450)
POTASSIUM SERPL-SCNC: 4 MMOL/L (ref 3.5–5.1)
Q-T INTERVAL: 388 MS
QRS DURATION: 88 MS
QTC CALCULATION (BEZET): 433 MS
R AXIS: 70 DEGREES
RBC # BLD AUTO: 2.76 X10(6)UL
RSV RNA SPEC NAA+PROBE: NEGATIVE
SARS-COV-2 RNA RESP QL NAA+PROBE: NOT DETECTED
SODIUM SERPL-SCNC: 143 MMOL/L (ref 136–145)
T AXIS: 170 DEGREES
TIBC SERPL-MCNC: 456 UG/DL (ref 240–450)
TRANSFERRIN SERPL-MCNC: 306 MG/DL (ref 200–360)
TROPONIN I HIGH SENSITIVITY: 13 NG/L
VENTRICULAR RATE: 75 BPM
WBC # BLD AUTO: 3.3 X10(3) UL (ref 4–11)

## 2023-03-07 PROCEDURE — 99223 1ST HOSP IP/OBS HIGH 75: CPT | Performed by: HOSPITALIST

## 2023-03-07 PROCEDURE — 71045 X-RAY EXAM CHEST 1 VIEW: CPT | Performed by: STUDENT IN AN ORGANIZED HEALTH CARE EDUCATION/TRAINING PROGRAM

## 2023-03-07 PROCEDURE — 99223 1ST HOSP IP/OBS HIGH 75: CPT | Performed by: INTERNAL MEDICINE

## 2023-03-07 RX ORDER — FUROSEMIDE 10 MG/ML
20 INJECTION INTRAMUSCULAR; INTRAVENOUS
Status: DISCONTINUED | OUTPATIENT
Start: 2023-03-07 | End: 2023-03-09

## 2023-03-07 RX ORDER — ONDANSETRON 2 MG/ML
4 INJECTION INTRAMUSCULAR; INTRAVENOUS EVERY 6 HOURS PRN
Status: DISCONTINUED | OUTPATIENT
Start: 2023-03-07 | End: 2023-03-11

## 2023-03-07 RX ORDER — FUROSEMIDE 10 MG/ML
40 INJECTION INTRAMUSCULAR; INTRAVENOUS ONCE
Status: COMPLETED | OUTPATIENT
Start: 2023-03-07 | End: 2023-03-07

## 2023-03-07 RX ORDER — SENNOSIDES 8.6 MG
17.2 TABLET ORAL NIGHTLY PRN
Status: DISCONTINUED | OUTPATIENT
Start: 2023-03-07 | End: 2023-03-11

## 2023-03-07 RX ORDER — CHOLECALCIFEROL (VITAMIN D3) 125 MCG
1000 CAPSULE ORAL DAILY
Status: DISCONTINUED | OUTPATIENT
Start: 2023-03-07 | End: 2023-03-11

## 2023-03-07 RX ORDER — METOPROLOL SUCCINATE 50 MG/1
50 TABLET, EXTENDED RELEASE ORAL DAILY
Status: DISCONTINUED | OUTPATIENT
Start: 2023-03-07 | End: 2023-03-11

## 2023-03-07 RX ORDER — ATORVASTATIN CALCIUM 10 MG/1
10 TABLET, FILM COATED ORAL NIGHTLY
Status: DISCONTINUED | OUTPATIENT
Start: 2023-03-07 | End: 2023-03-11

## 2023-03-07 RX ORDER — MELATONIN
3 NIGHTLY PRN
Status: DISCONTINUED | OUTPATIENT
Start: 2023-03-07 | End: 2023-03-11

## 2023-03-07 RX ORDER — POLYETHYLENE GLYCOL 3350 17 G/17G
17 POWDER, FOR SOLUTION ORAL DAILY PRN
Status: DISCONTINUED | OUTPATIENT
Start: 2023-03-07 | End: 2023-03-11

## 2023-03-07 RX ORDER — BISACODYL 10 MG
10 SUPPOSITORY, RECTAL RECTAL
Status: DISCONTINUED | OUTPATIENT
Start: 2023-03-07 | End: 2023-03-11

## 2023-03-07 RX ORDER — ACETAMINOPHEN 500 MG
500 TABLET ORAL EVERY 4 HOURS PRN
Status: DISCONTINUED | OUTPATIENT
Start: 2023-03-07 | End: 2023-03-11

## 2023-03-07 RX ORDER — METOCLOPRAMIDE HYDROCHLORIDE 5 MG/ML
5 INJECTION INTRAMUSCULAR; INTRAVENOUS EVERY 8 HOURS PRN
Status: DISCONTINUED | OUTPATIENT
Start: 2023-03-07 | End: 2023-03-11

## 2023-03-07 RX ORDER — ACETAMINOPHEN 500 MG
1000 TABLET ORAL ONCE
Status: COMPLETED | OUTPATIENT
Start: 2023-03-07 | End: 2023-03-07

## 2023-03-07 RX ORDER — SODIUM PHOSPHATE, DIBASIC AND SODIUM PHOSPHATE, MONOBASIC 7; 19 G/133ML; G/133ML
1 ENEMA RECTAL ONCE AS NEEDED
Status: DISCONTINUED | OUTPATIENT
Start: 2023-03-07 | End: 2023-03-11

## 2023-03-07 NOTE — ED QUICK NOTES
Orders for admission, patient is aware of plan and ready to go upstairs. Any questions, please call ED RN Brady Kramer at extension [phone].      Patient Covid vaccination status: Fully vaccinated     COVID Test Ordered in ED: SARS-CoV-2/Flu A and B/RSV by PCR (GeneXpert)    COVID Suspicion at Admission: N/A    Running Infusions:  None    Mental Status/LOC at time of transport: A&Ox4,     Other pertinent information: 20g LFt AC   CIWA score: N/A   NIH score:  N/A

## 2023-03-07 NOTE — H&P
[facility]    PATIENT'S NAME: Veena Barnett PHYSICIAN: Sue Jernigan MD   PATIENT ACCOUNT#:   [id number]    LOCATION:  [address] [place] [address] SoFiliberto Lee RECORD #:   [id number]       YOB: 1945  ADMISSION DATE:       03/07/2023    HISTORY AND PHYSICAL EXAMINATION    CHIEF COMPLAINT:  Shortness of breath. HISTORY OF PRESENT ILLNESS:  The patient is an 23-year-old female with past medical history of multiple comorbid conditions including history of arrhythmia, cellulitis, DVT, history of GERD, hypertension, hypercholesterolemia who had come to the hospital today endorsing shortness of breath. In the emergency department, the patient was found to have an elevated BNP level of 2631 and also was found to have possible GI bleed as well with lower hemoglobin levels. In the emergency department, the patient did undergo an x-ray which showed CHF, mild interstitial edema, and small pleural effusions. Cardiology was placed on consult, as well as GI was placed on consult. The patient will be admitted to the hospital for further management. Currently the patient has been seen and examined on the medical floor. Denies any chest pain, palpitations, fevers, chills, headaches, blurred vision, nausea, or vomiting at this time. PAST MEDICAL HISTORY:  Positive for history of atrial fibrillation, history of basal cell carcinoma of the nose, cellulitis, DVT, depression, GERD, hypertension, hypercholesterolemia, osteoarthritis, history of pulmonary embolism, history of CKD. PAST SURGICAL HISTORY:  Positive for appendectomy, cholecystectomy, colonoscopy, hysterectomy, hernia surgery. MEDICATIONS:  Home medications have been reviewed and reconciled. Please refer to the patient's chart for a detailed review regarding the patient's home medications. ALLERGIES:  Adhesive tape. FAMILY HISTORY:  Mother had a history of heart disorder and heart attack. Father had history of  cancer.     SOCIAL HISTORY:  The patient is a former smoker, quit in 1997. Denies any illicit drugs. Does use Cannibis, she states. REVIEW OF SYSTEMS:  Ten-point review of systems has been obtained and otherwise negative. PHYSICAL EXAMINATION:    GENERAL:  The patient lying in bed, appears to be in no acute distress at this time. She is A and O x3. VITAL SIGNS:  The patient's temperature is 98.1, pulse 72, respirations 11, blood pressure 102/52, saturating 92% on 1L to 2L nasal cannula. HEENT:  Extraocular movements are intact. Pupils equal, round, and reactive to light and accommodation. Atraumatic, normocephalic. LUNGS:  Good entry upper airways. Bibasilar crackles are appreciated. HEART:  S1, S2 appreciated. ABDOMEN:  Soft, nontender, nondistended. Positive bowel sounds. EXTREMITIES:  The patient does have lower extremity edema. Peripheral pulses are positive. MUSCULOSKELETAL:  Full range of motion, intact. NEUROLOGIC:  No focal neurologic deficits noted at this time. PSYCHIATRIC:  Appropriate affect. SKIN:  No rashes noted. LABORATORY DATA:  The patient's glucose is 105, sodium 143, potassium 4.0, chloride 102, carbon dioxide 25, BUN 22, creatinine 1.45. The patient's troponin is 13. BNP level is 2631. The patient's WBC is 3.3, hemoglobin 8.2, hematocrit 27.2, platelets 045. Chest x-ray as above. ASSESSMENT AND PLAN:  The patient is an 42-year-old female with past medical history of multiple comorbid conditions who was admitted to the hospital with acute congestive heart failure. 1.   Acute respiratory failure secondary to acute congestive heart failure. At this time Cardiology has been placed on consult. The patient was started on IV Lasix. We will continue to monitor closely. Continue to monitor strict inputs and outputs. We will continue diuresis. 2.   History of atrial fibrillation. The patient is anticoagulated with Eliquis. Hold Eliquis in the setting of possible GI bleed.   3. Hypertension. Resume the patient's oral antihypertensives. We will continue to monitor blood pressure closely. 4.   Dyslipidemia. The patient is on atorvastatin. We will continue at this time. 5.   History of depression. The patient is on sertraline. We will resume. 6.   Possible gastrointestinal bleeding. Gastroenterology has been placed on consult. Currently we will start the patient on PPI b.i.d. We will hold Eliquis at this time. 7.   Disposition. At this time we will continue to monitor closely. The patient is Full Code. Further recommendations to follow. Greater than 75 minutes spent; greater than 50% of time spent face to face.     Dictated By Tali Grider MD  d: 03/07/2023 10:46:15  t: 03/07/2023 11:11:56  Job [id number]  SMS/

## 2023-03-07 NOTE — ED INITIAL ASSESSMENT (HPI)
C/o SIGRID, BLE over the last week  Pt also reports diarrhea and \"worsening GERD\" for the last few days

## 2023-03-08 ENCOUNTER — APPOINTMENT (OUTPATIENT)
Dept: CV DIAGNOSTICS | Facility: HOSPITAL | Age: 78
End: 2023-03-08
Attending: HOSPITALIST
Payer: MEDICARE

## 2023-03-08 LAB
ALBUMIN SERPL-MCNC: 3.7 G/DL (ref 3.4–5)
ALBUMIN/GLOB SERPL: 1.1 {RATIO} (ref 1–2)
ALP LIVER SERPL-CCNC: 85 U/L
ALT SERPL-CCNC: 13 U/L
ANION GAP SERPL CALC-SCNC: 7 MMOL/L (ref 0–18)
AST SERPL-CCNC: 13 U/L (ref 15–37)
BASOPHILS # BLD AUTO: 0.07 X10(3) UL (ref 0–0.2)
BASOPHILS NFR BLD AUTO: 1.3 %
BILIRUB SERPL-MCNC: 0.5 MG/DL (ref 0.1–2)
BUN BLD-MCNC: 21 MG/DL (ref 7–18)
BUN/CREAT SERPL: 13.9 (ref 10–20)
CALCIUM BLD-MCNC: 9.5 MG/DL (ref 8.5–10.1)
CHLORIDE SERPL-SCNC: 108 MMOL/L (ref 98–112)
CO2 SERPL-SCNC: 27 MMOL/L (ref 21–32)
CREAT BLD-MCNC: 1.51 MG/DL
DEPRECATED RDW RBC AUTO: 50.6 FL (ref 35.1–46.3)
EOSINOPHIL # BLD AUTO: 0.13 X10(3) UL (ref 0–0.7)
EOSINOPHIL NFR BLD AUTO: 2.5 %
ERYTHROCYTE [DISTWIDTH] IN BLOOD BY AUTOMATED COUNT: 14.6 % (ref 11–15)
GFR SERPLBLD BASED ON 1.73 SQ M-ARVRAT: 35 ML/MIN/1.73M2 (ref 60–?)
GLOBULIN PLAS-MCNC: 3.5 G/DL (ref 2.8–4.4)
GLUCOSE BLD-MCNC: 98 MG/DL (ref 70–99)
HCT VFR BLD AUTO: 29.8 %
HGB BLD-MCNC: 9 G/DL
IMM GRANULOCYTES # BLD AUTO: 0.01 X10(3) UL (ref 0–1)
IMM GRANULOCYTES NFR BLD: 0.2 %
LYMPHOCYTES # BLD AUTO: 0.65 X10(3) UL (ref 1–4)
LYMPHOCYTES NFR BLD AUTO: 12.4 %
MAGNESIUM SERPL-MCNC: 1.9 MG/DL (ref 1.6–2.6)
MCH RBC QN AUTO: 29.2 PG (ref 26–34)
MCHC RBC AUTO-ENTMCNC: 30.2 G/DL (ref 31–37)
MCV RBC AUTO: 96.8 FL
MONOCYTES # BLD AUTO: 0.39 X10(3) UL (ref 0.1–1)
MONOCYTES NFR BLD AUTO: 7.4 %
NEUTROPHILS # BLD AUTO: 3.99 X10 (3) UL (ref 1.5–7.7)
NEUTROPHILS # BLD AUTO: 3.99 X10(3) UL (ref 1.5–7.7)
NEUTROPHILS NFR BLD AUTO: 76.2 %
OSMOLALITY SERPL CALC.SUM OF ELEC: 297 MOSM/KG (ref 275–295)
PHOSPHATE SERPL-MCNC: 3.1 MG/DL (ref 2.5–4.9)
PLATELET # BLD AUTO: 278 10(3)UL (ref 150–450)
POTASSIUM SERPL-SCNC: 3.9 MMOL/L (ref 3.5–5.1)
PROT SERPL-MCNC: 7.2 G/DL (ref 6.4–8.2)
RBC # BLD AUTO: 3.08 X10(6)UL
SODIUM SERPL-SCNC: 142 MMOL/L (ref 136–145)
WBC # BLD AUTO: 5.2 X10(3) UL (ref 4–11)

## 2023-03-08 PROCEDURE — 99232 SBSQ HOSP IP/OBS MODERATE 35: CPT | Performed by: INTERNAL MEDICINE

## 2023-03-08 PROCEDURE — 93306 TTE W/DOPPLER COMPLETE: CPT | Performed by: HOSPITALIST

## 2023-03-08 PROCEDURE — 99232 SBSQ HOSP IP/OBS MODERATE 35: CPT | Performed by: REGISTERED NURSE

## 2023-03-08 NOTE — PLAN OF CARE
Pt. A&Ox4, 1L of o2 for comfort- no home o2. Plan is to IV diurese, trend hgb. Possible EGD. Call light within reach, bed in lowest position.          Problem: Patient Centered Care  Goal: Patient preferences are identified and integrated in the patient's plan of care  Description: Interventions:  - Provide timely, complete, and accurate information to patient/family  - Incorporate patient and family knowledge, values, beliefs, and cultural backgrounds into the planning and delivery of care  - Encourage patient/family to participate in care and decision-making at the level they choose  - Honor patient and family perspectives and choices  Outcome: Progressing     Problem: Patient/Family Goals  Goal: Patient/Family Long Term Goal    Interventions:  - See additional Care Plan goals for specific interventions  Outcome: Progressing  Goal: Patient/Family Short Term Goal    Interventions:   - See additional Care Plan goals for specific interventions  Outcome: Progressing

## 2023-03-08 NOTE — CDS QUERY
.Clarification - Chronic Kidney Disease and/or CKD Specificity  CLINICAL DOCUMENTATION CLARIFICATION FORM  Dear Doctor: Alonso Garcia  Clinical information (provided below) includes Chronic Kidney Disease and/or CKD requiring clarification. For accurate ICD-10-CM code assignment to reflect severity of illness and risk of mortality,   PLEASE (X) ALL DIAGNOSES THAT APPLY. SELECTION BY PROVIDER ONLY      ( )  Chronic Kidney Disease Stage 1    ( )  Chronic Kidney Disease Stage 2 (mild)    ( X)  Chronic Kidney Disease Stage 3 (moderate)    ( )  Chronic Kidney Disease Stage 4 (severe)    ( )  Chronic Kidney Disease Stage 5    ( )  Other (please specify):     ( )  Unable to Determine (please comment)       Documentation includes Chronic Kidney Disease and/or CKD:   * Per H&P patient has a history of CKD   * Risk Factors: History of CHF & Afib   * GFR values range as below  December 2022--- 25 January 2023--- 36 March 2023---   March 2023--- 35  *  Creatinine on admission was 1.45    * Currently being treated with IV Lasix, monitoring strict intake and outputs     If you have any questions, please contact Clinical :  Alka Mcdonald RN at [phone]     Thank You!     THIS FORM IS A PERMANENT PART OF THE MEDICAL RECORD

## 2023-03-08 NOTE — PLAN OF CARE
Patient alert and oriented on room air currently with no complaints of pain. Patient stated she had a hard time falling asleep overnight- passed information over to day shift for a possible prn put on file. Patient stated she usually takes melatonin at home. Call light in reach and safety measures implemented. I/O's for monitoring excess fluid. Plan is possible EGD in future if excess fluid decreases. Problem: Patient Centered Care  Goal: Patient preferences are identified and integrated in the patient's plan of care  Description: Interventions:  - What would you like us to know as we care for you?   - Provide timely, complete, and accurate information to patient/family  - Incorporate patient and family knowledge, values, beliefs, and cultural backgrounds into the planning and delivery of care  - Encourage patient/family to participate in care and decision-making at the level they choose  - Honor patient and family perspectives and choices  Outcome: Progressing     Problem: Patient/Family Goals  Goal: Patient/Family Long Term Goal  Description: Patient's Long Term Goal:     Interventions:  - See additional Care Plan goals for specific interventions  Outcome: Progressing  Goal: Patient/Family Short Term Goal  Description: Patient's Short Term Goal:     Interventions:   - See additional Care Plan goals for specific interventions  Outcome: Progressing     Problem: CARDIOVASCULAR - ADULT  Goal: Maintains optimal cardiac output and hemodynamic stability  Description: INTERVENTIONS:  - Monitor vital signs, rhythm, and trends  - Monitor for bleeding, hypotension and signs of decreased cardiac output  - Evaluate effectiveness of vasoactive medications to optimize hemodynamic stability  - Monitor arterial and/or venous puncture sites for bleeding and/or hematoma  - Assess quality of pulses, skin color and temperature  - Assess for signs of decreased coronary artery perfusion - ex.  Angina  - Evaluate fluid balance, assess for edema, trend weights  Outcome: Progressing  Goal: Absence of cardiac arrhythmias or at baseline  Description: INTERVENTIONS:  - Continuous cardiac monitoring, monitor vital signs, obtain 12 lead EKG if indicated  - Evaluate effectiveness of antiarrhythmic and heart rate control medications as ordered  - Initiate emergency measures for life threatening arrhythmias  - Monitor electrolytes and administer replacement therapy as ordered  Outcome: Progressing     Problem: RESPIRATORY - ADULT  Goal: Achieves optimal ventilation and oxygenation  Description: INTERVENTIONS:  - Assess for changes in respiratory status  - Assess for changes in mentation and behavior  - Position to facilitate oxygenation and minimize respiratory effort  - Oxygen supplementation based on oxygen saturation or ABGs  - Provide Smoking Cessation handout, if applicable  - Encourage broncho-pulmonary hygiene including cough, deep breathe, Incentive Spirometry  - Assess the need for suctioning and perform as needed  - Assess and instruct to report SOB or any respiratory difficulty  - Respiratory Therapy support as indicated  - Manage/alleviate anxiety  - Monitor for signs/symptoms of CO2 retention  Outcome: Progressing

## 2023-03-09 ENCOUNTER — APPOINTMENT (OUTPATIENT)
Dept: PICC SERVICES | Facility: HOSPITAL | Age: 78
End: 2023-03-09
Attending: INTERNAL MEDICINE
Payer: MEDICARE

## 2023-03-09 ENCOUNTER — ANESTHESIA (OUTPATIENT)
Dept: ENDOSCOPY | Facility: HOSPITAL | Age: 78
End: 2023-03-09
Payer: MEDICARE

## 2023-03-09 ENCOUNTER — ANESTHESIA EVENT (OUTPATIENT)
Dept: ENDOSCOPY | Facility: HOSPITAL | Age: 78
End: 2023-03-09
Payer: MEDICARE

## 2023-03-09 ENCOUNTER — TELEPHONE (OUTPATIENT)
Facility: CLINIC | Age: 78
End: 2023-03-09

## 2023-03-09 LAB
ANION GAP SERPL CALC-SCNC: 5 MMOL/L (ref 0–18)
BASOPHILS # BLD AUTO: 0.07 X10(3) UL (ref 0–0.2)
BASOPHILS NFR BLD AUTO: 1.6 %
BUN BLD-MCNC: 20 MG/DL (ref 7–18)
BUN/CREAT SERPL: 13.6 (ref 10–20)
CALCIUM BLD-MCNC: 9.3 MG/DL (ref 8.5–10.1)
CHLORIDE SERPL-SCNC: 108 MMOL/L (ref 98–112)
CO2 SERPL-SCNC: 30 MMOL/L (ref 21–32)
CREAT BLD-MCNC: 1.47 MG/DL
DEPRECATED RDW RBC AUTO: 51.3 FL (ref 35.1–46.3)
EOSINOPHIL # BLD AUTO: 0.11 X10(3) UL (ref 0–0.7)
EOSINOPHIL NFR BLD AUTO: 2.5 %
ERYTHROCYTE [DISTWIDTH] IN BLOOD BY AUTOMATED COUNT: 14.7 % (ref 11–15)
GFR SERPLBLD BASED ON 1.73 SQ M-ARVRAT: 36 ML/MIN/1.73M2 (ref 60–?)
GLUCOSE BLD-MCNC: 97 MG/DL (ref 70–99)
HCT VFR BLD AUTO: 29.4 %
HGB BLD-MCNC: 8.8 G/DL
IMM GRANULOCYTES # BLD AUTO: 0.02 X10(3) UL (ref 0–1)
IMM GRANULOCYTES NFR BLD: 0.5 %
LYMPHOCYTES # BLD AUTO: 0.83 X10(3) UL (ref 1–4)
LYMPHOCYTES NFR BLD AUTO: 19.1 %
MCH RBC QN AUTO: 28.8 PG (ref 26–34)
MCHC RBC AUTO-ENTMCNC: 29.9 G/DL (ref 31–37)
MCV RBC AUTO: 96.1 FL
MONOCYTES # BLD AUTO: 0.53 X10(3) UL (ref 0.1–1)
MONOCYTES NFR BLD AUTO: 12.2 %
NEUTROPHILS # BLD AUTO: 2.79 X10 (3) UL (ref 1.5–7.7)
NEUTROPHILS # BLD AUTO: 2.79 X10(3) UL (ref 1.5–7.7)
NEUTROPHILS NFR BLD AUTO: 64.1 %
OSMOLALITY SERPL CALC.SUM OF ELEC: 299 MOSM/KG (ref 275–295)
PLATELET # BLD AUTO: 262 10(3)UL (ref 150–450)
POTASSIUM SERPL-SCNC: 3.8 MMOL/L (ref 3.5–5.1)
RBC # BLD AUTO: 3.06 X10(6)UL
SODIUM SERPL-SCNC: 143 MMOL/L (ref 136–145)
WBC # BLD AUTO: 4.4 X10(3) UL (ref 4–11)

## 2023-03-09 PROCEDURE — 0DB68ZX EXCISION OF STOMACH, VIA NATURAL OR ARTIFICIAL OPENING ENDOSCOPIC, DIAGNOSTIC: ICD-10-PCS | Performed by: INTERNAL MEDICINE

## 2023-03-09 PROCEDURE — 43239 EGD BIOPSY SINGLE/MULTIPLE: CPT | Performed by: INTERNAL MEDICINE

## 2023-03-09 PROCEDURE — 99233 SBSQ HOSP IP/OBS HIGH 50: CPT | Performed by: NURSE PRACTITIONER

## 2023-03-09 PROCEDURE — 99233 SBSQ HOSP IP/OBS HIGH 50: CPT | Performed by: INTERNAL MEDICINE

## 2023-03-09 RX ORDER — SODIUM CHLORIDE, SODIUM LACTATE, POTASSIUM CHLORIDE, CALCIUM CHLORIDE 600; 310; 30; 20 MG/100ML; MG/100ML; MG/100ML; MG/100ML
INJECTION, SOLUTION INTRAVENOUS CONTINUOUS PRN
Status: DISCONTINUED | OUTPATIENT
Start: 2023-03-09 | End: 2023-03-09 | Stop reason: SURG

## 2023-03-09 RX ORDER — CHLORHEXIDINE GLUCONATE 4 G/100ML
30 SOLUTION TOPICAL
Status: COMPLETED | OUTPATIENT
Start: 2023-03-10 | End: 2023-03-10

## 2023-03-09 RX ORDER — ASPIRIN 81 MG/1
81 TABLET, CHEWABLE ORAL DAILY
Status: DISCONTINUED | OUTPATIENT
Start: 2023-03-10 | End: 2023-03-10

## 2023-03-09 RX ORDER — SODIUM CHLORIDE 9 MG/ML
INJECTION, SOLUTION INTRAVENOUS
Status: ACTIVE | OUTPATIENT
Start: 2023-03-10 | End: 2023-03-10

## 2023-03-09 RX ORDER — POTASSIUM CHLORIDE 20 MEQ/1
40 TABLET, EXTENDED RELEASE ORAL ONCE
Status: COMPLETED | OUTPATIENT
Start: 2023-03-09 | End: 2023-03-09

## 2023-03-09 RX ORDER — LIDOCAINE HYDROCHLORIDE 10 MG/ML
INJECTION, SOLUTION EPIDURAL; INFILTRATION; INTRACAUDAL; PERINEURAL AS NEEDED
Status: DISCONTINUED | OUTPATIENT
Start: 2023-03-09 | End: 2023-03-09 | Stop reason: SURG

## 2023-03-09 RX ORDER — SODIUM CHLORIDE 9 MG/ML
INJECTION, SOLUTION INTRAVENOUS CONTINUOUS
Status: DISCONTINUED | OUTPATIENT
Start: 2023-03-09 | End: 2023-03-10

## 2023-03-09 RX ORDER — PHENYLEPHRINE HCL 10 MG/ML
VIAL (ML) INJECTION AS NEEDED
Status: DISCONTINUED | OUTPATIENT
Start: 2023-03-09 | End: 2023-03-09 | Stop reason: SURG

## 2023-03-09 RX ADMIN — LIDOCAINE HYDROCHLORIDE 50 MG: 10 INJECTION, SOLUTION EPIDURAL; INFILTRATION; INTRACAUDAL; PERINEURAL at 15:13:00

## 2023-03-09 RX ADMIN — PHENYLEPHRINE HCL 100 MCG: 10 MG/ML VIAL (ML) INJECTION at 15:22:00

## 2023-03-09 RX ADMIN — SODIUM CHLORIDE, SODIUM LACTATE, POTASSIUM CHLORIDE, CALCIUM CHLORIDE: 600; 310; 30; 20 INJECTION, SOLUTION INTRAVENOUS at 15:11:00

## 2023-03-09 RX ADMIN — SODIUM CHLORIDE, SODIUM LACTATE, POTASSIUM CHLORIDE, CALCIUM CHLORIDE: 600; 310; 30; 20 INJECTION, SOLUTION INTRAVENOUS at 15:22:00

## 2023-03-09 NOTE — TELEPHONE ENCOUNTER
Pt need DC clinic follow up in 2 weeks    Patient had EGD today for acute on chronic anemia. EGD = gastritis, path pending.

## 2023-03-09 NOTE — INTERVAL H&P NOTE
Pre-op Diagnosis: iron deficiency anemia    The above referenced H&P was reviewed by Mick Manrique MD on 3/9/2023, the patient was examined and no significant changes have occurred in the patient's condition since the H&P was performed. I discussed with the patient and/or legal representative the potential benefits, risks and side effects of this procedure; the likelihood of the patient achieving goals; and potential problems that might occur during recuperation. I discussed reasonable alternatives to the procedure, including risks, benefits and side effects related to the alternatives and risks related to not receiving this procedure. We will proceed with procedure as planned.

## 2023-03-09 NOTE — OPERATIVE REPORT
ESOPHAGOGASTRODUODENOSCOPY (EGD) REPORT    Melchor Hoff     1945 Age [de-identified]year old   PCP Neil Aranda. Carlos Eduardo Marquez MD Endoscopist Alana Meek MD     Date of procedure: 23    Procedure: EGD w/biopsy    Pre-operative diagnosis: anemia    Post-operative diagnosis: see impression    Medications: MAC    Complications: none    Procedure:  Informed consent was obtained from the patient after the risks of the procedure were discussed, including but not limited to bleeding, perforation, aspiration, infection, or possibility of a missed lesion. After discussions of the risks/benefits and alternatives to this procedure, as well as the planned sedation, the patient was placed in the left lateral decubitus position and begun on continuous blood pressure pulse oximetry and EKG monitoring and this was maintained throughout the procedure. Once an adequate level of sedation was obtained a bite block was placed. Then the lubricated tip of the [id number] diagnostic video upper endoscope was inserted and advanced using direct visualization into the posterior pharynx and ultimately into the esophagus, stomach, and duodenum. Complications: None    Findings:      1. Esophagus: The squamocolumnar junction was noted at 40 cm and appeared normal. The diaphragmatic pinch was noted noted at 40 cm from the incisors. The esophagus appeared normal throughout its entire length with no evidence of rings, webs or luminal narrowing. There was no evidence of erosions, ulcerations, varices, or masses. 2. Stomach: We then entered the stomach. Severe linear erythema in the antrum with thickened folds, biopsied with cold forceps for histology. No focal ulcer or erosions. There was no evidence of any luminal or submucosal masses. A retroflexed view allowed examination of the angularis, cardia and fundus and these areas also appeared normal with a non-patulous cardia. No hiatal hernia seen.       3. Duodenum: The duodenal mucosa appeared normal in the 1st and 2nd portion of the duodenum. We then withdrew the instrument from the patient who tolerated the procedure well. Impression:   1. Gastric erythema and thickened folds in antrum, biopsied    Recommend:  1. Follow-up pathology  2. Continue ppi bid  3. May resume diet  4. No further inpatient endoscopic evaluation at this time given stable hgb and no overt bleeding. She may follow-up gi clinic upon discharge.     >>>If tissue was sampled/removed and you have not received your pathology results either by phone or letter within 2 weeks, please call our office at [phone].     Specimens:  gastric    Blood loss: <1 ml      Marisol Nolan MD  [facility]

## 2023-03-09 NOTE — PRE-SEDATION ASSESSMENT
Physician Pre-Sedation Assessment    Pre-Sedation Assessment:    Sedation History: Previous Sedation with No Complications and Airway Assessed    Cardiac: normal S1, S2  Respiratory: breath sounds clear bilaterally   Abdomen: soft, BS (+), non-tender    ASA Classification: 3.  Patient with severe systemic disease    Plan: mac Sedation [facility] Medicine Daily Progress Note    Date of Service  11/11/2020    Chief Complaint  55 y.o. male admitted 11/9/2020 with hypertensive emergency with intracranial hemorrhage    Hospital Course  Patient admitted to the intensive care unit for blood pressure management and close clinical monitoring.    Interval Problem Update    Patient evaluated this morning he denies any headache  He is tolerating his diet  His left-sided weakness is improved    Consultants/Specialty  Neurology  Critical care    Code Status  Full Code    Disposition  Transfer neurology floor    Review of Systems  Review of Systems   Constitutional: Negative for chills and fever.   Respiratory: Negative for shortness of breath.    Cardiovascular: Negative for chest pain.   Gastrointestinal: Negative for nausea and vomiting.   Neurological: Positive for focal weakness.   All other systems reviewed and are negative.       Physical Exam  Temp:  [36.6 °C (97.9 °F)-36.9 °C (98.4 °F)] 36.8 °C (98.2 °F)  Pulse:  [59-78] 61  Resp:  [13-28] 14  BP: (123-180)/(71-94) 129/72  SpO2:  [93 %-97 %] 93 %    Physical Exam  Vitals signs and nursing note reviewed.   Constitutional:       Appearance: He is well-developed. He is not diaphoretic.   HENT:      Head: Normocephalic and atraumatic.      Mouth/Throat:      Pharynx: No oropharyngeal exudate.   Eyes:      General: No scleral icterus.        Right eye: No discharge.         Left eye: No discharge.      Conjunctiva/sclera: Conjunctivae normal.      Pupils: Pupils are equal, round, and reactive to light.   Neck:      Musculoskeletal: Neck supple.      Vascular: No JVD.      Trachea: No tracheal deviation.   Cardiovascular:      Rate and Rhythm: Normal rate and regular rhythm.      Heart sounds: No murmur. No friction rub. No gallop.    Pulmonary:      Effort: Pulmonary effort is normal. No respiratory distress.      Breath sounds: Normal breath sounds. No stridor. No wheezing.   Chest:      Chest wall: No  tenderness.   Abdominal:      General: Bowel sounds are normal. There is no distension.      Palpations: Abdomen is soft.      Tenderness: There is no abdominal tenderness. There is no rebound.   Musculoskeletal:         General: No tenderness.   Skin:     General: Skin is warm and dry.      Nails: There is no clubbing.     Neurological:      Mental Status: He is alert and oriented to person, place, and time.      Cranial Nerves: Cranial nerve deficit (Mild dysarthria) present.      Motor: Weakness (Left hemiparesis 4+/5) present.      Coordination: Coordination normal.   Psychiatric:         Behavior: Behavior normal.         Fluids    Intake/Output Summary (Last 24 hours) at 11/11/2020 0739  Last data filed at 11/11/2020 0400  Gross per 24 hour   Intake 120 ml   Output 1200 ml   Net -1080 ml       Laboratory  Recent Labs     11/09/20  0801 11/10/20  0555   WBC 5.9 6.6   RBC 5.67 5.38   HEMOGLOBIN 15.0 14.2   HEMATOCRIT 45.6 43.6   MCV 80.4* 81.0*   MCH 26.5* 26.4*   MCHC 32.9* 32.6*   RDW 44.3 45.3   PLATELETCT 185 187   MPV 10.0 10.4     Recent Labs     11/09/20  0801 11/09/20  0801 11/09/20  2215 11/10/20  0555 11/11/20  0350   SODIUM 135   < > 132* 133* 140   POTASSIUM 4.0  --   --  3.6 4.1   CHLORIDE 100  --   --  101 104   CO2 24  --   --  24 24   GLUCOSE 222*  --   --  191* 176*   BUN 15  --   --  9 12   CREATININE 0.75  --   --  0.66 0.80   CALCIUM 8.9  --   --  8.6 8.8    < > = values in this interval not displayed.     Recent Labs     11/09/20  0801   APTT 25.2   INR 0.96         Recent Labs     11/09/20  0939   TRIGLYCERIDE 82   HDL 38*   LDL 72       Imaging  MR-BRAIN-W/O   Final Result      1.  There is an approximately 13 x 16 mm sized acute right midbrain/cerebral peduncle hemorrhage.   2.  There are areas of small chronic hemorrhages in the basal ganglia and right medial parietal lobe.   3.  Large encephalomalacia with chronic hemorrhagic products in the left occipital lobe.   4.  Multiple chronic  small infarcts.   5.  Mild chronic microvascular ischemic disease.   6.  Mild cerebral volume loss.      EC-ECHOCARDIOGRAM COMPLETE W/ CONT   Final Result      DX-CHEST-PORTABLE (1 VIEW)   Final Result      1.  There is no acute cardiopulmonary process.      CT-CTA NECK WITH & W/O-POST PROCESSING   Final Result      1.  CT angiogram of the neck demonstrating no significant carotid artery stenosis or occlusion.   2.  There is a mildly enlarged heterogeneous thyroid gland with multiple thyroid nodules which could be further assessed on a nonemergent basis with thyroid ultrasound.      CT-CEREBRAL PERFUSION ANALYSIS   Final Result      1.  Cerebral blood flow less than 30% likely representing completed infarct = 11 mL.      2.  T Max more than 6 seconds likely representing combination of completed infarct and ischemia = 23 mL.      3.  Mismatched volume likely representing ischemic brain/penumbra = 12 mL      4.  Please note that the cerebral perfusion was performed on the limited brain tissue around the basal ganglia region. Infarct/ischemia outside the CT perfusion sections can be missed in this study.      CT-CTA HEAD WITH & W/O-POST PROCESS   Final Result      1.  CT angiogram of the [place] demonstrate no significant arterial stenosis or proximal occlusion.      CT-HEAD W/O   Final Result      1.  There is a focal area of increased attenuation in the right man most consistent with a small area of hemorrhage.   2.  There is an old infarct in the left posterior medial occipital lobe.           Assessment/Plan  * Acute hemorrhagic CVA (Pontine hemorrhage) (HCC)- (present on admission)  Assessment & Plan  Likely hypertensive bleed  Reviewed CT and MRI and CTA  Blood pressure control we will start carvedilol continue lisinopril and amlodipine and monitor blood pressure closely  As needed IV labetalol  DC bedrest and mobilize as tolerated  PT/OT/SLP    Hypertensive emergency- (present on admission)  Assessment  & Plan  Blood pressure improved but still not at goal we will add carvedilol and titrate as needed  Continue amlodipine and lisinopril    Elevated troponin- (present on admission)  Assessment & Plan  Likely secondary to hypertensive emergency  Echocardiogram with normal EF    Obesity (BMI 30-39.9)- (present on admission)  Assessment & Plan  Body mass index is 38.45 kg/m².     Mixed hyperlipidemia  Assessment & Plan  -Resume statin.  Check lipid profile.    Sleep apnea  Assessment & Plan  With recent sleep study resume CPAP    DM2 (diabetes mellitus, type 2) ([facility])- (present on admission)  Assessment & Plan  Increase Lantus and continue sliding scale insulin  Monitor CBGs and adjust accordingly  Recent Labs     11/09/20  1739 11/09/20  2356 11/10/20  0558 11/10/20  0826 11/10/20  1216 11/10/20  1820 11/11/20  0040 11/11/20  0448   POCGLUCOSE 184* 176* 185* 148* 218* 187* 190* 175*            VTE prophylaxis: SCD

## 2023-03-09 NOTE — PLAN OF CARE
Pt. A&Ox4, RA, with no complaints of pain at this time. Plan is for NPO tonight for EGD tomorrow and cath on Friday. Pt. Aware of plans. Call light within reach, bed in lowest position. Problem: Patient Centered Care  Goal: Patient preferences are identified and integrated in the patient's plan of care  Description: Interventions:    - Provide timely, complete, and accurate information to patient/family  - Incorporate patient and family knowledge, values, beliefs, and cultural backgrounds into the planning and delivery of care  - Encourage patient/family to participate in care and decision-making at the level they choose  - Honor patient and family perspectives and choices  Outcome: Progressing     Problem: Patient/Family Goals  Goal: Patient/Family Long Term Goal  Interventions:  - See additional Care Plan goals for specific interventions  Outcome: Progressing  Goal: Patient/Family Short Term Goal    Interventions:   - See additional Care Plan goals for specific interventions  Outcome: Progressing     Problem: CARDIOVASCULAR - ADULT  Goal: Maintains optimal cardiac output and hemodynamic stability  Description: INTERVENTIONS:  - Monitor vital signs, rhythm, and trends  - Monitor for bleeding, hypotension and signs of decreased cardiac output  - Evaluate effectiveness of vasoactive medications to optimize hemodynamic stability  - Monitor arterial and/or venous puncture sites for bleeding and/or hematoma  - Assess quality of pulses, skin color and temperature  - Assess for signs of decreased coronary artery perfusion - ex.  Angina  - Evaluate fluid balance, assess for edema, trend weights  Outcome: Progressing  Goal: Absence of cardiac arrhythmias or at baseline  Description: INTERVENTIONS:  - Continuous cardiac monitoring, monitor vital signs, obtain 12 lead EKG if indicated  - Evaluate effectiveness of antiarrhythmic and heart rate control medications as ordered  - Initiate emergency measures for life threatening arrhythmias  - Monitor electrolytes and administer replacement therapy as ordered  Outcome: Progressing     Problem: RESPIRATORY - ADULT  Goal: Achieves optimal ventilation and oxygenation  Description: INTERVENTIONS:  - Assess for changes in respiratory status  - Assess for changes in mentation and behavior  - Position to facilitate oxygenation and minimize respiratory effort  - Oxygen supplementation based on oxygen saturation or ABGs  - Provide Smoking Cessation handout, if applicable  - Encourage broncho-pulmonary hygiene including cough, deep breathe, Incentive Spirometry  - Assess the need for suctioning and perform as needed  - Assess and instruct to report SOB or any respiratory difficulty  - Respiratory Therapy support as indicated  - Manage/alleviate anxiety  - Monitor for signs/symptoms of CO2 retention  Outcome: Progressing

## 2023-03-09 NOTE — PLAN OF CARE
Patient is alert and oriented. No acute distress, denies pain. On room air. Independent. IV lasix. IV protonix. Cardiology planning on cath tomorrow, Friday. GI planning potential EGD today or tomorrow, has been NPO since midnight. Problem: CARDIOVASCULAR - ADULT  Goal: Maintains optimal cardiac output and hemodynamic stability  Description: INTERVENTIONS:  - Monitor vital signs, rhythm, and trends  - Monitor for bleeding, hypotension and signs of decreased cardiac output  - Evaluate effectiveness of vasoactive medications to optimize hemodynamic stability  - Monitor arterial and/or venous puncture sites for bleeding and/or hematoma  - Assess quality of pulses, skin color and temperature  - Assess for signs of decreased coronary artery perfusion - ex.  Angina  - Evaluate fluid balance, assess for edema, trend weights  Outcome: Progressing     Problem: RESPIRATORY - ADULT  Goal: Achieves optimal ventilation and oxygenation  Description: INTERVENTIONS:  - Assess for changes in respiratory status  - Assess for changes in mentation and behavior  - Position to facilitate oxygenation and minimize respiratory effort  - Oxygen supplementation based on oxygen saturation or ABGs  - Provide Smoking Cessation handout, if applicable  - Encourage broncho-pulmonary hygiene including cough, deep breathe, Incentive Spirometry  - Assess the need for suctioning and perform as needed  - Assess and instruct to report SOB or any respiratory difficulty  - Respiratory Therapy support as indicated  - Manage/alleviate anxiety  - Monitor for signs/symptoms of CO2 retention  Outcome: Progressing

## 2023-03-10 ENCOUNTER — APPOINTMENT (OUTPATIENT)
Dept: INTERVENTIONAL RADIOLOGY/VASCULAR | Facility: HOSPITAL | Age: 78
End: 2023-03-10
Attending: INTERNAL MEDICINE
Payer: MEDICARE

## 2023-03-10 LAB
ANION GAP SERPL CALC-SCNC: 5 MMOL/L (ref 0–18)
BASOPHILS # BLD AUTO: 0.06 X10(3) UL (ref 0–0.2)
BASOPHILS NFR BLD AUTO: 1.3 %
BUN BLD-MCNC: 21 MG/DL (ref 7–18)
BUN/CREAT SERPL: 13.7 (ref 10–20)
CALCIUM BLD-MCNC: 9 MG/DL (ref 8.5–10.1)
CHLORIDE SERPL-SCNC: 107 MMOL/L (ref 98–112)
CO2 SERPL-SCNC: 30 MMOL/L (ref 21–32)
CREAT BLD-MCNC: 1.53 MG/DL
DEPRECATED RDW RBC AUTO: 51.7 FL (ref 35.1–46.3)
EOSINOPHIL # BLD AUTO: 0.11 X10(3) UL (ref 0–0.7)
EOSINOPHIL NFR BLD AUTO: 2.4 %
ERYTHROCYTE [DISTWIDTH] IN BLOOD BY AUTOMATED COUNT: 15 % (ref 11–15)
GFR SERPLBLD BASED ON 1.73 SQ M-ARVRAT: 34 ML/MIN/1.73M2 (ref 60–?)
GLUCOSE BLD-MCNC: 94 MG/DL (ref 70–99)
HCT VFR BLD AUTO: 29.6 %
HGB BLD-MCNC: 9.1 G/DL
IMM GRANULOCYTES # BLD AUTO: 0.01 X10(3) UL (ref 0–1)
IMM GRANULOCYTES NFR BLD: 0.2 %
LYMPHOCYTES # BLD AUTO: 0.71 X10(3) UL (ref 1–4)
LYMPHOCYTES NFR BLD AUTO: 15.2 %
MCH RBC QN AUTO: 29.5 PG (ref 26–34)
MCHC RBC AUTO-ENTMCNC: 30.7 G/DL (ref 31–37)
MCV RBC AUTO: 96.1 FL
MONOCYTES # BLD AUTO: 0.58 X10(3) UL (ref 0.1–1)
MONOCYTES NFR BLD AUTO: 12.4 %
NEUTROPHILS # BLD AUTO: 3.19 X10 (3) UL (ref 1.5–7.7)
NEUTROPHILS # BLD AUTO: 3.19 X10(3) UL (ref 1.5–7.7)
NEUTROPHILS NFR BLD AUTO: 68.5 %
OSMOLALITY SERPL CALC.SUM OF ELEC: 297 MOSM/KG (ref 275–295)
PLATELET # BLD AUTO: 274 10(3)UL (ref 150–450)
POTASSIUM SERPL-SCNC: 3.8 MMOL/L (ref 3.5–5.1)
POTASSIUM SERPL-SCNC: 3.8 MMOL/L (ref 3.5–5.1)
RBC # BLD AUTO: 3.08 X10(6)UL
SODIUM SERPL-SCNC: 142 MMOL/L (ref 136–145)
WBC # BLD AUTO: 4.7 X10(3) UL (ref 4–11)

## 2023-03-10 PROCEDURE — 99232 SBSQ HOSP IP/OBS MODERATE 35: CPT | Performed by: INTERNAL MEDICINE

## 2023-03-10 PROCEDURE — B2151ZZ FLUOROSCOPY OF LEFT HEART USING LOW OSMOLAR CONTRAST: ICD-10-PCS | Performed by: INTERNAL MEDICINE

## 2023-03-10 PROCEDURE — B2111ZZ FLUOROSCOPY OF MULTIPLE CORONARY ARTERIES USING LOW OSMOLAR CONTRAST: ICD-10-PCS | Performed by: INTERNAL MEDICINE

## 2023-03-10 RX ORDER — LIDOCAINE HYDROCHLORIDE 20 MG/ML
INJECTION, SOLUTION EPIDURAL; INFILTRATION; INTRACAUDAL; PERINEURAL
Status: COMPLETED
Start: 2023-03-10 | End: 2023-03-10

## 2023-03-10 RX ORDER — TRAMADOL HYDROCHLORIDE 50 MG/1
50 TABLET ORAL EVERY 12 HOURS PRN
Status: CANCELLED | OUTPATIENT
Start: 2023-03-10

## 2023-03-10 RX ORDER — NITROGLYCERIN 20 MG/100ML
INJECTION INTRAVENOUS
Status: COMPLETED
Start: 2023-03-10 | End: 2023-03-10

## 2023-03-10 RX ORDER — SUCRALFATE ORAL 1 G/10ML
1 SUSPENSION ORAL
Status: DISCONTINUED | OUTPATIENT
Start: 2023-03-10 | End: 2023-03-11

## 2023-03-10 RX ORDER — ACETAMINOPHEN 325 MG/1
650 TABLET ORAL EVERY 4 HOURS PRN
Status: DISCONTINUED | OUTPATIENT
Start: 2023-03-10 | End: 2023-03-11

## 2023-03-10 RX ORDER — VERAPAMIL HYDROCHLORIDE 2.5 MG/ML
INJECTION, SOLUTION INTRAVENOUS
Status: COMPLETED
Start: 2023-03-10 | End: 2023-03-10

## 2023-03-10 RX ORDER — HEPARIN SODIUM 1000 [USP'U]/ML
INJECTION, SOLUTION INTRAVENOUS; SUBCUTANEOUS
Status: COMPLETED
Start: 2023-03-10 | End: 2023-03-10

## 2023-03-10 RX ORDER — TRAMADOL HYDROCHLORIDE 50 MG/1
100 TABLET ORAL EVERY 12 HOURS PRN
Status: CANCELLED | OUTPATIENT
Start: 2023-03-10

## 2023-03-10 RX ORDER — POTASSIUM CHLORIDE 20 MEQ/1
40 TABLET, EXTENDED RELEASE ORAL ONCE
Status: COMPLETED | OUTPATIENT
Start: 2023-03-10 | End: 2023-03-10

## 2023-03-10 RX ORDER — MIDAZOLAM HYDROCHLORIDE 1 MG/ML
INJECTION INTRAMUSCULAR; INTRAVENOUS
Status: COMPLETED
Start: 2023-03-10 | End: 2023-03-10

## 2023-03-10 RX ORDER — ASPIRIN 81 MG/1
TABLET, CHEWABLE ORAL
Status: COMPLETED
Start: 2023-03-10 | End: 2023-03-10

## 2023-03-10 NOTE — BRIEF OP NOTE
Preop diagnosis: chf assess for cad pretavr  Post op diagnosis: mild cad  Procedures: cor angio  Findings: ostial lad 20%, ostial d1 40%, lcx normal, rca nl. Resume low dose eliquis. Ok to Twistles from cardiology when ok with others.   EBL: 20 mls  Specimens: None

## 2023-03-10 NOTE — PROCEDURES
[facility]    PATIENT'S NAME: Donn Naranjo   ATTENDING PHYSICIAN: Abhay Hyde MD   OPERATING PHYSICIAN: Arnoldo Sanchez DO   PATIENT ACCOUNT#:   [de-identified]    LOCATION:  [place] [address]  MEDICAL RECORD #:   [id number]       YOB: 1945  ADMISSION DATE:       03/07/2023      OPERATION DATE:  03/10/2023    CARDIAC PROCEDURE TRANSCRIPTION      CARDIAC CATHETERIZATION   PREOPERATIVE DIAGNOSIS:    POSTOPERATIVE DIAGNOSIS:    PROCEDURE PERFORMED:  Bilateral coronary angiograms. SEDATION:  Conscious sedation was performed from 1559 to 1615 with 1 mg of Versed and 50 mcg of fentanyl for a total of 16 minutes, with an independent nurse monitoring the patient's blood pressure, oximetry, and heart rate. DESCRIPTION OF PROCEDURE:  Informed consent was obtained. Patient was placed in the cardiac catheterization lab. Patient was gently hydrated since the prior day, and then immediately right before the procedure, the patient received 5 ml/kg normal saline bolus. This was to reduce the chance of contrast-induced nephropathy. Right radial access was performed. A 6-Wallisian sheath was inserted. Through the 6-Wallisian sheath, we used the 5-Wallisian TIG catheter for left and right coronary angiography. At the end of the procedure, a radial band was applied to achieve hemostasis. Patient received heparin, verapamil, and nitroglycerin during the procedure. FINDINGS:  The left main is normal in appearance. The left anterior descending artery at the ostium had 20% stenosis. The first major diagonal branch has 40% ostial stenosis. The right coronary artery is a co-dominant vessel, normal in appearance. The circumflex coronary artery is a large and co-dominant vessel, normal in appearance. IMPRESSION:  Mild coronary artery disease.     Dictated By Brandee Mathias DO  d: 03/10/2023 16:29:19  t: 03/10/2023 16:45:02  Job [id number]  JN/    cc: Sammy Lyon Bouchra Vidal MD

## 2023-03-10 NOTE — TELEPHONE ENCOUNTER
Patient still admitted to [facility]  Will call patient once discharged to help with an appointment.

## 2023-03-10 NOTE — PLAN OF CARE
Patient A&Ox4, self care. EGD done today showed gastric erythema. Plan for cath tomorrow. Patient aware of plan of care. Verbalized understanding. Problem: Patient Centered Care  Goal: Patient preferences are identified and integrated in the patient's plan of care  Description: Interventions:  - What would you like us to know as we care for you?  - Provide timely, complete, and accurate information to patient/family  - Incorporate patient and family knowledge, values, beliefs, and cultural backgrounds into the planning and delivery of care  - Encourage patient/family to participate in care and decision-making at the level they choose  - Honor patient and family perspectives and choices  Outcome: Progressing     Problem: Patient/Family Goals  Goal: Patient/Family Long Term Goal  Description: Patient's Long Term Goal:     Interventions:  -   - See additional Care Plan goals for specific interventions  Outcome: Progressing  Goal: Patient/Family Short Term Goal  Description: Patient's Short Term Goal:     Interventions:   -   - See additional Care Plan goals for specific interventions  Outcome: Progressing     Problem: CARDIOVASCULAR - ADULT  Goal: Maintains optimal cardiac output and hemodynamic stability  Description: INTERVENTIONS:  - Monitor vital signs, rhythm, and trends  - Monitor for bleeding, hypotension and signs of decreased cardiac output  - Evaluate effectiveness of vasoactive medications to optimize hemodynamic stability  - Monitor arterial and/or venous puncture sites for bleeding and/or hematoma  - Assess quality of pulses, skin color and temperature  - Assess for signs of decreased coronary artery perfusion - ex.  Angina  - Evaluate fluid balance, assess for edema, trend weights  Outcome: Progressing  Goal: Absence of cardiac arrhythmias or at baseline  Description: INTERVENTIONS:  - Continuous cardiac monitoring, monitor vital signs, obtain 12 lead EKG if indicated  - Evaluate effectiveness of antiarrhythmic and heart rate control medications as ordered  - Initiate emergency measures for life threatening arrhythmias  - Monitor electrolytes and administer replacement therapy as ordered  Outcome: Progressing     Problem: RESPIRATORY - ADULT  Goal: Achieves optimal ventilation and oxygenation  Description: INTERVENTIONS:  - Assess for changes in respiratory status  - Assess for changes in mentation and behavior  - Position to facilitate oxygenation and minimize respiratory effort  - Oxygen supplementation based on oxygen saturation or ABGs  - Provide Smoking Cessation handout, if applicable  - Encourage broncho-pulmonary hygiene including cough, deep breathe, Incentive Spirometry  - Assess the need for suctioning and perform as needed  - Assess and instruct to report SOB or any respiratory difficulty  - Respiratory Therapy support as indicated  - Manage/alleviate anxiety  - Monitor for signs/symptoms of CO2 retention  Outcome: Progressing     Problem: HEMATOLOGIC - ADULT  Goal: Maintains hematologic stability  Description: INTERVENTIONS  - Assess for signs and symptoms of bleeding or hemorrhage  - Monitor labs and vital signs for trends  - Administer supportive blood products/factors, fluids and medications as ordered and appropriate  - Administer supportive blood products/factors as ordered and appropriate  Outcome: Progressing  Goal: Free from bleeding injury  Description: (Example usage: patient with low platelets)  INTERVENTIONS:  - Avoid intramuscular injections, enemas and rectal medication administration  - Ensure safe mobilization of patient  - Hold pressure on venipuncture sites to achieve adequate hemostasis  - Assess for signs and symptoms of internal bleeding  - Monitor lab trends  - Patient is to report abnormal signs of bleeding to staff  - Avoid use of toothpicks and dental floss  - Use electric shaver for shaving  - Use soft bristle tooth brush  - Limit straining and forceful nose blowing  Outcome: Progressing

## 2023-03-11 VITALS
DIASTOLIC BLOOD PRESSURE: 62 MMHG | HEIGHT: 67 IN | OXYGEN SATURATION: 97 % | RESPIRATION RATE: 18 BRPM | WEIGHT: 205 LBS | TEMPERATURE: 98 F | HEART RATE: 99 BPM | BODY MASS INDEX: 32.18 KG/M2 | SYSTOLIC BLOOD PRESSURE: 132 MMHG

## 2023-03-11 LAB
ANION GAP SERPL CALC-SCNC: 4 MMOL/L (ref 0–18)
BASOPHILS # BLD AUTO: 0.05 X10(3) UL (ref 0–0.2)
BASOPHILS NFR BLD AUTO: 1.1 %
BUN BLD-MCNC: 21 MG/DL (ref 7–18)
BUN/CREAT SERPL: 14.9 (ref 10–20)
CALCIUM BLD-MCNC: 9 MG/DL (ref 8.5–10.1)
CHLORIDE SERPL-SCNC: 111 MMOL/L (ref 98–112)
CO2 SERPL-SCNC: 28 MMOL/L (ref 21–32)
CREAT BLD-MCNC: 1.41 MG/DL
DEPRECATED RDW RBC AUTO: 52.7 FL (ref 35.1–46.3)
EOSINOPHIL # BLD AUTO: 0.11 X10(3) UL (ref 0–0.7)
EOSINOPHIL NFR BLD AUTO: 2.3 %
ERYTHROCYTE [DISTWIDTH] IN BLOOD BY AUTOMATED COUNT: 14.9 % (ref 11–15)
GFR SERPLBLD BASED ON 1.73 SQ M-ARVRAT: 38 ML/MIN/1.73M2 (ref 60–?)
GLUCOSE BLD-MCNC: 96 MG/DL (ref 70–99)
HCT VFR BLD AUTO: 29.1 %
HGB BLD-MCNC: 8.8 G/DL
IMM GRANULOCYTES # BLD AUTO: 0.01 X10(3) UL (ref 0–1)
IMM GRANULOCYTES NFR BLD: 0.2 %
LYMPHOCYTES # BLD AUTO: 0.9 X10(3) UL (ref 1–4)
LYMPHOCYTES NFR BLD AUTO: 19 %
MCH RBC QN AUTO: 29.9 PG (ref 26–34)
MCHC RBC AUTO-ENTMCNC: 30.2 G/DL (ref 31–37)
MCV RBC AUTO: 99 FL
MONOCYTES # BLD AUTO: 0.52 X10(3) UL (ref 0.1–1)
MONOCYTES NFR BLD AUTO: 11 %
NEUTROPHILS # BLD AUTO: 3.14 X10 (3) UL (ref 1.5–7.7)
NEUTROPHILS # BLD AUTO: 3.14 X10(3) UL (ref 1.5–7.7)
NEUTROPHILS NFR BLD AUTO: 66.4 %
OSMOLALITY SERPL CALC.SUM OF ELEC: 299 MOSM/KG (ref 275–295)
PLATELET # BLD AUTO: 264 10(3)UL (ref 150–450)
POTASSIUM SERPL-SCNC: 4.3 MMOL/L (ref 3.5–5.1)
POTASSIUM SERPL-SCNC: 4.3 MMOL/L (ref 3.5–5.1)
RBC # BLD AUTO: 2.94 X10(6)UL
SODIUM SERPL-SCNC: 143 MMOL/L (ref 136–145)
WBC # BLD AUTO: 4.7 X10(3) UL (ref 4–11)

## 2023-03-11 PROCEDURE — 99239 HOSP IP/OBS DSCHRG MGMT >30: CPT | Performed by: INTERNAL MEDICINE

## 2023-03-11 RX ORDER — SUCRALFATE ORAL 1 G/10ML
1 SUSPENSION ORAL
Qty: 560 ML | Refills: 0 | Status: SHIPPED | OUTPATIENT
Start: 2023-03-11 | End: 2023-03-25

## 2023-03-11 RX ORDER — METOPROLOL SUCCINATE 50 MG/1
50 TABLET, EXTENDED RELEASE ORAL DAILY
Qty: 30 TABLET | Refills: 0 | Status: SHIPPED | OUTPATIENT
Start: 2023-03-12 | End: 2023-04-11

## 2023-03-11 RX ORDER — FUROSEMIDE 20 MG/1
20 TABLET ORAL DAILY
Status: DISCONTINUED | OUTPATIENT
Start: 2023-03-11 | End: 2023-03-11

## 2023-03-11 NOTE — PLAN OF CARE
Patient with no complaints over night. Right wrist access site from angiogram remains free of complications. Wrist restrictions were reviewed with the patient. Plan for possible discharge home today. Problem: Patient Centered Care  Goal: Patient preferences are identified and integrated in the patient's plan of care  Description: Interventions:  - What would you like us to know as we care for you? I am independent with with my care. 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 - Provide timely, complete, and accurate information to patient/family  - Incorporate patient and family knowledge, values, beliefs, and cultural backgrounds into the planning and delivery of care  - Encourage patient/family to participate in care and decision-making at the level they choose  - Honor patient and family perspectives and choices  Outcome: Progressing     Problem: Patient/Family Goals  Goal: Patient/Family Long Term Goal  Description: Patient's Long Term Goal: I want to go home and avoid having to come back. Interventions:  - Medication compliance  - Salt restricted diet  - Monitor fluid intake and signs of fluid overload closely    - See additional Care Plan goals for specific interventions  Outcome: Progressing  Goal: Patient/Family Short Term Goal  Description: Patient's Short Term Goal: I want the pain in the bend of my left arm to go away. Interventions:   - Elevate extremity  - Avoid blood draws from infiltrated area  - Apply hot/ice as tolerated.   - See additional Care Plan goals for specific interventions  Outcome: Progressing     Problem: CARDIOVASCULAR - ADULT  Goal: Maintains optimal cardiac output and hemodynamic stability  Description: INTERVENTIONS:  - Monitor vital signs, rhythm, and trends  - Monitor for bleeding, hypotension and signs of decreased cardiac output  - Evaluate effectiveness of vasoactive medications to optimize hemodynamic stability  - Monitor arterial and/or venous puncture sites for bleeding and/or hematoma  - Assess quality of pulses, skin color and temperature  - Assess for signs of decreased coronary artery perfusion - ex.  Angina  - Evaluate fluid balance, assess for edema, trend weights  Outcome: Progressing  Goal: Absence of cardiac arrhythmias or at baseline  Description: INTERVENTIONS:  - Continuous cardiac monitoring, monitor vital signs, obtain 12 lead EKG if indicated  - Evaluate effectiveness of antiarrhythmic and heart rate control medications as ordered  - Initiate emergency measures for life threatening arrhythmias  - Monitor electrolytes and administer replacement therapy as ordered  Outcome: Progressing     Problem: RESPIRATORY - ADULT  Goal: Achieves optimal ventilation and oxygenation  Description: INTERVENTIONS:  - Assess for changes in respiratory status  - Assess for changes in mentation and behavior  - Position to facilitate oxygenation and minimize respiratory effort  - Oxygen supplementation based on oxygen saturation or ABGs  - Provide Smoking Cessation handout, if applicable  - Encourage broncho-pulmonary hygiene including cough, deep breathe, Incentive Spirometry  - Assess the need for suctioning and perform as needed  - Assess and instruct to report SOB or any respiratory difficulty  - Respiratory Therapy support as indicated  - Manage/alleviate anxiety  - Monitor for signs/symptoms of CO2 retention  Outcome: Progressing     Problem: HEMATOLOGIC - ADULT  Goal: Maintains hematologic stability  Description: INTERVENTIONS  - Assess for signs and symptoms of bleeding or hemorrhage  - Monitor labs and vital signs for trends  - Administer supportive blood products/factors, fluids and medications as ordered and appropriate  - Administer supportive blood products/factors as ordered and appropriate  Outcome: Progressing  Goal: Free from bleeding injury  Description: (Example usage: patient with low platelets)  INTERVENTIONS:  - Avoid intramuscular injections, enemas and rectal medication administration  - Ensure safe mobilization of patient  - Hold pressure on venipuncture sites to achieve adequate hemostasis  - Assess for signs and symptoms of internal bleeding  - Monitor lab trends  - Patient is to report abnormal signs of bleeding to staff  - Avoid use of toothpicks and dental floss  - Use electric shaver for shaving  - Use soft bristle tooth brush  - Limit straining and forceful nose blowing  Outcome: Progressing

## 2023-03-11 NOTE — PLAN OF CARE
Pt. A&Ox4, RA, with no complaints of pain at this time. Post cath assessment being continued. Call light within reach, bed in lowest position. Problem: Patient Centered Care  Goal: Patient preferences are identified and integrated in the patient's plan of care  Description: Interventions:  - Provide timely, complete, and accurate information to patient/family  - Incorporate patient and family knowledge, values, beliefs, and cultural backgrounds into the planning and delivery of care  - Encourage patient/family to participate in care and decision-making at the level they choose  - Honor patient and family perspectives and choices  Outcome: Progressing     Problem: Patient/Family Goals  Goal: Patient/Family Long Term Goal  Interventions:  - See additional Care Plan goals for specific interventions  Outcome: Progressing  Goal: Patient/Family Short Term Goal    Interventions:   - See additional Care Plan goals for specific interventions  Outcome: Progressing     Problem: CARDIOVASCULAR - ADULT  Goal: Maintains optimal cardiac output and hemodynamic stability  Description: INTERVENTIONS:  - Monitor vital signs, rhythm, and trends  - Monitor for bleeding, hypotension and signs of decreased cardiac output  - Evaluate effectiveness of vasoactive medications to optimize hemodynamic stability  - Monitor arterial and/or venous puncture sites for bleeding and/or hematoma  - Assess quality of pulses, skin color and temperature  - Assess for signs of decreased coronary artery perfusion - ex.  Angina  - Evaluate fluid balance, assess for edema, trend weights  Outcome: Progressing  Goal: Absence of cardiac arrhythmias or at baseline  Description: INTERVENTIONS:  - Continuous cardiac monitoring, monitor vital signs, obtain 12 lead EKG if indicated  - Evaluate effectiveness of antiarrhythmic and heart rate control medications as ordered  - Initiate emergency measures for life threatening arrhythmias  - Monitor electrolytes and administer replacement therapy as ordered  Outcome: Progressing     Problem: RESPIRATORY - ADULT  Goal: Achieves optimal ventilation and oxygenation  Description: INTERVENTIONS:  - Assess for changes in respiratory status  - Assess for changes in mentation and behavior  - Position to facilitate oxygenation and minimize respiratory effort  - Oxygen supplementation based on oxygen saturation or ABGs  - Provide Smoking Cessation handout, if applicable  - Encourage broncho-pulmonary hygiene including cough, deep breathe, Incentive Spirometry  - Assess the need for suctioning and perform as needed  - Assess and instruct to report SOB or any respiratory difficulty  - Respiratory Therapy support as indicated  - Manage/alleviate anxiety  - Monitor for signs/symptoms of CO2 retention  Outcome: Progressing     Problem: HEMATOLOGIC - ADULT  Goal: Maintains hematologic stability  Description: INTERVENTIONS  - Assess for signs and symptoms of bleeding or hemorrhage  - Monitor labs and vital signs for trends  - Administer supportive blood products/factors, fluids and medications as ordered and appropriate  - Administer supportive blood products/factors as ordered and appropriate  Outcome: Progressing  Goal: Free from bleeding injury  Description: (Example usage: patient with low platelets)  INTERVENTIONS:  - Avoid intramuscular injections, enemas and rectal medication administration  - Ensure safe mobilization of patient  - Hold pressure on venipuncture sites to achieve adequate hemostasis  - Assess for signs and symptoms of internal bleeding  - Monitor lab trends  - Patient is to report abnormal signs of bleeding to staff  - Avoid use of toothpicks and dental floss  - Use electric shaver for shaving  - Use soft bristle tooth brush  - Limit straining and forceful nose blowing  Outcome: Progressing

## 2023-03-11 NOTE — PLAN OF CARE
Manuela Wade is alert and oriented, ambulatory and independent. Room air. S/p cath through R radial. Site clean, dry and open to air. Plan is to discharge home today. Problem: Patient Centered Care  Goal: Patient preferences are identified and integrated in the patient's plan of care  Description: Interventions:  - What would you like us to know as we care for you? I am independent with with my care. 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 - Provide timely, complete, and accurate information to patient/family  - Incorporate patient and family knowledge, values, beliefs, and cultural backgrounds into the planning and delivery of care  - Encourage patient/family to participate in care and decision-making at the level they choose  - Honor patient and family perspectives and choices  Outcome: Progressing     Problem: Patient/Family Goals  Goal: Patient/Family Long Term Goal  Description: Patient's Long Term Goal: I want to go home and avoid having to come back. Interventions:  - Medication compliance  - Salt restricted diet  - Monitor fluid intake and signs of fluid overload closely    - See additional Care Plan goals for specific interventions  Outcome: Progressing  Goal: Patient/Family Short Term Goal  Description: Patient's Short Term Goal: I want the pain in the bend of my left arm to go away. Interventions:   - Elevate extremity  - Avoid blood draws from infiltrated area  - Apply hot/ice as tolerated.   - See additional Care Plan goals for specific interventions  Outcome: Progressing     Problem: CARDIOVASCULAR - ADULT  Goal: Maintains optimal cardiac output and hemodynamic stability  Description: INTERVENTIONS:  - Monitor vital signs, rhythm, and trends  - Monitor for bleeding, hypotension and signs of decreased cardiac output  - Evaluate effectiveness of vasoactive medications to optimize hemodynamic stability  - Monitor arterial and/or venous puncture sites for bleeding and/or hematoma  - Assess quality of pulses, skin color and temperature  - Assess for signs of decreased coronary artery perfusion - ex.  Angina  - Evaluate fluid balance, assess for edema, trend weights  Outcome: Progressing  Goal: Absence of cardiac arrhythmias or at baseline  Description: INTERVENTIONS:  - Continuous cardiac monitoring, monitor vital signs, obtain 12 lead EKG if indicated  - Evaluate effectiveness of antiarrhythmic and heart rate control medications as ordered  - Initiate emergency measures for life threatening arrhythmias  - Monitor electrolytes and administer replacement therapy as ordered  Outcome: Progressing     Problem: RESPIRATORY - ADULT  Goal: Achieves optimal ventilation and oxygenation  Description: INTERVENTIONS:  - Assess for changes in respiratory status  - Assess for changes in mentation and behavior  - Position to facilitate oxygenation and minimize respiratory effort  - Oxygen supplementation based on oxygen saturation or ABGs  - Provide Smoking Cessation handout, if applicable  - Encourage broncho-pulmonary hygiene including cough, deep breathe, Incentive Spirometry  - Assess the need for suctioning and perform as needed  - Assess and instruct to report SOB or any respiratory difficulty  - Respiratory Therapy support as indicated  - Manage/alleviate anxiety  - Monitor for signs/symptoms of CO2 retention  Outcome: Progressing     Problem: HEMATOLOGIC - ADULT  Goal: Maintains hematologic stability  Description: INTERVENTIONS  - Assess for signs and symptoms of bleeding or hemorrhage  - Monitor labs and vital signs for trends  - Administer supportive blood products/factors, fluids and medications as ordered and appropriate  - Administer supportive blood products/factors as ordered and appropriate  Outcome: Progressing  Goal: Free from bleeding injury  Description: (Example usage: patient with low platelets)  INTERVENTIONS:  - Avoid intramuscular injections, enemas and rectal medication administration  - Ensure safe mobilization of patient  - Hold pressure on venipuncture sites to achieve adequate hemostasis  - Assess for signs and symptoms of internal bleeding  - Monitor lab trends  - Patient is to report abnormal signs of bleeding to staff  - Avoid use of toothpicks and dental floss  - Use electric shaver for shaving  - Use soft bristle tooth brush  - Limit straining and forceful nose blowing  Outcome: Progressing

## 2023-03-11 NOTE — CM/SW NOTE
03/11/23 1400   Discharge disposition   Expected discharge disposition Home or Self   Discharge transportation Private car     Pt received MDO for discharge, pt is self, nn.      SW/CM to remain available for support and/or discharge planning.      Laurent Angela, MSW, LSW   x [phone]

## 2023-03-13 NOTE — TELEPHONE ENCOUNTER
Patient contacted. Accepted below appointment and given office directions.     Your Appointments           Wednesday March 22, 2023  9:30 AM  Consult with DUSTIN Lechuga-[facility] [address], Branden Hopes ([facility]) [address] [place] [address],[address]  [phone]

## 2023-03-14 ENCOUNTER — PATIENT OUTREACH (OUTPATIENT)
Dept: CASE MANAGEMENT | Age: 78
End: 2023-03-14

## 2023-03-14 ENCOUNTER — TELEPHONE (OUTPATIENT)
Dept: FAMILY MEDICINE CLINIC | Facility: CLINIC | Age: 78
End: 2023-03-14

## 2023-03-14 DIAGNOSIS — D64.9 ACUTE ON CHRONIC ANEMIA: ICD-10-CM

## 2023-03-14 DIAGNOSIS — Z02.9 ENCOUNTERS FOR UNSPECIFIED ADMINISTRATIVE PURPOSE: ICD-10-CM

## 2023-03-14 PROCEDURE — 1111F DSCHRG MED/CURRENT MED MERGE: CPT

## 2023-03-14 NOTE — TELEPHONE ENCOUNTER
Clinical staff:  Please f/u with PCP and decide if visit type can be updated or if appointment needs to be changed. Thank you! Clinical staff please address . Thank you.

## 2023-03-14 NOTE — TELEPHONE ENCOUNTER
Spoke to pt for TCM today. Patient is scheduled for a follow-up appointment on 3/20/23 for 15 minutes. NCM unable to change visit type to TCM d/t schedule availability. Please advise. BOOK BY DATE (last date for TCM): 3/25/23    Clinical staff:  Please f/u with PCP and decide if visit type can be updated or if appointment needs to be changed. Thank you!

## 2023-03-20 ENCOUNTER — OFFICE VISIT (OUTPATIENT)
Dept: FAMILY MEDICINE CLINIC | Facility: CLINIC | Age: 78
End: 2023-03-20

## 2023-03-20 VITALS
RESPIRATION RATE: 18 BRPM | DIASTOLIC BLOOD PRESSURE: 54 MMHG | WEIGHT: 216 LBS | SYSTOLIC BLOOD PRESSURE: 138 MMHG | BODY MASS INDEX: 33.9 KG/M2 | HEIGHT: 67 IN | HEART RATE: 76 BPM

## 2023-03-20 DIAGNOSIS — R60.0 LOWER EXTREMITY EDEMA: Primary | ICD-10-CM

## 2023-03-20 DIAGNOSIS — F32.1 MAJOR DEPRESSIVE DISORDER, SINGLE EPISODE, MODERATE (HCC): ICD-10-CM

## 2023-03-20 DIAGNOSIS — N18.32 TYPE 2 DIABETES MELLITUS WITH STAGE 3B CHRONIC KIDNEY DISEASE, WITHOUT LONG-TERM CURRENT USE OF INSULIN (HCC): ICD-10-CM

## 2023-03-20 DIAGNOSIS — M34.1 CREST SYNDROME (HCC): ICD-10-CM

## 2023-03-20 DIAGNOSIS — I50.9 ACUTE ON CHRONIC CONGESTIVE HEART FAILURE, UNSPECIFIED HEART FAILURE TYPE (HCC): ICD-10-CM

## 2023-03-20 DIAGNOSIS — I73.00 RAYNAUD'S DISEASE WITHOUT GANGRENE: ICD-10-CM

## 2023-03-20 DIAGNOSIS — I82.512 CHRONIC DEEP VEIN THROMBOSIS (DVT) OF FEMORAL VEIN OF LEFT LOWER EXTREMITY (HCC): ICD-10-CM

## 2023-03-20 DIAGNOSIS — D64.9 ACUTE ON CHRONIC ANEMIA: ICD-10-CM

## 2023-03-20 DIAGNOSIS — E11.22 TYPE 2 DIABETES MELLITUS WITH STAGE 3B CHRONIC KIDNEY DISEASE, WITHOUT LONG-TERM CURRENT USE OF INSULIN (HCC): ICD-10-CM

## 2023-03-20 DIAGNOSIS — E66.01 CLASS 2 SEVERE OBESITY DUE TO EXCESS CALORIES WITH SERIOUS COMORBIDITY AND BODY MASS INDEX (BMI) OF 37.0 TO 37.9 IN ADULT (HCC): ICD-10-CM

## 2023-03-20 DIAGNOSIS — I50.9 CONGESTIVE HEART FAILURE, UNSPECIFIED HF CHRONICITY, UNSPECIFIED HEART FAILURE TYPE (HCC): ICD-10-CM

## 2023-03-20 PROCEDURE — 1111F DSCHRG MED/CURRENT MED MERGE: CPT | Performed by: FAMILY MEDICINE

## 2023-03-20 PROCEDURE — 99495 TRANSJ CARE MGMT MOD F2F 14D: CPT | Performed by: FAMILY MEDICINE

## 2023-03-20 RX ORDER — FUROSEMIDE 20 MG/1
20 TABLET ORAL 2 TIMES DAILY
Qty: 180 TABLET | Refills: 0 | Status: SHIPPED | OUTPATIENT
Start: 2023-03-20 | End: 2023-06-18

## 2023-03-22 ENCOUNTER — OFFICE VISIT (OUTPATIENT)
Facility: CLINIC | Age: 78
End: 2023-03-22

## 2023-03-22 VITALS
HEART RATE: 72 BPM | BODY MASS INDEX: 33.74 KG/M2 | DIASTOLIC BLOOD PRESSURE: 61 MMHG | WEIGHT: 215 LBS | SYSTOLIC BLOOD PRESSURE: 123 MMHG | HEIGHT: 67 IN

## 2023-03-22 DIAGNOSIS — R49.0 VOICE HOARSENESS: ICD-10-CM

## 2023-03-22 DIAGNOSIS — Z09 HOSPITAL DISCHARGE FOLLOW-UP: ICD-10-CM

## 2023-03-22 DIAGNOSIS — K29.01 GASTROINTESTINAL HEMORRHAGE ASSOCIATED WITH ACUTE GASTRITIS: Primary | ICD-10-CM

## 2023-03-22 PROCEDURE — 99213 OFFICE O/P EST LOW 20 MIN: CPT | Performed by: NURSE PRACTITIONER

## 2023-03-22 PROCEDURE — 1126F AMNT PAIN NOTED NONE PRSNT: CPT | Performed by: NURSE PRACTITIONER

## 2023-03-22 PROCEDURE — 1111F DSCHRG MED/CURRENT MED MERGE: CPT | Performed by: NURSE PRACTITIONER

## 2023-03-22 NOTE — PATIENT INSTRUCTIONS
PLAN:   Continue sucralfate 4 times daily  Adjust pantoprazole dosing to 2 times daily, 30 minutes prior to breakfast and dinner  Follow up in 6-8 weeks for further management

## 2023-03-24 ENCOUNTER — NURSE TRIAGE (OUTPATIENT)
Dept: FAMILY MEDICINE CLINIC | Facility: CLINIC | Age: 78
End: 2023-03-24

## 2023-03-24 ENCOUNTER — TELEMEDICINE (OUTPATIENT)
Dept: FAMILY MEDICINE CLINIC | Facility: CLINIC | Age: 78
End: 2023-03-24

## 2023-03-24 ENCOUNTER — TELEPHONE (OUTPATIENT)
Dept: RHEUMATOLOGY | Facility: CLINIC | Age: 78
End: 2023-03-24

## 2023-03-24 DIAGNOSIS — L03.012 CELLULITIS OF LEFT THUMB: Primary | ICD-10-CM

## 2023-03-24 RX ORDER — PREDNISONE 20 MG/1
TABLET ORAL
Qty: 10 TABLET | Refills: 0 | Status: SHIPPED | OUTPATIENT
Start: 2023-03-24

## 2023-03-24 RX ORDER — SULFAMETHOXAZOLE AND TRIMETHOPRIM 800; 160 MG/1; MG/1
1 TABLET ORAL 2 TIMES DAILY
Qty: 14 TABLET | Refills: 0 | Status: SHIPPED | OUTPATIENT
Start: 2023-03-24 | End: 2023-03-31

## 2023-03-24 NOTE — TELEPHONE ENCOUNTER
Patient is in a lot of pain due to Ranos(sp) she has a boil and swelling on left hand. I have scheduled patient for 4-19, however if any sooner please call patient. [phone]

## 2023-03-24 NOTE — TELEPHONE ENCOUNTER
Spoke with pt and she reports swelling, redness, and warmth to finger. Pt advised to contact PCP for possible injection. Pt given sooner appointment for 3/30/23. Pt agreed follow up with PCP for further evaluation of her finger for possible infection.        Future Appointments   Date Time Provider Zain Lyons   3/30/2023  4:10 PM Adrianne Henao MD [facility] [place]   4/18/2023 10:00 AM Lise Kellogg MD [facility] [place]   5/9/2023  1:00 PM CMA RESOURCE [address] [place] [address] HEM ONC EMO   5/9/2023  2:00 PM Karlene Rich MD [address] [place] [address] HEM ONC EMO   5/31/2023  2:40 PM Sally Cuevas MD [facility] OPAL

## 2023-03-30 ENCOUNTER — OFFICE VISIT (OUTPATIENT)
Dept: RHEUMATOLOGY | Facility: CLINIC | Age: 78
End: 2023-03-30

## 2023-03-30 VITALS
HEART RATE: 85 BPM | BODY MASS INDEX: 32.49 KG/M2 | SYSTOLIC BLOOD PRESSURE: 113 MMHG | DIASTOLIC BLOOD PRESSURE: 75 MMHG | WEIGHT: 207 LBS | HEIGHT: 67 IN

## 2023-03-30 DIAGNOSIS — M34.1 CREST SYNDROME (HCC): Primary | ICD-10-CM

## 2023-03-30 DIAGNOSIS — M79.7 FIBROMYALGIA: ICD-10-CM

## 2023-03-30 PROCEDURE — 1126F AMNT PAIN NOTED NONE PRSNT: CPT | Performed by: INTERNAL MEDICINE

## 2023-03-30 PROCEDURE — 1111F DSCHRG MED/CURRENT MED MERGE: CPT | Performed by: INTERNAL MEDICINE

## 2023-03-30 PROCEDURE — 99214 OFFICE O/P EST MOD 30 MIN: CPT | Performed by: INTERNAL MEDICINE

## 2023-03-30 NOTE — PATIENT INSTRUCTIONS
1. Cont. Fluoxetine 20mg a day   2. Will get clarification on sildenifil 20mg three times a day  3. Return to clinic in 6 months. .   4. Cont. Sertraline   5. f/u with dr. Allison Leung   6. Cont. meds for acid reflux   7.  Cont. eliquis

## 2023-04-08 NOTE — TELEPHONE ENCOUNTER
[facility] Consult Note      ASSESSMENT and PLAN:       1.  Acute CVA with aphasia: Improved  2.  Hypertension grade 2 diastolic dysfunction  3.  Hyperlipidemia  4.  Morbid obesity with a BMI is 42.88  5.  Obstructive sleep apnea on CPAP    Plan:    Currently patient stable.  Resume the blood pressure medication.  Outpatient adjustment of blood pressure medication probably need a low-dose of hydrochlorothiazide.  Patient is on antiplatelet therapy for neurologist.  Plan to discharge home with a cardiac monitor to rule out any obvious arrhythmia.  MARGOT will be done as outpatient with anesthesia because of the significant obesity and sleep apnea  Explained the risk and benefit the procedure.  Patient family agreed undergo this procedure.  We will do as outpatient.  Review echocardiogram finding.  Ejection fraction is normal.  2 diastolic dysfunction    Review EKG no acute ST-T wave changes.  Voltage cardia for LVH.        History and Physical:    Patient is very pleasant 61-year-old man with a moderate obesity, sleep apnea on CPAP hypertension who presents to [facility] after having speech difficulty.  No significant muscle weakness arm weakness.  No vision abnormality.  Patient came to the emergency room, underwent MRI brain showed acute embolic appearing infarction left frontal lobe.  Patient is following neurologist.  Since hospital admission his speech is improved.  Patient was consulted for MARGOT evaluation.  Patient denies any active chest pain or short of breath.  He does get lower extreme edema.  His activity is somewhat limited due to knee arthritis.  He has been compliant with the CPAP.    Review Of Symptoms:    Review of Systems   Constitutional: Negative for appetite change and fatigue.   HENT: Negative for facial swelling, postnasal drip and rhinorrhea.    Eyes: Negative for discharge and itching.   Respiratory: Negative for chest tightness and shortness of breath.    Cardiovascular:  Spoke to Lori/Dr. Paulino Single office ([phone]) and states she will fax Eliquis orders ASAP.  I spoke to pt and she states she already received the ELIQUIS orders and clearance from Dr. Loretta Reyes at the [address] [place] [address] on 10/1/2020 \"before he even had a chance to s Positive for leg swelling. Negative for chest pain and palpitations.   Gastrointestinal: Negative for abdominal distention, abdominal pain and blood in stool.   Endocrine: Negative for cold intolerance and heat intolerance.   Genitourinary: Negative for dysuria.   Musculoskeletal: Negative for arthralgias.   Skin: Negative for color change, pallor and rash.   Neurological: Positive for speech difficulty. Negative for dizziness and light-headedness.   Hematological: Does not bruise/bleed easily.   Psychiatric/Behavioral: Negative for behavioral problems.           Family History   Problem Relation Age of Onset   • Heart disease Mother    • Heart disease Father    • Stroke Father    • Hyperlipidemia Father    • Thyroid Father    • Heart disease Brother    • Cancer Maternal Grandfather         prostate   • Diabetes Maternal Grandfather    • Heart disease Paternal Grandmother    • Diabetes Paternal Grandfather    • Heart disease Paternal Grandfather      Social History     Substance and Sexual Activity   Alcohol Use Yes     Social History     Tobacco Use   Smoking Status Never   Smokeless Tobacco Never     History   Drug Use Unknown     Past Medical History:   Diagnosis Date   • Allergic rhinitis 6/28/2019   • Atrophic gastritis 6/28/2019   • Benign essential hypertension 6/28/2019   • Hx of adenomatous colonic polyps 6/28/2019   • Hyperglycemia 6/28/2019   • Hypothyroidism 6/28/2019   • Intestinal metaplasia of gastric mucosa 6/28/2019   • Male hypogonadism 6/28/2019   • Migraine with aura 6/28/2019   • Morbid obesity with BMI of 40.0-44.9, adult (CMD) 6/28/2019   • Obstructive sleep apnea on CPAP 6/28/2019   • Osteoarthritis of both knees 6/28/2019   • Osteoarthritis of right hip 6/28/2019   • PA (pernicious anemia) 6/28/2019   • Pure hypercholesterolemia 6/28/2019   • Scoliosis 6/28/2019     Current Facility-Administered Medications   Medication Dose Route Frequency Provider Last Rate Last Admin   • atorvastatin  (LIPITOR) tablet 80 mg  80 mg Oral Nightly Valencia Marie MD       • aspirin chewable 81 mg  81 mg Oral Daily Valencia Marie MD       • [START ON 4/9/2023] clopidogrel (PLAVIX) tablet 75 mg  75 mg Oral Daily Valencia Marie MD       • levothyroxine (SYNTHROID, LEVOTHROID) tablet 200 mcg  200 mcg Oral Once per day on Mon Tue Wed Thu Fri Sat Becka Duong MD       • labetalol (NORMODYNE) injection 10 mg  10 mg Intravenous Q10 Min PRN Valencia Marie MD       • hydrALAZINE (APRESOLINE) injection 10 mg  10 mg Intravenous Q4H PRN Valencia Marie MD       • montelukast (SINGULAIR) tablet 10 mg  10 mg Oral Nightly PRN Becka Duong MD       • acetaminophen (TYLENOL) tablet 650 mg  650 mg Oral Q4H PRN Becka Duong MD       • melatonin tablet 6 mg  6 mg Oral Nightly PRN Becka Duong MD       • ondansetron (ZOFRAN) injection 4 mg  4 mg Intravenous Q6H PRN Becka Duong MD       • lactobacillus acidophilus (BACID) tablet 1 tablet  1 tablet Oral Daily Becka Duong MD   1 tablet at 04/08/23 0958   • enoxaparin (LOVENOX) injection 40 mg  40 mg Subcutaneous 2 times per day Becka Duong MD   40 mg at 04/07/23 2008         Physical Exam:    Visit Vitals  BP (!) 151/82 (BP Location: RUE - Right upper extremity, Patient Position: Sitting)   Pulse 64   Temp 97.3 °F (36.3 °C) (Temporal)   Resp 17   Ht 6' 1\" (1.854 m)   Wt (!) 147.4 kg (325 lb)   SpO2 96%   BMI 42.88 kg/m²       Physical Exam  Vitals reviewed.   HENT:      Head: Atraumatic.   Eyes:      Pupils: Pupils are equal, round, and reactive to light.   Neck:      Thyroid: No thyromegaly.   Cardiovascular:      Rate and Rhythm: Regular rhythm.      Heart sounds:     No S4 sounds.   Pulmonary:      Effort: Pulmonary effort is normal.      Breath sounds: No wheezing.   Abdominal:      Palpations: There is no hepatomegaly.   Musculoskeletal:      Cervical back: Neck supple.      Right lower leg: Edema present.      Left lower leg: Edema present.   Skin:     General: Skin is  warm and dry.   Neurological:      Mental Status: He is alert and oriented to person, place, and time.   Psychiatric:         Mood and Affect: Mood and affect normal.          Imaging    LAST EKG:  Encounter Date: 04/07/23   Electrocardiogram 12-Lead   Result Value    Ventricular Rate EKG/Min (BPM) 60    Atrial Rate (BPM) 60    LA-Interval (MSEC) 192    QRS-Interval (MSEC) 100    QT-Interval (MSEC) 412    QTc 412    P Axis (Degrees) 78    R Axis (Degrees) -27    T Axis (Degrees) 10    REPORT TEXT      Normal sinus rhythm  Moderate voltage criteria for LVH, may be normal variant  Borderline ECG  No previous ECGs available  Confirmed by CHIOMA SALINAS MD ([id number]) on 4/7/2023 5:47:49 PM           SHARATH DELGADO MD [facility]

## 2023-04-17 ENCOUNTER — LAB ENCOUNTER (OUTPATIENT)
Dept: LAB | Age: 78
End: 2023-04-17
Attending: FAMILY MEDICINE
Payer: MEDICARE

## 2023-04-17 DIAGNOSIS — D64.9 ACUTE ON CHRONIC ANEMIA: ICD-10-CM

## 2023-04-17 DIAGNOSIS — I50.9 CONGESTIVE HEART FAILURE, UNSPECIFIED HF CHRONICITY, UNSPECIFIED HEART FAILURE TYPE (HCC): ICD-10-CM

## 2023-04-17 LAB
ANION GAP SERPL CALC-SCNC: 2 MMOL/L (ref 0–18)
BASOPHILS # BLD AUTO: 0.07 X10(3) UL (ref 0–0.2)
BASOPHILS NFR BLD AUTO: 2 %
BUN BLD-MCNC: 20 MG/DL (ref 7–18)
BUN/CREAT SERPL: 16.9 (ref 10–20)
CALCIUM BLD-MCNC: 9.1 MG/DL (ref 8.5–10.1)
CHLORIDE SERPL-SCNC: 111 MMOL/L (ref 98–112)
CO2 SERPL-SCNC: 30 MMOL/L (ref 21–32)
CREAT BLD-MCNC: 1.18 MG/DL
DEPRECATED RDW RBC AUTO: 66.3 FL (ref 35.1–46.3)
EOSINOPHIL # BLD AUTO: 0.14 X10(3) UL (ref 0–0.7)
EOSINOPHIL NFR BLD AUTO: 4 %
ERYTHROCYTE [DISTWIDTH] IN BLOOD BY AUTOMATED COUNT: 17.6 % (ref 11–15)
FASTING STATUS PATIENT QL REPORTED: YES
GFR SERPLBLD BASED ON 1.73 SQ M-ARVRAT: 47 ML/MIN/1.73M2 (ref 60–?)
GLUCOSE BLD-MCNC: 95 MG/DL (ref 70–99)
HCT VFR BLD AUTO: 35.7 %
HGB BLD-MCNC: 10.8 G/DL
IMM GRANULOCYTES # BLD AUTO: 0 X10(3) UL (ref 0–1)
IMM GRANULOCYTES NFR BLD: 0 %
LYMPHOCYTES # BLD AUTO: 0.81 X10(3) UL (ref 1–4)
LYMPHOCYTES NFR BLD AUTO: 23.3 %
MCH RBC QN AUTO: 30.9 PG (ref 26–34)
MCHC RBC AUTO-ENTMCNC: 30.3 G/DL (ref 31–37)
MCV RBC AUTO: 102 FL
MONOCYTES # BLD AUTO: 0.38 X10(3) UL (ref 0.1–1)
MONOCYTES NFR BLD AUTO: 10.9 %
NEUTROPHILS # BLD AUTO: 2.08 X10 (3) UL (ref 1.5–7.7)
NEUTROPHILS # BLD AUTO: 2.08 X10(3) UL (ref 1.5–7.7)
NEUTROPHILS NFR BLD AUTO: 59.8 %
OSMOLALITY SERPL CALC.SUM OF ELEC: 298 MOSM/KG (ref 275–295)
PLATELET # BLD AUTO: 209 10(3)UL (ref 150–450)
PLATELET MORPHOLOGY: NORMAL
POTASSIUM SERPL-SCNC: 3.9 MMOL/L (ref 3.5–5.1)
RBC # BLD AUTO: 3.5 X10(6)UL
SODIUM SERPL-SCNC: 143 MMOL/L (ref 136–145)
WBC # BLD AUTO: 3.5 X10(3) UL (ref 4–11)

## 2023-04-17 PROCEDURE — 85025 COMPLETE CBC W/AUTO DIFF WBC: CPT

## 2023-04-17 PROCEDURE — 36415 COLL VENOUS BLD VENIPUNCTURE: CPT

## 2023-04-17 PROCEDURE — 80048 BASIC METABOLIC PNL TOTAL CA: CPT

## 2023-04-25 DIAGNOSIS — D53.9 MACROCYTIC ANEMIA: Primary | ICD-10-CM

## 2023-04-26 ENCOUNTER — TELEPHONE (OUTPATIENT)
Dept: FAMILY MEDICINE CLINIC | Facility: CLINIC | Age: 78
End: 2023-04-26

## 2023-04-26 NOTE — TELEPHONE ENCOUNTER
Reviewed fax. Patient will be undergoing transcatheter aortic valve replacement on 5/3/2023 with Dr. Nuris Ellison and planned implant of 23 mm Jackson RUBI valve. No further action needed.

## 2023-04-26 NOTE — TELEPHONE ENCOUNTER
Yusra Moore with [employer] is requesting a confirmation that you had received a fax for patient. Documents were sent today.

## 2023-04-26 NOTE — TELEPHONE ENCOUNTER
Left detailed message informing fax was received. Given to [facility] for review.

## 2023-04-27 DIAGNOSIS — E78.5 HYPERLIPIDEMIA, UNSPECIFIED HYPERLIPIDEMIA TYPE: ICD-10-CM

## 2023-04-27 RX ORDER — ATORVASTATIN CALCIUM 10 MG/1
10 TABLET, FILM COATED ORAL NIGHTLY
Qty: 90 TABLET | Refills: 3 | Status: SHIPPED | OUTPATIENT
Start: 2023-04-27

## 2023-04-28 NOTE — TELEPHONE ENCOUNTER
Refill passed per [facility] protocol   Requested Prescriptions   Pending Prescriptions Disp Refills    ATORVASTATIN 10 MG Oral Tab [Pharmacy Med Name: ATORVASTATIN 10MG TABLETS] 90 tablet 1     Sig: TAKE 1 TABLET(10 MG) BY MOUTH EVERY NIGHT       Cholesterol Medication Protocol Passed - 4/27/2023 12:51 PM        Passed - ALT in past 12 months        Passed - LDL in past 12 months        Passed - Last ALT < 80     Lab Results   Component Value Date    ALT 13 03/08/2023             Passed - Last LDL < 130     Lab Results   Component Value Date    LDL 65 05/23/2022               Passed - In person appointment or virtual visit in the past 12 mos or appointment in next 3 mos     Recent Outpatient Visits              4 weeks ago CREST syndrome ([facility] [address]    [address], [address], [place] MD Anatoly    Office Visit    1 month ago Cellulitis of left thumb    [facility], [facility], Critical access hospital[facility], [facility]    Telemedicine    1 month ago Gastrointestinal hemorrhage associated with acute gastritis    [facility], [address], [place] [employer], APN    Office Visit    1 month ago Lower extremity edema    [address] [facility], [place] Collin Scott MD    Office Visit    2 months ago     [address], [address] [facility], Sukh Lin MD    Office Visit          Future Appointments         Provider Department Appt Notes    In 1 week [address] 25 Hematology Oncology FT LAB PIV. CL cbc,B12    In 1 week Tobin Tomas 19 Hematology Oncology FOLLOW UP VISIT.cl  6m    In 3 months Annelise Beckford MD [facility], [address] [place] [address] 4 months

## 2023-05-09 ENCOUNTER — OFFICE VISIT (OUTPATIENT)
Dept: HEMATOLOGY/ONCOLOGY | Facility: HOSPITAL | Age: 78
End: 2023-05-09
Attending: INTERNAL MEDICINE
Payer: MEDICARE

## 2023-05-09 VITALS
HEIGHT: 67 IN | TEMPERATURE: 98 F | WEIGHT: 211.38 LBS | SYSTOLIC BLOOD PRESSURE: 174 MMHG | DIASTOLIC BLOOD PRESSURE: 60 MMHG | BODY MASS INDEX: 33.18 KG/M2 | OXYGEN SATURATION: 95 % | HEART RATE: 67 BPM | RESPIRATION RATE: 18 BRPM

## 2023-05-09 DIAGNOSIS — D50.9 IRON DEFICIENCY ANEMIA, UNSPECIFIED IRON DEFICIENCY ANEMIA TYPE: ICD-10-CM

## 2023-05-09 DIAGNOSIS — D51.9 ANEMIA DUE TO VITAMIN B12 DEFICIENCY, UNSPECIFIED B12 DEFICIENCY TYPE: ICD-10-CM

## 2023-05-09 DIAGNOSIS — D53.9 MACROCYTIC ANEMIA: Primary | ICD-10-CM

## 2023-05-09 DIAGNOSIS — D53.9 MACROCYTIC ANEMIA: ICD-10-CM

## 2023-05-09 LAB
BASOPHILS # BLD AUTO: 0.05 X10(3) UL (ref 0–0.2)
BASOPHILS NFR BLD AUTO: 1.2 %
DEPRECATED HBV CORE AB SER IA-ACNC: 44.4 NG/ML
DEPRECATED RDW RBC AUTO: 67.3 FL (ref 35.1–46.3)
EOSINOPHIL # BLD AUTO: 0.12 X10(3) UL (ref 0–0.7)
EOSINOPHIL NFR BLD AUTO: 2.9 %
ERYTHROCYTE [DISTWIDTH] IN BLOOD BY AUTOMATED COUNT: 18 % (ref 11–15)
HCT VFR BLD AUTO: 34.2 %
HGB BLD-MCNC: 10.2 G/DL
HGB RETIC QN AUTO: 34.8 PG (ref 28.2–36.6)
IMM GRANULOCYTES # BLD AUTO: 0.01 X10(3) UL (ref 0–1)
IMM GRANULOCYTES NFR BLD: 0.2 %
IMM RETICS NFR: 0.29 RATIO (ref 0.1–0.3)
IRON SATN MFR SERPL: 18 %
IRON SERPL-MCNC: 66 UG/DL
LYMPHOCYTES # BLD AUTO: 0.64 X10(3) UL (ref 1–4)
LYMPHOCYTES NFR BLD AUTO: 15.4 %
MCH RBC QN AUTO: 30.8 PG (ref 26–34)
MCHC RBC AUTO-ENTMCNC: 29.8 G/DL (ref 31–37)
MCV RBC AUTO: 103.3 FL
MONOCYTES # BLD AUTO: 0.34 X10(3) UL (ref 0.1–1)
MONOCYTES NFR BLD AUTO: 8.2 %
NEUTROPHILS # BLD AUTO: 3 X10 (3) UL (ref 1.5–7.7)
NEUTROPHILS # BLD AUTO: 3 X10(3) UL (ref 1.5–7.7)
NEUTROPHILS NFR BLD AUTO: 72.1 %
PLATELET # BLD AUTO: 179 10(3)UL (ref 150–450)
PLATELET MORPHOLOGY: NORMAL
RBC # BLD AUTO: 3.31 X10(6)UL
RETICS # AUTO: 92.8 X10(3) UL (ref 22.5–147.5)
RETICS/RBC NFR AUTO: 2.8 %
TIBC SERPL-MCNC: 365 UG/DL (ref 240–450)
TRANSFERRIN SERPL-MCNC: 245 MG/DL (ref 200–360)
VIT B12 SERPL-MCNC: 618 PG/ML (ref 193–986)
WBC # BLD AUTO: 4.2 X10(3) UL (ref 4–11)

## 2023-05-09 PROCEDURE — 82607 VITAMIN B-12: CPT

## 2023-05-09 PROCEDURE — 84466 ASSAY OF TRANSFERRIN: CPT

## 2023-05-09 PROCEDURE — 83540 ASSAY OF IRON: CPT

## 2023-05-09 PROCEDURE — 85045 AUTOMATED RETICULOCYTE COUNT: CPT

## 2023-05-09 PROCEDURE — 99213 OFFICE O/P EST LOW 20 MIN: CPT | Performed by: INTERNAL MEDICINE

## 2023-05-09 PROCEDURE — 85025 COMPLETE CBC W/AUTO DIFF WBC: CPT

## 2023-05-09 PROCEDURE — 82728 ASSAY OF FERRITIN: CPT

## 2023-05-09 PROCEDURE — 36415 COLL VENOUS BLD VENIPUNCTURE: CPT

## 2023-05-09 RX ORDER — FOLIC ACID 1 MG/1
1 TABLET ORAL DAILY
Qty: 90 TABLET | Refills: 3 | Status: SHIPPED | OUTPATIENT
Start: 2023-05-09

## 2023-05-19 DIAGNOSIS — F32.1 MAJOR DEPRESSIVE DISORDER, SINGLE EPISODE, MODERATE (HCC): ICD-10-CM

## 2023-05-19 DIAGNOSIS — E78.5 HYPERLIPIDEMIA, UNSPECIFIED HYPERLIPIDEMIA TYPE: ICD-10-CM

## 2023-05-19 RX ORDER — ATORVASTATIN CALCIUM 10 MG/1
10 TABLET, FILM COATED ORAL NIGHTLY
Qty: 90 TABLET | Refills: 3 | Status: SHIPPED | OUTPATIENT
Start: 2023-05-19

## 2023-05-19 NOTE — TELEPHONE ENCOUNTER
Refill passed per [place] [facility], [facility] protocol. Requested Prescriptions   Pending Prescriptions Disp Refills    sertraline 50 MG Oral Tab 135 tablet 1     Sig: Take 1.5 tablets (75 mg total) by mouth daily. Psychiatric Non-Scheduled (Anti-Anxiety) Passed - 5/19/2023 12:15 PM        Passed - In person appointment or virtual visit in the past 6 mos or appointment in next 3 mos     Recent Outpatient Visits              1 week ago Macrocytic anemia    [facility] Hematology Oncology Nino Shah MD    Office Visit    1 week ago Macrocytic anemia    [facility] Hematology Oncology    Nurse Only    1 month ago CREST syndrome [facility])    [address] Quiles, [address], [place] Ju Ulloa MD    Office Visit    1 month ago Cellulitis of left thumb    [facility], [address], [address] [facility]    Telemedicine    1 month ago Gastrointestinal hemorrhage associated with acute gastritis    [facility], [address], [place] DUSTIN Hammond    Office Visit          Future Appointments         Provider Department Appt Notes    In 5 days MD Roula Bhardwaj, [address], [place] Blood tests    In 2 months MD Roula Montes, [address] [facility] 4 months    In 3 months [place] [address] Hematology Oncology FT LAB PIV. CL cbc,B12  lvm w/time. cl    In 3 months Tobin Vaughn 19 Hematology Oncology FOLLOW UP VISIT.cl  3m                 atorvastatin 10 MG Oral Tab 90 tablet 3     Sig: Take 1 tablet (10 mg total) by mouth nightly.        Cholesterol Medication Protocol Passed - 5/19/2023 12:15 PM        Passed - ALT in past 12 months        Passed - LDL in past 12 months        Passed - Last ALT < 80     Lab Results   Component Value Date    ALT 13 03/08/2023             Passed - Last LDL < 130     Lab Results   Component Value Date    LDL 65 05/23/2022               Passed - In person appointment or virtual visit in the past 12 mos or appointment in next 3 mos     Recent Outpatient Visits              1 week ago Macrocytic anemia    [facility] Ramiro Yancey MD    Office Visit    1 week ago Macrocytic anemia    [facility] Hematology Oncology    Nurse Only    1 month ago CREST syndrome [facility])    [address], [place] Izabel Sprague MD    Office Visit    1 month ago Cellulitis of left thumb    [facility], [address], [address] [facility], [facility]    Telemedicine    1 month ago Gastrointestinal hemorrhage associated with acute gastritis    [facility], [address], [place] Debra Cici, APERIK    Office Visit          Future Appointments         Provider Department Appt Notes    In 5 days Alfonzo Garcia MD [address], [address], [place] Blood tests    In 2 months Suzie Salvador MD [address], [address] [facility] 4 months    In 3 months Lorin-Viru 25 Hematology Oncology FT LAB PIV. CL cbc,B12  lvm w/time. cl    In 3 months Tobin Mancini  Hematology Oncology FOLLOW UP VISIT.cl  3m                    Future Appointments         Provider Department Appt Notes    In 5 days Micaela Antony MD [address], [facility], [address] Blood tests    In 2 months Suzie Salvador MD [address], [address] [facility] 4 months    In 3 months Lorin-Viru 25 Hematology Oncology FT LAB PIV. CL cbc,B12  lvm w/time. cl    In 3 months Tobin Mancini 19 Hematology Oncology FOLLOW UP VISIT.cl  3m            Recent Outpatient Visits              1 week ago Macrocytic anemia    [facility] Hemat[facility] Oncology Ramiro Yancey MD    Office Visit    1 week ago Macrocytic anemia    [facility] Hematology Oncology    Nurse Only    1 month ago CREST syndrome [facility])    [address], [facility], [place] Juanita Connors MD    Office Visit    1 month ago Cellulitis of left thumb    [facility], [address], [address] [facility], [facility]    Telemedicine    1 month ago Gastrointestinal hemorrhage associated with acute gastritis    [facility], [address], [place] DUSTIN Srivastava    Office Visit

## 2023-05-24 ENCOUNTER — OFFICE VISIT (OUTPATIENT)
Dept: FAMILY MEDICINE CLINIC | Facility: CLINIC | Age: 78
End: 2023-05-24

## 2023-05-24 VITALS
RESPIRATION RATE: 17 BRPM | DIASTOLIC BLOOD PRESSURE: 66 MMHG | WEIGHT: 213.63 LBS | SYSTOLIC BLOOD PRESSURE: 161 MMHG | HEIGHT: 67 IN | BODY MASS INDEX: 33.53 KG/M2

## 2023-05-24 DIAGNOSIS — E78.5 HYPERLIPIDEMIA, UNSPECIFIED HYPERLIPIDEMIA TYPE: Primary | ICD-10-CM

## 2023-05-24 DIAGNOSIS — I10 ESSENTIAL HYPERTENSION: ICD-10-CM

## 2023-05-24 DIAGNOSIS — E11.22 TYPE 2 DIABETES MELLITUS WITH STAGE 3B CHRONIC KIDNEY DISEASE, WITHOUT LONG-TERM CURRENT USE OF INSULIN (HCC): ICD-10-CM

## 2023-05-24 DIAGNOSIS — F32.1 MAJOR DEPRESSIVE DISORDER, SINGLE EPISODE, MODERATE (HCC): ICD-10-CM

## 2023-05-24 DIAGNOSIS — N18.32 TYPE 2 DIABETES MELLITUS WITH STAGE 3B CHRONIC KIDNEY DISEASE, WITHOUT LONG-TERM CURRENT USE OF INSULIN (HCC): ICD-10-CM

## 2023-05-24 PROCEDURE — 1126F AMNT PAIN NOTED NONE PRSNT: CPT | Performed by: FAMILY MEDICINE

## 2023-05-24 PROCEDURE — 99214 OFFICE O/P EST MOD 30 MIN: CPT | Performed by: FAMILY MEDICINE

## 2023-05-24 RX ORDER — AMLODIPINE BESYLATE 5 MG/1
5 TABLET ORAL DAILY
Qty: 30 TABLET | Refills: 0 | Status: SHIPPED | OUTPATIENT
Start: 2023-05-24 | End: 2024-05-18

## 2023-05-24 RX ORDER — AMLODIPINE BESYLATE 5 MG/1
5 TABLET ORAL DAILY
Qty: 90 TABLET | Refills: 0 | Status: SHIPPED | OUTPATIENT
Start: 2023-05-24 | End: 2023-05-24

## 2023-05-24 RX ORDER — METOPROLOL SUCCINATE 50 MG/1
TABLET, EXTENDED RELEASE ORAL
COMMUNITY
Start: 2023-05-19

## 2023-07-20 ENCOUNTER — TELEPHONE (OUTPATIENT)
Dept: HEMATOLOGY/ONCOLOGY | Facility: HOSPITAL | Age: 78
End: 2023-07-20

## 2023-07-20 RX ORDER — FOLIC ACID 1 MG/1
1 TABLET ORAL DAILY
Qty: 90 TABLET | Refills: 3 | Status: SHIPPED | OUTPATIENT
Start: 2023-07-20

## 2023-07-20 NOTE — TELEPHONE ENCOUNTER
[facility] needs new Prescription for the following medication: folic acid 1 MG Oral Tab   Take 1 tablet (1 mg total) by mouth daily. , Normal, Disp-90 tablet   [address] [facility]   [phone], [phone] Thanks [facility]

## 2023-07-20 NOTE — TELEPHONE ENCOUNTER
Nisha Lin request received for pantoprazole. Per previous note, [facility] wanted patient to change dosing to 40mg twice daily. Order pended below, please review and sign if appropriate.

## 2023-07-21 RX ORDER — PANTOPRAZOLE SODIUM 40 MG/1
40 TABLET, DELAYED RELEASE ORAL 2 TIMES DAILY
Qty: 60 TABLET | Refills: 2 | Status: SHIPPED | OUTPATIENT
Start: 2023-07-21

## 2023-08-01 ENCOUNTER — TELEPHONE (OUTPATIENT)
Facility: CLINIC | Age: 78
End: 2023-08-01

## 2023-08-01 NOTE — TELEPHONE ENCOUNTER
Current Outpatient Medications   Medication Sig Dispense Refill    pantoprazole 40 MG Oral Tab EC Take 1 tablet (40 mg total) by mouth 2 (two) times daily.  60 tablet 2

## 2023-08-01 NOTE — TELEPHONE ENCOUNTER
Pantoprazole BID refill orders signed on 7/21/23. RN called [facility], med delivered on 7/26/23.

## 2023-08-16 ENCOUNTER — TELEPHONE (OUTPATIENT)
Dept: HEMATOLOGY/ONCOLOGY | Facility: HOSPITAL | Age: 78
End: 2023-08-16

## 2023-08-16 DIAGNOSIS — D53.9 MACROCYTIC ANEMIA: Primary | ICD-10-CM

## 2023-08-16 DIAGNOSIS — D51.9 ANEMIA DUE TO VITAMIN B12 DEFICIENCY, UNSPECIFIED B12 DEFICIENCY TYPE: ICD-10-CM

## 2023-08-16 NOTE — TELEPHONE ENCOUNTER
Linda Lynch just a FYI I rescheduled Megan Patricia from 8/17 to 9/19.  She was in imitate care for sinus infection and bronchitis. ryan

## 2023-08-17 ENCOUNTER — APPOINTMENT (OUTPATIENT)
Dept: HEMATOLOGY/ONCOLOGY | Facility: HOSPITAL | Age: 78
End: 2023-08-17
Attending: INTERNAL MEDICINE
Payer: MEDICARE

## 2023-08-30 ENCOUNTER — LAB ENCOUNTER (OUTPATIENT)
Dept: LAB | Age: 78
End: 2023-08-30
Attending: FAMILY MEDICINE
Payer: MEDICARE

## 2023-08-30 DIAGNOSIS — D51.9 ANEMIA DUE TO VITAMIN B12 DEFICIENCY, UNSPECIFIED B12 DEFICIENCY TYPE: ICD-10-CM

## 2023-08-30 DIAGNOSIS — E78.5 HYPERLIPIDEMIA, UNSPECIFIED HYPERLIPIDEMIA TYPE: ICD-10-CM

## 2023-08-30 DIAGNOSIS — E11.22 TYPE 2 DIABETES MELLITUS WITH STAGE 3B CHRONIC KIDNEY DISEASE, WITHOUT LONG-TERM CURRENT USE OF INSULIN (HCC): ICD-10-CM

## 2023-08-30 DIAGNOSIS — D53.9 MACROCYTIC ANEMIA: ICD-10-CM

## 2023-08-30 DIAGNOSIS — N18.32 TYPE 2 DIABETES MELLITUS WITH STAGE 3B CHRONIC KIDNEY DISEASE, WITHOUT LONG-TERM CURRENT USE OF INSULIN (HCC): ICD-10-CM

## 2023-08-30 LAB
BASOPHILS # BLD AUTO: 0.07 X10(3) UL (ref 0–0.2)
BASOPHILS NFR BLD AUTO: 2.2 %
CHOLEST SERPL-MCNC: 147 MG/DL (ref ?–200)
DEPRECATED RDW RBC AUTO: 59 FL (ref 35.1–46.3)
EOSINOPHIL # BLD AUTO: 0.13 X10(3) UL (ref 0–0.7)
EOSINOPHIL NFR BLD AUTO: 4 %
ERYTHROCYTE [DISTWIDTH] IN BLOOD BY AUTOMATED COUNT: 14.7 % (ref 11–15)
EST. AVERAGE GLUCOSE BLD GHB EST-MCNC: 91 MG/DL (ref 68–126)
FASTING PATIENT LIPID ANSWER: YES
HBA1C MFR BLD: 4.8 % (ref ?–5.7)
HCT VFR BLD AUTO: 29.8 %
HDLC SERPL-MCNC: 71 MG/DL (ref 40–59)
HGB BLD-MCNC: 8.8 G/DL
HGB RETIC QN AUTO: 26.6 PG (ref 28.2–36.6)
IMM GRANULOCYTES # BLD AUTO: 0.01 X10(3) UL (ref 0–1)
IMM GRANULOCYTES NFR BLD: 0.3 %
IMM RETICS NFR: 0.17 RATIO (ref 0.1–0.3)
LDLC SERPL CALC-MCNC: 61 MG/DL (ref ?–100)
LYMPHOCYTES # BLD AUTO: 0.74 X10(3) UL (ref 1–4)
LYMPHOCYTES NFR BLD AUTO: 23 %
MCH RBC QN AUTO: 31.9 PG (ref 26–34)
MCHC RBC AUTO-ENTMCNC: 29.5 G/DL (ref 31–37)
MCV RBC AUTO: 108 FL
MONOCYTES # BLD AUTO: 0.28 X10(3) UL (ref 0.1–1)
MONOCYTES NFR BLD AUTO: 8.7 %
NEUTROPHILS # BLD AUTO: 1.99 X10 (3) UL (ref 1.5–7.7)
NEUTROPHILS # BLD AUTO: 1.99 X10(3) UL (ref 1.5–7.7)
NEUTROPHILS NFR BLD AUTO: 61.8 %
NONHDLC SERPL-MCNC: 76 MG/DL (ref ?–130)
PLATELET # BLD AUTO: 190 10(3)UL (ref 150–450)
PLATELET MORPHOLOGY: NORMAL
RBC # BLD AUTO: 2.76 X10(6)UL
RETICS # AUTO: 106 X10(3) UL (ref 22.5–147.5)
RETICS/RBC NFR AUTO: 3.8 %
TRIGL SERPL-MCNC: 80 MG/DL (ref 30–149)
VLDLC SERPL CALC-MCNC: 12 MG/DL (ref 0–30)
WBC # BLD AUTO: 3.2 X10(3) UL (ref 4–11)

## 2023-08-30 PROCEDURE — 80061 LIPID PANEL: CPT

## 2023-08-30 PROCEDURE — 85045 AUTOMATED RETICULOCYTE COUNT: CPT

## 2023-08-30 PROCEDURE — 83036 HEMOGLOBIN GLYCOSYLATED A1C: CPT

## 2023-08-30 PROCEDURE — 36415 COLL VENOUS BLD VENIPUNCTURE: CPT

## 2023-08-30 PROCEDURE — 85025 COMPLETE CBC W/AUTO DIFF WBC: CPT

## 2023-08-31 ENCOUNTER — LAB ENCOUNTER (OUTPATIENT)
Dept: LAB | Age: 78
End: 2023-08-31
Attending: FAMILY MEDICINE
Payer: MEDICARE

## 2023-08-31 DIAGNOSIS — E78.5 HYPERLIPIDEMIA, UNSPECIFIED HYPERLIPIDEMIA TYPE: ICD-10-CM

## 2023-08-31 DIAGNOSIS — N18.32 TYPE 2 DIABETES MELLITUS WITH STAGE 3B CHRONIC KIDNEY DISEASE, WITHOUT LONG-TERM CURRENT USE OF INSULIN (HCC): ICD-10-CM

## 2023-08-31 DIAGNOSIS — E11.22 TYPE 2 DIABETES MELLITUS WITH STAGE 3B CHRONIC KIDNEY DISEASE, WITHOUT LONG-TERM CURRENT USE OF INSULIN (HCC): ICD-10-CM

## 2023-08-31 LAB
CREAT UR-SCNC: 42.2 MG/DL
MICROALBUMIN UR-MCNC: 2.72 MG/DL
MICROALBUMIN/CREAT 24H UR-RTO: 64.5 UG/MG (ref ?–30)

## 2023-08-31 PROCEDURE — 82043 UR ALBUMIN QUANTITATIVE: CPT

## 2023-08-31 PROCEDURE — 82570 ASSAY OF URINE CREATININE: CPT

## 2023-09-01 ENCOUNTER — OFFICE VISIT (OUTPATIENT)
Dept: NEPHROLOGY | Facility: CLINIC | Age: 78
End: 2023-09-01

## 2023-09-01 VITALS
DIASTOLIC BLOOD PRESSURE: 58 MMHG | HEART RATE: 73 BPM | WEIGHT: 210 LBS | SYSTOLIC BLOOD PRESSURE: 117 MMHG | BODY MASS INDEX: 33 KG/M2

## 2023-09-01 DIAGNOSIS — I10 ESSENTIAL HYPERTENSION: ICD-10-CM

## 2023-09-01 DIAGNOSIS — I50.9 CONGESTIVE HEART FAILURE, UNSPECIFIED HF CHRONICITY, UNSPECIFIED HEART FAILURE TYPE (HCC): Primary | ICD-10-CM

## 2023-09-01 DIAGNOSIS — N18.32 STAGE 3B CHRONIC KIDNEY DISEASE (HCC): ICD-10-CM

## 2023-09-01 PROCEDURE — 1126F AMNT PAIN NOTED NONE PRSNT: CPT | Performed by: INTERNAL MEDICINE

## 2023-09-01 PROCEDURE — 99214 OFFICE O/P EST MOD 30 MIN: CPT | Performed by: INTERNAL MEDICINE

## 2023-09-19 ENCOUNTER — APPOINTMENT (OUTPATIENT)
Dept: HEMATOLOGY/ONCOLOGY | Facility: HOSPITAL | Age: 78
End: 2023-09-19
Attending: INTERNAL MEDICINE
Payer: MEDICARE

## 2023-10-27 ENCOUNTER — HOSPITAL ENCOUNTER (EMERGENCY)
Facility: HOSPITAL | Age: 78
Discharge: HOME | End: 2023-10-27
Attending: EMERGENCY MEDICINE
Payer: MEDICARE

## 2023-10-27 ENCOUNTER — HOSPITAL ENCOUNTER (OUTPATIENT)
Age: 78
Discharge: ANOTHER HOSPITAL | End: 2023-10-27
Attending: EMERGENCY MEDICINE
Payer: MEDICARE

## 2023-10-27 VITALS
BODY MASS INDEX: 32.78 KG/M2 | HEART RATE: 70 BPM | OXYGEN SATURATION: 98 % | DIASTOLIC BLOOD PRESSURE: 67 MMHG | SYSTOLIC BLOOD PRESSURE: 108 MMHG | RESPIRATION RATE: 18 BRPM | TEMPERATURE: 98 F | HEIGHT: 66 IN | WEIGHT: 204 LBS

## 2023-10-27 VITALS
SYSTOLIC BLOOD PRESSURE: 112 MMHG | OXYGEN SATURATION: 97 % | TEMPERATURE: 98 F | DIASTOLIC BLOOD PRESSURE: 45 MMHG | RESPIRATION RATE: 20 BRPM | HEART RATE: 85 BPM

## 2023-10-27 DIAGNOSIS — R11.2 NAUSEA AND VOMITING IN ADULT: Primary | ICD-10-CM

## 2023-10-27 DIAGNOSIS — R63.4 WEIGHT LOSS: ICD-10-CM

## 2023-10-27 DIAGNOSIS — R06.00 DYSPNEA, UNSPECIFIED TYPE: Primary | ICD-10-CM

## 2023-10-27 DIAGNOSIS — R11.2 NAUSEA AND VOMITING, UNSPECIFIED VOMITING TYPE: ICD-10-CM

## 2023-10-27 DIAGNOSIS — J34.89 SINUS DRAINAGE: ICD-10-CM

## 2023-10-27 LAB
ALBUMIN SERPL-MCNC: 3.9 G/DL (ref 3.4–5)
ALBUMIN/GLOB SERPL: 1.2 {RATIO} (ref 1–2)
ALP LIVER SERPL-CCNC: 61 U/L
ALT SERPL-CCNC: 13 U/L
ANION GAP SERPL CALC-SCNC: 6 MMOL/L (ref 0–18)
AST SERPL-CCNC: 35 U/L (ref 15–37)
BASOPHILS # BLD AUTO: 0.04 X10(3) UL (ref 0–0.2)
BASOPHILS NFR BLD AUTO: 0.8 %
BILIRUB SERPL-MCNC: 1 MG/DL (ref 0.1–2)
BUN BLD-MCNC: 15 MG/DL (ref 7–18)
BUN/CREAT SERPL: 13.2 (ref 10–20)
CALCIUM BLD-MCNC: 9 MG/DL (ref 8.5–10.1)
CHLORIDE SERPL-SCNC: 111 MMOL/L (ref 98–112)
CO2 SERPL-SCNC: 27 MMOL/L (ref 21–32)
CREAT BLD-MCNC: 1.14 MG/DL
DEPRECATED RDW RBC AUTO: 68 FL (ref 35.1–46.3)
EGFRCR SERPLBLD CKD-EPI 2021: 49 ML/MIN/1.73M2 (ref 60–?)
EOSINOPHIL # BLD AUTO: 0.07 X10(3) UL (ref 0–0.7)
EOSINOPHIL NFR BLD AUTO: 1.4 %
ERYTHROCYTE [DISTWIDTH] IN BLOOD BY AUTOMATED COUNT: 18.8 % (ref 11–15)
GLOBULIN PLAS-MCNC: 3.3 G/DL (ref 2.8–4.4)
GLUCOSE BLD-MCNC: 97 MG/DL (ref 70–99)
HCT VFR BLD AUTO: 30.1 %
HGB BLD-MCNC: 9 G/DL
IMM GRANULOCYTES # BLD AUTO: 0.01 X10(3) UL (ref 0–1)
IMM GRANULOCYTES NFR BLD: 0.2 %
LIPASE SERPL-CCNC: 40 U/L (ref 13–75)
LYMPHOCYTES # BLD AUTO: 0.94 X10(3) UL (ref 1–4)
LYMPHOCYTES NFR BLD AUTO: 19.3 %
MCH RBC QN AUTO: 31.1 PG (ref 26–34)
MCHC RBC AUTO-ENTMCNC: 29.9 G/DL (ref 31–37)
MCV RBC AUTO: 104.2 FL
MONOCYTES # BLD AUTO: 0.47 X10(3) UL (ref 0.1–1)
MONOCYTES NFR BLD AUTO: 9.6 %
NEUTROPHILS # BLD AUTO: 3.35 X10 (3) UL (ref 1.5–7.7)
NEUTROPHILS # BLD AUTO: 3.35 X10(3) UL (ref 1.5–7.7)
NEUTROPHILS NFR BLD AUTO: 68.7 %
OSMOLALITY SERPL CALC.SUM OF ELEC: 299 MOSM/KG (ref 275–295)
PLATELET # BLD AUTO: 241 10(3)UL (ref 150–450)
PLATELET MORPHOLOGY: NORMAL
POTASSIUM SERPL-SCNC: 4 MMOL/L (ref 3.5–5.1)
PROT SERPL-MCNC: 7.2 G/DL (ref 6.4–8.2)
Q-T INTERVAL: 392 MS
QRS DURATION: 90 MS
QTC CALCULATION (BEZET): 466 MS
R AXIS: 105 DEGREES
RBC # BLD AUTO: 2.89 X10(6)UL
SODIUM SERPL-SCNC: 144 MMOL/L (ref 136–145)
T AXIS: -5 DEGREES
VENTRICULAR RATE: 85 BPM
WBC # BLD AUTO: 4.9 X10(3) UL (ref 4–11)

## 2023-10-27 PROCEDURE — 96361 HYDRATE IV INFUSION ADD-ON: CPT

## 2023-10-27 PROCEDURE — 93010 ELECTROCARDIOGRAM REPORT: CPT

## 2023-10-27 PROCEDURE — 96374 THER/PROPH/DIAG INJ IV PUSH: CPT

## 2023-10-27 PROCEDURE — 83690 ASSAY OF LIPASE: CPT | Performed by: EMERGENCY MEDICINE

## 2023-10-27 PROCEDURE — 85025 COMPLETE CBC W/AUTO DIFF WBC: CPT | Performed by: EMERGENCY MEDICINE

## 2023-10-27 PROCEDURE — 99284 EMERGENCY DEPT VISIT MOD MDM: CPT

## 2023-10-27 PROCEDURE — 99213 OFFICE O/P EST LOW 20 MIN: CPT

## 2023-10-27 PROCEDURE — 80053 COMPREHEN METABOLIC PANEL: CPT | Performed by: EMERGENCY MEDICINE

## 2023-10-27 PROCEDURE — 93005 ELECTROCARDIOGRAM TRACING: CPT

## 2023-10-27 RX ORDER — GUAIFENESIN 600 MG/1
1200 TABLET, EXTENDED RELEASE ORAL 2 TIMES DAILY
Qty: 14 TABLET | Refills: 0 | Status: SHIPPED | OUTPATIENT
Start: 2023-10-27 | End: 2023-11-14 | Stop reason: ALTCHOICE

## 2023-10-27 RX ORDER — ONDANSETRON 2 MG/ML
4 INJECTION INTRAMUSCULAR; INTRAVENOUS ONCE
Status: COMPLETED | OUTPATIENT
Start: 2023-10-27 | End: 2023-10-27

## 2023-10-27 RX ORDER — ONDANSETRON 4 MG/1
4 TABLET, ORALLY DISINTEGRATING ORAL EVERY 4 HOURS PRN
Qty: 10 TABLET | Refills: 0 | Status: SHIPPED | OUTPATIENT
Start: 2023-10-27 | End: 2023-11-03

## 2023-10-27 NOTE — ED INITIAL ASSESSMENT (HPI)
Pt to ED from [facility] with c/o sinus congestion and post nasal drip for about 2 weeks. Pt states competed antibiotics for strep dx on 10/15/2023. Pt states +vomiting every day for about 9 days with +weight loss. Pt appears short of breath with pursed lip breathing noted. Pt is alert and oriented x4.

## 2023-11-02 ENCOUNTER — PATIENT OUTREACH (OUTPATIENT)
Dept: CASE MANAGEMENT | Age: 78
End: 2023-11-02

## 2023-11-02 NOTE — TELEPHONE ENCOUNTER
Please follow-up with PCP in 1-3 days for re-evaluation. Please return to the ED for any new or worsening symptoms. Use benadryl over the counter for any pruritis.    Spoke to pt, she had forgotten to call [facility]. She has refills available so no further action is necessary. She does recall that the atorvastatin was decreased to 10mg due to improvement in her lipid profile.      Pt is aware that  spoke with Dr. Stanton Kiser

## 2023-11-14 ENCOUNTER — TELEPHONE (OUTPATIENT)
Dept: FAMILY MEDICINE CLINIC | Facility: CLINIC | Age: 78
End: 2023-11-14

## 2023-11-14 ENCOUNTER — LAB ENCOUNTER (OUTPATIENT)
Dept: LAB | Age: 78
End: 2023-11-14
Attending: FAMILY MEDICINE
Payer: MEDICARE

## 2023-11-14 ENCOUNTER — OFFICE VISIT (OUTPATIENT)
Dept: FAMILY MEDICINE CLINIC | Facility: CLINIC | Age: 78
End: 2023-11-14
Payer: MEDICARE

## 2023-11-14 VITALS
DIASTOLIC BLOOD PRESSURE: 70 MMHG | HEART RATE: 66 BPM | HEIGHT: 66 IN | BODY MASS INDEX: 33.11 KG/M2 | RESPIRATION RATE: 16 BRPM | WEIGHT: 206 LBS | SYSTOLIC BLOOD PRESSURE: 120 MMHG

## 2023-11-14 DIAGNOSIS — N18.32 TYPE 2 DIABETES MELLITUS WITH STAGE 3B CHRONIC KIDNEY DISEASE, WITHOUT LONG-TERM CURRENT USE OF INSULIN (HCC): ICD-10-CM

## 2023-11-14 DIAGNOSIS — I10 ESSENTIAL HYPERTENSION: ICD-10-CM

## 2023-11-14 DIAGNOSIS — Z00.00 ENCOUNTER FOR ANNUAL HEALTH EXAMINATION: Primary | ICD-10-CM

## 2023-11-14 DIAGNOSIS — R60.0 LOWER EXTREMITY EDEMA: ICD-10-CM

## 2023-11-14 DIAGNOSIS — E11.22 TYPE 2 DIABETES MELLITUS WITH STAGE 3B CHRONIC KIDNEY DISEASE, WITHOUT LONG-TERM CURRENT USE OF INSULIN (HCC): ICD-10-CM

## 2023-11-14 DIAGNOSIS — I50.9 CONGESTIVE HEART FAILURE, UNSPECIFIED HF CHRONICITY, UNSPECIFIED HEART FAILURE TYPE (HCC): ICD-10-CM

## 2023-11-14 DIAGNOSIS — E78.5 HYPERLIPIDEMIA, UNSPECIFIED HYPERLIPIDEMIA TYPE: ICD-10-CM

## 2023-11-14 DIAGNOSIS — J01.00 ACUTE NON-RECURRENT MAXILLARY SINUSITIS: ICD-10-CM

## 2023-11-14 DIAGNOSIS — M34.1 CREST SYNDROME (HCC): ICD-10-CM

## 2023-11-14 DIAGNOSIS — F32.1 MAJOR DEPRESSIVE DISORDER, SINGLE EPISODE, MODERATE (HCC): ICD-10-CM

## 2023-11-14 LAB
ALBUMIN SERPL-MCNC: 4 G/DL (ref 3.2–4.8)
ALBUMIN/GLOB SERPL: 1.5 {RATIO} (ref 1–2)
ALP LIVER SERPL-CCNC: 63 U/L
ALT SERPL-CCNC: <7 U/L
ANION GAP SERPL CALC-SCNC: 9 MMOL/L (ref 0–18)
AST SERPL-CCNC: 28 U/L (ref ?–34)
BILIRUB SERPL-MCNC: 0.9 MG/DL (ref 0.2–1.1)
BNP SERPL-MCNC: 579 PG/ML
BUN BLD-MCNC: 20 MG/DL (ref 9–23)
BUN/CREAT SERPL: 16.1 (ref 10–20)
CALCIUM BLD-MCNC: 9.5 MG/DL (ref 8.7–10.4)
CHLORIDE SERPL-SCNC: 105 MMOL/L (ref 98–112)
CHOLEST SERPL-MCNC: 138 MG/DL (ref ?–200)
CO2 SERPL-SCNC: 26 MMOL/L (ref 21–32)
CREAT BLD-MCNC: 1.24 MG/DL
EGFRCR SERPLBLD CKD-EPI 2021: 44 ML/MIN/1.73M2 (ref 60–?)
FASTING PATIENT LIPID ANSWER: NO
FASTING STATUS PATIENT QL REPORTED: NO
GLOBULIN PLAS-MCNC: 2.6 G/DL (ref 2.8–4.4)
GLUCOSE BLD-MCNC: 87 MG/DL (ref 70–99)
HDLC SERPL-MCNC: 59 MG/DL (ref 40–59)
LDLC SERPL CALC-MCNC: 64 MG/DL (ref ?–100)
NONHDLC SERPL-MCNC: 79 MG/DL (ref ?–130)
OSMOLALITY SERPL CALC.SUM OF ELEC: 292 MOSM/KG (ref 275–295)
POTASSIUM SERPL-SCNC: 4.2 MMOL/L (ref 3.5–5.1)
PROT SERPL-MCNC: 6.6 G/DL (ref 5.7–8.2)
SODIUM SERPL-SCNC: 140 MMOL/L (ref 136–145)
TRIGL SERPL-MCNC: 76 MG/DL (ref 30–149)
VLDLC SERPL CALC-MCNC: 11 MG/DL (ref 0–30)

## 2023-11-14 PROCEDURE — 83880 ASSAY OF NATRIURETIC PEPTIDE: CPT

## 2023-11-14 PROCEDURE — 83036 HEMOGLOBIN GLYCOSYLATED A1C: CPT

## 2023-11-14 PROCEDURE — 36415 COLL VENOUS BLD VENIPUNCTURE: CPT

## 2023-11-14 PROCEDURE — 80061 LIPID PANEL: CPT

## 2023-11-14 PROCEDURE — 80053 COMPREHEN METABOLIC PANEL: CPT

## 2023-11-14 RX ORDER — FLUTICASONE PROPIONATE 50 MCG
2 SPRAY, SUSPENSION (ML) NASAL DAILY
Qty: 16 G | Refills: 0 | Status: SHIPPED | OUTPATIENT
Start: 2023-11-14 | End: 2023-11-24

## 2023-11-14 RX ORDER — FUROSEMIDE 20 MG/1
1 TABLET ORAL DAILY
COMMUNITY

## 2023-11-14 RX ORDER — AMOXICILLIN 875 MG/1
875 TABLET, COATED ORAL 2 TIMES DAILY
Qty: 20 TABLET | Refills: 0 | Status: SHIPPED | OUTPATIENT
Start: 2023-11-14 | End: 2023-11-24

## 2023-11-14 RX ORDER — GUAIFENESIN AND DEXTROMETHORPHAN HYDROBROMIDE 600; 30 MG/1; MG/1
1 TABLET, EXTENDED RELEASE ORAL EVERY 12 HOURS
COMMUNITY
Start: 2023-10-28

## 2023-11-14 RX ORDER — ECHINACEA PURPUREA EXTRACT 125 MG
1 TABLET ORAL AS NEEDED
Qty: 60 ML | Refills: 0 | Status: SHIPPED | OUTPATIENT
Start: 2023-11-14

## 2023-11-14 RX ORDER — SERTRALINE HYDROCHLORIDE 100 MG/1
100 TABLET, FILM COATED ORAL DAILY
Qty: 90 TABLET | Refills: 0 | Status: SHIPPED | OUTPATIENT
Start: 2023-11-14

## 2023-11-14 NOTE — TELEPHONE ENCOUNTER
Left message with 's office at [address] [place] [address] Ophthalmology to fax the last office note to us at [phone].

## 2023-11-14 NOTE — TELEPHONE ENCOUNTER
Tried to reach patient to go over cognitive assessment part of medicare px questionnaire. Was missed during today;s visit. [place] [id number] or [id number] today before 4:30pm or I will return patient;s call tomorrow.

## 2023-11-15 DIAGNOSIS — I50.9 CONGESTIVE HEART FAILURE, UNSPECIFIED HF CHRONICITY, UNSPECIFIED HEART FAILURE TYPE (HCC): Primary | ICD-10-CM

## 2023-11-15 LAB
EST. AVERAGE GLUCOSE BLD GHB EST-MCNC: 91 MG/DL (ref 68–126)
HBA1C MFR BLD: 4.8 % (ref ?–5.7)

## 2023-11-20 ENCOUNTER — APPOINTMENT (OUTPATIENT)
Dept: GENERAL RADIOLOGY | Facility: HOSPITAL | Age: 78
End: 2023-11-20
Attending: EMERGENCY MEDICINE
Payer: MEDICARE

## 2023-11-20 ENCOUNTER — APPOINTMENT (OUTPATIENT)
Dept: GENERAL RADIOLOGY | Facility: HOSPITAL | Age: 78
End: 2023-11-20
Payer: MEDICARE

## 2023-11-20 ENCOUNTER — HOSPITAL ENCOUNTER (EMERGENCY)
Facility: HOSPITAL | Age: 78
Discharge: HOME | End: 2023-11-20
Attending: EMERGENCY MEDICINE
Payer: MEDICARE

## 2023-11-20 VITALS
HEART RATE: 57 BPM | OXYGEN SATURATION: 100 % | TEMPERATURE: 98 F | RESPIRATION RATE: 18 BRPM | BODY MASS INDEX: 32.95 KG/M2 | HEIGHT: 66 IN | WEIGHT: 205 LBS | SYSTOLIC BLOOD PRESSURE: 170 MMHG | DIASTOLIC BLOOD PRESSURE: 64 MMHG

## 2023-11-20 DIAGNOSIS — M79.605 ACUTE LEG PAIN, LEFT: Primary | ICD-10-CM

## 2023-11-20 LAB
ALBUMIN SERPL-MCNC: 4.3 G/DL (ref 3.2–4.8)
ALBUMIN/GLOB SERPL: 1.5 {RATIO} (ref 1–2)
ALP LIVER SERPL-CCNC: 65 U/L
ALT SERPL-CCNC: <7 U/L
ANION GAP SERPL CALC-SCNC: 7 MMOL/L (ref 0–18)
AST SERPL-CCNC: 45 U/L (ref ?–34)
BASOPHILS # BLD AUTO: 0.05 X10(3) UL (ref 0–0.2)
BASOPHILS NFR BLD AUTO: 1.1 %
BILIRUB SERPL-MCNC: 0.8 MG/DL (ref 0.2–1.1)
BNP SERPL-MCNC: 680 PG/ML
BUN BLD-MCNC: 17 MG/DL (ref 9–23)
BUN/CREAT SERPL: 12.8 (ref 10–20)
CALCIUM BLD-MCNC: 9.4 MG/DL (ref 8.7–10.4)
CHLORIDE SERPL-SCNC: 108 MMOL/L (ref 98–112)
CO2 SERPL-SCNC: 24 MMOL/L (ref 21–32)
CREAT BLD-MCNC: 1.33 MG/DL
DEPRECATED RDW RBC AUTO: 62.7 FL (ref 35.1–46.3)
EGFRCR SERPLBLD CKD-EPI 2021: 40 ML/MIN/1.73M2 (ref 60–?)
EOSINOPHIL # BLD AUTO: 0.1 X10(3) UL (ref 0–0.7)
EOSINOPHIL NFR BLD AUTO: 2.3 %
ERYTHROCYTE [DISTWIDTH] IN BLOOD BY AUTOMATED COUNT: 18.6 % (ref 11–15)
GLOBULIN PLAS-MCNC: 2.8 G/DL (ref 2.8–4.4)
GLUCOSE BLD-MCNC: 91 MG/DL (ref 70–99)
HCT VFR BLD AUTO: 28.1 %
HGB BLD-MCNC: 8.1 G/DL
IMM GRANULOCYTES # BLD AUTO: 0.02 X10(3) UL (ref 0–1)
IMM GRANULOCYTES NFR BLD: 0.5 %
LYMPHOCYTES # BLD AUTO: 0.78 X10(3) UL (ref 1–4)
LYMPHOCYTES NFR BLD AUTO: 17.9 %
MCH RBC QN AUTO: 29.1 PG (ref 26–34)
MCHC RBC AUTO-ENTMCNC: 28.8 G/DL (ref 31–37)
MCV RBC AUTO: 101.1 FL
MONOCYTES # BLD AUTO: 0.33 X10(3) UL (ref 0.1–1)
MONOCYTES NFR BLD AUTO: 7.6 %
NEUTROPHILS # BLD AUTO: 3.08 X10 (3) UL (ref 1.5–7.7)
NEUTROPHILS # BLD AUTO: 3.08 X10(3) UL (ref 1.5–7.7)
NEUTROPHILS NFR BLD AUTO: 70.6 %
OSMOLALITY SERPL CALC.SUM OF ELEC: 289 MOSM/KG (ref 275–295)
PLATELET # BLD AUTO: 196 10(3)UL (ref 150–450)
POTASSIUM SERPL-SCNC: 4.9 MMOL/L (ref 3.5–5.1)
PROT SERPL-MCNC: 7.1 G/DL (ref 5.7–8.2)
Q-T INTERVAL: 422 MS
QRS DURATION: 94 MS
QTC CALCULATION (BEZET): 458 MS
R AXIS: 85 DEGREES
RBC # BLD AUTO: 2.78 X10(6)UL
SODIUM SERPL-SCNC: 139 MMOL/L (ref 136–145)
T AXIS: 51 DEGREES
TROPONIN I SERPL HS-MCNC: 32 NG/L
VENTRICULAR RATE: 71 BPM
WBC # BLD AUTO: 4.4 X10(3) UL (ref 4–11)

## 2023-11-20 PROCEDURE — 83880 ASSAY OF NATRIURETIC PEPTIDE: CPT | Performed by: EMERGENCY MEDICINE

## 2023-11-20 PROCEDURE — 84484 ASSAY OF TROPONIN QUANT: CPT | Performed by: EMERGENCY MEDICINE

## 2023-11-20 PROCEDURE — 99284 EMERGENCY DEPT VISIT MOD MDM: CPT

## 2023-11-20 PROCEDURE — 96374 THER/PROPH/DIAG INJ IV PUSH: CPT

## 2023-11-20 PROCEDURE — 71045 X-RAY EXAM CHEST 1 VIEW: CPT | Performed by: EMERGENCY MEDICINE

## 2023-11-20 PROCEDURE — 80053 COMPREHEN METABOLIC PANEL: CPT | Performed by: EMERGENCY MEDICINE

## 2023-11-20 PROCEDURE — 93005 ELECTROCARDIOGRAM TRACING: CPT

## 2023-11-20 PROCEDURE — 93010 ELECTROCARDIOGRAM REPORT: CPT

## 2023-11-20 PROCEDURE — 85025 COMPLETE CBC W/AUTO DIFF WBC: CPT | Performed by: EMERGENCY MEDICINE

## 2023-11-20 PROCEDURE — 73552 X-RAY EXAM OF FEMUR 2/>: CPT | Performed by: EMERGENCY MEDICINE

## 2023-11-20 RX ORDER — CYCLOBENZAPRINE HCL 5 MG
5 TABLET ORAL ONCE
Status: COMPLETED | OUTPATIENT
Start: 2023-11-20 | End: 2023-11-20

## 2023-11-20 RX ORDER — ACETAMINOPHEN 500 MG
1000 TABLET ORAL ONCE
Status: COMPLETED | OUTPATIENT
Start: 2023-11-20 | End: 2023-11-20

## 2023-11-20 RX ORDER — GABAPENTIN 100 MG/1
100 CAPSULE ORAL 2 TIMES DAILY
Qty: 10 CAPSULE | Refills: 0 | Status: SHIPPED | OUTPATIENT
Start: 2023-11-20 | End: 2023-11-25

## 2023-11-20 RX ORDER — FUROSEMIDE 10 MG/ML
20 INJECTION INTRAMUSCULAR; INTRAVENOUS ONCE
Status: COMPLETED | OUTPATIENT
Start: 2023-11-20 | End: 2023-11-20

## 2023-11-20 NOTE — ED INITIAL ASSESSMENT (HPI)
Pt c/o left upper thigh pain since Thursday. Denies injury. Here today for sudden onset of SOB 2 hours prior to arrival. Denies chest pain. + eliquis. Pt compliant with medication. Breathing mildly labored in triage.

## 2023-11-21 NOTE — DISCHARGE INSTRUCTIONS
Take a double dose of your diuretic for the next 2 days. Call your doctor for follow-up appointment tomorrow. Start taking the new medication as prescribed in addition to Tylenol every 6 hours. If you experience worsening shortness of breath, worsening pain in the leg, or other new and concerning symptoms, return to the ER.

## 2023-11-28 ENCOUNTER — PATIENT OUTREACH (OUTPATIENT)
Dept: CASE MANAGEMENT | Age: 78
End: 2023-11-28

## 2023-12-01 NOTE — PROGRESS NOTES
[facility], [facility] - Gastroenterology                                                                                                               Reason for consult: f/u    Requesting physician or provider: Lacey Lee. Akua Eddy MD    Chief Complaint   Patient presents with    Follow - Up     Stomach issues; was in ER        HPI:   Gillian Nowak is a [de-identified]year old year-old female with history of morbid obesity, depression, acid reflux, basal cell skin cancer, hx of PE, OA, hiatal hernia s/p fundoplication (3980), GERD:     she is here today for f/u    Recent ed visit for leg pain  She reports increased gas and belching x last few weeks. Has h/o diarrhea that has improved since last visit. she moves her bowels 4-5x per week. Stools are formed and she has occasional straining. she denies brbpr and/or melena. she has had heartburn x last week. Is currently on pantoprazole. she denies dysphagia, odynophagia and/or globus. She has had cramping in low belly x last 6 weeks. Camping improves with bm. She has bloating that she is not sure if improves with bm. she denies nausea and/or vomiting. She has been following plant based, dairy free diet since last visit. She says feels full and can't finish a meal.      Stress-son in law passed away and was a hoarder  Dealing with estate    NSAIDS: no  Tobacco: no  Alcohol: no  Illicit drugs: edible CBD gummy for sleep aide     No FHx GI malignancy, ibd, celiac    No history of adverse reaction to sedation  No KENNETH  Eliquis  No pacemaker/defibrillator  No pain medications and/or sleep aides    Colonoscopy 10/7/20 normal to TI. Path neg for microscopic colitis     EGD w/ Dr. Janey Simon (11/2021)  path -- no obvious fungus noted but high suspicion for fungal infection. Started on diflucan 200mg/day 14 days. Foci complete IM  Reflux esophagitis  No evidence of dysplasia or carcinoma     consider repeat egd in 3 years for mapping.     Wt Readings from Last 6 Encounters:   12/05/23 205 lb (93 kg)   11/20/23 205 lb (93 kg)   11/14/23 206 lb (93.4 kg)   10/27/23 204 lb (92.5 kg)   09/01/23 210 lb (95.3 kg)   05/24/23 213 lb 9.6 oz (96.9 kg)        History, Medications, Allergies, ROS:      Past Medical History:   Diagnosis Date    Arrhythmia     afib    Back problem     lumbar disc disease    Basal cell carcinoma of nose     Bilateral carpal tunnel syndrome     Cataract     Cellulitis     Deep vein thrombosis (HCC)     unsure which leg, possibly left    Depression     Esophageal reflux     Essential hypertension     Heart disease     Heel spur     Right    Hemorrhoids     Hiatal hernia     High blood pressure     High cholesterol     Hyperlipidemia     Incontinence     Lumbar disc disease 2012    Multiple gastric ulcers 04/25/2017    Osteoarthritis     Pulmonary embolism (HCC)     Raynaud disease     Renal disorder     Tubular adenoma of colon 01/2017    repeat c-scope 1/2020    Type 2 diabetes mellitus with diabetic chronic kidney disease ([facility] [address] 11/30/2022    Visual impairment     glasses      Past Surgical History:   Procedure Laterality Date    ANGIOPLASTY (CORONARY)      APPENDECTOMY      ARTHROSCOPY OF JOINT UNLISTED Right     knee    BSO, OMENTECTOMY W/BRANDO      CARPAL TUNNEL RELEASE Bilateral     CHOLECYSTECTOMY  09/28/2016    COLONOSCOPY N/A 01/25/2017    Procedure: COLONOSCOPY;  Surgeon: Nadia De Dios MD;  Location: [address] [place] [address] ENDOSCOPY    COLONOSCOPY N/A 10/07/2020    Procedure: COLONOSCOPY;  Surgeon: Nadia De Dios MD;  Location: [address] [place] [address] ENDOSCOPY    EGD N/A 12/14/2016    Procedure: ESOPHAGOGASTRODUODENOSCOPY (EGD);   Surgeon: Nadia De Dios MD;  Location: [address] [place] [address] ENDOSCOPY    ELECTROCARDIOGRAM, COMPLETE  09/26/2013    Scanned to Media Tab    EXCISION OF NOSE      Basal cell carcinoma    HERNIA SURGERY      HYSTERECTOMY      KNEE REPLACEMENT SURGERY Right     OTHER SURGICAL HISTORY      Injections and narcotics, POD Bartuci    REMOVAL OF HEEL SPUR TOTAL ABDOM HYSTERECTOMY        Family Hx:   Family History   Problem Relation Age of Onset    Cancer Father         Skin cancer    Other (Other) Father 80        old age,unknown caused    Heart Attack Mother     Heart Disorder Mother     Hypertension Other         Family H/O    Cancer Other         Lymphoma  Family H/O    Heart Disease Other         Family H/O    Arthritis Other         Close relative  unsure which type    No Known Problems Brother       Social History:   Social History     Socioeconomic History    Marital status:    Tobacco Use    Smoking status: Former     Packs/day: 0     Types: Cigarettes     Quit date: 1997     Years since quittin.9    Smokeless tobacco: Never   Vaping Use    Vaping Use: Never used   Substance and Sexual Activity    Alcohol use: No     Comment: rare    Drug use: Never     Comment: edibles occasionally for sleep   Other Topics Concern    Caffeine Concern Yes     Comment: 8 cups coffee/sod daily    Reaction to local anesthetic No     Social Determinants of Health     Financial Resource Strain: Low Risk  (3/14/2023)    Financial Resource Strain     Difficulty of Paying Living Expenses: Not very hard     Med Affordability: No   Transportation Needs: No Transportation Needs (3/14/2023)    Transportation Needs     Lack of Transportation: No        Medications (Active prior to today's visit):  Current Outpatient Medications   Medication Sig Dispense Refill    sertraline 100 MG Oral Tab Take 1 tablet (100 mg total) by mouth daily. 90 tablet 0    pantoprazole 40 MG Oral Tab EC Take 1 tablet (40 mg total) by mouth 2 (two) times daily. 60 tablet 2    folic acid 1 MG Oral Tab Take 1 tablet (1 mg total) by mouth daily. 90 tablet 3    METOPROLOL SUCCINATE ER 50 MG Oral Tablet 24 Hr       amLODIPine 5 MG Oral Tab Take 1 tablet (5 mg total) by mouth daily. 30 tablet 0    atorvastatin 10 MG Oral Tab Take 1 tablet (10 mg total) by mouth nightly.  90 tablet 3    cyanocobalamin 1000 MCG Oral Tab Take 1 tablet (1,000 mcg total) by mouth daily. ELIQUIS 5 MG Oral Tab Take 1 tablet (5 mg total) by mouth 2 (two) times daily. 180 tablet 0    MUCINEX DM  MG Oral Tablet 12 Hr Take 1 tablet by mouth Q12H. FOR 10 DAYS (Patient not taking: Reported on 12/5/2023)      furosemide 20 MG Oral Tab Take 1 tablet (20 mg total) by mouth daily. sodium chloride 0.65 % Nasal Solution 1 spray by Nasal route as needed. (Patient not taking: Reported on 12/5/2023) 60 mL 0       Allergies: Allergies   Allergen Reactions    Adhesive Tape RASH     Skin off       ROS:   CONSTITUTIONAL: negative for fevers, chills, sweats and weight loss  EYES Negative for red eyes, yellow eyes, changes in vision  HEENT: Negative for dysphagia and hoarseness  RESPIRATORY: Negative for cough and shortness of breath  CARDIOVASCULAR: Negative for chest pain, palpitations  GASTROINTESTINAL: See HPI  GENITOURINARY: Negative for dysuria and frequency  MUSCULOSKELETAL: Negative for arthralgias and myalgias  NEUROLOGICAL: Negative for dizziness and headaches  BEHAVIOR/PSYCH: Negative for anxiety and poor appetite    PHYSICAL EXAM:   Blood pressure 139/82, pulse 102, height 5' 6\" (1.676 m), weight 205 lb (93 kg). GEN: WD/WN, NAD  HEENT: Supple symmetrical, trachea midline  CV: RRR, the extremities are warm and well perfused   LUNGS: No increased work of breathing  ABDOMEN: No scars, normal bowel sounds, soft, non-tender, non-distended no rebound or guarding, no masses, no hepatomegaly  MSK: No redness, no warmth, no swelling of joints  SKIN: No jaundice, no erythema, no rashes  HEMATOLOGIC: No bleeding, no bruising  NEURO: Alert and interactive, normal gait    Labs/Imaging/Procedures:     Patient's pertinent labs and imaging were reviewed and discussed with patient today.         .  ASSESSMENT/PLAN:   Fausto Caicedo is a [de-identified]year old year-old female with history of morbid obesity, depression, acid reflux, basal cell skin cancer, hx of PE, OA, hiatal hernia s/p fundoplication (4879), GERD:     #heartburn  #IM  #belching  #bloating  #weight loss  #gassiness  #constipation  #abd cramping  #constipation  She is here today for eval of her various gi complaints. Chronic h/o diarrhea, however has more constipation now. Think could explain many of her reported symptoms. No brbpr, melena. Has had weight loss, but think could be related to starting plant based, dairy free diet and stress. Noted labs 11/2203. Mild increase in ast. Hgb down from comparison. She is currently on eliquis and s/p aortic valve replacement in march. CT a/p IV no oral contrast 12/2022 reviewed. Last colonoscopy: 10/7/20 normal to TI. Path neg for microscopic colitis   EGD w/ Dr. Maggie Mckinley (11/2021)  path -- no obvious fungus noted but high suspicion for fungal infection. Started on diflucan 200mg/day 14 days. Foci complete IM  Reflux esophagitis  No evidence of dysplasia or carcinoma. Plan to consider repeat egd in 3 years for mapping. Given current symptoms, do think repeat reasonable at this time. Plan as below. Consider need for repeat cln.     -kub to evaluate for constipation  -labs to evaluate for iron def  -reflux diet modification  -continue pantoprazole  -add pepcid 40 mg at night  -repeat liver function testing in 3-6 mos      Egd w/ mac w/ Dr. Maggie Mckinley  Dx: dyspepsia, IM, weight loss  Nursing staff to contact Dr. Merced Frances for cardiac clearance and okay to hold eliquis 48h prior to procedure    Orders This Visit:  No orders of the defined types were placed in this encounter. Meds This Visit:  Requested Prescriptions      No prescriptions requested or ordered in this encounter       Imaging & Referrals:  None    ENDOSCOPIC RISK BENEFIT DISCUSSION: I described the procedure in great detail with the patient. I discussed the risks and benefits, including but not limited to: bleeding, perforation, infection, anesthesia complications, and even death.  Patient will be NPO after midnight and will have a person physically present at time of pick-up to drive patient home. Patient verbalized understanding and agrees to proceed with procedure as planned. Ismael Blake. Maribel Gonzalez, JUAN   12/5/2023        This note was partially prepared using [employer] voice recognition dictation software. As a result, errors may occur. When identified, these errors have been corrected.  While every attempt is made to correct errors during dictation, discrepancies may still exist.

## 2023-12-05 ENCOUNTER — TELEPHONE (OUTPATIENT)
Facility: CLINIC | Age: 78
End: 2023-12-05

## 2023-12-05 ENCOUNTER — OFFICE VISIT (OUTPATIENT)
Facility: CLINIC | Age: 78
End: 2023-12-05

## 2023-12-05 ENCOUNTER — HOSPITAL ENCOUNTER (OUTPATIENT)
Dept: GENERAL RADIOLOGY | Facility: HOSPITAL | Age: 78
Discharge: HOME | End: 2023-12-05
Attending: NURSE PRACTITIONER
Payer: MEDICARE

## 2023-12-05 ENCOUNTER — TELEPHONE (OUTPATIENT)
Dept: GASTROENTEROLOGY | Facility: CLINIC | Age: 78
End: 2023-12-05

## 2023-12-05 VITALS
WEIGHT: 205 LBS | HEART RATE: 102 BPM | DIASTOLIC BLOOD PRESSURE: 82 MMHG | HEIGHT: 66 IN | BODY MASS INDEX: 32.95 KG/M2 | SYSTOLIC BLOOD PRESSURE: 139 MMHG

## 2023-12-05 DIAGNOSIS — K31.A0 INTESTINAL METAPLASIA OF STOMACH: ICD-10-CM

## 2023-12-05 DIAGNOSIS — R10.9 ABDOMINAL CRAMPING: ICD-10-CM

## 2023-12-05 DIAGNOSIS — R63.4 WEIGHT LOSS: ICD-10-CM

## 2023-12-05 DIAGNOSIS — R14.2 BELCHING: ICD-10-CM

## 2023-12-05 DIAGNOSIS — K59.00 CONSTIPATION, UNSPECIFIED CONSTIPATION TYPE: ICD-10-CM

## 2023-12-05 DIAGNOSIS — R14.0 GASSINESS: ICD-10-CM

## 2023-12-05 DIAGNOSIS — R12 HEARTBURN: Primary | ICD-10-CM

## 2023-12-05 DIAGNOSIS — R10.13 DYSPEPSIA: Primary | ICD-10-CM

## 2023-12-05 DIAGNOSIS — R14.0 BLOATING: ICD-10-CM

## 2023-12-05 PROCEDURE — 74018 RADEX ABDOMEN 1 VIEW: CPT | Performed by: NURSE PRACTITIONER

## 2023-12-05 PROCEDURE — 99215 OFFICE O/P EST HI 40 MIN: CPT | Performed by: NURSE PRACTITIONER

## 2023-12-05 NOTE — TELEPHONE ENCOUNTER
Nursing:    Nursing please contact Dr. Morales for cardiac clearance and okay to hold eliquis 48h prior to EGD.    Thanks,  Jasmyne

## 2023-12-05 NOTE — TELEPHONE ENCOUNTER
Scheduled for:  EGD [address]  Provider Name:  Dr. Jose Renner  Date:  4/2/2024  Location:  [facility]  Sedation:  MAC  Time:  1:30 pm, arrival 12:30 pm  Prep:  NPO after midnight  Meds/Allergies Reconciled?:  JUAN Strange reviewed  Diagnosis with codes:  Dyspepsia R10.13; Weight loss R63.4; Intestinal metaplasia K31. A0  Was patient informed to call insurance with codes (Y/N):  Yes  Referral sent?:  Referral was sent at the time of electronic surgical scheduling. [facility] or [facility] [address] [place] [address] notified?:  I sent an electronic request to Endo Scheduling and received a confirmation today. Medication Orders:  Nursing staff to contact Dr. Racheal Estevez for cardiac clearance and okay to hold eliquis 48h prior to procedure (message sent)  Misc Orders:  N/A     Further instructions given by staff:  Prep instructions were given to pt in the office, pt verbalized understanding.

## 2023-12-07 ENCOUNTER — TELEPHONE (OUTPATIENT)
Dept: GASTROENTEROLOGY | Facility: CLINIC | Age: 78
End: 2023-12-07

## 2023-12-07 NOTE — TELEPHONE ENCOUNTER
Kub 12/5/23:  Impression  CONCLUSION:  1. Moderate stool throughout the colon consistent with constipation/fecal retention. 2. Nonspecific nonobstructive abdominal gas pattern. Dictated by (CST): Bess Heller MD on 12/05/2023 at 4:21 PM      Lmtcb. Recommend:  Miralax 1-2 capfuls daily and adjsut dose as needed  Squatty potty  My-chart in 2 weeks with status-update    Nursing:  Can you please let her know the results (constipation) and my recommendations?     Thanks,  Timur Cooper

## 2023-12-07 NOTE — TELEPHONE ENCOUNTER
RN called and spoke to pt, informed her of APN recommendations per below. Pt states she does use MyChart, instructed her to send message in 2 weeks with update on status. Pt verbalized understanding.

## 2023-12-08 NOTE — TELEPHONE ENCOUNTER
RN faxed request for cardiac clearance and approval to hold eliquis x 2 days before EGD on 4/2/24 to Dr. Corado's office. Received confirmation that fax was transmitted successfully.  Fax #[phone]

## 2023-12-19 DIAGNOSIS — D51.9 ANEMIA DUE TO VITAMIN B12 DEFICIENCY, UNSPECIFIED B12 DEFICIENCY TYPE: ICD-10-CM

## 2023-12-19 DIAGNOSIS — D53.9 MACROCYTIC ANEMIA: Primary | ICD-10-CM

## 2023-12-19 DIAGNOSIS — D50.9 IRON DEFICIENCY ANEMIA, UNSPECIFIED IRON DEFICIENCY ANEMIA TYPE: ICD-10-CM

## 2023-12-20 ENCOUNTER — OFFICE VISIT (OUTPATIENT)
Dept: HEMATOLOGY/ONCOLOGY | Facility: HOSPITAL | Age: 78
End: 2023-12-20
Attending: INTERNAL MEDICINE
Payer: MEDICARE

## 2023-12-20 ENCOUNTER — TELEPHONE (OUTPATIENT)
Facility: CLINIC | Age: 78
End: 2023-12-20

## 2023-12-20 VITALS
HEIGHT: 66 IN | DIASTOLIC BLOOD PRESSURE: 65 MMHG | TEMPERATURE: 98 F | HEART RATE: 78 BPM | BODY MASS INDEX: 32.17 KG/M2 | WEIGHT: 200.19 LBS | RESPIRATION RATE: 18 BRPM | SYSTOLIC BLOOD PRESSURE: 177 MMHG

## 2023-12-20 DIAGNOSIS — D50.9 IRON DEFICIENCY ANEMIA, UNSPECIFIED IRON DEFICIENCY ANEMIA TYPE: ICD-10-CM

## 2023-12-20 DIAGNOSIS — D53.9 MACROCYTIC ANEMIA: ICD-10-CM

## 2023-12-20 DIAGNOSIS — D63.1 ANEMIA IN STAGE 3B CHRONIC KIDNEY DISEASE  (HCC): ICD-10-CM

## 2023-12-20 DIAGNOSIS — D51.9 ANEMIA DUE TO VITAMIN B12 DEFICIENCY, UNSPECIFIED B12 DEFICIENCY TYPE: ICD-10-CM

## 2023-12-20 DIAGNOSIS — D53.9 MACROCYTIC ANEMIA: Primary | ICD-10-CM

## 2023-12-20 DIAGNOSIS — N18.32 ANEMIA IN STAGE 3B CHRONIC KIDNEY DISEASE  (HCC): ICD-10-CM

## 2023-12-20 LAB
BASOPHILS # BLD AUTO: 0.05 X10(3) UL (ref 0–0.2)
BASOPHILS NFR BLD AUTO: 1.3 %
DEPRECATED HBV CORE AB SER IA-ACNC: 22.4 NG/ML
DEPRECATED RDW RBC AUTO: 65.4 FL (ref 35.1–46.3)
EOSINOPHIL # BLD AUTO: 0.03 X10(3) UL (ref 0–0.7)
EOSINOPHIL NFR BLD AUTO: 0.8 %
ERYTHROCYTE [DISTWIDTH] IN BLOOD BY AUTOMATED COUNT: 19.2 % (ref 11–15)
HCT VFR BLD AUTO: 25.6 %
HGB BLD-MCNC: 7.5 G/DL
HGB RETIC QN AUTO: 25 PG (ref 28.2–36.6)
IMM GRANULOCYTES # BLD AUTO: 0.01 X10(3) UL (ref 0–1)
IMM GRANULOCYTES NFR BLD: 0.3 %
IMM RETICS NFR: 0.26 RATIO (ref 0.1–0.3)
IRON SATN MFR SERPL: 13 %
IRON SERPL-MCNC: 52 UG/DL
LDH SERPL L TO P-CCNC: 582 U/L
LYMPHOCYTES # BLD AUTO: 0.52 X10(3) UL (ref 1–4)
LYMPHOCYTES NFR BLD AUTO: 13.1 %
MCH RBC QN AUTO: 29.3 PG (ref 26–34)
MCHC RBC AUTO-ENTMCNC: 29.3 G/DL (ref 31–37)
MCV RBC AUTO: 100 FL
MONOCYTES # BLD AUTO: 0.47 X10(3) UL (ref 0.1–1)
MONOCYTES NFR BLD AUTO: 11.8 %
NEUTROPHILS # BLD AUTO: 2.89 X10 (3) UL (ref 1.5–7.7)
NEUTROPHILS # BLD AUTO: 2.89 X10(3) UL (ref 1.5–7.7)
NEUTROPHILS NFR BLD AUTO: 72.7 %
PLATELET # BLD AUTO: 192 10(3)UL (ref 150–450)
PLATELET MORPHOLOGY: NORMAL
RBC # BLD AUTO: 2.56 X10(6)UL
RETICS # AUTO: 85 X10(3) UL (ref 22.5–147.5)
RETICS/RBC NFR AUTO: 3.3 %
TIBC SERPL-MCNC: 404 UG/DL (ref 250–425)
TRANSFERRIN SERPL-MCNC: 271 MG/DL (ref 250–380)
VIT B12 SERPL-MCNC: 626 PG/ML (ref 211–911)
WBC # BLD AUTO: 4 X10(3) UL (ref 4–11)

## 2023-12-20 PROCEDURE — 83540 ASSAY OF IRON: CPT

## 2023-12-20 PROCEDURE — 99214 OFFICE O/P EST MOD 30 MIN: CPT | Performed by: INTERNAL MEDICINE

## 2023-12-20 PROCEDURE — 82728 ASSAY OF FERRITIN: CPT

## 2023-12-20 PROCEDURE — 85045 AUTOMATED RETICULOCYTE COUNT: CPT

## 2023-12-20 PROCEDURE — 85025 COMPLETE CBC W/AUTO DIFF WBC: CPT

## 2023-12-20 PROCEDURE — 83615 LACTATE (LD) (LDH) ENZYME: CPT

## 2023-12-20 PROCEDURE — 84466 ASSAY OF TRANSFERRIN: CPT

## 2023-12-20 PROCEDURE — 82607 VITAMIN B-12: CPT

## 2023-12-20 PROCEDURE — 36415 COLL VENOUS BLD VENIPUNCTURE: CPT

## 2023-12-20 NOTE — TELEPHONE ENCOUNTER
Dr. Maggie Mckinley-   Are you ok with patient seeing another provider so she can be seen sooner? Please advise. Thank you!

## 2023-12-20 NOTE — TELEPHONE ENCOUNTER
Dr. James Polo and Fredi Holland    Call received from Dr. Noe Ray who saw her in office today for anemia and iron deficiency. Hemoglobin is 7.5  She isn't scheduled for EGD until April. Dr. Noe Ray requesting that patient have EGD set up ASAP. Can we add her on sooner?     Please advise    Thank you

## 2023-12-21 NOTE — TELEPHONE ENCOUNTER
Communicated with Dr. Tonny Pop. Will plan for EGD sooner with me OR any available provider if Hg declines.

## 2023-12-27 ENCOUNTER — NURSE ONLY (OUTPATIENT)
Dept: HEMATOLOGY/ONCOLOGY | Facility: HOSPITAL | Age: 78
End: 2023-12-27
Attending: INTERNAL MEDICINE
Payer: MEDICARE

## 2023-12-27 DIAGNOSIS — D50.9 IRON DEFICIENCY ANEMIA, UNSPECIFIED IRON DEFICIENCY ANEMIA TYPE: ICD-10-CM

## 2023-12-27 DIAGNOSIS — D63.1 ANEMIA IN STAGE 3B CHRONIC KIDNEY DISEASE  (HCC): ICD-10-CM

## 2023-12-27 DIAGNOSIS — N18.32 ANEMIA IN STAGE 3B CHRONIC KIDNEY DISEASE  (HCC): ICD-10-CM

## 2023-12-27 LAB
ANTIBODY SCREEN: NEGATIVE
BASOPHILS # BLD AUTO: 0.03 X10(3) UL (ref 0–0.2)
BASOPHILS NFR BLD AUTO: 0.6 %
DEPRECATED RDW RBC AUTO: 68.5 FL (ref 35.1–46.3)
EOSINOPHIL # BLD AUTO: 0.07 X10(3) UL (ref 0–0.7)
EOSINOPHIL NFR BLD AUTO: 1.3 %
ERYTHROCYTE [DISTWIDTH] IN BLOOD BY AUTOMATED COUNT: 20.3 % (ref 11–15)
HCT VFR BLD AUTO: 28.1 %
HGB BLD-MCNC: 7.8 G/DL
IMM GRANULOCYTES # BLD AUTO: 0.02 X10(3) UL (ref 0–1)
IMM GRANULOCYTES NFR BLD: 0.4 %
LYMPHOCYTES # BLD AUTO: 0.64 X10(3) UL (ref 1–4)
LYMPHOCYTES NFR BLD AUTO: 11.9 %
MCH RBC QN AUTO: 27.7 PG (ref 26–34)
MCHC RBC AUTO-ENTMCNC: 27.8 G/DL (ref 31–37)
MCV RBC AUTO: 99.6 FL
MONOCYTES # BLD AUTO: 0.31 X10(3) UL (ref 0.1–1)
MONOCYTES NFR BLD AUTO: 5.8 %
NEUTROPHILS # BLD AUTO: 4.29 X10 (3) UL (ref 1.5–7.7)
NEUTROPHILS # BLD AUTO: 4.29 X10(3) UL (ref 1.5–7.7)
NEUTROPHILS NFR BLD AUTO: 80 %
PLATELET # BLD AUTO: 261 10(3)UL (ref 150–450)
PLATELET MORPHOLOGY: NORMAL
RBC # BLD AUTO: 2.82 X10(6)UL
RH BLOOD TYPE: NEGATIVE
WBC # BLD AUTO: 5.4 X10(3) UL (ref 4–11)

## 2023-12-27 PROCEDURE — 36415 COLL VENOUS BLD VENIPUNCTURE: CPT

## 2023-12-27 PROCEDURE — 86850 RBC ANTIBODY SCREEN: CPT

## 2023-12-27 PROCEDURE — 82668 ASSAY OF ERYTHROPOIETIN: CPT

## 2023-12-27 PROCEDURE — 86900 BLOOD TYPING SEROLOGIC ABO: CPT

## 2023-12-27 PROCEDURE — 86901 BLOOD TYPING SEROLOGIC RH(D): CPT

## 2023-12-27 PROCEDURE — 85025 COMPLETE CBC W/AUTO DIFF WBC: CPT

## 2023-12-29 LAB — ERYTHROPOIETIN: 133.2 MIU/ML

## 2024-01-03 ENCOUNTER — LAB ENCOUNTER (OUTPATIENT)
Dept: LAB | Age: 79
End: 2024-01-03
Attending: NURSE PRACTITIONER
Payer: MEDICARE

## 2024-01-03 DIAGNOSIS — R10.9 ABDOMINAL CRAMPING: ICD-10-CM

## 2024-01-03 DIAGNOSIS — K31.A0 INTESTINAL METAPLASIA OF STOMACH: ICD-10-CM

## 2024-01-03 DIAGNOSIS — N18.32 STAGE 3B CHRONIC KIDNEY DISEASE (HCC): ICD-10-CM

## 2024-01-03 DIAGNOSIS — R12 HEARTBURN: ICD-10-CM

## 2024-01-03 DIAGNOSIS — K59.00 CONSTIPATION, UNSPECIFIED CONSTIPATION TYPE: ICD-10-CM

## 2024-01-03 DIAGNOSIS — R63.4 WEIGHT LOSS: ICD-10-CM

## 2024-01-03 DIAGNOSIS — I10 ESSENTIAL HYPERTENSION: ICD-10-CM

## 2024-01-03 DIAGNOSIS — I50.9 CONGESTIVE HEART FAILURE, UNSPECIFIED HF CHRONICITY, UNSPECIFIED HEART FAILURE TYPE (HCC): ICD-10-CM

## 2024-01-03 DIAGNOSIS — R14.0 GASSINESS: ICD-10-CM

## 2024-01-03 DIAGNOSIS — R14.0 BLOATING: ICD-10-CM

## 2024-01-03 DIAGNOSIS — R14.2 BELCHING: ICD-10-CM

## 2024-01-03 LAB
ALBUMIN SERPL-MCNC: 4.2 G/DL (ref 3.2–4.8)
ALBUMIN/GLOB SERPL: 1.6 {RATIO} (ref 1–2)
ALP LIVER SERPL-CCNC: 61 U/L
ALT SERPL-CCNC: 13 U/L
ANION GAP SERPL CALC-SCNC: 4 MMOL/L (ref 0–18)
AST SERPL-CCNC: 38 U/L (ref ?–34)
BASOPHILS # BLD AUTO: 0.09 X10(3) UL (ref 0–0.2)
BASOPHILS NFR BLD AUTO: 2.4 %
BILIRUB SERPL-MCNC: 0.8 MG/DL (ref 0.2–1.1)
BUN BLD-MCNC: 24 MG/DL (ref 9–23)
BUN/CREAT SERPL: 19.5 (ref 10–20)
CALCIUM BLD-MCNC: 9.4 MG/DL (ref 8.7–10.4)
CHLORIDE SERPL-SCNC: 110 MMOL/L (ref 98–112)
CO2 SERPL-SCNC: 28 MMOL/L (ref 21–32)
CREAT BLD-MCNC: 1.23 MG/DL
DEPRECATED HBV CORE AB SER IA-ACNC: 23.8 NG/ML
DEPRECATED RDW RBC AUTO: 69.6 FL (ref 35.1–46.3)
EGFRCR SERPLBLD CKD-EPI 2021: 44 ML/MIN/1.73M2 (ref 60–?)
EOSINOPHIL # BLD AUTO: 0.09 X10(3) UL (ref 0–0.7)
EOSINOPHIL NFR BLD AUTO: 2.4 %
ERYTHROCYTE [DISTWIDTH] IN BLOOD BY AUTOMATED COUNT: 20.1 % (ref 11–15)
FASTING STATUS PATIENT QL REPORTED: YES
GLOBULIN PLAS-MCNC: 2.7 G/DL (ref 2.8–4.4)
GLUCOSE BLD-MCNC: 99 MG/DL (ref 70–99)
HCT VFR BLD AUTO: 27 %
HGB BLD-MCNC: 7.9 G/DL
IMM GRANULOCYTES # BLD AUTO: 0.01 X10(3) UL (ref 0–1)
IMM GRANULOCYTES NFR BLD: 0.3 %
IRON SATN MFR SERPL: 14 %
IRON SERPL-MCNC: 59 UG/DL
LYMPHOCYTES # BLD AUTO: 0.67 X10(3) UL (ref 1–4)
LYMPHOCYTES NFR BLD AUTO: 17.7 %
MCH RBC QN AUTO: 29.7 PG (ref 26–34)
MCHC RBC AUTO-ENTMCNC: 29.3 G/DL (ref 31–37)
MCV RBC AUTO: 101.5 FL
MONOCYTES # BLD AUTO: 0.41 X10(3) UL (ref 0.1–1)
MONOCYTES NFR BLD AUTO: 10.8 %
NEUTROPHILS # BLD AUTO: 2.52 X10 (3) UL (ref 1.5–7.7)
NEUTROPHILS # BLD AUTO: 2.52 X10(3) UL (ref 1.5–7.7)
NEUTROPHILS NFR BLD AUTO: 66.4 %
OSMOLALITY SERPL CALC.SUM OF ELEC: 298 MOSM/KG (ref 275–295)
PLATELET # BLD AUTO: 229 10(3)UL (ref 150–450)
PLATELET MORPHOLOGY: NORMAL
POTASSIUM SERPL-SCNC: 4.8 MMOL/L (ref 3.5–5.1)
PROT SERPL-MCNC: 6.9 G/DL (ref 5.7–8.2)
RBC # BLD AUTO: 2.66 X10(6)UL
SODIUM SERPL-SCNC: 142 MMOL/L (ref 136–145)
TIBC SERPL-MCNC: 410 UG/DL (ref 250–425)
TRANSFERRIN SERPL-MCNC: 275 MG/DL (ref 250–380)
WBC # BLD AUTO: 3.8 X10(3) UL (ref 4–11)

## 2024-01-03 PROCEDURE — 83540 ASSAY OF IRON: CPT

## 2024-01-03 PROCEDURE — 82728 ASSAY OF FERRITIN: CPT

## 2024-01-03 PROCEDURE — 84466 ASSAY OF TRANSFERRIN: CPT

## 2024-01-03 PROCEDURE — 80053 COMPREHEN METABOLIC PANEL: CPT

## 2024-01-03 PROCEDURE — 36415 COLL VENOUS BLD VENIPUNCTURE: CPT

## 2024-01-03 PROCEDURE — 85025 COMPLETE CBC W/AUTO DIFF WBC: CPT

## 2024-01-04 ENCOUNTER — TELEPHONE (OUTPATIENT)
Dept: HEMATOLOGY/ONCOLOGY | Facility: HOSPITAL | Age: 79
End: 2024-01-04

## 2024-01-04 ENCOUNTER — TELEPHONE (OUTPATIENT)
Dept: NEPHROLOGY | Facility: CLINIC | Age: 79
End: 2024-01-04

## 2024-01-04 ENCOUNTER — OFFICE VISIT (OUTPATIENT)
Dept: NEPHROLOGY | Facility: CLINIC | Age: 79
End: 2024-01-04
Payer: MEDICARE

## 2024-01-04 VITALS
DIASTOLIC BLOOD PRESSURE: 56 MMHG | HEART RATE: 67 BPM | BODY MASS INDEX: 34 KG/M2 | SYSTOLIC BLOOD PRESSURE: 136 MMHG | WEIGHT: 211 LBS

## 2024-01-04 DIAGNOSIS — N18.31 TYPE 2 DIABETES MELLITUS WITH STAGE 3A CHRONIC KIDNEY DISEASE, WITHOUT LONG-TERM CURRENT USE OF INSULIN (HCC): ICD-10-CM

## 2024-01-04 DIAGNOSIS — D50.8 OTHER IRON DEFICIENCY ANEMIA: ICD-10-CM

## 2024-01-04 DIAGNOSIS — D64.9 ACUTE ON CHRONIC ANEMIA: ICD-10-CM

## 2024-01-04 DIAGNOSIS — I10 ESSENTIAL HYPERTENSION: ICD-10-CM

## 2024-01-04 DIAGNOSIS — I50.9 CONGESTIVE HEART FAILURE, UNSPECIFIED HF CHRONICITY, UNSPECIFIED HEART FAILURE TYPE (HCC): ICD-10-CM

## 2024-01-04 DIAGNOSIS — E11.22 TYPE 2 DIABETES MELLITUS WITH STAGE 3A CHRONIC KIDNEY DISEASE, WITHOUT LONG-TERM CURRENT USE OF INSULIN (HCC): ICD-10-CM

## 2024-01-04 DIAGNOSIS — D50.9 IRON DEFICIENCY ANEMIA, UNSPECIFIED IRON DEFICIENCY ANEMIA TYPE: Primary | ICD-10-CM

## 2024-01-04 DIAGNOSIS — N18.32 STAGE 3B CHRONIC KIDNEY DISEASE (HCC): Primary | ICD-10-CM

## 2024-01-04 PROCEDURE — 99214 OFFICE O/P EST MOD 30 MIN: CPT | Performed by: INTERNAL MEDICINE

## 2024-01-04 PROCEDURE — 1126F AMNT PAIN NOTED NONE PRSNT: CPT | Performed by: INTERNAL MEDICINE

## 2024-01-04 NOTE — TELEPHONE ENCOUNTER
"[facility]   Physical Therapy Treatment Note and Progress Note    Name: Sy Aguiar  Clinic Number: [id number]    Therapy Diagnosis:   Encounter Diagnoses   Name Primary?    Primary osteoarthritis of left knee Yes    Quadriceps weakness      Physician: Urbano Vigil MD  Visit Date: 1/31/2023  Physician Orders: PT Eval and Treat   Medical Diagnosis from Referral:   M25.562,G89.29 (ICD-10-CM) - Chronic pain of left knee   M17.12 (ICD-10-CM) - Primary osteoarthritis of left knee   M62.81 (ICD-10-CM) - Quadriceps weakness   I10 (ICD-10-CM) - Hypertension, unspecified type   Evaluation Date: 1/10/2023  Authorization Period Expiration: None  Plan of Care Expiration: 3/1/2023  Most Recent Progress Note: 1/30/2023  Visit # / Visits authorized: 7/11  FOTO: 1/3  Precautions: Standard, HTN **History of Left Quad Tendon Repair**  PTA Visit #: 1/5  Time In: 3:55 PM  Time Out:  4:55 PM  Total Billable Time: 60 minutes (including 10' modalities)     SUBJECTIVE   Pt reports: his knee is "coming along."  He was somewhat compliant with his home exercise program.  Response to previous treatment: mild discomfort, soreness  Functional change: progressing  Pain: 0/10  Location: Left knee      OBJECTIVE     Objective Measures updated at progress report unless specified.     Treatment     Sy received the treatments listed below:      therapeutic exercises to develop strength, endurance, ROM, flexibility, posture, and core stabilization for 23 minutes including:  Recumbent bike 4' (front and backwards for ROM)  Knee Extension with Heel prop, 3 minutes with 4# above and below knee  Heel Slides with Dorsiflexion 2x10 x5" hold  Shuttle Double Leg Press 100# (50# loaded from the top and bottom) 2 minutes  Shuttle Single (L) Leg Press, 62.5#, 2 minutes  Sled-Pushing, 90#, down and back, 50 feet, 2 laps     manual therapy techniques: Joint mobilizations were applied for 12 minutes, including:  Patellar " Called patient after discussing with Dr. Pizarro.  Patient's hemoglobin has been stable to improving.  Reviewed her iron stores and these seem to be adequate, in fact they are slightly improved.  Her ferritin remains normal.  Her percent saturation is below 15% and most recently at 14%, this more likely reflective of anemia of chronic disease.  I do not recommend at this time to proceed with iron infusions as her numbers have been uptrending.  The patient is taking oral B12 sublingual and folic acid.  She is also having GI workup soon.    I recommend to continue monitoring and she has an appointment with Dr. Pizarro in 10 weeks with labs.  I have scheduled the patient for an appointment on the same day as Dr. Pizarro prior to his appointment with my APN.  We will review her CBC and iron stores at that time.  And at that time make determination whether patient needs to have iron infusions.   "Mobilizations, all planes  Talocrural Anterior to Posterior Mobilizations, Grade III-IV  Talocrural Distraction Mobilizations  Knee Overpressure into Extension, 10x10" hold     neuromuscular re-education activities to improve: Kinesthetic, Proprioception, and Sense for 15 minutes. The following activities were included:  Quad Sets 10x3" hold  Short-Arc Quad, 2#, 2x12, 3" hold  Straight-Leg Raises, 2x10  SLS on floor, 10" x 10"  Long Arc Quad, 2# 2x10  TKE with blue TB  2x10, 3" hold     therapeutic activities to improve functional performance for 00 minutes, including:    cold pack for 10 minutes to left knee.    Patient Education and Home Exercises     Home Exercises Provided and Patient Education Provided     Education provided:   - in travel precautions as he is frequently on a plane for work    Written Home Exercises Provided: Patient instructed to cont prior HEP. Exercises were reviewed and Sy was able to demonstrate them prior to the end of the session.  Sy demonstrated good  understanding of the education provided. See EMR under Patient Instructions for exercises provided during therapy sessions.     ASSESSMENT   Patient tolerates his program well with moderate end range tightness continuing through both planes at the left knee due to joint tightness and ongoing effusion. He remains functionally limited due to flexibility and strength deficits in which we will continue to address with skilled physical therapy.     Sy is progressing well towards his goals.   Pt prognosis is Excellent.     Pt will continue to benefit from skilled outpatient physical therapy to address the deficits listed in the problem list box on initial evaluation, provide pt/family education and to maximize pt's level of independence in the home and community environment.     Pt's spiritual, cultural and educational needs considered and pt agreeable to plan of care and goals.     Anticipated barriers to physical therapy: " None    Goals:  Short Term Goals: 2 weeks   1.) Patient will demonstrate independence in compliance and technique of home exercise program provided as per teach-back method of assessment. Met and Ongoing  2.) Patient will achieve 0 degrees of active and passive knee extension so as to improve normal terminal knee extension during ambulation. Ongoing  3.) Patient will achieve 90 degrees of knee flexion to demonstrate improvement in functional knee range of motion. Met  4.) Patient will complete 10 straight-leg raises without knee extension lag to demonstrate improved quadriceps function and endurance. Ongoing  5.) Patient will demonstrate a 10% improvement as per the FOTO. Ongoing     Long Term Goals: 6 weeks   1.) Patient will demonstrate independence in compliance and technique of home exercise program provided as per teach-back method of assessment. Ongoing  2.) Patient will achieve and maintain 0 degrees of active and passive knee extension so as to improve normal terminal knee extension during ambulation. Ongoing  3.) Patient will achieve 120 degrees of knee flexion to demonstrate improvement in functional knee range of motion. Ongoing  4.) Patient will ambulate for 300 feet with LRAD and with normal gait mechanics to demonstrate improved functional mobility. Ongoing  5.) Patient will demonstrate a 30% improvement as per the FOTO. Ongoing    PLAN   Continue within POC and progress as pt tolerates.   MD f/u again on 4/11/23.    Nati Pepe, PTA

## 2024-01-04 NOTE — PATIENT INSTRUCTIONS
Please add Jardiance 10 mg/day watch for any urine infections as well.  Sugars your heart and your kidneys    You will get iron 300 mg weekly x 3 weeks at the infusion center by Dr. Nunes's office they will call you to come in    Please repeat labs in 4 weeks I will put them in the computer    Call me if you need anything    Please see me back in about 10 weeks    Happy and healthy new year Yamini

## 2024-01-05 PROBLEM — D50.8 IRON DEFICIENCY ANEMIA SECONDARY TO INADEQUATE DIETARY IRON INTAKE: Status: ACTIVE | Noted: 2024-01-05

## 2024-01-05 PROBLEM — D64.9 ANEMIA, UNSPECIFIED: Status: ACTIVE | Noted: 2024-01-05

## 2024-01-05 NOTE — PROGRESS NOTES
Progress Note     Yamini Ocampo    Is here to see me history of anemia with iron deficiency B12 is normal history of depression has a daughter with special needs at home is on amlodipine Lipitor Eliquis for A-fib has a history of a recent aortic valve replacement takes Lasix a few times a week denies shortness of breath or chest pain but is very fatigued because of her hemoglobin admits to trace edema      HISTORY:  Past Medical History:   Diagnosis Date    Arrhythmia     afib    Back problem     lumbar disc disease    Basal cell carcinoma of nose     Bilateral carpal tunnel syndrome     Cataract     Cellulitis     Deep vein thrombosis (HCC)     unsure which leg, possibly left    Depression     Esophageal reflux     Essential hypertension     Heart disease     Heel spur     Right    Hemorrhoids     Hiatal hernia     High blood pressure     High cholesterol     Hyperlipidemia     Incontinence     Lumbar disc disease 2012    Multiple gastric ulcers 04/25/2017    Osteoarthritis     Pulmonary embolism (HCC)     Raynaud disease     Renal disorder     Tubular adenoma of colon 01/2017    repeat c-scope 1/2020    Type 2 diabetes mellitus with diabetic chronic kidney disease (HCC) 11/30/2022    Visual impairment     glasses      Past Surgical History:   Procedure Laterality Date    ANGIOPLASTY (CORONARY)      APPENDECTOMY      ARTHROSCOPY OF JOINT UNLISTED Right     knee    BSO, OMENTECTOMY W/BRANDO      CARPAL TUNNEL RELEASE Bilateral     CHOLECYSTECTOMY  09/28/2016    COLONOSCOPY N/A 01/25/2017    Procedure: COLONOSCOPY;  Surgeon: KATHARINE Prakash MD;  Location: [facility] ENDOSCOPY    COLONOSCOPY N/A 10/07/2020    Procedure: COLONOSCOPY;  Surgeon: KATHARINE Prakash MD;  Location: [facility] ENDOSCOPY    EGD N/A 12/14/2016    Procedure: ESOPHAGOGASTRODUODENOSCOPY (EGD);  Surgeon: KATHARINE Prakash MD;  Location: [facility] ENDOSCOPY    ELECTROCARDIOGRAM, COMPLETE  09/26/2013    Scanned to Media Tab    EXCISION OF NOSE      Basal cell carcinoma     HERNIA SURGERY      HYSTERECTOMY      KNEE REPLACEMENT SURGERY Right     OTHER SURGICAL HISTORY      Injections and narcotics, POD Bartuci    REMOVAL OF HEEL SPUR      TOTAL ABDOM HYSTERECTOMY        Social History     Tobacco Use    Smoking status: Former     Packs/day: 0     Types: Cigarettes     Quit date: 1997     Years since quittin.0    Smokeless tobacco: Never   Substance Use Topics    Alcohol use: No     Comment: rare         Medications (Active prior to today's visit):  Current Outpatient Medications   Medication Sig Dispense Refill    MUCINEX DM  MG Oral Tablet 12 Hr Take 1 tablet by mouth Q12H. FOR 10 DAYS      furosemide 20 MG Oral Tab Take 1 tablet (20 mg total) by mouth daily.      sertraline 100 MG Oral Tab Take 1 tablet (100 mg total) by mouth daily. 90 tablet 0    sodium chloride 0.65 % Nasal Solution 1 spray by Nasal route as needed. 60 mL 0    pantoprazole 40 MG Oral Tab EC Take 1 tablet (40 mg total) by mouth 2 (two) times daily. (Patient taking differently: Take 1 tablet (40 mg total) by mouth 2 (two) times daily. Pt take 1 daily) 60 tablet 2    folic acid 1 MG Oral Tab Take 1 tablet (1 mg total) by mouth daily. 90 tablet 3    METOPROLOL SUCCINATE ER 50 MG Oral Tablet 24 Hr       amLODIPine 5 MG Oral Tab Take 1 tablet (5 mg total) by mouth daily. 30 tablet 0    atorvastatin 10 MG Oral Tab Take 1 tablet (10 mg total) by mouth nightly. 90 tablet 3    cyanocobalamin 1000 MCG Oral Tab Take 1 tablet (1,000 mcg total) by mouth daily.      ELIQUIS 5 MG Oral Tab Take 1 tablet (5 mg total) by mouth 2 (two) times daily. 180 tablet 0       Allergies:  Allergies   Allergen Reactions    Adhesive Tape RASH     Skin off         ROS:     Constitutional:  Negative for decreased activity, fever, irritability and lethargy  ENMT:  Negative for ear drainage, hearing loss and nasal drainage  Eyes:  Negative for eye discharge and vision loss  Cardiovascular:  Negative for chest pain,  sob  Respiratory:  Negative for cough, dyspnea and wheezing  Gastrointestinal:  Negative for abdominal pain, constipation  Genitourinary:  Negative for dysuria and hematuria  Endocrine:  Negative for abnormal sleep patterns  Hema/Lymph:  Negative for easy bleeding and easy bruising  Integumentary:  Negative for pruritus and rash  Musculoskeletal:  Negative for bone/joint symptoms  Neurological:  Negative for gait disturbance  Psychiatric:  Negative for inappropriate interaction and psychiatric symptoms      Vitals:    01/04/24 1603   BP: 136/56   Pulse: 67       PHYSICAL EXAM:   Constitutional: appears well hydrated alert and responsive   Head/Face: normocephalic  Eyes/Vision: normal extraocular motion is intact  Nose/Mouth/Throat:mucous membranes are moist   Neck/Thyroid: neck is supple without adenopathy  Lymphatic: no abnormal cervical, supraclavicular adenopathy is noted  Respiratory:  lungs are clear to auscultation bilaterally  Cardiovascular: regular rate and rhythm   Abdomen: soft, non-tender, non-distended, BS normal  Skin/Hair: no unusual rashes present, no abnormal bruising noted  Back/Spine: no abnormalities noted  Musculoskeletal: no deformities  Extremities: 1+  Neurological:  Grossly normal       ASSESSMENT/PLAN:   Assessment   Encounter Diagnoses   Name Primary?    Stage 3b chronic kidney disease (HCC) Yes    Essential hypertension     Congestive heart failure, unspecified HF chronicity, unspecified heart failure type (HCC)     Other iron deficiency anemia     Type 2 diabetes mellitus with stage 3a chronic kidney disease, without long-term current use of insulin (HCC)     Acute on chronic anemia        #1 history of A-fib appears to be in sinus rhythm on Eliquis rate controlled at 67  #2 history of decreased ejection fraction add  55% before [place].  Add Jardiance 10 mg daily watch for any urinary infections  #3 CKD 3   Creatinine stable 1.23 GFR 44 rest of labs are okay  #4 anemia  Hemoglobin up to  7.9 MCV of 101 white count 3.8  It was 229 spoke to Dr. Nunes she like to hold off on any iron infusions so we will defer to her  Patient to get upper endoscopy in the next week or 2  Ordered repeat labs for about 4 weeks see me back in about 10 weeks she is up-to-date with immunizations  Orders This Visit:  Orders Placed This Encounter   Procedures    CBC With Differential With Platelet    Renal Function Panel    Iron And Tibc       Meds This Visit:  Requested Prescriptions      No prescriptions requested or ordered in this encounter       Imaging & Referrals:  None     1/4/2024  Manan Pizarro MD

## 2024-01-08 ENCOUNTER — TELEPHONE (OUTPATIENT)
Dept: HEMATOLOGY/ONCOLOGY | Facility: HOSPITAL | Age: 79
End: 2024-01-08

## 2024-01-08 NOTE — TELEPHONE ENCOUNTER
Spoke with patient who states she spoke with Dr Ivey and was informed she doesn't need to go forward with iron infusions at this time.  Did see notes from Dr Ivey on 1-4-24 that did state they would hold off on iron infusions at this time.

## 2024-01-17 ENCOUNTER — MED REC SCAN ONLY (OUTPATIENT)
Dept: NEPHROLOGY | Facility: CLINIC | Age: 79
End: 2024-01-17

## 2024-01-17 ENCOUNTER — TELEPHONE (OUTPATIENT)
Dept: NEPHROLOGY | Facility: CLINIC | Age: 79
End: 2024-01-17

## 2024-01-19 ENCOUNTER — TELEPHONE (OUTPATIENT)
Facility: CLINIC | Age: 79
End: 2024-01-19

## 2024-01-19 NOTE — TELEPHONE ENCOUNTER
Patient calling back states was called to go over instructions prior to procedure on 1/23/24. Please call.

## 2024-01-19 NOTE — TELEPHONE ENCOUNTER
Spoke to patient and let her know to hold Eliquis 2 days prior to procedure. Patient verbalized understanding. (See TE 12/20/23)

## 2024-01-22 ENCOUNTER — TELEPHONE (OUTPATIENT)
Dept: FAMILY MEDICINE CLINIC | Facility: CLINIC | Age: 79
End: 2024-01-22

## 2024-01-22 ENCOUNTER — ANESTHESIA EVENT (OUTPATIENT)
Dept: ENDOSCOPY | Facility: HOSPITAL | Age: 79
End: 2024-01-22
Payer: MEDICARE

## 2024-01-22 DIAGNOSIS — F32.1 MAJOR DEPRESSIVE DISORDER, SINGLE EPISODE, MODERATE (HCC): ICD-10-CM

## 2024-01-23 ENCOUNTER — HOSPITAL ENCOUNTER (OUTPATIENT)
Facility: HOSPITAL | Age: 79
Setting detail: HOSPITAL OUTPATIENT SURGERY
Discharge: HOME | End: 2024-01-23
Attending: INTERNAL MEDICINE | Admitting: INTERNAL MEDICINE
Payer: MEDICARE

## 2024-01-23 ENCOUNTER — APPOINTMENT (OUTPATIENT)
Dept: PICC SERVICES | Facility: HOSPITAL | Age: 79
End: 2024-01-23
Attending: INTERNAL MEDICINE
Payer: MEDICARE

## 2024-01-23 ENCOUNTER — ANESTHESIA (OUTPATIENT)
Dept: ENDOSCOPY | Facility: HOSPITAL | Age: 79
End: 2024-01-23
Payer: MEDICARE

## 2024-01-23 VITALS
HEART RATE: 77 BPM | OXYGEN SATURATION: 94 % | BODY MASS INDEX: 31.34 KG/M2 | DIASTOLIC BLOOD PRESSURE: 76 MMHG | SYSTOLIC BLOOD PRESSURE: 113 MMHG | RESPIRATION RATE: 21 BRPM | WEIGHT: 195 LBS | HEIGHT: 66 IN

## 2024-01-23 DIAGNOSIS — K31.A0 INTESTINAL METAPLASIA OF STOMACH: ICD-10-CM

## 2024-01-23 DIAGNOSIS — R63.4 WEIGHT LOSS: ICD-10-CM

## 2024-01-23 DIAGNOSIS — R10.13 DYSPEPSIA: ICD-10-CM

## 2024-01-23 PROCEDURE — 0DB78ZX EXCISION OF STOMACH, PYLORUS, VIA NATURAL OR ARTIFICIAL OPENING ENDOSCOPIC, DIAGNOSTIC: ICD-10-PCS | Performed by: INTERNAL MEDICINE

## 2024-01-23 PROCEDURE — 0DB68ZX EXCISION OF STOMACH, VIA NATURAL OR ARTIFICIAL OPENING ENDOSCOPIC, DIAGNOSTIC: ICD-10-PCS | Performed by: INTERNAL MEDICINE

## 2024-01-23 PROCEDURE — 43239 EGD BIOPSY SINGLE/MULTIPLE: CPT | Performed by: INTERNAL MEDICINE

## 2024-01-23 RX ORDER — NALOXONE HYDROCHLORIDE 0.4 MG/ML
0.08 INJECTION, SOLUTION INTRAMUSCULAR; INTRAVENOUS; SUBCUTANEOUS ONCE AS NEEDED
Status: DISCONTINUED | OUTPATIENT
Start: 2024-01-23 | End: 2024-01-23

## 2024-01-23 RX ORDER — SERTRALINE HYDROCHLORIDE 100 MG/1
100 TABLET, FILM COATED ORAL DAILY
Qty: 90 TABLET | Refills: 0 | Status: SHIPPED | OUTPATIENT
Start: 2024-01-23

## 2024-01-23 RX ORDER — LIDOCAINE HYDROCHLORIDE 10 MG/ML
INJECTION, SOLUTION EPIDURAL; INFILTRATION; INTRACAUDAL; PERINEURAL AS NEEDED
Status: DISCONTINUED | OUTPATIENT
Start: 2024-01-23 | End: 2024-01-23 | Stop reason: SURG

## 2024-01-23 RX ORDER — SODIUM CHLORIDE, SODIUM LACTATE, POTASSIUM CHLORIDE, CALCIUM CHLORIDE 600; 310; 30; 20 MG/100ML; MG/100ML; MG/100ML; MG/100ML
INJECTION, SOLUTION INTRAVENOUS CONTINUOUS
Status: DISCONTINUED | OUTPATIENT
Start: 2024-01-23 | End: 2024-01-23

## 2024-01-23 RX ADMIN — SODIUM CHLORIDE, SODIUM LACTATE, POTASSIUM CHLORIDE, CALCIUM CHLORIDE: 600; 310; 30; 20 INJECTION, SOLUTION INTRAVENOUS at 14:05:00

## 2024-01-23 RX ADMIN — LIDOCAINE HYDROCHLORIDE 50 MG: 10 INJECTION, SOLUTION EPIDURAL; INFILTRATION; INTRACAUDAL; PERINEURAL at 14:08:00

## 2024-01-23 NOTE — ANESTHESIA PREPROCEDURE EVALUATION
Anesthesia PreOp Note    HPI:     Yamini Ocampo is a 78 year old female who presents for preoperative consultation requested by: KATHARINE Prkaash MD    Date of Surgery: 1/23/2024    Procedure(s):  ESOPHAGOGASTRODUODENOSCOPY  Indication: Dyspepsia /Weight loss /Intestinal metaplasia of stomach    Relevant Problems   No relevant active problems       NPO:   Last solid intake: 1/22/2024 at 9:30 PM   Last clear liquid intake: 1/23/2024 at 9:30 AM                      History Review:  Patient Active Problem List    Diagnosis Date Noted    Anemia, unspecified 01/05/2024    Iron deficiency anemia secondary to inadequate dietary iron intake 01/05/2024    Acute on chronic congestive heart failure, unspecified heart failure type ([facility]) 03/07/2023    Acute on chronic anemia     Type 2 diabetes mellitus with diabetic chronic kidney disease ([facility]) 11/30/2022    Anemia due to vitamin B12 deficiency 08/01/2022    Macrocytic anemia 04/26/2022    Hyperpigmented skin lesion 04/26/2022    CREST syndrome ([facility]) 04/12/2022    Type 2 diabetes mellitus with stage 3b chronic kidney disease, without long-term current use of insulin ([facility]) 04/12/2022    Prediabetes 06/30/2021    Abnormal weight gain 06/30/2021    Diverticulosis of colon     Osteoarthritis of left knee 01/31/2019    Raynaud's disease without gangrene 03/02/2018    Osteoarthritis of right knee 10/12/2017    Osteoarthritis 10/12/2017    History of ischemic colitis 09/28/2017    CKD (chronic kidney disease) stage 3, GFR 30-59 ml/min ([facility]) 06/26/2017    Mesenteric ischemia, chronic ([facility]) 04/26/2017    A-fib ([facility])     Polyp of colon     Chronic diarrhea 01/06/2017    Gastroesophageal reflux disease without esophagitis     Hiatal hernia with GERD and esophagitis 09/01/2016    Ventral hernia without obstruction or gangrene 05/19/2016    Gastric erythema 04/28/2016    Essential hypertension 08/07/2015    Sleep apnea 10/01/2013    Congestive heart failure ([facility]) 08/27/2013    Peripheral  vascular disease (HCC) 08/27/2013    Major depressive disorder, single episode, moderate ([facility]) 06/20/2011    Vitamin D deficiency 06/20/2011    Class 2 severe obesity due to excess calories with serious comorbidity and body mass index (BMI) of 39.0 to 39.9 in adult  ([facility]) 03/05/2009       Past Medical History:   Diagnosis Date    Arrhythmia     afib    Back problem     lumbar disc disease    Basal cell carcinoma of nose     Bilateral carpal tunnel syndrome     Cataract     Cellulitis     Deep vein thrombosis (HCC)     unsure which leg, possibly left    Depression     Esophageal reflux     Essential hypertension     Heart disease     Heel spur     Right    Hemorrhoids     Hiatal hernia     High blood pressure     High cholesterol     Hyperlipidemia     Incontinence     Lumbar disc disease 2012    Multiple gastric ulcers 04/25/2017    Osteoarthritis     Pulmonary embolism ([facility])     Raynaud disease     Renal disorder     Tubular adenoma of colon 01/2017    repeat c-scope 1/2020    Type 2 diabetes mellitus with diabetic chronic kidney disease ([facility]) 11/30/2022    Visual impairment     glasses       Past Surgical History:   Procedure Laterality Date    ANGIOPLASTY (CORONARY)      APPENDECTOMY      ARTHROSCOPY OF JOINT UNLISTED Right     knee    BSO, OMENTECTOMY W/BRANDO      CARPAL TUNNEL RELEASE Bilateral     CHOLECYSTECTOMY  09/28/2016    COLONOSCOPY N/A 01/25/2017    Procedure: COLONOSCOPY;  Surgeon: KATHARINE Prakash MD;  Location: [facility] ENDOSCOPY    COLONOSCOPY N/A 10/07/2020    Procedure: COLONOSCOPY;  Surgeon: KATHARINE Prakash MD;  Location: [facility] ENDOSCOPY    EGD N/A 12/14/2016    Procedure: ESOPHAGOGASTRODUODENOSCOPY (EGD);  Surgeon: KATHARINE Prakash MD;  Location: [facility] ENDOSCOPY    ELECTROCARDIOGRAM, COMPLETE  09/26/2013    Scanned to Media Tab    EXCISION OF NOSE      Basal cell carcinoma    HERNIA SURGERY      HYSTERECTOMY      KNEE REPLACEMENT SURGERY Right     OTHER SURGICAL HISTORY      Injections and narcotics, POD  Sumi    REMOVAL OF HEEL SPUR      TOTAL ABDOM HYSTERECTOMY         Medications Prior to Admission   Medication Sig Dispense Refill Last Dose    [] gabapentin 100 MG Oral Cap Take 1 capsule (100 mg total) by mouth 2 (two) times daily for 5 days. 10 capsule 0     furosemide 20 MG Oral Tab Take 1 tablet (20 mg total) by mouth daily.       sertraline 100 MG Oral Tab Take 1 tablet (100 mg total) by mouth daily. 90 tablet 0     [] fluticasone propionate 50 MCG/ACT Nasal Suspension 2 sprays by Each Nare route daily for 10 days. 16 g 0     [] ondansetron 4 MG Oral Tablet Dispersible Take 1 tablet (4 mg total) by mouth every 4 (four) hours as needed for Nausea. 10 tablet 0     pantoprazole 40 MG Oral Tab EC Take 1 tablet (40 mg total) by mouth 2 (two) times daily. (Patient taking differently: Take 1 tablet (40 mg total) by mouth 2 (two) times daily. Pt take 1 daily) 60 tablet 2     folic acid 1 MG Oral Tab Take 1 tablet (1 mg total) by mouth daily. 90 tablet 3     METOPROLOL SUCCINATE ER 50 MG Oral Tablet 24 Hr        amLODIPine 5 MG Oral Tab Take 1 tablet (5 mg total) by mouth daily. 30 tablet 0     atorvastatin 10 MG Oral Tab Take 1 tablet (10 mg total) by mouth nightly. 90 tablet 3     cyanocobalamin 1000 MCG Oral Tab Take 1 tablet (1,000 mcg total) by mouth daily.       ELIQUIS 5 MG Oral Tab Take 1 tablet (5 mg total) by mouth 2 (two) times daily. 180 tablet 0     MUCINEX DM  MG Oral Tablet 12 Hr Take 1 tablet by mouth Q12H. FOR 10 DAYS       [] amoxicillin 875 MG Oral Tab Take 1 tablet (875 mg total) by mouth 2 (two) times daily for 10 days. 20 tablet 0     sodium chloride 0.65 % Nasal Solution 1 spray by Nasal route as needed. 60 mL 0      No current Epic-ordered facility-administered medications on file.     No current Epic-ordered outpatient medications on file.       Allergies   Allergen Reactions    Adhesive Tape RASH     Skin off       Family History   Problem Relation Age of  Onset    Cancer Father         Skin cancer    Other (Other) Father 97        old age,unknown caused    Heart Attack Mother     Heart Disorder Mother     Hypertension Other         Family H/O    Cancer Other         Lymphoma  Family H/O    Heart Disease Other         Family H/O    Arthritis Other         Close relative  unsure which type    No Known Problems Brother      Social History     Socioeconomic History    Marital status:    Tobacco Use    Smoking status: Former     Packs/day: 0     Types: Cigarettes     Quit date: 1997     Years since quittin.0    Smokeless tobacco: Never   Vaping Use    Vaping Use: Never used   Substance and Sexual Activity    Alcohol use: No     Comment: rare    Drug use: Never     Comment: edibles occasionally for sleep   Other Topics Concern    Caffeine Concern Yes     Comment: 8 cups coffee/sod daily    Reaction to local anesthetic No       Available pre-op labs reviewed.  Lab Results   Component Value Date    WBC 3.8 (L) 2024    RBC 2.66 (L) 2024    HGB 7.9 (L) 2024    HCT 27.0 (L) 2024    .5 (H) 2024    MCH 29.7 2024    MCHC 29.3 (L) 2024    RDW 20.1 (H) 2024    .0 2024     Lab Results   Component Value Date     2024    K 4.8 2024     2024    CO2 28.0 2024    BUN 24 (H) 2024    CREATSERUM 1.23 (H) 2024    GLU 99 2024    CA 9.4 2024          Vital Signs:  There is no height or weight on file to calculate BMI.   vitals were not taken for this visit.   There were no vitals filed for this visit.     Anesthesia Evaluation     Patient summary reviewed and Nursing notes reviewed    No history of anesthetic complications   Airway   Mallampati: I  TM distance: >3 FB  Neck ROM: full  Dental      Comment: Denies any loose, chipped, missing teeth      Pulmonary     breath sounds clear to auscultation  (-) COPD, asthma  Cardiovascular   (+) hypertension  well controlled, valvular problems/murmurs, CAD, dysrhythmias, CHF    ECG reviewed  Rhythm: irregular  ROS comment: Atrial Fibrillation on Eliquis (last date taken 1/20/2023)    Echo: EF 55-60%, Severe aortic stenosis-s/p AVR March 2023    Neuro/Psych    (+)  neuromuscular disease,  depression    (-) seizures, TIA, CVA    GI/Hepatic/Renal    (+) GERD well controlled, chronic renal disease    Endo/Other    (+) blood dyscrasia  (-) hypothyroidism, hyperthyroidism    Comments: DVT/PE- 22 years ago  Abdominal                  Anesthesia Plan:   ASA:  3  Plan:   MAC  Informed Consent Plan and Risks Discussed With:  Patient  Discussed plan with:  CRNA      I have informed Yamini Ocampo and/or legal guardian or family member of the nature of the anesthetic plan, benefits, risks including possible dental damage if relevant, major complications, and any alternative forms of anesthetic management.   All of the patient's questions were answered to the best of my ability. The patient desires the anesthetic management as planned.  Charles Kent CRNA  1/23/2024 12:31 PM  Present on Admission:  **None**

## 2024-01-23 NOTE — H&P
History & Physical Examination    Patient Name: Yamini Ocampo  MRN: [id number]  [facility]: [id number]  YOB: 1945    Diagnosis: GERD, gastric intestinal metaplasia    Medications Prior to Admission   Medication Sig Dispense Refill Last Dose    [] gabapentin 100 MG Oral Cap Take 1 capsule (100 mg total) by mouth 2 (two) times daily for 5 days. 10 capsule 0     furosemide 20 MG Oral Tab Take 1 tablet (20 mg total) by mouth daily.   2024    [] fluticasone propionate 50 MCG/ACT Nasal Suspension 2 sprays by Each Nare route daily for 10 days. 16 g 0     [] ondansetron 4 MG Oral Tablet Dispersible Take 1 tablet (4 mg total) by mouth every 4 (four) hours as needed for Nausea. 10 tablet 0     pantoprazole 40 MG Oral Tab EC Take 1 tablet (40 mg total) by mouth 2 (two) times daily. (Patient taking differently: Take 1 tablet (40 mg total) by mouth 2 (two) times daily. Pt take 1 daily) 60 tablet 2 2024    folic acid 1 MG Oral Tab Take 1 tablet (1 mg total) by mouth daily. 90 tablet 3 2024    METOPROLOL SUCCINATE ER 50 MG Oral Tablet 24 Hr    2024    amLODIPine 5 MG Oral Tab Take 1 tablet (5 mg total) by mouth daily. 30 tablet 0 2024    atorvastatin 10 MG Oral Tab Take 1 tablet (10 mg total) by mouth nightly. 90 tablet 3 2024    cyanocobalamin 1000 MCG Oral Tab Take 1 tablet (1,000 mcg total) by mouth daily.   2024    ELIQUIS 5 MG Oral Tab Take 1 tablet (5 mg total) by mouth 2 (two) times daily. 180 tablet 0 2024    MUCINEX DM  MG Oral Tablet 12 Hr Take 1 tablet by mouth Q12H. FOR 10 DAYS   prn    [] amoxicillin 875 MG Oral Tab Take 1 tablet (875 mg total) by mouth 2 (two) times daily for 10 days. 20 tablet 0     sodium chloride 0.65 % Nasal Solution 1 spray by Nasal route as needed. 60 mL 0      Current Facility-Administered Medications   Medication Dose Route Frequency    lactated ringers infusion   Intravenous Continuous     Facility-Administered  Medications Ordered in Other Encounters   Medication Dose Route Frequency    lidocaine PF (Xylocaine-MPF) 1% injection   Intravenous PRN    propofol (Diprivan) 10 MG/ML injection   Intravenous PRN    propofol (Diprivan) 10 mg/mL infusion premix   Intravenous Continuous PRN       Allergies:   Allergies   Allergen Reactions    Adhesive Tape RASH     Skin off       Past Medical History:   Diagnosis Date    Arrhythmia     afib    Back problem     lumbar disc disease    Basal cell carcinoma of nose     Bilateral carpal tunnel syndrome     Cataract     Cellulitis     Deep vein thrombosis (HCC)     unsure which leg, possibly left    Depression     Esophageal reflux     Essential hypertension     Heart disease     Heel spur     Right    Hemorrhoids     Hiatal hernia     High blood pressure     High cholesterol     Hyperlipidemia     Incontinence     Lumbar disc disease 2012    Multiple gastric ulcers 04/25/2017    Osteoarthritis     Pulmonary embolism (HCC)     Raynaud disease     Renal disorder     Tubular adenoma of colon 01/2017    repeat c-scope 1/2020    Type 2 diabetes mellitus with diabetic chronic kidney disease (HCC) 11/30/2022    Visual impairment     glasses     Past Surgical History:   Procedure Laterality Date    ANGIOPLASTY (CORONARY)      APPENDECTOMY      ARTHROSCOPY OF JOINT UNLISTED Right     knee    BSO, OMENTECTOMY W/BRANDO      CARPAL TUNNEL RELEASE Bilateral     CHOLECYSTECTOMY  09/28/2016    COLONOSCOPY N/A 01/25/2017    Procedure: COLONOSCOPY;  Surgeon: KATHARINE Prakash MD;  Location: [facility] ENDOSCOPY    COLONOSCOPY N/A 10/07/2020    Procedure: COLONOSCOPY;  Surgeon: KATHARINE Prakash MD;  Location: [facility] ENDOSCOPY    EGD N/A 12/14/2016    Procedure: ESOPHAGOGASTRODUODENOSCOPY (EGD);  Surgeon: KATHARINE Prakash MD;  Location: [facility] ENDOSCOPY    EGD N/A 01/23/2024    ; gastritis    ELECTROCARDIOGRAM, COMPLETE  09/26/2013    Scanned to Media Tab    EXCISION OF NOSE      Basal cell carcinoma    HERNIA  SURGERY      HYSTERECTOMY      KNEE REPLACEMENT SURGERY Right     OTHER SURGICAL HISTORY      Injections and narcotics, POD Bartuci    REMOVAL OF HEEL SPUR      TOTAL ABDOM HYSTERECTOMY       Family History   Problem Relation Age of Onset    Cancer Father         Skin cancer    Other (Other) Father 97        old age,unknown caused    Heart Attack Mother     Heart Disorder Mother     Hypertension Other         Family H/O    Cancer Other         Lymphoma  Family H/O    Heart Disease Other         Family H/O    Arthritis Other         Close relative  unsure which type    No Known Problems Brother      Social History     Tobacco Use    Smoking status: Former     Packs/day: 0     Types: Cigarettes     Quit date: 1997     Years since quittin.0    Smokeless tobacco: Never   Substance Use Topics    Alcohol use: No     Comment: rare         SYSTEM Check if Review is Normal Check if Physical Exam is Normal If not normal, please explain:   HEENT [X ] [ X]    NECK  [X ] [ X]    HEART [X ] [ X]    LUNGS [X ] [ X]    ABDOMEN [X ] [ X]    EXTREMITIES [X ] [ X]    OTHER        I have discussed the risks and benefits and alternatives of the procedure with the patient/family.  They understand and agree to proceed with plan of care.   I have reviewed the History and Physical done within the last 30 days.  Any changes noted above.    JACKIE Prakash MD  [facility] - Gastroenterology  2024  2:16 PM

## 2024-01-23 NOTE — TELEPHONE ENCOUNTER
Refill passed per [facility] protocol.   Sertraline increased to 100mg from 75mg 11/14/2023, patient was to have ffup end of Dec.   90 day refill given on 01/23/24, appointment needed for further refills.  (Was going to be only 30, changed to qty 90 because it is coming from mailorder)    Requested Prescriptions   Pending Prescriptions Disp Refills    SERTRALINE 100 MG Oral Tab [Pharmacy Med Name: SERTRALINE HCL TABS 100MG] 90 tablet 3     Sig: TAKE 1 TABLET DAILY       Psychiatric Non-Scheduled (Anti-Anxiety) Passed - 1/22/2024 12:49 AM        Passed - In person appointment or virtual visit in the past 6 mos or appointment in next 3 mos     Recent Outpatient Visits              2 weeks ago Stage 3b chronic kidney disease (HCC)    [facility] Manan Pizarro MD    Office Visit    3 weeks ago Iron deficiency anemia, unspecified iron deficiency anemia type    [facility] Hematology Oncology    Nurse Only    1 month ago Macrocytic anemia    [facility]cology    Nurse Only    1 month ago Macrocytic anemia    [facility] Hematology Oncology Frantz Ivey MD    Office Visit    1 month ago [facility] Jasmyne Harp APRN    Office Visit          Future Appointments         Provider Department Appt Notes    In 1 month VancleveAnna bravo APRN [facility] Hematology Oncology     In 1 month Manan Pizarro MD [facility] 10 weeks                  Future Appointments         Provider Department Appt Notes    In 1 month VancleveAnna bravo APRN [facility] Hematology Oncology     In 1 month Manan Pizarro MD [facility] 10 weeks          Recent Outpatient Visits              2 weeks ago Stage 3b chronic kidney disease (HCC)    [facility]  Manan Pizarro MD    Office Visit    3 weeks ago Iron deficiency anemia, unspecified iron deficiency anemia type    [facility] Hematology Oncology    Nurse Only    1 month ago Macrocytic anemia    [facility]cology    Nurse Only    1 month ago Macrocytic anemia    [facility]emat[facility]cology Frantz Ivey MD    Office Visit    1 month ago [facility] Jasmyne Harp, APRN    Office Visit

## 2024-01-23 NOTE — OPERATIVE REPORT
ESOPHAGOGASTRODUODENOSCOPY (EGD) REPORT    Yamini Ocampo     1945 Age 78 year old   PCP Kathy Anne MD Endoscopist Jennie Prakash MD     Date of procedure: 24    Procedure: EGD w/cold biopsy    Pre-operative diagnosis: GERD, gastric intestinal metaplasia    Post-operative diagnosis: Gastric erythema    Medications: MAC    Complications: none    Procedure:  Informed consent was obtained from the patient after the risks of the procedure were discussed, including but not limited to bleeding, perforation, aspiration, infection, or possibility of a missed lesion. After discussions of the risks/benefits and alternatives to this procedure, as well as the planned sedation, the patient was placed in the left lateral decubitus position and begun on continuous blood pressure pulse oximetry and EKG monitoring and this was maintained throughout the procedure. Once an adequate level of sedation was obtained a bite block was placed. Then the lubricated tip of the [id number] diagnostic video upper endoscope was inserted and advanced using direct visualization into the posterior pharynx and ultimately into the esophagus.    Complications: None    Findings:      1. Esophagus: The squamocolumnar junction was noted at 40 cm and appeared regular. The diaphragmatic pinch was noted noted at 40 cm from the incisors. The esophageal mucosa appeared normal in the proximal esophagus. Reflux was seen occurring during procedure and the liquid was suctioned.     2. Stomach: The stomach distended normally. Normal rugal folds were seen. The pylorus was patent. The gastric mucosa appeared mildly erythematous (especially in the antrum) s/p mapping gastric biopsies. Retroflexion revealed a normal fundus and a non-patulous cardia.      3. Duodenum: The duodenal mucosa appeared normal in the 1st and 2nd portion of the duodenum.     Impression:  1. Non-erosive reflux disease.  2. Gastric erythema s/p biopsies  (mapping) due to hx of gastric IM.    Recommend:  1. Await pathology.  2. Avoid caffeine, chocolate, peppermint, and alcohol. Also avoid lying down flat or in a recumbent position for 3 hours after a meal.   3. Can hold off on further endoscopy given age/co-morbidities.   4. Resume blood thinner tomorrow, monitor for bleeding.    >>>If tissue was sampled/removed and you have not received your pathology results either by phone or letter within 2 weeks, please call our office at [phone].    Specimens: gastric  Blood loss: <1 ml

## 2024-02-08 ENCOUNTER — OFFICE VISIT (OUTPATIENT)
Dept: FAMILY MEDICINE CLINIC | Facility: CLINIC | Age: 79
End: 2024-02-08

## 2024-02-08 VITALS
WEIGHT: 197 LBS | SYSTOLIC BLOOD PRESSURE: 94 MMHG | DIASTOLIC BLOOD PRESSURE: 62 MMHG | HEIGHT: 66 IN | HEART RATE: 98 BPM | BODY MASS INDEX: 31.66 KG/M2

## 2024-02-08 DIAGNOSIS — M79.89 LEG SWELLING: ICD-10-CM

## 2024-02-08 DIAGNOSIS — Z12.11 SCREENING FOR MALIGNANT NEOPLASM OF COLON: ICD-10-CM

## 2024-02-08 DIAGNOSIS — J02.9 SORE THROAT: ICD-10-CM

## 2024-02-08 DIAGNOSIS — J02.0 STREP PHARYNGITIS: Primary | ICD-10-CM

## 2024-02-08 PROCEDURE — 99213 OFFICE O/P EST LOW 20 MIN: CPT | Performed by: PHYSICIAN ASSISTANT

## 2024-02-08 RX ORDER — AZITHROMYCIN 250 MG/1
TABLET, FILM COATED ORAL
Qty: 6 TABLET | Refills: 0 | Status: SHIPPED | OUTPATIENT
Start: 2024-02-08 | End: 2024-02-12

## 2024-02-08 RX ORDER — AMOXICILLIN AND CLAVULANATE POTASSIUM 875; 125 MG/1; MG/1
1 TABLET, FILM COATED ORAL 2 TIMES DAILY
COMMUNITY
Start: 2024-01-30

## 2024-02-08 NOTE — PROGRESS NOTES
HPI:     HPI  78 year-old female is here in the office complaining of sore throat, ears pain, sinus pressure and legs swelling. Patient is taking Augmentin and will finish it by tomorrow. However, the sore throat persists.  Patient denies of chest pain, SOB, N/V/C/D, fever, dizziness, syncope, abdominal pain, cough, redness. There are no other concerns today.    Medications:     Current Outpatient Medications   Medication Sig Dispense Refill    amoxicillin clavulanate 875-125 MG Oral Tab Take 1 tablet by mouth 2 (two) times daily.      azithromycin 250 MG Oral Tab Take 2 tablets (500 mg total) by mouth daily for 1 day, THEN 1 tablet (250 mg total) daily for 4 days. 6 tablet 0    sertraline 100 MG Oral Tab Take 1 tablet (100 mg total) by mouth daily. **Appointment needed for further refills. 90 tablet 0    MUCINEX DM  MG Oral Tablet 12 Hr Take 1 tablet by mouth Q12H. FOR 10 DAYS      furosemide 20 MG Oral Tab Take 1 tablet (20 mg total) by mouth daily.      sodium chloride 0.65 % Nasal Solution 1 spray by Nasal route as needed. 60 mL 0    pantoprazole 40 MG Oral Tab EC Take 1 tablet (40 mg total) by mouth 2 (two) times daily. (Patient taking differently: Take 1 tablet (40 mg total) by mouth 2 (two) times daily. Pt take 1 daily) 60 tablet 2    folic acid 1 MG Oral Tab Take 1 tablet (1 mg total) by mouth daily. 90 tablet 3    METOPROLOL SUCCINATE ER 50 MG Oral Tablet 24 Hr       amLODIPine 5 MG Oral Tab Take 1 tablet (5 mg total) by mouth daily. 30 tablet 0    atorvastatin 10 MG Oral Tab Take 1 tablet (10 mg total) by mouth nightly. 90 tablet 3    cyanocobalamin 1000 MCG Oral Tab Take 1 tablet (1,000 mcg total) by mouth daily.      ELIQUIS 5 MG Oral Tab Take 1 tablet (5 mg total) by mouth 2 (two) times daily. 180 tablet 0       Allergies:     Allergies   Allergen Reactions    Adhesive Tape RASH     Skin off       History:     Health Maintenance   Topic Date Due    Diabetes Care Foot Exam  Never done    Zoster  Vaccines (1 of 2) Never done    Diabetes Care Dilated Eye Exam  09/08/2022    COVID-19 Vaccine (3 - 2023-24 season) 09/01/2023    Colorectal Cancer Screening  10/07/2023    Annual Depression Screening  01/01/2024    Influenza Vaccine (1) 11/14/2024 (Originally 10/1/2023)    Diabetes Care A1C  05/14/2024    Diabetes Care: Microalb/Creat Ratio  08/31/2024    Annual Physical  11/14/2024    Diabetes Care: GFR  01/03/2025    DEXA Scan  Completed    Fall Risk Screening (Annual)  Completed    Pneumococcal Vaccine: 65+ Years  Completed       No LMP recorded. Patient is postmenopausal.   Past Medical History:     Past Medical History:   Diagnosis Date    Arrhythmia     afib    Back problem     lumbar disc disease    Basal cell carcinoma of nose     Bilateral carpal tunnel syndrome     Cataract     Cellulitis     Deep vein thrombosis (HCC)     unsure which leg, possibly left    Depression     Esophageal reflux     Essential hypertension     Heart disease     Heel spur     Right    Hemorrhoids     Hiatal hernia     High blood pressure     High cholesterol     Hyperlipidemia     Incontinence     Lumbar disc disease 2012    Multiple gastric ulcers 04/25/2017    Osteoarthritis     Pulmonary embolism (HCC)     Raynaud disease     Renal disorder     Tubular adenoma of colon 01/2017    repeat c-scope 1/2020    Type 2 diabetes mellitus with diabetic chronic kidney disease (HCC) 11/30/2022    Visual impairment     glasses       Past Surgical History:     Past Surgical History:   Procedure Laterality Date    Angioplasty (coronary)      Appendectomy      Arthroscopy of joint unlisted Right     knee    Bso, omentectomy w/sushil      Carpal tunnel release Bilateral     Cholecystectomy  09/28/2016    Colonoscopy N/A 01/25/2017    Procedure: COLONOSCOPY;  Surgeon: KATHARINE Prakash MD;  Location: [facility] ENDOSCOPY    Colonoscopy N/A 10/07/2020    Procedure: COLONOSCOPY;  Surgeon: AKTHARINE Prakash MD;  Location: [facility] ENDOSCOPY    Egd N/A 12/14/2016     Procedure: ESOPHAGOGASTRODUODENOSCOPY (EGD);  Surgeon: KATHARINE Prakash MD;  Location: [facility] ENDOSCOPY    Egd N/A 2024    ; gastritis    Electrocardiogram, complete  2013    Scanned to Media Tab    Excision of nose      Basal cell carcinoma    Hernia surgery      Hysterectomy      Knee replacement surgery Right     Other surgical history      Injections and narcotics, POD Bartuci    Removal of heel spur      Total abdom hysterectomy         Family History:     Family History   Problem Relation Age of Onset    Cancer Father         Skin cancer    Other (Other) Father 97        old age,unknown caused    Heart Attack Mother     Heart Disorder Mother     Hypertension Other         Family H/O    Cancer Other         Lymphoma  Family H/O    Heart Disease Other         Family H/O    Arthritis Other         Close relative  unsure which type    No Known Problems Brother        Social History:     Social History     Socioeconomic History    Marital status:      Spouse name: Not on file    Number of children: Not on file    Years of education: Not on file    Highest education level: Not on file   Occupational History    Not on file   Tobacco Use    Smoking status: Former     Packs/day: 0     Types: Cigarettes     Quit date: 1997     Years since quittin.1    Smokeless tobacco: Never   Vaping Use    Vaping Use: Never used   Substance and Sexual Activity    Alcohol use: No     Comment: rare    Drug use: Never     Comment: edibles occasionally for sleep    Sexual activity: Not on file   Other Topics Concern     Service Not Asked    Blood Transfusions Not Asked    Caffeine Concern Yes     Comment: 8 cups coffee/sod daily    Occupational Exposure Not Asked    Hobby Hazards Not Asked    Sleep Concern Not Asked    Stress Concern Not Asked    Weight Concern Not Asked    Special Diet Not Asked    Back Care Not Asked    Exercise Not Asked    Bike Helmet Not Asked    Seat Belt Not Asked     Self-Exams Not Asked    Grew up on a farm Not Asked    History of tanning Not Asked    Outdoor occupation Not Asked    Breast feeding Not Asked    Reaction to local anesthetic No   Social History Narrative    Not on file     Social Determinants of Health     Financial Resource Strain: Low Risk  (3/14/2023)    Financial Resource Strain     Difficulty of Paying Living Expenses: Not very hard     Med Affordability: No   Food Insecurity: Not on file   Transportation Needs: No Transportation Needs (3/14/2023)    Transportation Needs     Lack of Transportation: No   Physical Activity: Not on file   Stress: Not on file   Social Connections: Not on file   Housing Stability: Not on file       Review of Systems:   Review of Systems   Constitutional:  Negative for activity change, chills, fatigue and fever.   HENT:  Positive for sore throat. Negative for congestion, ear discharge, ear pain, postnasal drip, rhinorrhea, sinus pressure and sinus pain.    Respiratory:  Negative for cough, chest tightness, shortness of breath and wheezing.    Cardiovascular:  Positive for leg swelling. Negative for chest pain and palpitations.   Gastrointestinal:  Negative for abdominal distention, abdominal pain, blood in stool, constipation, diarrhea, nausea and vomiting.   Skin:  Negative for rash.        Vitals:    02/08/24 1046   BP: 94/62   Pulse: 98   Weight: 197 lb (89.4 kg)   Height: 5' 6\" (1.676 m)     Body mass index is 31.8 kg/m².    Physical Exam:   Physical Exam  Vitals reviewed.   Constitutional:       General: She is not in acute distress.     Appearance: She is well-developed.   HENT:      Head: Normocephalic and atraumatic.      Right Ear: External ear normal.      Left Ear: External ear normal.      Nose: Nose normal.      Mouth/Throat:      Mouth: Mucous membranes are moist.      Pharynx: Oropharynx is clear. Posterior oropharyngeal erythema present. No oropharyngeal exudate.   Eyes:      General:         Right eye: No  discharge.         Left eye: No discharge.      Conjunctiva/sclera: Conjunctivae normal.   Cardiovascular:      Rate and Rhythm: Normal rate and regular rhythm.      Heart sounds: Normal heart sounds, S1 normal and S2 normal. No murmur heard.  Pulmonary:      Effort: Pulmonary effort is normal.      Breath sounds: Normal breath sounds. No wheezing or rales.   Chest:      Chest wall: No tenderness.   Lymphadenopathy:      Cervical: Cervical adenopathy present.   Skin:     Findings: No rash.   Neurological:      Mental Status: She is alert and oriented to person, place, and time.   Psychiatric:         Behavior: Behavior is cooperative.      Bl le+ edema. No tenderness to palpation, no erythema.    Assessment and Plan::     Problem List Items Addressed This Visit    None  Visit Diagnoses       Strep pharyngitis    -  Primary    Relevant Medications    azithromycin 250 MG Oral Tab  Supportive care includes:    encourage fluid intake   Advise patient to take Tylenol/Ibuprofen as needed for pain.  warm salt water gargles   humidifier       Sore throat        Relevant Orders    POC Rapid Strep [24575]    Screening for malignant neoplasm of colon        Relevant Medications            Other Relevant Orders    Gastro GI Telephone Colon Screen    Leg swelling      Advise patient to take Lasix 40 mg every day for 3 days. Elevate legs and follow up with Cardiologist.          Discussed plan of care with pt and pt is in agreement.All questions answered. Pt to call with questions or concerns.

## 2024-02-20 ENCOUNTER — OFFICE VISIT (OUTPATIENT)
Dept: FAMILY MEDICINE CLINIC | Facility: CLINIC | Age: 79
End: 2024-02-20

## 2024-02-20 VITALS
HEIGHT: 66 IN | SYSTOLIC BLOOD PRESSURE: 125 MMHG | BODY MASS INDEX: 30.05 KG/M2 | HEART RATE: 79 BPM | DIASTOLIC BLOOD PRESSURE: 55 MMHG | WEIGHT: 187 LBS

## 2024-02-20 DIAGNOSIS — J39.9 UPPER RESPIRATORY DISEASE: Primary | ICD-10-CM

## 2024-02-20 DIAGNOSIS — E11.22 TYPE 2 DIABETES MELLITUS WITH CHRONIC KIDNEY DISEASE, WITHOUT LONG-TERM CURRENT USE OF INSULIN, UNSPECIFIED CKD STAGE (HCC): ICD-10-CM

## 2024-02-20 DIAGNOSIS — J02.9 SORE THROAT: ICD-10-CM

## 2024-02-20 LAB
CONTROL LINE PRESENT WITH A CLEAR BACKGROUND (YES/NO): YES YES/NO
KIT LOT #: 7282 NUMERIC
STREP GRP A CUL-SCR: NEGATIVE

## 2024-02-20 PROCEDURE — 99213 OFFICE O/P EST LOW 20 MIN: CPT | Performed by: PHYSICIAN ASSISTANT

## 2024-02-20 PROCEDURE — 87880 STREP A ASSAY W/OPTIC: CPT | Performed by: PHYSICIAN ASSISTANT

## 2024-02-20 RX ORDER — ALBUTEROL SULFATE 90 UG/1
AEROSOL, METERED RESPIRATORY (INHALATION)
COMMUNITY

## 2024-02-20 NOTE — PROGRESS NOTES
HPI:     HPI  78 year-old female is here in the office complaining of sore throat, body ache, chills, headache and fatigue for the past 3-4 days.  Patient denies of chest pain, SOB, N/V/C/D, fever, dizziness, syncope, abdominal pain, cough. There are no other concerns today.      Medications:     Current Outpatient Medications   Medication Sig Dispense Refill    albuterol 108 (90 Base) MCG/ACT Inhalation Aero Soln SHAKE BEFORE USE AND TAKE 2 PUFFS INTO THE LUNGS EVERY 6 HOURS AS NEEDED      amoxicillin clavulanate 875-125 MG Oral Tab Take 1 tablet by mouth 2 (two) times daily.      sertraline 100 MG Oral Tab Take 1 tablet (100 mg total) by mouth daily. **Appointment needed for further refills. 90 tablet 0    MUCINEX DM  MG Oral Tablet 12 Hr Take 1 tablet by mouth Q12H. FOR 10 DAYS      furosemide 20 MG Oral Tab Take 1 tablet (20 mg total) by mouth daily.      sodium chloride 0.65 % Nasal Solution 1 spray by Nasal route as needed. 60 mL 0    pantoprazole 40 MG Oral Tab EC Take 1 tablet (40 mg total) by mouth 2 (two) times daily. (Patient taking differently: Take 1 tablet (40 mg total) by mouth 2 (two) times daily. Pt take 1 daily) 60 tablet 2    folic acid 1 MG Oral Tab Take 1 tablet (1 mg total) by mouth daily. 90 tablet 3    METOPROLOL SUCCINATE ER 50 MG Oral Tablet 24 Hr       amLODIPine 5 MG Oral Tab Take 1 tablet (5 mg total) by mouth daily. 30 tablet 0    atorvastatin 10 MG Oral Tab Take 1 tablet (10 mg total) by mouth nightly. 90 tablet 3    cyanocobalamin 1000 MCG Oral Tab Take 1 tablet (1,000 mcg total) by mouth daily.      ELIQUIS 5 MG Oral Tab Take 1 tablet (5 mg total) by mouth 2 (two) times daily. 180 tablet 0       Allergies:     Allergies   Allergen Reactions    Adhesive Tape RASH     Skin off       History:     Health Maintenance   Topic Date Due    Diabetes Care Foot Exam  Never done    Zoster Vaccines (1 of 2) Never done    Diabetes Care Dilated Eye Exam  09/08/2022    COVID-19 Vaccine (3 -  2023-24 season) 09/01/2023    Colorectal Cancer Screening  10/07/2023    Annual Depression Screening  01/01/2024    Influenza Vaccine (1) 11/14/2024 (Originally 10/1/2023)    Diabetes Care A1C  05/14/2024    Diabetes Care: Microalb/Creat Ratio  08/31/2024    Annual Physical  11/14/2024    Diabetes Care: GFR  01/03/2025    DEXA Scan  Completed    Fall Risk Screening (Annual)  Completed    Pneumococcal Vaccine: 65+ Years  Completed       No LMP recorded. Patient is postmenopausal.   Past Medical History:     Past Medical History:   Diagnosis Date    Arrhythmia     afib    Back problem     lumbar disc disease    Basal cell carcinoma of nose     Bilateral carpal tunnel syndrome     Cataract     Cellulitis     Deep vein thrombosis (HCC)     unsure which leg, possibly left    Depression     Esophageal reflux     Essential hypertension     Heart disease     Heel spur     Right    Hemorrhoids     Hiatal hernia     High blood pressure     High cholesterol     Hyperlipidemia     Incontinence     Lumbar disc disease 2012    Multiple gastric ulcers 04/25/2017    Osteoarthritis     Pulmonary embolism (HCC)     Raynaud disease     Renal disorder     Tubular adenoma of colon 01/2017    repeat c-scope 1/2020    Type 2 diabetes mellitus with diabetic chronic kidney disease (HCC) 11/30/2022    Visual impairment     glasses       Past Surgical History:     Past Surgical History:   Procedure Laterality Date    Angioplasty (coronary)      Appendectomy      Arthroscopy of joint unlisted Right     knee    Bso, omentectomy w/sushil      Carpal tunnel release Bilateral     Cholecystectomy  09/28/2016    Colonoscopy N/A 01/25/2017    Procedure: COLONOSCOPY;  Surgeon: KATHARINE Prakash MD;  Location: [facility] ENDOSCOPY    Colonoscopy N/A 10/07/2020    Procedure: COLONOSCOPY;  Surgeon: KATHARINE Prakash MD;  Location: [facility] ENDOSCOPY    Egd N/A 12/14/2016    Procedure: ESOPHAGOGASTRODUODENOSCOPY (EGD);  Surgeon: KATHARINE Prakash MD;  Location: [facility]  ENDOSCOPY    Egd N/A 2024    ; gastritis    Electrocardiogram, complete  2013    Scanned to Media Tab    Excision of nose      Basal cell carcinoma    Hernia surgery      Hysterectomy      Knee replacement surgery Right     Other surgical history      Injections and narcotics, POD Bartuci    Removal of heel spur      Total abdom hysterectomy         Family History:     Family History   Problem Relation Age of Onset    Cancer Father         Skin cancer    Other (Other) Father 97        old age,unknown caused    Heart Attack Mother     Heart Disorder Mother     Hypertension Other         Family H/O    Cancer Other         Lymphoma  Family H/O    Heart Disease Other         Family H/O    Arthritis Other         Close relative  unsure which type    No Known Problems Brother        Social History:     Social History     Socioeconomic History    Marital status:      Spouse name: Not on file    Number of children: Not on file    Years of education: Not on file    Highest education level: Not on file   Occupational History    Not on file   Tobacco Use    Smoking status: Former     Packs/day: 0     Types: Cigarettes     Quit date: 1997     Years since quittin.1    Smokeless tobacco: Never   Vaping Use    Vaping Use: Never used   Substance and Sexual Activity    Alcohol use: No     Comment: rare    Drug use: Never     Comment: edibles occasionally for sleep    Sexual activity: Not on file   Other Topics Concern     Service Not Asked    Blood Transfusions Not Asked    Caffeine Concern Yes     Comment: 8 cups coffee/sod daily    Occupational Exposure Not Asked    Hobby Hazards Not Asked    Sleep Concern Not Asked    Stress Concern Not Asked    Weight Concern Not Asked    Special Diet Not Asked    Back Care Not Asked    Exercise Not Asked    Bike Helmet Not Asked    Seat Belt Not Asked    Self-Exams Not Asked    Grew up on a farm Not Asked    History of tanning Not Asked    Outdoor  occupation Not Asked    Breast feeding Not Asked    Reaction to local anesthetic No   Social History Narrative    Not on file     Social Determinants of Health     Financial Resource Strain: Low Risk  (3/14/2023)    Financial Resource Strain     Difficulty of Paying Living Expenses: Not very hard     Med Affordability: No   Food Insecurity: Not on file   Transportation Needs: No Transportation Needs (3/14/2023)    Transportation Needs     Lack of Transportation: No   Physical Activity: Not on file   Stress: Not on file   Social Connections: Not on file   Housing Stability: Not on file       Review of Systems:   Review of Systems   Constitutional:  Positive for chills and fatigue. Negative for activity change and fever.   HENT:  Positive for sore throat. Negative for congestion, ear discharge, ear pain, postnasal drip, rhinorrhea, sinus pressure and sinus pain.    Respiratory:  Negative for cough, chest tightness, shortness of breath and wheezing.    Cardiovascular:  Negative for chest pain and palpitations.   Gastrointestinal:  Negative for abdominal distention, abdominal pain, blood in stool, constipation, diarrhea, nausea and vomiting.   Skin:  Negative for rash.   Neurological:  Positive for headaches.        Vitals:    02/20/24 1133   BP: 125/55   Pulse: 79   Weight: 187 lb (84.8 kg)   Height: 5' 6\" (1.676 m)     Body mass index is 30.18 kg/m².    Physical Exam:   Physical Exam  Vitals reviewed.   Constitutional:       General: She is not in acute distress.     Appearance: She is well-developed.   HENT:      Head: Normocephalic and atraumatic.      Right Ear: Tympanic membrane, ear canal and external ear normal. There is no impacted cerumen.      Left Ear: Tympanic membrane, ear canal and external ear normal. There is no impacted cerumen.      Nose: Nose normal.      Mouth/Throat:      Mouth: Mucous membranes are moist.      Pharynx: Oropharynx is clear. No oropharyngeal exudate or posterior oropharyngeal  erythema.   Eyes:      General:         Right eye: No discharge.         Left eye: No discharge.      Conjunctiva/sclera: Conjunctivae normal.   Cardiovascular:      Rate and Rhythm: Normal rate and regular rhythm.      Heart sounds: Normal heart sounds, S1 normal and S2 normal. No murmur heard.  Pulmonary:      Effort: Pulmonary effort is normal.      Breath sounds: Normal breath sounds. No wheezing or rales.   Chest:      Chest wall: No tenderness.   Lymphadenopathy:      Cervical: No cervical adenopathy.   Skin:     Findings: No rash.   Neurological:      Mental Status: She is alert and oriented to person, place, and time.   Psychiatric:         Behavior: Behavior is cooperative.          Assessment and Plan::     Problem List Items Addressed This Visit          HCC Problems    Type 2 diabetes mellitus with diabetic chronic kidney disease (HCC)     Other Visit Diagnoses       Upper respiratory disease    -  Primary    Relevant Medications    albuterol 108 (90 Base) MCG/ACT Inhalation Aero Soln    Other Relevant Orders    COVID-19 Testing by PCR ([facility]) [E]    Sore throat        Relevant Orders    POC Rapid Strep [62746] (Completed)        Supportive care includes:    encourage fluid intake   Advise patient to take Tylenol/Ibuprofen as needed for pain.  warm salt water gargles   humidifier   Bilateral barefoot skin diabetic exam is normal, visualized feet and the appearance is normal.  Bilateral monofilament/sensation of both feet is normal.  Pulsation pedal pulse exam of both lower legs/feet is normal as well.      Discussed plan of care with pt and pt is in agreement.All questions answered. Pt to call with questions or concerns.

## 2024-02-21 ENCOUNTER — TELEPHONE (OUTPATIENT)
Dept: FAMILY MEDICINE CLINIC | Facility: CLINIC | Age: 79
End: 2024-02-21

## 2024-02-21 LAB — SARS-COV-2 RNA RESP QL NAA+PROBE: NOT DETECTED

## 2024-02-21 NOTE — TELEPHONE ENCOUNTER
Left message for patient to return call.    ----- Message from Iris Patricia PA-C sent at 2/21/2024 10:15 AM CST -----  Covid 19 is negative.   (0) understands/communicates without difficulty

## 2024-03-08 ENCOUNTER — TELEPHONE (OUTPATIENT)
Dept: NEPHROLOGY | Facility: CLINIC | Age: 79
End: 2024-03-08

## 2024-03-08 NOTE — TELEPHONE ENCOUNTER
Mcm sent to remind pt of pre-clinic labs   Solaraze Counseling:  I discussed with the patient the risks of Solaraze including but not limited to erythema, scaling, itching, weeping, crusting, and pain. Colchicine Counseling:  Patient counseled regarding adverse effects including but not limited to stomach upset (nausea, vomiting, stomach pain, or diarrhea).  Patient instructed to limit alcohol consumption while taking this medication.  Colchicine may reduce blood counts especially with prolonged use.  The patient understands that monitoring of kidney function and blood counts may be required, especially at baseline. The patient verbalized understanding of the proper use and possible adverse effects of colchicine.  All of the patient's questions and concerns were addressed. Picato Counseling:  I discussed with the patient the risks of Picato including but not limited to erythema, scaling, itching, weeping, crusting, and pain. Ivermectin Pregnancy And Lactation Text: This medication is Pregnancy Category C and it isn't known if it is safe during pregnancy. It is also excreted in breast milk. Stelara Pregnancy And Lactation Text: This medication is Pregnancy Category B and is considered safe during pregnancy. It is unknown if this medication is excreted in breast milk. Tetracycline Counseling: Patient counseled regarding possible photosensitivity and increased risk for sunburn.  Patient instructed to avoid sunlight, if possible.  When exposed to sunlight, patients should wear protective clothing, sunglasses, and sunscreen.  The patient was instructed to call the office immediately if the following severe adverse effects occur:  hearing changes, easy bruising/bleeding, severe headache, or vision changes.  The patient verbalized understanding of the proper use and possible adverse effects of tetracycline.  All of the patient's questions and concerns were addressed. Patient understands to avoid pregnancy while on therapy due to potential birth defects. Doxepin Pregnancy And Lactation Text: This medication is Pregnancy Category C and it isn't known if it is safe during pregnancy. It is also excreted in breast milk and breast feeding isn't recommended. Clofazimine Pregnancy And Lactation Text: This medication is Pregnancy Category C and isn't considered safe during pregnancy. It is excreted in breast milk. Taltz Counseling: I discussed with the patient the risks of ixekizumab including but not limited to immunosuppression, serious infections, worsening of inflammatory bowel disease and drug reactions.  The patient understands that monitoring is required including a PPD at baseline and must alert us or the primary physician if symptoms of infection or other concerning signs are noted. Erythromycin Pregnancy And Lactation Text: This medication is Pregnancy Category B and is considered safe during pregnancy. It is also excreted in breast milk. Minocycline Counseling: Patient advised regarding possible photosensitivity and discoloration of the teeth, skin, lips, tongue and gums.  Patient instructed to avoid sunlight, if possible.  When exposed to sunlight, patients should wear protective clothing, sunglasses, and sunscreen.  The patient was instructed to call the office immediately if the following severe adverse effects occur:  hearing changes, easy bruising/bleeding, severe headache, or vision changes.  The patient verbalized understanding of the proper use and possible adverse effects of minocycline.  All of the patient's questions and concerns were addressed. Simponi Counseling:  I discussed with the patient the risks of golimumab including but not limited to myelosuppression, immunosuppression, autoimmune hepatitis, demyelinating diseases, lymphoma, and serious infections.  The patient understands that monitoring is required including a PPD at baseline and must alert us or the primary physician if symptoms of infection or other concerning signs are noted. Cosentyx Counseling:  I discussed with the patient the risks of Cosentyx including but not limited to worsening of Crohn's disease, immunosuppression, allergic reactions and infections.  The patient understands that monitoring is required including a PPD at baseline and must alert us or the primary physician if symptoms of infection or other concerning signs are noted. Eucrisa Counseling: Patient may experience a mild burning sensation during topical application. Eucrisa is not approved in children less than 2 years of age. Sski Pregnancy And Lactation Text: This medication is Pregnancy Category D and isn't considered safe during pregnancy. It is excreted in breast milk. Azithromycin Pregnancy And Lactation Text: This medication is considered safe during pregnancy and is also secreted in breast milk. Hydroquinone Pregnancy And Lactation Text: This medication has not been assigned a Pregnancy Risk Category but animal studies failed to show danger with the topical medication. It is unknown if the medication is excreted in breast milk. Arava Counseling:  Patient counseled regarding adverse effects of Arava including but not limited to nausea, vomiting, abnormalities in liver function tests. Patients may develop mouth sores, rash, diarrhea, and abnormalities in blood counts. The patient understands that monitoring is required including LFTs and blood counts.  There is a rare possibility of scarring of the liver and lung problems that can occur when taking methotrexate. Persistent nausea, loss of appetite, pale stools, dark urine, cough, and shortness of breath should be reported immediately. Patient advised to discontinue Arava treatment and consult with a physician prior to attempting conception. The patient will have to undergo a treatment to eliminate Arava from the body prior to conception. Dupixent Pregnancy And Lactation Text: This medication likely crosses the placenta but the risk for the fetus is uncertain. This medication is excreted in breast milk. Valtrex Pregnancy And Lactation Text: this medication is Pregnancy Category B and is considered safe during pregnancy. This medication is not directly found in breast milk but it's metabolite acyclovir is present. Benzoyl Peroxide Counseling: Patient counseled that medicine may cause skin irritation and bleach clothing.  In the event of skin irritation, the patient was advised to reduce the amount of the drug applied or use it less frequently.   The patient verbalized understanding of the proper use and possible adverse effects of benzoyl peroxide.  All of the patient's questions and concerns were addressed. Tazorac Counseling:  Patient advised that medication is irritating and drying.  Patient may need to apply sparingly and wash off after an hour before eventually leaving it on overnight.  The patient verbalized understanding of the proper use and possible adverse effects of tazorac.  All of the patient's questions and concerns were addressed. Bactrim Pregnancy And Lactation Text: This medication is Pregnancy Category D and is known to cause fetal risk.  It is also excreted in breast milk. Nsaids Counseling: NSAID Counseling: I discussed with the patient that NSAIDs should be taken with food. Prolonged use of NSAIDs can result in the development of stomach ulcers.  Patient advised to stop taking NSAIDs if abdominal pain occurs.  The patient verbalized understanding of the proper use and possible adverse effects of NSAIDs.  All of the patient's questions and concerns were addressed. Elidel Pregnancy And Lactation Text: This medication is Pregnancy Category C. It is unknown if this medication is excreted in breast milk. Metronidazole Counseling:  I discussed with the patient the risks of metronidazole including but not limited to seizures, nausea/vomiting, a metallic taste in the mouth, nausea/vomiting and severe allergy. Topical Clindamycin Pregnancy And Lactation Text: This medication is Pregnancy Category B and is considered safe during pregnancy. It is unknown if it is excreted in breast milk. Erivedge Counseling- I discussed with the patient the risks of Erivedge including but not limited to nausea, vomiting, diarrhea, constipation, weight loss, changes in the sense of taste, decreased appetite, muscle spasms, and hair loss.  The patient verbalized understanding of the proper use and possible adverse effects of Erivedge.  All of the patient's questions and concerns were addressed. Include Pregnancy/Lactation Warning?: No Hydroxyzine Counseling: Patient advised that the medication is sedating and not to drive a car after taking this medication.  Patient informed of potential adverse effects including but not limited to dry mouth, urinary retention, and blurry vision.  The patient verbalized understanding of the proper use and possible adverse effects of hydroxyzine.  All of the patient's questions and concerns were addressed. Birth Control Pills Counseling: Birth Control Pill Counseling: I discussed with the patient the potential side effects of OCPs including but not limited to increased risk of stroke, heart attack, thrombophlebitis, deep venous thrombosis, hepatic adenomas, breast changes, GI upset, headaches, and depression.  The patient verbalized understanding of the proper use and possible adverse effects of OCPs. All of the patient's questions and concerns were addressed. Protopic Counseling: Patient may experience a mild burning sensation during topical application. Protopic is not approved in children less than 2 years of age. There have been case reports of hematologic and skin malignancies in patients using topical calcineurin inhibitors although causality is questionable. Dapsone Pregnancy And Lactation Text: This medication is Pregnancy Category C and is not considered safe during pregnancy or breast feeding. Oxybutynin Counseling:  I discussed with the patient the risks of oxybutynin including but not limited to skin rash, drowsiness, dry mouth, difficulty urinating, and blurred vision. Ketoconazole Counseling:   Patient counseled regarding improving absorption with orange juice.  Adverse effects include but are not limited to breast enlargement, headache, diarrhea, nausea, upset stomach, liver function test abnormalities, taste disturbance, and stomach pain.  There is a rare possibility of liver failure that can occur when taking ketoconazole. The patient understands that monitoring of LFTs may be required, especially at baseline. The patient verbalized understanding of the proper use and possible adverse effects of ketoconazole.  All of the patient's questions and concerns were addressed. Infliximab Counseling:  I discussed with the patient the risks of infliximab including but not limited to myelosuppression, immunosuppression, autoimmune hepatitis, demyelinating diseases, lymphoma, and serious infections.  The patient understands that monitoring is required including a PPD at baseline and must alert us or the primary physician if symptoms of infection or other concerning signs are noted. Albendazole Counseling:  I discussed with the patient the risks of albendazole including but not limited to cytopenia, kidney damage, nausea/vomiting and severe allergy.  The patient understands that this medication is being used in an off-label manner. Spironolactone Pregnancy And Lactation Text: This medication can cause feminization of the male fetus and should be avoided during pregnancy. The active metabolite is also found in breast milk. Carac Counseling:  I discussed with the patient the risks of Carac including but not limited to erythema, scaling, itching, weeping, crusting, and pain. Bexarotene Pregnancy And Lactation Text: This medication is Pregnancy Category X and should not be given to women who are pregnant or may become pregnant. This medication should not be used if you are breast feeding. Quinolones Counseling:  I discussed with the patient the risks of fluoroquinolones including but not limited to GI upset, allergic reaction, drug rash, diarrhea, dizziness, photosensitivity, yeast infections, liver function test abnormalities, tendonitis/tendon rupture. Rituxan Pregnancy And Lactation Text: This medication is Pregnancy Category C and it isn't know if it is safe during pregnancy. It is unknown if this medication is excreted in breast milk but similar antibodies are known to be excreted. Acitretin Counseling:  I discussed with the patient the risks of acitretin including but not limited to hair loss, dry lips/skin/eyes, liver damage, hyperlipidemia, depression/suicidal ideation, photosensitivity.  Serious rare side effects can include but are not limited to pancreatitis, pseudotumor cerebri, bony changes, clot formation/stroke/heart attack.  Patient understands that alcohol is contraindicated since it can result in liver toxicity and significantly prolong the elimination of the drug by many years. Drysol Counseling:  I discussed with the patient the risks of drysol/aluminum chloride including but not limited to skin rash, itching, irritation, burning. Cimetidine Counseling:  I discussed with the patient the risks of Cimetidine including but not limited to gynecomastia, headache, diarrhea, nausea, drowsiness, arrhythmias, pancreatitis, skin rashes, psychosis, bone marrow suppression and kidney toxicity. Cimetidine Pregnancy And Lactation Text: This medication is Pregnancy Category B and is considered safe during pregnancy. It is also excreted in breast milk and breast feeding isn't recommended. Birth Control Pills Pregnancy And Lactation Text: This medication should be avoided if pregnant and for the first 30 days post-partum. Cephalexin Pregnancy And Lactation Text: This medication is Pregnancy Category B and considered safe during pregnancy.  It is also excreted in breast milk but can be used safely for shorter doses. Doxycycline Pregnancy And Lactation Text: This medication is Pregnancy Category D and not consider safe during pregnancy. It is also excreted in breast milk but is considered safe for shorter treatment courses. Simponi Pregnancy And Lactation Text: The risk during pregnancy and breastfeeding is uncertain with this medication. 5-Fu Pregnancy And Lactation Text: This medication is Pregnancy Category X and contraindicated in pregnancy and in women who may become pregnant. It is unknown if this medication is excreted in breast milk. Enbrel Counseling:  I discussed with the patient the risks of etanercept including but not limited to myelosuppression, immunosuppression, autoimmune hepatitis, demyelinating diseases, lymphoma, and infections.  The patient understands that monitoring is required including a PPD at baseline and must alert us or the primary physician if symptoms of infection or other concerning signs are noted. Xelcarlynz Pregnancy And Lactation Text: This medication is Pregnancy Category D and is not considered safe during pregnancy.  The risk during breast feeding is also uncertain. Azathioprine Counseling:  I discussed with the patient the risks of azathioprine including but not limited to myelosuppression, immunosuppression, hepatotoxicity, lymphoma, and infections.  The patient understands that monitoring is required including baseline LFTs, Creatinine, possible TPMP genotyping and weekly CBCs for the first month and then every 2 weeks thereafter.  The patient verbalized understanding of the proper use and possible adverse effects of azathioprine.  All of the patient's questions and concerns were addressed. Hydroxyzine Pregnancy And Lactation Text: This medication is not safe during pregnancy and should not be taken. It is also excreted in breast milk and breast feeding isn't recommended. Methotrexate Pregnancy And Lactation Text: This medication is Pregnancy Category X and is known to cause fetal harm. This medication is excreted in breast milk. Otezla Counseling: The side effects of Otezla were discussed with the patient, including but not limited to worsening or new depression, weight loss, diarrhea, nausea, upper respiratory tract infection, and headache. Patient instructed to call the office should any adverse effect occur.  The patient verbalized understanding of the proper use and possible adverse effects of Otezla.  All the patient's questions and concerns were addressed. Itraconazole Pregnancy And Lactation Text: This medication is Pregnancy Category C and it isn't know if it is safe during pregnancy. It is also excreted in breast milk. Topical Sulfur Applications Pregnancy And Lactation Text: This medication is Pregnancy Category C and has an unknown safety profile during pregnancy. It is unknown if this topical medication is excreted in breast milk. Griseofulvin Pregnancy And Lactation Text: This medication is Pregnancy Category X and is known to cause serious birth defects. It is unknown if this medication is excreted in breast milk but breast feeding should be avoided. Ivermectin Counseling:  Patient instructed to take medication on an empty stomach with a full glass of water.  Patient informed of potential adverse effects including but not limited to nausea, diarrhea, dizziness, itching, and swelling of the extremities or lymph nodes.  The patient verbalized understanding of the proper use and possible adverse effects of ivermectin.  All of the patient's questions and concerns were addressed. Dupixent Counseling: I discussed with the patient the risks of dupilumab including but not limited to eye infection and irritation, cold sores, injection site reactions, worsening of asthma, allergic reactions and increased risk of parasitic infection.  Live vaccines should be avoided while taking dupilumab. Dupilumab will also interact with certain medications such as warfarin and cyclosporine. The patient understands that monitoring is required and they must alert us or the primary physician if symptoms of infection or other concerning signs are noted. Metronidazole Pregnancy And Lactation Text: This medication is Pregnancy Category B and considered safe during pregnancy.  It is also excreted in breast milk. Benzoyl Peroxide Pregnancy And Lactation Text: This medication is Pregnancy Category C. It is unknown if benzoyl peroxide is excreted in breast milk. High Dose Vitamin A Pregnancy And Lactation Text: High dose vitamin A therapy is contraindicated during pregnancy and breast feeding. Ketoconazole Pregnancy And Lactation Text: This medication is Pregnancy Category C and it isn't know if it is safe during pregnancy. It is also excreted in breast milk and breast feeding isn't recommended. Prednisone Pregnancy And Lactation Text: This medication is Pregnancy Category C and it isn't know if it is safe during pregnancy. This medication is excreted in breast milk. Tremfya Counseling: I discussed with the patient the risks of guselkumab including but not limited to immunosuppression, serious infections, worsening of inflammatory bowel disease and drug reactions.  The patient understands that monitoring is required including a PPD at baseline and must alert us or the primary physician if symptoms of infection or other concerning signs are noted. Topical Retinoid counseling:  Patient advised to apply a pea-sized amount only at bedtime and wait 30 minutes after washing their face before applying.  If too drying, patient may add a non-comedogenic moisturizer. The patient verbalized understanding of the proper use and possible adverse effects of retinoids.  All of the patient's questions and concerns were addressed. Griseofulvin Counseling:  I discussed with the patient the risks of griseofulvin including but not limited to photosensitivity, cytopenia, liver damage, nausea/vomiting and severe allergy.  The patient understands that this medication is best absorbed when taken with a fatty meal (e.g., ice cream or french fries). Glycopyrrolate Counseling:  I discussed with the patient the risks of glycopyrrolate including but not limited to skin rash, drowsiness, dry mouth, difficulty urinating, and blurred vision. Protopic Pregnancy And Lactation Text: This medication is Pregnancy Category C. It is unknown if this medication is excreted in breast milk when applied topically. Cyclosporine Counseling:  I discussed with the patient the risks of cyclosporine including but not limited to hypertension, gingival hyperplasia,myelosuppression, immunosuppression, liver damage, kidney damage, neurotoxicity, lymphoma, and serious infections. The patient understands that monitoring is required including baseline blood pressure, CBC, CMP, lipid panel and uric acid, and then 1-2 times monthly CMP and blood pressure. Odomzo Counseling- I discussed with the patient the risks of Odomzo including but not limited to nausea, vomiting, diarrhea, constipation, weight loss, changes in the sense of taste, decreased appetite, muscle spasms, and hair loss.  The patient verbalized understanding of the proper use and possible adverse effects of Odomzo.  All of the patient's questions and concerns were addressed. Azithromycin Counseling:  I discussed with the patient the risks of azithromycin including but not limited to GI upset, allergic reaction, drug rash, diarrhea, and yeast infections. Hydroxychloroquine Pregnancy And Lactation Text: This medication has been shown to cause fetal harm but it isn't assigned a Pregnancy Risk Category. There are small amounts excreted in breast milk. Hydroxychloroquine Counseling:  I discussed with the patient that a baseline ophthalmologic exam is needed at the start of therapy and every year thereafter while on therapy. A CBC may also be warranted for monitoring.  The side effects of this medication were discussed with the patient, including but not limited to agranulocytosis, aplastic anemia, seizures, rashes, retinopathy, and liver toxicity. Patient instructed to call the office should any adverse effect occur.  The patient verbalized understanding of the proper use and possible adverse effects of Plaquenil.  All the patient's questions and concerns were addressed. High Dose Vitamin A Counseling: Side effects reviewed, pt to contact office should one occur. Cellcept Counseling:  I discussed with the patient the risks of mycophenolate mofetil including but not limited to infection/immunosuppression, GI upset, hypokalemia, hypercholesterolemia, bone marrow suppression, lymphoproliferative disorders, malignancy, GI ulceration/bleed/perforation, colitis, interstitial lung disease, kidney failure, progressive multifocal leukoencephalopathy, and birth defects.  The patient understands that monitoring is required including a baseline creatinine and regular CBC testing. In addition, patient must alert us immediately if symptoms of infection or other concerning signs are noted. Terbinafine Counseling: Patient counseling regarding adverse effects of terbinafine including but not limited to headache, diarrhea, rash, upset stomach, liver function test abnormalities, itching, taste/smell disturbance, nausea, abdominal pain, and flatulence.  There is a rare possibility of liver failure that can occur when taking terbinafine.  The patient understands that a baseline LFT and kidney function test may be required. The patient verbalized understanding of the proper use and possible adverse effects of terbinafine.  All of the patient's questions and concerns were addressed. Valtrex Counseling: I discussed with the patient the risks of valacyclovir including but not limited to kidney damage, nausea, vomiting and severe allergy.  The patient understands that if the infection seems to be worsening or is not improving, they are to call. Dapsone Counseling: I discussed with the patient the risks of dapsone including but not limited to hemolytic anemia, agranulocytosis, rashes, methemoglobinemia, kidney failure, peripheral neuropathy, headaches, GI upset, and liver toxicity.  Patients who start dapsone require monitoring including baseline LFTs and weekly CBCs for the first month, then every month thereafter.  The patient verbalized understanding of the proper use and possible adverse effects of dapsone.  All of the patient's questions and concerns were addressed. Clofazimine Counseling:  I discussed with the patient the risks of clofazimine including but not limited to skin and eye pigmentation, liver damage, nausea/vomiting, gastrointestinal bleeding and allergy. Methotrexate Counseling:  Patient counseled regarding adverse effects of methotrexate including but not limited to nausea, vomiting, abnormalities in liver function tests. Patients may develop mouth sores, rash, diarrhea, and abnormalities in blood counts. The patient understands that monitoring is required including LFT's and blood counts.  There is a rare possibility of scarring of the liver and lung problems that can occur when taking methotrexate. Persistent nausea, loss of appetite, pale stools, dark urine, cough, and shortness of breath should be reported immediately. Patient advised to discontinue methotrexate treatment at least three months before attempting to become pregnant.  I discussed the need for folate supplements while taking methotrexate.  These supplements can decrease side effects during methotrexate treatment. The patient verbalized understanding of the proper use and possible adverse effects of methotrexate.  All of the patient's questions and concerns were addressed. Stelara Counseling:  I discussed with the patient the risks of ustekinumab including but not limited to immunosuppression, malignancy, posterior leukoencephalopathy syndrome, and serious infections.  The patient understands that monitoring is required including a PPD at baseline and must alert us or the primary physician if symptoms of infection or other concerning signs are noted. Detail Level: Zone Topical Clindamycin Counseling: Patient counseled that this medication may cause skin irritation or allergic reactions.  In the event of skin irritation, the patient was advised to reduce the amount of the drug applied or use it less frequently.   The patient verbalized understanding of the proper use and possible adverse effects of clindamycin.  All of the patient's questions and concerns were addressed. Bactrim Counseling:  I discussed with the patient the risks of sulfa antibiotics including but not limited to GI upset, allergic reaction, drug rash, diarrhea, dizziness, photosensitivity, and yeast infections.  Rarely, more serious reactions can occur including but not limited to aplastic anemia, agranulocytosis, methemoglobinemia, blood dyscrasias, liver or kidney failure, lung infiltrates or desquamative/blistering drug rashes. Thalidomide Counseling: I discussed with the patient the risks of thalidomide including but not limited to birth defects, anxiety, weakness, chest pain, dizziness, cough and severe allergy. Gabapentin Counseling: I discussed with the patient the risks of gabapentin including but not limited to dizziness, somnolence, fatigue and ataxia. Zyclara Counseling:  I discussed with the patient the risks of imiquimod including but not limited to erythema, scaling, itching, weeping, crusting, and pain.  Patient understands that the inflammatory response to imiquimod is variable from person to person and was educated regarded proper titration schedule.  If flu-like symptoms develop, patient knows to discontinue the medication and contact us. Hydroquinone Counseling:  Patient advised that medication may result in skin irritation, lightening (hypopigmentation), dryness, and burning.  In the event of skin irritation, the patient was advised to reduce the amount of the drug applied or use it less frequently.  Rarely, spots that are treated with hydroquinone can become darker (pseudoochronosis).  Should this occur, patient instructed to stop medication and call the office. The patient verbalized understanding of the proper use and possible adverse effects of hydroquinone.  All of the patient's questions and concerns were addressed. Drysol Pregnancy And Lactation Text: This medication is considered safe during pregnancy and breast feeding. Minocycline Pregnancy And Lactation Text: This medication is Pregnancy Category D and not consider safe during pregnancy. It is also excreted in breast milk. Bexarotene Counseling:  I discussed with the patient the risks of bexarotene including but not limited to hair loss, dry lips/skin/eyes, liver abnormalities, hyperlipidemia, pancreatitis, depression/suicidal ideation, photosensitivity, drug rash/allergic reactions, hypothyroidism, anemia, leukopenia, infection, cataracts, and teratogenicity.  Patient understands that they will need regular blood tests to check lipid profile, liver function tests, white blood cell count, thyroid function tests and pregnancy test if applicable. Xolair Pregnancy And Lactation Text: This medication is Pregnancy Category B and is considered safe during pregnancy. This medication is excreted in breast milk. Topical Sulfur Applications Counseling: Topical Sulfur Counseling: Patient counseled that this medication may cause skin irritation or allergic reactions.  In the event of skin irritation, the patient was advised to reduce the amount of the drug applied or use it less frequently.   The patient verbalized understanding of the proper use and possible adverse effects of topical sulfur application.  All of the patient's questions and concerns were addressed. Otezla Pregnancy And Lactation Text: This medication is Pregnancy Category C and it isn't known if it is safe during pregnancy. It is unknown if it is excreted in breast milk. Azathioprine Pregnancy And Lactation Text: This medication is Pregnancy Category D and isn't considered safe during pregnancy. It is unknown if this medication is excreted in breast milk. Elidel Counseling: Patient may experience a mild burning sensation during topical application. Elidel is not approved in children less than 2 years of age. There have been case reports of hematologic and skin malignancies in patients using topical calcineurin inhibitors although causality is questionable. Rifampin Counseling: I discussed with the patient the risks of rifampin including but not limited to liver damage, kidney damage, red-orange body fluids, nausea/vomiting and severe allergy. Cephalexin Counseling: I counseled the patient regarding use of cephalexin as an antibiotic for prophylactic and/or therapeutic purposes. Cephalexin (commonly prescribed under brand name Keflex) is a cephalosporin antibiotic which is active against numerous classes of bacteria, including most skin bacteria. Side effects may include nausea, diarrhea, gastrointestinal upset, rash, hives, yeast infections, and in rare cases, hepatitis, kidney disease, seizures, fever, confusion, neurologic symptoms, and others. Patients with severe allergies to penicillin medications are cautioned that there is about a 10% incidence of cross-reactivity with cephalosporins. When possible, patients with penicillin allergies should use alternatives to cephalosporins for antibiotic therapy. Xeljanz Counseling: I discussed with the patient the risks of Xeljanz therapy including increased risk of infection, liver issues, headache, diarrhea, or cold symptoms. Live vaccines should be avoided. They were instructed to call if they have any problems. Isotretinoin Counseling: Patient should get monthly blood tests, not donate blood, not drive at night if vision affected, not share medication, and not undergo elective surgery for 6 months after tx completed. Side effects reviewed, pt to contact office should one occur. Spironolactone Counseling: Patient advised regarding risks of diarrhea, abdominal pain, hyperkalemia, birth defects (for female patients), liver toxicity and renal toxicity. The patient may need blood work to monitor liver and kidney function and potassium levels while on therapy. The patient verbalized understanding of the proper use and possible adverse effects of spironolactone.  All of the patient's questions and concerns were addressed. Xolair Counseling:  Patient informed of potential adverse effects including but not limited to fever, muscle aches, rash and allergic reactions.  The patient verbalized understanding of the proper use and possible adverse effects of Xolair.  All of the patient's questions and concerns were addressed. SSKI Counseling:  I discussed with the patient the risks of SSKI including but not limited to thyroid abnormalities, metallic taste, GI upset, fever, headache, acne, arthralgias, paraesthesias, lymphadenopathy, easy bleeding, arrhythmias, and allergic reaction. Humira Counseling:  I discussed with the patient the risks of adalimumab including but not limited to myelosuppression, immunosuppression, autoimmune hepatitis, demyelinating diseases, lymphoma, and serious infections.  The patient understands that monitoring is required including a PPD at baseline and must alert us or the primary physician if symptoms of infection or other concerning signs are noted. Acitretin Pregnancy And Lactation Text: This medication is Pregnancy Category X and should not be given to women who are pregnant or may become pregnant in the future. This medication is excreted in breast milk. Erivedge Pregnancy And Lactation Text: This medication is Pregnancy Category X and is absolutely contraindicated during pregnancy. It is unknown if it is excreted in breast milk. 5-Fu Counseling: 5-Fluorouracil Counseling:  I discussed with the patient the risks of 5-fluorouracil including but not limited to erythema, scaling, itching, weeping, crusting, and pain. Doxycycline Counseling:  Patient counseled regarding possible photosensitivity and increased risk for sunburn.  Patient instructed to avoid sunlight, if possible.  When exposed to sunlight, patients should wear protective clothing, sunglasses, and sunscreen.  The patient was instructed to call the office immediately if the following severe adverse effects occur:  hearing changes, easy bruising/bleeding, severe headache, or vision changes.  The patient verbalized understanding of the proper use and possible adverse effects of doxycycline.  All of the patient's questions and concerns were addressed. Nsaids Pregnancy And Lactation Text: These medications are considered safe up to 30 weeks gestation. It is excreted in breast milk. Fluconazole Counseling:  Patient counseled regarding adverse effects of fluconazole including but not limited to headache, diarrhea, nausea, upset stomach, liver function test abnormalities, taste disturbance, and stomach pain.  There is a rare possibility of liver failure that can occur when taking fluconazole.  The patient understands that monitoring of LFTs and kidney function test may be required, especially at baseline. The patient verbalized understanding of the proper use and possible adverse effects of fluconazole.  All of the patient's questions and concerns were addressed. Imiquimod Counseling:  I discussed with the patient the risks of imiquimod including but not limited to erythema, scaling, itching, weeping, crusting, and pain.  Patient understands that the inflammatory response to imiquimod is variable from person to person and was educated regarded proper titration schedule.  If flu-like symptoms develop, patient knows to discontinue the medication and contact us. Doxepin Counseling:  Patient advised that the medication is sedating and not to drive a car after taking this medication. Patient informed of potential adverse effects including but not limited to dry mouth, urinary retention, and blurry vision.  The patient verbalized understanding of the proper use and possible adverse effects of doxepin.  All of the patient's questions and concerns were addressed. Erythromycin Counseling:  I discussed with the patient the risks of erythromycin including but not limited to GI upset, allergic reaction, drug rash, diarrhea, increase in liver enzymes, and yeast infections. Itraconazole Counseling:  I discussed with the patient the risks of itraconazole including but not limited to liver damage, nausea/vomiting, neuropathy, and severe allergy.  The patient understands that this medication is best absorbed when taken with acidic beverages such as non-diet cola or ginger ale.  The patient understands that monitoring is required including baseline LFTs and repeat LFTs at intervals.  The patient understands that they are to contact us or the primary physician if concerning signs are noted. Solaraze Pregnancy And Lactation Text: This medication is Pregnancy Category B and is considered safe. There is some data to suggest avoiding during the third trimester. It is unknown if this medication is excreted in breast milk. Prednisone Counseling:  I discussed with the patient the risks of prolonged use of prednisone including but not limited to weight gain, insomnia, osteoporosis, mood changes, diabetes, susceptibility to infection, glaucoma and high blood pressure.  In cases where prednisone use is prolonged, patients should be monitored with blood pressure checks, serum glucose levels and an eye exam.  Additionally, the patient may need to be placed on GI prophylaxis, PCP prophylaxis, and calcium and vitamin D supplementation and/or a bisphosphonate.  The patient verbalized understanding of the proper use and the possible adverse effects of prednisone.  All of the patient's questions and concerns were addressed. Glycopyrrolate Pregnancy And Lactation Text: This medication is Pregnancy Category B and is considered safe during pregnancy. It is unknown if it is excreted breast milk. Tazorac Pregnancy And Lactation Text: This medication is not safe during pregnancy. It is unknown if this medication is excreted in breast milk. Minoxidil Counseling: Minoxidil is a topical medication which can increase blood flow where it is applied. It is uncertain how this medication increases hair growth. Side effects are uncommon and include stinging and allergic reactions. Rifampin Pregnancy And Lactation Text: This medication is Pregnancy Category C and it isn't know if it is safe during pregnancy. It is also excreted in breast milk and should not be used if you are breast feeding. Rituxan Counseling:  I discussed with the patient the risks of Rituxan infusions. Side effects can include infusion reactions, severe drug rashes including mucocutaneous reactions, reactivation of latent hepatitis and other infections and rarely progressive multifocal leukoencephalopathy.  All of the patient's questions and concerns were addressed. Isotretinoin Pregnancy And Lactation Text: This medication is Pregnancy Category X and is considered extremely dangerous during pregnancy. It is unknown if it is excreted in breast milk.

## 2024-03-14 ENCOUNTER — APPOINTMENT (OUTPATIENT)
Dept: HEMATOLOGY/ONCOLOGY | Facility: HOSPITAL | Age: 79
End: 2024-03-14
Attending: INTERNAL MEDICINE
Payer: MEDICARE

## 2024-03-22 ENCOUNTER — NURSE ONLY (OUTPATIENT)
Dept: HEMATOLOGY/ONCOLOGY | Facility: HOSPITAL | Age: 79
End: 2024-03-22
Attending: INTERNAL MEDICINE
Payer: MEDICARE

## 2024-03-22 VITALS
WEIGHT: 202 LBS | DIASTOLIC BLOOD PRESSURE: 52 MMHG | TEMPERATURE: 98 F | BODY MASS INDEX: 32.47 KG/M2 | RESPIRATION RATE: 18 BRPM | SYSTOLIC BLOOD PRESSURE: 121 MMHG | HEART RATE: 94 BPM | HEIGHT: 66 IN

## 2024-03-22 DIAGNOSIS — D50.8 IRON DEFICIENCY ANEMIA SECONDARY TO INADEQUATE DIETARY IRON INTAKE: ICD-10-CM

## 2024-03-22 DIAGNOSIS — D64.9 ACUTE ON CHRONIC ANEMIA: Primary | ICD-10-CM

## 2024-03-22 DIAGNOSIS — D51.9 ANEMIA DUE TO VITAMIN B12 DEFICIENCY, UNSPECIFIED B12 DEFICIENCY TYPE: ICD-10-CM

## 2024-03-22 DIAGNOSIS — D64.9 ANEMIA, UNSPECIFIED: Primary | ICD-10-CM

## 2024-03-22 DIAGNOSIS — D50.9 IRON DEFICIENCY ANEMIA, UNSPECIFIED IRON DEFICIENCY ANEMIA TYPE: ICD-10-CM

## 2024-03-22 DIAGNOSIS — D64.9 ACUTE ON CHRONIC ANEMIA: ICD-10-CM

## 2024-03-22 LAB
ALBUMIN SERPL-MCNC: 3.9 G/DL (ref 3.2–4.8)
ALBUMIN/GLOB SERPL: 1.5 {RATIO} (ref 1–2)
ALP LIVER SERPL-CCNC: 64 U/L
ALT SERPL-CCNC: 16 U/L
ANION GAP SERPL CALC-SCNC: 8 MMOL/L (ref 0–18)
ANTIBODY SCREEN: NEGATIVE
AST SERPL-CCNC: 38 U/L (ref ?–34)
BASOPHILS # BLD AUTO: 0.06 X10(3) UL (ref 0–0.2)
BASOPHILS NFR BLD AUTO: 1 %
BILIRUB SERPL-MCNC: 0.7 MG/DL (ref 0.2–1.1)
BUN BLD-MCNC: 31 MG/DL (ref 9–23)
BUN/CREAT SERPL: 18 (ref 10–20)
CALCIUM BLD-MCNC: 9.7 MG/DL (ref 8.7–10.4)
CHLORIDE SERPL-SCNC: 111 MMOL/L (ref 98–112)
CO2 SERPL-SCNC: 23 MMOL/L (ref 21–32)
CREAT BLD-MCNC: 1.72 MG/DL
DEPRECATED HBV CORE AB SER IA-ACNC: 22.2 NG/ML
DEPRECATED RDW RBC AUTO: 71.5 FL (ref 35.1–46.3)
EGFRCR SERPLBLD CKD-EPI 2021: 30 ML/MIN/1.73M2 (ref 60–?)
EOSINOPHIL # BLD AUTO: 0.05 X10(3) UL (ref 0–0.7)
EOSINOPHIL NFR BLD AUTO: 0.8 %
ERYTHROCYTE [DISTWIDTH] IN BLOOD BY AUTOMATED COUNT: 22 % (ref 11–15)
GLOBULIN PLAS-MCNC: 2.6 G/DL (ref 2.8–4.4)
GLUCOSE BLD-MCNC: 82 MG/DL (ref 70–99)
HCT VFR BLD AUTO: 25.3 %
HGB BLD-MCNC: 6.7 G/DL
IMM GRANULOCYTES # BLD AUTO: 0.02 X10(3) UL (ref 0–1)
IMM GRANULOCYTES NFR BLD: 0.3 %
IRON SATN MFR SERPL: 6 %
IRON SERPL-MCNC: 27 UG/DL
LYMPHOCYTES # BLD AUTO: 0.61 X10(3) UL (ref 1–4)
LYMPHOCYTES NFR BLD AUTO: 10 %
MCH RBC QN AUTO: 25.8 PG (ref 26–34)
MCHC RBC AUTO-ENTMCNC: 26.5 G/DL (ref 31–37)
MCV RBC AUTO: 97.3 FL
MONOCYTES # BLD AUTO: 0.51 X10(3) UL (ref 0.1–1)
MONOCYTES NFR BLD AUTO: 8.4 %
NEUTROPHILS # BLD AUTO: 4.82 X10 (3) UL (ref 1.5–7.7)
NEUTROPHILS # BLD AUTO: 4.82 X10(3) UL (ref 1.5–7.7)
NEUTROPHILS NFR BLD AUTO: 79.5 %
OSMOLALITY SERPL CALC.SUM OF ELEC: 300 MOSM/KG (ref 275–295)
PLATELET # BLD AUTO: 319 10(3)UL (ref 150–450)
PLATELET MORPHOLOGY: NORMAL
POTASSIUM SERPL-SCNC: 4.6 MMOL/L (ref 3.5–5.1)
PROT SERPL-MCNC: 6.5 G/DL (ref 5.7–8.2)
RBC # BLD AUTO: 2.6 X10(6)UL
RH BLOOD TYPE: NEGATIVE
SODIUM SERPL-SCNC: 142 MMOL/L (ref 136–145)
TIBC SERPL-MCNC: 420 UG/DL (ref 250–425)
TRANSFERRIN SERPL-MCNC: 282 MG/DL (ref 250–380)
WBC # BLD AUTO: 6.1 X10(3) UL (ref 4–11)

## 2024-03-22 PROCEDURE — 84466 ASSAY OF TRANSFERRIN: CPT

## 2024-03-22 PROCEDURE — 85060 BLOOD SMEAR INTERPRETATION: CPT

## 2024-03-22 PROCEDURE — 85025 COMPLETE CBC W/AUTO DIFF WBC: CPT

## 2024-03-22 PROCEDURE — 86900 BLOOD TYPING SEROLOGIC ABO: CPT | Performed by: NURSE PRACTITIONER

## 2024-03-22 PROCEDURE — 99211 OFF/OP EST MAY X REQ PHY/QHP: CPT

## 2024-03-22 PROCEDURE — 80053 COMPREHEN METABOLIC PANEL: CPT

## 2024-03-22 PROCEDURE — 86850 RBC ANTIBODY SCREEN: CPT | Performed by: NURSE PRACTITIONER

## 2024-03-22 PROCEDURE — 36415 COLL VENOUS BLD VENIPUNCTURE: CPT

## 2024-03-22 PROCEDURE — 86920 COMPATIBILITY TEST SPIN: CPT

## 2024-03-22 PROCEDURE — 82728 ASSAY OF FERRITIN: CPT

## 2024-03-22 PROCEDURE — 86901 BLOOD TYPING SEROLOGIC RH(D): CPT | Performed by: NURSE PRACTITIONER

## 2024-03-22 PROCEDURE — 83540 ASSAY OF IRON: CPT

## 2024-03-22 NOTE — PROGRESS NOTES
HPI     Yamini Ocampo is a 79 year old female here for follow up of   Encounter Diagnoses   Name Primary?    Acute on chronic anemia Yes    Anemia due to vitamin B12 deficiency, unspecified B12 deficiency type     Iron deficiency anemia secondary to inadequate dietary iron intake        Patient started the B12 supplement in April of 2022.      States taking SL B12. States she does not feel any difference due to fatigue.  States she does not sleep well due to incontinence.     She was admitted on 3/7/2023 and discharged on 3/11/2023 due to acute on chronic heart failure, at that time noted to have acute on chronic anemia with no overt GI bleeding noted.  She did have EGD on 3/9/23 which noted severe linear erythema in the antrum without focal ulceration or erosions.  While she was in the hospital, she received intravenous of Venofer 400mg.  Patient is on chronic Eliquis anticoagulation for A-fib.  She also had had a TAVR.    She was recently seen by GI on 12/5/2023 due to increased gas and belching for a few days, as well as heartburn the week prior to the visit.  She has been on pantoprazole.    Today she is here for follow-up of the anemia as above and feels more tired and fatigued.  She states she had a sinus infection x2 and most recently 2-3 weeks ago. Recent strep throat. Sinus issues.     No fevers but increased fatigue.     Denies black or tarry stools. Does have intermittent diarrhea and constipation. Yesterday was diarrhea.   C/o increased swelling to both lower extremities. Taking lasix 20 mg 1-2 per day following with Renal.     PS 1 fatigue    Patient drove herself today           Review of Systems:   Review of Systems   Constitutional:  Positive for appetite change, fatigue and unexpected weight change.   HENT:   Positive for sore throat. Negative for nosebleeds.         Congested    Respiratory:  Positive for cough (productive at times) and shortness of breath (with activity).    Cardiovascular:   Positive for leg swelling. Negative for chest pain and palpitations.   Gastrointestinal:  Positive for constipation and diarrhea. Negative for abdominal pain and blood in stool.        Occasional bloating and cramps on days of loose stools.    Genitourinary:  Negative for hematuria.    Musculoskeletal:  Positive for arthralgias, gait problem (looses balance easily) and neck pain.   Neurological:  Positive for gait problem (looses balance easily), light-headedness (at times) and numbness (of hands and feet.  States from Raynaud's.). Negative for dizziness and headaches.   Hematological:  Bruises/bleeds easily.   Psychiatric/Behavioral:  Positive for sleep disturbance.          Current Outpatient Medications   Medication Sig Dispense Refill    albuterol 108 (90 Base) MCG/ACT Inhalation Aero Soln SHAKE BEFORE USE AND TAKE 2 PUFFS INTO THE LUNGS EVERY 6 HOURS AS NEEDED      amoxicillin clavulanate 875-125 MG Oral Tab Take 1 tablet by mouth 2 (two) times daily.      sertraline 100 MG Oral Tab Take 1 tablet (100 mg total) by mouth daily. **Appointment needed for further refills. 90 tablet 0    MUCINEX DM  MG Oral Tablet 12 Hr Take 1 tablet by mouth Q12H. FOR 10 DAYS      furosemide 20 MG Oral Tab Take 1 tablet (20 mg total) by mouth daily.      sodium chloride 0.65 % Nasal Solution 1 spray by Nasal route as needed. 60 mL 0    pantoprazole 40 MG Oral Tab EC Take 1 tablet (40 mg total) by mouth 2 (two) times daily. (Patient taking differently: Take 1 tablet (40 mg total) by mouth 2 (two) times daily. Pt take 1 daily) 60 tablet 2    folic acid 1 MG Oral Tab Take 1 tablet (1 mg total) by mouth daily. 90 tablet 3    METOPROLOL SUCCINATE ER 50 MG Oral Tablet 24 Hr       amLODIPine 5 MG Oral Tab Take 1 tablet (5 mg total) by mouth daily. 30 tablet 0    atorvastatin 10 MG Oral Tab Take 1 tablet (10 mg total) by mouth nightly. 90 tablet 3    cyanocobalamin 1000 MCG Oral Tab Take 1 tablet (1,000 mcg total) by mouth daily.       ELIQUIS 5 MG Oral Tab Take 1 tablet (5 mg total) by mouth 2 (two) times daily. 180 tablet 0     Allergies:   Allergies   Allergen Reactions    Adhesive Tape RASH     Skin off       Past Medical History:   Diagnosis Date    Arrhythmia     afib    Back problem     lumbar disc disease    Basal cell carcinoma of nose     Bilateral carpal tunnel syndrome     Cataract     Cellulitis     Deep vein thrombosis (HCC)     unsure which leg, possibly left    Depression     Esophageal reflux     Essential hypertension     Heart disease     Heel spur     Right    Hemorrhoids     Hiatal hernia     High blood pressure     High cholesterol     Hyperlipidemia     Incontinence     Lumbar disc disease 2012    Multiple gastric ulcers 04/25/2017    Osteoarthritis     Pulmonary embolism (HCC)     Raynaud disease     Renal disorder     Tubular adenoma of colon 01/2017    repeat c-scope 1/2020    Type 2 diabetes mellitus with diabetic chronic kidney disease (HCC) 11/30/2022    Visual impairment     glasses     Past Surgical History:   Procedure Laterality Date    ANGIOPLASTY (CORONARY)      APPENDECTOMY      ARTHROSCOPY OF JOINT UNLISTED Right     knee    BSO, OMENTECTOMY W/BRANDO      CARPAL TUNNEL RELEASE Bilateral     CHOLECYSTECTOMY  09/28/2016    COLONOSCOPY N/A 01/25/2017    Procedure: COLONOSCOPY;  Surgeon: KATHARINE Prakash MD;  Location: [facility] ENDOSCOPY    COLONOSCOPY N/A 10/07/2020    Procedure: COLONOSCOPY;  Surgeon: KATHARINE Prakash MD;  Location: [facility] ENDOSCOPY    EGD N/A 12/14/2016    Procedure: ESOPHAGOGASTRODUODENOSCOPY (EGD);  Surgeon: KATHARINE Prakash MD;  Location: [facility] ENDOSCOPY    EGD N/A 01/23/2024    ; gastritis    ELECTROCARDIOGRAM, COMPLETE  09/26/2013    Scanned to Media Tab    EXCISION OF NOSE      Basal cell carcinoma    HERNIA SURGERY      HYSTERECTOMY      KNEE REPLACEMENT SURGERY Right     OTHER SURGICAL HISTORY      Injections and narcotics, POD Bartuci    REMOVAL OF HEEL SPUR      TOTAL ABDOM HYSTERECTOMY        Social History     Socioeconomic History    Marital status:    Tobacco Use    Smoking status: Former     Packs/day: 0     Types: Cigarettes     Quit date: 1997     Years since quittin.2    Smokeless tobacco: Never   Vaping Use    Vaping Use: Never used   Substance and Sexual Activity    Alcohol use: No     Comment: rare    Drug use: Never     Comment: edibles occasionally for sleep   Other Topics Concern    Caffeine Concern Yes     Comment: 8 cups coffee/sod daily    Reaction to local anesthetic No     Social Determinants of Health     Financial Resource Strain: Low Risk  (3/14/2023)    Financial Resource Strain     Difficulty of Paying Living Expenses: Not very hard     Med Affordability: No   Transportation Needs: No Transportation Needs (3/14/2023)    Transportation Needs     Lack of Transportation: No       Family History   Problem Relation Age of Onset    Cancer Father         Skin cancer    Other (Other) Father 97        old age,unknown caused    Heart Attack Mother     Heart Disorder Mother     Hypertension Other         Family H/O    Cancer Other         Lymphoma  Family H/O    Heart Disease Other         Family H/O    Arthritis Other         Close relative  unsure which type    No Known Problems Brother          PHYSICAL EXAM:    /52 (BP Location: Left arm, Patient Position: Sitting, Cuff Size: adult)   Pulse 94   Temp 98 °F (36.7 °C) (Oral)   Resp 18   Ht 1.676 m (5' 6\")   Wt 91.6 kg (202 lb)   BMI 32.60 kg/m²   Wt Readings from Last 6 Encounters:   24 84.8 kg (187 lb)   24 89.4 kg (197 lb)   24 88.5 kg (195 lb)   24 95.7 kg (211 lb)   23 90.8 kg (200 lb 3.2 oz)   23 93 kg (205 lb)     Physical Exam  General: Patient is alert, not in acute distress. Pale in appearance   HEENT: EOMs intact. PERRL.   Neck: No JVD. No palpable lymphadenopathy. Neck is supple.  Chest: Clear to auscultation.  Heart: Regular rate and rhythm.   Abdomen: Soft,  non tender with good bowel sounds.  Extremities: +2 edema.  Neurological: Grossly intact.   Lymphatics: There is no palpable lymphadenopathy throughout in the cervical, supraclavicular, axillary, or inguinal regions.  Psych/Depression: nl        ASSESSMENT/PLAN:     1. Acute on chronic anemia    2. Anemia due to vitamin B12 deficiency, unspecified B12 deficiency type    3. Iron deficiency anemia secondary to inadequate dietary iron intake        -Exacerbation.  Not macrocytic.   -She did have exacerbation of her anemia secondary to GI bleed.  Last visit, we had discussed follow-up with GI given her chronic GERD for which she had been on a PPI.  She remains on pantoprazole.  However, she is on chronic anticoagulation with apixaban due to chronic A-fib.  She may likely develop chronic iron deficiency anemia which can occur over time with esophagitis and gastritis, particularly with anticoagulation, as well as decreased absorption from PPI.      We will check iron studies today.  Patient has iron deficiency, replace iron with Venofer.  -She is to continue taking sublingual B12 daily as B12 levels had improved and normal today.  Continue with folic acid 1 mg daily.  -Also has CKD 3b, stable.  Will add epo level today, may benefit from epo replacement if no improvement after GI w/u and improvement in iron stores.         Hgb 6.7 - CMP added to todays labs   Pt refused to stay for transfusion today- if symptoms worsen must report to ER this weekend   Scheduled for Monday for blood transfusion     Add CMP and type and screen to today's labs       Will schedule for iron infusions- ordered previously but never given scheduled       - No follow-ups on file.      No orders of the defined types were placed in this encounter.    MDM high    Results From Past 48 Hours:  Recent Results (from the past 48 hour(s))   Iron And Tibc    Collection Time: 03/22/24  9:03 AM   Result Value Ref Range    Iron 27 (L) 50 - 170 ug/dL     Transferrin 282 250 - 380 mg/dL    Total Iron Binding Capacity 420 250 - 425 ug/dL    % Saturation 6 (L) 15 - 50 %   Ferritin    Collection Time: 03/22/24  9:03 AM   Result Value Ref Range    Ferritin 22.2 18.0 - 340.0 ng/mL   CBC W/ DIFFERENTIAL    Collection Time: 03/22/24  9:03 AM   Result Value Ref Range    WBC 6.1 4.0 - 11.0 x10(3) uL    RBC 2.60 (L) 3.80 - 5.30 x10(6)uL    HGB 6.7 (LL) 12.0 - 16.0 g/dL    HCT 25.3 (L) 35.0 - 48.0 %    MCV 97.3 80.0 - 100.0 fL    MCH 25.8 (L) 26.0 - 34.0 pg    MCHC 26.5 (L) 31.0 - 37.0 g/dL    RDW-SD 71.5 (H) 35.1 - 46.3 fL    RDW 22.0 (H) 11.0 - 15.0 %    .0 150.0 - 450.0 10(3)uL    Neutrophil Absolute Prelim 4.82 1.50 - 7.70 x10 (3) uL    Neutrophil Absolute 4.82 1.50 - 7.70 x10(3) uL    Lymphocyte Absolute 0.61 (L) 1.00 - 4.00 x10(3) uL    Monocyte Absolute 0.51 0.10 - 1.00 x10(3) uL    Eosinophil Absolute 0.05 0.00 - 0.70 x10(3) uL    Basophil Absolute 0.06 0.00 - 0.20 x10(3) uL    Immature Granulocyte Absolute 0.02 0.00 - 1.00 x10(3) uL    Neutrophil % 79.5 %    Lymphocyte % 10.0 %    Monocyte % 8.4 %    Eosinophil % 0.8 %    Basophil % 1.0 %    Immature Granulocyte % 0.3 %   RBC Morphology Scan    Collection Time: 03/22/24  9:03 AM   Result Value Ref Range    RBC Morphology See morphology below (A) Normal, Slide reviewed, see previous RBC morphology.    Platelet Morphology Normal Normal    Echinocytes, Gasper Cells 1+      Macrocytosis 1+      Microcytosis 1+      Hypochromia 1+      Polychromasia 1+      Ovalocytes 1+     COMP METABOLIC PANEL [E]    Collection Time: 03/22/24  9:03 AM   Result Value Ref Range    Glucose 82 70 - 99 mg/dL    Sodium 142 136 - 145 mmol/L    Potassium 4.6 3.5 - 5.1 mmol/L    Chloride 111 98 - 112 mmol/L    CO2 23.0 21.0 - 32.0 mmol/L    Anion Gap 8 0 - 18 mmol/L    BUN 31 (H) 9 - 23 mg/dL    Creatinine 1.72 (H) 0.55 - 1.02 mg/dL    BUN/CREA Ratio 18.0 10.0 - 20.0    Calcium, Total 9.7 8.7 - 10.4 mg/dL    Calculated Osmolality 300 (H) 275  - 295 mOsm/kg    eGFR-Cr 30 (L) >=60 mL/min/1.73m2    ALT 16 10 - 49 U/L    AST 38 (H) <=34 U/L    Alkaline Phosphatase 64 55 - 142 U/L    Bilirubin, Total 0.7 0.2 - 1.1 mg/dL    Total Protein 6.5 5.7 - 8.2 g/dL    Albumin 3.9 3.2 - 4.8 g/dL    Globulin  2.6 (L) 2.8 - 4.4 g/dL    A/G Ratio 1.5 1.0 - 2.0   [facility]    Collection Time: 03/22/24 10:12 AM   Result Value Ref Range    ABO BLOOD TYPE A     RH BLOOD TYPE Negative    Antibody Screen    Collection Time: 03/22/24 10:12 AM   Result Value Ref Range    Antibody Screen Negative          Imaging & Referrals:  None   No orders of the defined types were placed in this encounter.    Component      Latest Ref Rng 5/9/2023 11/20/2023 12/20/2023   WBC      4.0 - 11.0 x10(3) uL 4.2  4.4  4.0    RBC      3.80 - 5.30 x10(6)uL 3.31 (L)  2.78 (L)  2.56 (L)    Hemoglobin      12.0 - 16.0 g/dL 10.2 (L)  8.1 (L)  7.5 (L)    Hematocrit      35.0 - 48.0 % 34.2 (L)  28.1 (L)  25.6 (L)    MCV      80.0 - 100.0 fL 103.3 (H)  101.1 (H)  100.0    MCH      26.0 - 34.0 pg 30.8  29.1  29.3    MCHC      31.0 - 37.0 g/dL 29.8 (L)  28.8 (L)  29.3 (L)    RDW-SD      35.1 - 46.3 fL 67.3 (H)  62.7 (H)  65.4 (H)    RDW      11.0 - 15.0 % 18.0 (H)  18.6 (H)  19.2 (H)    Platelet Count      150.0 - 450.0 10(3)uL 179.0  196.0  192.0    Prelim Neutrophil Abs      1.50 - 7.70 x10 (3) uL 3.00  3.08  2.89    Neutrophils Absolute      1.50 - 7.70 x10(3) uL 3.00  3.08  2.89    Lymphocytes Absolute      1.00 - 4.00 x10(3) uL 0.64 (L)  0.78 (L)  0.52 (L)    Monocytes Absolute      0.10 - 1.00 x10(3) uL 0.34  0.33  0.47    Eosinophils Absolute      0.00 - 0.70 x10(3) uL 0.12  0.10  0.03    Basophils Absolute      0.00 - 0.20 x10(3) uL 0.05  0.05  0.05    Immature Granulocyte Absolute      0.00 - 1.00 x10(3) uL 0.01  0.02  0.01    Neutrophils %      % 72.1  70.6  72.7    Lymphocytes %      % 15.4  17.9  13.1    Monocytes %      % 8.2  7.6  11.8    Eosinophils %      % 2.9  2.3  0.8    Basophils %       % 1.2  1.1  1.3    Immature Granulocyte %      % 0.2  0.5  0.3    RETIC%      0.5 - 2.5 % 2.8 (H)   3.3 (H)    RETIC ABSOLUTE      22.5 - 147.5 x10(3) uL 92.8   85.0    Retic IRF      0.100 - 0.300 Ratio 0.287   0.259    Reticulocyte Hemoglobin Equivalent      28.2 - 36.6 pg 34.8   25.0 (L)    Iron, Serum      50 - 170 ug/dL 66   52    Transferrin      250 - 380 mg/dL 245   271    Iron Bind.Cap.(TIBC)      250 - 425 ug/dL 365   404    Iron Saturation      15 - 50 % 18   13 (L)    Vitamin B12      211 - 911 pg/mL 618   626    FERRITIN      18.0 - 340.0 ng/mL 44.4   22.4    LDH      120 - 246 U/L   582 (H)

## 2024-03-25 ENCOUNTER — OFFICE VISIT (OUTPATIENT)
Dept: HEMATOLOGY/ONCOLOGY | Facility: HOSPITAL | Age: 79
End: 2024-03-25
Attending: INTERNAL MEDICINE
Payer: MEDICARE

## 2024-03-25 VITALS
RESPIRATION RATE: 16 BRPM | TEMPERATURE: 98 F | HEART RATE: 74 BPM | OXYGEN SATURATION: 97 % | DIASTOLIC BLOOD PRESSURE: 62 MMHG | SYSTOLIC BLOOD PRESSURE: 142 MMHG

## 2024-03-25 DIAGNOSIS — D50.0 IRON DEFICIENCY ANEMIA DUE TO CHRONIC BLOOD LOSS: Primary | ICD-10-CM

## 2024-03-25 PROCEDURE — 36430 TRANSFUSION BLD/BLD COMPNT: CPT

## 2024-03-25 NOTE — PROGRESS NOTES
Pt here for one unit of PRBC .    Ordering MD: Uche  Order Exp: one of one, RTC in 3 weeks     Pt tolerated infusion without difficulty or complaint. Reviewed next apt date/time: yes      Education Record  Learner:  Patient  Disease / Diagnosis: anemia  Barriers / Limitations:  None  Method:  Discussion  General Topics:  Plan of care reviewed  Outcome:  Shows understanding, vs throughout stable.

## 2024-03-25 NOTE — PATIENT INSTRUCTIONS
.Post Transfusion Instructions for Out-Patients    Most recipients of blood transfusions do not experience any adverse effects.  You may resume your normal activities 4 to 6 hours after your blood transfusion.  Occasionally, reactions of blood transfusions may be delayed (for several weeks).    Symptoms of a transfusion reaction can include:    Faintness  Weakness  Nausea  Vomiting  Shortness of breath  Chest pain  Back pain  Hives  Rash  Itch  Flushing  Fever  Chills  Jaundice (yellowish tint to eyes/skin)  Dark or red urine    Though these symptoms may not be related to the blood transfusions, they should be reported to your physician.  Contact both your physician and the laboratory Blood Bank (see information below) so that your symptoms can be thoroughly evaluated.    Your physician's name: Lance  Your physician's phone number: [phone]    If your physician is unavailable, contact the Emergency Department.    [facility] Blood Bank:  [phone]  [facility] Emergency Department:  [phone]    [facility] Blood Bank: [phone]  [facility] Emergency Department: [phone]

## 2024-03-26 LAB
BLOOD TYPE BARCODE: 600
UNIT VOLUME: 350 ML

## 2024-04-01 ENCOUNTER — TELEPHONE (OUTPATIENT)
Dept: NEPHROLOGY | Facility: CLINIC | Age: 79
End: 2024-04-01

## 2024-04-01 NOTE — TELEPHONE ENCOUNTER
Dr. Pizarro, going through overdue results. Pt was to repeat renal panel, cbc and TIBC in feb. Canceled march appt. Did these labs for Dr. Ivey on 3-22 if you want to see those results-fyi

## 2024-04-09 ENCOUNTER — TELEPHONE (OUTPATIENT)
Dept: FAMILY MEDICINE CLINIC | Facility: CLINIC | Age: 79
End: 2024-04-09

## 2024-04-09 ENCOUNTER — NURSE TRIAGE (OUTPATIENT)
Dept: FAMILY MEDICINE CLINIC | Facility: CLINIC | Age: 79
End: 2024-04-09

## 2024-04-09 NOTE — TELEPHONE ENCOUNTER
Action Requested: Summary for Provider     []  Critical Lab, Recommendations Needed  [] Need Additional Advice  [x]   FYI    []   Need Orders  [] Need Medications Sent to Pharmacy  []  Other     SUMMARY: Patient was to be seen today for edema of legs, on her way to office visit she tripped and twisted R ankle. Pain increases when bearing weight on R leg--> advised IC today, agreeable [but unsure]. Refer to system/assessment protocol for yes/no answers. [See below for more details]     Reason for call: Edema  Onset: today    Reason for Disposition   Can't stand (bear weight) or walk (e.g., 4 steps)    Protocols used: Ankle and Foot Injury-A-[place]      Patient called, states she tripped on the way to the car to go to scheduled office visit for bilateral edema of legs. She caught her fall. She twisted her R ankle. She has some edema of her ankles [which was present prior], after tripping. She states when she bear weight on R leg the pain increases --> 4-5/10.  She denies any shortness of breath, chest pain, and/or chest tightness. She was advised to go to IC for evaluation today, she states \"ok\" but then she states \"we'll see\"; also rescheduled the visit she had scheduled for today. Home Care Advice discussed, per protocol. Patient instructed any new or worsening symptoms [reviewed] seek immediate medical attention. Patient verbalized understanding. No further questions or concerns at this time.    Future Appointments   Date Time Provider Department Center   4/10/2024  1:00 PM Iris Patricia [facility] [place]

## 2024-04-10 ENCOUNTER — HOSPITAL ENCOUNTER (OUTPATIENT)
Dept: ULTRASOUND IMAGING | Facility: HOSPITAL | Age: 79
Discharge: HOME | End: 2024-04-10
Attending: PHYSICIAN ASSISTANT
Payer: MEDICARE

## 2024-04-10 ENCOUNTER — OFFICE VISIT (OUTPATIENT)
Dept: FAMILY MEDICINE CLINIC | Facility: CLINIC | Age: 79
End: 2024-04-10

## 2024-04-10 ENCOUNTER — LAB ENCOUNTER (OUTPATIENT)
Dept: LAB | Age: 79
End: 2024-04-10
Attending: PHYSICIAN ASSISTANT
Payer: MEDICARE

## 2024-04-10 VITALS
DIASTOLIC BLOOD PRESSURE: 65 MMHG | HEIGHT: 66 IN | HEART RATE: 80 BPM | SYSTOLIC BLOOD PRESSURE: 127 MMHG | BODY MASS INDEX: 35.68 KG/M2 | WEIGHT: 222 LBS

## 2024-04-10 DIAGNOSIS — M79.605 LEFT LEG PAIN: ICD-10-CM

## 2024-04-10 DIAGNOSIS — M79.89 LEFT LEG SWELLING: ICD-10-CM

## 2024-04-10 DIAGNOSIS — R06.02 SHORTNESS OF BREATH: ICD-10-CM

## 2024-04-10 DIAGNOSIS — M79.89 LEFT LEG SWELLING: Primary | ICD-10-CM

## 2024-04-10 LAB
ANION GAP SERPL CALC-SCNC: 7 MMOL/L (ref 0–18)
BASOPHILS # BLD AUTO: 0.06 X10(3) UL (ref 0–0.2)
BASOPHILS NFR BLD AUTO: 1.3 %
BNP SERPL-MCNC: 832 PG/ML
BUN BLD-MCNC: 30 MG/DL (ref 9–23)
BUN/CREAT SERPL: 19.4 (ref 10–20)
CALCIUM BLD-MCNC: 9.3 MG/DL (ref 8.7–10.4)
CHLORIDE SERPL-SCNC: 110 MMOL/L (ref 98–112)
CO2 SERPL-SCNC: 26 MMOL/L (ref 21–32)
CREAT BLD-MCNC: 1.55 MG/DL
DEPRECATED RDW RBC AUTO: 68.7 FL (ref 35.1–46.3)
EGFRCR SERPLBLD CKD-EPI 2021: 33 ML/MIN/1.73M2 (ref 60–?)
EOSINOPHIL # BLD AUTO: 0.04 X10(3) UL (ref 0–0.7)
EOSINOPHIL NFR BLD AUTO: 0.9 %
ERYTHROCYTE [DISTWIDTH] IN BLOOD BY AUTOMATED COUNT: 20.7 % (ref 11–15)
FASTING STATUS PATIENT QL REPORTED: NO
GLUCOSE BLD-MCNC: 85 MG/DL (ref 70–99)
HCT VFR BLD AUTO: 26.1 %
HGB BLD-MCNC: 7 G/DL
IMM GRANULOCYTES # BLD AUTO: 0.01 X10(3) UL (ref 0–1)
IMM GRANULOCYTES NFR BLD: 0.2 %
LYMPHOCYTES # BLD AUTO: 0.52 X10(3) UL (ref 1–4)
LYMPHOCYTES NFR BLD AUTO: 11.4 %
MCH RBC QN AUTO: 25.5 PG (ref 26–34)
MCHC RBC AUTO-ENTMCNC: 26.8 G/DL (ref 31–37)
MCV RBC AUTO: 95.3 FL
MONOCYTES # BLD AUTO: 0.53 X10(3) UL (ref 0.1–1)
MONOCYTES NFR BLD AUTO: 11.6 %
NEUTROPHILS # BLD AUTO: 3.42 X10 (3) UL (ref 1.5–7.7)
NEUTROPHILS # BLD AUTO: 3.42 X10(3) UL (ref 1.5–7.7)
NEUTROPHILS NFR BLD AUTO: 74.6 %
OSMOLALITY SERPL CALC.SUM OF ELEC: 301 MOSM/KG (ref 275–295)
PLATELET # BLD AUTO: 334 10(3)UL (ref 150–450)
PLATELET MORPHOLOGY: NORMAL
POTASSIUM SERPL-SCNC: 4.9 MMOL/L (ref 3.5–5.1)
RBC # BLD AUTO: 2.74 X10(6)UL
SODIUM SERPL-SCNC: 143 MMOL/L (ref 136–145)
TROPONIN I SERPL HS-MCNC: 32 NG/L
WBC # BLD AUTO: 4.6 X10(3) UL (ref 4–11)

## 2024-04-10 PROCEDURE — 93971 EXTREMITY STUDY: CPT | Performed by: PHYSICIAN ASSISTANT

## 2024-04-10 PROCEDURE — 84484 ASSAY OF TROPONIN QUANT: CPT

## 2024-04-10 PROCEDURE — 83880 ASSAY OF NATRIURETIC PEPTIDE: CPT

## 2024-04-10 PROCEDURE — 99213 OFFICE O/P EST LOW 20 MIN: CPT | Performed by: PHYSICIAN ASSISTANT

## 2024-04-10 PROCEDURE — 36415 COLL VENOUS BLD VENIPUNCTURE: CPT

## 2024-04-10 PROCEDURE — 80048 BASIC METABOLIC PNL TOTAL CA: CPT

## 2024-04-10 PROCEDURE — 85025 COMPLETE CBC W/AUTO DIFF WBC: CPT

## 2024-04-10 RX ORDER — DAPAGLIFLOZIN 10 MG/1
1 TABLET, FILM COATED ORAL DAILY
COMMUNITY

## 2024-04-10 NOTE — PROGRESS NOTES
HPI:     HPI  A 79-year-old female is here in the office complaining of left leg pain, left leg swelling and SOB for the past 3 days. The patient refuses to go to ER at this time.  The patient denies chest pain, headache, leg redness, N/V/C/D, fever, dizziness, syncope, and abdominal pain. There are no other concerns today.    Medications:     Current Outpatient Medications   Medication Sig Dispense Refill    FARXIGA 10 MG Oral Tab Take 1 tablet (10 mg total) by mouth daily.      albuterol 108 (90 Base) MCG/ACT Inhalation Aero Soln SHAKE BEFORE USE AND TAKE 2 PUFFS INTO THE LUNGS EVERY 6 HOURS AS NEEDED      sertraline 100 MG Oral Tab Take 1 tablet (100 mg total) by mouth daily. **Appointment needed for further refills. 90 tablet 0    MUCINEX DM  MG Oral Tablet 12 Hr Take 1 tablet by mouth Q12H. FOR 10 DAYS      furosemide 20 MG Oral Tab Take 1 tablet (20 mg total) by mouth daily.      sodium chloride 0.65 % Nasal Solution 1 spray by Nasal route as needed. 60 mL 0    pantoprazole 40 MG Oral Tab EC Take 1 tablet (40 mg total) by mouth 2 (two) times daily. (Patient taking differently: Take 1 tablet (40 mg total) by mouth 2 (two) times daily. Pt take 1 daily) 60 tablet 2    folic acid 1 MG Oral Tab Take 1 tablet (1 mg total) by mouth daily. 90 tablet 3    METOPROLOL SUCCINATE ER 50 MG Oral Tablet 24 Hr       amLODIPine 5 MG Oral Tab Take 1 tablet (5 mg total) by mouth daily. 30 tablet 0    atorvastatin 10 MG Oral Tab Take 1 tablet (10 mg total) by mouth nightly. 90 tablet 3    cyanocobalamin 1000 MCG Oral Tab Take 1 tablet (1,000 mcg total) by mouth daily.      ELIQUIS 5 MG Oral Tab Take 1 tablet (5 mg total) by mouth 2 (two) times daily. 180 tablet 0       Allergies:     Allergies   Allergen Reactions    Adhesive Tape RASH     Skin off       History:     Health Maintenance   Topic Date Due    Zoster Vaccines (1 of 2) Never done    Diabetes Care Dilated Eye Exam  09/08/2022    COVID-19 Vaccine (3 - 2023-24  season) 09/01/2023    Colorectal Cancer Screening  10/07/2023    Annual Depression Screening  01/01/2024    Influenza Vaccine (Season Ended) 11/14/2024 (Originally 10/1/2024)    Diabetes Care A1C  05/14/2024    Diabetes Care: Microalb/Creat Ratio  08/31/2024    Annual Physical  11/14/2024    Diabetes Care Foot Exam  02/20/2025    Diabetes Care: GFR  03/22/2025    DEXA Scan  Completed    Fall Risk Screening (Annual)  Completed    Pneumococcal Vaccine: 65+ Years  Completed       No LMP recorded. Patient is postmenopausal.   Past Medical History:     Past Medical History:    Arrhythmia    afib    Back problem    lumbar disc disease    Basal cell carcinoma of nose    Bilateral carpal tunnel syndrome    Cataract    Cellulitis    Deep vein thrombosis (HCC)    unsure which leg, possibly left    Depression    Esophageal reflux    Essential hypertension    Heart disease    Heel spur    Right    Hemorrhoids    Hiatal hernia    High blood pressure    High cholesterol    Hyperlipidemia    Incontinence    Lumbar disc disease    Multiple gastric ulcers    Osteoarthritis    Pulmonary embolism (HCC)    Raynaud disease    Renal disorder    Tubular adenoma of colon    repeat c-scope 1/2020    Type 2 diabetes mellitus with diabetic chronic kidney disease (HCC)    Visual impairment    glasses       Past Surgical History:     Past Surgical History:   Procedure Laterality Date    Angioplasty (coronary)      Appendectomy      Arthroscopy of joint unlisted Right     knee    Bso, omentectomy w/sushil      Carpal tunnel release Bilateral     Cholecystectomy  09/28/2016    Colonoscopy N/A 01/25/2017    Procedure: COLONOSCOPY;  Surgeon: KATHARINE Prakash MD;  Location: [facility] ENDOSCOPY    Colonoscopy N/A 10/07/2020    Procedure: COLONOSCOPY;  Surgeon: KATHARINE Prakash MD;  Location: [facility] ENDOSCOPY    Egd N/A 12/14/2016    Procedure: ESOPHAGOGASTRODUODENOSCOPY (EGD);  Surgeon: KATHARINE Prakash MD;  Location: [facility] ENDOSCOPY    Egd N/A 01/23/2024     ; gastritis    Electrocardiogram, complete  2013    Scanned to Media Tab    Excision of nose      Basal cell carcinoma    Hernia surgery      Hysterectomy      Knee replacement surgery Right     Other surgical history      Injections and narcotics, POD Bartuci    Removal of heel spur      Total abdom hysterectomy         Family History:     Family History   Problem Relation Age of Onset    Cancer Father         Skin cancer    Other (Other) Father 97        old age,unknown caused    Heart Attack Mother     Heart Disorder Mother     Hypertension Other         Family H/O    Cancer Other         Lymphoma  Family H/O    Heart Disease Other         Family H/O    Arthritis Other         Close relative  unsure which type    No Known Problems Brother        Social History:     Social History     Socioeconomic History    Marital status:      Spouse name: Not on file    Number of children: Not on file    Years of education: Not on file    Highest education level: Not on file   Occupational History    Not on file   Tobacco Use    Smoking status: Former     Current packs/day: 0.00     Types: Cigarettes     Quit date: 1997     Years since quittin.2    Smokeless tobacco: Never   Vaping Use    Vaping status: Never Used   Substance and Sexual Activity    Alcohol use: No     Comment: rare    Drug use: Never     Comment: edibles occasionally for sleep    Sexual activity: Not on file   Other Topics Concern     Service Not Asked    Blood Transfusions Not Asked    Caffeine Concern Yes     Comment: 8 cups coffee/sod daily    Occupational Exposure Not Asked    Hobby Hazards Not Asked    Sleep Concern Not Asked    Stress Concern Not Asked    Weight Concern Not Asked    Special Diet Not Asked    Back Care Not Asked    Exercise Not Asked    Bike Helmet Not Asked    Seat Belt Not Asked    Self-Exams Not Asked    Grew up on a farm Not Asked    History of tanning Not Asked    Outdoor occupation Not Asked     Breast feeding Not Asked    Reaction to local anesthetic No   Social History Narrative    Not on file     Social Determinants of Health     Financial Resource Strain: Low Risk  (3/14/2023)    Financial Resource Strain     Difficulty of Paying Living Expenses: Not very hard     Med Affordability: No   Food Insecurity: Not on file   Transportation Needs: No Transportation Needs (3/14/2023)    Transportation Needs     Lack of Transportation: No   Physical Activity: Not on file   Stress: Not on file   Social Connections: Not on file   Housing Stability: Low Risk  (4/13/2023)    Received from [facility]    Housing Stability     Mortgage Payment Concerns?: Not on file     Number of Places Lived in the Last Year: Not on file     Unstable Housing?: Not on file       Review of Systems:   Review of Systems   Respiratory:  Positive for shortness of breath.     + left leg pain and swelling    Vitals:    04/10/24 1259   BP: 127/65   Pulse: 80   Weight: 222 lb (100.7 kg)   Height: 5' 6\" (1.676 m)     Body mass index is 35.83 kg/m².    Physical Exam:   Physical Exam  Vitals reviewed.   Cardiovascular:      Rate and Rhythm: Normal rate and regular rhythm.      Heart sounds: Normal heart sounds.   Pulmonary:      Effort: Pulmonary effort is normal.      Breath sounds: Normal breath sounds.      The left leg: no erythema, + swelling and mild tenderness to palpation.    Assessment and Plan::     Problem List Items Addressed This Visit    None  Visit Diagnoses       Left leg swelling    -  Primary    Relevant Orders    US VENOUS DOPPLER LEG LEFT - DIAG IMG (CPT=93971) (Completed)    CBC With Differential With Platelet (Completed)    Basic Metabolic Panel (8) (Completed)    BNP (Brain Natriuretic Peptide) [E] (Completed)    Troponin I (High Sensitivity) [E] (Completed)    Left leg pain        Relevant Orders    US VENOUS DOPPLER LEG LEFT - DIAG IMG (CPT=93971) (Completed)    CBC With Differential With Platelet  (Completed)    Basic Metabolic Panel (8) (Completed)    BNP (Brain Natriuretic Peptide) [E] (Completed)    Troponin I (High Sensitivity) [E] (Completed)    Shortness of breath        Relevant Orders    BNP (Brain Natriuretic Peptide) [E] (Completed)        - Advise patient to proceed to ER if symptom worsen. The patient states that the patient will go to ER tomorrow if her symptoms worsen.  - Advise patient to take Tylenol as needed for pain.  - Elevate leg, ice compresses and wears stocking.  - Limit salt intake  - Continue with Albuterol inhaler for SOB.

## 2024-04-12 DIAGNOSIS — D50.0 IRON DEFICIENCY ANEMIA DUE TO CHRONIC BLOOD LOSS: Primary | ICD-10-CM

## 2024-04-15 ENCOUNTER — NURSE ONLY (OUTPATIENT)
Dept: HEMATOLOGY/ONCOLOGY | Facility: HOSPITAL | Age: 79
End: 2024-04-15
Attending: INTERNAL MEDICINE
Payer: MEDICARE

## 2024-04-15 VITALS
HEIGHT: 65.98 IN | OXYGEN SATURATION: 99 % | DIASTOLIC BLOOD PRESSURE: 54 MMHG | WEIGHT: 215.88 LBS | RESPIRATION RATE: 18 BRPM | HEART RATE: 94 BPM | TEMPERATURE: 98 F | SYSTOLIC BLOOD PRESSURE: 147 MMHG | BODY MASS INDEX: 34.69 KG/M2

## 2024-04-15 DIAGNOSIS — D50.9 IRON DEFICIENCY ANEMIA, UNSPECIFIED IRON DEFICIENCY ANEMIA TYPE: ICD-10-CM

## 2024-04-15 DIAGNOSIS — D50.0 IRON DEFICIENCY ANEMIA DUE TO CHRONIC BLOOD LOSS: ICD-10-CM

## 2024-04-15 DIAGNOSIS — D50.8 IRON DEFICIENCY ANEMIA SECONDARY TO INADEQUATE DIETARY IRON INTAKE: Primary | ICD-10-CM

## 2024-04-15 DIAGNOSIS — D64.9 ANEMIA, UNSPECIFIED TYPE: ICD-10-CM

## 2024-04-15 LAB
ANTIBODY SCREEN: NEGATIVE
BASOPHILS # BLD AUTO: 0.07 X10(3) UL (ref 0–0.2)
BASOPHILS NFR BLD AUTO: 1.2 %
DEPRECATED HBV CORE AB SER IA-ACNC: 25.3 NG/ML
DEPRECATED RDW RBC AUTO: 67.4 FL (ref 35.1–46.3)
EOSINOPHIL # BLD AUTO: 0.06 X10(3) UL (ref 0–0.7)
EOSINOPHIL NFR BLD AUTO: 1.1 %
ERYTHROCYTE [DISTWIDTH] IN BLOOD BY AUTOMATED COUNT: 21.2 % (ref 11–15)
HCT VFR BLD AUTO: 24.5 %
HGB BLD-MCNC: 7.1 G/DL
IMM GRANULOCYTES # BLD AUTO: 0.02 X10(3) UL (ref 0–1)
IMM GRANULOCYTES NFR BLD: 0.4 %
IRON SATN MFR SERPL: 6 %
IRON SERPL-MCNC: 24 UG/DL
LYMPHOCYTES # BLD AUTO: 0.71 X10(3) UL (ref 1–4)
LYMPHOCYTES NFR BLD AUTO: 12.5 %
MCH RBC QN AUTO: 26.8 PG (ref 26–34)
MCHC RBC AUTO-ENTMCNC: 29 G/DL (ref 31–37)
MCV RBC AUTO: 92.5 FL
MONOCYTES # BLD AUTO: 0.53 X10(3) UL (ref 0.1–1)
MONOCYTES NFR BLD AUTO: 9.3 %
NEUTROPHILS # BLD AUTO: 4.29 X10 (3) UL (ref 1.5–7.7)
NEUTROPHILS # BLD AUTO: 4.29 X10(3) UL (ref 1.5–7.7)
NEUTROPHILS NFR BLD AUTO: 75.5 %
PLATELET # BLD AUTO: 306 10(3)UL (ref 150–450)
PLATELET MORPHOLOGY: NORMAL
RBC # BLD AUTO: 2.65 X10(6)UL
RH BLOOD TYPE: NEGATIVE
TIBC SERPL-MCNC: 425 UG/DL (ref 250–425)
TRANSFERRIN SERPL-MCNC: 285 MG/DL (ref 250–380)
WBC # BLD AUTO: 5.7 X10(3) UL (ref 4–11)

## 2024-04-15 PROCEDURE — 86850 RBC ANTIBODY SCREEN: CPT

## 2024-04-15 PROCEDURE — 86900 BLOOD TYPING SEROLOGIC ABO: CPT

## 2024-04-15 PROCEDURE — 36415 COLL VENOUS BLD VENIPUNCTURE: CPT

## 2024-04-15 PROCEDURE — 99215 OFFICE O/P EST HI 40 MIN: CPT | Performed by: NURSE PRACTITIONER

## 2024-04-15 PROCEDURE — 85025 COMPLETE CBC W/AUTO DIFF WBC: CPT

## 2024-04-15 PROCEDURE — 82728 ASSAY OF FERRITIN: CPT

## 2024-04-15 PROCEDURE — 84466 ASSAY OF TRANSFERRIN: CPT

## 2024-04-15 PROCEDURE — 83540 ASSAY OF IRON: CPT

## 2024-04-15 PROCEDURE — 86901 BLOOD TYPING SEROLOGIC RH(D): CPT

## 2024-04-15 NOTE — PROGRESS NOTES
HPI     Yamini Ocampo is a 79 year old female here for follow up of   Encounter Diagnoses   Name Primary?    Iron deficiency anemia secondary to inadequate dietary iron intake Yes    Anemia, unspecified type          Patient started the B12 supplement in April of 2022.      States taking SL B12. States she does not feel any difference due to fatigue.  States she does not sleep well due to incontinence.     She was admitted on 3/7/2023 and discharged on 3/11/2023 due to acute on chronic heart failure, at that time noted to have acute on chronic anemia with no overt GI bleeding noted.  She did have EGD on 3/9/23 which noted severe linear erythema in the antrum without focal ulceration or erosions.  While she was in the hospital, she received intravenous of Venofer 400mg.  Patient is on chronic Eliquis anticoagulation for A-fib.  She also had had a TAVR.    She was recently seen by GI on 12/5/2023 due to increased gas and belching for a few days, as well as heartburn the week prior to the visit.  She has been on pantoprazole.    Today she is here for follow-up of the anemia as above and feels more tired and fatigued.  She states she had a sinus infection x2 and most recently 2-3 weeks ago. Recent strep throat. Sinus issues. No infectious complaints today.     No fevers but increased fatigue.     Denies black or tarry stools. Does have intermittent diarrhea and constipation. Yesterday was diarrhea.   C/o increased swelling to both lower extremities. Taking lasix 20 mg 1-2 per day following with Renal.     Pt c/o sob. Fatigue.   Pale in appearance.      Recently saw primary service, encouraged to go to ER pt declined.   Transfused last week for HGb 6.7, states did not feel better afterwards.    Encouraged to make appt with cardiology SOON.     PS 1 fatigue    Friend drove pt today           Review of Systems:   Review of Systems   Constitutional:  Positive for appetite change, fatigue and unexpected weight change.    HENT:   Negative for nosebleeds and sore throat.         Congested    Respiratory:  Positive for cough (productive at times, white phlegm) and shortness of breath (with activity).    Cardiovascular:  Positive for leg swelling (up to thigh bilaterally). Negative for chest pain and palpitations.   Gastrointestinal:  Positive for constipation and diarrhea. Negative for abdominal pain, blood in stool and rectal pain.        Occasional bloating and cramps on days of loose stools.    Genitourinary:  Negative for hematuria.    Musculoskeletal:  Positive for arthralgias, gait problem (looses balance easily) and neck pain.   Skin:         Pale in appearance   Neurological:  Positive for gait problem (looses balance easily), light-headedness (at times) and numbness (of hands and feet.  States from Raynaud's.). Negative for dizziness and headaches.   Hematological:  Bruises/bleeds easily.   Psychiatric/Behavioral:  Positive for sleep disturbance.          Current Outpatient Medications   Medication Sig Dispense Refill    FARXIGA 10 MG Oral Tab Take 1 tablet (10 mg total) by mouth daily.      albuterol 108 (90 Base) MCG/ACT Inhalation Aero Soln SHAKE BEFORE USE AND TAKE 2 PUFFS INTO THE LUNGS EVERY 6 HOURS AS NEEDED      sertraline 100 MG Oral Tab Take 1 tablet (100 mg total) by mouth daily. **Appointment needed for further refills. 90 tablet 0    MUCINEX DM  MG Oral Tablet 12 Hr Take 1 tablet by mouth Q12H. FOR 10 DAYS      furosemide 20 MG Oral Tab Take 1 tablet (20 mg total) by mouth daily.      sodium chloride 0.65 % Nasal Solution 1 spray by Nasal route as needed. 60 mL 0    pantoprazole 40 MG Oral Tab EC Take 1 tablet (40 mg total) by mouth 2 (two) times daily. (Patient taking differently: Take 1 tablet (40 mg total) by mouth 2 (two) times daily. Pt take 1 daily) 60 tablet 2    folic acid 1 MG Oral Tab Take 1 tablet (1 mg total) by mouth daily. 90 tablet 3    METOPROLOL SUCCINATE ER 50 MG Oral Tablet 24 Hr        amLODIPine 5 MG Oral Tab Take 1 tablet (5 mg total) by mouth daily. 30 tablet 0    atorvastatin 10 MG Oral Tab Take 1 tablet (10 mg total) by mouth nightly. 90 tablet 3    cyanocobalamin 1000 MCG Oral Tab Take 1 tablet (1,000 mcg total) by mouth daily.      ELIQUIS 5 MG Oral Tab Take 1 tablet (5 mg total) by mouth 2 (two) times daily. 180 tablet 0     Allergies:   Allergies   Allergen Reactions    Adhesive Tape RASH     Skin off       Past Medical History:    Arrhythmia    afib    Back problem    lumbar disc disease    Basal cell carcinoma of nose    Bilateral carpal tunnel syndrome    Cataract    Cellulitis    Deep vein thrombosis (HCC)    unsure which leg, possibly left    Depression    Esophageal reflux    Essential hypertension    Heart disease    Heel spur    Right    Hemorrhoids    Hiatal hernia    High blood pressure    High cholesterol    Hyperlipidemia    Incontinence    Lumbar disc disease    Multiple gastric ulcers    Osteoarthritis    Pulmonary embolism (HCC)    Raynaud disease    Renal disorder    Tubular adenoma of colon    repeat c-scope 1/2020    Type 2 diabetes mellitus with diabetic chronic kidney disease (HCC)    Visual impairment    glasses     Past Surgical History:   Procedure Laterality Date    Angioplasty (coronary)      Appendectomy      Arthroscopy of joint unlisted Right     knee    Bso, omentectomy w/sushil      Carpal tunnel release Bilateral     Cholecystectomy  09/28/2016    Colonoscopy N/A 01/25/2017    Procedure: COLONOSCOPY;  Surgeon: KATHARINE Prakash MD;  Location: [facility] ENDOSCOPY    Colonoscopy N/A 10/07/2020    Procedure: COLONOSCOPY;  Surgeon: KATHARINE Prakash MD;  Location: [facility] ENDOSCOPY    Egd N/A 12/14/2016    Procedure: ESOPHAGOGASTRODUODENOSCOPY (EGD);  Surgeon: KATHARINE Prakash MD;  Location: [facility] ENDOSCOPY    Egd N/A 01/23/2024    ; gastritis    Electrocardiogram, complete  09/26/2013    Scanned to Media Tab    Excision of nose      Basal cell carcinoma    Hernia  surgery      Hysterectomy      Knee replacement surgery Right     Other surgical history      Injections and narcotics, POD Bartuci    Removal of heel spur      Total abdom hysterectomy       Social History     Socioeconomic History    Marital status:    Tobacco Use    Smoking status: Former     Current packs/day: 0.00     Types: Cigarettes     Quit date: 1997     Years since quittin.3    Smokeless tobacco: Never   Vaping Use    Vaping status: Never Used   Substance and Sexual Activity    Alcohol use: No     Comment: rare    Drug use: Never     Comment: edibles occasionally for sleep   Other Topics Concern    Caffeine Concern Yes     Comment: 8 cups coffee/sod daily    Reaction to local anesthetic No     Social Determinants of Health     Financial Resource Strain: Low Risk  (3/14/2023)    Financial Resource Strain     Difficulty of Paying Living Expenses: Not very hard     Med Affordability: No   Transportation Needs: No Transportation Needs (3/14/2023)    Transportation Needs     Lack of Transportation: No    [facility]    Housing Stability       Family History   Problem Relation Age of Onset    Cancer Father         Skin cancer    Other (Other) Father 97        old age,unknown caused    Heart Attack Mother     Heart Disorder Mother     Hypertension Other         Family H/O    Cancer Other         Lymphoma  Family H/O    Heart Disease Other         Family H/O    Arthritis Other         Close relative  unsure which type    No Known Problems Brother          PHYSICAL EXAM:    /54 (BP Location: Left arm, Patient Position: Sitting, Cuff Size: adult)   Pulse 94   Temp 97.5 °F (36.4 °C) (Oral)   Resp 18   Ht 1.676 m (5' 5.98\")   Wt 97.9 kg (215 lb 14.4 oz)   SpO2 99%   BMI 34.86 kg/m²   Wt Readings from Last 6 Encounters:   04/15/24 97.9 kg (215 lb 14.4 oz)   04/10/24 100.7 kg (222 lb)   24 91.6 kg (202 lb)   24  84.8 kg (187 lb)   02/08/24 89.4 kg (197 lb)   01/23/24 88.5 kg (195 lb)     Physical Exam  General: Patient is alert, not in acute distress. Pale in appearance   HEENT: EOMs intact. PERRL.   Neck: No JVD. No palpable lymphadenopathy. Neck is supple.  Chest: Clear to auscultation.  Heart: Regular rate and rhythm.   Abdomen: Soft, non tender with good bowel sounds.  Extremities: +2 edema.  Neurological: Grossly intact.   Lymphatics: There is no palpable lymphadenopathy throughout in the cervical, supraclavicular, axillary, or inguinal regions.  Psych/Depression: nl        ASSESSMENT/PLAN:     1. Iron deficiency anemia secondary to inadequate dietary iron intake    2. Anemia, unspecified type          -Exacerbation.  Not macrocytic.   -She did have exacerbation of her anemia secondary to GI bleed.  Last visit, we had discussed follow-up with GI given her chronic GERD for which she had been on a PPI.  She remains on pantoprazole.  However, she is on chronic anticoagulation with apixaban due to chronic A-fib.  She may likely develop chronic iron deficiency anemia which can occur over time with esophagitis and gastritis, particularly with anticoagulation, as well as decreased absorption from PPI.      We will check iron studies today.  Patient has iron deficiency, replace iron with Venofer.  -She is to continue taking sublingual B12 daily as B12 levels had improved and normal today.  Continue with folic acid 1 mg daily.  -Also has CKD 3b, stable.  Will add epo level today, may benefit from epo replacement if no improvement after GI w/u and improvement in iron stores.       Reviewed with Dr Ivey   Hgb 7.1 - today- did not feel better after transfusion last week  if symptoms worsen must report to ER     Scheduled for iron infusion     Urged patient to call cardiology for appt soon  And follow up with GI          - No follow-ups on file.      No orders of the defined types were placed in this encounter.    MDM  high    Results From Past 48 Hours:  Recent Results (from the past 48 hour(s))   Iron And Tibc    Collection Time: 04/15/24 10:12 AM   Result Value Ref Range    Iron 24 (L) 50 - 170 ug/dL    Transferrin 285 250 - 380 mg/dL    Total Iron Binding Capacity 425 250 - 425 ug/dL    % Saturation 6 (L) 15 - 50 %   Ferritin    Collection Time: 04/15/24 10:12 AM   Result Value Ref Range    Ferritin 25.3 18.0 - 340.0 ng/mL   CBC W/ DIFFERENTIAL    Collection Time: 04/15/24 10:12 AM   Result Value Ref Range    WBC 5.7 4.0 - 11.0 x10(3) uL    RBC 2.65 (L) 3.80 - 5.30 x10(6)uL    HGB 7.1 (L) 12.0 - 16.0 g/dL    HCT 24.5 (L) 35.0 - 48.0 %    MCV 92.5 80.0 - 100.0 fL    MCH 26.8 26.0 - 34.0 pg    MCHC 29.0 (L) 31.0 - 37.0 g/dL    RDW-SD 67.4 (H) 35.1 - 46.3 fL    RDW 21.2 (H) 11.0 - 15.0 %    .0 150.0 - 450.0 10(3)uL    Neutrophil Absolute Prelim 4.29 1.50 - 7.70 x10 (3) uL           Imaging & Referrals:  None   No orders of the defined types were placed in this encounter.    Component      Latest Ref Rng 5/9/2023 11/20/2023 12/20/2023   WBC      4.0 - 11.0 x10(3) uL 4.2  4.4  4.0    RBC      3.80 - 5.30 x10(6)uL 3.31 (L)  2.78 (L)  2.56 (L)    Hemoglobin      12.0 - 16.0 g/dL 10.2 (L)  8.1 (L)  7.5 (L)    Hematocrit      35.0 - 48.0 % 34.2 (L)  28.1 (L)  25.6 (L)    MCV      80.0 - 100.0 fL 103.3 (H)  101.1 (H)  100.0    MCH      26.0 - 34.0 pg 30.8  29.1  29.3    MCHC      31.0 - 37.0 g/dL 29.8 (L)  28.8 (L)  29.3 (L)    RDW-SD      35.1 - 46.3 fL 67.3 (H)  62.7 (H)  65.4 (H)    RDW      11.0 - 15.0 % 18.0 (H)  18.6 (H)  19.2 (H)    Platelet Count      150.0 - 450.0 10(3)uL 179.0  196.0  192.0    Prelim Neutrophil Abs      1.50 - 7.70 x10 (3) uL 3.00  3.08  2.89    Neutrophils Absolute      1.50 - 7.70 x10(3) uL 3.00  3.08  2.89    Lymphocytes Absolute      1.00 - 4.00 x10(3) uL 0.64 (L)  0.78 (L)  0.52 (L)    Monocytes Absolute      0.10 - 1.00 x10(3) uL 0.34  0.33  0.47    Eosinophils Absolute      0.00 - 0.70 x10(3) uL  0.12  0.10  0.03    Basophils Absolute      0.00 - 0.20 x10(3) uL 0.05  0.05  0.05    Immature Granulocyte Absolute      0.00 - 1.00 x10(3) uL 0.01  0.02  0.01    Neutrophils %      % 72.1  70.6  72.7    Lymphocytes %      % 15.4  17.9  13.1    Monocytes %      % 8.2  7.6  11.8    Eosinophils %      % 2.9  2.3  0.8    Basophils %      % 1.2  1.1  1.3    Immature Granulocyte %      % 0.2  0.5  0.3    RETIC%      0.5 - 2.5 % 2.8 (H)   3.3 (H)    RETIC ABSOLUTE      22.5 - 147.5 x10(3) uL 92.8   85.0    Retic IRF      0.100 - 0.300 Ratio 0.287   0.259    Reticulocyte Hemoglobin Equivalent      28.2 - 36.6 pg 34.8   25.0 (L)    Iron, Serum      50 - 170 ug/dL 66   52    Transferrin      250 - 380 mg/dL 245   271    Iron Bind.Cap.(TIBC)      250 - 425 ug/dL 365   404    Iron Saturation      15 - 50 % 18   13 (L)    Vitamin B12      211 - 911 pg/mL 618   626    FERRITIN      18.0 - 340.0 ng/mL 44.4   22.4    LDH      120 - 246 U/L   582 (H)

## 2024-04-17 ENCOUNTER — OFFICE VISIT (OUTPATIENT)
Dept: HEMATOLOGY/ONCOLOGY | Facility: HOSPITAL | Age: 79
End: 2024-04-17
Attending: INTERNAL MEDICINE
Payer: MEDICARE

## 2024-04-17 VITALS
DIASTOLIC BLOOD PRESSURE: 56 MMHG | TEMPERATURE: 98 F | RESPIRATION RATE: 18 BRPM | HEART RATE: 69 BPM | OXYGEN SATURATION: 92 % | SYSTOLIC BLOOD PRESSURE: 156 MMHG

## 2024-04-17 DIAGNOSIS — D50.8 IRON DEFICIENCY ANEMIA SECONDARY TO INADEQUATE DIETARY IRON INTAKE: Primary | ICD-10-CM

## 2024-04-17 PROCEDURE — 96365 THER/PROPH/DIAG IV INF INIT: CPT

## 2024-04-17 NOTE — PROGRESS NOTES
Pt here for Venofer 300mg 1/3 . Pt denies any issues or concerns.      Ordering MD: Moira  Order Exp: After 2 more doses     Pt tolerated infusion without difficulty or complaint. VSS post infusion, no s&s of reaction noted. PIV removed, gauze and coban applied.  Reviewed next apt date/time: 4/24 at 0900      Education Record  Learner:  Patient  Disease / Diagnosis: DEVEN  Barriers / Limitations:  None  Method:  Reinforcement  General Topics:  Plan of care reviewed  Outcome:  Shows understanding

## 2024-04-21 DIAGNOSIS — E78.5 HYPERLIPIDEMIA, UNSPECIFIED HYPERLIPIDEMIA TYPE: ICD-10-CM

## 2024-04-21 DIAGNOSIS — F32.1 MAJOR DEPRESSIVE DISORDER, SINGLE EPISODE, MODERATE (HCC): ICD-10-CM

## 2024-04-23 ENCOUNTER — LAB ENCOUNTER (OUTPATIENT)
Dept: LAB | Age: 79
End: 2024-04-23
Attending: PHYSICIAN ASSISTANT
Payer: MEDICARE

## 2024-04-23 DIAGNOSIS — D50.8 OTHER IRON DEFICIENCY ANEMIA: ICD-10-CM

## 2024-04-23 DIAGNOSIS — D50.9 IRON DEFICIENCY ANEMIA, UNSPECIFIED IRON DEFICIENCY ANEMIA TYPE: ICD-10-CM

## 2024-04-23 DIAGNOSIS — I50.9 CONGESTIVE HEART FAILURE, UNSPECIFIED HF CHRONICITY, UNSPECIFIED HEART FAILURE TYPE (HCC): ICD-10-CM

## 2024-04-23 DIAGNOSIS — N18.32 STAGE 3B CHRONIC KIDNEY DISEASE (HCC): ICD-10-CM

## 2024-04-23 DIAGNOSIS — I10 ESSENTIAL HYPERTENSION: ICD-10-CM

## 2024-04-23 LAB
ALBUMIN SERPL-MCNC: 3.7 G/DL (ref 3.2–4.8)
ANION GAP SERPL CALC-SCNC: 6 MMOL/L (ref 0–18)
BASOPHILS # BLD AUTO: 0.05 X10(3) UL (ref 0–0.2)
BASOPHILS NFR BLD AUTO: 1 %
BUN BLD-MCNC: 31 MG/DL (ref 9–23)
BUN/CREAT SERPL: 18 (ref 10–20)
CALCIUM BLD-MCNC: 9.2 MG/DL (ref 8.7–10.4)
CHLORIDE SERPL-SCNC: 109 MMOL/L (ref 98–112)
CO2 SERPL-SCNC: 28 MMOL/L (ref 21–32)
CREAT BLD-MCNC: 1.72 MG/DL
DEPRECATED HBV CORE AB SER IA-ACNC: 103.5 NG/ML
DEPRECATED RDW RBC AUTO: 75.9 FL (ref 35.1–46.3)
EGFRCR SERPLBLD CKD-EPI 2021: 30 ML/MIN/1.73M2 (ref 60–?)
EOSINOPHIL # BLD AUTO: 0.06 X10(3) UL (ref 0–0.7)
EOSINOPHIL NFR BLD AUTO: 1.2 %
ERYTHROCYTE [DISTWIDTH] IN BLOOD BY AUTOMATED COUNT: 23.9 % (ref 11–15)
GLUCOSE BLD-MCNC: 97 MG/DL (ref 70–99)
HCT VFR BLD AUTO: 26.1 %
HGB BLD-MCNC: 7.2 G/DL
IMM GRANULOCYTES # BLD AUTO: 0.03 X10(3) UL (ref 0–1)
IMM GRANULOCYTES NFR BLD: 0.6 %
IRON SATN MFR SERPL: 13 %
IRON SERPL-MCNC: 55 UG/DL
LYMPHOCYTES # BLD AUTO: 0.76 X10(3) UL (ref 1–4)
LYMPHOCYTES NFR BLD AUTO: 14.8 %
MCH RBC QN AUTO: 27 PG (ref 26–34)
MCHC RBC AUTO-ENTMCNC: 27.6 G/DL (ref 31–37)
MCV RBC AUTO: 97.8 FL
MONOCYTES # BLD AUTO: 0.39 X10(3) UL (ref 0.1–1)
MONOCYTES NFR BLD AUTO: 7.6 %
NEUTROPHILS # BLD AUTO: 3.84 X10 (3) UL (ref 1.5–7.7)
NEUTROPHILS # BLD AUTO: 3.84 X10(3) UL (ref 1.5–7.7)
NEUTROPHILS NFR BLD AUTO: 74.8 %
OSMOLALITY SERPL CALC.SUM OF ELEC: 302 MOSM/KG (ref 275–295)
PHOSPHATE SERPL-MCNC: 3.2 MG/DL (ref 2.4–5.1)
PLATELET # BLD AUTO: 221 10(3)UL (ref 150–450)
PLATELET MORPHOLOGY: NORMAL
PLATELETS.RETICULATED NFR BLD AUTO: 5.4 % (ref 0–7)
POTASSIUM SERPL-SCNC: 4.6 MMOL/L (ref 3.5–5.1)
RBC # BLD AUTO: 2.67 X10(6)UL
SODIUM SERPL-SCNC: 143 MMOL/L (ref 136–145)
TIBC SERPL-MCNC: 423 UG/DL (ref 250–425)
TRANSFERRIN SERPL-MCNC: 284 MG/DL (ref 250–380)
WBC # BLD AUTO: 5.1 X10(3) UL (ref 4–11)

## 2024-04-23 PROCEDURE — 80069 RENAL FUNCTION PANEL: CPT

## 2024-04-23 PROCEDURE — 83540 ASSAY OF IRON: CPT

## 2024-04-23 PROCEDURE — 85025 COMPLETE CBC W/AUTO DIFF WBC: CPT

## 2024-04-23 PROCEDURE — 82728 ASSAY OF FERRITIN: CPT

## 2024-04-23 PROCEDURE — 84466 ASSAY OF TRANSFERRIN: CPT

## 2024-04-23 PROCEDURE — 36415 COLL VENOUS BLD VENIPUNCTURE: CPT

## 2024-04-23 RX ORDER — ATORVASTATIN CALCIUM 10 MG/1
10 TABLET, FILM COATED ORAL NIGHTLY
Qty: 90 TABLET | Refills: 3 | Status: SHIPPED | OUTPATIENT
Start: 2024-04-23

## 2024-04-23 RX ORDER — SERTRALINE HYDROCHLORIDE 100 MG/1
100 TABLET, FILM COATED ORAL DAILY
Qty: 90 TABLET | Refills: 3 | Status: SHIPPED | OUTPATIENT
Start: 2024-04-23

## 2024-04-23 NOTE — TELEPHONE ENCOUNTER
Refill passed per [facility] protocol.  Requested Prescriptions   Pending Prescriptions Disp Refills    SERTRALINE 100 MG Oral Tab [Pharmacy Med Name: SERTRALINE HCL TABS 100MG] 90 tablet 3     Sig: TAKE 1 TABLET DAILY (APPOINTMENT NEEDED FOR FURTHER REFILLS)       Psychiatric Non-Scheduled (Anti-Anxiety) Passed - 4/21/2024 11:11 PM        Passed - In person appointment or virtual visit in the past 6 mos or appointment in next 3 mos     Recent Outpatient Visits              6 days ago Iron deficiency anemia secondary to inadequate dietary iron intake    [facility] - Infusion    Office Visit    1 week ago Iron deficiency anemia due to chronic blood loss    [facility] Hematology Oncology    Nurse Only    1 week ago Iron deficiency anemia secondary to inadequate dietary iron intake    [facility] Hematology Oncology Anna Sanchez APRN    Office Visit    1 week ago Left leg swelling    [facility], [address], [place] I[place] Patricia PA-C    Office Visit    4 weeks ago Iron deficiency anemia due to chronic blood loss    [facility] - Infusion    Office Visit          Future Appointments         Provider Department Appt Notes    Tomorrow EM CC INFRN 1 Yaquelin SWEENEY Soriano [facility] - Infusion VENOFER [id number] 2/3    In 1 week EM CC INFRN 6 Yaquelin SWEENEY [facility] - Infusion VENOFER [id number] 3/3    In 1 week Manan Pizarro MD [facility] [place] [facility]                    Passed - Depression Screening completed within the past 12 months          ATORVASTATIN 10 MG Oral Tab [Pharmacy Med Name: ATORVASTATIN TABS 10MG] 90 tablet 3     Sig: TAKE 1 TABLET NIGHTLY       Cholesterol Medication Protocol Passed - 4/21/2024 11:11 PM        Passed - ALT < 80     Lab Results   Component Value Date    ALT 16 03/22/2024             Passed - ALT resulted within past year        Passed - Lipid panel  within past 12 months     Lab Results   Component Value Date    CHOLEST 138 11/14/2023    TRIG 76 11/14/2023    HDL 59 11/14/2023    LDL 64 11/14/2023    VLDL 11 11/14/2023    NONHDLC 79 11/14/2023             Passed - In person appointment or virtual visit in the past 12 mos or appointment in next 3 mos     Recent Outpatient Visits              6 days ago Iron deficiency anemia secondary to inadequate dietary iron intake    [facility]    Office Visit    1 week ago Iron deficiency anemia due to chronic blood loss    [facility] Hematology Oncology    Nurse Only    1 week ago Iron deficiency anemia secondary to inadequate dietary iron intake    [facility] Hematology Oncology Anna Sanchez APRN    Office Visit    1 week ago Left leg swelling    [facility], [address], [place] Iris Patricia PA-C    Office Visit    4 weeks ago Iron deficiency anemia due to chronic blood loss    Yaquelin Soriano [facility] - Infusion    Office Visit          Future Appointments         Provider Department Appt Notes    Tomorrow EM CC INFRN 1 Yaquelin Soriano [facility] - Infusion VENOFER [id number] 2/3    In 1 week EM CC INFRN 6 Yaquelin Soriano [facility] - Infusion VENOFER [id number] 3/3    In 1 week Manan Pizarro MD [facility]                       Recent Outpatient Visits              6 days ago Iron deficiency anemia secondary to inadequate dietary iron intake    [facility] - Infusion    Office Visit    1 week ago Iron deficiency anemia due to chronic blood loss    [facility] Hematology Oncology    Nurse Only    1 week ago Iron deficiency anemia secondary to inadequate dietary iron intake    [facility] Hematology Oncology Anna Sanchez APRN    Office Visit    1 week ago Left leg swelling    [facility], [address], [place]  EVER [place]    Office Visit    4 weeks ago Iron deficiency anemia due to chronic blood loss    Yaquelin SWEENEY [facility] - Infusion    Office Visit          Future Appointments         Provider Department Appt Notes    Tomorrow EM CC INFRN 1 Yaquelin SWEENEY [facility] - Infusion VENOFER [id number] 2/3    In 1 week EM CC INFRN 6 Yaquelin SWEENEY [facility] - Infusion VENOFER [id number] 3/3    In 1 week Manan Pizarro MD [facility], [facility] per [facility]

## 2024-04-24 ENCOUNTER — OFFICE VISIT (OUTPATIENT)
Dept: HEMATOLOGY/ONCOLOGY | Facility: HOSPITAL | Age: 79
End: 2024-04-24
Attending: INTERNAL MEDICINE
Payer: MEDICARE

## 2024-04-24 ENCOUNTER — TELEPHONE (OUTPATIENT)
Dept: NEPHROLOGY | Facility: CLINIC | Age: 79
End: 2024-04-24

## 2024-04-24 VITALS
RESPIRATION RATE: 18 BRPM | SYSTOLIC BLOOD PRESSURE: 150 MMHG | DIASTOLIC BLOOD PRESSURE: 71 MMHG | HEART RATE: 74 BPM | TEMPERATURE: 98 F

## 2024-04-24 DIAGNOSIS — D50.8 IRON DEFICIENCY ANEMIA SECONDARY TO INADEQUATE DIETARY IRON INTAKE: Primary | ICD-10-CM

## 2024-04-24 DIAGNOSIS — D64.9 ANEMIA, UNSPECIFIED TYPE: ICD-10-CM

## 2024-04-24 PROCEDURE — 96366 THER/PROPH/DIAG IV INF ADDON: CPT

## 2024-04-24 PROCEDURE — 96365 THER/PROPH/DIAG IV INF INIT: CPT

## 2024-04-24 NOTE — TELEPHONE ENCOUNTER
Manan Pizarro MD Rios, Jennette B, RN  Please let her know kidneys are stable but hemoglobin remains low and iron is low she needs to follow-up with her hematologist thank you

## 2024-04-24 NOTE — PROGRESS NOTES
Patient here for 300mg Venofer dose 2 of 3. Patient denies any issues or concerns.      Ordering MD: Manan Pizarro  Order Exp: 1 dose remaining in order     Patient tolerated infusion without difficulty or complaint. Post infusion VSS. Reviewed next apt date/time: 5/1 at 11:30am. 3 month labs and MD follow-up appt made for pt. Printed AVS provided.    Education Record  Learner:  Patient  Disease / Diagnosis: anemia  Barriers / Limitations:  None  Method:  Reinforcement  General Topics:  Plan of care reviewed and Fall risk and prevention  Outcome:  Shows understanding

## 2024-04-24 NOTE — TELEPHONE ENCOUNTER
Dr Pizarro informed pt of note below ,verbalized understanding,reported is getting Iron infusion Venofer x 3 doses last dose next month.

## 2024-05-01 ENCOUNTER — OFFICE VISIT (OUTPATIENT)
Dept: HEMATOLOGY/ONCOLOGY | Facility: HOSPITAL | Age: 79
End: 2024-05-01
Attending: INTERNAL MEDICINE
Payer: MEDICARE

## 2024-05-01 VITALS
OXYGEN SATURATION: 94 % | SYSTOLIC BLOOD PRESSURE: 140 MMHG | HEART RATE: 74 BPM | RESPIRATION RATE: 16 BRPM | DIASTOLIC BLOOD PRESSURE: 70 MMHG | TEMPERATURE: 98 F

## 2024-05-01 DIAGNOSIS — D50.8 IRON DEFICIENCY ANEMIA SECONDARY TO INADEQUATE DIETARY IRON INTAKE: Primary | ICD-10-CM

## 2024-05-01 DIAGNOSIS — D64.9 ANEMIA, UNSPECIFIED TYPE: ICD-10-CM

## 2024-05-01 PROCEDURE — 96365 THER/PROPH/DIAG IV INF INIT: CPT

## 2024-05-03 ENCOUNTER — OFFICE VISIT (OUTPATIENT)
Dept: NEPHROLOGY | Facility: CLINIC | Age: 79
End: 2024-05-03

## 2024-05-03 VITALS
DIASTOLIC BLOOD PRESSURE: 50 MMHG | WEIGHT: 219 LBS | SYSTOLIC BLOOD PRESSURE: 121 MMHG | BODY MASS INDEX: 35.2 KG/M2 | HEIGHT: 66 IN | HEART RATE: 75 BPM

## 2024-05-03 DIAGNOSIS — I50.9 CONGESTIVE HEART FAILURE, UNSPECIFIED HF CHRONICITY, UNSPECIFIED HEART FAILURE TYPE (HCC): ICD-10-CM

## 2024-05-03 DIAGNOSIS — I73.9 PERIPHERAL VASCULAR DISEASE (HCC): ICD-10-CM

## 2024-05-03 DIAGNOSIS — N18.32 STAGE 3B CHRONIC KIDNEY DISEASE (HCC): ICD-10-CM

## 2024-05-03 DIAGNOSIS — D53.9 MACROCYTIC ANEMIA: ICD-10-CM

## 2024-05-03 DIAGNOSIS — I10 ESSENTIAL HYPERTENSION: ICD-10-CM

## 2024-05-03 DIAGNOSIS — E11.22 TYPE 2 DIABETES MELLITUS WITH STAGE 3A CHRONIC KIDNEY DISEASE, WITHOUT LONG-TERM CURRENT USE OF INSULIN (HCC): Primary | ICD-10-CM

## 2024-05-03 DIAGNOSIS — N18.31 TYPE 2 DIABETES MELLITUS WITH STAGE 3A CHRONIC KIDNEY DISEASE, WITHOUT LONG-TERM CURRENT USE OF INSULIN (HCC): Primary | ICD-10-CM

## 2024-05-03 DIAGNOSIS — M34.1 CREST SYNDROME (HCC): ICD-10-CM

## 2024-05-03 PROCEDURE — 99214 OFFICE O/P EST MOD 30 MIN: CPT | Performed by: INTERNAL MEDICINE

## 2024-05-03 RX ORDER — TORSEMIDE 20 MG/1
TABLET ORAL
Qty: 60 TABLET | Refills: 2 | Status: SHIPPED | OUTPATIENT
Start: 2024-05-03

## 2024-05-03 NOTE — PATIENT INSTRUCTIONS
Stop furosemide    Changed to torsemide 2 pills daily total of 40 mg    Keep legs elevated    Touch base with me in about a week how you are doing if you can get weight daily that would be great at like you to lose about 15 pounds from all this water    Asked me when you need to see me again based on how you are responding to the diuretic    Good to see you  Yamini

## 2024-05-09 ENCOUNTER — HOSPITAL ENCOUNTER (EMERGENCY)
Facility: HOSPITAL | Age: 79
Discharge: HOME | End: 2024-05-09
Attending: EMERGENCY MEDICINE

## 2024-05-09 ENCOUNTER — PATIENT MESSAGE (OUTPATIENT)
Dept: NEPHROLOGY | Facility: CLINIC | Age: 79
End: 2024-05-09

## 2024-05-09 ENCOUNTER — HOSPITAL ENCOUNTER (OUTPATIENT)
Age: 79
Discharge: ANOTHER HOSPITAL | End: 2024-05-09

## 2024-05-09 ENCOUNTER — NURSE TRIAGE (OUTPATIENT)
Dept: FAMILY MEDICINE CLINIC | Facility: CLINIC | Age: 79
End: 2024-05-09

## 2024-05-09 ENCOUNTER — APPOINTMENT (OUTPATIENT)
Dept: CT IMAGING | Facility: HOSPITAL | Age: 79
End: 2024-05-09
Attending: EMERGENCY MEDICINE

## 2024-05-09 VITALS
RESPIRATION RATE: 20 BRPM | WEIGHT: 211 LBS | OXYGEN SATURATION: 92 % | SYSTOLIC BLOOD PRESSURE: 156 MMHG | HEART RATE: 72 BPM | DIASTOLIC BLOOD PRESSURE: 58 MMHG | TEMPERATURE: 98 F | BODY MASS INDEX: 33.91 KG/M2 | HEIGHT: 66 IN

## 2024-05-09 VITALS
TEMPERATURE: 98 F | RESPIRATION RATE: 24 BRPM | SYSTOLIC BLOOD PRESSURE: 153 MMHG | DIASTOLIC BLOOD PRESSURE: 68 MMHG | HEART RATE: 73 BPM

## 2024-05-09 DIAGNOSIS — N93.9 VAGINAL BLEEDING: ICD-10-CM

## 2024-05-09 DIAGNOSIS — D64.9 ANEMIA, UNSPECIFIED TYPE: ICD-10-CM

## 2024-05-09 DIAGNOSIS — N93.9 VAGINAL BLEEDING: Primary | ICD-10-CM

## 2024-05-09 DIAGNOSIS — Z92.29 HX OF LONG TERM USE OF BLOOD THINNERS: ICD-10-CM

## 2024-05-09 DIAGNOSIS — R42 DIZZY: Primary | ICD-10-CM

## 2024-05-09 LAB
ANION GAP SERPL CALC-SCNC: 10 MMOL/L (ref 0–18)
BASOPHILS # BLD AUTO: 0.07 X10(3) UL (ref 0–0.2)
BASOPHILS NFR BLD AUTO: 1.9 %
BILIRUB UR QL: NEGATIVE
BUN BLD-MCNC: 30 MG/DL (ref 9–23)
BUN/CREAT SERPL: 18.3 (ref 10–20)
CALCIUM BLD-MCNC: 9.4 MG/DL (ref 8.7–10.4)
CHLORIDE SERPL-SCNC: 105 MMOL/L (ref 98–112)
CLARITY UR: CLEAR
CO2 SERPL-SCNC: 29 MMOL/L (ref 21–32)
COLOR UR: COLORLESS
CREAT BLD-MCNC: 1.64 MG/DL
DEPRECATED RDW RBC AUTO: 101.5 FL (ref 35.1–46.3)
EGFRCR SERPLBLD CKD-EPI 2021: 31 ML/MIN/1.73M2 (ref 60–?)
EOSINOPHIL # BLD AUTO: 0.05 X10(3) UL (ref 0–0.7)
EOSINOPHIL NFR BLD AUTO: 1.4 %
ERYTHROCYTE [DISTWIDTH] IN BLOOD BY AUTOMATED COUNT: 28.8 % (ref 11–15)
GLUCOSE BLD-MCNC: 108 MG/DL (ref 70–99)
GLUCOSE UR-MCNC: NORMAL MG/DL
HCT VFR BLD AUTO: 29.5 %
HGB BLD-MCNC: 8.5 G/DL
HGB UR QL STRIP.AUTO: NEGATIVE
IMM GRANULOCYTES # BLD AUTO: 0.01 X10(3) UL (ref 0–1)
IMM GRANULOCYTES NFR BLD: 0.3 %
KETONES UR-MCNC: NEGATIVE MG/DL
LEUKOCYTE ESTERASE UR QL STRIP.AUTO: 25
LYMPHOCYTES # BLD AUTO: 0.61 X10(3) UL (ref 1–4)
LYMPHOCYTES NFR BLD AUTO: 16.5 %
MAGNESIUM SERPL-MCNC: 2 MG/DL (ref 1.6–2.6)
MCH RBC QN AUTO: 29.6 PG (ref 26–34)
MCHC RBC AUTO-ENTMCNC: 28.8 G/DL (ref 31–37)
MCV RBC AUTO: 102.8 FL
MONOCYTES # BLD AUTO: 0.29 X10(3) UL (ref 0.1–1)
MONOCYTES NFR BLD AUTO: 7.8 %
NEUTROPHILS # BLD AUTO: 2.67 X10 (3) UL (ref 1.5–7.7)
NEUTROPHILS # BLD AUTO: 2.67 X10(3) UL (ref 1.5–7.7)
NEUTROPHILS NFR BLD AUTO: 72.1 %
NITRITE UR QL STRIP.AUTO: NEGATIVE
OSMOLALITY SERPL CALC.SUM OF ELEC: 305 MOSM/KG (ref 275–295)
PH UR: 6.5 [PH] (ref 5–8)
PLATELET # BLD AUTO: 245 10(3)UL (ref 150–450)
PLATELET MORPHOLOGY: NORMAL
POTASSIUM SERPL-SCNC: 3.9 MMOL/L (ref 3.5–5.1)
PROT UR-MCNC: NEGATIVE MG/DL
RBC # BLD AUTO: 2.87 X10(6)UL
SODIUM SERPL-SCNC: 144 MMOL/L (ref 136–145)
SP GR UR STRIP: 1.01 (ref 1–1.03)
UROBILINOGEN UR STRIP-ACNC: NORMAL
WBC # BLD AUTO: 3.7 X10(3) UL (ref 4–11)

## 2024-05-09 PROCEDURE — 99285 EMERGENCY DEPT VISIT HI MDM: CPT

## 2024-05-09 PROCEDURE — 80048 BASIC METABOLIC PNL TOTAL CA: CPT | Performed by: EMERGENCY MEDICINE

## 2024-05-09 PROCEDURE — 87186 SC STD MICRODIL/AGAR DIL: CPT | Performed by: EMERGENCY MEDICINE

## 2024-05-09 PROCEDURE — 81001 URINALYSIS AUTO W/SCOPE: CPT | Performed by: EMERGENCY MEDICINE

## 2024-05-09 PROCEDURE — 83735 ASSAY OF MAGNESIUM: CPT | Performed by: EMERGENCY MEDICINE

## 2024-05-09 PROCEDURE — 87077 CULTURE AEROBIC IDENTIFY: CPT | Performed by: EMERGENCY MEDICINE

## 2024-05-09 PROCEDURE — 36415 COLL VENOUS BLD VENIPUNCTURE: CPT

## 2024-05-09 PROCEDURE — 85025 COMPLETE CBC W/AUTO DIFF WBC: CPT | Performed by: EMERGENCY MEDICINE

## 2024-05-09 PROCEDURE — 87086 URINE CULTURE/COLONY COUNT: CPT | Performed by: EMERGENCY MEDICINE

## 2024-05-09 PROCEDURE — 99284 EMERGENCY DEPT VISIT MOD MDM: CPT

## 2024-05-09 PROCEDURE — 99213 OFFICE O/P EST LOW 20 MIN: CPT

## 2024-05-09 PROCEDURE — 74176 CT ABD & PELVIS W/O CONTRAST: CPT | Performed by: EMERGENCY MEDICINE

## 2024-05-09 NOTE — TELEPHONE ENCOUNTER
ANDREE Tillman  Action Requested: Summary for Provider     []  Critical Lab, Recommendations Needed  [] Need Additional Advice  [x]   ANDREE    []   Need Orders  [] Need Medications Sent to Pharmacy  []  Other     SUMMARY: Vaginal bleeding that is mild, right red and intermittent since onset. Some intermittent lightheadedness and dizziness that is intermittent, not present now, but was briefly present this morning. Patient has a history of anemia and on Eliquis. Pallor in color, which has been chronic as of recently and patient had Fe transfusion recently. Some intermittent mild cramping of abdomen is present. Advised evaluation now at [facility] [our office is closed]--> agreeable. [See below for more details]     Reason for call: Vaginal Bleeding, Dizziness, and Anemia    Onset: 5/7/24    Reason for Disposition   Taking Coumadin (warfarin) or other strong blood thinner, or known bleeding disorder (e.g., thrombocytopenia)    Protocols used: Vaginal Bleeding - [id number]      I called patient, she states she has some mild bright red vaginal bleeding that is intermittent since onset. Patient has a history of Anemia. She does feel some intermittent lightheadedness and dizziness that is chronic [over 1 year], this morning for few seconds, then it subsided - she was advised if she gets lightheaded or dizzy again she should lay down with her legs elevated above the level of her heart and call 911. She does not need support to walk. Denies any shortness of breath, chest pain, and/or chest tightness. Mild cramping this afternoon that is intermittent. She is taking Eliquis as well. She has a small scab on her back that came off and bled Tuesday or Wednesday, the bleeding subsided within 2-3 minutes. She is a bit pale today, but has been intermittent and chronic she believes it is related to anemia. She states she had an iron infusion recently. She was advised to go to [facility]mediate [facility] for an evaluation now, given  our office is closed at this time --> agreeable and she will go to [facility] now. Patient instructed any new or worsening symptoms [reviewed] seek immediate medical attention--> 911. I made her aware I will convey the above to Dr. Tillman. Patient verbalized understanding. No further questions or concerns at this time.

## 2024-05-09 NOTE — PROGRESS NOTES
q    Progress Note     Yamini Ocampo    Is a pt with a history of hypertension congestive heart failure  Normal LV but diastolic dysfunction and aortic stenosis  Chronic anemia  Which she sees hematology.  Had some erythema in her antrum without any ulceration is received iron is on anticoagulation for A-fib recently had a [place].  Has been treated by hematology I recently asked him to consider starting Epogen on her    Has CKD 3 also.  Crest syndrome currently feels okay but very tired on Zoloft Protonix Farxiga Lipitor Eliquis.  HISTORY:  Past Medical History:    Arrhythmia    afib    Back problem    lumbar disc disease    Basal cell carcinoma of nose    Bilateral carpal tunnel syndrome    Cataract    Cellulitis    Deep vein thrombosis (HCC)    unsure which leg, possibly left    Depression    Esophageal reflux    Essential hypertension    Heart disease    Heel spur    Right    Hemorrhoids    Hiatal hernia    High blood pressure    High cholesterol    Hyperlipidemia    Incontinence    Lumbar disc disease    Multiple gastric ulcers    Osteoarthritis    Pulmonary embolism (HCC)    Raynaud disease    Renal disorder    Tubular adenoma of colon    repeat c-scope 1/2020    Type 2 diabetes mellitus with diabetic chronic kidney disease (HCC)    Visual impairment    glasses      Past Surgical History:   Procedure Laterality Date    Angioplasty (coronary)      Appendectomy      Arthroscopy of joint unlisted Right     knee    Bso, omentectomy w/sushil      Carpal tunnel release Bilateral     Cholecystectomy  09/28/2016    Colonoscopy N/A 01/25/2017    Procedure: COLONOSCOPY;  Surgeon: KATHARINE Prakash MD;  Location: [facility] ENDOSCOPY    Colonoscopy N/A 10/07/2020    Procedure: COLONOSCOPY;  Surgeon: KATHARINE Prakash MD;  Location: [facility] ENDOSCOPY    Egd N/A 12/14/2016    Procedure: ESOPHAGOGASTRODUODENOSCOPY (EGD);  Surgeon: KATHARINE Prakash MD;  Location: [facility] ENDOSCOPY    Egd N/A 01/23/2024    ; gastritis    Electrocardiogram,  complete  2013    Scanned to Media Tab    Excision of nose      Basal cell carcinoma    Hernia surgery      Hysterectomy      Knee replacement surgery Right     Other surgical history      Injections and narcotics, POD [place]uci    Removal of heel spur      Total abdom hysterectomy        Social History     Tobacco Use    Smoking status: Former     Current packs/day: 0.00     Types: Cigarettes     Quit date: 1997     Years since quittin.3    Smokeless tobacco: Never   Substance Use Topics    Alcohol use: No     Comment: rare         Medications (Active prior to today's visit):  Current Outpatient Medications   Medication Sig Dispense Refill    torsemide 20 MG Oral Tab 2 pills each morning for edema 60 tablet 2    sertraline 100 MG Oral Tab Take 1 tablet (100 mg total) by mouth daily. 90 tablet 3    atorvastatin 10 MG Oral Tab Take 1 tablet (10 mg total) by mouth nightly. 90 tablet 3    FARXIGA 10 MG Oral Tab Take 1 tablet (10 mg total) by mouth daily.      albuterol 108 (90 Base) MCG/ACT Inhalation Aero Soln SHAKE BEFORE USE AND TAKE 2 PUFFS INTO THE LUNGS EVERY 6 HOURS AS NEEDED      furosemide 20 MG Oral Tab Take 1 tablet (20 mg total) by mouth daily.      pantoprazole 40 MG Oral Tab EC Take 1 tablet (40 mg total) by mouth 2 (two) times daily. (Patient taking differently: Take 1 tablet (40 mg total) by mouth 2 (two) times daily. Pt take 1 daily) 60 tablet 2    folic acid 1 MG Oral Tab Take 1 tablet (1 mg total) by mouth daily. 90 tablet 3    METOPROLOL SUCCINATE ER 50 MG Oral Tablet 24 Hr       amLODIPine 5 MG Oral Tab Take 1 tablet (5 mg total) by mouth daily. 30 tablet 0    cyanocobalamin 1000 MCG Oral Tab Take 1 tablet (1,000 mcg total) by mouth daily.      ELIQUIS 5 MG Oral Tab Take 1 tablet (5 mg total) by mouth 2 (two) times daily. 180 tablet 0    sodium chloride 0.65 % Nasal Solution 1 spray by Nasal route as needed. (Patient not taking: Reported on 5/3/2024) 60 mL 0       Allergies:  Allergies    Allergen Reactions    Adhesive Tape RASH     Skin off         ROS:     Constitutional: Feels very fatigued  ENMT:  Negative for ear drainage, hearing loss and nasal drainage  Eyes:  Negative for eye discharge and vision loss  Cardiovascular:  Negative for chest pain, sob  Respiratory:  Negative for cough, dyspnea and wheezing  Gastrointestinal:  Negative for abdominal pain, constipation  Genitourinary:  Negative for dysuria and hematuria  Endocrine:  Negative for abnormal sleep patterns  Hema/Lymph:  Negative for easy bleeding and easy bruising  Integumentary: Complains of edema  Musculoskeletal:  Negative for bone/joint symptoms  Neurological:  Negative for gait disturbance  Psychiatric:  Negative for inappropriate interaction and psychiatric symptoms      Vitals:    05/03/24 1638   BP: 121/50   Pulse: 75       PHYSICAL EXAM:   Constitutional: appears well hydrated alert and responsive   Head/Face: normocephalic  Eyes/Vision conjunctivae pale  Nose/Mouth/Throat:mucous membranes are moist   Neck/Thyroid: neck is supple without adenopathy  Lymphatic: no abnormal cervical, supraclavicular adenopathy is noted  Respiratory:  lungs are clear to auscultation bilaterally  Cardiovascular: regular rate and rhythm   Abdomen: soft, non-tender, non-distended, BS normal  Skin/Hair: no unusual rashes present, no abnormal bruising noted  Back/Spine: no abnormalities noted  Musculoskeletal: no deformities  Extremities: 3+ edema  Neurological:  Grossly normal       ASSESSMENT/PLAN:   Assessment   Encounter Diagnoses   Name Primary?    Type 2 diabetes mellitus with stage 3a chronic kidney disease, without long-term current use of insulin (HCC) Yes    CREST syndrome (HCC)     Stage 3b chronic kidney disease (HCC)     Essential hypertension     Peripheral vascular disease (HCC)     Congestive heart failure, unspecified HF chronicity, unspecified heart failure type (HCC)     Macrocytic anemia        1 chronic anemia  Last iron 13%  hemoglobin 7.2  Recently received blood will start on Aranesp   200 every 4 weeks follow CBCs    #2 history of recent TAVR and aortic stenosis stable  3.  CKD #3 CKD 3B creatinine 1.72 GFR of 30 potassium bicarb good  Renal function has definitely worsened most likely because of her anemia    Does have proteinuria is on Farxiga    #4 hypertension controlled consider ACE or ARB    #5 edema change supports torsemide 40 mg/day set of Lasix keep legs elevated let me know in about a week how she is doing    See me back in a few weeks prep to repeat CBC and electrolytes  Orders This Visit:  No orders of the defined types were placed in this encounter.      Meds This Visit:  Requested Prescriptions     Signed Prescriptions Disp Refills    torsemide 20 MG Oral Tab 60 tablet 2     Si pills each morning for edema       Imaging & Referrals:  None     2024  Manan Pizarro MD

## 2024-05-09 NOTE — TELEPHONE ENCOUNTER
[facility]:       viginal bleeding not heavy started Tuesday night

## 2024-05-10 ENCOUNTER — TELEPHONE (OUTPATIENT)
Dept: NEPHROLOGY | Facility: CLINIC | Age: 79
End: 2024-05-10

## 2024-05-10 NOTE — ED INITIAL ASSESSMENT (HPI)
Presents with 2 days of vaginal bleeding. Color is pale. Patient c/o weakness, dizziness, and SOB. Taking Eliquis with hx of anemia.

## 2024-05-10 NOTE — ED PROVIDER NOTES
Patient Seen in: [facility] Emergency Department    History     Chief Complaint   Patient presents with    Vaginal Bleeding       HPI    79-year-old female presents ER with complaint of vaginal bleeding.  Patient states that she noted that there was blood on her pad earlier today.  Patient states occurred this afternoon.  Patient with history of cholecystectomy and hysterectomy.  Patient states she has vaginal blood as well and not from her urine.  Patient denies any abdominal pain.  Patient states she is currently on Eliquis for history of A-fib as well as prosthetic valve    History reviewed.   Past Medical History:    Arrhythmia    afib    Back problem    lumbar disc disease    Basal cell carcinoma of nose    Bilateral carpal tunnel syndrome    Cataract    Cellulitis    Deep vein thrombosis (HCC)    unsure which leg, possibly left    Depression    Esophageal reflux    Essential hypertension    Heart disease    Heel spur    Right    Hemorrhoids    Hiatal hernia    High blood pressure    High cholesterol    Hyperlipidemia    Incontinence    Lumbar disc disease    Multiple gastric ulcers    Osteoarthritis    Pulmonary embolism (HCC)    Raynaud disease    Renal disorder    Tubular adenoma of colon    repeat c-scope 1/2020    Type 2 diabetes mellitus with diabetic chronic kidney disease (HCC)    Visual impairment    glasses       History reviewed.   Past Surgical History:   Procedure Laterality Date    Angioplasty (coronary)      Appendectomy      Arthroscopy of joint unlisted Right     knee    Bso, omentectomy w/sushil      Carpal tunnel release Bilateral     Cholecystectomy  09/28/2016    Colonoscopy N/A 01/25/2017    Procedure: COLONOSCOPY;  Surgeon: KATHARINE Prakash MD;  Location: [facility] ENDOSCOPY    Colonoscopy N/A 10/07/2020    Procedure: COLONOSCOPY;  Surgeon: KATHARINE Prakash MD;  Location: [facility] ENDOSCOPY    Egd N/A 12/14/2016    Procedure: ESOPHAGOGASTRODUODENOSCOPY (EGD);  Surgeon: KATHARINE Prakash MD;   Location: [facility] ENDOSCOPY    Egd N/A 2024    ; gastritis    Electrocardiogram, complete  2013    Scanned to Media Tab    Excision of nose      Basal cell carcinoma    Hernia surgery      Hysterectomy      Knee replacement surgery Right     Other surgical history      Injections and narcotics, POD Bartuci    Removal of heel spur      Total abdom hysterectomy           Medications :  (Not in a hospital admission)       Family History   Problem Relation Age of Onset    Cancer Father         Skin cancer    Other (Other) Father 97        old age,unknown caused    Heart Attack Mother     Heart Disorder Mother     Hypertension Other         Family H/O    Cancer Other         Lymphoma  Family H/O    Heart Disease Other         Family H/O    Arthritis Other         Close relative  unsure which type    No Known Problems Brother        Smoking Status:   Social History     Socioeconomic History    Marital status:    Tobacco Use    Smoking status: Former     Current packs/day: 0.00     Types: Cigarettes     Quit date: 1997     Years since quittin.3    Smokeless tobacco: Never   Vaping Use    Vaping status: Never Used   Substance and Sexual Activity    Alcohol use: No     Comment: rare    Drug use: Never     Comment: edibles occasionally for sleep   Other Topics Concern    Caffeine Concern Yes     Comment: 8 cups coffee/sod daily    Reaction to local anesthetic No       ROS  All pertinent positives for the review of systems are mentioned in the HPI  All other organ systems are reviewed and are negative.    Constitutional and vital signs reviewed.      Social History and Family History elements reviewed from today, pertinent positives to the presenting problem noted.    Physical Exam     ED Triage Vitals   BP 24 (!) 171/67   Pulse 242 66   Resp 24 22   Temp 24 98 °F (36.7 °C)   Temp src 24 Oral   SpO2 24 95 %   O2 Device 24  1944 None (Room air)       All measures to prevent infection transmission during my interaction with the patient were taken. The patient was already wearing a droplet mask on my arrival to the room. Personal protective equipment including droplet mask, eye protection, and gloves were worn throughout the duration of the exam.  Handwashing was performed prior to and after the exam.  Stethoscope and any equipment used during my examination was cleaned with super sani-cloth germicidal wipes following the exam.     Physical Exam  Vitals and nursing note reviewed.   Constitutional:       Appearance: She is well-developed.   HENT:      Head: Normocephalic and atraumatic.      Right Ear: External ear normal.      Left Ear: External ear normal.      Nose: Nose normal.   Eyes:      Conjunctiva/sclera: Conjunctivae normal.      Pupils: Pupils are equal, round, and reactive to light.   Cardiovascular:      Rate and Rhythm: Normal rate and regular rhythm.      Heart sounds: Normal heart sounds.   Pulmonary:      Effort: Pulmonary effort is normal.      Breath sounds: Normal breath sounds.   Abdominal:      General: Bowel sounds are normal.      Palpations: Abdomen is soft.   Musculoskeletal:         General: Normal range of motion.      Cervical back: Normal range of motion and neck supple.   Skin:     General: Skin is warm and dry.   Neurological:      General: No focal deficit present.      Mental Status: She is alert and oriented to person, place, and time. Mental status is at baseline.      Deep Tendon Reflexes: Reflexes are normal and symmetric.   Psychiatric:         Behavior: Behavior normal.         Thought Content: Thought content normal.         Judgment: Judgment normal.         ED Course        Labs Reviewed   BASIC METABOLIC PANEL (8) - Abnormal; Notable for the following components:       Result Value    Glucose 108 (*)     BUN 30 (*)     Creatinine 1.64 (*)     Calculated Osmolality 305 (*)     eGFR-Cr 31 (*)      All other components within normal limits   URINALYSIS WITH CULTURE REFLEX - Abnormal; Notable for the following components:    Urine Color Colorless (*)     Leukocyte Esterase Urine 25 (*)     WBC Urine 6-10 (*)     RBC Urine 3-5 (*)     Bacteria Urine Rare (*)     Squamous Epi. Cells Few (*)     All other components within normal limits   RBC MORPHOLOGY SCAN - Abnormal; Notable for the following components:    RBC Morphology See morphology below (*)     All other components within normal limits   CBC W/ DIFFERENTIAL - Abnormal; Notable for the following components:    WBC 3.7 (*)     RBC 2.87 (*)     HGB 8.5 (*)     HCT 29.5 (*)     .8 (*)     MCHC 28.8 (*)     RDW-.5 (*)     RDW 28.8 (*)     Lymphocyte Absolute 0.61 (*)     All other components within normal limits   MAGNESIUM - Normal   CBC WITH DIFFERENTIAL WITH PLATELET    Narrative:     The following orders were created for panel order CBC With Differential With Platelet.                  Procedure                               Abnormality         Status                                     ---------                               -----------         ------                                     CBC W/ DIFFERENTIAL[[id number]]          Abnormal            Final result                                                 Please view results for these tests on the individual orders.   SCAN SLIDE   RAINBOW DRAW LAVENDER   RAINBOW DRAW LIGHT GREEN   RAINBOW DRAW BLUE   URINE CULTURE, ROUTINE         Imaging Results Available and Reviewed while in ED: CT ABDOMEN+PELVIS(CPT=74176)    Result Date: 5/9/2024  CONCLUSION:   1. No finding to explain vaginal bleeding.  The uterus is surgically absent.  No abnormality is seen at the hysterectomy site.   2. Right lower lobe pneumonia.   3. Findings consistent with volume overload including anasarca and small bilateral pleural effusions, right greater than left.  4. Marked cardiomegaly.   5. Additional chronic or  incidental findings are described in the body of this report.    elm-remote    Dictated by (CST): Bobby Mcmanus MD on 5/09/2024 at 9:50 PM     Finalized by (CST): Bobby Mcmanus MD on 5/09/2024 at 9:55 PM         ED Medications Administered: Medications - No data to display      MDM     Vitals:    05/09/24 1942 05/09/24 1944   BP:  (!) 171/67   Pulse: 66    Resp: 22    Temp: 98 °F (36.7 °C)    TempSrc: Oral    SpO2:  95%   Weight: 95.7 kg    Height: 167.6 cm (5' 6\")      *I personally reviewed and interpreted all ED vitals.  I also personally reviewed all labs and imaging if ordered    Pulse Ox: 95%, Room air, Normal     Monitor Interpretation:   normal sinus rhythm    Differential Diagnosis/ Diagnostic Considerations: Vaginal bleeding, urethral mass, UTI, rectal bleed    Medical Record Review: I personally reviewed available prior medical records for any recent pertinent discharge summaries, testing, and procedures and reviewed those reports.    Complicating Factors: The patient already has does not have any pertinent problems on file. to contribute to the complexity of this ED evaluation.    Medical Decision Making  79-year-old female presents ER with complaint of vaginal bleeding this afternoon.  Patient with no longer vaginal bleeding.  Patient denies any abdominal pain or active bleed at this time.  Patient with history of hysterectomy.  Patient's hemoglobin 8.5 which is 1 unit greater than her baseline.  Patient CT abdomen pelvis that does not show any acute cause for bleed.  Patient on Eliquis which likely is the cause of her breakthrough bleed.  Patient okay to be discharged home instructed follow-up with her PCP if symptoms worsen    Problems Addressed:  Anemia, unspecified type: chronic illness or injury  Vaginal bleeding: acute illness or injury    Amount and/or Complexity of Data Reviewed  Independent Historian: EMS     Details: EMS states the patient had episode of vaginal bleeding this afternoon but  since resolved.  External Data Reviewed:      Details: Outpatient notes reviewed.  Patient was sent from immediate care with complaint of vaginal bleeding.  Patient arrived via EMS.  Labs: ordered. Decision-making details documented in ED Course.  Radiology: ordered and independent interpretation performed. Decision-making details documented in ED Course.     Details: CT abdomen pelvis interpreted by myself shows no constipation or bowel obstruction.        Condition upon leaving the department: Stable    Disposition and Plan     Clinical Impression:  1. Vaginal bleeding    2. Anemia, unspecified type        Disposition:  Discharge    Follow-up:  Kathy Flanagan MD  [address]  [place] [address] [phone]    Schedule an appointment as soon as possible for a visit  If symptoms worsen      Medications Prescribed:  Current Discharge Medication List

## 2024-05-10 NOTE — ED PROVIDER NOTES
Patient Seen in: [facility] [place]      History     Chief Complaint   Patient presents with    Vaginal Bleeding    Dizziness     Stated Complaint: not feeling well    Subjective:   Yamini is an 79-year-old female with a history of CHF, A-fib, diabetes, anemia, and CKD presenting to the immediate care complaining of vaginal bleeding for the past 2 days.  She states that the vaginal bleeding has slowed down today.  Patient states that she has felt dizziness and lightheadedness throughout the day.  She also has some shortness of breath.  Patient has chronic swelling to her legs which she states is improving but there is still marked swelling to both of her legs.  Patient has a history of anemia.  Takes Eliquis.  She states that she does feel some shortness of breath.  She denies any chest pain or abdominal pain.          Objective:   No pertinent past medical history.            No pertinent past surgical history.              No pertinent social history.            Review of Systems    Positive for stated complaint: not feeling well  Other systems are as noted in HPI.  Constitutional and vital signs reviewed.      All other systems reviewed and negative except as noted above.    Physical Exam     ED Triage Vitals [05/09/24 1911]   /68   Pulse 73   Resp 24   Temp 98.2 °F (36.8 °C)   Temp src Temporal   SpO2    O2 Device None (Room air)       Current Vitals:   Vital Signs  BP: 153/68  Pulse: 73  Resp: 24  Temp: 98.2 °F (36.8 °C)  Temp src: Temporal    Oxygen Therapy  O2 Device: None (Room air)            Physical Exam  Vitals and nursing note reviewed.   Constitutional:       General: She is not in acute distress.     Appearance: Normal appearance. She is ill-appearing. She is not toxic-appearing or diaphoretic.   HENT:      Head: Normocephalic.   Cardiovascular:      Rate and Rhythm: Normal rate and regular rhythm.      Pulses: Normal pulses.      Heart sounds: Normal heart sounds.   Pulmonary:      Effort:  Pulmonary effort is normal. No respiratory distress.      Breath sounds: Normal breath sounds.   Abdominal:      General: Abdomen is flat. Bowel sounds are normal. There is no distension.      Palpations: Abdomen is soft. There is no mass.      Tenderness: There is no abdominal tenderness. There is no right CVA tenderness, left CVA tenderness, guarding or rebound.      Hernia: No hernia is present.   Musculoskeletal:         General: Normal range of motion.      Cervical back: Normal range of motion.   Skin:     General: Skin is warm and dry.      Capillary Refill: Capillary refill takes less than 2 seconds.      Coloration: Skin is pale.   Neurological:      General: No focal deficit present.      Mental Status: She is alert and oriented to person, place, and time.   Psychiatric:         Mood and Affect: Mood normal.         Behavior: Behavior normal.         Thought Content: Thought content normal.         Judgment: Judgment normal.             ED Course   Labs Reviewed - No data to display       MDM        Medical Decision Making  Multiple medical diagnoses were considered including but not limited to anemia, acute vaginal bleeding, dizziness, dehydration.  Patient is ill appearing, non-toxic and in no acute distress.  Vital signs are stable.   79-year-old female with a history of CHF, A-fib, diabetes, anemia, and CKD on Eliquis presents to the immediate care with 2 days of vaginal bleeding, dizziness, lightheadedness and shortness of breath.  Skin is pale.  Given the patient's significant medical history and the limitations of the immediate care she will be sent to the emergency department for further evaluation and management.  The [facility] fire department was called to transport the patient to the [place] emergency department.  Patient discussed with Dr. Daniels who agrees with this plan.            Disposition and Plan     Clinical Impression:  1. Dizzy    2. Vaginal bleeding    3. Hx of long term use of blood  thinners         Disposition:  Ic to ed  5/9/2024  7:13 pm    Follow-up:  No follow-up provider specified.        Medications Prescribed:  Current Discharge Medication List

## 2024-05-10 NOTE — ED QUICK NOTES
Family dropped patient off. Patient attempted to call granddaughter without response. 911 called for transport to ED.

## 2024-05-10 NOTE — TELEPHONE ENCOUNTER
Dr Pizarro please see note below .Patient is not on Aranesp injection yet ,will check patient insurance     5/9/24-Also issue on Aranesp I wanted to start her on 200 every 4 weeks

## 2024-05-10 NOTE — TELEPHONE ENCOUNTER
Patient calling states medications are working, 221 to 206 lbs. Please call. (See patient message from 5/9/24)

## 2024-05-10 NOTE — ED INITIAL ASSESSMENT (HPI)
Patient presents via ems from immediate care with vaginal bleeding that started Monday morning.   Per patient no bleeding noted this morning, noticed bleeding this afternoon - currently no bleeding at this time.     +eliquis

## 2024-05-11 NOTE — PROGRESS NOTES
ED Culture Callback Results Review    Pharmacist reviewed culture results from ED visit .    Final urine culture positive for untreated e. coli.    No treatment is necessary at this time as culture is suggestive of asymptomatic bacteriuria rather than active infection as discussed with Dr. Haney.    Marla Antunez, PharmD  Emergency Medicine Pharmacist Specialist  05/11/24; 2:21 PM

## 2024-05-13 ENCOUNTER — LAB ENCOUNTER (OUTPATIENT)
Dept: LAB | Age: 79
End: 2024-05-13
Attending: FAMILY MEDICINE

## 2024-05-13 ENCOUNTER — OFFICE VISIT (OUTPATIENT)
Dept: FAMILY MEDICINE CLINIC | Facility: CLINIC | Age: 79
End: 2024-05-13
Payer: MEDICARE

## 2024-05-13 VITALS
DIASTOLIC BLOOD PRESSURE: 58 MMHG | SYSTOLIC BLOOD PRESSURE: 142 MMHG | HEART RATE: 73 BPM | WEIGHT: 211.19 LBS | HEIGHT: 66 IN | BODY MASS INDEX: 33.94 KG/M2 | RESPIRATION RATE: 17 BRPM

## 2024-05-13 DIAGNOSIS — F32.1 MAJOR DEPRESSIVE DISORDER, SINGLE EPISODE, MODERATE (HCC): ICD-10-CM

## 2024-05-13 DIAGNOSIS — I73.00 RAYNAUD'S DISEASE WITHOUT GANGRENE: ICD-10-CM

## 2024-05-13 DIAGNOSIS — E66.01 CLASS 2 SEVERE OBESITY DUE TO EXCESS CALORIES WITH SERIOUS COMORBIDITY AND BODY MASS INDEX (BMI) OF 39.0 TO 39.9 IN ADULT (HCC): ICD-10-CM

## 2024-05-13 DIAGNOSIS — I50.9 ACUTE ON CHRONIC CONGESTIVE HEART FAILURE, UNSPECIFIED HEART FAILURE TYPE (HCC): ICD-10-CM

## 2024-05-13 DIAGNOSIS — N93.9 VAGINAL BLEEDING: ICD-10-CM

## 2024-05-13 DIAGNOSIS — E11.22 TYPE 2 DIABETES MELLITUS WITH CHRONIC KIDNEY DISEASE, WITHOUT LONG-TERM CURRENT USE OF INSULIN, UNSPECIFIED CKD STAGE (HCC): Primary | ICD-10-CM

## 2024-05-13 DIAGNOSIS — I82.512 CHRONIC DEEP VEIN THROMBOSIS (DVT) OF FEMORAL VEIN OF LEFT LOWER EXTREMITY (HCC): ICD-10-CM

## 2024-05-13 DIAGNOSIS — R31.21 ASYMPTOMATIC MICROSCOPIC HEMATURIA: ICD-10-CM

## 2024-05-13 DIAGNOSIS — I48.11 LONGSTANDING PERSISTENT ATRIAL FIBRILLATION (HCC): ICD-10-CM

## 2024-05-13 DIAGNOSIS — E11.22 TYPE 2 DIABETES MELLITUS WITH CHRONIC KIDNEY DISEASE, WITHOUT LONG-TERM CURRENT USE OF INSULIN, UNSPECIFIED CKD STAGE (HCC): ICD-10-CM

## 2024-05-13 PROBLEM — R63.5 ABNORMAL WEIGHT GAIN: Status: RESOLVED | Noted: 2021-06-30 | Resolved: 2024-05-13

## 2024-05-13 PROBLEM — K52.9 CHRONIC DIARRHEA: Status: RESOLVED | Noted: 2017-01-06 | Resolved: 2024-05-13

## 2024-05-13 LAB
BASOPHILS # BLD AUTO: 0.04 X10(3) UL (ref 0–0.2)
BASOPHILS NFR BLD AUTO: 0.9 %
BILIRUB UR QL: NEGATIVE
COLOR UR: YELLOW
DEPRECATED RDW RBC AUTO: 107.1 FL (ref 35.1–46.3)
EOSINOPHIL # BLD AUTO: 0.06 X10(3) UL (ref 0–0.7)
EOSINOPHIL NFR BLD AUTO: 1.3 %
ERYTHROCYTE [DISTWIDTH] IN BLOOD BY AUTOMATED COUNT: 28.2 % (ref 11–15)
EST. AVERAGE GLUCOSE BLD GHB EST-MCNC: 77 MG/DL (ref 68–126)
GLUCOSE UR-MCNC: NORMAL MG/DL
HBA1C MFR BLD: 4.3 % (ref ?–5.7)
HCT VFR BLD AUTO: 29.3 %
HGB BLD-MCNC: 8.1 G/DL
HGB UR QL STRIP.AUTO: NEGATIVE
HYALINE CASTS #/AREA URNS AUTO: PRESENT /LPF
IMM GRANULOCYTES # BLD AUTO: 0.01 X10(3) UL (ref 0–1)
IMM GRANULOCYTES NFR BLD: 0.2 %
KETONES UR-MCNC: NEGATIVE MG/DL
LEUKOCYTE ESTERASE UR QL STRIP.AUTO: 75
LYMPHOCYTES # BLD AUTO: 0.66 X10(3) UL (ref 1–4)
LYMPHOCYTES NFR BLD AUTO: 14.8 %
MCH RBC QN AUTO: 29.9 PG (ref 26–34)
MCHC RBC AUTO-ENTMCNC: 27.6 G/DL (ref 31–37)
MCV RBC AUTO: 108.1 FL
MONOCYTES # BLD AUTO: 0.44 X10(3) UL (ref 0.1–1)
MONOCYTES NFR BLD AUTO: 9.9 %
NEUTROPHILS # BLD AUTO: 3.24 X10 (3) UL (ref 1.5–7.7)
NEUTROPHILS # BLD AUTO: 3.24 X10(3) UL (ref 1.5–7.7)
NEUTROPHILS NFR BLD AUTO: 72.9 %
NITRITE UR QL STRIP.AUTO: NEGATIVE
PH UR: 5.5 [PH] (ref 5–8)
PLATELET # BLD AUTO: 241 10(3)UL (ref 150–450)
PLATELET MORPHOLOGY: NORMAL
PROT UR-MCNC: NEGATIVE MG/DL
RBC # BLD AUTO: 2.71 X10(6)UL
SP GR UR STRIP: 1.01 (ref 1–1.03)
UROBILINOGEN UR STRIP-ACNC: NORMAL
WBC # BLD AUTO: 4.5 X10(3) UL (ref 4–11)

## 2024-05-13 PROCEDURE — 81001 URINALYSIS AUTO W/SCOPE: CPT

## 2024-05-13 PROCEDURE — 87186 SC STD MICRODIL/AGAR DIL: CPT

## 2024-05-13 PROCEDURE — 36415 COLL VENOUS BLD VENIPUNCTURE: CPT

## 2024-05-13 PROCEDURE — 87077 CULTURE AEROBIC IDENTIFY: CPT

## 2024-05-13 PROCEDURE — 87086 URINE CULTURE/COLONY COUNT: CPT

## 2024-05-13 PROCEDURE — 85025 COMPLETE CBC W/AUTO DIFF WBC: CPT

## 2024-05-13 PROCEDURE — 83036 HEMOGLOBIN GLYCOSYLATED A1C: CPT

## 2024-05-13 PROCEDURE — 87088 URINE BACTERIA CULTURE: CPT

## 2024-05-13 PROCEDURE — 99214 OFFICE O/P EST MOD 30 MIN: CPT | Performed by: FAMILY MEDICINE

## 2024-05-13 NOTE — PROGRESS NOTES
Yamini Ocampo is a 79 year old female.  HPI:   Patient is here to follow-up after recent ER visit, she reports that she has not been presenting with any episodes of bleeding but at that point she also was noticing bleeding in scratching her back, she is asymptomatic at this time, diuretic was suggested by nephrology which has resulted in significant increasing urinary frequency but CHF has been stable  Current Outpatient Medications   Medication Sig Dispense Refill    torsemide 20 MG Oral Tab 2 pills each morning for edema 60 tablet 2    sertraline 100 MG Oral Tab Take 1 tablet (100 mg total) by mouth daily. 90 tablet 3    atorvastatin 10 MG Oral Tab Take 1 tablet (10 mg total) by mouth nightly. 90 tablet 3    FARXIGA 10 MG Oral Tab Take 1 tablet (10 mg total) by mouth daily.      albuterol 108 (90 Base) MCG/ACT Inhalation Aero Soln SHAKE BEFORE USE AND TAKE 2 PUFFS INTO THE LUNGS EVERY 6 HOURS AS NEEDED      folic acid 1 MG Oral Tab Take 1 tablet (1 mg total) by mouth daily. 90 tablet 3    METOPROLOL SUCCINATE ER 50 MG Oral Tablet 24 Hr       amLODIPine 5 MG Oral Tab Take 1 tablet (5 mg total) by mouth daily. 30 tablet 0    cyanocobalamin 1000 MCG Oral Tab Take 1 tablet (1,000 mcg total) by mouth daily.      ELIQUIS 5 MG Oral Tab Take 1 tablet (5 mg total) by mouth 2 (two) times daily. 180 tablet 0      Past Medical History:    Arrhythmia    afib    Back problem    lumbar disc disease    Basal cell carcinoma of nose    Bilateral carpal tunnel syndrome    Cataract    Cellulitis    Deep vein thrombosis (HCC)    unsure which leg, possibly left    Depression    Esophageal reflux    Essential hypertension    Heart disease    Heel spur    Right    Hemorrhoids    Hiatal hernia    High blood pressure    High cholesterol    Hyperlipidemia    Incontinence    Lumbar disc disease    Multiple gastric ulcers    Osteoarthritis    Pulmonary embolism (HCC)    Raynaud disease    Renal disorder    Tubular adenoma of colon    repeat  c-scope 2020    Type 2 diabetes mellitus with diabetic chronic kidney disease (HCC)    Visual impairment    glasses      Social History:  Social History     Socioeconomic History    Marital status:    Tobacco Use    Smoking status: Former     Current packs/day: 0.00     Types: Cigarettes     Quit date: 1997     Years since quittin.3    Smokeless tobacco: Never   Vaping Use    Vaping status: Never Used   Substance and Sexual Activity    Alcohol use: No     Comment: rare    Drug use: Never     Comment: edibles occasionally for sleep   Other Topics Concern    Caffeine Concern Yes     Comment: 8 cups coffee/sod daily    Reaction to local anesthetic No     Social Determinants of Health     Financial Resource Strain: Low Risk  (3/14/2023)    Financial Resource Strain     Difficulty of Paying Living Expenses: Not very hard     Med Affordability: No   Transportation Needs: No Transportation Needs (3/14/2023)    Transportation Needs     Lack of Transportation: No    [facility]    Housing Stability          EXAM:   /58   Pulse 73   Resp 17   Ht 5' 6\" (1.676 m)   Wt 211 lb 3.2 oz (95.8 kg)   BMI 34.09 kg/m²     Physical Exam  Constitutional:       General: She is not in acute distress.  Pulmonary:      Effort: Pulmonary effort is normal.   Neurological:      Mental Status: She is alert and oriented to person, place, and time.   Psychiatric:         Mood and Affect: Mood normal.         Behavior: Behavior normal.            ASSESSMENT AND PLAN:   1. Type 2 diabetes mellitus with chronic kidney disease, without long-term current use of insulin, unspecified CKD stage (HCC)  Patient will be completing follow-up blood work next month, last level stable  - Hemoglobin A1C; Future    2. Acute on chronic congestive heart failure, unspecified heart failure type (HCC)  Asymptomatic at this time    3. Vaginal bleeding  Resolved, patient  declined pelvic exam  - CBC W Differential W Platelet [E]; Future    4. Asymptomatic microscopic hematuria  Asymptomatic at this time, follow-up UA ordered  - Urinalysis with Culture Reflex; Future    5. Class 2 severe obesity due to excess calories with serious comorbidity and body mass index (BMI) of 39.0 to 39.9 in adult (HCC)  Patient has been experiencing weight regain over the last year, we will continue monitoring    6. Major depressive disorder, single episode, moderate (HCC)  Stable, will continue monitoring    7. Raynaud's disease without gangrene  Following with rheumatology    8. Longstanding persistent atrial fibrillation (HCC)  Stable    9. Chronic deep vein thrombosis (DVT) of femoral vein of left lower extremity (HCC)  Stable       The patient indicates understanding of these issues and agrees to the plan.      The above note was creating using Dragon speech recognition technology. Please excuse any typos.

## 2024-05-13 NOTE — TELEPHONE ENCOUNTER
Dulce from [employer] is requesting for diagnosis code for aranesp please follow up fax # [phone] ph # [phone] opt 2

## 2024-05-13 NOTE — TELEPHONE ENCOUNTER
Called-  Spoke to [employer] [facility] [employer] .; faxed form needed with information for aranesp treatment

## 2024-05-14 ENCOUNTER — TELEPHONE (OUTPATIENT)
Facility: CLINIC | Age: 79
End: 2024-05-14

## 2024-05-15 NOTE — TELEPHONE ENCOUNTER
RN called both home and cell numbers, no answer to either call so RN LMTCB regarding med refill request. GI office number provided.    Per chart, pt is not taking pantoprazole but last GI note says to continue ppi.

## 2024-05-16 RX ORDER — PANTOPRAZOLE SODIUM 40 MG/1
40 TABLET, DELAYED RELEASE ORAL 2 TIMES DAILY
Qty: 60 TABLET | Refills: 2 | Status: SHIPPED | OUTPATIENT
Start: 2024-05-16

## 2024-05-16 NOTE — TELEPHONE ENCOUNTER
Spoke to patient, states she is taking pantoprazole and is out right now. Needs refill sent to [facility]. Medication refilled per protocol.

## 2024-05-23 ENCOUNTER — TELEPHONE (OUTPATIENT)
Dept: NEPHROLOGY | Facility: CLINIC | Age: 79
End: 2024-05-23

## 2024-05-23 DIAGNOSIS — D50.8 OTHER IRON DEFICIENCY ANEMIA: Primary | ICD-10-CM

## 2024-05-23 DIAGNOSIS — N18.32 STAGE 3B CHRONIC KIDNEY DISEASE (HCC): ICD-10-CM

## 2024-05-23 NOTE — TELEPHONE ENCOUNTER
Patient is post to start Aranesp her iron is low has she received any IV Venofer she taking iron could we find out please I ordered 200 every 4 weeks of Aranesp

## 2024-05-28 NOTE — TELEPHONE ENCOUNTER
Nurse appointment scheduled for tomorrow 5/29/24 for Aranesp inj     5/1/24 received #3/3 of Venofer Infusion

## 2024-05-29 ENCOUNTER — NURSE ONLY (OUTPATIENT)
Dept: NEPHROLOGY | Facility: CLINIC | Age: 79
End: 2024-05-29

## 2024-05-29 VITALS — SYSTOLIC BLOOD PRESSURE: 106 MMHG | DIASTOLIC BLOOD PRESSURE: 50 MMHG | HEART RATE: 64 BPM

## 2024-05-29 DIAGNOSIS — D50.8 OTHER IRON DEFICIENCY ANEMIA: Primary | ICD-10-CM

## 2024-05-29 DIAGNOSIS — N18.32 STAGE 3B CHRONIC KIDNEY DISEASE (HCC): ICD-10-CM

## 2024-05-29 PROCEDURE — 96372 THER/PROPH/DIAG INJ SC/IM: CPT | Performed by: INTERNAL MEDICINE

## 2024-05-29 NOTE — PROGRESS NOTES
Pt here today for administration of Aranesp injection.    Lab results on: 5/13/24 Hgb 8.1 - Hematocrit 29.3. BP today:  106/50  Aranesp 200 mcg administered subcutaneously to left upper arm  No complications. Injection tolerated well. Side effects and possible   Allergies discussed.  Agreed to call if any concerns, voiced understanding.   RTC 4 weeks for next injection.

## 2024-06-11 RX ORDER — PANTOPRAZOLE SODIUM 40 MG/1
40 TABLET, DELAYED RELEASE ORAL 2 TIMES DAILY
Qty: 60 TABLET | Refills: 2 | Status: SHIPPED | OUTPATIENT
Start: 2024-06-11

## 2024-06-11 NOTE — TELEPHONE ENCOUNTER
Requested Prescriptions     Pending Prescriptions Disp Refills    pantoprazole 40 MG Oral Tab EC 60 tablet 2     Sig: Take 1 tablet (40 mg total) by mouth 2 (two) times daily.     Last seen: 12/5/2023  Suggested follow up:  Last refill: 5/16/2024    Refill pended, please review/sign if agreeable.

## 2024-06-25 ENCOUNTER — LAB ENCOUNTER (OUTPATIENT)
Dept: LAB | Age: 79
DRG: 291 | End: 2024-06-25
Attending: INTERNAL MEDICINE
Payer: MEDICARE

## 2024-06-25 ENCOUNTER — HOSPITAL ENCOUNTER (INPATIENT)
Facility: HOSPITAL | Age: 79
LOS: 3 days | Discharge: HOME | DRG: 291 | End: 2024-06-28
Attending: EMERGENCY MEDICINE | Admitting: HOSPITALIST
Payer: MEDICARE

## 2024-06-25 ENCOUNTER — APPOINTMENT (OUTPATIENT)
Dept: GENERAL RADIOLOGY | Facility: HOSPITAL | Age: 79
DRG: 291 | End: 2024-06-25
Attending: EMERGENCY MEDICINE
Payer: MEDICARE

## 2024-06-25 DIAGNOSIS — I50.9 ACUTE ON CHRONIC CONGESTIVE HEART FAILURE, UNSPECIFIED HEART FAILURE TYPE (HCC): Primary | ICD-10-CM

## 2024-06-25 DIAGNOSIS — D64.9 ANEMIA, UNSPECIFIED TYPE: ICD-10-CM

## 2024-06-25 DIAGNOSIS — I73.9 PERIPHERAL VASCULAR DISEASE (HCC): ICD-10-CM

## 2024-06-25 DIAGNOSIS — M34.1 CREST SYNDROME (HCC): ICD-10-CM

## 2024-06-25 DIAGNOSIS — E11.22 TYPE 2 DIABETES MELLITUS WITH STAGE 3A CHRONIC KIDNEY DISEASE, WITHOUT LONG-TERM CURRENT USE OF INSULIN (HCC): ICD-10-CM

## 2024-06-25 DIAGNOSIS — I50.9 CONGESTIVE HEART FAILURE, UNSPECIFIED HF CHRONICITY, UNSPECIFIED HEART FAILURE TYPE (HCC): ICD-10-CM

## 2024-06-25 DIAGNOSIS — D50.8 OTHER IRON DEFICIENCY ANEMIA: ICD-10-CM

## 2024-06-25 DIAGNOSIS — D53.9 MACROCYTIC ANEMIA: ICD-10-CM

## 2024-06-25 DIAGNOSIS — I10 ESSENTIAL HYPERTENSION: ICD-10-CM

## 2024-06-25 DIAGNOSIS — N18.32 STAGE 3B CHRONIC KIDNEY DISEASE (HCC): ICD-10-CM

## 2024-06-25 DIAGNOSIS — N18.31 TYPE 2 DIABETES MELLITUS WITH STAGE 3A CHRONIC KIDNEY DISEASE, WITHOUT LONG-TERM CURRENT USE OF INSULIN (HCC): ICD-10-CM

## 2024-06-25 PROBLEM — K92.2 GI BLEED: Status: ACTIVE | Noted: 2024-06-25

## 2024-06-25 LAB
ALBUMIN SERPL-MCNC: 3.6 G/DL (ref 3.2–4.8)
ANION GAP SERPL CALC-SCNC: 10 MMOL/L (ref 0–18)
ANION GAP SERPL CALC-SCNC: 7 MMOL/L (ref 0–18)
ANTIBODY SCREEN: NEGATIVE
BASOPHILS # BLD AUTO: 0.06 X10(3) UL (ref 0–0.2)
BASOPHILS NFR BLD AUTO: 0.8 %
BNP SERPL-MCNC: 1013 PG/ML
BUN BLD-MCNC: 33 MG/DL (ref 9–23)
BUN BLD-MCNC: 37 MG/DL (ref 9–23)
BUN/CREAT SERPL: 19.1 (ref 10–20)
BUN/CREAT SERPL: 20.7 (ref 10–20)
CALCIUM BLD-MCNC: 8.8 MG/DL (ref 8.7–10.4)
CALCIUM BLD-MCNC: 8.9 MG/DL (ref 8.7–10.4)
CHLORIDE SERPL-SCNC: 110 MMOL/L (ref 98–112)
CHLORIDE SERPL-SCNC: 110 MMOL/L (ref 98–112)
CO2 SERPL-SCNC: 23 MMOL/L (ref 21–32)
CO2 SERPL-SCNC: 24 MMOL/L (ref 21–32)
CREAT BLD-MCNC: 1.73 MG/DL
CREAT BLD-MCNC: 1.79 MG/DL
DEPRECATED RDW RBC AUTO: 73.7 FL (ref 35.1–46.3)
EGFRCR SERPLBLD CKD-EPI 2021: 28 ML/MIN/1.73M2 (ref 60–?)
EGFRCR SERPLBLD CKD-EPI 2021: 29 ML/MIN/1.73M2 (ref 60–?)
EOSINOPHIL # BLD AUTO: 0.03 X10(3) UL (ref 0–0.7)
EOSINOPHIL NFR BLD AUTO: 0.4 %
ERYTHROCYTE [DISTWIDTH] IN BLOOD BY AUTOMATED COUNT: 19.9 % (ref 11–15)
GLUCOSE BLD-MCNC: 90 MG/DL (ref 70–99)
GLUCOSE BLD-MCNC: 98 MG/DL (ref 70–99)
GLUCOSE BLDC GLUCOMTR-MCNC: 131 MG/DL (ref 70–99)
GLUCOSE BLDC GLUCOMTR-MCNC: 69 MG/DL (ref 70–99)
GLUCOSE BLDC GLUCOMTR-MCNC: 71 MG/DL (ref 70–99)
GLUCOSE BLDC GLUCOMTR-MCNC: 87 MG/DL (ref 70–99)
HCT VFR BLD AUTO: 24 %
HCT VFR BLD AUTO: 24 %
HGB BLD-MCNC: 6.7 G/DL
HGB BLD-MCNC: 7.2 G/DL
IMM GRANULOCYTES # BLD AUTO: 0.01 X10(3) UL (ref 0–1)
IMM GRANULOCYTES NFR BLD: 0.1 %
INR BLD: 1.92 (ref 0.8–1.2)
IRON SATN MFR SERPL: 7 %
IRON SERPL-MCNC: 29 UG/DL
LYMPHOCYTES # BLD AUTO: 0.67 X10(3) UL (ref 1–4)
LYMPHOCYTES NFR BLD AUTO: 8.6 %
MCH RBC QN AUTO: 30.2 PG (ref 26–34)
MCHC RBC AUTO-ENTMCNC: 27.9 G/DL (ref 31–37)
MCV RBC AUTO: 108.1 FL
MONOCYTES # BLD AUTO: 0.38 X10(3) UL (ref 0.1–1)
MONOCYTES NFR BLD AUTO: 4.9 %
NEUTROPHILS # BLD AUTO: 6.6 X10 (3) UL (ref 1.5–7.7)
NEUTROPHILS # BLD AUTO: 6.6 X10(3) UL (ref 1.5–7.7)
NEUTROPHILS NFR BLD AUTO: 85.2 %
OSMOLALITY SERPL CALC.SUM OF ELEC: 300 MOSM/KG (ref 275–295)
OSMOLALITY SERPL CALC.SUM OF ELEC: 303 MOSM/KG (ref 275–295)
PHOSPHATE SERPL-MCNC: 3.7 MG/DL (ref 2.4–5.1)
PLATELET # BLD AUTO: 300 10(3)UL (ref 150–450)
PLATELET MORPHOLOGY: NORMAL
POTASSIUM SERPL-SCNC: 4.6 MMOL/L (ref 3.5–5.1)
POTASSIUM SERPL-SCNC: 4.7 MMOL/L (ref 3.5–5.1)
PROTHROMBIN TIME: 23.2 SECONDS (ref 11.6–14.8)
PTH-INTACT SERPL-MCNC: 104.6 PG/ML (ref 18.5–88)
RBC # BLD AUTO: 2.22 X10(6)UL
RH BLOOD TYPE: NEGATIVE
SODIUM SERPL-SCNC: 141 MMOL/L (ref 136–145)
SODIUM SERPL-SCNC: 143 MMOL/L (ref 136–145)
TIBC SERPL-MCNC: 408 UG/DL (ref 250–425)
TRANSFERRIN SERPL-MCNC: 274 MG/DL (ref 250–380)
TROPONIN I SERPL HS-MCNC: 25 NG/L
WBC # BLD AUTO: 7.8 X10(3) UL (ref 4–11)

## 2024-06-25 PROCEDURE — 71045 X-RAY EXAM CHEST 1 VIEW: CPT | Performed by: EMERGENCY MEDICINE

## 2024-06-25 PROCEDURE — 83970 ASSAY OF PARATHORMONE: CPT

## 2024-06-25 PROCEDURE — 36415 COLL VENOUS BLD VENIPUNCTURE: CPT

## 2024-06-25 PROCEDURE — 99223 1ST HOSP IP/OBS HIGH 75: CPT | Performed by: HOSPITALIST

## 2024-06-25 PROCEDURE — 30233N1 TRANSFUSION OF NONAUTOLOGOUS RED BLOOD CELLS INTO PERIPHERAL VEIN, PERCUTANEOUS APPROACH: ICD-10-PCS | Performed by: HOSPITALIST

## 2024-06-25 PROCEDURE — 85060 BLOOD SMEAR INTERPRETATION: CPT

## 2024-06-25 PROCEDURE — 85025 COMPLETE CBC W/AUTO DIFF WBC: CPT

## 2024-06-25 PROCEDURE — 80069 RENAL FUNCTION PANEL: CPT

## 2024-06-25 RX ORDER — DEXTROSE MONOHYDRATE 25 G/50ML
50 INJECTION, SOLUTION INTRAVENOUS
Status: DISCONTINUED | OUTPATIENT
Start: 2024-06-25 | End: 2024-06-28

## 2024-06-25 RX ORDER — FUROSEMIDE 10 MG/ML
20 INJECTION INTRAMUSCULAR; INTRAVENOUS ONCE
Status: COMPLETED | OUTPATIENT
Start: 2024-06-25 | End: 2024-06-25

## 2024-06-25 RX ORDER — NICOTINE POLACRILEX 4 MG
30 LOZENGE BUCCAL
Status: DISCONTINUED | OUTPATIENT
Start: 2024-06-25 | End: 2024-06-28

## 2024-06-25 RX ORDER — NICOTINE POLACRILEX 4 MG
15 LOZENGE BUCCAL
Status: DISCONTINUED | OUTPATIENT
Start: 2024-06-25 | End: 2024-06-28

## 2024-06-25 RX ORDER — METOCLOPRAMIDE HYDROCHLORIDE 5 MG/ML
5 INJECTION INTRAMUSCULAR; INTRAVENOUS EVERY 8 HOURS PRN
Status: DISCONTINUED | OUTPATIENT
Start: 2024-06-25 | End: 2024-06-28

## 2024-06-25 RX ORDER — BUMETANIDE 0.25 MG/ML
1 INJECTION INTRAMUSCULAR; INTRAVENOUS
Status: DISCONTINUED | OUTPATIENT
Start: 2024-06-25 | End: 2024-06-28

## 2024-06-25 RX ORDER — ACETAMINOPHEN 500 MG
500 TABLET ORAL EVERY 4 HOURS PRN
Status: DISCONTINUED | OUTPATIENT
Start: 2024-06-25 | End: 2024-06-28

## 2024-06-25 RX ORDER — ONDANSETRON 2 MG/ML
4 INJECTION INTRAMUSCULAR; INTRAVENOUS EVERY 6 HOURS PRN
Status: DISCONTINUED | OUTPATIENT
Start: 2024-06-25 | End: 2024-06-28

## 2024-06-25 NOTE — ED PROVIDER NOTES
Patient Seen in: [facility]         EMERGENCY DEPARTMENT NOTE    Dictated. Voice Transcription software has been utilized for this dictation (the reader should be aware that typographical errors are possible with voice transcription software and to please contact the dictating physician if there are questions.)         History     Chief Complaint   Patient presents with    Abnormal Labs       There may be discrepancies from triage note.     HPI    History provided by patient and patient's granddaughter.    79-year-old female, history of anemia who reports receiving blood transfusions in the past, on Eliquis morning and evening complaining of mild fatigue, shortness of breath x 2 days.  States that she frequently has shortness of breath secondary to anemia.  Denies bleeding from any orifice.  States that her stool is brown.  No abdominal pain.  Granddaughter at bedside states that patient appears pale.  Upon arrival to the emergency department and in triage, patient found to be hypotensive with systolic pressure of 78.  Also found to be tachycardic with heart rate of 106.  During interview, normotensive with heart rate of 67.  She denies chest pain.  Denies history of ACS.  Reports history of PE/DVT in the past.  Reports stable swelling of her bilateral lower extremities which she states is secondary to her chronic CHF.  Reports compliance with her medications.  States that she had routine blood work drawn by nephrology recently and was told to come to the emergency room for hemoglobin of 6.  Upon chart review it looks like patient's hemoglobin was 6.3 at 12:00 this afternoon    No fevers, chills, nausea, vomiting, diarrhea, constipation, cough, cold symptoms, urinary complaints.  No chest pain, shortness of breath  No headache, neck pain, neck stiffness, incontinence.  No changes in mentation, no changes in vision, no total/new extremity weakness, no total/new extremity paresthesia, no difficulty  speaking.  No alleviating or exacerbating factors.          History reviewed.   Past Medical History:    Arrhythmia    afib    Back problem    lumbar disc disease    Basal cell carcinoma of nose    Bilateral carpal tunnel syndrome    Cataract    Cellulitis    Deep vein thrombosis (HCC)    unsure which leg, possibly left    Depression    Esophageal reflux    Essential hypertension    Heart disease    Heel spur    Right    Hemorrhoids    Hiatal hernia    High blood pressure    High cholesterol    Hyperlipidemia    Incontinence    Lumbar disc disease    Multiple gastric ulcers    Osteoarthritis    Pulmonary embolism (HCC)    Raynaud disease    Renal disorder    Tubular adenoma of colon    repeat c-scope 1/2020    Type 2 diabetes mellitus with diabetic chronic kidney disease (HCC)    Visual impairment    glasses       History reviewed.   Past Surgical History:   Procedure Laterality Date    Angioplasty (coronary)      Appendectomy      Arthroscopy of joint unlisted Right     knee    Bso, omentectomy w/sushil      Carpal tunnel release Bilateral     Cholecystectomy  09/28/2016    Colonoscopy N/A 01/25/2017    Procedure: COLONOSCOPY;  Surgeon: KATHARINE Prakash MD;  Location: [facility] ENDOSCOPY    Colonoscopy N/A 10/07/2020    Procedure: COLONOSCOPY;  Surgeon: KATHARINE Prakash MD;  Location: [facility] ENDOSCOPY    Egd N/A 12/14/2016    Procedure: ESOPHAGOGASTRODUODENOSCOPY (EGD);  Surgeon: KATHARINE Prakash MD;  Location: [facility] ENDOSCOPY    Egd N/A 01/23/2024    ; gastritis    Electrocardiogram, complete  09/26/2013    Scanned to Media Tab    Excision of nose      Basal cell carcinoma    Hernia surgery      Hysterectomy      Knee replacement surgery Right     Other surgical history      Injections and narcotics, POD Bartuci    Removal of heel spur      Total abdom hysterectomy           Medications :  (Not in a hospital admission)       Family History   Problem Relation Age of Onset    Cancer Father         Skin cancer    Other  (Other) Father 97        old age,unknown caused    Heart Attack Mother     Heart Disorder Mother     Hypertension Other         Family H/O    Cancer Other         Lymphoma  Family H/O    Heart Disease Other         Family H/O    Arthritis Other         Close relative  unsure which type    No Known Problems Brother        Smoking Status:   Social History     Socioeconomic History    Marital status:    Tobacco Use    Smoking status: Former     Current packs/day: 0.00     Types: Cigarettes     Quit date: 1997     Years since quittin.4    Smokeless tobacco: Never   Vaping Use    Vaping status: Never Used   Substance and Sexual Activity    Alcohol use: No     Comment: rare    Drug use: Never     Comment: edibles occasionally for sleep   Other Topics Concern    Caffeine Concern Yes     Comment: 8 cups coffee/sod daily    Reaction to local anesthetic No       Review of Systems   Constitutional: Negative.    HENT: Negative.     Eyes: Negative.    Respiratory:  Positive for shortness of breath. Negative for wheezing.    Cardiovascular:  Positive for leg swelling. Negative for chest pain.   Gastrointestinal: Negative.  Negative for abdominal pain, blood in stool, melena and vomiting.   Genitourinary: Negative.    Musculoskeletal: Negative.    Skin: Negative.    Neurological: Negative.    Endo/Heme/Allergies: Negative.    Psychiatric/Behavioral: Negative.     All other systems reviewed and are negative.    Pertinent positives as listed.  All other organ systems are reviewed and are negative.    Constitutional and vital signs reviewed.      Social History and Family History elements reviewed from today, pertinent positives to the presenting problem noted.    Physical Exam     ED Triage Vitals [24 1804]   BP (!) 78/49   Pulse 106   Resp 18   Temp 96.9 °F (36.1 °C)   Temp src Temporal   SpO2 93 %   O2 Device None (Room air)       All measures to prevent infection transmission during my interaction with the  patient were taken. The patient was already wearing a droplet mask on my arrival to the room. Personal protective equipment including droplet mask, eye protection, and gloves were worn throughout the duration of the exam.  Handwashing was performed prior to and after the exam.  Stethoscope and any equipment used during my examination was cleaned with super sani-cloth germicidal wipes following the exam.     Physical Exam  Vitals and nursing note reviewed.   Constitutional:       General: She is not in acute distress.     Appearance: She is not ill-appearing or toxic-appearing.      Comments: Smiles       Cardiovascular:      Rate and Rhythm: Normal rate. Rhythm irregular.      Comments: Dorsalis pedis pulses 2+ bilaterally    Pulmonary:      Effort: Pulmonary effort is normal. No respiratory distress.   Abdominal:      General: There is no distension.      Palpations: Abdomen is soft.      Tenderness: There is no abdominal tenderness. There is no guarding or rebound.   Genitourinary:     Comments: rectal exam: Brown stool, heme-negative        Musculoskeletal:      Cervical back: Normal range of motion and neck supple.      Right lower leg: No edema.      Left lower leg: No edema.   Skin:     Capillary Refill: Capillary refill takes less than 2 seconds.      Coloration: Skin is pale. Skin is not jaundiced.      Findings: No bruising, erythema, lesion or rash.   Neurological:      General: No focal deficit present.      Mental Status: She is alert and oriented to person, place, and time.      Sensory: No sensory deficit.      Motor: No weakness.      Comments: Gross motor and sensory function intact symmetrically and bilaterally to upper extremities and lower extremities.   Psychiatric:         Mood and Affect: Mood normal.         Behavior: Behavior normal.           Review of prior notes in Care everywhere/Epic performed by myself:  -Patient had an echocardiogram completed March 2023 revealing ejection fraction of  55 to 60%.  She had severe aortic valve stenosis  -Patient's hemoglobin was measured at 6.3 at 12:17 PM today.  Hemoglobin was 8.1 on 5/13/2024.  Hemoglobin was 8.5 on 5/9/2024        ED Course     If labs obtained, they are personally reviewed by myself:     Labs Reviewed   BASIC METABOLIC PANEL (8) - Abnormal; Notable for the following components:       Result Value    BUN 37 (*)     Creatinine 1.79 (*)     BUN/CREA Ratio 20.7 (*)     Calculated Osmolality 300 (*)     eGFR-Cr 28 (*)     All other components within normal limits   PROTHROMBIN TIME (PT) - Abnormal; Notable for the following components:    PT 23.2 (*)     INR 1.92 (*)     All other components within normal limits   BNP (B TYPE NATRIURETIC PEPTIDE) - Abnormal; Notable for the following components:    Beta Natriuretic Peptide 1,013 (*)     All other components within normal limits   IRON AND TIBC - Abnormal; Notable for the following components:    Iron 29 (*)     % Saturation 7 (*)     All other components within normal limits   CBC W/ DIFFERENTIAL - Abnormal; Notable for the following components:    RBC 2.22 (*)     HGB 6.7 (*)     HCT 24.0 (*)     .1 (*)     MCHC 27.9 (*)     RDW-SD 73.7 (*)     RDW 19.9 (*)     Lymphocyte Absolute 0.67 (*)     All other components within normal limits   TROPONIN I HIGH SENSITIVITY - Normal   CBC WITH DIFFERENTIAL WITH PLATELET    Narrative:     The following orders were created for panel order CBC With Differential With Platelet.                  Procedure                               Abnormality         Status                                     ---------                               -----------         ------                                     CBC W/ DIFFERENTIAL[[id number]]          Abnormal            Final result                                                 Please view results for these tests on the individual orders.   TYPE AND SCREEN    Narrative:     The following orders were created for panel  order Type and screen.                  Procedure                               Abnormality         Status                                     ---------                               -----------         ------                                     ABORH (Blood Type)[[id number]]                               Final result                               Antibody Screen[[id number]]                                  Final result                                                 Please view results for these tests on the individual orders.   PREPARE RBC   ABORH (BLOOD TYPE)   ANTIBODY SCREEN       If radiologic studies ordered during today's ER visit, my independent interpretation are seen directly below.  This is awaiting the radiologist's final interpretation.  Chest X-ray, independent interpretation completed by myself and awaiting formal radiology read: Pleural effusions noted.a      Imaging Results read by radiology in ED: XR CHEST AP PORTABLE  (CPT=71045)    Result Date: 6/25/2024  CONCLUSION:  1. Congestive failure with small bilateral pleural effusions.  Associated hazy right greater than left basilar opacities, which are favored to reflect compressive atelectasis (coexistent infection should be excluded on the basis of clinical parameters). 2. Cardiomegaly with aortic valve repair.   elm-remote  Dictated by (CST): Jorge Bartlett MD on 6/25/2024 at 6:50 PM     Finalized by (CST): Jorge Bartlett MD on 6/25/2024 at 6:53 PM               ED Medications Administered:   Medications   pantoprazole (Protonix) 40 mg in sodium chloride 0.9% PF 10 mL IV push (40 mg Intravenous Given 6/25/24 1937)   furosemide (Lasix) 10 mg/mL injection 20 mg (20 mg Intravenous Given 6/25/24 2014)           Vitals:    06/25/24 1918 06/25/24 1948 06/25/24 2000 06/25/24 2008   BP: 142/58 141/61 (!) 140/116    Pulse: 68  96    Resp: 22  17    Temp: 97.1 °F (36.2 °C) 97.2 °F (36.2 °C)     TempSrc: Temporal Temporal     SpO2: 99%  100% 100%   Weight:          *I personally reviewed and interpreted all ED vitals.    Pulse Ox interpretation by myself: 99%, Room air, Normal     Monitor Interpretation by myself: Atrial fibrillation    If Ekg obtained during today's visit, it is independently interpreted by myself directly below:  EKG    Rate, intervals and axes as noted on EKG Report.  Rate: 74  Rhythm: Atrial Fibrillation  Reading: No ST elevation, no ST depression, T waves normal.  Right axis             Medical Record Review: I personally reviewed available prior medical records for any recent pertinent discharge summaries, testing, and procedures and reviewed those reports.      [facility]     Medical decision making/ED Course:     79-year-old female with history of CHF, aortic stenosis, CKD, anemia  told to come to the emergency room for anemia noted on routine lab work.  Denies bleeding from any orifice.  Rectal exam revealed brown stool that is heme-negative.  Hemoglobin on repeat today 6.7.  Patient reports mild shortness of breath which she frequently has with anemia.  Denies associated chest pain.  I suspect her shortness of breath may be multifactorial secondary to anemia and CHF exacerbation.  BNP elevated in the 1000's.  Thankfully troponin negative.  She has no chest pain to suggest acute ACS.  Creatinine elevated at 1.79-history of CKD.  Electrolytes normal.  Potassium normal.  Type and screen obtained and negative, patient to receive 1 unit of PRBCs at a very slow rate given fluid overload.  Chest x-ray  revealing pleural effusions.  INR elevated at 1.92.  Patient was found to be hypoxic laying flat, stat at 45 degrees and 2 L of supplemental O2 placed.  No tachypnea, reports feeling well, denies cough-pneumonia considered and unlikely.  Her hypoxia is likely secondary to fluid overload.  Upon numerous reevaluations, saturating normally, no distress.  Repeat blood pressures revealing normotension.  I suspect patient was likely hypotensive with changes in  position.  Case discussed with hospitalist and patient's cardiologist to agree with admission and 1 unit of PRBCs.  Hospitalist requests Lasix prior to PRBC transfusion.  Patient agreeable to admission.  No further recommendations at this time    ACS, dissection, PE, cardiac arrhythmia, pericardial effusion, among other life-threatening medical conditions considered and seems unlikely given patient's history, exam, and appearance.  Pt agrees and is aware of plan.         Differential Diagnosis:  as listed above in medical decision making.   *Please note that in the presenting to the emergency department, illness/injury that poses a threat to life or function is considered during this patient's initial evaluation.    The complexity of this visit is therefore inherently more complex given the need to consider life threatening pathology prior to any other etiology for this patient's visit.    The differential diagnosis and medical decision above exemplify this rationale.       Medical Decision Making  Problems Addressed:  Acute on chronic congestive heart failure, unspecified heart failure type (HCC): chronic illness or injury with exacerbation, progression, or side effects of treatment  Anemia, unspecified type: chronic illness or injury with exacerbation, progression, or side effects of treatment    Amount and/or Complexity of Data Reviewed  External Data Reviewed: labs, radiology and notes.  Labs: ordered. Decision-making details documented in ED Course.  Radiology: ordered and independent interpretation performed. Decision-making details documented in ED Course.  ECG/medicine tests: ordered and independent interpretation performed. Decision-making details documented in ED Course.  Discussion of management or test interpretation with external provider(s): Hospital system admission order  Cardiologist    Risk  Decision regarding hospitalization.    Critical Care  Total time providing critical care: 33  minutes          ED Course as of 06/25/24 2023  ------------------------------------------------------------  Time: 06/25 1515  Comment: Blood pressure 137/52; heart rate of 67, oxygen saturation of 88%, patient laying flat.  Will sit forward and will place 2 L of supplemental O2.  Patient smiles, no distress, talkative  ------------------------------------------------------------  Time: 06/25 1830  Comment: Patient remains normotensive 136/60, no tachycardia.  Saturating 98% on 2 L  ------------------------------------------------------------  Time: 06/25 1939  Comment: Blood pressure 142/50 weight, heart rate of 72.  Saturating 100% on 2 L.  No distress, status post evaluation by hospitalist who requests Lasix prior to blood transfusion.  ------------------------------------------------------------  Time: 06/25 2022  Comment: Patient with no active bleeding.  Initially blood was ordered to be given over 2 hours however given patient has active fluid overload and no active bleeding, will give blood over 4 hours.  Nursing staff kindly adjusted this and will relay information to floor nurse.          Vitals:    06/25/24 1918 06/25/24 1948 06/25/24 2000 06/25/24 2008   BP: 142/58 141/61 (!) 140/116    Pulse: 68  96    Resp: 22 17    Temp: 97.1 °F (36.2 °C) 97.2 °F (36.2 °C)     TempSrc: Temporal Temporal     SpO2: 99%  100% 100%   Weight:                 Complicating Factors: Significant medical problems that contribute to the complexity of this emergency room evaluation is listed above.    Condition upon leaving the department: Stable    Disposition and Plan     Clinical Impression:  1. Acute on chronic congestive heart failure, unspecified heart failure type (HCC)    2. Anemia, unspecified type        Disposition:  Admit    Medications Prescribed:  Current Discharge Medication List                Hospital Problems       Present on Admission  Date Reviewed: 5/13/2024            ICD-10-CM Noted POA    * (Principal)  Acute on chronic congestive heart failure, unspecified heart failure type (HCC) I50.9 3/7/2023 Unknown

## 2024-06-25 NOTE — ED INITIAL ASSESSMENT (HPI)
Patient arrives ambulatory through triage for a blood transfusion reports hemoglobin is 6. Pale looking.

## 2024-06-26 ENCOUNTER — APPOINTMENT (OUTPATIENT)
Dept: CV DIAGNOSTICS | Facility: HOSPITAL | Age: 79
DRG: 291 | End: 2024-06-26
Attending: HOSPITALIST
Payer: MEDICARE

## 2024-06-26 LAB
ANION GAP SERPL CALC-SCNC: 7 MMOL/L (ref 0–18)
BASOPHILS # BLD AUTO: 0.04 X10(3) UL (ref 0–0.2)
BASOPHILS # BLD AUTO: 0.06 X10(3) UL (ref 0–0.2)
BASOPHILS NFR BLD AUTO: 0.7 %
BASOPHILS NFR BLD AUTO: 0.8 %
BUN BLD-MCNC: 35 MG/DL (ref 9–23)
BUN/CREAT SERPL: 20.5 (ref 10–20)
CALCIUM BLD-MCNC: 8.8 MG/DL (ref 8.7–10.4)
CHLORIDE SERPL-SCNC: 109 MMOL/L (ref 98–112)
CO2 SERPL-SCNC: 26 MMOL/L (ref 21–32)
CREAT BLD-MCNC: 1.71 MG/DL
DEPRECATED HBV CORE AB SER IA-ACNC: 31.9 NG/ML
DEPRECATED RDW RBC AUTO: 74.5 FL (ref 35.1–46.3)
DEPRECATED RDW RBC AUTO: 75.6 FL (ref 35.1–46.3)
EGFRCR SERPLBLD CKD-EPI 2021: 30 ML/MIN/1.73M2 (ref 60–?)
EOSINOPHIL # BLD AUTO: 0.02 X10(3) UL (ref 0–0.7)
EOSINOPHIL # BLD AUTO: 0.09 X10(3) UL (ref 0–0.7)
EOSINOPHIL NFR BLD AUTO: 0.2 %
EOSINOPHIL NFR BLD AUTO: 1.7 %
ERYTHROCYTE [DISTWIDTH] IN BLOOD BY AUTOMATED COUNT: 19.8 % (ref 11–15)
ERYTHROCYTE [DISTWIDTH] IN BLOOD BY AUTOMATED COUNT: 20.9 % (ref 11–15)
GLUCOSE BLD-MCNC: 90 MG/DL (ref 70–99)
GLUCOSE BLDC GLUCOMTR-MCNC: 100 MG/DL (ref 70–99)
GLUCOSE BLDC GLUCOMTR-MCNC: 115 MG/DL (ref 70–99)
GLUCOSE BLDC GLUCOMTR-MCNC: 116 MG/DL (ref 70–99)
GLUCOSE BLDC GLUCOMTR-MCNC: 72 MG/DL (ref 70–99)
GLUCOSE BLDC GLUCOMTR-MCNC: 79 MG/DL (ref 70–99)
HCT VFR BLD AUTO: 22.6 %
HCT VFR BLD AUTO: 24.3 %
HGB BLD-MCNC: 6.3 G/DL
HGB BLD-MCNC: 7.5 G/DL
IMM GRANULOCYTES # BLD AUTO: 0.02 X10(3) UL (ref 0–1)
IMM GRANULOCYTES # BLD AUTO: 0.02 X10(3) UL (ref 0–1)
IMM GRANULOCYTES NFR BLD: 0.2 %
IMM GRANULOCYTES NFR BLD: 0.4 %
LYMPHOCYTES # BLD AUTO: 0.56 X10(3) UL (ref 1–4)
LYMPHOCYTES # BLD AUTO: 0.72 X10(3) UL (ref 1–4)
LYMPHOCYTES NFR BLD AUTO: 13.5 %
LYMPHOCYTES NFR BLD AUTO: 7 %
MCH RBC QN AUTO: 30.7 PG (ref 26–34)
MCH RBC QN AUTO: 31.8 PG (ref 26–34)
MCHC RBC AUTO-ENTMCNC: 27.9 G/DL (ref 31–37)
MCHC RBC AUTO-ENTMCNC: 30.9 G/DL (ref 31–37)
MCV RBC AUTO: 103 FL
MCV RBC AUTO: 110.2 FL
MONOCYTES # BLD AUTO: 0.34 X10(3) UL (ref 0.1–1)
MONOCYTES # BLD AUTO: 0.41 X10(3) UL (ref 0.1–1)
MONOCYTES NFR BLD AUTO: 4.2 %
MONOCYTES NFR BLD AUTO: 7.7 %
NEUTROPHILS # BLD AUTO: 4.05 X10 (3) UL (ref 1.5–7.7)
NEUTROPHILS # BLD AUTO: 4.05 X10(3) UL (ref 1.5–7.7)
NEUTROPHILS # BLD AUTO: 7.03 X10 (3) UL (ref 1.5–7.7)
NEUTROPHILS # BLD AUTO: 7.03 X10(3) UL (ref 1.5–7.7)
NEUTROPHILS NFR BLD AUTO: 75.9 %
NEUTROPHILS NFR BLD AUTO: 87.7 %
OSMOLALITY SERPL CALC.SUM OF ELEC: 302 MOSM/KG (ref 275–295)
PLATELET # BLD AUTO: 256 10(3)UL (ref 150–450)
PLATELET # BLD AUTO: 273 10(3)UL (ref 150–450)
POTASSIUM SERPL-SCNC: 4.3 MMOL/L (ref 3.5–5.1)
Q-T INTERVAL: 414 MS
QRS DURATION: 96 MS
QTC CALCULATION (BEZET): 459 MS
R AXIS: 104 DEGREES
RBC # BLD AUTO: 2.05 X10(6)UL
RBC # BLD AUTO: 2.36 X10(6)UL
SODIUM SERPL-SCNC: 142 MMOL/L (ref 136–145)
T AXIS: 69 DEGREES
VENTRICULAR RATE: 74 BPM
WBC # BLD AUTO: 5.3 X10(3) UL (ref 4–11)
WBC # BLD AUTO: 8 X10(3) UL (ref 4–11)

## 2024-06-26 PROCEDURE — 99223 1ST HOSP IP/OBS HIGH 75: CPT | Performed by: INTERNAL MEDICINE

## 2024-06-26 PROCEDURE — 99233 SBSQ HOSP IP/OBS HIGH 50: CPT | Performed by: HOSPITALIST

## 2024-06-26 PROCEDURE — 93306 TTE W/DOPPLER COMPLETE: CPT | Performed by: HOSPITALIST

## 2024-06-26 RX ORDER — IPRATROPIUM BROMIDE AND ALBUTEROL SULFATE 2.5; .5 MG/3ML; MG/3ML
3 SOLUTION RESPIRATORY (INHALATION) EVERY 4 HOURS PRN
Status: DISCONTINUED | OUTPATIENT
Start: 2024-06-26 | End: 2024-06-28

## 2024-06-26 RX ORDER — SERTRALINE HYDROCHLORIDE 100 MG/1
100 TABLET, FILM COATED ORAL DAILY
Status: DISCONTINUED | OUTPATIENT
Start: 2024-06-26 | End: 2024-06-28

## 2024-06-26 RX ORDER — FOLIC ACID 1 MG/1
1 TABLET ORAL DAILY
Status: DISCONTINUED | OUTPATIENT
Start: 2024-06-26 | End: 2024-06-28

## 2024-06-26 RX ORDER — ATORVASTATIN CALCIUM 10 MG/1
10 TABLET, FILM COATED ORAL NIGHTLY
Status: DISCONTINUED | OUTPATIENT
Start: 2024-06-26 | End: 2024-06-28

## 2024-06-26 RX ORDER — MAGNESIUM OXIDE 400 MG (241.3 MG MAGNESIUM) TABLET
1000 TABLET DAILY
Status: DISCONTINUED | OUTPATIENT
Start: 2024-06-26 | End: 2024-06-28

## 2024-06-26 RX ORDER — METOPROLOL SUCCINATE 50 MG/1
50 TABLET, EXTENDED RELEASE ORAL 2 TIMES DAILY
Status: DISCONTINUED | OUTPATIENT
Start: 2024-06-26 | End: 2024-06-28

## 2024-06-26 NOTE — PLAN OF CARE
Problem: CARDIOVASCULAR - ADULT  Goal: Maintains optimal cardiac output and hemodynamic stability  Description: INTERVENTIONS:  - Monitor vital signs, rhythm, and trends  - Monitor for bleeding, hypotension and signs of decreased cardiac output  - Evaluate effectiveness of vasoactive medications to optimize hemodynamic stability  - Monitor arterial and/or venous puncture sites for bleeding and/or hematoma  - Assess quality of pulses, skin color and temperature  - Assess for signs of decreased coronary artery perfusion - ex. Angina  - Evaluate fluid balance, assess for edema, trend weights  Outcome: Progressing  Goal: Absence of cardiac arrhythmias or at baseline  Description: INTERVENTIONS:  - Continuous cardiac monitoring, monitor vital signs, obtain 12 lead EKG if indicated  - Evaluate effectiveness of antiarrhythmic and heart rate control medications as ordered  - Initiate emergency measures for life threatening arrhythmias  - Monitor electrolytes and administer replacement therapy as ordered  Outcome: Progressing     Problem: RESPIRATORY - ADULT  Goal: Achieves optimal ventilation and oxygenation  Description: INTERVENTIONS:  - Assess for changes in respiratory status  - Assess for changes in mentation and behavior  - Position to facilitate oxygenation and minimize respiratory effort  - Oxygen supplementation based on oxygen saturation or ABGs  - Provide Smoking Cessation handout, if applicable  - Encourage broncho-pulmonary hygiene including cough, deep breathe, Incentive Spirometry  - Assess the need for suctioning and perform as needed  - Assess and instruct to report SOB or any respiratory difficulty  - Respiratory Therapy support as indicated  - Manage/alleviate anxiety  - Monitor for signs/symptoms of CO2 retention  Outcome: Progressing     Problem: GASTROINTESTINAL - ADULT  Goal: Minimal or absence of nausea and vomiting  Description: INTERVENTIONS:  - Maintain adequate hydration with IV or PO as ordered  and tolerated  - Nasogastric tube to low intermittent suction as ordered  - Evaluate effectiveness of ordered antiemetic medications  - Provide nonpharmacologic comfort measures as appropriate  - Advance diet as tolerated, if ordered  - Obtain nutritional consult as needed  - Evaluate fluid balance  Outcome: Progressing     Problem: HEMATOLOGIC - ADULT  Goal: Maintains hematologic stability  Description: INTERVENTIONS  - Assess for signs and symptoms of bleeding or hemorrhage  - Monitor labs and vital signs for trends  - Administer supportive blood products/factors, fluids and medications as ordered and appropriate  - Administer supportive blood products/factors as ordered and appropriate  Outcome: Progressing  Goal: Free from bleeding injury  Description: (Example usage: patient with low platelets)  INTERVENTIONS:  - Avoid intramuscular injections, enemas and rectal medication administration  - Ensure safe mobilization of patient  - Hold pressure on venipuncture sites to achieve adequate hemostasis  - Assess for signs and symptoms of internal bleeding  - Monitor lab trends  - Patient is to report abnormal signs of bleeding to staff  - Avoid use of toothpicks and dental floss  - Use electric shaver for shaving  - Use soft bristle tooth brush  - Limit straining and forceful nose blowing  Outcome: Progressing     Received awake,oriented. 1 unit of PRBC transfused.Hypoglycemic episode noted during the night. No complaints of pain. Plans for ECHO today.

## 2024-06-26 NOTE — CONSULTS
Is this a shared or split note between Advanced Practice Provider and Physician? Yes      [facility]   Gastroenterology Consultation Note    Yamini Ocampo  Patient Status:    Inpatient  Date of Admission:         6/25/2024, Hospital day #1  Attending:   Elias Shipley MD  PCP:     Kathy Anne MD    Reason for Consultation:  Acute on Chronic Anemia    History of Present Illness:  Yamini Ocampo is a 79 year old female w/ PMHx of BMI 33.86, HTN, HLD, DM2, CKD3, Anemia of chronic disease, PUD, GERD, Ventricular diastolic dysfunction, HFpEF, Pulmonary artery HTN, CAD, Chronic Afib (on eliquis) who presents to the ED with acute on chronic diastolic HF and anemia.    Pt states over the past few weeks she has been noticing an increase in her GERD symptoms with worsening acid reflux, early satiety and bloating.  She denies ABD pain, N/V, dysphagia/odynophagia, constipation, diarrhea, black/bloody stools, fever, chills, chest pain and unintentional weight loss.  She does note some worsening SOB on admission but this has since resolved.  She denies NSAID use.    Pertinent Family Hx:  - No known history of esophageal, gastric or colon cancers  - No known IBD  - No known liver pathologies    Endoscopy Hx:  Last colonoscopy: 10/7/20 normal to TI. Path neg for microscopic colitis     - EGD 1/2024  Impression:  1. Non-erosive reflux disease.  2. Gastric erythema s/p biopsies (mapping) due to hx of gastric IM.    Final Diagnosis:      A. Gastric antrum lesser curvature; biopsy:  Fragments of gastric antral type mucosa with mild foveolar hyperplasia and mild chronic inactive gastritis.  Diff-Quik stain (appropriate control reactivity) shows no definitive evidence of H. pylori-like microorganisms.  No evidence of intestinal metaplasia, dysplasia or carcinoma identified.      B. Gastric antrum greater curvature; biopsy:  Fragments of gastric mucosa with focal mild chronic inactive gastritis.  Diff-Quik stain  (appropriate control reactivity) shows no definitive evidence of H. pylori-like microorganisms.  No evidence of intestinal metaplasia, dysplasia or carcinoma identified.      C. Gastric incisura; biopsy:  Fragments of gastric antral and oxyntic type mucosa with mild chronic inactive gastritis.  Diff-Quik stain (appropriate control reactivity) shows no definitive evidence of H. pylori-like microorganisms.  No evidence of intestinal metaplasia, dysplasia or carcinoma identified.       D. Gastric body lesser; biopsy:  Fragments of gastric oxyntic type mucosa with mild chronic inactive gastritis.  Diff-Quik stain (appropriate control reactivity) shows no definitive evidence of H. pylori-like microorganisms.  No evidence of intestinal metaplasia, dysplasia or carcinoma identified.       E. Gastric body greater; biopsy:  Fragments of gastric oxyntic type mucosa with focal mild chronic inactive gastritis.  Diff-Quik stain (appropriate control reactivity) shows no definitive evidence of H. pylori-like microorganisms.  No evidence of intestinal metaplasia, dysplasia or carcinoma identified.     Social Hx:  - No tobacco use  - No ETOH  - Denies cannabis/illicit drug use     History:  Past Medical History:    Arrhythmia    afib    Back problem    lumbar disc disease    Basal cell carcinoma of nose    Bilateral carpal tunnel syndrome    Cataract    Cellulitis    Deep vein thrombosis (HCC)    unsure which leg, possibly left    Depression    Esophageal reflux    Essential hypertension    Heart disease    Heel spur    Right    Hemorrhoids    Hiatal hernia    High blood pressure    High cholesterol    Hyperlipidemia    Incontinence    Lumbar disc disease    Multiple gastric ulcers    Osteoarthritis    Pulmonary embolism (HCC)    Raynaud disease    Renal disorder    Tubular adenoma of colon    repeat c-scope 1/2020    Type 2 diabetes mellitus with diabetic chronic kidney disease (HCC)    Visual impairment    glasses     Past  Surgical History:   Procedure Laterality Date    Angioplasty (coronary)      Appendectomy      Arthroscopy of joint unlisted Right     knee    Bso, omentectomy w/sushil      Carpal tunnel release Bilateral     Cholecystectomy  09/28/2016    Colonoscopy N/A 01/25/2017    Procedure: COLONOSCOPY;  Surgeon: KATHARINE Prakash MD;  Location: [facility] ENDOSCOPY    Colonoscopy N/A 10/07/2020    Procedure: COLONOSCOPY;  Surgeon: KATHARINE Prakash MD;  Location: [facility] ENDOSCOPY    Egd N/A 12/14/2016    Procedure: ESOPHAGOGASTRODUODENOSCOPY (EGD);  Surgeon: KATHARINE Prakash MD;  Location: [facility] ENDOSCOPY    Egd N/A 01/23/2024    ; gastritis    Electrocardiogram, complete  09/26/2013    Scanned to Media Tab    Excision of nose      Basal cell carcinoma    Hernia surgery      Hysterectomy      Knee replacement surgery Right     Other surgical history      Injections and narcotics, POD Bartuci    Removal of heel spur      Total abdom hysterectomy       Family History   Problem Relation Age of Onset    Cancer Father         Skin cancer    Other (Other) Father 97        old age,unknown caused    Heart Attack Mother     Heart Disorder Mother     Hypertension Other         Family H/O    Cancer Other         Lymphoma  Family H/O    Heart Disease Other         Family H/O    Arthritis Other         Close relative  unsure which type    No Known Problems Brother       reports that she quit smoking about 27 years ago. Her smoking use included cigarettes. She has never used smokeless tobacco. She reports that she does not drink alcohol and does not use drugs.    Allergies:  Allergies   Allergen Reactions    Adhesive Tape RASH     Skin off       Medications:    Current Facility-Administered Medications:     ipratropium-albuterol (Duoneb) 0.5-2.5 (3) MG/3ML inhalation solution 3 mL, 3 mL, Nebulization, Q4H PRN    atorvastatin (Lipitor) tab 10 mg, 10 mg, Oral, Nightly    cyanocobalamin (Vitamin B12) tab 1,000 mcg, 1,000 mcg, Oral, Daily    folic  acid (Folvite) tab 1 mg, 1 mg, Oral, Daily    sertraline (Zoloft) tab 100 mg, 100 mg, Oral, Daily    metoprolol succinate ER (Toprol XL) 24 hr tab 50 mg, 50 mg, Oral, BID    pantoprazole (Protonix) 40 mg in sodium chloride 0.9% PF 10 mL IV push, 40 mg, Intravenous, Q12H    acetaminophen (Tylenol Extra Strength) tab 500 mg, 500 mg, Oral, Q4H PRN    ondansetron (Zofran) 4 MG/2ML injection 4 mg, 4 mg, Intravenous, Q6H PRN    metoclopramide (Reglan) 5 mg/mL injection 5 mg, 5 mg, Intravenous, Q8H PRN    bumetanide (Bumex) 0.25 MG/ML injection 1 mg, 1 mg, Intravenous, BID (Diuretic)    glucose (Dex4) 15 GM/59ML oral liquid 15 g, 15 g, Oral, Q15 Min PRN **OR** glucose (Glutose) 40% oral gel 15 g, 15 g, Oral, Q15 Min PRN **OR** glucose-vitamin C (Dex-4) chewable tab 4 tablet, 4 tablet, Oral, Q15 Min PRN **OR** dextrose 50% injection 50 mL, 50 mL, Intravenous, Q15 Min PRN **OR** glucose (Dex4) 15 GM/59ML oral liquid 30 g, 30 g, Oral, Q15 Min PRN **OR** glucose (Glutose) 40% oral gel 30 g, 30 g, Oral, Q15 Min PRN **OR** glucose-vitamin C (Dex-4) chewable tab 8 tablet, 8 tablet, Oral, Q15 Min PRN    insulin aspart (NovoLOG) 100 Units/mL FlexPen 1-5 Units, 1-5 Units, Subcutaneous, TID CC    Review of Systems:   CONSTITUTIONAL:  negative for fevers, chills, unintentional weight loss   EYES:  negative for diplopia or change in vision   RESPIRATORY:  negative for severe shortness of breath  CARDIOVASCULAR:  negative for crushing sub-sternal chest pain  GASTROINTESTINAL:  see HPI  GENITOURINARY:  negative for dysuria or gross hematuria  INTEGUMENT/BREAST:  SKIN:  negative for jaundice or new rash   ALLERGIC/IMMUNOLOGIC:  negative for hay fever   ENDOCRINE:  negative for cold intolerance and heat intolerance  MUSCULOSKELETAL:  negative for joint effusion/severe erythema  NEURO: negative for new loss of consciousness or dizziness   BEHAVIOR/PSYCH:  negative for psychotic behavior    Physical Exam:    Blood pressure 132/61, pulse 85,  temperature 98.1 °F (36.7 °C), temperature source Oral, resp. rate 18, height 5' 6\" (1.676 m), weight 209 lb 12.8 oz (95.2 kg), SpO2 90%. Body mass index is 33.86 kg/m².    Gen: awake, alert patient, NAD  HEENT: EOMI, the sclera appears anicteric, oropharynx clear, mucus membranes appear moist  CV: RRR  Lung: no conversational dyspnea   Abdomen: soft NTND abdomen with NABS appreciated   Back: No CVA tenderness   Skin: dry, warm, no jaundice  Ext: +2 pitting BLE edema is evident  Neuro: Alert, oriented x4 and interactive  Psych: calm, cooperative    Laboratory Data:  Lab Results   Component Value Date    WBC 5.3 06/26/2024    HGB 7.5 06/26/2024    HCT 24.3 06/26/2024    .0 06/26/2024    CREATSERUM 1.71 06/26/2024    BUN 35 06/26/2024     06/26/2024    K 4.3 06/26/2024     06/26/2024    CO2 26.0 06/26/2024    GLU 90 06/26/2024    CA 8.8 06/26/2024    INR 1.92 06/25/2024       Imaging:  XR CHEST AP PORTABLE  (CPT=71045)    Result Date: 6/25/2024  CONCLUSION:  1. Congestive failure with small bilateral pleural effusions.  Associated hazy right greater than left basilar opacities, which are favored to reflect compressive atelectasis (coexistent infection should be excluded on the basis of clinical parameters). 2. Cardiomegaly with aortic valve repair.   elm-remote  Dictated by (CST): Jorge Bartlett MD on 6/25/2024 at 6:50 PM     Finalized by (CST): Jorge Bartlett MD on 6/25/2024 at 6:53 PM           Assessment & Plan   Yamini Oacmpo is a 79 year old female w/ PMHx of BMI 33.86, HTN, HLD, DM2, CKD3, Anemia of chronic disease, PUD, GERD, Ventricular diastolic dysfunction, HFpEF, Pulmonary artery HTN, CAD, Chronic Afib (on eliquis) who presents to the ED with acute on chronic diastolic HF and anemia.    #Acute on Chronic Anemia  #CHF exacerbation  -Labs on admission significant for Hgb 6.3 from baseline 8-10, s/p 1 unit PRBCs with adequate response, Hgb 7.5 today.  .  Iron 29, BUN 35, BNP 1013,  INR 1.92.  -CXR demonstrated congestive heart failure with small bilateral pleural effusions  -Pt started on Bumex IV with improvement in edema and SOB  -No overt GIB though Pt reports exacerbation in chronic dyspepsia symptoms since her medication regimen was disrupted by her pharmacy  -EGD 1/2024 with nonerosive reflux disease and gastric erythema, Bx negative.  Hx of gastric ulcers in the past.  -Hemoccult negative in ED  -Etiology possible gastritis, esophagitis, PUD.    -Recommend repeat upper endoscopy to further evaluate.  Risks/benefits of procedure discussed.  Pt states understanding and is willing to proceed.  -Will need cardiac clearance prior to endoscopic sedation as she is currently admitted for CHF exacerbation.    EGD consent: I have discussed the risks, benefits, and alternatives to upper endoscopy/enteroscopy with the patient/primary decision maker [who demonstrated understanding], including but not limited to the risks of bleeding, infection, pain, death, as well as the risks of anesthesia and perforation all leading to prolonged hospitalization, surgical intervention, or even death. I also specifically mentioned the miss rate of upper endoscopy of 5-10% in the best of all circumstances.  The patient has agreed to sign an informed consent and elected to proceed with procedure with possible intervention [i.e. polypectomy, stent placement, etc.] as indicated.     Recommend:  -Low fiber/soft cardiac today, NPO at 0000  -EGD tomorrow  -Empiric PPI BID  -Monitor for overt GIB  -Trend Hgb, transfuse to goal >7  -Cardiac clearance prior to endoscopic sedation  -Hold eliquis if able    Thank you for the opportunity to participate in the care of this patient.    Case discussed with Alyssa Orellana MD and Den VERDIN.    Jocelyn Taylor DNP, [facility]  6/26/2024

## 2024-06-26 NOTE — ED QUICK NOTES
Orders for admission, patient is aware of plan and ready to go upstairs. Any questions, please call ED RN Patricia at extension [phone].     Patient Covid vaccination status: Fully vaccinated     COVID Test Ordered in ED: None    COVID Suspicion at Admission: N/A    Running Infusions:  None    Mental Status/LOC at time of transport: AxOx4    Other pertinent information:   CIWA score: N/A   NIH score:  N/A

## 2024-06-26 NOTE — H&P
[facility]    PATIENT'S NAME: CATARINA DAVIS   ATTENDING PHYSICIAN: Ebonie Linda MD   PATIENT ACCOUNT#:   [id number]    LOCATION:  [place] [id number]  MEDICAL RECORD #:   [id number]       YOB: 1945  ADMISSION DATE:       06/25/2024    HISTORY AND PHYSICAL EXAMINATION    CHIEF COMPLAINT:  Acute on chronic diastolic heart failure and anemia.  Rule out gastrointestinal bleed.    HISTORY OF PRESENT ILLNESS:  Patient is an 79-year-old  female who was sent today to the emergency department for evaluation of progressive fatigue and after she was found to have a low hemoglobin of 6.3 on a CBC.  Repeat CBC in the emergency room showed hemoglobin of 6.7.  Her hemoglobin was 8.1 on May 13, six weeks ago.  INR 1.92.  She is chronically on Eliquis.  BNP still pending.  Chemistry still pending.  Outpatient chemistry today showed GFR of 29.  Chest x-ray showed small bilateral pleural effusions and heart failure changes.  Patient was started on blood transfusion with Lasix in the emergency room, and she will be admitted to the hospital for further management.  Rectal exam done by the emergency room physician was negative for Hemoccult blood.       PAST MEDICAL HISTORY:  Hypertension; hyperlipidemia; diabetes mellitus type 2; chronic kidney disease stage 3; anemia of chronic kidney disease; peptic ulcer disease and gastroesophageal reflux disease with chronic gastritis; left ventricular diastolic dysfunction with heart failure, preserved ejection fraction, moderate pulmonary artery hypertension; nonobstructive coronary artery disease; chronic atrial fibrillation, anticoagulated with Eliquis.    PAST SURGICAL HISTORY:  Appendectomy, bilateral carpal tunnel release, bilateral total knee arthroplasty, nasal basal cell carcinoma resection, hysterectomy, cholecystectomy, and total abdominal hysterectomy.    MEDICATIONS:  Please see medication reconciliation list.     ALLERGIES:  Adhesive tape.    FAMILY  HISTORY:  Mother had coronary artery disease.  Father had skin cancer.      SOCIAL HISTORY:  No tobacco, alcohol, or drug use.  Lives with her family.  Independent for basic activities of daily living.     REVIEW OF SYSTEMS:  Progressive shortness of breath and fatigue, dyspnea on exertion for the last 2 to 3 days.  Patient denies any chest pain.  She does have chronic gastroesophageal reflux disease which has been more pronounced lately.  Denies any melena or dark bowel movements.  No rectal bleed.  Other 12-point review of systems is negative.       PHYSICAL EXAMINATION:    GENERAL:  Alert and oriented to time, place and person.  Mild to moderate distress.  Visibly dyspneic.  VITAL SIGNS:  Temperature 96.9; pulse 71; respiratory rate 21; blood pressure 136/60; pulse ox 88% on room air, 99% on nasal cannula oxygen.  HEENT:  Atraumatic.  Oropharynx clear.  Moist mucous membranes.    NECK:  Supple.  No lymphadenopathy.  Positive jugular venous distention.   LUNGS:  Diminished breathing sounds, both lung bases.  HEART:  Irregularly irregular rhythm.  S1 and S2 auscultated.  No murmur.    ABDOMEN:  Soft, nondistended.  No tenderness.  Positive bowel sounds.   EXTREMITIES:  There is +1 to 2 edema, both legs.  No clubbing or cyanosis.   NEUROLOGIC:  Motor and sensory intact.      ASSESSMENT:    1.   Acute on chronic diastolic heart failure.  2.   Above condition complicated by acute on chronic anemia.  No evidence of active gastrointestinal bleed but patient does have history of gastroesophageal reflux disease and possible upper gastrointestinal bleed.  3.   Essential hypertension, though hypovolemic today.  4.   Hypoxemia.  5.   Chronic atrial fibrillation.  6.   Diabetes mellitus type 2.  7.   Chronic kidney disease stage 3.    PLAN:  Patient will be admitted to telemetry floor.  IV Bumex, blood transfusion, and monitor hemodynamic status closely.  Cardiology and gastroenterology consults.  IV Protonix.  Monitor  Accu-Cheks.  Obtain iron level.  Further recommendations to follow.     Dictated By Hillary Fitzgerald MD  d: 06/25/2024 19:02:46  t: 06/25/2024 19:49:47  [facility] [id number]  FB/    cc: Ebonie Linda MD

## 2024-06-26 NOTE — CONSULTS
[facility]  Report of Consultation    Yamini Ocampo Patient Status:  Inpatient    1945 MRN [id number]   Location [facility] 3W/SW Attending Elias Shipley MD   Hosp Day # 1 PCP Kathy Anne MD     Reason for Consultation:  Consultation request was made for CHF and SOB.     History of Present Illness:  Yamini Ocampo is a a(n) 79 year old female. Pt was transferred to ED due to anemia. Pt has hx of anemia with blood transfusions in the past. Upon arrival to ED, pt was hypotensive (SBP 78) and tachycardic (). Pt reported that she had been feeling fatigue and was experiencing worsening SOB for the past 1 week before admission; her hemoglobin was 6.3 upon admission, which increased to 7.5 today after transfusion. Eliquis is currently on hold. Pt reported that her breathing is okay today. Admitted acid reflux. Chart reviewed.    History:  Past Medical History:    Arrhythmia    afib    Back problem    lumbar disc disease    Basal cell carcinoma of nose    Bilateral carpal tunnel syndrome    Cataract    Cellulitis    Deep vein thrombosis (HCC)    unsure which leg, possibly left    Depression    Esophageal reflux    Essential hypertension    Heart disease    Heel spur    Right    Hemorrhoids    Hiatal hernia    High blood pressure    High cholesterol    Hyperlipidemia    Incontinence    Lumbar disc disease    Multiple gastric ulcers    Osteoarthritis    Pulmonary embolism (HCC)    Raynaud disease    Renal disorder    Tubular adenoma of colon    repeat c-scope 2020    Type 2 diabetes mellitus with diabetic chronic kidney disease (HCC)    Visual impairment    glasses     Past Surgical History:   Procedure Laterality Date    Angioplasty (coronary)      Appendectomy      Arthroscopy of joint unlisted Right     knee    Bso, omentectomy w/sushil      Carpal tunnel release Bilateral     Cholecystectomy  2016    Colonoscopy N/A 2017    Procedure: COLONOSCOPY;  Surgeon: KATHARINE Prakash MD;   Location: [facility] ENDOSCOPY    Colonoscopy N/A 10/07/2020    Procedure: COLONOSCOPY;  Surgeon: KATHARINE Prakash MD;  Location: [facility] ENDOSCOPY    Egd N/A 12/14/2016    Procedure: ESOPHAGOGASTRODUODENOSCOPY (EGD);  Surgeon: KATHARINE Prakash MD;  Location: [facility] ENDOSCOPY    Egd N/A 01/23/2024    ; gastritis    Electrocardiogram, complete  09/26/2013    Scanned to Media Tab    Excision of nose      Basal cell carcinoma    Hernia surgery      Hysterectomy      Knee replacement surgery Right     Other surgical history      Injections and narcotics, POD Bartuci    Removal of heel spur      Total abdom hysterectomy       Family History   Problem Relation Age of Onset    Cancer Father         Skin cancer    Other (Other) Father 97        old age,unknown caused    Heart Attack Mother     Heart Disorder Mother     Hypertension Other         Family H/O    Cancer Other         Lymphoma  Family H/O    Heart Disease Other         Family H/O    Arthritis Other         Close relative  unsure which type    No Known Problems Brother       reports that she quit smoking about 27 years ago. Her smoking use included cigarettes. She has never used smokeless tobacco. She reports that she does not drink alcohol and does not use drugs.    Allergies:  Allergies   Allergen Reactions    Adhesive Tape RASH     Skin off       Medications:    Current Facility-Administered Medications:     ipratropium-albuterol (Duoneb) 0.5-2.5 (3) MG/3ML inhalation solution 3 mL, 3 mL, Nebulization, Q4H PRN    atorvastatin (Lipitor) tab 10 mg, 10 mg, Oral, Nightly    cyanocobalamin (Vitamin B12) tab 1,000 mcg, 1,000 mcg, Oral, Daily    folic acid (Folvite) tab 1 mg, 1 mg, Oral, Daily    sertraline (Zoloft) tab 100 mg, 100 mg, Oral, Daily    metoprolol succinate ER (Toprol XL) 24 hr tab 50 mg, 50 mg, Oral, BID    pantoprazole (Protonix) 40 mg in sodium chloride 0.9% PF 10 mL IV push, 40 mg, Intravenous, Q12H    acetaminophen (Tylenol Extra Strength) tab 500 mg,  500 mg, Oral, Q4H PRN    ondansetron (Zofran) 4 MG/2ML injection 4 mg, 4 mg, Intravenous, Q6H PRN    metoclopramide (Reglan) 5 mg/mL injection 5 mg, 5 mg, Intravenous, Q8H PRN    bumetanide (Bumex) 0.25 MG/ML injection 1 mg, 1 mg, Intravenous, BID (Diuretic)    glucose (Dex4) 15 GM/59ML oral liquid 15 g, 15 g, Oral, Q15 Min PRN **OR** glucose (Glutose) 40% oral gel 15 g, 15 g, Oral, Q15 Min PRN **OR** glucose-vitamin C (Dex-4) chewable tab 4 tablet, 4 tablet, Oral, Q15 Min PRN **OR** dextrose 50% injection 50 mL, 50 mL, Intravenous, Q15 Min PRN **OR** glucose (Dex4) 15 GM/59ML oral liquid 30 g, 30 g, Oral, Q15 Min PRN **OR** glucose (Glutose) 40% oral gel 30 g, 30 g, Oral, Q15 Min PRN **OR** glucose-vitamin C (Dex-4) chewable tab 8 tablet, 8 tablet, Oral, Q15 Min PRN    insulin aspart (NovoLOG) 100 Units/mL FlexPen 1-5 Units, 1-5 Units, Subcutaneous, TID CC    Review of Systems:  All systems were reviewed and are negative except as described above in HPI.    Physical Exam:  Blood pressure 129/55, pulse 81, temperature 98 °F (36.7 °C), temperature source Oral, resp. rate 18, height 5' 6\" (1.676 m), weight 209 lb 12.8 oz (95.2 kg), SpO2 91%.  Temp (24hrs), Av.8 °F (36.6 °C), Min:97.1 °F (36.2 °C), Max:98.1 °F (36.7 °C)    Wt Readings from Last 3 Encounters:   24 209 lb 12.8 oz (95.2 kg)   24 211 lb 3.2 oz (95.8 kg)   24 211 lb (95.7 kg)     General: NAD.  HEENT: No focal deficits.  Neck: Positive JVD, carotids 2+ no bruits.  Cardiac: Regular rate and rhythm, S1, S2 normal, systolic murmur at aortic area, rub or gallop.  Lungs: Crackles at bases.  Normal excursions and effort.  Abdomen: Soft, non-tender.   Extremities: Edema LE BL.  Peripheral pulses are 2+.  Neurologic: AAO x 3. NO focal deficit.  Skin: Warm and dry.     Laboratories and Data:  Diagnostics:  EK24  Atrial fibrillation with a competing junctional pacemaker   Rightward axis   Low voltage QRS, consider pulmonary disease,  pericardial effusion, or normal variant   Incomplete right bundle branch block   Abnormal ECG     Confirmed by Paulo Armstrong ([id number]) on 6/26/2024 3:36:27 PM   Echo: 6/26/24  Conclusions:   1. Left ventricle: The cavity size was normal. Wall thickness was normal.      Systolic function was normal. The estimated ejection fraction was 60-65%,      by biplane method of disks. No diagnostic evidence for regional wall      motion abnormalities. Unable to assess LV diastolic function due to heart      rhythm.   2. Left atrium: The left atrial volume was markedly increased.   3. Right atrium: The atrium was markedly dilated.   4. Aortic valve: A bioprosthetic valve is present. There was mild      perivalvular regurgitation. The peak systolic velocity was 3.05m/sec. The      mean systolic gradient was 20mm Hg. The ratio of LVOT to aortic valve      peak velocity was 0.33.   5. Mitral valve: There was mild regurgitation.   6. Tricuspid valve: There was mild-moderate regurgitation.   7. Pulmonary arteries: Systolic pressure was moderately increased, estimated      to be 55mm Hg.   8. Pericardium, extracardiac: A small pericardial effusion was identified      posterior to the heart. There was a left pleural effusion.   Impressions:  This study is compared with previous dated 03/08/2023:     CXR: 6/25/24    CONCLUSION:   1. Congestive failure with small bilateral pleural effusions.  Associated hazy right greater than left basilar opacities, which are favored to reflect compressive atelectasis (coexistent infection should be excluded on the basis of clinical parameters).   2. Cardiomegaly with aortic valve repair.     Labs:   Lab Results   Component Value Date    WBC 5.3 06/26/2024    RBC 2.36 06/26/2024    HGB 7.5 06/26/2024    HCT 24.3 06/26/2024    .0 06/26/2024    MCH 31.8 06/26/2024    MCHC 30.9 06/26/2024    RDW 20.9 06/26/2024    .0 06/26/2024     Lab Results   Component Value Date    INR 1.92 (H) 06/25/2024     INR 1.0 10/03/2017    INR 1.1 04/25/2017       Assessment/Plan:  Patient Active Problem List   Diagnosis    Congestive heart failure ([facility])    Major depressive disorder, single episode, moderate ([facility])    Class 2 severe obesity due to excess calories with serious comorbidity and body mass index (BMI) of 39.0 to 39.9 in adult ([facility])    Peripheral vascular disease ([facility])    Sleep apnea    Vitamin D deficiency    Essential hypertension    Gastric erythema    Ventral hernia without obstruction or gangrene    Hiatal hernia with GERD and esophagitis    Gastroesophageal reflux disease without esophagitis    A-fib ([facility])    Polyp of colon    Mesenteric ischemia, chronic ([facility])    CKD (chronic kidney disease) stage 3, GFR 30-59 ml/min ([facility])    History of ischemic colitis    Osteoarthritis of right knee    Osteoarthritis    Raynaud's disease without gangrene    Osteoarthritis of left knee    Diverticulosis of colon    Prediabetes    CREST syndrome ([facility])    Type 2 diabetes mellitus with stage 3b chronic kidney disease, without long-term current use of insulin ([facility])    Macrocytic anemia    Hyperpigmented skin lesion    Anemia due to vitamin B12 deficiency    Type 2 diabetes mellitus with diabetic chronic kidney disease ([facility])    Acute on chronic congestive heart failure, unspecified heart failure type ([facility])    Anemia, unspecified    Iron deficiency anemia secondary to inadequate dietary iron intake    Dyspepsia    Intestinal metaplasia of gastric mucosa    Anemia, unspecified type    GI bleed     Assessment:    Anemia  Chronic A-fib  Acute on Chronic Diastolic HF  Mild to moderate prostatic aortic stenosis and mild perivalvular AI  S/p EMIR procedure for severe aortic stenosis  CKD stage III b  Remote hx of PE/DVT    Plan:    Echo reviewed; normal systolic function. EF 60-65%. Moderate prostatic stenosis.  Continue to hold anticoagulation.  Proceed with EGD tomorrow.  Patient is stable from cardiology standpoint to proceed.  X-ray  showed pulmonary congestion; continue Bumex 1 mg, IV, BID.  Consider outpatient watchman device.  Follow up with me 1 week after discharge.    CASSANDRA GREGORIO DO  6/26/2024  6:10 PM

## 2024-06-26 NOTE — DIETARY NOTE
NUTRITION EDUCATION NOTE     Received consult for heart failure diet education. Verbally reviewed basic low sodium diet restrictions. Provided with Low Sodium Nutrition Therapy NCM handout to reinforce. Receptive to instruction. Would benefit from outpt f/u. Expect fair compliance. RD contact information provided to pt.        Heather Ogden RD, LDN  Clinical Dietitian  P: [phone]

## 2024-06-26 NOTE — DISCHARGE PLANNING
Going Home Instructions  In this section you will find the tools which will guide you through the first few days after you leave the hospital. Continued use of these tools will help you develop the skills necessary to keep your heart failure under control.     Home Care Instructions Following Heart Failure - the most important things to do every day include:   Weigh yourself and review the “Self-Check Plan” sheet every morning.   Call your cardiologist office if you are in the “Pay Attention-Use Caution” (yellow zone) or “Medical Alert-Warning!” (red zone) as outlined in the Self-Check Plan sheet.  Take your medicines as prescribed.  Limit your sodium (salt) intake.  Know when to call your cardiologist, primary doctor, or nurse.  Know when to seek emergency care.      Things for You to Remember:   1. See your doctor or healthcare provider as written on your discharge instructions.  It is important that you attend this appointment to make sure your symptoms are under control.     2. Your recommended sodium intake is 4805-1482 mg daily.    3.  Weigh yourself every day.    4. Some exercise and activity is important to help keep your heart functioning and strong. Unless instructed not to exercise, you may walk at a slow to moderate pace for 10-15 minutes 2-3 days per week to start. Pace your activity to prevent shortness of breath or fatigue. Stop exercising if you develop chest pain, lightheadedness, or significant shortness of breath.       Call Your Cardiologist If:   You gain 2-3 pounds in one day or 5 pounds in one week.  You have more difficulty breathing.  You are getting more tired with normal activity.  You are more short of breath lying down, or awaken at night short of breath.  You have swelling of your feet or legs.  You urinate less often during the day and more often at night.  You have cramps in your legs.  You have blurred vision or see yellowish-green halos around objects of lights.    Go to the  Emergency Room If:   You have pain or tightness in your chest  You are extremely short of breath  You are coughing up pink-frothy mucus  You are traveling and develop symptoms of worsening heart failure      ** Please follow up with your cardiologist or Advanced Practice Provider as written on your discharge instructions. If you are not provided with an appointment, let your nurse know so you can get an appointment**

## 2024-06-26 NOTE — H&P (VIEW-ONLY)
Is this a shared or split note between Advanced Practice Provider and Physician? Yes      [facility]   Gastroenterology Consultation Note    Yamini Ocampo  Patient Status:    Inpatient  Date of Admission:         6/25/2024, Hospital day #1  Attending:   Elias Shipley MD  PCP:     Kathy Anne MD    Reason for Consultation:  Acute on Chronic Anemia    History of Present Illness:  Yamini Ocampo is a 79 year old female w/ PMHx of BMI 33.86, HTN, HLD, DM2, CKD3, Anemia of chronic disease, PUD, GERD, Ventricular diastolic dysfunction, HFpEF, Pulmonary artery HTN, CAD, Chronic Afib (on eliquis) who presents to the ED with acute on chronic diastolic HF and anemia.    Pt states over the past few weeks she has been noticing an increase in her GERD symptoms with worsening acid reflux, early satiety and bloating.  She denies ABD pain, N/V, dysphagia/odynophagia, constipation, diarrhea, black/bloody stools, fever, chills, chest pain and unintentional weight loss.  She does note some worsening SOB on admission but this has since resolved.  She denies NSAID use.    Pertinent Family Hx:  - No known history of esophageal, gastric or colon cancers  - No known IBD  - No known liver pathologies    Endoscopy Hx:  Last colonoscopy: 10/7/20 normal to TI. Path neg for microscopic colitis     - EGD 1/2024  Impression:  1. Non-erosive reflux disease.  2. Gastric erythema s/p biopsies (mapping) due to hx of gastric IM.    Final Diagnosis:      A. Gastric antrum lesser curvature; biopsy:  Fragments of gastric antral type mucosa with mild foveolar hyperplasia and mild chronic inactive gastritis.  Diff-Quik stain (appropriate control reactivity) shows no definitive evidence of H. pylori-like microorganisms.  No evidence of intestinal metaplasia, dysplasia or carcinoma identified.      B. Gastric antrum greater curvature; biopsy:  Fragments of gastric mucosa with focal mild chronic inactive gastritis.  Diff-Quik stain  (appropriate control reactivity) shows no definitive evidence of H. pylori-like microorganisms.  No evidence of intestinal metaplasia, dysplasia or carcinoma identified.      C. Gastric incisura; biopsy:  Fragments of gastric antral and oxyntic type mucosa with mild chronic inactive gastritis.  Diff-Quik stain (appropriate control reactivity) shows no definitive evidence of H. pylori-like microorganisms.  No evidence of intestinal metaplasia, dysplasia or carcinoma identified.       D. Gastric body lesser; biopsy:  Fragments of gastric oxyntic type mucosa with mild chronic inactive gastritis.  Diff-Quik stain (appropriate control reactivity) shows no definitive evidence of H. pylori-like microorganisms.  No evidence of intestinal metaplasia, dysplasia or carcinoma identified.       E. Gastric body greater; biopsy:  Fragments of gastric oxyntic type mucosa with focal mild chronic inactive gastritis.  Diff-Quik stain (appropriate control reactivity) shows no definitive evidence of H. pylori-like microorganisms.  No evidence of intestinal metaplasia, dysplasia or carcinoma identified.     Social Hx:  - No tobacco use  - No ETOH  - Denies cannabis/illicit drug use     History:  Past Medical History:    Arrhythmia    afib    Back problem    lumbar disc disease    Basal cell carcinoma of nose    Bilateral carpal tunnel syndrome    Cataract    Cellulitis    Deep vein thrombosis (HCC)    unsure which leg, possibly left    Depression    Esophageal reflux    Essential hypertension    Heart disease    Heel spur    Right    Hemorrhoids    Hiatal hernia    High blood pressure    High cholesterol    Hyperlipidemia    Incontinence    Lumbar disc disease    Multiple gastric ulcers    Osteoarthritis    Pulmonary embolism (HCC)    Raynaud disease    Renal disorder    Tubular adenoma of colon    repeat c-scope 1/2020    Type 2 diabetes mellitus with diabetic chronic kidney disease (HCC)    Visual impairment    glasses     Past  Surgical History:   Procedure Laterality Date    Angioplasty (coronary)      Appendectomy      Arthroscopy of joint unlisted Right     knee    Bso, omentectomy w/sushil      Carpal tunnel release Bilateral     Cholecystectomy  09/28/2016    Colonoscopy N/A 01/25/2017    Procedure: COLONOSCOPY;  Surgeon: KATHARINE Prakash MD;  Location: [facility] [place] ENDOSCOPY    Colonoscopy N/A 10/07/2020    Procedure: COLONOSCOPY;  Surgeon: KATHARINE Prakash MD;  Location: [facility] [place] ENDOSCOPY    Egd N/A 12/14/2016    Procedure: ESOPHAGOGASTRODUODENOSCOPY (EGD);  Surgeon: KATHARINE Prakash MD;  Location: [facility] [place] ENDOSCOPY    Egd N/A 01/23/2024    ; gastritis    Electrocardiogram, complete  09/26/2013    Scanned to Media Tab    Excision of nose      Basal cell carcinoma    Hernia surgery      Hysterectomy      Knee replacement surgery Right     Other surgical history      Injections and narcotics, POD Bartuci    Removal of heel spur      Total abdom hysterectomy       Family History   Problem Relation Age of Onset    Cancer Father         Skin cancer    Other (Other) Father 97        old age,unknown caused    Heart Attack Mother     Heart Disorder Mother     Hypertension Other         Family H/O    Cancer Other         Lymphoma  Family H/O    Heart Disease Other         Family H/O    Arthritis Other         Close relative  unsure which type    No Known Problems Brother       reports that she quit smoking about 27 years ago. Her smoking use included cigarettes. She has never used smokeless tobacco. She reports that she does not drink alcohol and does not use drugs.    Allergies:  Allergies   Allergen Reactions    Adhesive Tape RASH     Skin off       Medications:    Current Facility-Administered Medications:     ipratropium-albuterol (Duoneb) 0.5-2.5 (3) MG/3ML inhalation solution 3 mL, 3 mL, Nebulization, Q4H PRN    atorvastatin (Lipitor) tab 10 mg, 10 mg, Oral, Nightly    cyanocobalamin (Vitamin B12) tab 1,000 mcg, 1,000 mcg, Oral, Daily    folic  acid (Folvite) tab 1 mg, 1 mg, Oral, Daily    sertraline (Zoloft) tab 100 mg, 100 mg, Oral, Daily    metoprolol succinate ER (Toprol XL) 24 hr tab 50 mg, 50 mg, Oral, BID    pantoprazole (Protonix) 40 mg in sodium chloride 0.9% PF 10 mL IV push, 40 mg, Intravenous, Q12H    acetaminophen (Tylenol Extra Strength) tab 500 mg, 500 mg, Oral, Q4H PRN    ondansetron (Zofran) 4 MG/2ML injection 4 mg, 4 mg, Intravenous, Q6H PRN    metoclopramide (Reglan) 5 mg/mL injection 5 mg, 5 mg, Intravenous, Q8H PRN    bumetanide (Bumex) 0.25 MG/ML injection 1 mg, 1 mg, Intravenous, BID (Diuretic)    glucose (Dex4) 15 GM/59ML oral liquid 15 g, 15 g, Oral, Q15 Min PRN **OR** glucose (Glutose) 40% oral gel 15 g, 15 g, Oral, Q15 Min PRN **OR** glucose-vitamin C (Dex-4) chewable tab 4 tablet, 4 tablet, Oral, Q15 Min PRN **OR** dextrose 50% injection 50 mL, 50 mL, Intravenous, Q15 Min PRN **OR** glucose (Dex4) 15 GM/59ML oral liquid 30 g, 30 g, Oral, Q15 Min PRN **OR** glucose (Glutose) 40% oral gel 30 g, 30 g, Oral, Q15 Min PRN **OR** glucose-vitamin C (Dex-4) chewable tab 8 tablet, 8 tablet, Oral, Q15 Min PRN    insulin aspart (NovoLOG) 100 Units/mL FlexPen 1-5 Units, 1-5 Units, Subcutaneous, TID CC    Review of Systems:   CONSTITUTIONAL:  negative for fevers, chills, unintentional weight loss   EYES:  negative for diplopia or change in vision   RESPIRATORY:  negative for severe shortness of breath  CARDIOVASCULAR:  negative for crushing sub-sternal chest pain  GASTROINTESTINAL:  see HPI  GENITOURINARY:  negative for dysuria or gross hematuria  INTEGUMENT/BREAST:  SKIN:  negative for jaundice or new rash   ALLERGIC/IMMUNOLOGIC:  negative for hay fever   ENDOCRINE:  negative for cold intolerance and heat intolerance  MUSCULOSKELETAL:  negative for joint effusion/severe erythema  NEURO: negative for new loss of consciousness or dizziness   BEHAVIOR/PSYCH:  negative for psychotic behavior    Physical Exam:    Blood pressure 132/61, pulse 85,  temperature 98.1 °F (36.7 °C), temperature source Oral, resp. rate 18, height 5' 6\" (1.676 m), weight 209 lb 12.8 oz (95.2 kg), SpO2 90%. Body mass index is 33.86 kg/m².    Gen: awake, alert patient, NAD  HEENT: EOMI, the sclera appears anicteric, oropharynx clear, mucus membranes appear moist  CV: RRR  Lung: no conversational dyspnea   Abdomen: soft NTND abdomen with NABS appreciated   Back: No CVA tenderness   Skin: dry, warm, no jaundice  Ext: +2 pitting BLE edema is evident  Neuro: Alert, oriented x4 and interactive  Psych: calm, cooperative    Laboratory Data:  Lab Results   Component Value Date    WBC 5.3 06/26/2024    HGB 7.5 06/26/2024    HCT 24.3 06/26/2024    .0 06/26/2024    CREATSERUM 1.71 06/26/2024    BUN 35 06/26/2024     06/26/2024    K 4.3 06/26/2024     06/26/2024    CO2 26.0 06/26/2024    GLU 90 06/26/2024    CA 8.8 06/26/2024    INR 1.92 06/25/2024       Imaging:  XR CHEST AP PORTABLE  (CPT=71045)    Result Date: 6/25/2024  CONCLUSION:  1. Congestive failure with small bilateral pleural effusions.  Associated hazy right greater than left basilar opacities, which are favored to reflect compressive atelectasis (coexistent infection should be excluded on the basis of clinical parameters). 2. Cardiomegaly with aortic valve repair.   elm-remote  Dictated by (CST): Jorge Bartlett MD on 6/25/2024 at 6:50 PM     Finalized by (CST): Jorge Bartlett MD on 6/25/2024 at 6:53 PM           Assessment & Plan   Yamini Ocampo is a 79 year old female w/ PMHx of BMI 33.86, HTN, HLD, DM2, CKD3, Anemia of chronic disease, PUD, GERD, Ventricular diastolic dysfunction, HFpEF, Pulmonary artery HTN, CAD, Chronic Afib (on eliquis) who presents to the ED with acute on chronic diastolic HF and anemia.    #Acute on Chronic Anemia  #CHF exacerbation  -Labs on admission significant for Hgb 6.3 from baseline 8-10, s/p 1 unit PRBCs with adequate response, Hgb 7.5 today.  .  Iron 29, BUN 35, BNP 1013,  INR 1.92.  -CXR demonstrated congestive heart failure with small bilateral pleural effusions  -Pt started on Bumex IV with improvement in edema and SOB  -No overt GIB though Pt reports exacerbation in chronic dyspepsia symptoms since her medication regimen was disrupted by her pharmacy  -EGD 1/2024 with nonerosive reflux disease and gastric erythema, Bx negative.  Hx of gastric ulcers in the past.  -Hemoccult negative in ED  -Etiology possible gastritis, esophagitis, PUD.    -Recommend repeat upper endoscopy to further evaluate.  Risks/benefits of procedure discussed.  Pt states understanding and is willing to proceed.  -Will need cardiac clearance prior to endoscopic sedation as she is currently admitted for CHF exacerbation.    EGD consent: I have discussed the risks, benefits, and alternatives to upper endoscopy/enteroscopy with the patient/primary decision maker [who demonstrated understanding], including but not limited to the risks of bleeding, infection, pain, death, as well as the risks of anesthesia and perforation all leading to prolonged hospitalization, surgical intervention, or even death. I also specifically mentioned the miss rate of upper endoscopy of 5-10% in the best of all circumstances.  The patient has agreed to sign an informed consent and elected to proceed with procedure with possible intervention [i.e. polypectomy, stent placement, etc.] as indicated.     Recommend:  -Low fiber/soft cardiac today, NPO at 0000  -EGD tomorrow  -Empiric PPI BID  -Monitor for overt GIB  -Trend Hgb, transfuse to goal >7  -Cardiac clearance prior to endoscopic sedation  -Hold eliquis if able    Thank you for the opportunity to participate in the care of this patient.    Case discussed with Alyssa Orellana MD and Den VERDIN.    Jocelyn Taylor DNP, [facility]  6/26/2024

## 2024-06-26 NOTE — PLAN OF CARE
Plan for EGD 6/27, continue IV bumex and IV protonix. Monitor hgb and Cr.        Problem: Patient Centered Care  Goal: Patient preferences are identified and integrated in the patient's plan of care  Description: Interventions:  - What would you like us to know as we care for you? Home with dtr and granddaughter  - Provide timely, complete, and accurate information to patient/family  - Incorporate patient and family knowledge, values, beliefs, and cultural backgrounds into the planning and delivery of care  - Encourage patient/family to participate in care and decision-making at the level they choose  - Honor patient and family perspectives and choices  Outcome: Progressing     Problem: CARDIOVASCULAR - ADULT  Goal: Maintains optimal cardiac output and hemodynamic stability  Description: INTERVENTIONS:  - Monitor vital signs, rhythm, and trends  - Monitor for bleeding, hypotension and signs of decreased cardiac output  - Evaluate effectiveness of vasoactive medications to optimize hemodynamic stability  - Monitor arterial and/or venous puncture sites for bleeding and/or hematoma  - Assess quality of pulses, skin color and temperature  - Assess for signs of decreased coronary artery perfusion - ex. Angina  - Evaluate fluid balance, assess for edema, trend weights  Outcome: Progressing  Goal: Absence of cardiac arrhythmias or at baseline  Description: INTERVENTIONS:  - Continuous cardiac monitoring, monitor vital signs, obtain 12 lead EKG if indicated  - Evaluate effectiveness of antiarrhythmic and heart rate control medications as ordered  - Initiate emergency measures for life threatening arrhythmias  - Monitor electrolytes and administer replacement therapy as ordered  Outcome: Progressing     Problem: RESPIRATORY - ADULT  Goal: Achieves optimal ventilation and oxygenation  Description: INTERVENTIONS:  - Assess for changes in respiratory status  - Assess for changes in mentation and behavior  - Position to  facilitate oxygenation and minimize respiratory effort  - Oxygen supplementation based on oxygen saturation or ABGs  - Provide Smoking Cessation handout, if applicable  - Encourage broncho-pulmonary hygiene including cough, deep breathe, Incentive Spirometry  - Assess the need for suctioning and perform as needed  - Assess and instruct to report SOB or any respiratory difficulty  - Respiratory Therapy support as indicated  - Manage/alleviate anxiety  - Monitor for signs/symptoms of CO2 retention  Outcome: Progressing     Problem: GASTROINTESTINAL - ADULT  Goal: Minimal or absence of nausea and vomiting  Description: INTERVENTIONS:  - Maintain adequate hydration with IV or PO as ordered and tolerated  - Nasogastric tube to low intermittent suction as ordered  - Evaluate effectiveness of ordered antiemetic medications  - Provide nonpharmacologic comfort measures as appropriate  - Advance diet as tolerated, if ordered  - Obtain nutritional consult as needed  - Evaluate fluid balance  Outcome: Progressing     Problem: HEMATOLOGIC - ADULT  Goal: Maintains hematologic stability  Description: INTERVENTIONS  - Assess for signs and symptoms of bleeding or hemorrhage  - Monitor labs and vital signs for trends  - Administer supportive blood products/factors, fluids and medications as ordered and appropriate  - Administer supportive blood products/factors as ordered and appropriate  Outcome: Progressing  Goal: Free from bleeding injury  Description: (Example usage: patient with low platelets)  INTERVENTIONS:  - Avoid intramuscular injections, enemas and rectal medication administration  - Ensure safe mobilization of patient  - Hold pressure on venipuncture sites to achieve adequate hemostasis  - Assess for signs and symptoms of internal bleeding  - Monitor lab trends  - Patient is to report abnormal signs of bleeding to staff  - Avoid use of toothpicks and dental floss  - Use electric shaver for shaving  - Use soft bristle  tooth brush  - Limit straining and forceful nose blowing  Outcome: Progressing

## 2024-06-27 ENCOUNTER — ANESTHESIA (OUTPATIENT)
Dept: ENDOSCOPY | Facility: HOSPITAL | Age: 79
DRG: 291 | End: 2024-06-27
Payer: MEDICARE

## 2024-06-27 ENCOUNTER — ANESTHESIA (OUTPATIENT)
Dept: PICC SERVICES | Facility: HOSPITAL | Age: 79
DRG: 291 | End: 2024-06-27
Payer: MEDICARE

## 2024-06-27 ENCOUNTER — ANESTHESIA EVENT (OUTPATIENT)
Dept: ENDOSCOPY | Facility: HOSPITAL | Age: 79
DRG: 291 | End: 2024-06-27
Payer: MEDICARE

## 2024-06-27 ENCOUNTER — APPOINTMENT (OUTPATIENT)
Dept: PICC SERVICES | Facility: HOSPITAL | Age: 79
DRG: 291 | End: 2024-06-27
Attending: ANESTHESIOLOGY
Payer: MEDICARE

## 2024-06-27 LAB
ANION GAP SERPL CALC-SCNC: 6 MMOL/L (ref 0–18)
BASOPHILS # BLD AUTO: 0.04 X10(3) UL (ref 0–0.2)
BASOPHILS NFR BLD AUTO: 0.8 %
BUN BLD-MCNC: 31 MG/DL (ref 9–23)
BUN/CREAT SERPL: 19.3 (ref 10–20)
CALCIUM BLD-MCNC: 8.8 MG/DL (ref 8.7–10.4)
CHLORIDE SERPL-SCNC: 108 MMOL/L (ref 98–112)
CO2 SERPL-SCNC: 28 MMOL/L (ref 21–32)
CREAT BLD-MCNC: 1.61 MG/DL
DEPRECATED RDW RBC AUTO: 74.6 FL (ref 35.1–46.3)
EGFRCR SERPLBLD CKD-EPI 2021: 32 ML/MIN/1.73M2 (ref 60–?)
EOSINOPHIL # BLD AUTO: 0.1 X10(3) UL (ref 0–0.7)
EOSINOPHIL NFR BLD AUTO: 2 %
ERYTHROCYTE [DISTWIDTH] IN BLOOD BY AUTOMATED COUNT: 20.5 % (ref 11–15)
GLUCOSE BLD-MCNC: 96 MG/DL (ref 70–99)
GLUCOSE BLDC GLUCOMTR-MCNC: 103 MG/DL (ref 70–99)
GLUCOSE BLDC GLUCOMTR-MCNC: 106 MG/DL (ref 70–99)
GLUCOSE BLDC GLUCOMTR-MCNC: 107 MG/DL (ref 70–99)
GLUCOSE BLDC GLUCOMTR-MCNC: 108 MG/DL (ref 70–99)
GLUCOSE BLDC GLUCOMTR-MCNC: 121 MG/DL (ref 70–99)
GLUCOSE BLDC GLUCOMTR-MCNC: 49 MG/DL (ref 70–99)
GLUCOSE BLDC GLUCOMTR-MCNC: 58 MG/DL (ref 70–99)
GLUCOSE BLDC GLUCOMTR-MCNC: 83 MG/DL (ref 70–99)
GLUCOSE BLDC GLUCOMTR-MCNC: 91 MG/DL (ref 70–99)
HCT VFR BLD AUTO: 27.5 %
HGB BLD-MCNC: 7.9 G/DL
IMM GRANULOCYTES # BLD AUTO: 0.01 X10(3) UL (ref 0–1)
IMM GRANULOCYTES NFR BLD: 0.2 %
LYMPHOCYTES # BLD AUTO: 0.62 X10(3) UL (ref 1–4)
LYMPHOCYTES NFR BLD AUTO: 12.5 %
MCH RBC QN AUTO: 30 PG (ref 26–34)
MCHC RBC AUTO-ENTMCNC: 28.7 G/DL (ref 31–37)
MCV RBC AUTO: 104.6 FL
MONOCYTES # BLD AUTO: 0.35 X10(3) UL (ref 0.1–1)
MONOCYTES NFR BLD AUTO: 7.1 %
NEUTROPHILS # BLD AUTO: 3.83 X10 (3) UL (ref 1.5–7.7)
NEUTROPHILS # BLD AUTO: 3.83 X10(3) UL (ref 1.5–7.7)
NEUTROPHILS NFR BLD AUTO: 77.4 %
OSMOLALITY SERPL CALC.SUM OF ELEC: 300 MOSM/KG (ref 275–295)
PLATELET # BLD AUTO: 297 10(3)UL (ref 150–450)
POTASSIUM SERPL-SCNC: 3.9 MMOL/L (ref 3.5–5.1)
RBC # BLD AUTO: 2.63 X10(6)UL
SODIUM SERPL-SCNC: 142 MMOL/L (ref 136–145)
WBC # BLD AUTO: 5 X10(3) UL (ref 4–11)

## 2024-06-27 PROCEDURE — 0DB68ZX EXCISION OF STOMACH, VIA NATURAL OR ARTIFICIAL OPENING ENDOSCOPIC, DIAGNOSTIC: ICD-10-PCS | Performed by: INTERNAL MEDICINE

## 2024-06-27 PROCEDURE — 0DB78ZX EXCISION OF STOMACH, PYLORUS, VIA NATURAL OR ARTIFICIAL OPENING ENDOSCOPIC, DIAGNOSTIC: ICD-10-PCS | Performed by: INTERNAL MEDICINE

## 2024-06-27 PROCEDURE — 43239 EGD BIOPSY SINGLE/MULTIPLE: CPT | Performed by: INTERNAL MEDICINE

## 2024-06-27 PROCEDURE — 99233 SBSQ HOSP IP/OBS HIGH 50: CPT | Performed by: HOSPITALIST

## 2024-06-27 RX ORDER — SODIUM CHLORIDE, SODIUM LACTATE, POTASSIUM CHLORIDE, CALCIUM CHLORIDE 600; 310; 30; 20 MG/100ML; MG/100ML; MG/100ML; MG/100ML
INJECTION, SOLUTION INTRAVENOUS CONTINUOUS PRN
Status: DISCONTINUED | OUTPATIENT
Start: 2024-06-27 | End: 2024-06-27 | Stop reason: SURG

## 2024-06-27 RX ORDER — SODIUM CHLORIDE, SODIUM LACTATE, POTASSIUM CHLORIDE, CALCIUM CHLORIDE 600; 310; 30; 20 MG/100ML; MG/100ML; MG/100ML; MG/100ML
INJECTION, SOLUTION INTRAVENOUS CONTINUOUS PRN
Status: DISCONTINUED | OUTPATIENT
Start: 2024-06-27 | End: 2024-06-27

## 2024-06-27 RX ADMIN — SODIUM CHLORIDE, SODIUM LACTATE, POTASSIUM CHLORIDE, CALCIUM CHLORIDE: 600; 310; 30; 20 INJECTION, SOLUTION INTRAVENOUS at 14:10:00

## 2024-06-27 RX ADMIN — SODIUM CHLORIDE, SODIUM LACTATE, POTASSIUM CHLORIDE, CALCIUM CHLORIDE: 600; 310; 30; 20 INJECTION, SOLUTION INTRAVENOUS at 14:28:00

## 2024-06-27 RX ADMIN — SODIUM CHLORIDE, SODIUM LACTATE, POTASSIUM CHLORIDE, CALCIUM CHLORIDE: 600; 310; 30; 20 INJECTION, SOLUTION INTRAVENOUS at 11:27:00

## 2024-06-27 NOTE — ANESTHESIA PREPROCEDURE EVALUATION
Anesthesia PreOp Note    HPI:     Yamini Ocampo is a 79 year old female who presents for preoperative consultation requested by: Alyssa Orellana MD    Date of Surgery: 6/25/2024 - 6/27/2024    Procedure(s):  ESOPHAGOGASTRODUODENOSCOPY (EGD)  Indication: Chronica anemia    Relevant Problems   No relevant active problems       NPO:  Last Liquid Consumption Date: 06/26/24  Last Liquid Consumption Time: 2212  Last Solid Consumption Date: 06/26/24  Last Solid Consumption Time: 2210  Last Liquid Consumption Date: 06/26/24          History Review:  Patient Active Problem List    Diagnosis Date Noted    Anemia, unspecified type 06/25/2024    GI bleed 06/25/2024    Dyspepsia 01/23/2024    Intestinal metaplasia of gastric mucosa 01/23/2024    Anemia, unspecified 01/05/2024    Iron deficiency anemia secondary to inadequate dietary iron intake 01/05/2024    Acute on chronic congestive heart failure, unspecified heart failure type ([facility]) 03/07/2023    Type 2 diabetes mellitus with diabetic chronic kidney disease ([facility]) 11/30/2022    Anemia due to vitamin B12 deficiency 08/01/2022    Macrocytic anemia 04/26/2022    Hyperpigmented skin lesion 04/26/2022    CREST syndrome ([facility]) 04/12/2022    Type 2 diabetes mellitus with stage 3b chronic kidney disease, without long-term current use of insulin ([facility]) 04/12/2022    Prediabetes 06/30/2021    Diverticulosis of colon     Osteoarthritis of left knee 01/31/2019    Raynaud's disease without gangrene 03/02/2018    Osteoarthritis of right knee 10/12/2017    Osteoarthritis 10/12/2017    History of ischemic colitis 09/28/2017    CKD (chronic kidney disease) stage 3, GFR 30-59 ml/min ([facility]) 06/26/2017    Mesenteric ischemia, chronic ([facility]) 04/26/2017    A-fib ([facility])     Polyp of colon     Gastroesophageal reflux disease without esophagitis     Hiatal hernia with GERD and esophagitis 09/01/2016    Ventral hernia without obstruction or gangrene 05/19/2016    Gastric erythema 04/28/2016    Essential  hypertension 08/07/2015    Sleep apnea 10/01/2013    Congestive heart failure (HCC) 08/27/2013    Peripheral vascular disease (HCC) 08/27/2013    Major depressive disorder, single episode, moderate (HCC) 06/20/2011    Vitamin D deficiency 06/20/2011    Class 2 severe obesity due to excess calories with serious comorbidity and body mass index (BMI) of 39.0 to 39.9 in adult (HCC) 03/05/2009       Past Medical History:    Arrhythmia    afib    Back problem    lumbar disc disease    Basal cell carcinoma of nose    Bilateral carpal tunnel syndrome    Cataract    Cellulitis    Deep vein thrombosis (HCC)    unsure which leg, possibly left    Depression    Esophageal reflux    Essential hypertension    Heart disease    Heel spur    Right    Hemorrhoids    Hiatal hernia    High blood pressure    High cholesterol    Hyperlipidemia    Incontinence    Lumbar disc disease    Multiple gastric ulcers    Osteoarthritis    Pulmonary embolism (HCC)    Raynaud disease    Renal disorder    Tubular adenoma of colon    repeat c-scope 1/2020    Type 2 diabetes mellitus with diabetic chronic kidney disease (HCC)    Visual impairment    glasses       Past Surgical History:   Procedure Laterality Date    Angioplasty (coronary)      Appendectomy      Arthroscopy of joint unlisted Right     knee    Bso, omentectomy w/sushil      Carpal tunnel release Bilateral     Cholecystectomy  09/28/2016    Colonoscopy N/A 01/25/2017    Procedure: COLONOSCOPY;  Surgeon: KATHARINE Prakash MD;  Location: [facility] ENDOSCOPY    Colonoscopy N/A 10/07/2020    Procedure: COLONOSCOPY;  Surgeon: KATHARINE Prakash MD;  Location: [facility] ENDOSCOPY    Egd N/A 12/14/2016    Procedure: ESOPHAGOGASTRODUODENOSCOPY (EGD);  Surgeon: KATHARINE Prakash MD;  Location: [facility] ENDOSCOPY    Egd N/A 01/23/2024    ; gastritis    Electrocardiogram, complete  09/26/2013    Scanned to Media Tab    Excision of nose      Basal cell carcinoma    Hernia surgery      Hysterectomy      Knee  replacement surgery Right     Other surgical history      Injections and narcotics, POD Bartuci    Removal of heel spur      Total abdom hysterectomy         Facility-Administered Medications Prior to Admission   Medication Dose Route Frequency Provider Last Rate Last Admin    darbepoetin molina (Aranesp) 200 MCG/0.4ML injection 200 mcg  200 mcg Subcutaneous Q4 Week Manan Pizarro MD   200 mcg at 24 1016     Medications Prior to Admission   Medication Sig Dispense Refill Last Dose    pantoprazole 40 MG Oral Tab EC Take 1 tablet (40 mg total) by mouth 2 (two) times daily. 60 tablet 2 Past Week    torsemide 20 MG Oral Tab 2 pills each morning for edema 60 tablet 2 2024    sertraline 100 MG Oral Tab Take 1 tablet (100 mg total) by mouth daily. 90 tablet 3 2024    atorvastatin 10 MG Oral Tab Take 1 tablet (10 mg total) by mouth nightly. 90 tablet 3 2024    FARXIGA 10 MG Oral Tab Take 1 tablet (10 mg total) by mouth daily.   2024    albuterol 108 (90 Base) MCG/ACT Inhalation Aero Soln SHAKE BEFORE USE AND TAKE 2 PUFFS INTO THE LUNGS EVERY 6 HOURS AS NEEDED       folic acid 1 MG Oral Tab Take 1 tablet (1 mg total) by mouth daily. 90 tablet 3 2024    METOPROLOL SUCCINATE ER 50 MG Oral Tablet 24 Hr Take 1 tablet (50 mg total) by mouth in the morning and 1 tablet (50 mg total) before bedtime.   2024    cyanocobalamin 1000 MCG Oral Tab Take 1 tablet (1,000 mcg total) by mouth daily.   2024    ELIQUIS 5 MG Oral Tab Take 1 tablet (5 mg total) by mouth 2 (two) times daily. 180 tablet 0 2024    [] amLODIPine 5 MG Oral Tab Take 1 tablet (5 mg total) by mouth daily. 30 tablet 0      Current Facility-Administered Medications Ordered in Epic   Medication Dose Route Frequency Provider Last Rate Last Admin    ipratropium-albuterol (Duoneb) 0.5-2.5 (3) MG/3ML inhalation solution 3 mL  3 mL Nebulization Q4H PRN Elias Shipley MD        atorvastatin (Lipitor) tab 10 mg  10 mg Oral Nightly  Elias Shipley MD   10 mg at 06/26/24 2047    cyanocobalamin (Vitamin B12) tab 1,000 mcg  1,000 mcg Oral Daily Elias Shipley MD   1,000 mcg at 06/26/24 1100    folic acid (Folvite) tab 1 mg  1 mg Oral Daily Elias Shipley MD   1 mg at 06/26/24 1100    sertraline (Zoloft) tab 100 mg  100 mg Oral Daily Elias Shipley MD   100 mg at 06/26/24 1100    metoprolol succinate ER (Toprol XL) 24 hr tab 50 mg  50 mg Oral BID Elias Shipley MD   50 mg at 06/26/24 2047    pantoprazole (Protonix) 40 mg in sodium chloride 0.9% PF 10 mL IV push  40 mg Intravenous Q12H Elias Shipley MD   40 mg at 06/27/24 1048    acetaminophen (Tylenol Extra Strength) tab 500 mg  500 mg Oral Q4H PRN Hillary Fitzgerald MD        ondansetron (Zofran) 4 MG/2ML injection 4 mg  4 mg Intravenous Q6H PRN Hillary Fitzgerald MD        metoclopramide (Reglan) 5 mg/mL injection 5 mg  5 mg Intravenous Q8H PRN Hillary Fitzgerald MD        bumetanide (Bumex) 0.25 MG/ML injection 1 mg  1 mg Intravenous BID (Diuretic) Hillary Fitzgerald MD   1 mg at 06/26/24 1726    glucose (Dex4) 15 GM/59ML oral liquid 15 g  15 g Oral Q15 Min PRN Hillary Fitzgerald MD   15 g at 06/25/24 2117    Or    glucose (Glutose) 40% oral gel 15 g  15 g Oral Q15 Min PRN Hillary Fitzgerald MD        Or    glucose-vitamin C (Dex-4) chewable tab 4 tablet  4 tablet Oral Q15 Min PRN Hillary Fitzgerald MD        Or    dextrose 50% injection 50 mL  50 mL Intravenous Q15 Min PRN Hillary Fitzgerald MD        Or    glucose (Dex4) 15 GM/59ML oral liquid 30 g  30 g Oral Q15 Min PRN Hillary Fitzgerald MD        Or    glucose (Glutose) 40% oral gel 30 g  30 g Oral Q15 Min PRN Hillary Fitzgerald MD        Or    glucose-vitamin C (Dex-4) chewable tab 8 tablet  8 tablet Oral Q15 Min PRN Hillary Fitzgerald MD        insulin aspart (NovoLOG) 100 Units/mL FlexPen 1-5 Units  1-5 Units Subcutaneous TID CC Hillary Fitzgerald MD         No current Epic-ordered outpatient medications on file.       Allergies   Allergen Reactions    Adhesive Tape RASH     Skin off        Family History   Problem Relation Age of Onset    Cancer Father         Skin cancer    Other (Other) Father 97        old age,unknown caused    Heart Attack Mother     Heart Disorder Mother     Hypertension Other         Family H/O    Cancer Other         Lymphoma  Family H/O    Heart Disease Other         Family H/O    Arthritis Other         Close relative  unsure which type    No Known Problems Brother      Social History     Socioeconomic History    Marital status:    Tobacco Use    Smoking status: Former     Current packs/day: 0.00     Types: Cigarettes     Quit date: 1997     Years since quittin.5    Smokeless tobacco: Never   Vaping Use    Vaping status: Never Used   Substance and Sexual Activity    Alcohol use: No     Comment: rare    Drug use: Never     Comment: edibles occasionally for sleep   Other Topics Concern    Caffeine Concern Yes     Comment: 8 cups coffee/sod daily    Reaction to local anesthetic No       Available pre-op labs reviewed.  Lab Results   Component Value Date    WBC 5.0 2024    RBC 2.63 (L) 2024    HGB 7.9 (L) 2024    HCT 27.5 (L) 2024    .6 (H) 2024    MCH 30.0 2024    MCHC 28.7 (L) 2024    RDW 20.5 (H) 2024    .0 2024     Lab Results   Component Value Date     2024    K 3.9 2024     2024    CO2 28.0 2024    BUN 31 (H) 2024    CREATSERUM 1.61 (H) 2024    GLU 96 2024    PGLU 103 (H) 2024    CA 8.8 2024     Lab Results   Component Value Date    INR 1.92 (H) 2024       Vital Signs:  Body mass index is 33.25 kg/m².   height is 1.676 m (5' 6\") and weight is 93.4 kg (206 lb). Her oral temperature is 98 °F (36.7 °C). Her blood pressure is 146/54 and her pulse is 81. Her respiration is 18 and oxygen saturation is 92%.   Vitals:    24 0512 24 0955 24 1003 24 1110   BP:  147/69  146/54   Pulse:  81 76 81    Resp:  18  18   Temp:  98 °F (36.7 °C)     TempSrc:  Oral     SpO2:  98% 93% 92%   Weight: 93.4 kg (206 lb)      Height:            Anesthesia Evaluation     Patient summary reviewed and Nursing notes reviewed    Airway   Mallampati: II  TM distance: >3 FB  Dental          Pulmonary - normal exam   (+) sleep apnea    ROS comment: History of PE  Cardiovascular   Exercise tolerance: good  (+) hypertension well controlled, dysrhythmias, CHF  (-) past MI, CAD    NYHA Classification: II  ECG reviewed  Rhythm: irregular  Rate: normal  ROS comment:   Assessment/Plan:  Patient Active Problem List  Diagnosis  · Congestive heart failure (HCC)  · Major depressive disorder, single episode, moderate ([facility])  · Class 2 severe obesity due to excess calories with serious comorbidity and body mass index (BMI) of 39.0 to 39.9 in adult ([facility])  · Peripheral vascular disease ([facility])  · Sleep apnea  · Vitamin D deficiency  · Essential hypertension  · Gastric erythema  · Ventral hernia without obstruction or gangrene  · Hiatal hernia with GERD and esophagitis  · Gastroesophageal reflux disease without esophagitis  · A-fib ([facility])  · Polyp of colon  · Mesenteric ischemia, chronic ([facility])  · CKD (chronic kidney disease) stage 3, GFR 30-59 ml/min ([facility])  · History of ischemic colitis  · Osteoarthritis of right knee  · Osteoarthritis  · Raynaud's disease without gangrene  · Osteoarthritis of left knee  · Diverticulosis of colon  · Prediabetes  · CREST syndrome ([facility])  · Type 2 diabetes mellitus with stage 3b chronic kidney disease, without long-term current use of insulin ([facility])  · Macrocytic anemia  · Hyperpigmented skin lesion  · Anemia due to vitamin B12 deficiency  · Type 2 diabetes mellitus with diabetic chronic kidney disease ([facility])  · Acute on chronic congestive heart failure, unspecified heart failure type ([facility])  · Anemia, unspecified  · Iron deficiency anemia secondary to inadequate dietary iron intake  · Dyspepsia  · Intestinal metaplasia of  gastric mucosa  · Anemia, unspecified type  · GI bleed     Assessment:     Anemia  Chronic A-fib  Acute on Chronic Diastolic HF  Mild to moderate prostatic aortic stenosis and mild perivalvular AI  S/p EMIR procedure for severe aortic stenosis  CKD stage III b  Remote hx of PE/DVT     Plan:     Echo reviewed; normal systolic function. EF 60-65%. Moderate prostatic stenosis.  Continue to hold anticoagulation.  Proceed with EGD tomorrow.  Patient is stable from cardiology standpoint to proceed.  X-ray showed pulmonary congestion; continue Bumex 1 mg, IV, BID.  Consider outpatient watchman device.  Follow up with me 1 week after discharge.     CASSANDRA GREGORIO DO  6/26/2024  6:10 PM       Neuro/Psych    (+)  neuromuscular disease,  depression      GI/Hepatic/Renal    (+) GERD, chronic renal disease CRI    Endo/Other    (+) diabetes mellitus    Comments: ASSESSMENT:    1.       Acute on chronic diastolic heart failure.  2.       Above condition complicated by acute on chronic anemia.  No evidence of active gastrointestinal bleed but patient does have history of gastroesophageal reflux disease and possible upper gastrointestinal bleed.  3.       Essential hypertension, though hypovolemic today.  4.       Hypoxemia.  5.       Chronic atrial fibrillation.  6.       Diabetes mellitus type 2.  7.       Chronic kidney disease stage 3.    Abdominal  - normal exam                 Anesthesia Plan:   ASA:  4  Plan:   MAC  Informed Consent Plan and Risks Discussed With:  Patient  Discussed plan with:  Attending and surgeon      I have informed Yamini Ocampo and/or legal guardian or family member of the nature of the anesthetic plan, benefits, risks including possible dental damage if relevant, major complications, and any alternative forms of anesthetic management.   All of the patient's questions were answered to the best of my ability. The patient desires the anesthetic management as planned.  ERICKSON BENTON MD  6/27/2024 11:30  AM  Present on Admission:  **None**

## 2024-06-27 NOTE — ANESTHESIA POSTPROCEDURE EVALUATION
Patient: Yamini Ocampo    Procedure Summary       Date: 06/27/24 Room / Location: [facility] ENDOSCOPY 05 / [facility] ENDOSCOPY    Anesthesia Start: 1152 Anesthesia Stop:     Procedure: ESOPHAGOGASTRODUODENOSCOPY (EGD) Diagnosis: (Chronica anemia)    Surgeons: Alyssa Orellana MD Anesthesiologist: Eun Baez MD    Anesthesia Type: MAC ASA Status: 4            Anesthesia Type: MAC    Vitals Value Taken Time   /60 06/27/24 1345   Temp  06/27/24 1359   Pulse 78 06/27/24 1358   Resp 21 06/27/24 1358   SpO2 93 % 06/27/24 1358   Vitals shown include unfiled device data.    [facility] AN Post Evaluation:   Patient Evaluated in PACU  Patient Participation: complete - patient participated  Level of Consciousness: awake and awake and alert  Pain Score: 0  Pain Management: adequate  Airway Patency:patent  Dental exam unchanged from preop  Yes    Nausea/Vomiting: none  Cardiovascular Status: stable  Respiratory Status: room air  Postoperative Hydration stable  Comments: IV team was ordered      EUN BAEZ MD  6/27/2024 1:59 PM

## 2024-06-27 NOTE — PLAN OF CARE
Patient denies pain and discomfort,Bumex IV, 1L of Oxygen, safety measures in place call light with reach. Plan NPO for possible EGD.  Problem: Patient Centered Care  Goal: Patient preferences are identified and integrated in the patient's plan of care  Description: Interventions:  - What would you like us to know as we care for you? From home with daughter  - Provide timely, complete, and accurate information to patient/family  - Incorporate patient and family knowledge, values, beliefs, and cultural backgrounds into the planning and delivery of care  - Encourage patient/family to participate in care and decision-making at the level they choose  - Honor patient and family perspectives and choices  Outcome: Progressing     Problem: CARDIOVASCULAR - ADULT  Goal: Maintains optimal cardiac output and hemodynamic stability  Description: INTERVENTIONS:  - Monitor vital signs, rhythm, and trends  - Monitor for bleeding, hypotension and signs of decreased cardiac output  - Evaluate effectiveness of vasoactive medications to optimize hemodynamic stability  - Monitor arterial and/or venous puncture sites for bleeding and/or hematoma  - Assess quality of pulses, skin color and temperature  - Assess for signs of decreased coronary artery perfusion - ex. Angina  - Evaluate fluid balance, assess for edema, trend weights  Outcome: Progressing  Goal: Absence of cardiac arrhythmias or at baseline  Description: INTERVENTIONS:  - Continuous cardiac monitoring, monitor vital signs, obtain 12 lead EKG if indicated  - Evaluate effectiveness of antiarrhythmic and heart rate control medications as ordered  - Initiate emergency measures for life threatening arrhythmias  - Monitor electrolytes and administer replacement therapy as ordered  Outcome: Progressing     Problem: RESPIRATORY - ADULT  Goal: Achieves optimal ventilation and oxygenation  Description: INTERVENTIONS:  - Assess for changes in respiratory status  - Assess for changes in  mentation and behavior  - Position to facilitate oxygenation and minimize respiratory effort  - Oxygen supplementation based on oxygen saturation or ABGs  - Provide Smoking Cessation handout, if applicable  - Encourage broncho-pulmonary hygiene including cough, deep breathe, Incentive Spirometry  - Assess the need for suctioning and perform as needed  - Assess and instruct to report SOB or any respiratory difficulty  - Respiratory Therapy support as indicated  - Manage/alleviate anxiety  - Monitor for signs/symptoms of CO2 retention  Outcome: Progressing     Problem: GASTROINTESTINAL - ADULT  Goal: Minimal or absence of nausea and vomiting  Description: INTERVENTIONS:  - Maintain adequate hydration with IV or PO as ordered and tolerated  - Nasogastric tube to low intermittent suction as ordered  - Evaluate effectiveness of ordered antiemetic medications  - Provide nonpharmacologic comfort measures as appropriate  - Advance diet as tolerated, if ordered  - Obtain nutritional consult as needed  - Evaluate fluid balance  Outcome: Progressing     Problem: HEMATOLOGIC - ADULT  Goal: Maintains hematologic stability  Description: INTERVENTIONS  - Assess for signs and symptoms of bleeding or hemorrhage  - Monitor labs and vital signs for trends  - Administer supportive blood products/factors, fluids and medications as ordered and appropriate  - Administer supportive blood products/factors as ordered and appropriate  Outcome: Progressing     Problem: HEMATOLOGIC - ADULT  Goal: Maintains hematologic stability  Description: INTERVENTIONS  - Assess for signs and symptoms of bleeding or hemorrhage  - Monitor labs and vital signs for trends  - Administer supportive blood products/factors, fluids and medications as ordered and appropriate  - Administer supportive blood products/factors as ordered and appropriate  Outcome: Progressing  Goal: Free from bleeding injury  Description: (Example usage: patient with low  platelets)  INTERVENTIONS:  - Avoid intramuscular injections, enemas and rectal medication administration  - Ensure safe mobilization of patient  - Hold pressure on venipuncture sites to achieve adequate hemostasis  - Assess for signs and symptoms of internal bleeding  - Monitor lab trends  - Patient is to report abnormal signs of bleeding to staff  - Avoid use of toothpicks and dental floss  - Use electric shaver for shaving  - Use soft bristle tooth brush  - Limit straining and forceful nose blowing  Outcome: Progressing

## 2024-06-27 NOTE — ANESTHESIA POSTPROCEDURE EVALUATION
Patient: Yamini Ocampo    Procedure Summary       Date: 06/27/24 Room / Location: [facility] ENDOSCOPY 05 / [facility]COPY    Anesthesia Start: 1410 Anesthesia Stop: 1437    Procedure: ESOPHAGOGASTRODUODENOSCOPY (EGD) Diagnosis: (gastric erosions, food in stomach)    Surgeons: Alyssa Orellana MD Anesthesiologist: Nicole Corrales MD    Anesthesia Type: MAC ASA Status: 4            Anesthesia Type: MAC    Vitals Value Taken Time   BP 87/64 06/27/24 1435   Temp  06/27/24 1438   Pulse 91 06/27/24 1436   Resp 25 06/27/24 1436   SpO2 90 % 06/27/24 1436   Vitals shown include unfiled device data.    [facility] AN Post Evaluation:   Patient Evaluated in PACU  Patient Participation: complete - patient participated  Level of Consciousness: awake and alert  Pain Score: 0  Pain Management: adequate  Airway Patency:patent  Yes    Nausea/Vomiting: none  Cardiovascular Status: acceptable  Respiratory Status: acceptable  Postoperative Hydration acceptable      NICOLE CORRALES MD  6/27/2024 2:38 PM

## 2024-06-27 NOTE — PLAN OF CARE
Yamini is resting comfortably in the chair, alert and oriented x 4, on 1L NC, NSR on tele, Eliquis restarted per MD orders, diuresing with IV Bumex, purewick in place, EGD completed, safety precautions in place, call light within easy reach.    Problem: Patient Centered Care  Goal: Patient preferences are identified and integrated in the patient's plan of care  Description: Interventions:  - What would you like us to know as we care for you? I live with my daughter  - Provide timely, complete, and accurate information to patient/family  - Incorporate patient and family knowledge, values, beliefs, and cultural backgrounds into the planning and delivery of care  - Encourage patient/family to participate in care and decision-making at the level they choose  - Honor patient and family perspectives and choices  Outcome: Progressing     Problem: CARDIOVASCULAR - ADULT  Goal: Maintains optimal cardiac output and hemodynamic stability  Description: INTERVENTIONS:  - Monitor vital signs, rhythm, and trends  - Monitor for bleeding, hypotension and signs of decreased cardiac output  - Evaluate effectiveness of vasoactive medications to optimize hemodynamic stability  - Monitor arterial and/or venous puncture sites for bleeding and/or hematoma  - Assess quality of pulses, skin color and temperature  - Assess for signs of decreased coronary artery perfusion - ex. Angina  - Evaluate fluid balance, assess for edema, trend weights  Outcome: Progressing  Goal: Absence of cardiac arrhythmias or at baseline  Description: INTERVENTIONS:  - Continuous cardiac monitoring, monitor vital signs, obtain 12 lead EKG if indicated  - Evaluate effectiveness of antiarrhythmic and heart rate control medications as ordered  - Initiate emergency measures for life threatening arrhythmias  - Monitor electrolytes and administer replacement therapy as ordered  Outcome: Progressing     Problem: RESPIRATORY - ADULT  Goal: Achieves optimal ventilation and  oxygenation  Description: INTERVENTIONS:  - Assess for changes in respiratory status  - Assess for changes in mentation and behavior  - Position to facilitate oxygenation and minimize respiratory effort  - Oxygen supplementation based on oxygen saturation or ABGs  - Provide Smoking Cessation handout, if applicable  - Encourage broncho-pulmonary hygiene including cough, deep breathe, Incentive Spirometry  - Assess the need for suctioning and perform as needed  - Assess and instruct to report SOB or any respiratory difficulty  - Respiratory Therapy support as indicated  - Manage/alleviate anxiety  - Monitor for signs/symptoms of CO2 retention  Outcome: Progressing     Problem: GASTROINTESTINAL - ADULT  Goal: Minimal or absence of nausea and vomiting  Description: INTERVENTIONS:  - Maintain adequate hydration with IV or PO as ordered and tolerated  - Nasogastric tube to low intermittent suction as ordered  - Evaluate effectiveness of ordered antiemetic medications  - Provide nonpharmacologic comfort measures as appropriate  - Advance diet as tolerated, if ordered  - Obtain nutritional consult as needed  - Evaluate fluid balance  Outcome: Progressing     Problem: HEMATOLOGIC - ADULT  Goal: Maintains hematologic stability  Description: INTERVENTIONS  - Assess for signs and symptoms of bleeding or hemorrhage  - Monitor labs and vital signs for trends  - Administer supportive blood products/factors, fluids and medications as ordered and appropriate  - Administer supportive blood products/factors as ordered and appropriate  Outcome: Progressing  Goal: Free from bleeding injury  Description: (Example usage: patient with low platelets)  INTERVENTIONS:  - Avoid intramuscular injections, enemas and rectal medication administration  - Ensure safe mobilization of patient  - Hold pressure on venipuncture sites to achieve adequate hemostasis  - Assess for signs and symptoms of internal bleeding  - Monitor lab trends  - Patient is  to report abnormal signs of bleeding to staff  - Avoid use of toothpicks and dental floss  - Use electric shaver for shaving  - Use soft bristle tooth brush  - Limit straining and forceful nose blowing  Outcome: Progressing

## 2024-06-27 NOTE — OPERATIVE REPORT
ESOPHAGOGASTRODUODENOSCOPY (EGD) REPORT    Yamini Ocampo     1945 Age 79 year old   PCP Kathy Anne MD Endoscopist Alyssa Orellana MD     Date of procedure: 24    Procedure: EGD w/biopsy    Pre-operative diagnosis: anemia    Post-operative diagnosis: see impression    Medications: MAC    Complications: none    Procedure:  Informed consent was obtained from the patient after the risks of the procedure were discussed, including but not limited to bleeding, perforation, aspiration, infection, or possibility of a missed lesion. After discussions of the risks/benefits and alternatives to this procedure, as well as the planned sedation, the patient was placed in the left lateral decubitus position and begun on continuous blood pressure pulse oximetry and EKG monitoring and this was maintained throughout the procedure. Once an adequate level of sedation was obtained a bite block was placed. Then the lubricated tip of the [id number] diagnostic video upper endoscope was inserted and advanced using direct visualization into the posterior pharynx and ultimately into the esophagus, stomach, and duodenum.    Complications: None    Findings:      1. Esophagus:Layering fluid in esophagus, suctioned and cleared upon entry. No focal stricture or mass. Normal appearing esophageal mucosa.     2. Stomach: We then entered the stomach. Moderate amount of food debris in stomach limiting visualization of fundus. Linear erythema in a snakelike appearance in antrum, which may resemble gastric antral vascular ectasias. No bleeding or oozing. No focal ulcerations.  There was no evidence of any luminal or submucosal masses. A retroflexed view allowed examination of the angularis, cardia and fundus and these areas also appeared normal with a non-patulous cardia. No hiatal hernia seen. Random biopsies of the stomach (body, antrum, cardia, and incisura) were taken with cold forceps for histology.     3. Duodenum: The  duodenal mucosa appeared normal in the 1st and 2nd portion of the duodenum. Bilious effluent.    We then withdrew the instrument from the patient who tolerated the procedure well.     Impression:   1. 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 APC not performed today given food debris in stomach.   2. Food retention in stomach and esophagus    Recommend:  1. Continue ppi bid  2. Ok to resume anticoagulation from my stance  3. May need APC of GAVE in future if worsening anemia    >>>If tissue was sampled/removed and you have not received your pathology results either by phone or letter within 2 weeks, please call our office at [phone].    Specimens:  Gastric    Blood loss: <1 ml      Alyssa Orellana MD  [facility]

## 2024-06-27 NOTE — PROGRESS NOTES
[facility]  Progress Note     Yamini Ocampo  : 1945    Status: Inpatient  Day #: 2    Attending: Elias Shipley MD  PCP: Kathy Anne MD     Assessment and Plan:    Acute on chronic diastolic CHF  -cardiology consulted  -f/u echo  -cont bumex IV  -monitor I/O, wt, renal function    Acute on chronic anemia  -anemia of chronic disease, r/o GI blood loss  -monitor hgb  -s/p 1 unit PRBC     H/o GERD, esophagitis, PUD, gastric IM  H/o 2 gastric ulcers on EGD   H/o hiatal hernia repair and nissen fundoplication   -cont PPI  -EGD 24 - non-erosive reflux disease, gastric erythema  -no sign of active bleed.   -Rectal exam in ED reportedly hemoccult negative  -GI consulted  -EGD today    Chronic afib  -hold eliquis  -cont metoprolol    DM2  -monitor BG, cover ISS    CKD3  -monitor renal function       DVT Mechanical Prophylaxis:   SCDs,    DVT Pharmacologic Prophylaxis   Medication   None               Subjective:      Initial Chief Complaint:  fatigue    Still feels fatigued. No CP, no SOB. No abd pain, no n/v. No rectal bleeding, melena.    Objective:      Temp:  [98 °F (36.7 °C)-98.7 °F (37.1 °C)] 98 °F (36.7 °C)  Pulse:  [74-87] 76  Resp:  [18] 18  BP: (129-150)/(55-69) 147/69  SpO2:  [91 %-98 %] 93 %  General:  Alert, no distress  HEENT:  Normocephalic, atraumatic  Cardiac:  Regular rate, regular rhythm  Pulmonary:  bibasilar crackles  Gastrointestinal:  Soft, non-tender, normal bowel sounds  Musculoskeletal:  No joint swelling  Extremities:  +LE edema  Neurologic:  nonfocal  Psychiatric:  Normal affect, calm and appropriate    Intake/Output Summary (Last 24 hours) at 2024 1020  Last data filed at 2024 1000  Gross per 24 hour   Intake 10 ml   Output 3100 ml   Net -3090 ml         Recent Labs   Lab 24  1823 24  2316 24  0643 24  0534   WBC 7.8  --  5.3 5.0   HGB 6.7* 7.2* 7.5* 7.9*   HCT 24.0* 24.0* 24.3* 27.5*   .0  --  256.0 297.0    RBC 2.22*  --  2.36* 2.63*   .1*  --  103.0* 104.6*   MCH 30.2  --  31.8 30.0   MCHC 27.9*  --  30.9* 28.7*   RDW 19.9*  --  20.9* 20.5*   NEPRELIM 6.60  --  4.05 3.83     Recent Labs   Lab 06/25/24  1217 06/25/24  1823 06/26/24  0643 06/27/24  0534   BUN 33* 37* 35* 31*   CREATSERUM 1.73* 1.79* 1.71* 1.61*   CA 8.8 8.9 8.8 8.8   ALB 3.6  --   --   --     141 142 142   K 4.6 4.7 4.3 3.9    110 109 108   CO2 23.0 24.0 26.0 28.0   GLU 98 90 90 96   PHOS 3.7  --   --   --    INR  --  1.92*  --   --        XR CHEST AP PORTABLE  (CPT=71045)    Result Date: 6/25/2024  CONCLUSION:  1. Congestive failure with small bilateral pleural effusions.  Associated hazy right greater than left basilar opacities, which are favored to reflect compressive atelectasis (coexistent infection should be excluded on the basis of clinical parameters). 2. Cardiomegaly with aortic valve repair.   [email or web address]  Dictated by (CST): Jorge Bartlett MD on 6/25/2024 at 6:50 PM     Finalized by (CST): Jorge Bartlett MD on 6/25/2024 at 6:53 PM         EKG    Result Date: 6/26/2024  Atrial fibrillation with a competing junctional pacemaker Rightward axis Low voltage QRS, consider pulmonary disease, pericardial effusion, or normal variant Incomplete right bundle branch block Abnormal ECG Confirmed by Paulo Armstrong ([id number]) on 6/26/2024 3:36:27 PM   Medications:   atorvastatin  10 mg Oral Nightly    cyanocobalamin  1,000 mcg Oral Daily    folic acid  1 mg Oral Daily    sertraline  100 mg Oral Daily    metoprolol succinate ER  50 mg Oral BID    pantoprazole  40 mg Intravenous Q12H    bumetanide  1 mg Intravenous BID (Diuretic)    insulin aspart  1-5 Units Subcutaneous TID CC      PRN Meds:   ipratropium-albuterol    acetaminophen    ondansetron    metoclopramide    glucose **OR** glucose **OR** glucose-vitamin C **OR** dextrose **OR** glucose **OR** glucose **OR** glucose-vitamin C    Supplementary Documentation:        [facility]. Time  spent on chart/note review, review of labs/imaging, discussion with patient, physical exam, discussion with staff, consultants, coordinating care, writing progress note, discussion of plan of care.      Elias Shipley MD

## 2024-06-27 NOTE — INTERVAL H&P NOTE
Pre-op Diagnosis: anemia    The above referenced H&P was reviewed by Alyssa Johnston Ma, MD on 6/27/2024, the patient was examined and no significant changes have occurred in the patient's condition since the H&P was performed.  I discussed with the patient and/or legal representative the potential benefits, risks and side effects of this procedure; the likelihood of the patient achieving goals; and potential problems that might occur during recuperation.  I discussed reasonable alternatives to the procedure, including risks, benefits and side effects related to the alternatives and risks related to not receiving this procedure.  We will proceed with procedure as planned.

## 2024-06-27 NOTE — ANESTHESIA PREPROCEDURE EVALUATION
Anesthesia PreOp Note    HPI:     Yamini Ocampo is a 79 year old female who presents for preoperative consultation requested by: Alyssa Orellana MD    Date of Surgery: 6/25/2024 - 6/27/2024    Procedure(s):  ESOPHAGOGASTRODUODENOSCOPY (EGD)  Indication: Chronica anemia    Relevant Problems   No relevant active problems       NPO:  Last Liquid Consumption Date: 06/26/24  Last Liquid Consumption Time: 2212  Last Solid Consumption Date: 06/26/24  Last Solid Consumption Time: 2210  Last Liquid Consumption Date: 06/26/24          History Review:  Patient Active Problem List    Diagnosis Date Noted    Anemia, unspecified type 06/25/2024    GI bleed 06/25/2024    Dyspepsia 01/23/2024    Intestinal metaplasia of gastric mucosa 01/23/2024    Anemia, unspecified 01/05/2024    Iron deficiency anemia secondary to inadequate dietary iron intake 01/05/2024    Acute on chronic congestive heart failure, unspecified heart failure type ([facility]) 03/07/2023    Type 2 diabetes mellitus with diabetic chronic kidney disease ([facility]) 11/30/2022    Anemia due to vitamin B12 deficiency 08/01/2022    Macrocytic anemia 04/26/2022    Hyperpigmented skin lesion 04/26/2022    CREST syndrome ([facility]) 04/12/2022    Type 2 diabetes mellitus with stage 3b chronic kidney disease, without long-term current use of insulin ([facility]) 04/12/2022    Prediabetes 06/30/2021    Diverticulosis of colon     Osteoarthritis of left knee 01/31/2019    Raynaud's disease without gangrene 03/02/2018    Osteoarthritis of right knee 10/12/2017    Osteoarthritis 10/12/2017    History of ischemic colitis 09/28/2017    CKD (chronic kidney disease) stage 3, GFR 30-59 ml/min ([facility]) 06/26/2017    Mesenteric ischemia, chronic ([facility]) 04/26/2017    A-fib ([facility])     Polyp of colon     Gastroesophageal reflux disease without esophagitis     Hiatal hernia with GERD and esophagitis 09/01/2016    Ventral hernia without obstruction or gangrene 05/19/2016    Gastric erythema 04/28/2016    Essential  hypertension 08/07/2015    Sleep apnea 10/01/2013    Congestive heart failure (HCC) 08/27/2013    Peripheral vascular disease (HCC) 08/27/2013    Major depressive disorder, single episode, moderate (HCC) 06/20/2011    Vitamin D deficiency 06/20/2011    Class 2 severe obesity due to excess calories with serious comorbidity and body mass index (BMI) of 39.0 to 39.9 in adult (HCC) 03/05/2009       Past Medical History:    Arrhythmia    afib    Back problem    lumbar disc disease    Basal cell carcinoma of nose    Bilateral carpal tunnel syndrome    Cataract    Cellulitis    Deep vein thrombosis (HCC)    unsure which leg, possibly left    Depression    Esophageal reflux    Essential hypertension    Heart disease    Heel spur    Right    Hemorrhoids    Hiatal hernia    High blood pressure    High cholesterol    Hyperlipidemia    Incontinence    Lumbar disc disease    Multiple gastric ulcers    Osteoarthritis    Pulmonary embolism (HCC)    Raynaud disease    Renal disorder    Tubular adenoma of colon    repeat c-scope 1/2020    Type 2 diabetes mellitus with diabetic chronic kidney disease (HCC)    Visual impairment    glasses       Past Surgical History:   Procedure Laterality Date    Angioplasty (coronary)      Appendectomy      Arthroscopy of joint unlisted Right     knee    Bso, omentectomy w/sushil      Carpal tunnel release Bilateral     Cholecystectomy  09/28/2016    Colonoscopy N/A 01/25/2017    Procedure: COLONOSCOPY;  Surgeon: KATHARINE Prakash MD;  Location: [facility] ENDOSCOPY    Colonoscopy N/A 10/07/2020    Procedure: COLONOSCOPY;  Surgeon: KATHARINE Prakash MD;  Location: [facility] ENDOSCOPY    Egd N/A 12/14/2016    Procedure: ESOPHAGOGASTRODUODENOSCOPY (EGD);  Surgeon: KATHARINE Prakash MD;  Location: [facility] ENDOSCOPY    Egd N/A 01/23/2024    ; gastritis    Electrocardiogram, complete  09/26/2013    Scanned to Media Tab    Excision of nose      Basal cell carcinoma    Hernia surgery      Hysterectomy      Knee  replacement surgery Right     Other surgical history      Injections and narcotics, POD Bartuci    Removal of heel spur      Total abdom hysterectomy         Facility-Administered Medications Prior to Admission   Medication Dose Route Frequency Provider Last Rate Last Admin    darbepoetin molina (Aranesp) 200 MCG/0.4ML injection 200 mcg  200 mcg Subcutaneous Q4 Week Manan Pizarro MD   200 mcg at 24 1016     Medications Prior to Admission   Medication Sig Dispense Refill Last Dose    pantoprazole 40 MG Oral Tab EC Take 1 tablet (40 mg total) by mouth 2 (two) times daily. 60 tablet 2 Past Week    torsemide 20 MG Oral Tab 2 pills each morning for edema 60 tablet 2 2024    sertraline 100 MG Oral Tab Take 1 tablet (100 mg total) by mouth daily. 90 tablet 3 2024    atorvastatin 10 MG Oral Tab Take 1 tablet (10 mg total) by mouth nightly. 90 tablet 3 2024    FARXIGA 10 MG Oral Tab Take 1 tablet (10 mg total) by mouth daily.   2024    albuterol 108 (90 Base) MCG/ACT Inhalation Aero Soln SHAKE BEFORE USE AND TAKE 2 PUFFS INTO THE LUNGS EVERY 6 HOURS AS NEEDED       folic acid 1 MG Oral Tab Take 1 tablet (1 mg total) by mouth daily. 90 tablet 3 2024    METOPROLOL SUCCINATE ER 50 MG Oral Tablet 24 Hr Take 1 tablet (50 mg total) by mouth in the morning and 1 tablet (50 mg total) before bedtime.   2024    cyanocobalamin 1000 MCG Oral Tab Take 1 tablet (1,000 mcg total) by mouth daily.   2024    ELIQUIS 5 MG Oral Tab Take 1 tablet (5 mg total) by mouth 2 (two) times daily. 180 tablet 0 2024    [] amLODIPine 5 MG Oral Tab Take 1 tablet (5 mg total) by mouth daily. 30 tablet 0      Current Facility-Administered Medications Ordered in Epic   Medication Dose Route Frequency Provider Last Rate Last Admin    ipratropium-albuterol (Duoneb) 0.5-2.5 (3) MG/3ML inhalation solution 3 mL  3 mL Nebulization Q4H PRN Elias Shipley MD        atorvastatin (Lipitor) tab 10 mg  10 mg Oral Nightly  Elias Shipley MD   10 mg at 06/26/24 2047    cyanocobalamin (Vitamin B12) tab 1,000 mcg  1,000 mcg Oral Daily Elias Shipley MD   1,000 mcg at 06/26/24 1100    folic acid (Folvite) tab 1 mg  1 mg Oral Daily Elias Shipley MD   1 mg at 06/26/24 1100    sertraline (Zoloft) tab 100 mg  100 mg Oral Daily Elias Shipley MD   100 mg at 06/26/24 1100    metoprolol succinate ER (Toprol XL) 24 hr tab 50 mg  50 mg Oral BID Elias Shipley MD   50 mg at 06/26/24 2047    pantoprazole (Protonix) 40 mg in sodium chloride 0.9% PF 10 mL IV push  40 mg Intravenous Q12H Elias Shipley MD   40 mg at 06/27/24 1048    acetaminophen (Tylenol Extra Strength) tab 500 mg  500 mg Oral Q4H PRN Hillary Fitzgerald MD        ondansetron (Zofran) 4 MG/2ML injection 4 mg  4 mg Intravenous Q6H PRN Hillary Fitzgerald MD        metoclopramide (Reglan) 5 mg/mL injection 5 mg  5 mg Intravenous Q8H PRN Hillary Fitzgerald MD        bumetanide (Bumex) 0.25 MG/ML injection 1 mg  1 mg Intravenous BID (Diuretic) Hillary Fitzgerald MD   1 mg at 06/26/24 1726    glucose (Dex4) 15 GM/59ML oral liquid 15 g  15 g Oral Q15 Min PRN Hillary Fitzgerald MD   15 g at 06/25/24 2117    Or    glucose (Glutose) 40% oral gel 15 g  15 g Oral Q15 Min PRN Hillary Fitzgerald MD        Or    glucose-vitamin C (Dex-4) chewable tab 4 tablet  4 tablet Oral Q15 Min PRN Hillary Fitzgerald MD        Or    dextrose 50% injection 50 mL  50 mL Intravenous Q15 Min PRN Hillary Fitzgerald MD        Or    glucose (Dex4) 15 GM/59ML oral liquid 30 g  30 g Oral Q15 Min PRN Hillary Fitzgerald MD        Or    glucose (Glutose) 40% oral gel 30 g  30 g Oral Q15 Min PRN Hillary Fitzgerald MD        Or    glucose-vitamin C (Dex-4) chewable tab 8 tablet  8 tablet Oral Q15 Min PRN Hillary Fitzgerald MD        insulin aspart (NovoLOG) 100 Units/mL FlexPen 1-5 Units  1-5 Units Subcutaneous TID CC Hillary Fitzgerald MD         No current Epic-ordered outpatient medications on file.       Allergies   Allergen Reactions    Adhesive Tape RASH     Skin off        Family History   Problem Relation Age of Onset    Cancer Father         Skin cancer    Other (Other) Father 97        old age,unknown caused    Heart Attack Mother     Heart Disorder Mother     Hypertension Other         Family H/O    Cancer Other         Lymphoma  Family H/O    Heart Disease Other         Family H/O    Arthritis Other         Close relative  unsure which type    No Known Problems Brother      Social History     Socioeconomic History    Marital status:    Tobacco Use    Smoking status: Former     Current packs/day: 0.00     Types: Cigarettes     Quit date: 1997     Years since quittin.5    Smokeless tobacco: Never   Vaping Use    Vaping status: Never Used   Substance and Sexual Activity    Alcohol use: No     Comment: rare    Drug use: Never     Comment: edibles occasionally for sleep   Other Topics Concern    Caffeine Concern Yes     Comment: 8 cups coffee/sod daily    Reaction to local anesthetic No       Available pre-op labs reviewed.  Lab Results   Component Value Date    WBC 5.0 2024    RBC 2.63 (L) 2024    HGB 7.9 (L) 2024    HCT 27.5 (L) 2024    .6 (H) 2024    MCH 30.0 2024    MCHC 28.7 (L) 2024    RDW 20.5 (H) 2024    .0 2024     Lab Results   Component Value Date     2024    K 3.9 2024     2024    CO2 28.0 2024    BUN 31 (H) 2024    CREATSERUM 1.61 (H) 2024    GLU 96 2024    PGLU 83 2024    CA 8.8 2024     Lab Results   Component Value Date    INR 1.92 (H) 2024       Vital Signs:  Body mass index is 33.25 kg/m².   height is 1.676 m (5' 6\") and weight is 93.4 kg (206 lb). Her oral temperature is 98 °F (36.7 °C). Her blood pressure is 161/69 (abnormal) and her pulse is 75. Her respiration is 23 and oxygen saturation is 95%.   Vitals:    24 0955 24 1003 24 1110 24 1215   BP: 147/69  146/54 (!) 161/69   Pulse:  81 76 81 75   Resp: 18  18 23   Temp: 98 °F (36.7 °C)      TempSrc: Oral      SpO2: 98% 93% 92% 95%   Weight:       Height:            Anesthesia Evaluation     Patient summary reviewed and Nursing notes reviewed    Airway   Mallampati: II  TM distance: >3 FB  Dental          Pulmonary - normal exam   (+) sleep apnea    ROS comment: History of PE  Cardiovascular   Exercise tolerance: good  (+) hypertension well controlled, dysrhythmias, CHF  (-) past MI, CAD    NYHA Classification: II  ECG reviewed  Rhythm: irregular  Rate: normal  ROS comment:   Assessment/Plan:  Patient Active Problem List  Diagnosis  · Congestive heart failure (HCC)  · Major depressive disorder, single episode, moderate ([facility])  · Class 2 severe obesity due to excess calories with serious comorbidity and body mass index (BMI) of 39.0 to 39.9 in adult ([facility])  · Peripheral vascular disease ([facility])  · Sleep apnea  · Vitamin D deficiency  · Essential hypertension  · Gastric erythema  · Ventral hernia without obstruction or gangrene  · Hiatal hernia with GERD and esophagitis  · Gastroesophageal reflux disease without esophagitis  · A-fib ([facility])  · Polyp of colon  · Mesenteric ischemia, chronic ([facility])  · CKD (chronic kidney disease) stage 3, GFR 30-59 ml/min ([facility])  · History of ischemic colitis  · Osteoarthritis of right knee  · Osteoarthritis  · Raynaud's disease without gangrene  · Osteoarthritis of left knee  · Diverticulosis of colon  · Prediabetes  · CREST syndrome ([facility])  · Type 2 diabetes mellitus with stage 3b chronic kidney disease, without long-term current use of insulin ([facility])  · Macrocytic anemia  · Hyperpigmented skin lesion  · Anemia due to vitamin B12 deficiency  · Type 2 diabetes mellitus with diabetic chronic kidney disease ([facility])  · Acute on chronic congestive heart failure, unspecified heart failure type ([facility])  · Anemia, unspecified  · Iron deficiency anemia secondary to inadequate dietary iron intake  · Dyspepsia  · Intestinal metaplasia of  gastric mucosa  · Anemia, unspecified type  · GI bleed     Assessment:     Anemia  Chronic A-fib  Acute on Chronic Diastolic HF  Mild to moderate prostatic aortic stenosis and mild perivalvular AI  S/p EMIR procedure for severe aortic stenosis  CKD stage III b  Remote hx of PE/DVT     Plan:     Echo reviewed; normal systolic function. EF 60-65%. Moderate prostatic stenosis.  Continue to hold anticoagulation.  Proceed with EGD tomorrow.  Patient is stable from cardiology standpoint to proceed.  X-ray showed pulmonary congestion; continue Bumex 1 mg, IV, BID.  Consider outpatient watchman device.  Follow up with me 1 week after discharge.     CASSANDRA GREGORIO DO  6/26/2024  6:10 PM       Neuro/Psych    (+)  neuromuscular disease,  depression      GI/Hepatic/Renal    (+) GERD, chronic renal disease CRI    Endo/Other    (+) diabetes mellitus    Comments: ASSESSMENT:    1.       Acute on chronic diastolic heart failure.  2.       Above condition complicated by acute on chronic anemia.  No evidence of active gastrointestinal bleed but patient does have history of gastroesophageal reflux disease and possible upper gastrointestinal bleed.  3.       Essential hypertension, though hypovolemic today.  4.       Hypoxemia.  5.       Chronic atrial fibrillation.  6.       Diabetes mellitus type 2.  7.       Chronic kidney disease stage 3.    Abdominal  - normal exam                 Anesthesia Plan:   ASA:  4  Plan:   MAC  Informed Consent Plan and Risks Discussed With:  Patient  Discussed plan with:  Attending and surgeon      I have informed Yamini Ocampo and/or legal guardian or family member of the nature of the anesthetic plan, benefits, risks including possible dental damage if relevant, major complications, and any alternative forms of anesthetic management.   All of the patient's questions were answered to the best of my ability. The patient desires the anesthetic management as planned.  ERICKSON BENTON MD  6/27/2024 11:30  AM  Present on Admission:  **None**

## 2024-06-28 ENCOUNTER — TELEPHONE (OUTPATIENT)
Facility: CLINIC | Age: 79
End: 2024-06-28

## 2024-06-28 ENCOUNTER — ANESTHESIA EVENT (OUTPATIENT)
Dept: PICC SERVICES | Facility: HOSPITAL | Age: 79
DRG: 291 | End: 2024-06-28
Payer: MEDICARE

## 2024-06-28 ENCOUNTER — APPOINTMENT (OUTPATIENT)
Dept: GENERAL RADIOLOGY | Facility: HOSPITAL | Age: 79
DRG: 291 | End: 2024-06-28
Attending: HOSPITALIST
Payer: MEDICARE

## 2024-06-28 VITALS
BODY MASS INDEX: 31.44 KG/M2 | WEIGHT: 195.63 LBS | DIASTOLIC BLOOD PRESSURE: 65 MMHG | SYSTOLIC BLOOD PRESSURE: 137 MMHG | HEIGHT: 66 IN | HEART RATE: 90 BPM | OXYGEN SATURATION: 93 % | RESPIRATION RATE: 20 BRPM | TEMPERATURE: 98 F

## 2024-06-28 LAB
ANION GAP SERPL CALC-SCNC: 3 MMOL/L (ref 0–18)
BUN BLD-MCNC: 26 MG/DL (ref 9–23)
BUN/CREAT SERPL: 17.8 (ref 10–20)
CALCIUM BLD-MCNC: 9.1 MG/DL (ref 8.7–10.4)
CHLORIDE SERPL-SCNC: 107 MMOL/L (ref 98–112)
CO2 SERPL-SCNC: 32 MMOL/L (ref 21–32)
CREAT BLD-MCNC: 1.46 MG/DL
DEPRECATED RDW RBC AUTO: 72.9 FL (ref 35.1–46.3)
EGFRCR SERPLBLD CKD-EPI 2021: 36 ML/MIN/1.73M2 (ref 60–?)
ERYTHROCYTE [DISTWIDTH] IN BLOOD BY AUTOMATED COUNT: 20 % (ref 11–15)
GLUCOSE BLD-MCNC: 88 MG/DL (ref 70–99)
GLUCOSE BLDC GLUCOMTR-MCNC: 104 MG/DL (ref 70–99)
GLUCOSE BLDC GLUCOMTR-MCNC: 118 MG/DL (ref 70–99)
GLUCOSE BLDC GLUCOMTR-MCNC: 81 MG/DL (ref 70–99)
HCT VFR BLD AUTO: 27 %
HGB BLD-MCNC: 7.8 G/DL
MCH RBC QN AUTO: 30.2 PG (ref 26–34)
MCHC RBC AUTO-ENTMCNC: 28.9 G/DL (ref 31–37)
MCV RBC AUTO: 104.7 FL
OSMOLALITY SERPL CALC.SUM OF ELEC: 298 MOSM/KG (ref 275–295)
PLATELET # BLD AUTO: 313 10(3)UL (ref 150–450)
POTASSIUM SERPL-SCNC: 3.8 MMOL/L (ref 3.5–5.1)
RBC # BLD AUTO: 2.58 X10(6)UL
SODIUM SERPL-SCNC: 142 MMOL/L (ref 136–145)
WBC # BLD AUTO: 4.9 X10(3) UL (ref 4–11)

## 2024-06-28 PROCEDURE — 99239 HOSP IP/OBS DSCHRG MGMT >30: CPT | Performed by: HOSPITALIST

## 2024-06-28 PROCEDURE — 71045 X-RAY EXAM CHEST 1 VIEW: CPT | Performed by: HOSPITALIST

## 2024-06-28 PROCEDURE — 99233 SBSQ HOSP IP/OBS HIGH 50: CPT | Performed by: INTERNAL MEDICINE

## 2024-06-28 RX ORDER — BUMETANIDE 1 MG/1
1 TABLET ORAL
Qty: 60 TABLET | Refills: 0 | Status: SHIPPED | OUTPATIENT
Start: 2024-06-28

## 2024-06-28 RX ORDER — PANTOPRAZOLE SODIUM 40 MG/1
40 TABLET, DELAYED RELEASE ORAL
Qty: 60 TABLET | Refills: 0 | Status: SHIPPED | OUTPATIENT
Start: 2024-06-28

## 2024-06-28 RX ORDER — PANTOPRAZOLE SODIUM 40 MG/1
40 TABLET, DELAYED RELEASE ORAL
Status: DISCONTINUED | OUTPATIENT
Start: 2024-06-28 | End: 2024-06-28

## 2024-06-28 NOTE — PROGRESS NOTES
[facility]     Gastroenterology Progress Note    Yamini Ocampo Patient Status:  Inpatient    1945 MRN [id number]   Location [facility] 3W/SW Attending Elias Shipley MD   Hosp Day # 3 PCP Kathy Anne MD       Subjective:   Feels good today, no abd pain, n/v. No bowel movement. Anticoagulation restarted     Objective:   Blood pressure 137/65, pulse 90, temperature 98.3 °F (36.8 °C), temperature source Oral, resp. rate 20, height 5' 6\" (1.676 m), weight 195 lb 9.6 oz (88.7 kg), SpO2 93%. Body mass index is 31.57 kg/m².    General: awake, alert and oriented, no acute distress  HEENT: moist mucus membranes  PULM: no conversational dyspnea  CARDIOVASCULAR: regular rate and rhythm, the extremities are warm and well perfused  GI: soft, non-tender, non-distended, + BS, no rebound/guarding   EXTREMITIES: no edema, moving all extremities  SKIN: no visible rash  NEURO: appropriate and interactive    Assessment and Plan:   Yamini Ocampo is a 79 year old female w/ PMHx of BMI 33.86, HTN, HLD, DM2, CKD3, Anemia of chronic disease, PUD, GERD, Ventricular diastolic dysfunction, HFpEF, Pulmonary artery HTN, CAD, Chronic Afib (on eliquis) who presents to the ED with acute on chronic diastolic HF and anemia.     EGD  showed gastric erythema, possible GAVE. APC not performed given food debris in stomach.     Recommend:  1. Continue ppi bid  2. Ok to resume anticoagulation from my stance  3. May need APC of GAVE in future if worsening anemia     GI will sign off, please call with questions. She may follow-up gi clinic upon discharge    Alyssa Orellana MD  [facility]      Results:     Lab Results   Component Value Date    WBC 4.9 2024    HGB 7.8 (L) 2024    HCT 27.0 (L) 2024    .0 2024    CREATSERUM 1.46 (H) 2024    BUN 26 (H) 2024     2024    K 3.8 2024     2024    CO2 32.0 2024    GLU 88  06/28/2024    CA 9.1 06/28/2024    ALB 3.6 06/25/2024    ALKPHO 64 03/22/2024    BILT 0.7 03/22/2024    TP 6.5 03/22/2024    AST 38 (H) 03/22/2024    ALT 16 03/22/2024    PTT 30.0 01/29/2019    INR 1.92 (H) 06/25/2024    TSH 2.130 05/04/2021    LIP 40 10/27/2023    ESRML 35 (H) 06/14/2017    CRP 0.7 06/14/2017     (H) 06/10/2011    MG 2.0 05/09/2024    PHOS 3.7 06/25/2024    TROP <0.045 05/10/2019    CK 20 (L) 09/05/2017    B12 626 12/20/2023       XR CHEST AP PORTABLE  (CPT=71045)    Result Date: 6/28/2024  CONCLUSION: Mild pulmonary edema and bilateral pleural effusions with associated atelectasis, similar to the prior exam.    Dictated by (CST): Bobby Mcmanus MD on 6/28/2024 at 12:18 PM     Finalized by (CST): Bobby Mcmanus MD on 6/28/2024 at 12:18 PM

## 2024-06-28 NOTE — PLAN OF CARE
Problem: Patient Centered Care  Goal: Patient preferences are identified and integrated in the patient's plan of care  Description: Interventions:  - What would you like us to know as we care for you? Home with shaunna  - Provide timely, complete, and accurate information to patient/family  - Incorporate patient and family knowledge, values, beliefs, and cultural backgrounds into the planning and delivery of care  - Encourage patient/family to participate in care and decision-making at the level they choose  - Honor patient and family perspectives and choices  Outcome: Progressing     Problem: CARDIOVASCULAR - ADULT  Goal: Maintains optimal cardiac output and hemodynamic stability  Description: INTERVENTIONS:  - Monitor vital signs, rhythm, and trends  - Monitor for bleeding, hypotension and signs of decreased cardiac output  - Evaluate effectiveness of vasoactive medications to optimize hemodynamic stability  - Monitor arterial and/or venous puncture sites for bleeding and/or hematoma  - Assess quality of pulses, skin color and temperature  - Assess for signs of decreased coronary artery perfusion - ex. Angina  - Evaluate fluid balance, assess for edema, trend weights  Outcome: Progressing  Goal: Absence of cardiac arrhythmias or at baseline  Description: INTERVENTIONS:  - Continuous cardiac monitoring, monitor vital signs, obtain 12 lead EKG if indicated  - Evaluate effectiveness of antiarrhythmic and heart rate control medications as ordered  - Initiate emergency measures for life threatening arrhythmias  - Monitor electrolytes and administer replacement therapy as ordered  Outcome: Progressing     Problem: RESPIRATORY - ADULT  Goal: Achieves optimal ventilation and oxygenation  Description: INTERVENTIONS:  - Assess for changes in respiratory status  - Assess for changes in mentation and behavior  - Position to facilitate oxygenation and minimize respiratory effort  - Oxygen supplementation based on oxygen  saturation or ABGs  - Provide Smoking Cessation handout, if applicable  - Encourage broncho-pulmonary hygiene including cough, deep breathe, Incentive Spirometry  - Assess the need for suctioning and perform as needed  - Assess and instruct to report SOB or any respiratory difficulty  - Respiratory Therapy support as indicated  - Manage/alleviate anxiety  - Monitor for signs/symptoms of CO2 retention  Outcome: Progressing     Problem: GASTROINTESTINAL - ADULT  Goal: Minimal or absence of nausea and vomiting  Description: INTERVENTIONS:  - Maintain adequate hydration with IV or PO as ordered and tolerated  - Nasogastric tube to low intermittent suction as ordered  - Evaluate effectiveness of ordered antiemetic medications  - Provide nonpharmacologic comfort measures as appropriate  - Advance diet as tolerated, if ordered  - Obtain nutritional consult as needed  - Evaluate fluid balance  Outcome: Progressing     Problem: HEMATOLOGIC - ADULT  Goal: Maintains hematologic stability  Description: INTERVENTIONS  - Assess for signs and symptoms of bleeding or hemorrhage  - Monitor labs and vital signs for trends  - Administer supportive blood products/factors, fluids and medications as ordered and appropriate  - Administer supportive blood products/factors as ordered and appropriate  Outcome: Progressing  Goal: Free from bleeding injury  Description: (Example usage: patient with low platelets)  INTERVENTIONS:  - Avoid intramuscular injections, enemas and rectal medication administration  - Ensure safe mobilization of patient  - Hold pressure on venipuncture sites to achieve adequate hemostasis  - Assess for signs and symptoms of internal bleeding  - Monitor lab trends  - Patient is to report abnormal signs of bleeding to staff  - Avoid use of toothpicks and dental floss  - Use electric shaver for shaving  - Use soft bristle tooth brush  - Limit straining and forceful nose blowing  Outcome: Progressing

## 2024-06-28 NOTE — PLAN OF CARE
RA at night, IV bumex continued with good output. Complains of no pain, vital signs stable. No acute changes. Call light within reach, fall precautions in place.    Problem: Patient Centered Care  Goal: Patient preferences are identified and integrated in the patient's plan of care  Description: Interventions:  - What would you like us to know as we care for you?   - Provide timely, complete, and accurate information to patient/family  - Incorporate patient and family knowledge, values, beliefs, and cultural backgrounds into the planning and delivery of care  - Encourage patient/family to participate in care and decision-making at the level they choose  - Honor patient and family perspectives and choices  Outcome: Progressing     Problem: CARDIOVASCULAR - ADULT  Goal: Maintains optimal cardiac output and hemodynamic stability  Description: INTERVENTIONS:  - Monitor vital signs, rhythm, and trends  - Monitor for bleeding, hypotension and signs of decreased cardiac output  - Evaluate effectiveness of vasoactive medications to optimize hemodynamic stability  - Monitor arterial and/or venous puncture sites for bleeding and/or hematoma  - Assess quality of pulses, skin color and temperature  - Assess for signs of decreased coronary artery perfusion - ex. Angina  - Evaluate fluid balance, assess for edema, trend weights  Outcome: Progressing  Goal: Absence of cardiac arrhythmias or at baseline  Description: INTERVENTIONS:  - Continuous cardiac monitoring, monitor vital signs, obtain 12 lead EKG if indicated  - Evaluate effectiveness of antiarrhythmic and heart rate control medications as ordered  - Initiate emergency measures for life threatening arrhythmias  - Monitor electrolytes and administer replacement therapy as ordered  Outcome: Progressing     Problem: RESPIRATORY - ADULT  Goal: Achieves optimal ventilation and oxygenation  Description: INTERVENTIONS:  - Assess for changes in respiratory status  - Assess for  changes in mentation and behavior  - Position to facilitate oxygenation and minimize respiratory effort  - Oxygen supplementation based on oxygen saturation or ABGs  - Provide Smoking Cessation handout, if applicable  - Encourage broncho-pulmonary hygiene including cough, deep breathe, Incentive Spirometry  - Assess the need for suctioning and perform as needed  - Assess and instruct to report SOB or any respiratory difficulty  - Respiratory Therapy support as indicated  - Manage/alleviate anxiety  - Monitor for signs/symptoms of CO2 retention  Outcome: Progressing     Problem: GASTROINTESTINAL - ADULT  Goal: Minimal or absence of nausea and vomiting  Description: INTERVENTIONS:  - Maintain adequate hydration with IV or PO as ordered and tolerated  - Nasogastric tube to low intermittent suction as ordered  - Evaluate effectiveness of ordered antiemetic medications  - Provide nonpharmacologic comfort measures as appropriate  - Advance diet as tolerated, if ordered  - Obtain nutritional consult as needed  - Evaluate fluid balance  Outcome: Progressing     Problem: HEMATOLOGIC - ADULT  Goal: Maintains hematologic stability  Description: INTERVENTIONS  - Assess for signs and symptoms of bleeding or hemorrhage  - Monitor labs and vital signs for trends  - Administer supportive blood products/factors, fluids and medications as ordered and appropriate  - Administer supportive blood products/factors as ordered and appropriate  Outcome: Progressing  Goal: Free from bleeding injury  Description: (Example usage: patient with low platelets)  INTERVENTIONS:  - Avoid intramuscular injections, enemas and rectal medication administration  - Ensure safe mobilization of patient  - Hold pressure on venipuncture sites to achieve adequate hemostasis  - Assess for signs and symptoms of internal bleeding  - Monitor lab trends  - Patient is to report abnormal signs of bleeding to staff  - Avoid use of toothpicks and dental floss  - Use  electric shaver for shaving  - Use soft bristle tooth brush  - Limit straining and forceful nose blowing  Outcome: Progressing

## 2024-06-28 NOTE — H&P (VIEW-ONLY)
[facility]     Gastroenterology Progress Note    Yamini Ocampo Patient Status:  Inpatient    1945 MRN [id number]   Location [facility] 3W/SW Attending Elias Shipley MD   Hosp Day # 3 PCP Kathy Anne MD       Subjective:   Feels good today, no abd pain, n/v. No bowel movement. Anticoagulation restarted     Objective:   Blood pressure 137/65, pulse 90, temperature 98.3 °F (36.8 °C), temperature source Oral, resp. rate 20, height 5' 6\" (1.676 m), weight 195 lb 9.6 oz (88.7 kg), SpO2 93%. Body mass index is 31.57 kg/m².    General: awake, alert and oriented, no acute distress  HEENT: moist mucus membranes  PULM: no conversational dyspnea  CARDIOVASCULAR: regular rate and rhythm, the extremities are warm and well perfused  GI: soft, non-tender, non-distended, + BS, no rebound/guarding   EXTREMITIES: no edema, moving all extremities  SKIN: no visible rash  NEURO: appropriate and interactive    Assessment and Plan:   Yamini Ocampo is a 79 year old female w/ PMHx of BMI 33.86, HTN, HLD, DM2, CKD3, Anemia of chronic disease, PUD, GERD, Ventricular diastolic dysfunction, HFpEF, Pulmonary artery HTN, CAD, Chronic Afib (on eliquis) who presents to the ED with acute on chronic diastolic HF and anemia.     EGD  showed gastric erythema, possible GAVE. APC not performed given food debris in stomach.     Recommend:  1. Continue ppi bid  2. Ok to resume anticoagulation from my stance  3. May need APC of GAVE in future if worsening anemia     GI will sign off, please call with questions. She may follow-up gi clinic upon discharge    Alyssa Orellana MD  [facility]      Results:     Lab Results   Component Value Date    WBC 4.9 2024    HGB 7.8 (L) 2024    HCT 27.0 (L) 2024    .0 2024    CREATSERUM 1.46 (H) 2024    BUN 26 (H) 2024     2024    K 3.8 2024     2024    CO2 32.0 2024    GLU 88  06/28/2024    CA 9.1 06/28/2024    ALB 3.6 06/25/2024    ALKPHO 64 03/22/2024    BILT 0.7 03/22/2024    TP 6.5 03/22/2024    AST 38 (H) 03/22/2024    ALT 16 03/22/2024    PTT 30.0 01/29/2019    INR 1.92 (H) 06/25/2024    TSH 2.130 05/04/2021    LIP 40 10/27/2023    ESRML 35 (H) 06/14/2017    CRP 0.7 06/14/2017     (H) 06/10/2011    MG 2.0 05/09/2024    PHOS 3.7 06/25/2024    TROP <0.045 05/10/2019    CK 20 (L) 09/05/2017    B12 626 12/20/2023       XR CHEST AP PORTABLE  (CPT=71045)    Result Date: 6/28/2024  CONCLUSION: Mild pulmonary edema and bilateral pleural effusions with associated atelectasis, similar to the prior exam.    Dictated by (CST): Bobby Mcmanus MD on 6/28/2024 at 12:18 PM     Finalized by (CST): Bobby Mcmanus MD on 6/28/2024 at 12:18 PM

## 2024-06-28 NOTE — DISCHARGE SUMMARY
[facility]  Discharge Summary     Yamini Ocampo  : 1945    Status: Inpatient  Day #: 3    Attending: Elias Shipley MD  PCP: Kathy Anne MD     Date of Admission:  2024  Date of Discharge:  2024     Hospital Discharge Diagnoses:     Acute on Chronic diastolic CHF  Acute on chronic anemia  GERD  H/o PUD  Chronic afib  Dm2  ckd3      History of Present Illness:     Copied from Admission H&P:    Patient is an 79-year-old  female who was sent today to the emergency department for evaluation of progressive fatigue and after she was found to have a low hemoglobin of 6.3 on a CBC. Repeat CBC in the emergency room showed hemoglobin of 6.7. Her hemoglobin was 8.1 on May 13, six weeks ago. INR 1.92. She is chronically on Eliquis. BNP still pending. Chemistry still pending. Outpatient chemistry today showed GFR of 29. Chest x-ray showed small bilateral pleural effusions and heart failure changes. Patient was started on blood transfusion with Lasix in the emergency room, and she will be admitted to the hospital for further management. Rectal exam done by the emergency room physician was negative for Hemoccult blood.       Hospital Course:     Acute on chronic diastolic CHF  -cardiology consulted  -echo normal EF  -bumex IV with good output. Bumex po on discharge     Mild hypoxemia 2/2 CHF  Cough  -check CXR     Acute on chronic anemia  -anemia of chronic disease, r/o GI blood loss  -monitor hgb  -s/p 1 unit PRBC      H/o GERD, esophagitis, PUD, gastric IM  H/o 2 gastric ulcers on EGD   H/o hiatal hernia repair and nissen fundoplication   -cont PPI  -EGD 24 - non-erosive reflux disease, gastric erythema  -no sign of active bleed.   -Rectal exam in ED reportedly hemoccult negative  -GI consulted  -EGD  - gastric erythema, possible GAVE     Chronic afib  -eliquis     DM2  -cont home meds     CKD3  -stable    Consultants         Provider   Role Specialty     Ma,  Alyssa Johnston MD      Consulting Physician GASTROENTEROLOGY     Arnoldo Corado DO      Consulting Physician Cardiovascular Diseases          Surgical Procedures       Case IDs Date Procedure Surgeon Location Status    [id number] 6/27/24 ESOPHAGOGASTRODUODENOSCOPY (EGD) Alyssa Orellana MD [facility] ENDOSCOPY Comp    [id number] 6/27/24 ESOPHAGOGASTRODUODENOSCOPY (EGD) Alyssa Orellana MD [facility] ENDOSCOPY Comp           Physical Exam:   Blood pressure 137/65, pulse 90, temperature 98.3 °F (36.8 °C), temperature source Oral, resp. rate 20, height 5' 6\" (1.676 m), weight 195 lb 9.6 oz (88.7 kg), SpO2 93%.  General:  Alert, no distress  HEENT:  Normocephalic, atraumatic  Cardiac:  Regular rate, regular rhythm  Pulmonary:  Clear to auscultation bilaterally, respirations unlabored  Gastrointestinal:  Soft, non-tender, normal bowel sounds  Musculoskeletal:  No joint swelling  Extremities:  No edema, no cyanosis, no clubbing  Neurologic:  nonfocal  Psychiatric:  Normal affect, calm and appropriate         Discharge Medications        START taking these medications        Instructions Prescription details   bumetanide 1 MG Tabs  Commonly known as: Bumex      Take 1 tablet (1 mg total) by mouth BID (Diuretic).   Quantity: 60 tablet  Refills: 0            CONTINUE taking these medications        Instructions Prescription details   albuterol 108 (90 Base) MCG/ACT Aers  Commonly known as: Ventolin HFA      SHAKE BEFORE USE AND TAKE 2 PUFFS INTO THE LUNGS EVERY 6 HOURS AS NEEDED   Refills: 0     atorvastatin 10 MG Tabs  Commonly known as: Lipitor      Take 1 tablet (10 mg total) by mouth nightly.   Quantity: 90 tablet  Refills: 3     cyanocobalamin 1000 MCG Tabs  Commonly known as: Vitamin B12      Take 1 tablet (1,000 mcg total) by mouth daily.   Refills: 0     Eliquis 5 MG Tabs  Generic drug: apixaban      Take 1 tablet (5 mg total) by mouth 2 (two) times daily.   Quantity: 180 tablet  Refills: 0     Farxiga 10 MG Tabs  Generic drug:  dapagliflozin      Take 1 tablet (10 mg total) by mouth daily.   Refills: 0     folic acid 1 MG Tabs  Commonly known as: Folvite      Take 1 tablet (1 mg total) by mouth daily.   Quantity: 90 tablet  Refills: 3     metoprolol succinate ER 50 MG Tb24  Commonly known as: Toprol XL      Take 1 tablet (50 mg total) by mouth in the morning and 1 tablet (50 mg total) before bedtime.   Refills: 0     pantoprazole 40 MG Tbec  Commonly known as: Protonix      Take 1 tablet (40 mg total) by mouth 2 (two) times daily.   Quantity: 60 tablet  Refills: 2     sertraline 100 MG Tabs  Commonly known as: Zoloft      Take 1 tablet (100 mg total) by mouth daily.   Quantity: 90 tablet  Refills: 3            STOP taking these medications      amLODIPine 5 MG Tabs  Commonly known as: Norvasc        torsemide 20 MG Tabs  Commonly known as: Demadex                  Where to Get Your Medications        These medications were sent to [id number] - [place], [place] - [address] AT [facility], [phone], [phone]  [address], [facility] [address]      Hours: 24-hours Phone: [phone]   bumetanide 1 MG Tabs        Follow-up Information       Arnoldo Corado, DO Follow up in 1 week(s).    Specialties: Cardiovascular Diseases, CARDIOLOGY  Contact information:  [address]  [address]  [place] [address]  [phone]               Ma, Alyssa Johnston MD. Call.    Specialty: GASTROENTEROLOGY  Why: As needed  Contact information:  [address]  [place] [address]  [phone]                             Hospital Discharge Diagnoses:  acute on chronic diastolic CHF    Lace+ Score: 73  59-90 High Risk  29-58 Medium Risk  0-28   Low Risk.    TCM Follow-Up Recommendation:  LACE > 58: High Risk of readmission after discharge from the hospital.        I spent >30 minutes on this discharge. Discussed treatment and discharge plans.       Elias Shipley MD

## 2024-06-28 NOTE — PROGRESS NOTES
[facility]  Progress Note     Yamini Ocampo  : 1945    Status: Inpatient  Day #: 3    Attending: Elias Shiplye MD  PCP: Kathy Anne MD     Assessment and Plan:    Acute on chronic diastolic CHF  -cardiology consulted  -f/u echo  -cont bumex IV  -monitor I/O, wt, renal function    Mild hypoxemia 2/2 CHF  Cough  -check CXR    Acute on chronic anemia  -anemia of chronic disease, r/o GI blood loss  -monitor hgb  -s/p 1 unit PRBC     H/o GERD, esophagitis, PUD, gastric IM  H/o 2 gastric ulcers on EGD   H/o hiatal hernia repair and nissen fundoplication   -cont PPI  -EGD 24 - non-erosive reflux disease, gastric erythema  -no sign of active bleed.   -Rectal exam in ED reportedly hemoccult negative  -GI consulted  -EGD  - gastric erythema, possible GAVE    Chronic afib  -hold eliquis  -cont metoprolol    DM2  -monitor BG, cover ISS    CKD3  -monitor renal function       DVT Mechanical Prophylaxis:   SCDs,    DVT Pharmacologic Prophylaxis   Medication    apixaban (Eliquis) tab 5 mg               Subjective:      Initial Chief Complaint:  fatigue    +cough today. On 2L O2. No CP.     Objective:      Temp:  [97.8 °F (36.6 °C)-98.3 °F (36.8 °C)] 98.3 °F (36.8 °C)  Pulse:  [] 90  Resp:  [14-25] 20  BP: ()/(49-77) 137/65  SpO2:  [87 %-97 %] 97 %  General:  Alert, no distress  HEENT:  Normocephalic, atraumatic  Cardiac:  Regular rate, regular rhythm  Pulmonary:  bibasilar crackles  Gastrointestinal:  Soft, non-tender, normal bowel sounds  Musculoskeletal:  No joint swelling  Extremities:  +LE edema  Neurologic:  nonfocal  Psychiatric:  Normal affect, calm and appropriate    Intake/Output Summary (Last 24 hours) at 2024 1044  Last data filed at 2024 0900  Gross per 24 hour   Intake 920 ml   Output 1750 ml   Net -830 ml         Recent Labs   Lab 24  1823 24  2316 24  0643 24  0534 24  0713   WBC 7.8  --  5.3 5.0 4.9   HGB 6.7*    < > 7.5* 7.9* 7.8*   HCT 24.0*   < > 24.3* 27.5* 27.0*   .0  --  256.0 297.0 313.0   RBC 2.22*  --  2.36* 2.63* 2.58*   .1*  --  103.0* 104.6* 104.7*   MCH 30.2  --  31.8 30.0 30.2   MCHC 27.9*  --  30.9* 28.7* 28.9*   RDW 19.9*  --  20.9* 20.5* 20.0*   NEPRELIM 6.60  --  4.05 3.83  --     < > = values in this interval not displayed.     Recent Labs   Lab 06/25/24  1217 06/25/24  1823 06/26/24  0643 06/27/24  0534 06/28/24  0713   BUN 33* 37* 35* 31* 26*   CREATSERUM 1.73* 1.79* 1.71* 1.61* 1.46*   CA 8.8 8.9 8.8 8.8 9.1   ALB 3.6  --   --   --   --     141 142 142 142   K 4.6 4.7 4.3 3.9 3.8    110 109 108 107   CO2 23.0 24.0 26.0 28.0 32.0   GLU 98 90 90 96 88   PHOS 3.7  --   --   --   --    INR  --  1.92*  --   --   --        No results found.      Medications:   pantoprazole  40 mg Oral BID AC    apixaban  5 mg Oral BID    atorvastatin  10 mg Oral Nightly    cyanocobalamin  1,000 mcg Oral Daily    folic acid  1 mg Oral Daily    sertraline  100 mg Oral Daily    metoprolol succinate ER  50 mg Oral BID    bumetanide  1 mg Intravenous BID (Diuretic)    insulin aspart  1-5 Units Subcutaneous TID CC      PRN Meds:   ipratropium-albuterol    acetaminophen    ondansetron    metoclopramide    glucose **OR** glucose **OR** glucose-vitamin C **OR** dextrose **OR** glucose **OR** glucose **OR** glucose-vitamin C    Supplementary Documentation:        MDM High. Time spent on chart/note review, review of labs/imaging, discussion with patient, physical exam, discussion with staff, consultants, coordinating care, writing progress note, discussion of plan of care.      Elias Shipley MD

## 2024-06-28 NOTE — DISCHARGE PLANNING
Patient was provided with discharge instructions, education, and follow up information. Prescriptions were already sent electronically to patient's pharmacy. Patient verbalizes understanding of follow up information, specifically PCP, cardiology, and GI. Patient has no questions after reviewing all instructions and will be going home.     Erica VERDIN, Discharge Leader [phone]

## 2024-06-28 NOTE — ADDENDUM NOTE
Addendum  created 06/28/24 0748 by Eun Baez MD    Intraprocedure Event edited, Patient device added

## 2024-06-29 LAB
BLOOD TYPE BARCODE: 600
UNIT VOLUME: 350 ML

## 2024-07-01 ENCOUNTER — PATIENT OUTREACH (OUTPATIENT)
Dept: CASE MANAGEMENT | Age: 79
End: 2024-07-01

## 2024-07-01 NOTE — PROGRESS NOTES
Left message on mailbox for patient to call nurse care manager back for transitional care management/hospital follow-up call.  Nurse care  information included in message.

## 2024-07-05 NOTE — TELEPHONE ENCOUNTER
Pt called back and appointment made    Future Appointments   Date Time Provider Department Center   7/26/2024 11:30 AM Vivi Rogers PA-C [id number]   8/1/2024 10:15 AM [facility] RESOURCE [facility] HEM ONC EMO   8/1/2024 11:00 AM Anna Sanchez APRN [facility] HEM ONC EMO

## 2024-07-05 NOTE — TELEPHONE ENCOUNTER
Left message to call back     If Pt calls back please transfer to RN's    No response letter mailed to patient.

## 2024-07-08 NOTE — PROGRESS NOTES
Multiple attempts to reach patient and messages left with no return call.  Past Transitions of Care timeframe.  Encounter closing.

## 2024-07-11 ENCOUNTER — TELEPHONE (OUTPATIENT)
Facility: CLINIC | Age: 79
End: 2024-07-11

## 2024-07-11 RX ORDER — PANTOPRAZOLE SODIUM 40 MG/1
40 TABLET, DELAYED RELEASE ORAL
Qty: 60 TABLET | Refills: 3 | Status: SHIPPED | OUTPATIENT
Start: 2024-07-11

## 2024-07-11 NOTE — TELEPHONE ENCOUNTER
Patient states that [facility] told her that prescription for pantoprazole was cancelled.  Patient is out of medication and would like prescription sent to [facility] [place].  Please call.

## 2024-07-11 NOTE — TELEPHONE ENCOUNTER
Per 6/27/24 EGD report from the hospital    Recommend:  1. Continue ppi bid  2. Ok to resume anticoagulation from my stance  3. May need APC of GAVE in future if worsening anemia    Last prescribed. Refills sent to pharmacy.         Disp Refills Start End    pantoprazole 40 MG Oral Tab EC (Discontinued) 60 tablet 2 6/11/2024 6/28/2024    Sig - Route: Take 1 tablet (40 mg total) by mouth 2 (two) times daily. - Oral    Sent to pharmacy as: Pantoprazole Sodium 40 MG Oral Tablet Delayed Release (Protonix)    Reason for Discontinue:  Stop Taking at Discharge    E-Prescribing Status: Receipt confirmed by pharmacy (6/11/2024  7:51 AM CDT)    E-Cancel Status: Request approved by pharmacy (6/28/2024  3:43 PM CDT)

## 2024-07-26 ENCOUNTER — LAB ENCOUNTER (OUTPATIENT)
Dept: LAB | Facility: HOSPITAL | Age: 79
End: 2024-07-26
Attending: PHYSICIAN ASSISTANT
Payer: MEDICARE

## 2024-07-26 ENCOUNTER — HOSPITAL ENCOUNTER (INPATIENT)
Facility: HOSPITAL | Age: 79
LOS: 7 days | Discharge: HOME | DRG: 377 | End: 2024-08-02
Attending: EMERGENCY MEDICINE | Admitting: HOSPITALIST
Payer: MEDICARE

## 2024-07-26 ENCOUNTER — HOSPITAL ENCOUNTER (INPATIENT)
Facility: HOSPITAL | Age: 79
LOS: 7 days | Discharge: HOME | End: 2024-08-02
Attending: EMERGENCY MEDICINE | Admitting: HOSPITALIST
Payer: MEDICARE

## 2024-07-26 ENCOUNTER — LAB ENCOUNTER (OUTPATIENT)
Dept: LAB | Facility: HOSPITAL | Age: 79
DRG: 377 | End: 2024-07-26
Attending: PHYSICIAN ASSISTANT
Payer: MEDICARE

## 2024-07-26 ENCOUNTER — OFFICE VISIT (OUTPATIENT)
Facility: CLINIC | Age: 79
End: 2024-07-26
Payer: MEDICARE

## 2024-07-26 ENCOUNTER — TELEPHONE (OUTPATIENT)
Facility: CLINIC | Age: 79
End: 2024-07-26

## 2024-07-26 VITALS
SYSTOLIC BLOOD PRESSURE: 145 MMHG | WEIGHT: 206 LBS | HEART RATE: 108 BPM | BODY MASS INDEX: 33.11 KG/M2 | DIASTOLIC BLOOD PRESSURE: 68 MMHG | HEIGHT: 66 IN

## 2024-07-26 DIAGNOSIS — K62.5 RECTAL BLEEDING: Primary | ICD-10-CM

## 2024-07-26 DIAGNOSIS — D64.9 ANEMIA, UNSPECIFIED TYPE: ICD-10-CM

## 2024-07-26 DIAGNOSIS — D50.0 IRON DEFICIENCY ANEMIA DUE TO CHRONIC BLOOD LOSS: ICD-10-CM

## 2024-07-26 DIAGNOSIS — D50.0 IRON DEFICIENCY ANEMIA DUE TO CHRONIC BLOOD LOSS: Primary | ICD-10-CM

## 2024-07-26 DIAGNOSIS — K31.819 GAVE (GASTRIC ANTRAL VASCULAR ECTASIA): ICD-10-CM

## 2024-07-26 LAB
ANION GAP SERPL CALC-SCNC: 8 MMOL/L (ref 0–18)
ANTIBODY SCREEN: NEGATIVE
BASOPHILS # BLD AUTO: 0.05 X10(3) UL (ref 0–0.2)
BASOPHILS # BLD AUTO: 0.07 X10(3) UL (ref 0–0.2)
BASOPHILS NFR BLD AUTO: 1.1 %
BASOPHILS NFR BLD AUTO: 1.4 %
BNP SERPL-MCNC: 1132 PG/ML
BUN BLD-MCNC: 39 MG/DL (ref 9–23)
BUN/CREAT SERPL: 22.7 (ref 10–20)
CALCIUM BLD-MCNC: 8.5 MG/DL (ref 8.7–10.4)
CHLORIDE SERPL-SCNC: 113 MMOL/L (ref 98–112)
CO2 SERPL-SCNC: 23 MMOL/L (ref 21–32)
CREAT BLD-MCNC: 1.72 MG/DL
DEPRECATED HBV CORE AB SER IA-ACNC: 20.9 NG/ML
DEPRECATED RDW RBC AUTO: 66.9 FL (ref 35.1–46.3)
DEPRECATED RDW RBC AUTO: 72.3 FL (ref 35.1–46.3)
EGFRCR SERPLBLD CKD-EPI 2021: 30 ML/MIN/1.73M2 (ref 60–?)
EOSINOPHIL # BLD AUTO: 0.02 X10(3) UL (ref 0–0.7)
EOSINOPHIL # BLD AUTO: 0.03 X10(3) UL (ref 0–0.7)
EOSINOPHIL NFR BLD AUTO: 0.4 %
EOSINOPHIL NFR BLD AUTO: 0.6 %
ERYTHROCYTE [DISTWIDTH] IN BLOOD BY AUTOMATED COUNT: 19.5 % (ref 11–15)
ERYTHROCYTE [DISTWIDTH] IN BLOOD BY AUTOMATED COUNT: 19.6 % (ref 11–15)
GLUCOSE BLD-MCNC: 94 MG/DL (ref 70–99)
HCT VFR BLD AUTO: 19 %
HCT VFR BLD AUTO: 22.3 %
HGB BLD-MCNC: 5.4 G/DL
HGB BLD-MCNC: 5.9 G/DL
IMM GRANULOCYTES # BLD AUTO: 0.01 X10(3) UL (ref 0–1)
IMM GRANULOCYTES # BLD AUTO: 0.02 X10(3) UL (ref 0–1)
IMM GRANULOCYTES NFR BLD: 0.2 %
IMM GRANULOCYTES NFR BLD: 0.4 %
IRON SATN MFR SERPL: 11 %
IRON SERPL-MCNC: 46 UG/DL
LYMPHOCYTES # BLD AUTO: 0.54 X10(3) UL (ref 1–4)
LYMPHOCYTES # BLD AUTO: 0.58 X10(3) UL (ref 1–4)
LYMPHOCYTES NFR BLD AUTO: 11.1 %
LYMPHOCYTES NFR BLD AUTO: 12.6 %
MCH RBC QN AUTO: 28.8 PG (ref 26–34)
MCH RBC QN AUTO: 29.2 PG (ref 26–34)
MCHC RBC AUTO-ENTMCNC: 26.5 G/DL (ref 31–37)
MCHC RBC AUTO-ENTMCNC: 28.4 G/DL (ref 31–37)
MCV RBC AUTO: 102.7 FL
MCV RBC AUTO: 108.8 FL
MONOCYTES # BLD AUTO: 0.38 X10(3) UL (ref 0.1–1)
MONOCYTES # BLD AUTO: 0.41 X10(3) UL (ref 0.1–1)
MONOCYTES NFR BLD AUTO: 7.8 %
MONOCYTES NFR BLD AUTO: 8.9 %
NEUTROPHILS # BLD AUTO: 3.54 X10 (3) UL (ref 1.5–7.7)
NEUTROPHILS # BLD AUTO: 3.54 X10(3) UL (ref 1.5–7.7)
NEUTROPHILS # BLD AUTO: 3.83 X10 (3) UL (ref 1.5–7.7)
NEUTROPHILS # BLD AUTO: 3.83 X10(3) UL (ref 1.5–7.7)
NEUTROPHILS NFR BLD AUTO: 76.8 %
NEUTROPHILS NFR BLD AUTO: 78.7 %
OSMOLALITY SERPL CALC.SUM OF ELEC: 307 MOSM/KG (ref 275–295)
PLATELET # BLD AUTO: 269 10(3)UL (ref 150–450)
PLATELET # BLD AUTO: 280 10(3)UL (ref 150–450)
PLATELET MORPHOLOGY: NORMAL
POTASSIUM SERPL-SCNC: 4.6 MMOL/L (ref 3.5–5.1)
RBC # BLD AUTO: 1.85 X10(6)UL
RBC # BLD AUTO: 2.05 X10(6)UL
RH BLOOD TYPE: NEGATIVE
SODIUM SERPL-SCNC: 144 MMOL/L (ref 136–145)
TIBC SERPL-MCNC: 425 UG/DL (ref 250–425)
TRANSFERRIN SERPL-MCNC: 285 MG/DL (ref 250–380)
WBC # BLD AUTO: 4.6 X10(3) UL (ref 4–11)
WBC # BLD AUTO: 4.9 X10(3) UL (ref 4–11)

## 2024-07-26 PROCEDURE — 36415 COLL VENOUS BLD VENIPUNCTURE: CPT

## 2024-07-26 PROCEDURE — 85025 COMPLETE CBC W/AUTO DIFF WBC: CPT

## 2024-07-26 PROCEDURE — 84466 ASSAY OF TRANSFERRIN: CPT

## 2024-07-26 PROCEDURE — 30233N1 TRANSFUSION OF NONAUTOLOGOUS RED BLOOD CELLS INTO PERIPHERAL VEIN, PERCUTANEOUS APPROACH: ICD-10-PCS | Performed by: EMERGENCY MEDICINE

## 2024-07-26 PROCEDURE — 99214 OFFICE O/P EST MOD 30 MIN: CPT | Performed by: PHYSICIAN ASSISTANT

## 2024-07-26 PROCEDURE — 83540 ASSAY OF IRON: CPT

## 2024-07-26 PROCEDURE — 82728 ASSAY OF FERRITIN: CPT

## 2024-07-26 PROCEDURE — 99223 1ST HOSP IP/OBS HIGH 75: CPT | Performed by: HOSPITALIST

## 2024-07-26 RX ORDER — ACETAMINOPHEN 500 MG
500 TABLET ORAL EVERY 4 HOURS PRN
Status: DISCONTINUED | OUTPATIENT
Start: 2024-07-26 | End: 2024-08-02

## 2024-07-26 RX ORDER — BUMETANIDE 0.25 MG/ML
1 INJECTION INTRAMUSCULAR; INTRAVENOUS ONCE
Status: COMPLETED | OUTPATIENT
Start: 2024-07-26 | End: 2024-07-26

## 2024-07-26 RX ORDER — ONDANSETRON 2 MG/ML
4 INJECTION INTRAMUSCULAR; INTRAVENOUS EVERY 6 HOURS PRN
Status: DISCONTINUED | OUTPATIENT
Start: 2024-07-26 | End: 2024-08-02

## 2024-07-26 RX ORDER — SPIRONOLACTONE 25 MG/1
125 TABLET ORAL DAILY
Status: ON HOLD | COMMUNITY
Start: 2024-07-18

## 2024-07-26 RX ORDER — FUROSEMIDE 10 MG/ML
20 INJECTION INTRAMUSCULAR; INTRAVENOUS ONCE
Status: DISCONTINUED | OUTPATIENT
Start: 2024-07-26 | End: 2024-07-26

## 2024-07-26 RX ORDER — BUMETANIDE 0.25 MG/ML
1 INJECTION INTRAMUSCULAR; INTRAVENOUS
Status: DISCONTINUED | OUTPATIENT
Start: 2024-07-26 | End: 2024-08-02

## 2024-07-26 RX ORDER — METOCLOPRAMIDE HYDROCHLORIDE 5 MG/ML
10 INJECTION INTRAMUSCULAR; INTRAVENOUS EVERY 8 HOURS PRN
Status: DISCONTINUED | OUTPATIENT
Start: 2024-07-26 | End: 2024-08-02

## 2024-07-26 NOTE — PROGRESS NOTES
[facility] - Gastroenterology                                                                                                               Reason for consult:   Chief Complaint   Patient presents with    ER F/U       Requesting physician or provider: Kathy Anne MD      HPI:   Yamini Ocampo is a 79 year old year-old female with history of BMI 33.86, HTN, HLD, DM2, CKD3, Anemia of chronic disease, PUD, GERD, Ventricular diastolic dysfunction, HFpEF, Pulmonary artery HTN, CAD, Chronic Afib (on eliquis) who presents to the ED with acute on chronic diastolic HF and anemia. Patient ultimately underwent repeat EGD during admission. 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 No APC performed. She presents today for follow up.     Continues to take pantoprazole 40 mg twice a day. She had an episode of emesis this am that was the juice she had this am. She notes she had some mild discomfort to her abdomen abdomen, but that has since resolved. No nausea.   Appetite has been down since the hospitalization.     Has BM every other day. Can have straining on a regular basis. She notes she has had dark stools in the past, but not during her bowel movement today. No bright red blood, no melena this morning with small BM. Weight has been stable.     She notes increased shortness of breath over the last week. Denies CP. Notes dizziness, but states that is her baseline and it has not worsening.     Pertinent Family Hx:  - No known history of esophageal, gastric or colon cancers  - No known IBD  - No known liver pathologies     Endoscopy Hx:  Last colonoscopy: 10/7/20 normal to TI. Path neg for microscopic colitis     - EGD 6/2024  Impression:   1. 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 APC not performed today given food debris in stomach.   2. Food retention in stomach and esophagus    - EGD 1/2024  Impression:  1. Non-erosive reflux disease.  2. Gastric erythema s/p  biopsies (mapping) due to hx of gastric IM.     Final Diagnosis:      A. Gastric antrum lesser curvature; biopsy:  Fragments of gastric antral type mucosa with mild foveolar hyperplasia and mild chronic inactive gastritis.  Diff-Quik stain (appropriate control reactivity) shows no definitive evidence of H. pylori-like microorganisms.  No evidence of intestinal metaplasia, dysplasia or carcinoma identified.      B. Gastric antrum greater curvature; biopsy:  Fragments of gastric mucosa with focal mild chronic inactive gastritis.  Diff-Quik stain (appropriate control reactivity) shows no definitive evidence of H. pylori-like microorganisms.  No evidence of intestinal metaplasia, dysplasia or carcinoma identified.      C. Gastric incisura; biopsy:  Fragments of gastric antral and oxyntic type mucosa with mild chronic inactive gastritis.  Diff-Quik stain (appropriate control reactivity) shows no definitive evidence of H. pylori-like microorganisms.  No evidence of intestinal metaplasia, dysplasia or carcinoma identified.       D. Gastric body lesser; biopsy:  Fragments of gastric oxyntic type mucosa with mild chronic inactive gastritis.  Diff-Quik stain (appropriate control reactivity) shows no definitive evidence of H. pylori-like microorganisms.  No evidence of intestinal metaplasia, dysplasia or carcinoma identified.       E. Gastric body greater; biopsy:  Fragments of gastric oxyntic type mucosa with focal mild chronic inactive gastritis.  Diff-Quik stain (appropriate control reactivity) shows no definitive evidence of H. pylori-like microorganisms.  No evidence of intestinal metaplasia, dysplasia or carcinoma identified.      Social Hx:  - No tobacco use  - No ETOH  - Denies cannabis/illicit drug use      Wt Readings from Last 6 Encounters:   07/26/24 206 lb (93.4 kg)   06/28/24 195 lb 9.6 oz (88.7 kg)   05/13/24 211 lb 3.2 oz (95.8 kg)   05/09/24 211 lb (95.7 kg)   05/03/24 219 lb (99.3 kg)   04/15/24 215 lb 14.4  oz (97.9 kg)        History, Medications, Allergies, ROS:      Past Medical History:    Arrhythmia    afib    Back problem    lumbar disc disease    Basal cell carcinoma of nose    Bilateral carpal tunnel syndrome    Cataract    Cellulitis    Deep vein thrombosis (HCC)    unsure which leg, possibly left    Depression    Esophageal reflux    Essential hypertension    Heart disease    Heel spur    Right    Hemorrhoids    Hiatal hernia    High blood pressure    High cholesterol    Hyperlipidemia    Incontinence    Lumbar disc disease    Multiple gastric ulcers    Osteoarthritis    Pulmonary embolism (HCC)    Raynaud disease    Renal disorder    Tubular adenoma of colon    repeat c-scope 1/2020    Type 2 diabetes mellitus with diabetic chronic kidney disease (HCC)    Visual impairment    glasses      Past Surgical History:   Procedure Laterality Date    Angioplasty (coronary)      Appendectomy      Arthroscopy of joint unlisted Right     knee    Bso, omentectomy w/sushil      Carpal tunnel release Bilateral     Cholecystectomy  09/28/2016    Colonoscopy N/A 01/25/2017    Procedure: COLONOSCOPY;  Surgeon: KATHARINE Prakash MD;  Location: [facility] ENDOSCOPY    Colonoscopy N/A 10/07/2020    Procedure: COLONOSCOPY;  Surgeon: KATHARINE Prakash MD;  Location: [facility] ENDOSCOPY    Egd N/A 12/14/2016    Procedure: ESOPHAGOGASTRODUODENOSCOPY (EGD);  Surgeon: KATHARINE Prakash MD;  Location: [facility] ENDOSCOPY    Egd N/A 01/23/2024    ; gastritis    Electrocardiogram, complete  09/26/2013    Scanned to Media Tab    Excision of nose      Basal cell carcinoma    Hernia surgery      Hysterectomy      Knee replacement surgery Right     Other surgical history      Injections and narcotics, POD Bartuci    Removal of heel spur      Total abdom hysterectomy        Family Hx:   Family History   Problem Relation Age of Onset    Cancer Father         Skin cancer    Other (Other) Father 97        old age,unknown caused    Heart Attack Mother     Heart  Disorder Mother     Hypertension Other         Family H/O    Cancer Other         Lymphoma  Family H/O    Heart Disease Other         Family H/O    Arthritis Other         Close relative  unsure which type    No Known Problems Brother       Social History:   Social History     Socioeconomic History    Marital status:    Tobacco Use    Smoking status: Former     Current packs/day: 0.00     Types: Cigarettes     Quit date: 1997     Years since quittin.5    Smokeless tobacco: Never   Vaping Use    Vaping status: Never Used   Substance and Sexual Activity    Alcohol use: No     Comment: rare    Drug use: Never     Comment: edibles occasionally for sleep   Other Topics Concern    Caffeine Concern Yes     Comment: 8 cups coffee/sod daily    Reaction to local anesthetic No     Social Determinants of Health     Financial Resource Strain: Low Risk  (3/14/2023)    Financial Resource Strain     Difficulty of Paying Living Expenses: Not very hard     Med Affordability: No   Food Insecurity: No Food Insecurity (2024)    Food Insecurity     Food Insecurity: Never true   Transportation Needs: No Transportation Needs (2024)    Transportation Needs     Lack of Transportation: No   Housing Stability: Low Risk  (2024)    Housing Stability     Housing Instability: No        Medications (Active prior to today's visit):  Current Outpatient Medications   Medication Sig Dispense Refill    spironolactone 25 MG Oral Tab Take 5 tablets (125 mg total) by mouth daily.      pantoprazole 40 MG Oral Tab EC Take 1 tablet (40 mg total) by mouth 2 (two) times daily before meals. 60 tablet 3    bumetanide 1 MG Oral Tab Take 1 tablet (1 mg total) by mouth BID (Diuretic). 60 tablet 0    sertraline 100 MG Oral Tab Take 1 tablet (100 mg total) by mouth daily. 90 tablet 3    atorvastatin 10 MG Oral Tab Take 1 tablet (10 mg total) by mouth nightly. 90 tablet 3    FARXIGA 10 MG Oral Tab Take 1 tablet (10 mg total) by mouth  daily.      albuterol 108 (90 Base) MCG/ACT Inhalation Aero Soln SHAKE BEFORE USE AND TAKE 2 PUFFS INTO THE LUNGS EVERY 6 HOURS AS NEEDED      folic acid 1 MG Oral Tab Take 1 tablet (1 mg total) by mouth daily. 90 tablet 3    METOPROLOL SUCCINATE ER 50 MG Oral Tablet 24 Hr Take 1 tablet (50 mg total) by mouth in the morning and 1 tablet (50 mg total) before bedtime.      cyanocobalamin 1000 MCG Oral Tab Take 1 tablet (1,000 mcg total) by mouth daily.      ELIQUIS 5 MG Oral Tab Take 1 tablet (5 mg total) by mouth 2 (two) times daily. 180 tablet 0       Allergies:  Allergies   Allergen Reactions    Adhesive Tape RASH     Skin off       ROS:   CONSTITUTIONAL: negative for fevers, chills, sweats and weight loss  EYES Negative for red eyes, yellow eyes, changes in vision  HEENT: Negative for dysphagia and hoarseness  RESPIRATORY: Negative for cough and shortness of breath  CARDIOVASCULAR: Negative for chest pain, palpitations  GASTROINTESTINAL: See HPI  GENITOURINARY: Negative for dysuria and frequency  MUSCULOSKELETAL: Negative for arthralgias and myalgias  NEUROLOGICAL: Negative for dizziness and headaches  BEHAVIOR/PSYCH: Negative for anxiety and poor appetite    PHYSICAL EXAM:   Blood pressure 145/68, pulse 108, height 5' 6\" (1.676 m), weight 206 lb (93.4 kg).    GEN: WD/WN, NAD  HEENT: Supple symmetrical, trachea midline  CV: RRR, the extremities are warm and well perfused   LUNGS: No increased work of breathing  ABDOMEN: No scars, normal bowel sounds, soft, non-tender, non-distended no rebound or guarding, no masses, no hepatomegaly  MSK: No redness, no warmth, no swelling of joints  SKIN: No jaundice, no erythema, no rashes  HEMATOLOGIC: No bleeding, no bruising  NEURO: Alert and interactive, normal gait    Labs/Imaging/Procedures:     Patient's pertinent labs and imaging were reviewed and discussed with patient today.     Lab Results   Component Value Date    WBC 4.9 06/28/2024    RBC 2.58 (L) 06/28/2024    HGB  7.8 (L) 06/28/2024    HCT 27.0 (L) 06/28/2024    .7 (H) 06/28/2024    MCH 30.2 06/28/2024    MCHC 28.9 (L) 06/28/2024    RDW 20.0 (H) 06/28/2024    .0 06/28/2024    MPV 9.2 01/24/2019        Lab Results   Component Value Date    GLU 88 06/28/2024    BUN 26 (H) 06/28/2024    BUNCREA 17.8 06/28/2024    CREATSERUM 1.46 (H) 06/28/2024    ANIONGAP 3 06/28/2024    GFRNAA 44 (L) 05/23/2022    GFRAA 51 (L) 05/23/2022    CA 9.1 06/28/2024    OSMOCALC 298 (H) 06/28/2024    ALKPHO 64 03/22/2024    AST 38 (H) 03/22/2024    ALT 16 03/22/2024    ALKPHOS 68 06/21/2016    BILT 0.7 03/22/2024    TP 6.5 03/22/2024    ALB 3.6 06/25/2024    GLOBULIN 2.6 (L) 03/22/2024    AGRATIO 1.3 06/21/2016     06/28/2024    K 3.8 06/28/2024     06/28/2024    CO2 32.0 06/28/2024        XR CHEST AP PORTABLE  (CPT=71045)    Result Date: 6/28/2024  PROCEDURE: XR CHEST AP PORTABLE  (CPT=71045) TIME: 1057 hours.   COMPARISON: [facility], XR CHEST AP PORTABLE (CPT=71045), 6/25/2024, 6:41 PM.  [facility], XR CHEST AP PORTABLE (CPT=71045), 11/20/2023, 4:34 PM.  [facility], XR CHEST AP PORTABLE (CPT=71045), 3/07/2023, 9:16 AM.  INDICATIONS: Hypoxemia  TECHNIQUE:   Single view.   FINDINGS:  CARDIAC/VASC: Heart is enlarged.  Aortic atherosclerosis. MEDIAST/CAMACHO:   No visible mass or adenopathy. LUNGS/PLEURA: Mild pulmonary edema and bilateral pleural effusions with associated atelectasis.  No pneumothorax. BONES: No fracture or visible bony lesion. OTHER: Negative.          CONCLUSION: Mild pulmonary edema and bilateral pleural effusions with associated atelectasis, similar to the prior exam.    Dictated by (CST): Bobby Mcmanus MD on 6/28/2024 at 12:18 PM     Finalized by (CST): Bobby Mcmanus MD on 6/28/2024 at 12:18 PM                .  ASSESSMENT/PLAN:   Yamini Ocampo is a 79 year old year-old female with history of BMI 33.86, HTN, HLD, DM2, CKD3, Anemia of chronic disease, PUD, GERD,  Ventricular diastolic dysfunction, HFpEF, Pulmonary artery HTN, CAD, Chronic Afib (on eliquis) who presents to the ED with acute on chronic diastolic HF and anemia. Patient ultimately underwent repeat EGD during admission. 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 No APC performed. She presents today for follow up.     #GAVE  #DEVEN  #+/- melena  -patient with increased SOB and possible melena earlier this week, I recommend ER visit patient declined and wanted to get lab work done first to see if her hemoglobin has declined since last check  -discussed concern that she is having an upper GIB, patient understood risk  -plan to get lab work done today  -continue PPI BID at this time  -educated on when to go to ER for worsening symptoms or if having overt bleeding  -patient has follow up with hematology on 8/1   -patient to call office with any further questions    Patient acknowledged understanding today. All questions answered.       ADDENDUM: Hemoglobin 5.9 today. Called patient and advised for ER for immediate evaluation. Patient acknowledged understanding and will get to ER as soon as possible.       Orders This Visit:  Orders Placed This Encounter   Procedures    CBC W Differential W Platelet    Iron And Tibc    Ferritin       Meds This Visit:  Requested Prescriptions      No prescriptions requested or ordered in this encounter       Imaging & Referrals:  None      Vivi Rogers PA-C   7/26/2024        This note was partially prepared using Dragon Medical voice recognition dictation software. As a result, errors may occur. When identified, these errors have been corrected. While every attempt is made to correct errors during dictation, discrepancies may still exist.

## 2024-07-26 NOTE — ED QUICK NOTES
Orders for admission, patient is aware of plan and ready to go upstairs. Any questions, please call ED VELVET wu  at extension [phone].   Type of COVID test sent:  COVID Suspicion level: none    Titratable drug(s) infusing:  Rate:  protonix  LOC at time of transport: ax4    Other pertinent information  RBC to be transfused

## 2024-07-26 NOTE — PATIENT INSTRUCTIONS
Recommendations:  - get lab work done today  - Vivi will call with lab results  - if any further dark black stools or worsening of shortness of breath advise to go to ER  -continue pantoprazole  -keep follow up with Dr. Nunes for 8/1/2024

## 2024-07-26 NOTE — ED INITIAL ASSESSMENT (HPI)
Patient stating she was advised to come into the ER because her Hgb was low today. Patient states she has had black stools for the past 7-10 days. +Vomiting but denies blood. +Pale. +Fatigue.

## 2024-07-26 NOTE — TELEPHONE ENCOUNTER
Called and reviewed lab work with patient.     Hemoglobin extremely low. She needs to come to ER for admission. Patient acknowledged understanding and will be in the ER as soon as possible. She will need admission for blood transfusion and likely another EGD tomorrow am.     Called and let ER know she was coming. Advised plan for transfusion and tentative EGD tomorrow am.       Vivi Rogers PA-C

## 2024-07-27 ENCOUNTER — ANESTHESIA (OUTPATIENT)
Dept: ENDOSCOPY | Facility: HOSPITAL | Age: 79
End: 2024-07-27
Payer: MEDICARE

## 2024-07-27 ENCOUNTER — ANESTHESIA EVENT (OUTPATIENT)
Dept: ENDOSCOPY | Facility: HOSPITAL | Age: 79
End: 2024-07-27
Payer: MEDICARE

## 2024-07-27 LAB
ANION GAP SERPL CALC-SCNC: 9 MMOL/L (ref 0–18)
BASOPHILS # BLD AUTO: 0.09 X10(3) UL (ref 0–0.2)
BASOPHILS NFR BLD AUTO: 2.1 %
BUN BLD-MCNC: 29 MG/DL (ref 9–23)
BUN/CREAT SERPL: 17 (ref 10–20)
CALCIUM BLD-MCNC: 8.8 MG/DL (ref 8.7–10.4)
CHLORIDE SERPL-SCNC: 113 MMOL/L (ref 98–112)
CO2 SERPL-SCNC: 20 MMOL/L (ref 21–32)
CREAT BLD-MCNC: 1.71 MG/DL
DEPRECATED RDW RBC AUTO: 63.5 FL (ref 35.1–46.3)
EGFRCR SERPLBLD CKD-EPI 2021: 30 ML/MIN/1.73M2 (ref 60–?)
EOSINOPHIL # BLD AUTO: 0.05 X10(3) UL (ref 0–0.7)
EOSINOPHIL NFR BLD AUTO: 1.1 %
ERYTHROCYTE [DISTWIDTH] IN BLOOD BY AUTOMATED COUNT: 17.7 % (ref 11–15)
GLUCOSE BLD-MCNC: 95 MG/DL (ref 70–99)
HCT VFR BLD AUTO: 25.2 %
HGB BLD-MCNC: 7.3 G/DL
IMM GRANULOCYTES # BLD AUTO: 0.01 X10(3) UL (ref 0–1)
IMM GRANULOCYTES NFR BLD: 0.2 %
LYMPHOCYTES # BLD AUTO: 0.77 X10(3) UL (ref 1–4)
LYMPHOCYTES NFR BLD AUTO: 17.6 %
MCH RBC QN AUTO: 29.6 PG (ref 26–34)
MCHC RBC AUTO-ENTMCNC: 29 G/DL (ref 31–37)
MCV RBC AUTO: 102 FL
MONOCYTES # BLD AUTO: 0.44 X10(3) UL (ref 0.1–1)
MONOCYTES NFR BLD AUTO: 10 %
NEUTROPHILS # BLD AUTO: 3.02 X10 (3) UL (ref 1.5–7.7)
NEUTROPHILS # BLD AUTO: 3.02 X10(3) UL (ref 1.5–7.7)
NEUTROPHILS NFR BLD AUTO: 69 %
OSMOLALITY SERPL CALC.SUM OF ELEC: 300 MOSM/KG (ref 275–295)
PLATELET # BLD AUTO: 272 10(3)UL (ref 150–450)
POTASSIUM SERPL-SCNC: 5 MMOL/L (ref 3.5–5.1)
RBC # BLD AUTO: 2.47 X10(6)UL
SODIUM SERPL-SCNC: 142 MMOL/L (ref 136–145)
WBC # BLD AUTO: 4.4 X10(3) UL (ref 4–11)

## 2024-07-27 PROCEDURE — 43235 EGD DIAGNOSTIC BRUSH WASH: CPT | Performed by: INTERNAL MEDICINE

## 2024-07-27 PROCEDURE — 0DJ08ZZ INSPECTION OF UPPER INTESTINAL TRACT, VIA NATURAL OR ARTIFICIAL OPENING ENDOSCOPIC: ICD-10-PCS | Performed by: INTERNAL MEDICINE

## 2024-07-27 PROCEDURE — 99232 SBSQ HOSP IP/OBS MODERATE 35: CPT | Performed by: STUDENT IN AN ORGANIZED HEALTH CARE EDUCATION/TRAINING PROGRAM

## 2024-07-27 RX ORDER — FOLIC ACID 1 MG/1
1 TABLET ORAL DAILY
Status: DISCONTINUED | OUTPATIENT
Start: 2024-07-27 | End: 2024-08-02

## 2024-07-27 RX ORDER — NALOXONE HYDROCHLORIDE 0.4 MG/ML
80 INJECTION, SOLUTION INTRAMUSCULAR; INTRAVENOUS; SUBCUTANEOUS AS NEEDED
Status: DISCONTINUED | OUTPATIENT
Start: 2024-07-27 | End: 2024-07-27 | Stop reason: HOSPADM

## 2024-07-27 RX ORDER — ALBUTEROL SULFATE 90 UG/1
2 AEROSOL, METERED RESPIRATORY (INHALATION) EVERY 6 HOURS PRN
Status: DISCONTINUED | OUTPATIENT
Start: 2024-07-27 | End: 2024-08-02

## 2024-07-27 RX ORDER — SERTRALINE HYDROCHLORIDE 100 MG/1
100 TABLET, FILM COATED ORAL DAILY
Status: DISCONTINUED | OUTPATIENT
Start: 2024-07-27 | End: 2024-08-02

## 2024-07-27 RX ORDER — LIDOCAINE HYDROCHLORIDE 10 MG/ML
INJECTION, SOLUTION EPIDURAL; INFILTRATION; INTRACAUDAL; PERINEURAL AS NEEDED
Status: DISCONTINUED | OUTPATIENT
Start: 2024-07-27 | End: 2024-07-27 | Stop reason: SURG

## 2024-07-27 RX ORDER — MAGNESIUM OXIDE 400 MG (241.3 MG MAGNESIUM) TABLET
1000 TABLET DAILY
Status: DISCONTINUED | OUTPATIENT
Start: 2024-07-27 | End: 2024-08-02

## 2024-07-27 RX ORDER — SODIUM CHLORIDE, SODIUM LACTATE, POTASSIUM CHLORIDE, CALCIUM CHLORIDE 600; 310; 30; 20 MG/100ML; MG/100ML; MG/100ML; MG/100ML
INJECTION, SOLUTION INTRAVENOUS CONTINUOUS PRN
Status: DISCONTINUED | OUTPATIENT
Start: 2024-07-27 | End: 2024-07-27 | Stop reason: SURG

## 2024-07-27 RX ORDER — SODIUM CHLORIDE, SODIUM LACTATE, POTASSIUM CHLORIDE, CALCIUM CHLORIDE 600; 310; 30; 20 MG/100ML; MG/100ML; MG/100ML; MG/100ML
INJECTION, SOLUTION INTRAVENOUS CONTINUOUS
Status: DISCONTINUED | OUTPATIENT
Start: 2024-07-27 | End: 2024-07-27

## 2024-07-27 RX ORDER — ATORVASTATIN CALCIUM 10 MG/1
10 TABLET, FILM COATED ORAL NIGHTLY
Status: DISCONTINUED | OUTPATIENT
Start: 2024-07-27 | End: 2024-08-02

## 2024-07-27 RX ADMIN — LIDOCAINE HYDROCHLORIDE 25 MG: 10 INJECTION, SOLUTION EPIDURAL; INFILTRATION; INTRACAUDAL; PERINEURAL at 10:41:00

## 2024-07-27 RX ADMIN — SODIUM CHLORIDE, SODIUM LACTATE, POTASSIUM CHLORIDE, CALCIUM CHLORIDE: 600; 310; 30; 20 INJECTION, SOLUTION INTRAVENOUS at 10:40:00

## 2024-07-27 RX ADMIN — SODIUM CHLORIDE, SODIUM LACTATE, POTASSIUM CHLORIDE, CALCIUM CHLORIDE: 600; 310; 30; 20 INJECTION, SOLUTION INTRAVENOUS at 10:45:00

## 2024-07-27 NOTE — ED PROVIDER NOTES
Patient Seen in: [facility] 5sw/se    History     Chief Complaint   Patient presents with    Abnormal Labs     Stated Complaint: sent by , low Hgb    HPI  Patient complains of dark stool all week, seen md had labs done noted to be anemic hgb < 7.  Was admitted diagnosed with gastritis on EGD.  Risk factors for GI bleeding include: known gastritis.   Associated symptoms: fatigye. Swelling in legs, sob/sampson  Past Medical History:    Arrhythmia    afib    Back problem    lumbar disc disease    Basal cell carcinoma of nose    Bilateral carpal tunnel syndrome    Cataract    Cellulitis    Deep vein thrombosis (HCC)    unsure which leg, possibly left    Depression    Esophageal reflux    Essential hypertension    Heart disease    Heel spur    Right    Hemorrhoids    Hiatal hernia    High blood pressure    High cholesterol    Hyperlipidemia    Incontinence    Lumbar disc disease    Multiple gastric ulcers    Osteoarthritis    Pulmonary embolism (HCC)    Raynaud disease    Renal disorder    Tubular adenoma of colon    repeat c-scope 1/2020    Type 2 diabetes mellitus with diabetic chronic kidney disease (HCC)    Visual impairment    glasses       Past Surgical History:   Procedure Laterality Date    Angioplasty (coronary)      Appendectomy      Arthroscopy of joint unlisted Right     knee    Bso, omentectomy w/sushil      Carpal tunnel release Bilateral     Cholecystectomy  09/28/2016    Colonoscopy N/A 01/25/2017    Procedure: COLONOSCOPY;  Surgeon: KATHARINE Prakash MD;  Location: [facility] ENDOSCOPY    Colonoscopy N/A 10/07/2020    Procedure: COLONOSCOPY;  Surgeon: KATHARINE Prakash MD;  Location: [facility] ENDOSCOPY    Egd N/A 12/14/2016    Procedure: ESOPHAGOGASTRODUODENOSCOPY (EGD);  Surgeon: KATHARINE Prakash MD;  Location: [facility] ENDOSCOPY    Egd N/A 01/23/2024    ; gastritis    Electrocardiogram, complete  09/26/2013    Scanned to Media Tab    Excision of nose      Basal cell carcinoma    Hernia surgery       Hysterectomy      Knee replacement surgery Right     Other surgical history      Injections and narcotics, POD Bartuci    Removal of heel spur      Total abdom hysterectomy              Family History   Problem Relation Age of Onset    Cancer Father         Skin cancer    Other (Other) Father 97        old age,unknown caused    Heart Attack Mother     Heart Disorder Mother     Hypertension Other         Family H/O    Cancer Other         Lymphoma  Family H/O    Heart Disease Other         Family H/O    Arthritis Other         Close relative  unsure which type    No Known Problems Brother        Social History     Socioeconomic History    Marital status:    Tobacco Use    Smoking status: Former     Current packs/day: 0.00     Types: Cigarettes     Quit date: 1997     Years since quittin.5    Smokeless tobacco: Never   Vaping Use    Vaping status: Never Used   Substance and Sexual Activity    Alcohol use: No     Comment: rare    Drug use: Never     Comment: edibles occasionally for sleep   Other Topics Concern    Caffeine Concern Yes     Comment: 8 cups coffee/sod daily    Reaction to local anesthetic No     Social Determinants of Health     Financial Resource Strain: Low Risk  (3/14/2023)    Financial Resource Strain     Difficulty of Paying Living Expenses: Not very hard     Med Affordability: No   Food Insecurity: No Food Insecurity (2024)    Food Insecurity     Food Insecurity: Never true   Transportation Needs: No Transportation Needs (2024)    Transportation Needs     Lack of Transportation: No   Housing Stability: Low Risk  (2024)    Housing Stability     Housing Instability: No       Review of Systems    Positive for stated complaint: sent by Dr, low Hgb  Other systems are as noted in HPI.  Constitutional and vital signs reviewed.      All other systems reviewed and negative except as noted above.    PSFH elements reviewed from today and agreed except as otherwise stated in  HPI.    Physical Exam     ED Triage Vitals [07/26/24 1743]   /67   Pulse 103   Resp 20   Temp 98.9 °F (37.2 °C)   Temp src Temporal   SpO2 98 %   O2 Device None (Room air)       Current:/66 (BP Location: Right arm)   Pulse 82   Temp 97.9 °F (36.6 °C) (Oral)   Resp 16   Wt 91.5 kg   SpO2 94%   BMI 32.57 kg/m²   PULSE OX nl  GENERAL: appears pale  HEAD: normocephalic, atraumatic  EYES: PERRLA, EOMI,THROAT: mmm, no lesions  NECK: supple, no meningeal signs  LUNGS: no resp distress, cta bilateral  CARDIO: RRR without murmur  GI: soft, non-tender, normal bowel sounds  RECTAL: heme + stool.  EXTREMITIES: moves all 4 spont, no edema  NEURO: alert, oriented x 3, 2-12 intact, no focal deficits appreciated  SKIN: good skin turgor, no  rashes  PSYCH: calm, cooperative,    Differential includes:  pud with bleeding ulcer vs. gastritis vs. AVM vs. colitis s.  diverticulosis vs. medication side effect         ED Course     Labs Reviewed   BASIC METABOLIC PANEL (8) - Abnormal; Notable for the following components:       Result Value    Chloride 113 (*)     BUN 39 (*)     Creatinine 1.72 (*)     BUN/CREA Ratio 22.7 (*)     Calcium, Total 8.5 (*)     Calculated Osmolality 307 (*)     eGFR-Cr 30 (*)     All other components within normal limits   BNP (B TYPE NATRIURETIC PEPTIDE) - Abnormal; Notable for the following components:    Beta Natriuretic Peptide 1,132 (*)     All other components within normal limits   BASIC METABOLIC PANEL (8) - Abnormal; Notable for the following components:    Chloride 113 (*)     CO2 20.0 (*)     BUN 29 (*)     Creatinine 1.71 (*)     Calculated Osmolality 300 (*)     eGFR-Cr 30 (*)     All other components within normal limits   CBC W/ DIFFERENTIAL - Abnormal; Notable for the following components:    RBC 1.85 (*)     HGB 5.4 (*)     HCT 19.0 (*)     .7 (*)     MCHC 28.4 (*)     RDW-SD 66.9 (*)     RDW 19.5 (*)     Lymphocyte Absolute 0.58 (*)     All other components within  normal limits   CBC W/ DIFFERENTIAL - Abnormal; Notable for the following components:    RBC 2.47 (*)     HGB 7.3 (*)     HCT 25.2 (*)     .0 (*)     MCHC 29.0 (*)     RDW-SD 63.5 (*)     RDW 17.7 (*)     Lymphocyte Absolute 0.77 (*)     All other components within normal limits   CBC WITH DIFFERENTIAL WITH PLATELET    Narrative:     The following orders were created for panel order CBC With Differential With Platelet.  Procedure                               Abnormality         Status                     ---------                               -----------         ------                     CBC W/ DIFFERENTIAL[[id number]]          Abnormal            Final result                 Please view results for these tests on the individual orders.   CBC WITH DIFFERENTIAL WITH PLATELET    Narrative:     The following orders were created for panel order CBC With Differential With Platelet.  Procedure                               Abnormality         Status                     ---------                               -----------         ------                     CBC W/ DIFFERENTIAL[[id number]]          Abnormal            Final result                 Please view results for these tests on the individual orders.   TYPE AND SCREEN    Narrative:     The following orders were created for panel order Type and screen.  Procedure                               Abnormality         Status                     ---------                               -----------         ------                     ABORH (Blood Type)[[id number]]                               Final result               Antibody Screen[[id number]]                                  Final result                 Please view results for these tests on the individual orders.   PREPARE RBC   ABORH (BLOOD TYPE)   ANTIBODY SCREEN       MDM     @No results found.    Monitor Interpretation:  Nsr 76.    Radiology Interpretation:    No results found.    Medical Decision Making  Problems  Addressed:  Anemia, unspecified type: acute illness or injury with systemic symptoms that poses a threat to life or bodily functions  Rectal bleeding: acute illness or injury with systemic symptoms that poses a threat to life or bodily functions    Amount and/or Complexity of Data Reviewed  Labs: ordered. Decision-making details documented in ED Course.  ECG/medicine tests: ordered and independent interpretation performed. Decision-making details documented in ED Course.  Discussion of management or test interpretation with external provider(s): Transfuse prbc's, protonix, lasix, consult GI Dr. Prakash, Dr. Fitzgerald in ER for admission, consult cardiology.    I spent a total of 40 minutes of critical care time in obtaining history, performing a physical exam, bedside monitoring of interventions, collecting and interpreting tests and discussion with consultants but not including time spent performing procedures.      Risk  Drug therapy requiring intensive monitoring for toxicity.  Decision regarding hospitalization.            Disposition and Plan     Clinical Impression:  1. Rectal bleeding    2. Anemia, unspecified type        Disposition:  Admit    Follow-up:  No follow-up provider specified.    Medications Prescribed:  Current Discharge Medication List          Hospital Problems       Present on Admission  Date Reviewed: 7/26/2024            ICD-10-CM Noted POA    * (Principal) Rectal bleeding K62.5 7/26/2024 Unknown    Anemia, unspecified type D64.9 6/25/2024 Unknown    GI bleed K92.2 6/25/2024 Unknown

## 2024-07-27 NOTE — OPERATIVE REPORT
ESOPHAGOGASTRODUODENOSCOPY (EGD) REPORT    Yamini Ocampo     1945 Age 79 year old   PCP Kathy Anne MD Endoscopist Jennie Prakash MD     Date of procedure: 24    Procedure: EGD     Pre-operative diagnosis: Anemia, GI bleeding    Post-operative diagnosis: Retained food in esophagus    Medications: MAC    Complications: none    Procedure:  Informed consent was obtained from the patient after the risks of the procedure were discussed, including but not limited to bleeding, perforation, aspiration, infection, or possibility of a missed lesion. After discussions of the risks/benefits and alternatives to this procedure, as well as the planned sedation, the patient was placed in the left lateral decubitus position and begun on continuous blood pressure pulse oximetry and EKG monitoring and this was maintained throughout the procedure. Once an adequate level of sedation was obtained a bite block was placed. Then the lubricated tip of the [id number] diagnostic video upper endoscope was inserted and advanced using direct visualization into the posterior pharynx and ultimately into the esophagus.    Complications: None    Findings:      1. Esophagus: Immediately upon entering the esophagus, there was a significant amount of liquid and pieces of solid food in the esophagus. No obvious impaction noted, but given risk of aspiration the procedure was aborted. No blood was seen.    Impression:  1. Unable to examine stomach today due to retained food in the esophagus. No blood was seen to reflux.   2. Will need to repeat EGD with a more empty esophagus to reduce risk of aspiration.    Recommend:  1. Full liquid diet.  2. Hold anticoagulation if possible.  3. Trend Hgb.   4. Plan for repeat EGD on Monday - will also allow for more time off anticoagulation so APC can be performed safely.  5. Use reglan to empty food out of the stomach as well.   6. Continue PPI IV.    Specimens: none  Blood loss:  <1 ml

## 2024-07-27 NOTE — ANESTHESIA PREPROCEDURE EVALUATION
Anesthesia PreOp Note    HPI:     Yamini Ocampo is a 79 year old female who presents for preoperative consultation requested by: KATHARINE Prakash MD    Date of Surgery: 7/26/2024 - 7/27/2024    Procedure(s):  ESOPHAGOGASTRODUODENOSCOPY (EGD)  Indication: GI bleed    Relevant Problems   No relevant active problems       NPO:  Last Liquid Consumption Date: 07/26/24     Last Solid Consumption Date: 07/26/24     Last Liquid Consumption Date: 07/26/24          History Review:  Patient Active Problem List    Diagnosis Date Noted    Rectal bleeding 07/26/2024    Anemia, unspecified type 06/25/2024    GI bleed 06/25/2024    Dyspepsia 01/23/2024    Intestinal metaplasia of gastric mucosa 01/23/2024    Anemia, unspecified 01/05/2024    Iron deficiency anemia secondary to inadequate dietary iron intake 01/05/2024    Acute on chronic congestive heart failure, unspecified heart failure type ([facility]) 03/07/2023    Type 2 diabetes mellitus with diabetic chronic kidney disease ([facility]) 11/30/2022    Anemia due to vitamin B12 deficiency 08/01/2022    Macrocytic anemia 04/26/2022    Hyperpigmented skin lesion 04/26/2022    CREST syndrome ([facility]) 04/12/2022    Type 2 diabetes mellitus with stage 3b chronic kidney disease, without long-term current use of insulin ([facility]) 04/12/2022    Prediabetes 06/30/2021    Diverticulosis of colon     Osteoarthritis of left knee 01/31/2019    Raynaud's disease without gangrene 03/02/2018    Osteoarthritis of right knee 10/12/2017    Osteoarthritis 10/12/2017    History of ischemic colitis 09/28/2017    CKD (chronic kidney disease) stage 3, GFR 30-59 ml/min ([facility]) 06/26/2017    Mesenteric ischemia, chronic ([facility]) 04/26/2017    A-fib ([facility])     Polyp of colon     Gastroesophageal reflux disease without esophagitis     Hiatal hernia with GERD and esophagitis 09/01/2016    Ventral hernia without obstruction or gangrene 05/19/2016    Gastric erythema 04/28/2016    Essential hypertension 08/07/2015    Sleep apnea  10/01/2013    Congestive heart failure (HCC) 08/27/2013    Peripheral vascular disease (HCC) 08/27/2013    Major depressive disorder, single episode, moderate (HCC) 06/20/2011    Vitamin D deficiency 06/20/2011    Class 2 severe obesity due to excess calories with serious comorbidity and body mass index (BMI) of 39.0 to 39.9 in adult (HCC) 03/05/2009       Past Medical History:    Arrhythmia    afib    Back problem    lumbar disc disease    Basal cell carcinoma of nose    Bilateral carpal tunnel syndrome    Cataract    Cellulitis    Deep vein thrombosis (HCC)    unsure which leg, possibly left    Depression    Esophageal reflux    Essential hypertension    Heart disease    Heel spur    Right    Hemorrhoids    Hiatal hernia    High blood pressure    High cholesterol    Hyperlipidemia    Incontinence    Lumbar disc disease    Multiple gastric ulcers    Osteoarthritis    Pulmonary embolism (HCC)    Raynaud disease    Renal disorder    Tubular adenoma of colon    repeat c-scope 1/2020    Type 2 diabetes mellitus with diabetic chronic kidney disease (HCC)    Visual impairment    glasses       Past Surgical History:   Procedure Laterality Date    Angioplasty (coronary)      Appendectomy      Arthroscopy of joint unlisted Right     knee    Bso, omentectomy w/sushil      Carpal tunnel release Bilateral     Cholecystectomy  09/28/2016    Colonoscopy N/A 01/25/2017    Procedure: COLONOSCOPY;  Surgeon: KATHARINE Prakash MD;  Location: [facility] ENDOSCOPY    Colonoscopy N/A 10/07/2020    Procedure: COLONOSCOPY;  Surgeon: KATHARINE Prakash MD;  Location: [facility] ENDOSCOPY    Egd N/A 12/14/2016    Procedure: ESOPHAGOGASTRODUODENOSCOPY (EGD);  Surgeon: KATHARINE Prakash MD;  Location: [facility] ENDOSCOPY    Egd N/A 01/23/2024    ; gastritis    Egd N/A 07/27/2024    ;    Electrocardiogram, complete  09/26/2013    Scanned to Media Tab    Excision of nose      Basal cell carcinoma    Hernia surgery      Hysterectomy      Knee replacement  surgery Right     Other surgical history      Injections and narcotics, POD Bartuci    Removal of heel spur      Total abdom hysterectomy         Facility-Administered Medications Prior to Admission   Medication Dose Route Frequency Provider Last Rate Last Admin    darbepoetin molina (Aranesp) 200 MCG/0.4ML injection 200 mcg  200 mcg Subcutaneous Q4 Week Manan Pizarro MD   200 mcg at 05/29/24 1016     Medications Prior to Admission   Medication Sig Dispense Refill Last Dose    pantoprazole 40 MG Oral Tab EC Take 1 tablet (40 mg total) by mouth 2 (two) times daily before meals. 60 tablet 3 7/26/2024 at 0900    sertraline 100 MG Oral Tab Take 1 tablet (100 mg total) by mouth daily. 90 tablet 3 7/26/2024 at 0900    atorvastatin 10 MG Oral Tab Take 1 tablet (10 mg total) by mouth nightly. 90 tablet 3 7/25/2024 at 2100    folic acid 1 MG Oral Tab Take 1 tablet (1 mg total) by mouth daily. 90 tablet 3 7/26/2024 at 0900    METOPROLOL SUCCINATE ER 50 MG Oral Tablet 24 Hr Take 1 tablet (50 mg total) by mouth in the morning and 1 tablet (50 mg total) before bedtime.   7/26/2024 at 0900    cyanocobalamin 1000 MCG Oral Tab Take 1 tablet (1,000 mcg total) by mouth daily.   7/26/2024 at 0900    ELIQUIS 5 MG Oral Tab Take 1 tablet (5 mg total) by mouth 2 (two) times daily. 180 tablet 0 7/26/2024 at 0900    spironolactone 25 MG Oral Tab Take 5 tablets (125 mg total) by mouth daily.       bumetanide 1 MG Oral Tab Take 1 tablet (1 mg total) by mouth BID (Diuretic). 60 tablet 0     FARXIGA 10 MG Oral Tab Take 1 tablet (10 mg total) by mouth daily.       albuterol 108 (90 Base) MCG/ACT Inhalation Aero Soln SHAKE BEFORE USE AND TAKE 2 PUFFS INTO THE LUNGS EVERY 6 HOURS AS NEEDED        Current Facility-Administered Medications Ordered in Epic   Medication Dose Route Frequency Provider Last Rate Last Admin    pantoprazole (Protonix) 40 mg in sodium chloride 0.9% PF 10 mL IV push  40 mg Intravenous Q12H Brigitte Freeman MD   40 mg at  24 0859    acetaminophen (Tylenol Extra Strength) tab 500 mg  500 mg Oral Q4H PRN Hillary Fitzgerald MD        ondansetron (Zofran) 4 MG/2ML injection 4 mg  4 mg Intravenous Q6H PRN Hillary Fitzgerald MD        metoclopramide (Reglan) 5 mg/mL injection 10 mg  10 mg Intravenous Q8H PRN Hillary Fitzgerald MD        bumetanide (Bumex) 0.25 MG/ML injection 1 mg  1 mg Intravenous BID (Diuretic) Hillary Fitzgerald MD   1 mg at 24 0859     No current Epic-ordered outpatient medications on file.       Allergies   Allergen Reactions    Adhesive Tape RASH     Skin off       Family History   Problem Relation Age of Onset    Cancer Father         Skin cancer    Other (Other) Father 97        old age,unknown caused    Heart Attack Mother     Heart Disorder Mother     Hypertension Other         Family H/O    Cancer Other         Lymphoma  Family H/O    Heart Disease Other         Family H/O    Arthritis Other         Close relative  unsure which type    No Known Problems Brother      Social History     Socioeconomic History    Marital status:    Tobacco Use    Smoking status: Former     Current packs/day: 0.00     Types: Cigarettes     Quit date: 1997     Years since quittin.5    Smokeless tobacco: Never   Vaping Use    Vaping status: Never Used   Substance and Sexual Activity    Alcohol use: No     Comment: rare    Drug use: Never     Comment: edibles occasionally for sleep   Other Topics Concern    Caffeine Concern Yes     Comment: 8 cups coffee/sod daily    Reaction to local anesthetic No       Available pre-op labs reviewed.  Lab Results   Component Value Date    WBC 4.4 2024    RBC 2.47 (L) 2024    HGB 7.3 (L) 2024    HCT 25.2 (L) 2024    .0 (H) 2024    MCH 29.6 2024    MCHC 29.0 (L) 2024    RDW 17.7 (H) 2024    .0 2024     Lab Results   Component Value Date     2024    K 5.0 2024     (H) 2024    CO2 20.0  (L) 07/27/2024    BUN 29 (H) 07/27/2024    CREATSERUM 1.71 (H) 07/27/2024    GLU 95 07/27/2024    PGLU 118 (H) 06/28/2024    CA 8.8 07/27/2024          Vital Signs:  Body mass index is 32.57 kg/m².   height is 1.676 m (5' 6\") and weight is 91.5 kg (201 lb 12.8 oz). Her oral temperature is 97.9 °F (36.6 °C). Her blood pressure is 158/63 and her pulse is 86. Her respiration is 12 and oxygen saturation is 94%.   Vitals:    07/27/24 0659 07/27/24 0857 07/27/24 1015 07/27/24 1026   BP: 150/78 143/66 158/63    Pulse: 79 82 86    Resp: 16 16 12    Temp:  97.9 °F (36.6 °C)     TempSrc:  Oral     SpO2: 93% 94% 94%    Weight:       Height:    1.676 m (5' 6\")        Anesthesia Evaluation     Patient summary reviewed and Nursing notes reviewed    Airway   Mallampati: II  TM distance: >3 FB  Neck ROM: full  Dental      Pulmonary - normal exam    breath sounds clear to auscultation  (+) sleep apnea  Cardiovascular - normal exam  (+) hypertension, CHF    Rhythm: regular  Rate: normal    Neuro/Psych    (+)  neuromuscular disease,  depression      GI/Hepatic/Renal    (+) GERD    Endo/Other    (+) diabetes mellitus  Abdominal                  Anesthesia Plan:   ASA:  3  Emergent    Plan:   MAC  Informed Consent Plan and Risks Discussed With:  Patient      I have informed Yamini Ocampo and/or legal guardian or family member of the nature of the anesthetic plan, benefits, risks including possible dental damage if relevant, major complications, and any alternative forms of anesthetic management.   All of the patient's questions were answered to the best of my ability. The patient desires the anesthetic management as planned.  MAYCOL SALCEDO MD  7/27/2024 10:32 AM  Present on Admission:  **None**

## 2024-07-27 NOTE — ED QUICK NOTES
Of note: Blood transfusion not started prior to pt transported to IP assigned ready bed. Per ED charge RN blood transfusion does not need to be started in ED if pt has ready assigned bed to not delay throughput.

## 2024-07-27 NOTE — PLAN OF CARE
Alert and oriented x 4. Up with standby assist. Frequent ambulation encouraged. Voiding via purewick. Denies pain. Vital signs monitored. No acute changes noted throughout shift. Tolerating full liquid diet. Fall precautions in place- bed in lowest position, call light and personal belongings within reach, non-skid socks in place. Frequent rounding by nursing staff. Unsuccessful EGD today, PRN Reglan utilized to enhance gut motility, plan is EGD on Monday.    Problem: Patient Centered Care  Goal: Patient preferences are identified and integrated in the patient's plan of care  Description: Interventions:  - What would you like us to know as we care for you? \"I am from home with my daughter.\"  - Provide timely, complete, and accurate information to patient/family  - Incorporate patient and family knowledge, values, beliefs, and cultural backgrounds into the planning and delivery of care  - Encourage patient/family to participate in care and decision-making at the level they choose  - Honor patient and family perspectives and choices  Outcome: Progressing     Problem: Patient/Family Goals  Goal: Patient/Family Long Term Goal  Description: Patient's Long Term Goal: \"to go home\"    Interventions:  -Follow up MD appt  -Take meds as prescribed  -Use of assistive device if needed  - See additional Care Plan goals for specific interventions  Outcome: Progressing  Goal: Patient/Family Short Term Goal  Description: Patient's Short Term Goal: \"to prevent low hgb\"    Interventions:   -Monitor VS  -Check labs results  -Proper diet  -Provide pain meds as prescribed  - See additional Care Plan goals for specific interventions  Outcome: Progressing     Problem: HEMATOLOGIC - ADULT  Goal: Maintains hematologic stability  Description: INTERVENTIONS  - Assess for signs and symptoms of bleeding or hemorrhage  - Monitor labs and vital signs for trends  - Administer supportive blood products/factors, fluids and medications as ordered and  appropriate  - Administer supportive blood products/factors as ordered and appropriate  Outcome: Progressing  Goal: Free from bleeding injury  Description: (Example usage: patient with low platelets)  INTERVENTIONS:  - Avoid intramuscular injections, enemas and rectal medication administration  - Ensure safe mobilization of patient  - Hold pressure on venipuncture sites to achieve adequate hemostasis  - Assess for signs and symptoms of internal bleeding  - Monitor lab trends  - Patient is to report abnormal signs of bleeding to staff  - Avoid use of toothpicks and dental floss  - Use electric shaver for shaving  - Use soft bristle tooth brush  - Limit straining and forceful nose blowing  Outcome: Progressing     Problem: SAFETY ADULT - FALL  Goal: Free from fall injury  Description: INTERVENTIONS:  - Assess pt frequently for physical needs  - Identify cognitive and physical deficits and behaviors that affect risk of falls.  - [place] fall precautions as indicated by assessment.  - Educate pt/family on patient safety including physical limitations  - Instruct pt to call for assistance with activity based on assessment  - Modify environment to reduce risk of injury  - Provide assistive devices as appropriate  - Consider OT/PT consult to assist with strengthening/mobility  - Encourage toileting schedule  Outcome: Progressing     Problem: DISCHARGE PLANNING  Goal: Discharge to home or other facility with appropriate resources  Description: INTERVENTIONS:  - Identify barriers to discharge w/pt and caregiver  - Include patient/family/discharge partner in discharge planning  - Arrange for needed discharge resources and transportation as appropriate  - Identify discharge learning needs (meds, wound care, etc)  - Arrange for interpreters to assist at discharge as needed  - Consider post-discharge preferences of patient/family/discharge partner  - Complete POLST form as appropriate  - Assess patient's ability to be  responsible for managing their own health  - Refer to Case Management Department for coordinating discharge planning if the patient needs post-hospital services based on physician/LIP order or complex needs related to functional status, cognitive ability or social support system  Outcome: Progressing

## 2024-07-27 NOTE — CM/SW NOTE
07/27/24 1500   Readmission Assessment   Factors that patient feels contributed to this readmission Acute/Chronic Clinical Presentation   Pt's living situation prior to admission? Home with family   Pt's level of independence at discharge? No assist/independent (minimal)   Pt. received education on diagnoses at time of discharge? Yes   Did you know who and how to call someone if you felt worse? Yes   Did any new symptoms or issues develop after you were discharged? Yes   ----Post D/C symptoms: Symptoms/issue related to previous hospitalization No   Did you understand your discharge instructions? Yes   Were medications taken as indicated on discharge instructions? Yes   Did you have a follow-up appointment scheduled at time of discharge? Yes   ----F/U appt: Did you go to that appointment? Yes   ---- F/U appt: How many days after discharge was follow-up appointment? 0-14 days   Was the recommended discharge plan achieved? Yes   Was pt. discharged w/out services? No       CM self-referred for discharge planning. Patient is 30 day readmission - presenting with rectal bleeding, unrelated symptom to previous admission.  Per chart review, plan for EGD Monday 7/29.    Patient with history of CHF, previous admission r/t CHF exacerbation.    CM sent tentative HH referrals for medical mgmt/monitoring r/t significant medical history, and readmission. Orders/Face to face entered.    CM/SW will need to meet with patient/family prior to discharge to determine if agreeable to HH arrangement, provide list/confirm choice agency.    / to remain available for support and/or discharge planning.     Plan: Home with Home Health (pending patient approval, list/choice agency) once medically cleared    Kenia Jones RN, BSN  Nurse   [phone]

## 2024-07-27 NOTE — ANESTHESIA POSTPROCEDURE EVALUATION
Patient: Yamini Ocampo    Procedure Summary       Date: 07/27/24 Room / Location: [facility] ENDOSCOPY 01 / [facility] ENDOSCOPY    Anesthesia Start: 1038 Anesthesia Stop: 1052    Procedure: ESOPHAGOGASTRODUODENOSCOPY (EGD) Diagnosis: (retained food)    Surgeons: KATHARINE Prakash MD Anesthesiologist: Maycol Salcedo MD    Anesthesia Type: MAC ASA Status: 3 - Emergent            Anesthesia Type: MAC    Vitals Value Taken Time   BP 96/39 07/27/24 1051   Temp  07/27/24 1054   Pulse 80 07/27/24 1053   Resp 21 07/27/24 1053   SpO2 96 % 07/27/24 1053   Vitals shown include unfiled device data.    [facility] AN Post Evaluation:   Patient Evaluated in PACU  Patient Participation: complete - patient participated  Level of Consciousness: awake and alert  Pain Management: adequate  Airway Patency:patent  Dental exam unchanged from preop  Yes    Nausea/Vomiting: none  Cardiovascular Status: acceptable  Respiratory Status: acceptable  Postoperative Hydration acceptable      MAYCOL SALCEDO MD  7/27/2024 10:54 AM

## 2024-07-27 NOTE — INTERVAL H&P NOTE
Pre-op Diagnosis: GI bleed    The above referenced H&P was reviewed by KATHARINE Prakash MD on 7/27/2024, the patient was examined and no significant changes have occurred in the patient's condition since the H&P was performed.  I discussed with the patient and/or legal representative the potential benefits, risks and side effects of this procedure; the likelihood of the patient achieving goals; and potential problems that might occur during recuperation.  I discussed reasonable alternatives to the procedure, including risks, benefits and side effects related to the alternatives and risks related to not receiving this procedure.  We will proceed with procedure as planned.

## 2024-07-27 NOTE — H&P
[facility]    PATIENT'S NAME: CATARINA DAVIS   ATTENDING PHYSICIAN: Wander Bonner MD   PATIENT ACCOUNT#:   [id number]    LOCATION:  [address]  MEDICAL RECORD #:   [id number]       YOB: 1945  ADMISSION DATE:       07/26/2024    HISTORY AND PHYSICAL EXAMINATION    CHIEF COMPLAINT:  Melena, gastrointestinal bleed, and acute on chronic diastolic heart failure.    HISTORY OF PRESENT ILLNESS:  The patient is an 79-year-old  female with underlying valvular heart disease status post TAVR procedure and left ventricular diastolic dysfunction who presented to the emergency department after she had a blood test done as an outpatient showing hemoglobin of 5.9 which is the lower baseline usually runs 7.5 to 8.  Chemistry profile is still pending.  Patient was initiated in the emergency room on IV Bumex and IV Protonix, and she will be admitted to the hospital for further management.    PAST MEDICAL HISTORY:  She was hospitalized in June 2024 for upper gastrointestinal bleed, upper endoscopy was suggestive for gastric antral vascular ectasia.  No APC was done at that time secondary to food particles.  She had history of hypertension, hyperlipidemia, prediabetic state, chronic kidney disease stage 3, anemia of chronic kidney disease, peptic ulcer disease, gastroesophageal reflux disease, chronic gastritis, left ventricular diastolic dysfunction with chronic heart failure, preserved ejection fraction, moderate pulmonary artery hypertension, nonobstructive coronary artery disease, chronic atrial fibrillation, anticoagulated with Eliquis.    PAST SURGICAL HISTORY:  Appendectomy, bilateral carpal tunnel release, bilateral total knee arthroplasties, nasal basal cell carcinoma resection, cholecystectomy, hysterectomy, and TAVR procedure.    MEDICATIONS:  Please see medication reconciliation list.    ALLERGIES:  Adhesive tape.     FAMILY HISTORY:  Mother had coronary artery disease.  Father had  skin cancer.    SOCIAL HISTORY:  No tobacco, alcohol, or drug use.  Lives with her daughter.  Usually independent for basic activities of daily living.    REVIEW OF SYSTEMS:  Patient reports progressive shortness of breath, leg edema.  She tries to comply with low salt diet.  Also, she has been noticing dark bowel movement with progressive fatigue.  No chest pain.  Other 12-point review of systems negative.      PHYSICAL EXAMINATION:    GENERAL:  Pale in color.  Alert and oriented to time, place, and person.  Mild distress.  VITAL SIGNS:  Temperature 98.9, pulse 100.3, respiratory rate 20, blood pressure 151/67, pulse ox 98% on room air.  HEENT:  Atraumatic.  Oropharynx clear.   NECK:  Supple.  No lymphadenopathy.  Positive jugular venous distention.  LUNGS:  Diminished breathing sounds both lung bases.  HEART:  Irregular rhythm.  S1, S2 auscultated.   ABDOMEN:  Soft, nondistended.  No tenderness.  Positive bowel sounds.  EXTREMITIES:  +2 edema on both legs.  No clubbing or cyanosis.   NEUROLOGIC:  Motor and sensory intact.     ASSESSMENT AND PLAN:    1.   Melena, gastrointestinal bleed, upper source.  2.   Chronic atrial fibrillation anticoagulated with Eliquis.  3.   Essential hypertension.  4.   Acute on chronic diastolic heart failure.    Patient will be admitted to telemetry floor.  IV Bumex.  IV Protonix.  Clear liquid diet.  Gastroenterology and cardiology consult.  Hold Eliquis.  Further recommendations to follow.    Dictated By Hillary Fitzgerald MD  d: 07/26/2024 19:01:21  t: 07/26/2024 19:58:52  Job [id number]  FB/

## 2024-07-27 NOTE — PLAN OF CARE
Problem: Patient Centered Care  Goal: Patient preferences are identified and integrated in the patient's plan of care  Description: Interventions:  - What would you like us to know as we care for you? \"I am from home with my daughter.\"  - Provide timely, complete, and accurate information to patient/family  - Incorporate patient and family knowledge, values, beliefs, and cultural backgrounds into the planning and delivery of care  - Encourage patient/family to participate in care and decision-making at the level they choose  - Honor patient and family perspectives and choices  Outcome: Progressing     Problem: Patient/Family Goals  Goal: Patient/Family Long Term Goal  Description: Patient's Long Term Goal: \"to go home\"    Interventions:  -Follow up MD appt  -Take meds as prescribed  -Use of assistive device if needed  - See additional Care Plan goals for specific interventions  Outcome: Progressing  Goal: Patient/Family Short Term Goal  Description: Patient's Short Term Goal: \"to prevent low hgb\"    Interventions:   -Monitor VS  -Check labs results  -Proper diet  -Provide pain meds as prescribed  - See additional Care Plan goals for specific interventions  Outcome: Progressing     Problem: HEMATOLOGIC - ADULT  Goal: Maintains hematologic stability  Description: INTERVENTIONS  - Assess for signs and symptoms of bleeding or hemorrhage  - Monitor labs and vital signs for trends  - Administer supportive blood products/factors, fluids and medications as ordered and appropriate  - Administer supportive blood products/factors as ordered and appropriate  Outcome: Progressing  Goal: Free from bleeding injury  Description: (Example usage: patient with low platelets)  INTERVENTIONS:  - Avoid intramuscular injections, enemas and rectal medication administration  - Ensure safe mobilization of patient  - Hold pressure on venipuncture sites to achieve adequate hemostasis  - Assess for signs and symptoms of internal bleeding  -  Monitor lab trends  - Patient is to report abnormal signs of bleeding to staff  - Avoid use of toothpicks and dental floss  - Use electric shaver for shaving  - Use soft bristle tooth brush  - Limit straining and forceful nose blowing  Outcome: Progressing     Problem: SAFETY ADULT - FALL  Goal: Free from fall injury  Description: INTERVENTIONS:  - Assess pt frequently for physical needs  - Identify cognitive and physical deficits and behaviors that affect risk of falls.  - Vance fall precautions as indicated by assessment.  - Educate pt/family on patient safety including physical limitations  - Instruct pt to call for assistance with activity based on assessment  - Modify environment to reduce risk of injury  - Provide assistive devices as appropriate  - Consider OT/PT consult to assist with strengthening/mobility  - Encourage toileting schedule  Outcome: Progressing     Problem: DISCHARGE PLANNING  Goal: Discharge to home or other facility with appropriate resources  Description: INTERVENTIONS:  - Identify barriers to discharge w/pt and caregiver  - Include patient/family/discharge partner in discharge planning  - Arrange for needed discharge resources and transportation as appropriate  - Identify discharge learning needs (meds, wound care, etc)  - Arrange for interpreters to assist at discharge as needed  - Consider post-discharge preferences of patient/family/discharge partner  - Complete POLST form as appropriate  - Assess patient's ability to be responsible for managing their own health  - Refer to Case Management Department for coordinating discharge planning if the patient needs post-hospital services based on physician/LIP order or complex needs related to functional status, cognitive ability or social support system  Outcome: Progressing       Patient is alert and oriented x 4, with admitting diagnosis of rectal bleeding. She denies to be in any pain or discomfort at this time. Initial hgb of 5.4, 1  unit of PRBC given, repeat hgb: 7.4. Safety and fall precautions initiated, bed alarm on. Call light within reach. Frequent rounding by nursing staff.

## 2024-07-27 NOTE — PROGRESS NOTES
Progress Note     Yamini Ocampo Patient Status:  Inpatient    1945 MRN [id number]   Location [facility] 5SW/SE Attending Brigitte Freeman MD   Hosp Day # 1 PCP Kathy Anne MD       Subjective:   S: Patient underwent EGD but unsuccessful due to retained food products in esophagus.     Review of Systems:   10 point ROS completed and was negative, except for pertinent positive and negatives stated in subjective.    Objective:   Vital signs:  Temp:  [97.7 °F (36.5 °C)-99.3 °F (37.4 °C)] 97.7 °F (36.5 °C)  Pulse:  [] 82  Resp:  [12-27] 16  BP: ()/(45-92) 132/77  SpO2:  [91 %-99 %] 93 %    Wt Readings from Last 6 Encounters:   24 201 lb 12.8 oz (91.5 kg)   24 206 lb (93.4 kg)   24 195 lb 9.6 oz (88.7 kg)   24 211 lb 3.2 oz (95.8 kg)   24 211 lb (95.7 kg)   24 219 lb (99.3 kg)         Physical Exam:    General: No acute distress. Sleepy but arousable  Respiratory: Clear to auscultation bilaterally. No wheezes. No rhonchi.  Cardiovascular: S1, S2. Regular rate and rhythm. No murmurs, rubs or gallops.   Abdomen: Soft, nontender, nondistended.  Positive bowel sounds. No rebound or guarding.  Neurologic: No focal neurological deficits.   Musculoskeletal: Moves all extremities.  Extremities: No edema.    Results:   Diagnostic Data:      Labs:    Labs Last 24 Hours:   BMP     CBC    Other     Na 142 Cl 113 BUN 29 Glu 95   Hb 7.3   PTT - Procal -   K 5.0 CO2 20.0 Cr 1.71   WBC 4.4 >< .0  INR - CRP -   Renal Lytes Endo    Hct 25.2   Trop - D dim -   eGFR - Ca 8.8 POC Gluc  -    LFT   pBNP - Lactic -   eGFR AA - PO4 - A1c -   AST - APk - Prot -  LDL -     Mg - TSH -   ALT - T sis - Alb -        COVID-19 Lab Results    COVID-19  Lab Results   Component Value Date    COVID19 Not Detected 2024    COVID19 Not Detected 2023    COVID19 Not Detected 2021       Pro-Calcitonin  No results for input(s): \"PCT\" in the last 168  hours.    Cardiac  No results for input(s): \"TROP\", \"PBNP\" in the last 168 hours.    Creatinine Kinase  No results for input(s): \"CK\" in the last 168 hours.    Inflammatory Markers  Recent Labs   Lab 07/26/24  1243   KATIE 20.9       Imaging: Imaging data reviewed in Epic.    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 No APC was done at that time secondary to food particles.   - this admission received 1U pRBC transfusion with appropriate response  - s/p EGD 7/27 unsuccessful due to retention of food in esophagus  - GI following, plan for repeat EGD on 7/29  - cont PPI IV BID  - hold further eliquis    Chronic atrial fibrillation anticoagulated with Eliquis.  - hold home eliquis in setting of anemia bleeding  - cont tele monitoring    Chronic Medical Problems:   GERD  Essential hypertension.  Acute on chronic diastolic heart failure.         MDM: moderate complexity- severe exacerbation of chronic illness posing a threat to life. IV meds requiring close inpatient monitoring.    I personally spent time on chart/note review, review of labs/imaging, discussion with patient, physical exam, coordinating care, writing progress note, and discussion of plan of care.        Brigitte Freeman MD    Supplementary Documentation:         **Certification      PHYSICIAN Certification of Need for Inpatient Hospitalization - Initial Certification    Patient will require inpatient services that will reasonably be expected to span two midnight's based on the clinical documentation in H+P.   Based on patients current state of illness, I anticipate that, after discharge, patient will require TBD.

## 2024-07-28 LAB
ANION GAP SERPL CALC-SCNC: 7 MMOL/L (ref 0–18)
BASOPHILS # BLD AUTO: 0.06 X10(3) UL (ref 0–0.2)
BASOPHILS NFR BLD AUTO: 1.2 %
BUN BLD-MCNC: 33 MG/DL (ref 9–23)
BUN/CREAT SERPL: 20.6 (ref 10–20)
CALCIUM BLD-MCNC: 9.4 MG/DL (ref 8.7–10.4)
CHLORIDE SERPL-SCNC: 108 MMOL/L (ref 98–112)
CO2 SERPL-SCNC: 28 MMOL/L (ref 21–32)
CREAT BLD-MCNC: 1.6 MG/DL
DEPRECATED RDW RBC AUTO: 65.1 FL (ref 35.1–46.3)
EGFRCR SERPLBLD CKD-EPI 2021: 32 ML/MIN/1.73M2 (ref 60–?)
EOSINOPHIL # BLD AUTO: 0.11 X10(3) UL (ref 0–0.7)
EOSINOPHIL NFR BLD AUTO: 2.1 %
ERYTHROCYTE [DISTWIDTH] IN BLOOD BY AUTOMATED COUNT: 19 % (ref 11–15)
GLUCOSE BLD-MCNC: 92 MG/DL (ref 70–99)
HCT VFR BLD AUTO: 25.9 %
HGB BLD-MCNC: 7.5 G/DL
IMM GRANULOCYTES # BLD AUTO: 0.02 X10(3) UL (ref 0–1)
IMM GRANULOCYTES NFR BLD: 0.4 %
LYMPHOCYTES # BLD AUTO: 0.6 X10(3) UL (ref 1–4)
LYMPHOCYTES NFR BLD AUTO: 11.7 %
MCH RBC QN AUTO: 29 PG (ref 26–34)
MCHC RBC AUTO-ENTMCNC: 29 G/DL (ref 31–37)
MCV RBC AUTO: 100 FL
MONOCYTES # BLD AUTO: 0.44 X10(3) UL (ref 0.1–1)
MONOCYTES NFR BLD AUTO: 8.5 %
NEUTROPHILS # BLD AUTO: 3.92 X10 (3) UL (ref 1.5–7.7)
NEUTROPHILS # BLD AUTO: 3.92 X10(3) UL (ref 1.5–7.7)
NEUTROPHILS NFR BLD AUTO: 76.1 %
OSMOLALITY SERPL CALC.SUM OF ELEC: 303 MOSM/KG (ref 275–295)
PLATELET # BLD AUTO: 269 10(3)UL (ref 150–450)
POTASSIUM SERPL-SCNC: 4 MMOL/L (ref 3.5–5.1)
RBC # BLD AUTO: 2.59 X10(6)UL
SODIUM SERPL-SCNC: 143 MMOL/L (ref 136–145)
WBC # BLD AUTO: 5.2 X10(3) UL (ref 4–11)

## 2024-07-28 PROCEDURE — 99233 SBSQ HOSP IP/OBS HIGH 50: CPT | Performed by: STUDENT IN AN ORGANIZED HEALTH CARE EDUCATION/TRAINING PROGRAM

## 2024-07-28 PROCEDURE — 99232 SBSQ HOSP IP/OBS MODERATE 35: CPT | Performed by: INTERNAL MEDICINE

## 2024-07-28 RX ORDER — METOPROLOL SUCCINATE 50 MG/1
50 TABLET, EXTENDED RELEASE ORAL
Status: DISCONTINUED | OUTPATIENT
Start: 2024-07-29 | End: 2024-08-02

## 2024-07-28 RX ORDER — SPIRONOLACTONE 25 MG/1
25 TABLET ORAL DAILY
Status: DISCONTINUED | OUTPATIENT
Start: 2024-07-29 | End: 2024-08-02

## 2024-07-28 NOTE — CONSULTS
Patient is a 79 year old female who was admitted to the hospital for Rectal bleeding:  Very pleasant lady known to me comes back into the hospital after recurrent gastrointestinal hemorrhage.  Patient was noted to have melanotic stools and there is thought of upper GI bleeding because of that.  She denied having any abdominal pain nausea vomiting.  She denied having any chest pain or shortness of breath.  She did notice lower extremity were tight.  She had been feeling extremely tired and she was found to have a very low hemoglobin at 5.9.  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  She has been on Eliquis anticoagulation for her atrial fibrillation.  She has known nonobstructive coronary artery disease as well as moderate pulmonary hypertension and chronic diastolic heart failure.    Past Medical History:   Past Medical History:    Arrhythmia    afib    Back problem    lumbar disc disease    Basal cell carcinoma of nose    Bilateral carpal tunnel syndrome    Cataract    Cellulitis    Deep vein thrombosis (HCC)    unsure which leg, possibly left    Depression    Esophageal reflux    Essential hypertension    Heart disease    Heel spur    Right    Hemorrhoids    Hiatal hernia    High blood pressure    High cholesterol    Hyperlipidemia    Incontinence    Lumbar disc disease    Multiple gastric ulcers    Osteoarthritis    Pulmonary embolism (HCC)    Raynaud disease    Renal disorder    Tubular adenoma of colon    repeat c-scope 1/2020    Type 2 diabetes mellitus with diabetic chronic kidney disease (HCC)    Visual impairment    glasses       Past Surgical History:  Past Surgical History:   Procedure Laterality Date    Angioplasty (coronary)      Appendectomy      Arthroscopy of joint unlisted Right     knee    Bso, omentectomy w/sushil      Carpal tunnel release Bilateral     Cholecystectomy  09/28/2016    Colonoscopy N/A 01/25/2017     Procedure: COLONOSCOPY;  Surgeon: KATHARINE Prakash MD;  Location: [facility] ENDOSCOPY    Colonoscopy N/A 10/07/2020    Procedure: COLONOSCOPY;  Surgeon: KATHARINE Prakash MD;  Location: [facility] ENDOSCOPY    Egd N/A 12/14/2016    Procedure: ESOPHAGOGASTRODUODENOSCOPY (EGD);  Surgeon: KATHARINE Prakash MD;  Location: [facility] ENDOSCOPY    Egd N/A 01/23/2024    ; gastritis    Egd N/A 07/27/2024    ; retained food    Electrocardiogram, complete  09/26/2013    Scanned to Media Tab    Excision of nose      Basal cell carcinoma    Hernia surgery      Hysterectomy      Knee replacement surgery Right     Other surgical history      Injections and narcotics, POD Bartuci    Removal of heel spur      Total abdom hysterectomy         Family History:  Family History   Problem Relation Age of Onset    Cancer Father         Skin cancer    Other (Other) Father 97        old age,unknown caused    Heart Attack Mother     Heart Disorder Mother     Hypertension Other         Family H/O    Cancer Other         Lymphoma  Family H/O    Heart Disease Other         Family H/O    Arthritis Other         Close relative  unsure which type    No Known Problems Brother        Social History:  Pediatric History   Patient Parents    Not on file     Other Topics Concern     Service Not Asked    Blood Transfusions Not Asked    Caffeine Concern Yes     Comment: 8 cups coffee/sod daily    Occupational Exposure Not Asked    Hobby Hazards Not Asked    Sleep Concern Not Asked    Stress Concern Not Asked    Weight Concern Not Asked    Special Diet Not Asked    Back Care Not Asked    Exercise Not Asked    Bike Helmet Not Asked    Seat Belt Not Asked    Self-Exams Not Asked    Grew up on a farm Not Asked    History of tanning Not Asked    Outdoor occupation Not Asked    Breast feeding Not Asked    Reaction to local anesthetic No   Social History Narrative    Not on file           Current Medications:  Current Facility-Administered Medications    Medication Dose Route Frequency    sodium ferric gluconate (Ferrlecit) 125 mg in sodium chloride 0.9% 100mL IVPB premix  125 mg Intravenous Once    pantoprazole (Protonix) 40 mg in sodium chloride 0.9% PF 10 mL IV push  40 mg Intravenous Q12H    albuterol (Ventolin HFA) 108 (90 Base) MCG/ACT inhaler 2 puff  2 puff Inhalation Q6H PRN    spironolactone (Aldactone) tab 125 mg  125 mg Oral Daily    atorvastatin (Lipitor) tab 10 mg  10 mg Oral Nightly    cyanocobalamin (Vitamin B12) tab 1,000 mcg  1,000 mcg Oral Daily    folic acid (Folvite) tab 1 mg  1 mg Oral Daily    sertraline (Zoloft) tab 100 mg  100 mg Oral Daily    acetaminophen (Tylenol Extra Strength) tab 500 mg  500 mg Oral Q4H PRN    ondansetron (Zofran) 4 MG/2ML injection 4 mg  4 mg Intravenous Q6H PRN    metoclopramide (Reglan) 5 mg/mL injection 10 mg  10 mg Intravenous Q8H PRN    bumetanide (Bumex) 0.25 MG/ML injection 1 mg  1 mg Intravenous BID (Diuretic)     Medications Prior to Admission   Medication Sig    pantoprazole 40 MG Oral Tab EC Take 1 tablet (40 mg total) by mouth 2 (two) times daily before meals.    sertraline 100 MG Oral Tab Take 1 tablet (100 mg total) by mouth daily.    atorvastatin 10 MG Oral Tab Take 1 tablet (10 mg total) by mouth nightly.    folic acid 1 MG Oral Tab Take 1 tablet (1 mg total) by mouth daily.    METOPROLOL SUCCINATE ER 50 MG Oral Tablet 24 Hr Take 1 tablet (50 mg total) by mouth in the morning and 1 tablet (50 mg total) before bedtime.    cyanocobalamin 1000 MCG Oral Tab Take 1 tablet (1,000 mcg total) by mouth daily.    ELIQUIS 5 MG Oral Tab Take 1 tablet (5 mg total) by mouth 2 (two) times daily.    spironolactone 25 MG Oral Tab Take 5 tablets (125 mg total) by mouth daily.    bumetanide 1 MG Oral Tab Take 1 tablet (1 mg total) by mouth BID (Diuretic).    FARXIGA 10 MG Oral Tab Take 1 tablet (10 mg total) by mouth daily.    albuterol 108 (90 Base) MCG/ACT Inhalation Aero Soln SHAKE BEFORE USE AND TAKE 2 PUFFS INTO  THE LUNGS EVERY 6 HOURS AS NEEDED       Allergies:  Allergies   Allergen Reactions    Adhesive Tape RASH     Skin off       Review of Systems:   A comprehensive 12 point review of system was performed.  All other review of systems are negative.    Physical Exam:   Blood pressure 99/63, pulse 97, temperature 98.1 °F (36.7 °C), temperature source Oral, resp. rate 16, height 5' 6\" (1.676 m), weight 190 lb 3.2 oz (86.3 kg), SpO2 91%.  Intake/Output:   Last 3 shifts: [id number]   This shift:   I/O this shift:  In: 230 [P.O.:230]  Out: -        Vent Settings:      Hemodynamic parameters (last 24 hours):      Scheduled Meds:    sodium ferric gluconate  125 mg Intravenous Once    pantoprazole  40 mg Intravenous Q12H    spironolactone  125 mg Oral Daily    atorvastatin  10 mg Oral Nightly    cyanocobalamin  1,000 mcg Oral Daily    folic acid  1 mg Oral Daily    sertraline  100 mg Oral Daily    bumetanide  1 mg Intravenous BID (Diuretic)       Continuous Infusions:       Physical Exam:    General: No acute distress.  HEENT: No scleral icterus.  External ears are normal.  Lids are normal.  Oral mucosa is moist.  Neck: No JVD, no bruits.  Cardiac: Normal rate, No murmur.  Lungs: Clear without rhales, rhochi or wheezing.  Abdomen: Soft, non-tender. No abdominal bruit. No hepatojugular reflux.  Extremities: No edema.    Neurologic: Alert and moving all 4 extremities.  Psychiatry: Patient has calm affect.  Lymphatic: No cervical or lower extremity lymphadenopathy.  Skin/nails: No clubbing.  Musculoskeletal: No joint effusions.    Results:     Laboratory Data:  Lab Results   Component Value Date    WBC 5.2 07/28/2024    HGB 7.5 (L) 07/28/2024    HCT 25.9 (L) 07/28/2024    .0 07/28/2024    CREATSERUM 1.60 (H) 07/28/2024    BUN 33 (H) 07/28/2024     07/28/2024    K 4.0 07/28/2024     07/28/2024    CO2 28.0 07/28/2024    GLU 92 07/28/2024    CA 9.4 07/28/2024    ALB 3.6 06/25/2024    ALKPHO 64 03/22/2024    TP  6.5 03/22/2024    AST 38 (H) 03/22/2024    ALT 16 03/22/2024    PTT 30.0 01/29/2019    INR 1.92 (H) 06/25/2024    PTP 23.2 (H) 06/25/2024    TSH 2.130 05/04/2021    LIP 40 10/27/2023    ESRML 35 (H) 06/14/2017    CRP 0.7 06/14/2017     (H) 06/10/2011    MG 2.0 05/09/2024    PHOS 3.7 06/25/2024    TROP <0.045 05/10/2019    CK 20 (L) 09/05/2017    B12 626 12/20/2023         [facility]  [facility]        IMPRESSION:  Recurrent upper GI bleed.  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  No APC was done at the time secondary to food particles.  Repeat endoscopy is planned.  Anticoagulation on hold for 48 hours.    Chronic atrial fibrillation.    Acute on chronic diastolic heart failure.    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  Patient is status post TAVR procedure for severe aortic stenosis.    Chronic kidney disease stage IIIb.    Remote history of DVT/PE.    Recommendations: By examination she has mild edema as well as some JVD.  Chest x-ray demonstrating mild pulmonary congestion.  Clinically however she is not with significant overload at this time.  Patient is on Bumex 1 mg IV twice daily along with spironolactone which I will change to 25 mg daily.  Basic metabolic profile reviewed creatinine is 1.6 which is stable from previous testing.  Farxiga can be resumed upon discharge.  Patient was also taking metoprolol and we can resume it as blood pressure overall has been stable in the past. Outpatient Watchman device evaluation discussed with patient.    Thank you very much for the consultation. Please do not hesitate to call with questions.    Arnoldo Corado DO [facility]  [address]  [place] [address] [phone]

## 2024-07-28 NOTE — PROGRESS NOTES
Progress Note     Yamini Ocampo Patient Status:  Inpatient    1945 MRN [id number]   Location [facility] 5SW/SE Attending Brigitte Freeman MD   Hosp Day # 2 PCP Kathy Anne MD       Subjective:   S: Patient seen at bedside this morning, in good spirits, denied acute complaints.   Had clears for breakfast, tolerated well.       Review of Systems:   10 point ROS completed and was negative, except for pertinent positive and negatives stated in subjective.    Objective:   Vital signs:  Temp:  [97.9 °F (36.6 °C)-99.1 °F (37.3 °C)] 98.1 °F (36.7 °C)  Pulse:  [73-97] 97  Resp:  [16] 16  BP: ()/(63-84) 99/63  SpO2:  [91 %-95 %] 91 %    Wt Readings from Last 6 Encounters:   24 190 lb 3.2 oz (86.3 kg)   24 206 lb (93.4 kg)   24 195 lb 9.6 oz (88.7 kg)   24 211 lb 3.2 oz (95.8 kg)   24 211 lb (95.7 kg)   24 219 lb (99.3 kg)         Physical Exam:    General: No acute distress. Sleepy but arousable  Respiratory: Clear to auscultation bilaterally. No wheezes. No rhonchi.  Cardiovascular: S1, S2. Regular rate and rhythm. No murmurs, rubs or gallops.   Abdomen: Soft, nontender, nondistended.  Positive bowel sounds. No rebound or guarding.  Neurologic: No focal neurological deficits.   Musculoskeletal: Moves all extremities.  Extremities: No edema.    Results:   Diagnostic Data:      Labs:    Labs Last 24 Hours:   BMP     CBC    Other     Na 143 Cl 108 BUN 33 Glu 92   Hb 7.5   PTT - Procal -   K 4.0 CO2 28.0 Cr 1.60   WBC 5.2 >< .0  INR - CRP -   Renal Lytes Endo    Hct 25.9   Trop - D dim -   eGFR - Ca 9.4 POC Gluc  -    LFT   pBNP - Lactic -   eGFR AA - PO4 - A1c -   AST - APk - Prot -  LDL -     Mg - TSH -   ALT - T sis - Alb -        COVID-19 Lab Results    COVID-19  Lab Results   Component Value Date    COVID19 Not Detected 2024    COVID19 Not Detected 2023    COVID19 Not Detected 2021       Pro-Calcitonin  No results for input(s):  \"PCT\" in the last 168 hours.    Cardiac  No results for input(s): \"TROP\", \"PBNP\" in the last 168 hours.    Creatinine Kinase  No results for input(s): \"CK\" in the last 168 hours.    Inflammatory Markers  Recent Labs   Lab 07/26/24  1243   KATIE 20.9       Imaging: Imaging data reviewed in Epic.    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 No APC was done at that time secondary to food particles.   - this admission received 1U pRBC transfusion with appropriate response  - s/p EGD 7/27 unsuccessful due to retention of food in esophagus  - GI following, plan for repeat EGD on 7/29. NPO @MN  - cont PPI IV BID  - hold further eliquis    Chronic atrial fibrillation anticoagulated with Eliquis.  - hold home eliquis in setting of anemia bleeding  - cont tele monitoring    Acute on chronic diastolic heart failure  - cardiology consulted  - Admission BNP >1200  - pt started on IV Bumex , anticipate transition to PO regimen soon       Chronic Medical Problems:   GERD  Essential hypertension.       Dispo: pending clinical course, possible dc home tomorrow after EGD       MDM: High   I personally spent time on chart/note review, review of labs/imaging, discussion with patient, physical exam, discussion with staff, consultants, coordinating care, writing progress note, and discussion of plan of care.       Brigitte Freeman MD

## 2024-07-28 NOTE — PLAN OF CARE
Patient continues on full liquid diet with fluid restriction, verbalizes that feels full quickly when eating. Plan for EGD in the am, NPO after midnight, consent signed. Patient complains of some mild heartburn this morning, denies nausea. Iron infusion given. Denies pain. Saline locked.     Problem: Patient Centered Care  Goal: Patient preferences are identified and integrated in the patient's plan of care  Description: Interventions:  - What would you like us to know as we care for you? \"I am from home with my daughter.\"  - Provide timely, complete, and accurate information to patient/family  - Incorporate patient and family knowledge, values, beliefs, and cultural backgrounds into the planning and delivery of care  - Encourage patient/family to participate in care and decision-making at the level they choose  - Honor patient and family perspectives and choices  Outcome: Progressing     Problem: Patient/Family Goals  Goal: Patient/Family Long Term Goal  Description: Patient's Long Term Goal: \"to go home\"    Interventions:  -Follow up MD appt  -Take meds as prescribed  -Use of assistive device if needed  - See additional Care Plan goals for specific interventions  Outcome: Progressing  Goal: Patient/Family Short Term Goal  Description: Patient's Short Term Goal: \"to prevent low hgb\"    Interventions:   -Monitor VS  -Check labs results  -Proper diet  -Provide pain meds as prescribed  - See additional Care Plan goals for specific interventions  Outcome: Progressing     Problem: HEMATOLOGIC - ADULT  Goal: Maintains hematologic stability  Description: INTERVENTIONS  - Assess for signs and symptoms of bleeding or hemorrhage  - Monitor labs and vital signs for trends  - Administer supportive blood products/factors, fluids and medications as ordered and appropriate  - Administer supportive blood products/factors as ordered and appropriate  Outcome: Progressing  Goal: Free from bleeding injury  Description: (Example usage:  patient with low platelets)  INTERVENTIONS:  - Avoid intramuscular injections, enemas and rectal medication administration  - Ensure safe mobilization of patient  - Hold pressure on venipuncture sites to achieve adequate hemostasis  - Assess for signs and symptoms of internal bleeding  - Monitor lab trends  - Patient is to report abnormal signs of bleeding to staff  - Avoid use of toothpicks and dental floss  - Use electric shaver for shaving  - Use soft bristle tooth brush  - Limit straining and forceful nose blowing  Outcome: Progressing     Problem: SAFETY ADULT - FALL  Goal: Free from fall injury  Description: INTERVENTIONS:  - Assess pt frequently for physical needs  - Identify cognitive and physical deficits and behaviors that affect risk of falls.  - [place] fall precautions as indicated by assessment.  - Educate pt/family on patient safety including physical limitations  - Instruct pt to call for assistance with activity based on assessment  - Modify environment to reduce risk of injury  - Provide assistive devices as appropriate  - Consider OT/PT consult to assist with strengthening/mobility  - Encourage toileting schedule  Outcome: Progressing     Problem: DISCHARGE PLANNING  Goal: Discharge to home or other facility with appropriate resources  Description: INTERVENTIONS:  - Identify barriers to discharge w/pt and caregiver  - Include patient/family/discharge partner in discharge planning  - Arrange for needed discharge resources and transportation as appropriate  - Identify discharge learning needs (meds, wound care, etc)  - Arrange for interpreters to assist at discharge as needed  - Consider post-discharge preferences of patient/family/discharge partner  - Complete POLST form as appropriate  - Assess patient's ability to be responsible for managing their own health  - Refer to Case Management Department for coordinating discharge planning if the patient needs post-hospital services based on  physician/LIP order or complex needs related to functional status, cognitive ability or social support system  Outcome: Progressing

## 2024-07-28 NOTE — PROGRESS NOTES
[facility]     Gastroenterology Progress Note    Yamini Ocampo Patient Status:  Inpatient    1945 MRN [id number]   Location [facility] 5SW/SE Attending Brigitte Freeman MD   Hosp Day # 2 PCP Kathy Anne MD       Subjective:   Chief complaint: anemia, blood loss    Feels well  Got some sleep  No ab pain  No melena  No vomiting  No Dysphagia complaints      Objective:   Blood pressure 99/63, pulse 97, temperature 98.1 °F (36.7 °C), temperature source Oral, resp. rate 16, height 5' 6\" (1.676 m), weight 190 lb 3.2 oz (86.3 kg), SpO2 91%. Body mass index is 30.7 kg/m².    GEN: WD/WN, NAD  HEENT: Supple symmetrical, trachea midline  CV: RRR, the extremities are warm and well perfused   LUNGS: No increased work of breathing  ABDOMEN: No scars, normal bowel sounds, soft, non-tender, non-distended no rebound or guarding, no masses, no hepatomegaly  MSK: No redness, no warmth, no swelling of joints  SKIN: No jaundice, no erythema, no rashes  HEMATOLOGIC: No bleeding, no bruising  NEURO: Alert and interactive, normal gait    Assessment and Plan:   Yamini Ocampo is a 79 year old year-old female with history of BMI 33.86, HTN, HLD, DM2, CKD3, Anemia of chronic disease, PUD, GERD, Ventricular diastolic dysfunction, HFpEF, Pulmonary artery HTN, CAD, Chronic Afib (on eliquis) who presents to the ED with acute on chronic diastolic HF and anemia. Patient ultimately underwent repeat EGD during admission. 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 No APC performed. She presents today for follow up.      #GAVE  #DEVEN  #+/- melena    Symptomatic anemia, s/p transfusion and improved. EGD attempted  yesterday showed food/fluid filled esophagus from poor motility. Procedure aborted.    Plan:  -full liquid diet, NPO after MN  -repeat EGD, hopefully esophagus/stoamch more empty   -also has been holding anticoagulation >2 days now so can ablate any GAVE related lesions  -IV  protonix    EGD tomorrow, hopefully home tomorrow.      EGD consent: I have discussed the risks, benefits, and alternatives to upper endoscopy/enteroscopy with the patient/primary decision maker [who demonstrated understanding], including but not limited to the risks of bleeding, infection, pain, death, as well as the risks of anesthesia and perforation all leading to prolonged hospitalization, surgical intervention, or even death. I also specifically mentioned the miss rate of upper endoscopy of 5-10% in the best of all circumstances.  The patient has agreed to sign an informed consent and elected to proceed with procedure with possible intervention [i.e. polypectomy, stent placement, etc.] as indicated.        JACKIE Prakash MD  [facility]      Results:     Lab Results   Component Value Date    WBC 4.4 07/27/2024    HGB 7.3 (L) 07/27/2024    HCT 25.2 (L) 07/27/2024    .0 07/27/2024    CREATSERUM 1.71 (H) 07/27/2024    BUN 29 (H) 07/27/2024     07/27/2024    K 5.0 07/27/2024     (H) 07/27/2024    CO2 20.0 (L) 07/27/2024    GLU 95 07/27/2024    CA 8.8 07/27/2024    ALB 3.6 06/25/2024    ALKPHO 64 03/22/2024    BILT 0.7 03/22/2024    TP 6.5 03/22/2024    AST 38 (H) 03/22/2024    ALT 16 03/22/2024    PTT 30.0 01/29/2019    INR 1.92 (H) 06/25/2024    TSH 2.130 05/04/2021    LIP 40 10/27/2023    ESRML 35 (H) 06/14/2017    CRP 0.7 06/14/2017     (H) 06/10/2011    MG 2.0 05/09/2024    PHOS 3.7 06/25/2024    TROP <0.045 05/10/2019    CK 20 (L) 09/05/2017    B12 626 12/20/2023       No results found.

## 2024-07-28 NOTE — PLAN OF CARE
Problem: Patient Centered Care  Goal: Patient preferences are identified and integrated in the patient's plan of care  Description: Interventions:  - What would you like us to know as we care for you? \"I am from home with my daughter.\"  - Provide timely, complete, and accurate information to patient/family  - Incorporate patient and family knowledge, values, beliefs, and cultural backgrounds into the planning and delivery of care  - Encourage patient/family to participate in care and decision-making at the level they choose  - Honor patient and family perspectives and choices  Outcome: Progressing     Problem: Patient/Family Goals  Goal: Patient/Family Long Term Goal  Description: Patient's Long Term Goal: \"to go home\"    Interventions:  -Follow up MD appt  -Take meds as prescribed  -Use of assistive device if needed  - See additional Care Plan goals for specific interventions  Outcome: Progressing  Goal: Patient/Family Short Term Goal  Description: Patient's Short Term Goal: \"to prevent low hgb\"    Interventions:   -Monitor VS  -Check labs results  -Proper diet  -Provide pain meds as prescribed  - See additional Care Plan goals for specific interventions  Outcome: Progressing     Problem: HEMATOLOGIC - ADULT  Goal: Maintains hematologic stability  Description: INTERVENTIONS  - Assess for signs and symptoms of bleeding or hemorrhage  - Monitor labs and vital signs for trends  - Administer supportive blood products/factors, fluids and medications as ordered and appropriate  - Administer supportive blood products/factors as ordered and appropriate  Outcome: Progressing  Goal: Free from bleeding injury  Description: (Example usage: patient with low platelets)  INTERVENTIONS:  - Avoid intramuscular injections, enemas and rectal medication administration  - Ensure safe mobilization of patient  - Hold pressure on venipuncture sites to achieve adequate hemostasis  - Assess for signs and symptoms of internal bleeding  -  Monitor lab trends  - Patient is to report abnormal signs of bleeding to staff  - Avoid use of toothpicks and dental floss  - Use electric shaver for shaving  - Use soft bristle tooth brush  - Limit straining and forceful nose blowing  Outcome: Progressing     Problem: SAFETY ADULT - FALL  Goal: Free from fall injury  Description: INTERVENTIONS:  - Assess pt frequently for physical needs  - Identify cognitive and physical deficits and behaviors that affect risk of falls.  - [place] fall precautions as indicated by assessment.  - Educate pt/family on patient safety including physical limitations  - Instruct pt to call for assistance with activity based on assessment  - Modify environment to reduce risk of injury  - Provide assistive devices as appropriate  - Consider OT/PT consult to assist with strengthening/mobility  - Encourage toileting schedule  Outcome: Progressing     Problem: DISCHARGE PLANNING  Goal: Discharge to home or other facility with appropriate resources  Description: INTERVENTIONS:  - Identify barriers to discharge w/pt and caregiver  - Include patient/family/discharge partner in discharge planning  - Arrange for needed discharge resources and transportation as appropriate  - Identify discharge learning needs (meds, wound care, etc)  - Arrange for interpreters to assist at discharge as needed  - Consider post-discharge preferences of patient/family/discharge partner  - Complete POLST form as appropriate  - Assess patient's ability to be responsible for managing their own health  - Refer to Case Management Department for coordinating discharge planning if the patient needs post-hospital services based on physician/LIP order or complex needs related to functional status, cognitive ability or social support system  Outcome: Progressing     Patient is alert and oriented x 4. He denies to be in any pain or discomfort at this time. Safety and fall precautions maintained, bed alarm on. Call light within  reach. Frequent rounding by nursing staff.

## 2024-07-29 ENCOUNTER — ANESTHESIA (OUTPATIENT)
Dept: ENDOSCOPY | Facility: HOSPITAL | Age: 79
End: 2024-07-29
Payer: MEDICARE

## 2024-07-29 ENCOUNTER — ANESTHESIA EVENT (OUTPATIENT)
Dept: ENDOSCOPY | Facility: HOSPITAL | Age: 79
End: 2024-07-29
Payer: MEDICARE

## 2024-07-29 LAB
ANION GAP SERPL CALC-SCNC: 7 MMOL/L (ref 0–18)
BASOPHILS # BLD AUTO: 0.06 X10(3) UL (ref 0–0.2)
BASOPHILS NFR BLD AUTO: 1.1 %
BUN BLD-MCNC: 34 MG/DL (ref 9–23)
BUN/CREAT SERPL: 20.9 (ref 10–20)
CALCIUM BLD-MCNC: 9.3 MG/DL (ref 8.7–10.4)
CHLORIDE SERPL-SCNC: 106 MMOL/L (ref 98–112)
CO2 SERPL-SCNC: 30 MMOL/L (ref 21–32)
CREAT BLD-MCNC: 1.63 MG/DL
DEPRECATED RDW RBC AUTO: 60.9 FL (ref 35.1–46.3)
EGFRCR SERPLBLD CKD-EPI 2021: 31 ML/MIN/1.73M2 (ref 60–?)
EOSINOPHIL # BLD AUTO: 0.09 X10(3) UL (ref 0–0.7)
EOSINOPHIL NFR BLD AUTO: 1.7 %
ERYTHROCYTE [DISTWIDTH] IN BLOOD BY AUTOMATED COUNT: 19 % (ref 11–15)
GLUCOSE BLD-MCNC: 89 MG/DL (ref 70–99)
HCT VFR BLD AUTO: 22.9 %
HGB BLD-MCNC: 7.2 G/DL
IMM GRANULOCYTES # BLD AUTO: 0.02 X10(3) UL (ref 0–1)
IMM GRANULOCYTES NFR BLD: 0.4 %
LYMPHOCYTES # BLD AUTO: 0.76 X10(3) UL (ref 1–4)
LYMPHOCYTES NFR BLD AUTO: 14 %
MCH RBC QN AUTO: 30 PG (ref 26–34)
MCHC RBC AUTO-ENTMCNC: 31.4 G/DL (ref 31–37)
MCV RBC AUTO: 95.4 FL
MONOCYTES # BLD AUTO: 0.49 X10(3) UL (ref 0.1–1)
MONOCYTES NFR BLD AUTO: 9 %
NEUTROPHILS # BLD AUTO: 4 X10 (3) UL (ref 1.5–7.7)
NEUTROPHILS # BLD AUTO: 4 X10(3) UL (ref 1.5–7.7)
NEUTROPHILS NFR BLD AUTO: 73.8 %
OSMOLALITY SERPL CALC.SUM OF ELEC: 303 MOSM/KG (ref 275–295)
PLATELET # BLD AUTO: 280 10(3)UL (ref 150–450)
POTASSIUM SERPL-SCNC: 4 MMOL/L (ref 3.5–5.1)
RBC # BLD AUTO: 2.4 X10(6)UL
SODIUM SERPL-SCNC: 143 MMOL/L (ref 136–145)
WBC # BLD AUTO: 5.4 X10(3) UL (ref 4–11)

## 2024-07-29 PROCEDURE — 99233 SBSQ HOSP IP/OBS HIGH 50: CPT | Performed by: STUDENT IN AN ORGANIZED HEALTH CARE EDUCATION/TRAINING PROGRAM

## 2024-07-29 PROCEDURE — 43255 EGD CONTROL BLEEDING ANY: CPT | Performed by: INTERNAL MEDICINE

## 2024-07-29 PROCEDURE — 0W3P8ZZ CONTROL BLEEDING IN GASTROINTESTINAL TRACT, VIA NATURAL OR ARTIFICIAL OPENING ENDOSCOPIC: ICD-10-PCS | Performed by: INTERNAL MEDICINE

## 2024-07-29 RX ORDER — NALOXONE HYDROCHLORIDE 0.4 MG/ML
0.08 INJECTION, SOLUTION INTRAMUSCULAR; INTRAVENOUS; SUBCUTANEOUS ONCE AS NEEDED
Status: ACTIVE | OUTPATIENT
Start: 2024-07-29 | End: 2024-07-29

## 2024-07-29 RX ORDER — LIDOCAINE HYDROCHLORIDE 10 MG/ML
INJECTION, SOLUTION EPIDURAL; INFILTRATION; INTRACAUDAL; PERINEURAL AS NEEDED
Status: DISCONTINUED | OUTPATIENT
Start: 2024-07-29 | End: 2024-07-29 | Stop reason: SURG

## 2024-07-29 RX ORDER — METOPROLOL SUCCINATE 25 MG/1
25 TABLET, EXTENDED RELEASE ORAL ONCE
Status: COMPLETED | OUTPATIENT
Start: 2024-07-29 | End: 2024-07-29

## 2024-07-29 RX ORDER — SODIUM CHLORIDE, SODIUM LACTATE, POTASSIUM CHLORIDE, CALCIUM CHLORIDE 600; 310; 30; 20 MG/100ML; MG/100ML; MG/100ML; MG/100ML
INJECTION, SOLUTION INTRAVENOUS CONTINUOUS
Status: DISCONTINUED | OUTPATIENT
Start: 2024-07-29 | End: 2024-07-30

## 2024-07-29 RX ORDER — SODIUM CHLORIDE, SODIUM LACTATE, POTASSIUM CHLORIDE, CALCIUM CHLORIDE 600; 310; 30; 20 MG/100ML; MG/100ML; MG/100ML; MG/100ML
INJECTION, SOLUTION INTRAVENOUS CONTINUOUS PRN
Status: DISCONTINUED | OUTPATIENT
Start: 2024-07-29 | End: 2024-07-29 | Stop reason: SURG

## 2024-07-29 RX ORDER — PHENYLEPHRINE HCL 10 MG/ML
VIAL (ML) INJECTION AS NEEDED
Status: DISCONTINUED | OUTPATIENT
Start: 2024-07-29 | End: 2024-07-29 | Stop reason: SURG

## 2024-07-29 RX ADMIN — SODIUM CHLORIDE, SODIUM LACTATE, POTASSIUM CHLORIDE, CALCIUM CHLORIDE: 600; 310; 30; 20 INJECTION, SOLUTION INTRAVENOUS at 12:35:00

## 2024-07-29 RX ADMIN — PHENYLEPHRINE HCL 200 MCG: 10 MG/ML VIAL (ML) INJECTION at 12:58:00

## 2024-07-29 RX ADMIN — LIDOCAINE HYDROCHLORIDE 40 MG: 10 INJECTION, SOLUTION EPIDURAL; INFILTRATION; INTRACAUDAL; PERINEURAL at 12:54:00

## 2024-07-29 RX ADMIN — SODIUM CHLORIDE, SODIUM LACTATE, POTASSIUM CHLORIDE, CALCIUM CHLORIDE: 600; 310; 30; 20 INJECTION, SOLUTION INTRAVENOUS at 13:16:00

## 2024-07-29 RX ADMIN — PHENYLEPHRINE HCL 100 MCG: 10 MG/ML VIAL (ML) INJECTION at 13:03:00

## 2024-07-29 NOTE — PLAN OF CARE
Problem: Patient Centered Care  Goal: Patient preferences are identified and integrated in the patient's plan of care  Description: Interventions:  - What would you like us to know as we care for you? \"I am from home with my daughter.\"  - Provide timely, complete, and accurate information to patient/family  - Incorporate patient and family knowledge, values, beliefs, and cultural backgrounds into the planning and delivery of care  - Encourage patient/family to participate in care and decision-making at the level they choose  - Honor patient and family perspectives and choices  Outcome: Progressing     Problem: Patient/Family Goals  Goal: Patient/Family Long Term Goal  Description: Patient's Long Term Goal:  Discharge home     Interventions:  - Monitor vitals  - Monitor labs  - Remote tele   - GI on consult   - Cardiology on consult  - See additional Care Plan goals for specific interventions  Outcome: Progressing  Goal: Patient/Family Short Term Goal  Description: Patient's Short Term Goal: \"to prevent low hgb\"    Interventions:   - Monitor labs  - Monitor vitals  - Replaced PRBCs if Hgb < 7  - Remote tele   - See additional Care Plan goals for specific interventions  Outcome: Progressing     Problem: HEMATOLOGIC - ADULT  Goal: Maintains hematologic stability  Description: INTERVENTIONS  - Assess for signs and symptoms of bleeding or hemorrhage  - Monitor labs and vital signs for trends  - Administer supportive blood products/factors, fluids and medications as ordered and appropriate  - Administer supportive blood products/factors as ordered and appropriate  Outcome: Progressing  Goal: Free from bleeding injury  Description: (Example usage: patient with low platelets)  INTERVENTIONS:  - Avoid intramuscular injections, enemas and rectal medication administration  - Ensure safe mobilization of patient  - Hold pressure on venipuncture sites to achieve adequate hemostasis  - Assess for signs and symptoms of internal  bleeding  - Monitor lab trends  - Patient is to report abnormal signs of bleeding to staff  - Avoid use of toothpicks and dental floss  - Use electric shaver for shaving  - Use soft bristle tooth brush  - Limit straining and forceful nose blowing  Outcome: Progressing     Problem: CARDIOVASCULAR - ADULT  Goal: Maintains optimal cardiac output and hemodynamic stability  Description: INTERVENTIONS:  - Monitor vital signs, rhythm, and trends  - Monitor for bleeding, hypotension and signs of decreased cardiac output  - Evaluate effectiveness of vasoactive medications to optimize hemodynamic stability  - Monitor arterial and/or venous puncture sites for bleeding and/or hematoma  - Assess quality of pulses, skin color and temperature  - Assess for signs of decreased coronary artery perfusion - ex. Angina  - Evaluate fluid balance, assess for edema, trend weights  Outcome: Progressing  Goal: Absence of cardiac arrhythmias or at baseline  Description: INTERVENTIONS:  - Continuous cardiac monitoring, monitor vital signs, obtain 12 lead EKG if indicated  - Evaluate effectiveness of antiarrhythmic and heart rate control medications as ordered  - Initiate emergency measures for life threatening arrhythmias  - Monitor electrolytes and administer replacement therapy as ordered  Outcome: Progressing     Problem: SAFETY ADULT - FALL  Goal: Free from fall injury  Description: INTERVENTIONS:  - Assess pt frequently for physical needs  - Identify cognitive and physical deficits and behaviors that affect risk of falls.  - [place] fall precautions as indicated by assessment.  - Educate pt/family on patient safety including physical limitations  - Instruct pt to call for assistance with activity based on assessment  - Modify environment to reduce risk of injury  - Provide assistive devices as appropriate  - Consider OT/PT consult to assist with strengthening/mobility  - Encourage toileting schedule  Outcome: Progressing     Problem:  DISCHARGE PLANNING  Goal: Discharge to home or other facility with appropriate resources  Description: INTERVENTIONS:  - Identify barriers to discharge w/pt and caregiver  - Include patient/family/discharge partner in discharge planning  - Arrange for needed discharge resources and transportation as appropriate  - Identify discharge learning needs (meds, wound care, etc)  - Arrange for interpreters to assist at discharge as needed  - Consider post-discharge preferences of patient/family/discharge partner  - Complete POLST form as appropriate  - Assess patient's ability to be responsible for managing their own health  - Refer to Case Management Department for coordinating discharge planning if the patient needs post-hospital services based on physician/LIP order or complex needs related to functional status, cognitive ability or social support system  Outcome: Progressing   Pt denies any pain. Hgb 7.2. BP 87/60, , pt c/o feeling tired. Denies dizziness. Discussed with cardiology. 500 mL bolus given. Repeat BP 95/64. EGD completed. Pt returned in stable condition. Started on low fiber diet. Pt ambulating with standby assist. Discharge home when medically cleared. Discussed plan of care with pt.

## 2024-07-29 NOTE — ANESTHESIA PREPROCEDURE EVALUATION
Anesthesia PreOp Note    HPI:     Yamini Ocampo is a 79 year old female who presents for preoperative consultation requested by: Alyssa Orellana MD    Date of Surgery: 7/26/2024 - 7/29/2024    Procedure(s):  ESOPHAGOGASTRODUODENOSCOPY (EGD)  Indication: anemia    Relevant Problems   No relevant active problems       NPO:  Last Liquid Consumption Date: 07/28/24  Last Liquid Consumption Time: 2359  Last Solid Consumption Date: 07/28/24  Last Solid Consumption Time: 2359  Last Liquid Consumption Date: 07/28/24          History Review:  Patient Active Problem List    Diagnosis Date Noted    Rectal bleeding 07/26/2024    Anemia, unspecified type 06/25/2024    GI bleed 06/25/2024    Dyspepsia 01/23/2024    Intestinal metaplasia of gastric mucosa 01/23/2024    Anemia, unspecified 01/05/2024    Iron deficiency anemia secondary to inadequate dietary iron intake 01/05/2024    Acute on chronic congestive heart failure, unspecified heart failure type ([facility]) 03/07/2023    Type 2 diabetes mellitus with diabetic chronic kidney disease ([facility]) 11/30/2022    Anemia due to vitamin B12 deficiency 08/01/2022    Macrocytic anemia 04/26/2022    Hyperpigmented skin lesion 04/26/2022    CREST syndrome ([facility]) 04/12/2022    Type 2 diabetes mellitus with stage 3b chronic kidney disease, without long-term current use of insulin ([facility]) 04/12/2022    Prediabetes 06/30/2021    Diverticulosis of colon     Osteoarthritis of left knee 01/31/2019    Raynaud's disease without gangrene 03/02/2018    Osteoarthritis of right knee 10/12/2017    Osteoarthritis 10/12/2017    History of ischemic colitis 09/28/2017    CKD (chronic kidney disease) stage 3, GFR 30-59 ml/min ([facility]) 06/26/2017    Mesenteric ischemia, chronic ([facility]) 04/26/2017    A-fib ([facility])     Polyp of colon     Gastroesophageal reflux disease without esophagitis     Hiatal hernia with GERD and esophagitis 09/01/2016    Ventral hernia without obstruction or gangrene 05/19/2016    Gastric erythema  04/28/2016    Essential hypertension 08/07/2015    Sleep apnea 10/01/2013    Congestive heart failure (HCC) 08/27/2013    Peripheral vascular disease (HCC) 08/27/2013    Major depressive disorder, single episode, moderate (HCC) 06/20/2011    Vitamin D deficiency 06/20/2011    Class 2 severe obesity due to excess calories with serious comorbidity and body mass index (BMI) of 39.0 to 39.9 in adult (HCC) 03/05/2009       Past Medical History:    Arrhythmia    afib    Back problem    lumbar disc disease    Basal cell carcinoma of nose    Bilateral carpal tunnel syndrome    Cataract    Cellulitis    Deep vein thrombosis (HCC)    unsure which leg, possibly left    Depression    Esophageal reflux    Essential hypertension    Heart disease    Heel spur    Right    Hemorrhoids    Hiatal hernia    High blood pressure    High cholesterol    Hyperlipidemia    Incontinence    Lumbar disc disease    Multiple gastric ulcers    Osteoarthritis    Pulmonary embolism (HCC)    Raynaud disease    Renal disorder    Tubular adenoma of colon    repeat c-scope 1/2020    Type 2 diabetes mellitus with diabetic chronic kidney disease (HCC)    Visual impairment    glasses       Past Surgical History:   Procedure Laterality Date    Angioplasty (coronary)      Appendectomy      Arthroscopy of joint unlisted Right     knee    Bso, omentectomy w/sushil      Carpal tunnel release Bilateral     Cholecystectomy  09/28/2016    Colonoscopy N/A 01/25/2017    Procedure: COLONOSCOPY;  Surgeon: KATHARINE Prakash MD;  Location: [facility] ENDOSCOPY    Colonoscopy N/A 10/07/2020    Procedure: COLONOSCOPY;  Surgeon: KATHARINE Prakash MD;  Location: [facility] ENDOSCOPY    Egd N/A 12/14/2016    Procedure: ESOPHAGOGASTRODUODENOSCOPY (EGD);  Surgeon: KATHARINE Prakash MD;  Location: [facility] ENDOSCOPY    Egd N/A 01/23/2024    ; gastritis    Egd N/A 07/27/2024    ; retained food    Electrocardiogram, complete  09/26/2013    Scanned to Media Tab    Excision of nose       Basal cell carcinoma    Hernia surgery      Hysterectomy      Knee replacement surgery Right     Other surgical history      Injections and narcotics, POD [place]    Removal of heel spur      Total abdom hysterectomy         Facility-Administered Medications Prior to Admission   Medication Dose Route Frequency Provider Last Rate Last Admin    darbepoetin molina (Aranesp) 200 MCG/0.4ML injection 200 mcg  200 mcg Subcutaneous Q4 Week Manan Pizarro MD   200 mcg at 05/29/24 1016     Medications Prior to Admission   Medication Sig Dispense Refill Last Dose    pantoprazole 40 MG Oral Tab EC Take 1 tablet (40 mg total) by mouth 2 (two) times daily before meals. 60 tablet 3 7/26/2024 at 0900    sertraline 100 MG Oral Tab Take 1 tablet (100 mg total) by mouth daily. 90 tablet 3 7/26/2024 at 0900    atorvastatin 10 MG Oral Tab Take 1 tablet (10 mg total) by mouth nightly. 90 tablet 3 7/25/2024 at 2100    folic acid 1 MG Oral Tab Take 1 tablet (1 mg total) by mouth daily. 90 tablet 3 7/26/2024 at 0900    cyanocobalamin 1000 MCG Oral Tab Take 1 tablet (1,000 mcg total) by mouth daily.   7/26/2024 at 0900    ELIQUIS 5 MG Oral Tab Take 1 tablet (5 mg total) by mouth 2 (two) times daily. 180 tablet 0 7/26/2024 at 0900    spironolactone 25 MG Oral Tab Take 5 tablets (125 mg total) by mouth daily.       bumetanide 1 MG Oral Tab Take 1 tablet (1 mg total) by mouth BID (Diuretic). 60 tablet 0     FARXIGA 10 MG Oral Tab Take 1 tablet (10 mg total) by mouth daily.       albuterol 108 (90 Base) MCG/ACT Inhalation Aero Soln SHAKE BEFORE USE AND TAKE 2 PUFFS INTO THE LUNGS EVERY 6 HOURS AS NEEDED       METOPROLOL SUCCINATE ER 50 MG Oral Tablet 24 Hr Take 1 tablet (50 mg total) by mouth in the morning and 1 tablet (50 mg total) before bedtime.    at 0900     Current Facility-Administered Medications Ordered in Epic   Medication Dose Route Frequency Provider Last Rate Last Admin    spironolactone (Aldactone) tab 25 mg  25 mg Oral Daily  Arnoldo Corado DO        metoprolol succinate ER (Toprol XL) 24 hr tab 50 mg  50 mg Oral Daily Beta Blocker Arnoldo Corado DO        pantoprazole (Protonix) 40 mg in sodium chloride 0.9% PF 10 mL IV push  40 mg Intravenous Q12H Brigitte Freeman MD   40 mg at 07/29/24 0837    albuterol (Ventolin HFA) 108 (90 Base) MCG/ACT inhaler 2 puff  2 puff Inhalation Q6H PRN Brigitte Freeman MD        atorvastatin (Lipitor) tab 10 mg  10 mg Oral Nightly Brigitte Freeman MD   10 mg at 07/28/24 2115    cyanocobalamin (Vitamin B12) tab 1,000 mcg  1,000 mcg Oral Daily Briigtte Freeman MD   1,000 mcg at 07/28/24 0922    folic acid (Folvite) tab 1 mg  1 mg Oral Daily Brigitte Freeman MD   1 mg at 07/28/24 0922    sertraline (Zoloft) tab 100 mg  100 mg Oral Daily Brigitte Freeman MD   100 mg at 07/28/24 0922    acetaminophen (Tylenol Extra Strength) tab 500 mg  500 mg Oral Q4H PRN Hillary Fitzgerald MD        ondansetron (Zofran) 4 MG/2ML injection 4 mg  4 mg Intravenous Q6H PRN Hillary Fitzgerald MD        metoclopramide (Reglan) 5 mg/mL injection 10 mg  10 mg Intravenous Q8H PRN Hillary Fitzgerald MD   10 mg at 07/27/24 1409    bumetanide (Bumex) 0.25 MG/ML injection 1 mg  1 mg Intravenous BID (Diuretic) Hillary Fitzgerald MD   1 mg at 07/28/24 1812     No current Epic-ordered outpatient medications on file.       Allergies   Allergen Reactions    Adhesive Tape RASH     Skin off       Family History   Problem Relation Age of Onset    Cancer Father         Skin cancer    Other (Other) Father 97        old age,unknown caused    Heart Attack Mother     Heart Disorder Mother     Hypertension Other         Family H/O    Cancer Other         Lymphoma  Family H/O    Heart Disease Other         Family H/O    Arthritis Other         Close relative  unsure which type    No Known Problems Brother      Social History     Socioeconomic History    Marital status:    Tobacco Use    Smoking status: Former     Current packs/day: 0.00     Types:  Cigarettes     Quit date: 1997     Years since quittin.5    Smokeless tobacco: Never   Vaping Use    Vaping status: Never Used   Substance and Sexual Activity    Alcohol use: No     Comment: rare    Drug use: Never     Comment: edibles occasionally for sleep   Other Topics Concern    Caffeine Concern Yes     Comment: 8 cups coffee/sod daily    Reaction to local anesthetic No       Available pre-op labs reviewed.  Lab Results   Component Value Date    WBC 5.4 2024    RBC 2.40 (L) 2024    HGB 7.2 (L) 2024    HCT 22.9 (L) 2024    MCV 95.4 2024    MCH 30.0 2024    MCHC 31.4 2024    RDW 19.0 (H) 2024    .0 2024     Lab Results   Component Value Date     2024    K 4.0 2024     2024    CO2 30.0 2024    BUN 34 (H) 2024    CREATSERUM 1.63 (H) 2024    GLU 89 2024    PGLU 118 (H) 2024    CA 9.3 2024          Vital Signs:  Body mass index is 30.7 kg/m².   height is 1.676 m (5' 6\") and weight is 86.3 kg (190 lb 3.2 oz). Her oral temperature is 98.4 °F (36.9 °C). Her blood pressure is 110/51 and her pulse is 95. Her respiration is 20 and oxygen saturation is 91%.   Vitals:    24 0830 24 0840 24 1040 24 1217   BP: 90/67 (!) 87/60 95/64 110/51   Pulse: (!) 133 112  95   Resp: 18   20   Temp: 98.4 °F (36.9 °C)      TempSrc: Oral      SpO2:    91%   Weight:       Height:            Anesthesia Evaluation     Patient summary reviewed and Nursing notes reviewed    Airway   Mallampati: III  TM distance: >3 FB  Neck ROM: full  Dental      Pulmonary     breath sounds clear to auscultation  (+) shortness of breath, sleep apnea  Cardiovascular   (+) hypertension, CHF, weak pulses, peripheral edema    Rhythm: irregular  Rate: normal    Neuro/Psych    (+)  neuromuscular disease,        GI/Hepatic/Renal    (+) GERD    Comments: GI bleed/anemia    Endo/Other    (+) diabetes mellitus   Abdominal                  Anesthesia Plan:   ASA:  3  Plan:   MAC  Informed Consent Plan and Risks Discussed With:  Patient  Use of Blood Products Discussed With:  Patient  Blood Product Use Consented    Discussed plan with:  Attending      I have informed Yamini Ocampo and/or legal guardian or family member of the nature of the anesthetic plan, benefits, risks including possible dental damage if relevant, major complications, and any alternative forms of anesthetic management.   All of the patient's questions were answered to the best of my ability. The patient desires the anesthetic management as planned.  Kiko Garcia MD  7/29/2024 12:47 PM  Present on Admission:  **None**

## 2024-07-29 NOTE — PROGRESS NOTES
[facility]  part of [facility] Progress Note       Yamini Ocampo Patient Status:  Inpatient    1945 MRN [id number]   Location [facility] ENDOSCOPY LAB SUITES Attending Brigitte Freeman MD   Hosp Day # 3 PCP Kathy Anne MD       Patient is a 79 year old female who was admitted to the hospital for Rectal bleeding:  Subjective:  Patient for GI w/u today  No cardiac c/o    Patient admitted with Recurrent GI bleed  Patient has H/O AFIB  H/O Non obstructive CAD and Pulmonary HTN  This time was admitted with Melena and Hb 5.9 upon admission    C Monitor: AFIB    Past Medical History:   Past Medical History:    Arrhythmia    afib    Back problem    lumbar disc disease    Basal cell carcinoma of nose    Bilateral carpal tunnel syndrome    Cataract    Cellulitis    Deep vein thrombosis (HCC)    unsure which leg, possibly left    Depression    Esophageal reflux    Essential hypertension    Heart disease    Heel spur    Right    Hemorrhoids    Hiatal hernia    High blood pressure    High cholesterol    Hyperlipidemia    Incontinence    Lumbar disc disease    Multiple gastric ulcers    Osteoarthritis    Pulmonary embolism (HCC)    Raynaud disease    Renal disorder    Tubular adenoma of colon    repeat c-scope 2020    Type 2 diabetes mellitus with diabetic chronic kidney disease (HCC)    Visual impairment    glasses       Past Surgical History:  Past Surgical History:   Procedure Laterality Date    Angioplasty (coronary)      Appendectomy      Arthroscopy of joint unlisted Right     knee    Bso, omentectomy w/sushil      Carpal tunnel release Bilateral     Cholecystectomy  2016    Colonoscopy N/A 2017    Procedure: COLONOSCOPY;  Surgeon: KATHARINE Prakash MD;  Location: [facility] ENDOSCOPY    Colonoscopy N/A 10/07/2020    Procedure: COLONOSCOPY;  Surgeon: KATHARINE Prakash MD;  Location: [facility] ENDOSCOPY    Egd N/A 2016    Procedure:  ESOPHAGOGASTRODUODENOSCOPY (EGD);  Surgeon: KATHARINE Prakash MD;  Location: [facility] ENDOSCOPY    Egd N/A 01/23/2024    ; gastritis    Egd N/A 07/27/2024    ; retained food    Electrocardiogram, complete  09/26/2013    Scanned to Media Tab    Excision of nose      Basal cell carcinoma    Hernia surgery      Hysterectomy      Knee replacement surgery Right     Other surgical history      Injections and narcotics, POD Bartuci    Removal of heel spur      Total abdom hysterectomy         Family History:  Family History   Problem Relation Age of Onset    Cancer Father         Skin cancer    Other (Other) Father 97        old age,unknown caused    Heart Attack Mother     Heart Disorder Mother     Hypertension Other         Family H/O    Cancer Other         Lymphoma  Family H/O    Heart Disease Other         Family H/O    Arthritis Other         Close relative  unsure which type    No Known Problems Brother        Social History:  Pediatric History   Patient Parents    Not on file     Other Topics Concern     Service Not Asked    Blood Transfusions Not Asked    Caffeine Concern Yes     Comment: 8 cups coffee/sod daily    Occupational Exposure Not Asked    Hobby Hazards Not Asked    Sleep Concern Not Asked    Stress Concern Not Asked    Weight Concern Not Asked    Special Diet Not Asked    Back Care Not Asked    Exercise Not Asked    Bike Helmet Not Asked    Seat Belt Not Asked    Self-Exams Not Asked    Grew up on a farm Not Asked    History of tanning Not Asked    Outdoor occupation Not Asked    Breast feeding Not Asked    Reaction to local anesthetic No   Social History Narrative    Not on file           Current Medications:  Current Facility-Administered Medications   Medication Dose Route Frequency    spironolactone (Aldactone) tab 25 mg  25 mg Oral Daily    metoprolol succinate ER (Toprol XL) 24 hr tab 50 mg  50 mg Oral Daily Beta Blocker    pantoprazole (Protonix) 40 mg in sodium chloride 0.9% PF  10 mL IV push  40 mg Intravenous Q12H    albuterol (Ventolin HFA) 108 (90 Base) MCG/ACT inhaler 2 puff  2 puff Inhalation Q6H PRN    atorvastatin (Lipitor) tab 10 mg  10 mg Oral Nightly    cyanocobalamin (Vitamin B12) tab 1,000 mcg  1,000 mcg Oral Daily    folic acid (Folvite) tab 1 mg  1 mg Oral Daily    sertraline (Zoloft) tab 100 mg  100 mg Oral Daily    acetaminophen (Tylenol Extra Strength) tab 500 mg  500 mg Oral Q4H PRN    ondansetron (Zofran) 4 MG/2ML injection 4 mg  4 mg Intravenous Q6H PRN    metoclopramide (Reglan) 5 mg/mL injection 10 mg  10 mg Intravenous Q8H PRN    bumetanide (Bumex) 0.25 MG/ML injection 1 mg  1 mg Intravenous BID (Diuretic)     Medications Prior to Admission   Medication Sig    pantoprazole 40 MG Oral Tab EC Take 1 tablet (40 mg total) by mouth 2 (two) times daily before meals.    sertraline 100 MG Oral Tab Take 1 tablet (100 mg total) by mouth daily.    atorvastatin 10 MG Oral Tab Take 1 tablet (10 mg total) by mouth nightly.    folic acid 1 MG Oral Tab Take 1 tablet (1 mg total) by mouth daily.    cyanocobalamin 1000 MCG Oral Tab Take 1 tablet (1,000 mcg total) by mouth daily.    ELIQUIS 5 MG Oral Tab Take 1 tablet (5 mg total) by mouth 2 (two) times daily.    spironolactone 25 MG Oral Tab Take 5 tablets (125 mg total) by mouth daily.    bumetanide 1 MG Oral Tab Take 1 tablet (1 mg total) by mouth BID (Diuretic).    FARXIGA 10 MG Oral Tab Take 1 tablet (10 mg total) by mouth daily.    albuterol 108 (90 Base) MCG/ACT Inhalation Aero Soln SHAKE BEFORE USE AND TAKE 2 PUFFS INTO THE LUNGS EVERY 6 HOURS AS NEEDED    METOPROLOL SUCCINATE ER 50 MG Oral Tablet 24 Hr Take 1 tablet (50 mg total) by mouth in the morning and 1 tablet (50 mg total) before bedtime.       Allergies:  Allergies   Allergen Reactions    Adhesive Tape RASH     Skin off       Review of Systems:   A comprehensive 12 point review of system was performed.  All pertinent positive and negative findings per HPI.    Physical  Exam:   Blood pressure 95/64, pulse 112, temperature 98.4 °F (36.9 °C), temperature source Oral, resp. rate 18, height 5' 6\" (1.676 m), weight 190 lb 3.2 oz (86.3 kg), SpO2 95%.  Intake/Output:   Last 3 shifts: [id number]   This shift: No intake/output data recorded.     Vent Settings:      Hemodynamic parameters (last 24 hours):      Scheduled Meds:    spironolactone  25 mg Oral Daily    metoprolol succinate  50 mg Oral Daily Beta Blocker    pantoprazole  40 mg Intravenous Q12H    atorvastatin  10 mg Oral Nightly    cyanocobalamin  1,000 mcg Oral Daily    folic acid  1 mg Oral Daily    sertraline  100 mg Oral Daily    bumetanide  1 mg Intravenous BID (Diuretic)       Continuous Infusions:       Physical Exam:    General: No acute distress.  HEENT: NAD  Neck: No JVD, no bruits.  Cardiac: Normal rate, No murmur.  Lungs: Clear without rhales, rhochi or wheezing.  Abdomen: Soft, non-tender. BS+  Extremities: No edema.    Neurologic: Alert and moving all 4 extremities.  Psychiatry: Patient has calm affect.      Results:     Laboratory Data:  Lab Results   Component Value Date    WBC 5.4 07/29/2024    HGB 7.2 (L) 07/29/2024    HCT 22.9 (L) 07/29/2024    .0 07/29/2024    CREATSERUM 1.63 (H) 07/29/2024    BUN 34 (H) 07/29/2024     07/29/2024    K 4.0 07/29/2024     07/29/2024    CO2 30.0 07/29/2024    GLU 89 07/29/2024    CA 9.3 07/29/2024    ALB 3.6 06/25/2024    ALKPHO 64 03/22/2024    TP 6.5 03/22/2024    AST 38 (H) 03/22/2024    ALT 16 03/22/2024    PTT 30.0 01/29/2019    INR 1.92 (H) 06/25/2024    PTP 23.2 (H) 06/25/2024    TSH 2.130 05/04/2021    LIP 40 10/27/2023    ESRML 35 (H) 06/14/2017    CRP 0.7 06/14/2017     (H) 06/10/2011    MG 2.0 05/09/2024    PHOS 3.7 06/25/2024    TROP <0.045 05/10/2019    CK 20 (L) 09/05/2017    B12 626 12/20/2023         Imaging:  ECHO 6/26/24:    ECG rhythm:   Atrial fibrillation      ----------------------------------------------------------------------------     Conclusions:     1. Left ventricle: The cavity size was normal. Wall thickness was normal.      Systolic function was normal. The estimated ejection fraction was 60-65%,      by biplane method of disks. No diagnostic evidence for regional wall      motion abnormalities. Unable to assess LV diastolic function due to heart      rhythm.   2. Left atrium: The left atrial volume was markedly increased.   3. Right atrium: The atrium was markedly dilated.   4. Aortic valve: A bioprosthetic valve is present. There was mild      perivalvular regurgitation. The peak systolic velocity was 3.05m/sec. The      mean systolic gradient was 20mm Hg. The ratio of LVOT to aortic valve      peak velocity was 0.33.   5. Mitral valve: There was mild regurgitation.   6. Tricuspid valve: There was mild-moderate regurgitation.   7. Pulmonary arteries: Systolic pressure was moderately increased, estimated      to be 55mm Hg.   8. Pericardium, extracardiac: A small pericardial effusion was identified      posterior to the heart. There was a left pleural effusion.   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 Please do not hesitate to call with questions.    Balwinder Andujar MD  [facility]  [address]  [place] [address]  [phone]    This note was prepared using Dragon Medical voice recognition dictation software. As a result errors may occur. When identified these errors have been corrected. While every attempt is made to correct errors during dictation discrepancies may still exist

## 2024-07-29 NOTE — OPERATIVE REPORT
ESOPHAGOGASTRODUODENOSCOPY (EGD) REPORT    Yamini Ocampo     1945 Age 79 year old   PCP Kathy Anne MD Endoscopist Alyssa Orellana MD     Date of procedure: 24    Procedure: EGD w/control of bleeding    Pre-operative diagnosis: anemia    Post-operative diagnosis: see impression    Medications: MAC    Complications: none    Procedure:  Informed consent was obtained from the patient after the risks of the procedure were discussed, including but not limited to bleeding, perforation, aspiration, infection, or possibility of a missed lesion. After discussions of the risks/benefits and alternatives to this procedure, as well as the planned sedation, the patient was placed in the left lateral decubitus position and begun on continuous blood pressure pulse oximetry and EKG monitoring and this was maintained throughout the procedure. Once an adequate level of sedation was obtained a bite block was placed. Then the lubricated tip of the [id number] diagnostic video upper endoscope was inserted and advanced using direct visualization into the posterior pharynx and ultimately into the esophagus, stomach, and duodenum.    Complications: None    Findings:      1. Esophagus: Layering fluid in esophagus cleared upon entry.     2. Stomach: We then entered the stomach. No hematin or blood seen. Linear ectasias/erythema in antrum, previously biopsied consistent with vascular ectasias. Given anemia, these were treated with 7F argon plasma coagulation. No bleeding during or after maneuver.     3. Duodenum: The duodenal mucosa appeared normal in the 1st and 2nd portion of the duodenum. Bilious effluent     We then withdrew the instrument from the patient who tolerated the procedure well.     Impression:   1. Gastric antral vascular ectasias s/p APC    Recommend:  1. Continue ppi bid  2. Continue to hold anticoagulation today, can consider resuming tomorrow pending hgb     >>>If tissue was sampled/removed and you  have not received your pathology results either by phone or letter within 2 weeks, please call our office at [phone].    Specimens:  none    Blood loss: <1 ml      Alyssa Orellana MD  [facility]

## 2024-07-29 NOTE — PROGRESS NOTES
Progress Note     Yamini Ocampo Patient Status:  Inpatient    1945 MRN [id number]   Location [facility] 5SW/SE Attending Brigitte Freeman MD   Hosp Day # 3 PCP Kathy Anne MD       Subjective:   S: Patient seen at bedside this morning laying supine in bed, BP low, 500cc fluid bolus given.  Patient denied any complaints.   Patient later taken for EGD.    Review of Systems:   10 point ROS completed and was negative, except for pertinent positive and negatives stated in subjective.    Objective:   Vital signs:  Temp:  [97.5 °F (36.4 °C)-98.9 °F (37.2 °C)] 98.4 °F (36.9 °C)  Pulse:  [] 86  Resp:  [16-26] 18  BP: ()/(25-73) 123/55  SpO2:  [90 %-97 %] 92 %    Wt Readings from Last 6 Encounters:   24 190 lb 3.2 oz (86.3 kg)   24 206 lb (93.4 kg)   24 195 lb 9.6 oz (88.7 kg)   24 211 lb 3.2 oz (95.8 kg)   24 211 lb (95.7 kg)   24 219 lb (99.3 kg)         Physical Exam:      General: No acute distress.   Respiratory: Clear to auscultation bilaterally. No wheezes. No rhonchi.  Cardiovascular: S1, S2. Regular rate and rhythm. No murmurs, rubs or gallops.   Abdomen: Soft, nontender, nondistended.  Positive bowel sounds. No rebound or guarding.  Neurologic: No focal neurological deficits.   Musculoskeletal: Moves all extremities.  Extremities: No edema.    Results:   Diagnostic Data:      Labs:    Labs Last 24 Hours:   BMP     CBC    Other     Na 143 Cl 106 BUN 34 Glu 89   Hb 7.2   PTT - Procal -   K 4.0 CO2 30.0 Cr 1.63   WBC 5.4 >< .0  INR - CRP -   Renal Lytes Endo    Hct 22.9   Trop - D dim -   eGFR - Ca 9.3 POC Gluc  -    LFT   pBNP - Lactic -   eGFR AA - PO4 - A1c -   AST - APk - Prot -  LDL -     Mg - TSH -   ALT - T sis - Alb -        COVID-19 Lab Results    COVID-19  Lab Results   Component Value Date    COVID19 Not Detected 2024    COVID19 Not Detected 2023    COVID19 Not Detected 2021       Pro-Calcitonin  No results  for input(s): \"PCT\" in the last 168 hours.    Cardiac  No results for input(s): \"TROP\", \"PBNP\" in the last 168 hours.    Creatinine Kinase  No results for input(s): \"CK\" in the last 168 hours.    Inflammatory Markers  Recent Labs   Lab 07/26/24  1243   KATIE 20.9       Imaging: Imaging data reviewed in Epic.    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 No APC was done at that time secondary to food particles.   - this admission received 1U pRBC transfusion with appropriate response  - s/p EGD 7/27 unsuccessful due to retention of food in esophagus  - GI following  - s/p repeat EGD 7/29 with findings of GAVE s/p APC  - cont PPI IV BID  - hold further eliquis anticoag, consider resuming tomorrow pending  hgb     Chronic atrial fibrillation anticoagulated with Eliquis.  - hold home eliquis in setting of anemia bleeding  - cont tele monitoring    Acute on chronic diastolic heart failure  - cardiology consulted  - Admission BNP >1200  - pt started on IV Bumex , anticipate transition to PO regimen soon   - Farxiga on discharge     Chronic Medical Problems:   GERD  Essential hypertension.       Dispo: pending clinical course, possible dc home tomorrow       MDM: High   I personally spent time on chart/note review, review of labs/imaging, discussion with patient, physical exam, discussion with staff, consultants, coordinating care, writing progress note, and discussion of plan of  care.       Brigitte Freeman MD

## 2024-07-29 NOTE — ANESTHESIA POSTPROCEDURE EVALUATION
Patient: Yamini Ocampo    Procedure Summary       Date: 07/29/24 Room / Location: [facility] ENDOSCOPY 05 / [facility] ENDOSCOPY    Anesthesia Start: 1248 Anesthesia Stop: 1316    Procedure: ESOPHAGOGASTRODUODENOSCOPY (EGD) Diagnosis: (gastric antral vascular ectasia)    Surgeons: Alyssa Orellana MD Anesthesiologist: Kiko Garcia MD    Anesthesia Type: MAC ASA Status: 3            Anesthesia Type: MAC    Vitals Value Taken Time   /73 07/29/24 1329   Temp 97.0 07/29/24 1346   Pulse 92 07/29/24 1329   Resp 30 07/29/24 1329   SpO2 94 % 07/29/24 1329   Vitals shown include unfiled device data.    [facility] AN Post Evaluation:   Patient Evaluated in PACU  Patient Participation: complete - patient participated  Level of Consciousness: awake and alert  Pain Score: 0  Pain Management: adequate  Airway Patency:patent  Dental exam unchanged from preop  Yes    Nausea/Vomiting: none  Cardiovascular Status: acceptable  Respiratory Status: acceptable  Postoperative Hydration acceptable      Kiko Garcia MD  7/29/2024 1:46 PM

## 2024-07-29 NOTE — PLAN OF CARE
Patient is alert and oriented x4. Patient is on room air and remote tele. VSS. Patient is saline locked. Patient ambulates independently. Patient is on a full liquid diet however will be NPO at midnight for procedure 7/29/24. Patient denies n/v/d, pain and sob. Patient is voiding via purewick and last had a bm 7/24/24. Safety measures are in place.       Problem: Patient Centered Care  Goal: Patient preferences are identified and integrated in the patient's plan of care  Description: Interventions:  - What would you like us to know as we care for you? \"I am from home with my daughter.\"  - Provide timely, complete, and accurate information to patient/family  - Incorporate patient and family knowledge, values, beliefs, and cultural backgrounds into the planning and delivery of care  - Encourage patient/family to participate in care and decision-making at the level they choose  - Honor patient and family perspectives and choices  Outcome: Progressing     Problem: Patient/Family Goals  Goal: Patient/Family Long Term Goal  Description: Patient's Long Term Goal:  Discharge home     Interventions:  - Monitor vitals  - Monitor labs  - Remote tele   - GI on consult   - Cardiology on consult  - See additional Care Plan goals for specific interventions  Outcome: Progressing  Goal: Patient/Family Short Term Goal  Description: Patient's Short Term Goal: \"to prevent low hgb\"    Interventions:   - Monitor labs  - Monitor vitals  - Replaced PRBCs if Hgb < 7  - Remote tele   - See additional Care Plan goals for specific interventions  Outcome: Progressing     Problem: HEMATOLOGIC - ADULT  Goal: Maintains hematologic stability  Description: INTERVENTIONS  - Assess for signs and symptoms of bleeding or hemorrhage  - Monitor labs and vital signs for trends  - Administer supportive blood products/factors, fluids and medications as ordered and appropriate  - Administer supportive blood products/factors as ordered and  appropriate  Outcome: Progressing  Goal: Free from bleeding injury  Description: (Example usage: patient with low platelets)  INTERVENTIONS:  - Avoid intramuscular injections, enemas and rectal medication administration  - Ensure safe mobilization of patient  - Hold pressure on venipuncture sites to achieve adequate hemostasis  - Assess for signs and symptoms of internal bleeding  - Monitor lab trends  - Patient is to report abnormal signs of bleeding to staff  - Avoid use of toothpicks and dental floss  - Use electric shaver for shaving  - Use soft bristle tooth brush  - Limit straining and forceful nose blowing  Outcome: Progressing     Problem: SAFETY ADULT - FALL  Goal: Free from fall injury  Description: INTERVENTIONS:  - Assess pt frequently for physical needs  - Identify cognitive and physical deficits and behaviors that affect risk of falls.  - [place] fall precautions as indicated by assessment.  - Educate pt/family on patient safety including physical limitations  - Instruct pt to call for assistance with activity based on assessment  - Modify environment to reduce risk of injury  - Provide assistive devices as appropriate  - Consider OT/PT consult to assist with strengthening/mobility  - Encourage toileting schedule  Outcome: Progressing     Problem: DISCHARGE PLANNING  Goal: Discharge to home or other facility with appropriate resources  Description: INTERVENTIONS:  - Identify barriers to discharge w/pt and caregiver  - Include patient/family/discharge partner in discharge planning  - Arrange for needed discharge resources and transportation as appropriate  - Identify discharge learning needs (meds, wound care, etc)  - Arrange for interpreters to assist at discharge as needed  - Consider post-discharge preferences of patient/family/discharge partner  - Complete POLST form as appropriate  - Assess patient's ability to be responsible for managing their own health  - Refer to Case Management Department  for coordinating discharge planning if the patient needs post-hospital services based on physician/LIP order or complex needs related to functional status, cognitive ability or social support system  Outcome: Progressing     Problem: CARDIOVASCULAR - ADULT  Goal: Maintains optimal cardiac output and hemodynamic stability  Description: INTERVENTIONS:  - Monitor vital signs, rhythm, and trends  - Monitor for bleeding, hypotension and signs of decreased cardiac output  - Evaluate effectiveness of vasoactive medications to optimize hemodynamic stability  - Monitor arterial and/or venous puncture sites for bleeding and/or hematoma  - Assess quality of pulses, skin color and temperature  - Assess for signs of decreased coronary artery perfusion - ex. Angina  - Evaluate fluid balance, assess for edema, trend weights  Outcome: Progressing  Goal: Absence of cardiac arrhythmias or at baseline  Description: INTERVENTIONS:  - Continuous cardiac monitoring, monitor vital signs, obtain 12 lead EKG if indicated  - Evaluate effectiveness of antiarrhythmic and heart rate control medications as ordered  - Initiate emergency measures for life threatening arrhythmias  - Monitor electrolytes and administer replacement therapy as ordered  Outcome: Progressing

## 2024-07-29 NOTE — H&P
History & Physical Examination    Patient Name: Yamini Ocampo  MRN: [id number]  [facility]: [id number]  YOB: 1945    Diagnosis: melena    Facility-Administered Medications Prior to Admission   Medication Dose Route Frequency Provider Last Rate Last Admin    darbepoetin molina (Aranesp) 200 MCG/0.4ML injection 200 mcg  200 mcg Subcutaneous Q4 Week Manan Pizarro MD   200 mcg at 05/29/24 1016     Medications Prior to Admission   Medication Sig Dispense Refill Last Dose    pantoprazole 40 MG Oral Tab EC Take 1 tablet (40 mg total) by mouth 2 (two) times daily before meals. 60 tablet 3 7/26/2024 at 0900    sertraline 100 MG Oral Tab Take 1 tablet (100 mg total) by mouth daily. 90 tablet 3 7/26/2024 at 0900    atorvastatin 10 MG Oral Tab Take 1 tablet (10 mg total) by mouth nightly. 90 tablet 3 7/25/2024 at 2100    folic acid 1 MG Oral Tab Take 1 tablet (1 mg total) by mouth daily. 90 tablet 3 7/26/2024 at 0900    cyanocobalamin 1000 MCG Oral Tab Take 1 tablet (1,000 mcg total) by mouth daily.   7/26/2024 at 0900    ELIQUIS 5 MG Oral Tab Take 1 tablet (5 mg total) by mouth 2 (two) times daily. 180 tablet 0 7/26/2024 at 0900    spironolactone 25 MG Oral Tab Take 5 tablets (125 mg total) by mouth daily.       bumetanide 1 MG Oral Tab Take 1 tablet (1 mg total) by mouth BID (Diuretic). 60 tablet 0     FARXIGA 10 MG Oral Tab Take 1 tablet (10 mg total) by mouth daily.       albuterol 108 (90 Base) MCG/ACT Inhalation Aero Soln SHAKE BEFORE USE AND TAKE 2 PUFFS INTO THE LUNGS EVERY 6 HOURS AS NEEDED       METOPROLOL SUCCINATE ER 50 MG Oral Tablet 24 Hr Take 1 tablet (50 mg total) by mouth in the morning and 1 tablet (50 mg total) before bedtime.    at 0900     Current Facility-Administered Medications   Medication Dose Route Frequency    spironolactone (Aldactone) tab 25 mg  25 mg Oral Daily    metoprolol succinate ER (Toprol XL) 24 hr tab 50 mg  50 mg Oral Daily Beta Blocker    pantoprazole (Protonix) 40 mg in sodium  chloride 0.9% PF 10 mL IV push  40 mg Intravenous Q12H    albuterol (Ventolin HFA) 108 (90 Base) MCG/ACT inhaler 2 puff  2 puff Inhalation Q6H PRN    atorvastatin (Lipitor) tab 10 mg  10 mg Oral Nightly    cyanocobalamin (Vitamin B12) tab 1,000 mcg  1,000 mcg Oral Daily    folic acid (Folvite) tab 1 mg  1 mg Oral Daily    sertraline (Zoloft) tab 100 mg  100 mg Oral Daily    acetaminophen (Tylenol Extra Strength) tab 500 mg  500 mg Oral Q4H PRN    ondansetron (Zofran) 4 MG/2ML injection 4 mg  4 mg Intravenous Q6H PRN    metoclopramide (Reglan) 5 mg/mL injection 10 mg  10 mg Intravenous Q8H PRN    bumetanide (Bumex) 0.25 MG/ML injection 1 mg  1 mg Intravenous BID (Diuretic)       Allergies:   Allergies   Allergen Reactions    Adhesive Tape RASH     Skin off       Past Medical History:    Arrhythmia    afib    Back problem    lumbar disc disease    Basal cell carcinoma of nose    Bilateral carpal tunnel syndrome    Cataract    Cellulitis    Deep vein thrombosis (HCC)    unsure which leg, possibly left    Depression    Esophageal reflux    Essential hypertension    Heart disease    Heel spur    Right    Hemorrhoids    Hiatal hernia    High blood pressure    High cholesterol    Hyperlipidemia    Incontinence    Lumbar disc disease    Multiple gastric ulcers    Osteoarthritis    Pulmonary embolism (HCC)    Raynaud disease    Renal disorder    Tubular adenoma of colon    repeat c-scope 1/2020    Type 2 diabetes mellitus with diabetic chronic kidney disease (HCC)    Visual impairment    glasses     Past Surgical History:   Procedure Laterality Date    Angioplasty (coronary)      Appendectomy      Arthroscopy of joint unlisted Right     knee    Bso, omentectomy w/sushil      Carpal tunnel release Bilateral     Cholecystectomy  09/28/2016    Colonoscopy N/A 01/25/2017    Procedure: COLONOSCOPY;  Surgeon: KATHARINE Prakash MD;  Location: [facility] ENDOSCOPY    Colonoscopy N/A 10/07/2020    Procedure: COLONOSCOPY;  Surgeon: KATHARINE Prakash  MD [place];  Location: [facility] ENDOSCOPY    Egd N/A 2016    Procedure: ESOPHAGOGASTRODUODENOSCOPY (EGD);  Surgeon: KATHARINE Prakash MD;  Location: [facility] ENDOSCOPY    Egd N/A 2024    ; gastritis    Egd N/A 2024    ; retained food    Electrocardiogram, complete  2013    Scanned to Media Tab    Excision of nose      Basal cell carcinoma    Hernia surgery      Hysterectomy      Knee replacement surgery Right     Other surgical history      Injections and narcotics, POD [place]    Removal of heel spur      Total abdom hysterectomy       Family History   Problem Relation Age of Onset    Cancer Father         Skin cancer    Other (Other) Father 97        old age,unknown caused    Heart Attack Mother     Heart Disorder Mother     Hypertension Other         Family H/O    Cancer Other         Lymphoma  Family H/O    Heart Disease Other         Family H/O    Arthritis Other         Close relative  unsure which type    No Known Problems Brother      Social History     Tobacco Use    Smoking status: Former     Current packs/day: 0.00     Types: Cigarettes     Quit date: 1997     Years since quittin.5    Smokeless tobacco: Never   Substance Use Topics    Alcohol use: No     Comment: rare         SYSTEM Check if Review is Normal Check if Physical Exam is Normal If not normal, please explain:   HEENT [X ] [ X]    NECK  [X ] [ X]    HEART [X ] [ X]    LUNGS [X ] [ X]    ABDOMEN [X ] [ X]    EXTREMITIES [X ] [ X]    OTHER        I have discussed the risks and benefits and alternatives of the procedure with the patient/family.  They understand and agree to proceed with plan of care.   I have reviewed the History and Physical done within the last 30 days.  Any changes noted above.    Alyssa Orellana MD  [facility] - Gastroenterology  2024  10:31 AM

## 2024-07-30 LAB
ANION GAP SERPL CALC-SCNC: 5 MMOL/L (ref 0–18)
ANTIBODY SCREEN: NEGATIVE
BASOPHILS # BLD AUTO: 0.06 X10(3) UL (ref 0–0.2)
BASOPHILS NFR BLD AUTO: 0.8 %
BLOOD TYPE BARCODE: 600
BNP SERPL-MCNC: 545 PG/ML
BUN BLD-MCNC: 32 MG/DL (ref 9–23)
BUN/CREAT SERPL: 18.6 (ref 10–20)
CALCIUM BLD-MCNC: 9.5 MG/DL (ref 8.7–10.4)
CHLORIDE SERPL-SCNC: 106 MMOL/L (ref 98–112)
CO2 SERPL-SCNC: 31 MMOL/L (ref 21–32)
CREAT BLD-MCNC: 1.72 MG/DL
DEPRECATED RDW RBC AUTO: 64.7 FL (ref 35.1–46.3)
EGFRCR SERPLBLD CKD-EPI 2021: 30 ML/MIN/1.73M2 (ref 60–?)
EOSINOPHIL # BLD AUTO: 0.11 X10(3) UL (ref 0–0.7)
EOSINOPHIL NFR BLD AUTO: 1.5 %
ERYTHROCYTE [DISTWIDTH] IN BLOOD BY AUTOMATED COUNT: 19.3 % (ref 11–15)
GLUCOSE BLD-MCNC: 91 MG/DL (ref 70–99)
HCT VFR BLD AUTO: 23.2 %
HGB BLD-MCNC: 6.7 G/DL
HGB BLD-MCNC: 8.7 G/DL
IMM GRANULOCYTES # BLD AUTO: 0.02 X10(3) UL (ref 0–1)
IMM GRANULOCYTES NFR BLD: 0.3 %
LYMPHOCYTES # BLD AUTO: 0.76 X10(3) UL (ref 1–4)
LYMPHOCYTES NFR BLD AUTO: 10 %
MCH RBC QN AUTO: 28.9 PG (ref 26–34)
MCHC RBC AUTO-ENTMCNC: 28.9 G/DL (ref 31–37)
MCV RBC AUTO: 100 FL
MONOCYTES # BLD AUTO: 0.62 X10(3) UL (ref 0.1–1)
MONOCYTES NFR BLD AUTO: 8.2 %
NEUTROPHILS # BLD AUTO: 6 X10 (3) UL (ref 1.5–7.7)
NEUTROPHILS # BLD AUTO: 6 X10(3) UL (ref 1.5–7.7)
NEUTROPHILS NFR BLD AUTO: 79.2 %
OSMOLALITY SERPL CALC.SUM OF ELEC: 300 MOSM/KG (ref 275–295)
PLATELET # BLD AUTO: 269 10(3)UL (ref 150–450)
POTASSIUM SERPL-SCNC: 4 MMOL/L (ref 3.5–5.1)
RBC # BLD AUTO: 2.32 X10(6)UL
RH BLOOD TYPE: NEGATIVE
SODIUM SERPL-SCNC: 142 MMOL/L (ref 136–145)
UNIT VOLUME: 350 ML
WBC # BLD AUTO: 7.6 X10(3) UL (ref 4–11)

## 2024-07-30 PROCEDURE — 99233 SBSQ HOSP IP/OBS HIGH 50: CPT | Performed by: HOSPITALIST

## 2024-07-30 PROCEDURE — 99233 SBSQ HOSP IP/OBS HIGH 50: CPT | Performed by: INTERNAL MEDICINE

## 2024-07-30 RX ORDER — SODIUM CHLORIDE 9 MG/ML
INJECTION, SOLUTION INTRAVENOUS ONCE
Status: DISCONTINUED | OUTPATIENT
Start: 2024-07-30 | End: 2024-08-02

## 2024-07-30 NOTE — PLAN OF CARE
Problem: Patient Centered Care  Goal: Patient preferences are identified and integrated in the patient's plan of care  Description: Interventions:  - What would you like us to know as we care for you? \"I am from home with my daughter.\"  - Provide timely, complete, and accurate information to patient/family  - Incorporate patient and family knowledge, values, beliefs, and cultural backgrounds into the planning and delivery of care  - Encourage patient/family to participate in care and decision-making at the level they choose  - Honor patient and family perspectives and choices  Outcome: Progressing     Problem: Patient/Family Goals  Goal: Patient/Family Long Term Goal  Description: Patient's Long Term Goal:  Discharge home     Interventions:  - Monitor vitals  - Monitor labs  - Remote tele   - GI on consult   - Cardiology on consult  - See additional Care Plan goals for specific interventions  Outcome: Progressing  Goal: Patient/Family Short Term Goal  Description: Patient's Short Term Goal: \"to prevent low hgb\"    Interventions:   - Monitor labs  - Monitor vitals  - Replaced PRBCs if Hgb < 7  - Remote tele   - See additional Care Plan goals for specific interventions  Outcome: Progressing     Problem: HEMATOLOGIC - ADULT  Goal: Maintains hematologic stability  Description: INTERVENTIONS  - Assess for signs and symptoms of bleeding or hemorrhage  - Monitor labs and vital signs for trends  - Administer supportive blood products/factors, fluids and medications as ordered and appropriate  - Administer supportive blood products/factors as ordered and appropriate  Outcome: Progressing  Goal: Free from bleeding injury  Description: (Example usage: patient with low platelets)  INTERVENTIONS:  - Avoid intramuscular injections, enemas and rectal medication administration  - Ensure safe mobilization of patient  - Hold pressure on venipuncture sites to achieve adequate hemostasis  - Assess for signs and symptoms of internal  bleeding  - Monitor lab trends  - Patient is to report abnormal signs of bleeding to staff  - Avoid use of toothpicks and dental floss  - Use electric shaver for shaving  - Use soft bristle tooth brush  - Limit straining and forceful nose blowing  Outcome: Progressing     Problem: SAFETY ADULT - FALL  Goal: Free from fall injury  Description: INTERVENTIONS:  - Assess pt frequently for physical needs  - Identify cognitive and physical deficits and behaviors that affect risk of falls.  - [place] fall precautions as indicated by assessment.  - Educate pt/family on patient safety including physical limitations  - Instruct pt to call for assistance with activity based on assessment  - Modify environment to reduce risk of injury  - Provide assistive devices as appropriate  - Consider OT/PT consult to assist with strengthening/mobility  - Encourage toileting schedule  Outcome: Progressing     Problem: DISCHARGE PLANNING  Goal: Discharge to home or other facility with appropriate resources  Description: INTERVENTIONS:  - Identify barriers to discharge w/pt and caregiver  - Include patient/family/discharge partner in discharge planning  - Arrange for needed discharge resources and transportation as appropriate  - Identify discharge learning needs (meds, wound care, etc)  - Arrange for interpreters to assist at discharge as needed  - Consider post-discharge preferences of patient/family/discharge partner  - Complete POLST form as appropriate  - Assess patient's ability to be responsible for managing their own health  - Refer to Case Management Department for coordinating discharge planning if the patient needs post-hospital services based on physician/LIP order or complex needs related to functional status, cognitive ability or social support system  Outcome: Progressing     Problem: CARDIOVASCULAR - ADULT  Goal: Maintains optimal cardiac output and hemodynamic stability  Description: INTERVENTIONS:  - Monitor vital signs,  rhythm, and trends  - Monitor for bleeding, hypotension and signs of decreased cardiac output  - Evaluate effectiveness of vasoactive medications to optimize hemodynamic stability  - Monitor arterial and/or venous puncture sites for bleeding and/or hematoma  - Assess quality of pulses, skin color and temperature  - Assess for signs of decreased coronary artery perfusion - ex. Angina  - Evaluate fluid balance, assess for edema, trend weights  Outcome: Progressing  Goal: Absence of cardiac arrhythmias or at baseline  Description: INTERVENTIONS:  - Continuous cardiac monitoring, monitor vital signs, obtain 12 lead EKG if indicated  - Evaluate effectiveness of antiarrhythmic and heart rate control medications as ordered  - Initiate emergency measures for life threatening arrhythmias  - Monitor electrolytes and administer replacement therapy as ordered  Outcome: Progressing

## 2024-07-30 NOTE — PROGRESS NOTES
Progress Note     Yamini Ocampo Patient Status:  Inpatient    1945 MRN [id number]   Location [facility] 5SW/SE Attending Brigitte Freeman MD   Hosp Day # 4 PCP Kathy Anne MD       Subjective:   S: Patient resting in bed.  Feels fine.  No bloody bm but hgb dropped below 7 today.    Review of Systems:   10 point ROS completed and was negative, except for pertinent positive and negatives stated in subjective.    Objective:   Vital signs:  Temp:  [97.7 °F (36.5 °C)-99.3 °F (37.4 °C)] 98.9 °F (37.2 °C)  Pulse:  [] 88  Resp:  [14-20] 16  BP: ()/(48-76) 151/65  SpO2:  [91 %-100 %] 93 %    Wt Readings from Last 6 Encounters:   24 190 lb 3.2 oz (86.3 kg)   24 206 lb (93.4 kg)   24 195 lb 9.6 oz (88.7 kg)   24 211 lb 3.2 oz (95.8 kg)   24 211 lb (95.7 kg)   24 219 lb (99.3 kg)         Physical Exam:      Gen: No acute distress  Pulm: Lungs clear, normal respiratory effort  CV: Heart with regular rate and rhythm  Abd: Abdomen soft, nontender, nondistended, bowel sounds present  Neuro: No acute focal deficits  MSK: moves extremities  Skin: Warm and dry  Psych: Normal affect  Ext: no c/c/e      Results:   Diagnostic Data:      Labs:    Labs Last 24 Hours:   BMP     CBC    Other     Na 142 Cl 106 BUN 32 Glu 91   Hb 8.7   PTT - Procal -   K 4.0 CO2 31.0 Cr 1.72   WBC 7.6 >< .0  INR - CRP -   Renal Lytes Endo    Hct 23.2   Trop - D dim -   eGFR - Ca 9.5 POC Gluc  -    LFT   pBNP - Lactic -   eGFR AA - PO4 - A1c -   AST - APk - Prot -  LDL -     Mg - TSH -   ALT - T sis - Alb -        COVID-19 Lab Results    COVID-19  Lab Results   Component Value Date    COVID19 Not Detected 2024    COVID19 Not Detected 2023    COVID19 Not Detected 2021       Pro-Calcitonin  No results for input(s): \"PCT\" in the last 168 hours.    Cardiac  No results for input(s): \"TROP\", \"PBNP\" in the last 168 hours.    Creatinine Kinase  No results for input(s):  \"CK\" in the last 168 hours.    Inflammatory Markers  Recent Labs   Lab 07/26/24  1243   KATIE 20.9       Imaging: Imaging data reviewed in Epic.    Medications:    sodium chloride   Intravenous Once    polyethylene glycol-electrolyte solution  480 mL Oral BID@0300,1800    spironolactone  25 mg Oral Daily    metoprolol succinate  50 mg Oral Daily Beta Blocker    pantoprazole  40 mg Intravenous Q12H    atorvastatin  10 mg Oral Nightly    cyanocobalamin  1,000 mcg Oral Daily    folic acid  1 mg Oral Daily    sertraline  100 mg Oral Daily    bumetanide  1 mg Intravenous BID (Diuretic)       Assessment & Plan:   ASSESSMENT / PLAN:     Melena, UGIB  Acute Symptomatic Anemia   - pt with outpatient labs revealing hgb 5.9, advised to come to ED  - recent hospitalization in June 2024 for UGIB, 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 No APC was done at that time secondary to food particles.   - this admission received 1U pRBC transfusion with appropriate response  - s/p EGD 7/27 unsuccessful due to retention of food in esophagus  - GI following  - s/p repeat EGD 7/29 with findings of GAVE s/p APC  - cont PPI IV BID  - hold further eliquis anticoag for now  - transfuse 1 unit prbc today  - plan colonoscopy +/- VCE tomorrow    Chronic atrial fibrillation anticoagulated with Eliquis.  - hold home eliquis in setting of anemia bleeding  - cont tele monitoring    Acute on chronic diastolic heart failure  - cardiology consulted  - Admission BNP >1200  - pt started on IV Bumex , anticipate transition to PO regimen soon   - Farxiga on discharge     Chronic Medical Problems:   GERD  Essential hypertension.       Dispo: pending clinical course, possible dc home tomorrow       MDM: High   I personally spent time on chart/note review, review of labs/imaging, discussion with patient, physical exam, discussion with staff, consultants, coordinating care, writing progress note, and discussion of plan of care.

## 2024-07-30 NOTE — PLAN OF CARE
Galjr is alert and oriented x4. Voiding freely. Denies pain. Up with assist. Hgb 6.7 this morning, MD notified and 1 unit PRBC was ordered. Discharge when medically cleared.

## 2024-07-30 NOTE — PROGRESS NOTES
[facility]     Gastroenterology Progress Note    Yamini Ocampo Patient Status:  Inpatient    1945 MRN [id number]   Location [facility] 5SW/SE Attending Mandy Vazquez MD   Hosp Day # 4 PCP Kathy Anne MD       Subjective:   No bowel movement overnight. No abd pain, n/v     Objective:   Blood pressure 117/61, pulse 95, temperature 98.6 °F (37 °C), temperature source Oral, resp. rate 14, height 5' 6\" (1.676 m), weight 190 lb 3.2 oz (86.3 kg), SpO2 93%. Body mass index is 30.7 kg/m².    General: awake, alert and oriented, no acute distress  HEENT: moist mucus membranes  PULM: no conversational dyspnea  CARDIOVASCULAR: regular rate and rhythm, the extremities are warm and well perfused  GI: soft, non-tender, non-distended, + BS, no rebound/guarding   EXTREMITIES: no edema, moving all extremities  SKIN: no visible rash  NEURO: appropriate and interactive    Assessment and Plan:   Yamini Ocampo is a 79 year old year-old female with history of BMI 33.86, HTN, HLD, DM2, CKD3, Anemia of chronic disease, PUD, GERD, Ventricular diastolic dysfunction, HFpEF, Pulmonary artery HTN, CAD, Chronic Afib (on eliquis) who presents to the ED with acute on chronic diastolic HF and anemia. She has known GAVE and underwent APC on . Now with recurrent anemia today to hgb 6.7, no overt bleeding. Concern for alternate source of anemia as there was no hematin staining or old blood in stomach to explain recent bleeding, such as AVMs    - repeat hgb post transfusion  - plan for colonoscopy +/- VCE tomorrow  - clears today    Alyssa Orellana MD  [facility] Gastroenterology      Results:     Lab Results   Component Value Date    WBC 7.6 2024    HGB 6.7 (LL) 2024    HCT 23.2 (L) 2024    .0 2024    CREATSERUM 1.72 (H) 2024    BUN 32 (H) 2024     2024    K 4.0 2024     2024    CO2 31.0 2024    GLU 91 2024     CA 9.5 07/30/2024    ALB 3.6 06/25/2024    ALKPHO 64 03/22/2024    BILT 0.7 03/22/2024    TP 6.5 03/22/2024    AST 38 (H) 03/22/2024    ALT 16 03/22/2024    PTT 30.0 01/29/2019    INR 1.92 (H) 06/25/2024    TSH 2.130 05/04/2021    LIP 40 10/27/2023    ESRML 35 (H) 06/14/2017    CRP 0.7 06/14/2017     (H) 06/10/2011    MG 2.0 05/09/2024    PHOS 3.7 06/25/2024    TROP <0.045 05/10/2019    CK 20 (L) 09/05/2017    B12 626 12/20/2023       No results found.

## 2024-07-31 ENCOUNTER — ANESTHESIA EVENT (OUTPATIENT)
Dept: ENDOSCOPY | Facility: HOSPITAL | Age: 79
End: 2024-07-31
Payer: MEDICARE

## 2024-07-31 ENCOUNTER — ANESTHESIA (OUTPATIENT)
Dept: ENDOSCOPY | Facility: HOSPITAL | Age: 79
End: 2024-07-31
Payer: MEDICARE

## 2024-07-31 LAB
ANION GAP SERPL CALC-SCNC: 11 MMOL/L (ref 0–18)
BASOPHILS # BLD AUTO: 0.09 X10(3) UL (ref 0–0.2)
BASOPHILS NFR BLD AUTO: 1.5 %
BLOOD TYPE BARCODE: 600
BUN BLD-MCNC: 26 MG/DL (ref 9–23)
BUN/CREAT SERPL: 14.3 (ref 10–20)
CALCIUM BLD-MCNC: 10 MG/DL (ref 8.7–10.4)
CHLORIDE SERPL-SCNC: 111 MMOL/L (ref 98–112)
CO2 SERPL-SCNC: 27 MMOL/L (ref 21–32)
CREAT BLD-MCNC: 1.82 MG/DL
DEPRECATED RDW RBC AUTO: 69.5 FL (ref 35.1–46.3)
EGFRCR SERPLBLD CKD-EPI 2021: 28 ML/MIN/1.73M2 (ref 60–?)
EOSINOPHIL # BLD AUTO: 0.09 X10(3) UL (ref 0–0.7)
EOSINOPHIL NFR BLD AUTO: 1.5 %
ERYTHROCYTE [DISTWIDTH] IN BLOOD BY AUTOMATED COUNT: 20.9 % (ref 11–15)
GLUCOSE BLD-MCNC: 100 MG/DL (ref 70–99)
HCT VFR BLD AUTO: 32.7 %
HGB BLD-MCNC: 10.1 G/DL
IMM GRANULOCYTES # BLD AUTO: 0.03 X10(3) UL (ref 0–1)
IMM GRANULOCYTES NFR BLD: 0.5 %
LYMPHOCYTES # BLD AUTO: 0.65 X10(3) UL (ref 1–4)
LYMPHOCYTES NFR BLD AUTO: 11.1 %
MAGNESIUM SERPL-MCNC: 1.9 MG/DL (ref 1.6–2.6)
MCH RBC QN AUTO: 30.3 PG (ref 26–34)
MCHC RBC AUTO-ENTMCNC: 30.9 G/DL (ref 31–37)
MCV RBC AUTO: 98.2 FL
MONOCYTES # BLD AUTO: 0.37 X10(3) UL (ref 0.1–1)
MONOCYTES NFR BLD AUTO: 6.3 %
NEUTROPHILS # BLD AUTO: 4.63 X10 (3) UL (ref 1.5–7.7)
NEUTROPHILS # BLD AUTO: 4.63 X10(3) UL (ref 1.5–7.7)
NEUTROPHILS NFR BLD AUTO: 79.1 %
OSMOLALITY SERPL CALC.SUM OF ELEC: 313 MOSM/KG (ref 275–295)
PLATELET # BLD AUTO: 303 10(3)UL (ref 150–450)
POTASSIUM SERPL-SCNC: 3.7 MMOL/L (ref 3.5–5.1)
RBC # BLD AUTO: 3.33 X10(6)UL
SODIUM SERPL-SCNC: 149 MMOL/L (ref 136–145)
UNIT VOLUME: 350 ML
WBC # BLD AUTO: 5.9 X10(3) UL (ref 4–11)

## 2024-07-31 PROCEDURE — 45378 DIAGNOSTIC COLONOSCOPY: CPT | Performed by: INTERNAL MEDICINE

## 2024-07-31 PROCEDURE — 0DJD8ZZ INSPECTION OF LOWER INTESTINAL TRACT, VIA NATURAL OR ARTIFICIAL OPENING ENDOSCOPIC: ICD-10-PCS | Performed by: INTERNAL MEDICINE

## 2024-07-31 PROCEDURE — 0DJ08ZZ INSPECTION OF UPPER INTESTINAL TRACT, VIA NATURAL OR ARTIFICIAL OPENING ENDOSCOPIC: ICD-10-PCS | Performed by: INTERNAL MEDICINE

## 2024-07-31 PROCEDURE — 99233 SBSQ HOSP IP/OBS HIGH 50: CPT | Performed by: HOSPITALIST

## 2024-07-31 PROCEDURE — 91110 GI TRC IMG INTRAL ESOPH-ILE: CPT | Performed by: INTERNAL MEDICINE

## 2024-07-31 RX ORDER — PHENYLEPHRINE HCL 10 MG/ML
VIAL (ML) INJECTION AS NEEDED
Status: DISCONTINUED | OUTPATIENT
Start: 2024-07-31 | End: 2024-07-31 | Stop reason: SURG

## 2024-07-31 RX ADMIN — PHENYLEPHRINE HCL 100 MCG: 10 MG/ML VIAL (ML) INJECTION at 14:44:00

## 2024-07-31 RX ADMIN — PHENYLEPHRINE HCL 100 MCG: 10 MG/ML VIAL (ML) INJECTION at 14:53:00

## 2024-07-31 NOTE — PLAN OF CARE
Problem: Patient Centered Care  Goal: Patient preferences are identified and integrated in the patient's plan of care  Description: Interventions:  - What would you like us to know as we care for you? \"I am from home with my daughter.\"  - Provide timely, complete, and accurate information to patient/family  - Incorporate patient and family knowledge, values, beliefs, and cultural backgrounds into the planning and delivery of care  - Encourage patient/family to participate in care and decision-making at the level they choose  - Honor patient and family perspectives and choices  Outcome: Progressing     Problem: Patient/Family Goals  Goal: Patient/Family Long Term Goal  Description: Patient's Long Term Goal:  Discharge home     Interventions:  - Monitor vitals  - Monitor labs  - Remote tele   - GI on consult   - Cardiology on consult  - See additional Care Plan goals for specific interventions  Outcome: Progressing  Goal: Patient/Family Short Term Goal  Description: Patient's Short Term Goal: \"to prevent low hgb\"    Interventions:   - Monitor labs  - Monitor vitals  - Replaced PRBCs if Hgb < 7  - Remote tele   - See additional Care Plan goals for specific interventions  Outcome: Progressing     Problem: HEMATOLOGIC - ADULT  Goal: Maintains hematologic stability  Description: INTERVENTIONS  - Assess for signs and symptoms of bleeding or hemorrhage  - Monitor labs and vital signs for trends  - Administer supportive blood products/factors, fluids and medications as ordered and appropriate  - Administer supportive blood products/factors as ordered and appropriate  Outcome: Progressing  Goal: Free from bleeding injury  Description: (Example usage: patient with low platelets)  INTERVENTIONS:  - Avoid intramuscular injections, enemas and rectal medication administration  - Ensure safe mobilization of patient  - Hold pressure on venipuncture sites to achieve adequate hemostasis  - Assess for signs and symptoms of internal  bleeding  - Monitor lab trends  - Patient is to report abnormal signs of bleeding to staff  - Avoid use of toothpicks and dental floss  - Use electric shaver for shaving  - Use soft bristle tooth brush  - Limit straining and forceful nose blowing  Outcome: Progressing     Problem: SAFETY ADULT - FALL  Goal: Free from fall injury  Description: INTERVENTIONS:  - Assess pt frequently for physical needs  - Identify cognitive and physical deficits and behaviors that affect risk of falls.  - [place] fall precautions as indicated by assessment.  - Educate pt/family on patient safety including physical limitations  - Instruct pt to call for assistance with activity based on assessment  - Modify environment to reduce risk of injury  - Provide assistive devices as appropriate  - Consider OT/PT consult to assist with strengthening/mobility  - Encourage toileting schedule  Outcome: Progressing     Problem: DISCHARGE PLANNING  Goal: Discharge to home or other facility with appropriate resources  Description: INTERVENTIONS:  - Identify barriers to discharge w/pt and caregiver  - Include patient/family/discharge partner in discharge planning  - Arrange for needed discharge resources and transportation as appropriate  - Identify discharge learning needs (meds, wound care, etc)  - Arrange for interpreters to assist at discharge as needed  - Consider post-discharge preferences of patient/family/discharge partner  - Complete POLST form as appropriate  - Assess patient's ability to be responsible for managing their own health  - Refer to Case Management Department for coordinating discharge planning if the patient needs post-hospital services based on physician/LIP order or complex needs related to functional status, cognitive ability or social support system  Outcome: Progressing     Problem: CARDIOVASCULAR - ADULT  Goal: Maintains optimal cardiac output and hemodynamic stability  Description: INTERVENTIONS:  - Monitor vital signs,  rhythm, and trends  - Monitor for bleeding, hypotension and signs of decreased cardiac output  - Evaluate effectiveness of vasoactive medications to optimize hemodynamic stability  - Monitor arterial and/or venous puncture sites for bleeding and/or hematoma  - Assess quality of pulses, skin color and temperature  - Assess for signs of decreased coronary artery perfusion - ex. Angina  - Evaluate fluid balance, assess for edema, trend weights  Outcome: Progressing  Goal: Absence of cardiac arrhythmias or at baseline  Description: INTERVENTIONS:  - Continuous cardiac monitoring, monitor vital signs, obtain 12 lead EKG if indicated  - Evaluate effectiveness of antiarrhythmic and heart rate control medications as ordered  - Initiate emergency measures for life threatening arrhythmias  - Monitor electrolytes and administer replacement therapy as ordered  Outcome: Progressing

## 2024-07-31 NOTE — ANESTHESIA POSTPROCEDURE EVALUATION
Patient: Yamini Ocampo    Procedure Summary       Date: 07/31/24 Room / Location: [facility] ENDOSCOPY 05 / [facility] ENDOSCOPY    Anesthesia Start: 1413 Anesthesia Stop: 1459    Procedures:       COLONOSCOPY      CAPSULE ENDOSCOPY Diagnosis: (poor prep, diverticulosis, gastric antral vascular ectasia)    Surgeons: Alyssa Orellana MD Anesthesiologist: Irvin Meadows MD    Anesthesia Type: MAC ASA Status: 3            Anesthesia Type: MAC    Vitals Value Taken Time   /65 07/31/24 1635   Temp  07/31/24 1715   Pulse 75 07/31/24 1636   Resp 19 07/31/24 1636   SpO2 93 % 07/31/24 1636   Vitals shown include unfiled device data.    [facility] AN Post Evaluation:   Patient Evaluated in PACU  Patient Participation: complete - patient participated  Level of Consciousness: awake and alert  Pain Management: adequate  Airway Patency:patent  Dental exam unchanged from preop  Yes    Nausea/Vomiting: none  Cardiovascular Status: hemodynamically stable  Respiratory Status: room air  Postoperative Hydration euvolemic      Irvin Meadows MD  7/31/2024 5:15 PM

## 2024-07-31 NOTE — PLAN OF CARE
Problem: Patient Centered Care  Goal: Patient preferences are identified and integrated in the patient's plan of care  Description: Interventions:  - What would you like us to know as we care for you? \"I am from home with my daughter.\"  - Provide timely, complete, and accurate information to patient/family  - Incorporate patient and family knowledge, values, beliefs, and cultural backgrounds into the planning and delivery of care  - Encourage patient/family to participate in care and decision-making at the level they choose  - Honor patient and family perspectives and choices  Outcome: Progressing     Problem: Patient/Family Goals  Goal: Patient/Family Long Term Goal  Description: Patient's Long Term Goal:  Discharge home     Interventions:  - Monitor vitals  - Monitor labs  - Remote tele   - GI on consult   - Cardiology on consult  - See additional Care Plan goals for specific interventions  Outcome: Progressing  Goal: Patient/Family Short Term Goal  Description: Patient's Short Term Goal: \"to prevent low hgb\"    Interventions:   - Monitor labs  - Monitor vitals  - Replaced PRBCs if Hgb < 7  - Remote tele   - See additional Care Plan goals for specific interventions  Outcome: Progressing     Problem: HEMATOLOGIC - ADULT  Goal: Maintains hematologic stability  Description: INTERVENTIONS  - Assess for signs and symptoms of bleeding or hemorrhage  - Monitor labs and vital signs for trends  - Administer supportive blood products/factors, fluids and medications as ordered and appropriate  - Administer supportive blood products/factors as ordered and appropriate  Outcome: Progressing  Goal: Free from bleeding injury  Description: (Example usage: patient with low platelets)  INTERVENTIONS:  - Avoid intramuscular injections, enemas and rectal medication administration  - Ensure safe mobilization of patient  - Hold pressure on venipuncture sites to achieve adequate hemostasis  - Assess for signs and symptoms of internal  bleeding  - Monitor lab trends  - Patient is to report abnormal signs of bleeding to staff  - Avoid use of toothpicks and dental floss  - Use electric shaver for shaving  - Use soft bristle tooth brush  - Limit straining and forceful nose blowing  Outcome: Progressing     Problem: SAFETY ADULT - FALL  Goal: Free from fall injury  Description: INTERVENTIONS:  - Assess pt frequently for physical needs  - Identify cognitive and physical deficits and behaviors that affect risk of falls.  - [place] fall precautions as indicated by assessment.  - Educate pt/family on patient safety including physical limitations  - Instruct pt to call for assistance with activity based on assessment  - Modify environment to reduce risk of injury  - Provide assistive devices as appropriate  - Consider OT/PT consult to assist with strengthening/mobility  - Encourage toileting schedule  Outcome: Progressing     Problem: DISCHARGE PLANNING  Goal: Discharge to home or other facility with appropriate resources  Description: INTERVENTIONS:  - Identify barriers to discharge w/pt and caregiver  - Include patient/family/discharge partner in discharge planning  - Arrange for needed discharge resources and transportation as appropriate  - Identify discharge learning needs (meds, wound care, etc)  - Arrange for interpreters to assist at discharge as needed  - Consider post-discharge preferences of patient/family/discharge partner  - Complete POLST form as appropriate  - Assess patient's ability to be responsible for managing their own health  - Refer to Case Management Department for coordinating discharge planning if the patient needs post-hospital services based on physician/LIP order or complex needs related to functional status, cognitive ability or social support system  Outcome: Progressing     Problem: CARDIOVASCULAR - ADULT  Goal: Maintains optimal cardiac output and hemodynamic stability  Description: INTERVENTIONS:  - Monitor vital signs,  rhythm, and trends  - Monitor for bleeding, hypotension and signs of decreased cardiac output  - Evaluate effectiveness of vasoactive medications to optimize hemodynamic stability  - Monitor arterial and/or venous puncture sites for bleeding and/or hematoma  - Assess quality of pulses, skin color and temperature  - Assess for signs of decreased coronary artery perfusion - ex. Angina  - Evaluate fluid balance, assess for edema, trend weights  Outcome: Progressing  Goal: Absence of cardiac arrhythmias or at baseline  Description: INTERVENTIONS:  - Continuous cardiac monitoring, monitor vital signs, obtain 12 lead EKG if indicated  - Evaluate effectiveness of antiarrhythmic and heart rate control medications as ordered  - Initiate emergency measures for life threatening arrhythmias  - Monitor electrolytes and administer replacement therapy as ordered  Outcome: Progressing    Patient for procedure today. Bowel prep overnight with good results. Safety measures in place. Freuqent rounding by nursing staff.

## 2024-07-31 NOTE — INTERVAL H&P NOTE
Pre-op Diagnosis: anemia    The above referenced H&P was reviewed by Alyssa Johnston Ma, MD on 7/31/2024, the patient was examined and no significant changes have occurred in the patient's condition since the H&P was performed.  I discussed with the patient and/or legal representative the potential benefits, risks and side effects of this procedure; the likelihood of the patient achieving goals; and potential problems that might occur during recuperation.  I discussed reasonable alternatives to the procedure, including risks, benefits and side effects related to the alternatives and risks related to not receiving this procedure.  We will proceed with procedure as planned.

## 2024-07-31 NOTE — OPERATIVE REPORT
ESOPHAGOGASTRODUODENOSCOPY (EGD) & COLONOSCOPY REPORT    Yamini Ocampo     1945 Age 79 year old   PCP Kathy Anne MD Endoscopist Alyssa Orellana MD     Date of procedure: 24    Procedure: EGD w/placement of video capsule & Colonoscopy     Pre-operative diagnosis: anemia    Post-operative diagnosis: see impression    Medications: MAC    Withdrawal time: 7 minutes    Complications: none    Procedure: Informed consent was obtained from the patient after the risks of the procedure were discussed, including but not limited to bleeding, perforation, aspiration, infection, or possibility of a missed lesion. We discussed the risks/benefits and alternatives to this procedure, as well as the planned sedation.    Colonoscopy procedure: Once an adequate level of sedation was obtained a digital rectal exam was completed. Then the lubricated tip of the [id number] diagnostic video pediatric colonoscope was inserted and advanced without difficulty to the cecum using the CO2 insufflation technique. The cecum was identified by localizing the trifold, the appendix and the ileocecal valve. Withdrawal was begun with thorough washing and careful examination of the colonic walls and folds. Photodocumentation was obtained. BBPS 3 (). I then carefully withdrew the instrument from the patient who tolerated the procedure well.     EGD procedure: The patient was placed in the left lateral decubitus position and begun on continuous blood pressure pulse oximetry and EKG monitoring and this was maintained throughout the procedure. Once an adequate level of sedation was obtained a bite block was placed. Then the lubricated tip of the [id number] diagnostic video upper endoscope was inserted and advanced using direct visualization into the posterior pharynx and ultimately into the esophagus.     Complications: None    EGD findings:      1. Esophagus: Small amount of retained fluid in esophagus, cleared upon  entry.   2. Stomach: The stomach distended normally. Normal rugal folds were seen. The pylorus was patent. Linear erythema in antrum consistent with known GAVE with overlying small ulcerations that were non-bleeding secondary to recent cautery. Retroflexion revealed a normal fundus.   3. Duodenum: The duodenal mucosa appeared normal in the 1st and 2nd portion of the duodenum.     The scope was withdrawn then a pillcam deployment device was placed at tip of scope with pillcam secured. Scope was then advanced into the stomach and pillcam successfully deployed into the stomach, photodocumented.     Colonoscopy findings:    ERYN: normal rectal tone, no masses palpated.    Copious amount of liquid brown stool throughout colon.   Severe pan colonic diverticulosis  4. A retroflexed view of the rectum revealed no abnormalities.  5. The limited visualized colonic mucosa throughout the colon showed normal vascular pattern, without evidence of angioectasias or inflammation.       Impression:  GAVE, non-bleeding.  Pillcam deployed into stomach  Poor colon prep, diverticulosis    Recommend:  Await VCE findings    >>>If tissue was obtained and you have not received your pathology results either by phone or letter within 2 weeks, please call our office at [phone].    Specimens: none    Blood loss: <1 ml

## 2024-07-31 NOTE — PAYOR COMM NOTE
7/26 THRU 7/29  ADMISSION REVIEW     Payor: [employer] CHOICE/HMO/POS/EPO  Subscriber #:  [id number]  Authorization Number: [id number]    Admit date: 7/26/24  Admit time:  8:15 PM       REVIEW DOCUMENTATION:     ED Provider Notes        ED Provider Notes signed by Wander Bonner MD at 7/27/2024  9:42 AM       Author: Wander Bonner MD Service: -- Author Type: Physician    Filed: 7/27/2024  9:42 AM Date of Service: 7/26/2024  7:00 PM Status: Signed    : Wander Bonner MD (Physician)           Patient Seen in: [facility] 5sw/se    History     Chief Complaint   Patient presents with    Abnormal Labs     Stated Complaint: sent by Dr low Hgb    HPI  Patient complains of dark stool all week, seen md had labs done noted to be anemic hgb < 7.  Was admitted diagnosed with gastritis on EGD.  Risk factors for GI bleeding include: known gastritis.   Associated symptoms: fatigye. Swelling in legs, sob/sampson  Past Medical History:    Arrhythmia    afib    Back problem    lumbar disc disease    Basal cell carcinoma of nose    Bilateral carpal tunnel syndrome    Cataract    Cellulitis    Deep vein thrombosis (HCC)    unsure which leg, possibly left    Depression    Esophageal reflux    Essential hypertension    Heart disease    Heel spur    Right    Hemorrhoids    Hiatal hernia    High blood pressure    High cholesterol    Hyperlipidemia    Incontinence    Lumbar disc disease    Multiple gastric ulcers    Osteoarthritis    Pulmonary embolism (HCC)    Raynaud disease    Renal disorder    Tubular adenoma of colon    repeat c-scope 1/2020    Type 2 diabetes mellitus with diabetic chronic kidney disease (HCC)    Visual impairment    glasses       Past Surgical History:   Procedure Laterality Date    Angioplasty (coronary)      Appendectomy      Arthroscopy of joint unlisted Right     knee    Bso, omentectomy w/sushil      Carpal tunnel release Bilateral     Cholecystectomy  09/28/2016    Colonoscopy N/A 01/25/2017     Addended by: OLVIN MANZO on: 12/7/2018 04:56 PM     Modules accepted: Orders     Procedure: COLONOSCOPY;  Surgeon: KATHARINE Prakash MD;  Location: [facility] ENDOSCOPY    Colonoscopy N/A 10/07/2020    Procedure: COLONOSCOPY;  Surgeon: KATHARINE Prakash MD;  Location: [facility] ENDOSCOPY    Egd N/A 2016    Procedure: ESOPHAGOGASTRODUODENOSCOPY (EGD);  Surgeon: KATHARINE Prakash MD;  Location: [facility] ENDOSCOPY    Egd N/A 2024    ; gastritis    Electrocardiogram, complete  2013    Scanned to Media Tab    Excision of nose      Basal cell carcinoma    Hernia surgery      Hysterectomy      Knee replacement surgery Right     Other surgical history      Injections and narcotics, POD Bartuci    Removal of heel spur      Total abdom hysterectomy              Family History   Problem Relation Age of Onset    Cancer Father         Skin cancer    Other (Other) Father 97        old age,unknown caused    Heart Attack Mother     Heart Disorder Mother     Hypertension Other         Family H/O    Cancer Other         Lymphoma  Family H/O    Heart Disease Other         Family H/O    Arthritis Other         Close relative  unsure which type    No Known Problems Brother        Social History     Socioeconomic History    Marital status:    Tobacco Use    Smoking status: Former     Current packs/day: 0.00     Types: Cigarettes     Quit date: 1997     Years since quittin.5    Smokeless tobacco: Never   Vaping Use    Vaping status: Never Used   Substance and Sexual Activity    Alcohol use: No     Comment: rare    Drug use: Never     Comment: edibles occasionally for sleep   Other Topics Concern    Caffeine Concern Yes     Comment: 8 cups coffee/sod daily    Reaction to local anesthetic No     Social Determinants of Health     Financial Resource Strain: Low Risk  (3/14/2023)    Financial Resource Strain     Difficulty of Paying Living Expenses: Not very hard     Med Affordability: No   Food Insecurity: No Food Insecurity (2024)    Food Insecurity     Food Insecurity: Never true    Transportation Needs: No Transportation Needs (7/26/2024)    Transportation Needs     Lack of Transportation: No   Housing Stability: Low Risk  (7/26/2024)    Housing Stability     Housing Instability: No       Review of Systems    Positive for stated complaint: sent by Dr, low Hgb  Other systems are as noted in HPI.  Constitutional and vital signs reviewed.      All other systems reviewed and negative except as noted above.    PSFH elements reviewed from today and agreed except as otherwise stated in HPI.    Physical Exam     ED Triage Vitals [07/26/24 1743]   /67   Pulse 103   Resp 20   Temp 98.9 °F (37.2 °C)   Temp src Temporal   SpO2 98 %   O2 Device None (Room air)       Current:/66 (BP Location: Right arm)   Pulse 82   Temp 97.9 °F (36.6 °C) (Oral)   Resp 16   Wt 91.5 kg   SpO2 94%   BMI 32.57 kg/m²   PULSE OX nl  GENERAL: appears pale  HEAD: normocephalic, atraumatic  EYES: PERRLA, EOMI,THROAT: mmm, no lesions  NECK: supple, no meningeal signs  LUNGS: no resp distress, cta bilateral  CARDIO: RRR without murmur  GI: soft, non-tender, normal bowel sounds  RECTAL: heme + stool.  EXTREMITIES: moves all 4 spont, no edema  NEURO: alert, oriented x 3, 2-12 intact, no focal deficits appreciated  SKIN: good skin turgor, no  rashes  PSYCH: calm, cooperative,    Differential includes:  pud with bleeding ulcer vs. gastritis vs. AVM vs. colitis s.  diverticulosis vs. medication side effect         ED Course     Labs Reviewed   BASIC METABOLIC PANEL (8) - Abnormal; Notable for the following components:       Result Value    Chloride 113 (*)     BUN 39 (*)     Creatinine 1.72 (*)     BUN/CREA Ratio 22.7 (*)     Calcium, Total 8.5 (*)     Calculated Osmolality 307 (*)     eGFR-Cr 30 (*)     All other components within normal limits   BNP (B TYPE NATRIURETIC PEPTIDE) - Abnormal; Notable for the following components:    Beta Natriuretic Peptide 1,132 (*)     All other components within normal limits   BASIC  METABOLIC PANEL (8) - Abnormal; Notable for the following components:    Chloride 113 (*)     CO2 20.0 (*)     BUN 29 (*)     Creatinine 1.71 (*)     Calculated Osmolality 300 (*)     eGFR-Cr 30 (*)     All other components within normal limits   CBC W/ DIFFERENTIAL - Abnormal; Notable for the following components:    RBC 1.85 (*)     HGB 5.4 (*)     HCT 19.0 (*)     .7 (*)     MCHC 28.4 (*)     RDW-SD 66.9 (*)     RDW 19.5 (*)     Lymphocyte Absolute 0.58 (*)     All other components within normal limits   CBC W/ DIFFERENTIAL - Abnormal; Notable for the following components:    RBC 2.47 (*)     HGB 7.3 (*)     HCT 25.2 (*)     .0 (*)     MCHC 29.0 (*)     RDW-SD 63.5 (*)     RDW 17.7 (*)     Lymphocyte Absolute 0.77 (*)     All other components within normal limits   CBC WITH DIFFERENTIAL WITH PLATELET    Narrative:     The following orders were created for panel order CBC With Differential With Platelet.  Procedure                               Abnormality         Status                     ---------                               -----------         ------                     CBC W/ DIFFERENTIAL[[id number]]          Abnormal            Final result                 Please view results for these tests on the individual orders.   CBC WITH DIFFERENTIAL WITH PLATELET    Narrative:     The following orders were created for panel order CBC With Differential With Platelet.  Procedure                               Abnormality         Status                     ---------                               -----------         ------                     CBC W/ DIFFERENTIAL[[id number]]          Abnormal            Final result                 Please view results for these tests on the individual orders.   TYPE AND SCREEN    Narrative:     The following orders were created for panel order Type and screen.  Procedure                               Abnormality         Status                     ---------                                -----------         ------                     ABORH (Blood Type)[[id number]]                               Final result               Antibody Screen[[id number]]                                  Final result                 Please view results for these tests on the individual orders.   PREPARE RBC   ABORH (BLOOD TYPE)   ANTIBODY SCREEN       MDM     @No results found.    Monitor Interpretation:  Nsr 76.    Radiology Interpretation:    No results found.    Medical Decision Making  Problems Addressed:  Anemia, unspecified type: acute illness or injury with systemic symptoms that poses a threat to life or bodily functions  Rectal bleeding: acute illness or injury with systemic symptoms that poses a threat to life or bodily functions    Amount and/or Complexity of Data Reviewed  Labs: ordered. Decision-making details documented in ED Course.  ECG/medicine tests: ordered and independent interpretation performed. Decision-making details documented in ED Course.  Discussion of management or test interpretation with external provider(s): Transfuse prbc's, protonix, lasix, consult GI Dr. Prakash, Dr. Fitzgerald in ER for admission, consult cardiology.    I spent a total of 40 minutes of critical care time in obtaining history, performing a physical exam, bedside monitoring of interventions, collecting and interpreting tests and discussion with consultants but not including time spent performing procedures.      Risk  Drug therapy requiring intensive monitoring for toxicity.  Decision regarding hospitalization.            Disposition and Plan     Clinical Impression:  1. Rectal bleeding    2. Anemia, unspecified type        Disposition:  Admit    Follow-up:  No follow-up provider specified.    Medications Prescribed:  Current Discharge Medication List          Hospital Problems       Present on Admission  Date Reviewed: 7/26/2024            ICD-10-CM Noted POA    * (Principal) Rectal bleeding K62.5 7/26/2024 Unknown     Anemia, unspecified type D64.9 6/25/2024 Unknown    GI bleed K92.2 6/25/2024 Unknown                  Signed by Wander Bonner MD on 7/27/2024  9:42 AM         MEDICATIONS ADMINISTERED IN LAST 1 DAY:  atorvastatin (Lipitor) tab 10 mg       Date Action Dose Route User    7/30/2024 2012 Given 10 mg Oral Sara Evans RN          bumetanide (Bumex) 0.25 MG/ML injection 1 mg       Date Action Dose Route User    7/30/2024 1728 Given 1 mg Intravenous Jean-Pierre De La Rosa RN          metoprolol succinate ER (Toprol XL) 24 hr tab 50 mg       Date Action Dose Route User    7/31/2024 0537 Given 50 mg Oral Sara Evans RN          pantoprazole (Protonix) 40 mg in sodium chloride 0.9% PF 10 mL IV push       Date Action Dose Route User    7/31/2024 0842 Given 40 mg Intravenous Jean-Pierre De La Rosa RN    7/30/2024 2012 Given 40 mg Intravenous Sara Evans RN          polyethylene glycol-electrolyte solution (Plenvu) 140 g SOLR 480 mL       Date Action Dose Route User    7/31/2024 0255 Given 480 mL Oral Sara Evans RN    7/30/2024 1734 Given 480 mL Oral Jean-Pierre De La Rosa RN          propofol (Diprivan) 10 MG/ML injection       Date Action Dose Route User    7/31/2024 1420 Given 50 mg Intravenous Irvin Meadows MD          propofol (Diprivan) 10 mg/mL infusion premix       Date Action Dose Route User    7/31/2024 1418 New Bag 150 mcg/kg/min × 86.3 kg Intravenous Irvin Meadows MD            Vitals (last day)       Date/Time Temp Pulse Resp BP SpO2 Weight O2 Device O2 Flow Rate (L/min) [facility]    07/31/24 1345 -- 85 19 138/64 94 % -- None (Room air) -- CA    07/31/24 1213 98.2 °F (36.8 °C) 84 18 142/68 92 % -- None (Room air) -- AN    07/31/24 0840 98.3 °F (36.8 °C) 110 16 139/65 93 % -- None (Room air) -- AN    07/31/24 0533 98.6 °F (37 °C) 89 18 112/69 93 % -- None (Room air) --     07/30/24 2011 98.7 °F (37.1 °C) 98 18 145/99 93 % -- None (Room air) --     07/30/24 1638 98.9 °F (37.2 °C) 88 16 151/65 93 % -- None (Room air) -- AN     07/30/24 1353 -- 100 -- -- -- -- None (Room air) -- AN    07/30/24 1353 97.7 °F (36.5 °C) -- 16 133/76 91 % -- -- -- ET    07/30/24 1252 98.9 °F (37.2 °C) 85 16 137/65 94 % -- -- -- AN    07/30/24 1151 98.6 °F (37 °C) 88 16 123/67 95 % -- None (Room air) -- AN    07/30/24 1050 98.7 °F (37.1 °C) 93 16 104/56 94 % -- -- -- AN    07/30/24 1033 98.4 °F (36.9 °C) 83 16 101/54 93 % -- -- -- AN    07/30/24 0925 98.6 °F (37 °C) 95 14 117/61 93 % -- None (Room air) -- AN    07/30/24 0700 98.3 °F (36.8 °C) 96 16 95/48 91 % -- None (Room air) -- DD       Blood Transfusion Record       Product Unit Status Volume Start End            Transfuse RBC       24  [id number]  [id number] Completed 07/30/24 1433 517.5 mL 07/30/24 1033 07/30/24 1400       24  [id number]  [id number] Completed 07/27/24 0131 387.92 mL 07/26/24 2221 07/27/24 0130                7/26 H&P  ADMISSION DATE:       07/26/2024     HISTORY AND PHYSICAL EXAMINATION     CHIEF COMPLAINT:  Melena, gastrointestinal bleed, and acute on chronic diastolic heart failure.     HISTORY OF PRESENT ILLNESS:  The patient is an 79-year-old  female with underlying valvular heart disease status post TAVR procedure and left ventricular diastolic dysfunction who presented to the emergency department after she had a blood test done as an outpatient showing hemoglobin of 5.9 which is the lower baseline usually runs 7.5 to 8.  Chemistry profile is still pending.  Patient was initiated in the emergency room on IV Bumex and IV Protonix, and she will be admitted to the hospital for further management.       PHYSICAL EXAMINATION:    GENERAL:  Pale in color.  Alert and oriented to time, place, and person.  Mild distress.  VITAL SIGNS:  Temperature 98.9, pulse 100.3, respiratory rate 20, blood pressure 151/67, pulse ox 98% on room air.  HEENT:  Atraumatic.  Oropharynx clear.   NECK:  Supple.  No lymphadenopathy.  Positive jugular venous distention.  LUNGS:  Diminished breathing sounds  both lung bases.  HEART:  Irregular rhythm.  S1, S2 auscultated.   ABDOMEN:  Soft, nondistended.  No tenderness.  Positive bowel sounds.  EXTREMITIES:  +2 edema on both legs.  No clubbing or cyanosis.   NEUROLOGIC:  Motor and sensory intact.      ASSESSMENT AND PLAN:    1.       Melena, gastrointestinal bleed, upper source.  2.       Chronic atrial fibrillation anticoagulated with Eliquis.  3.       Essential hypertension.  4.       Acute on chronic diastolic heart failure.     Patient will be admitted to telemetry floor.  IV Bumex.  IV Protonix.  Clear liquid diet.  Gastroenterology and cardiology consult.  Hold Eliquis.  Further recommendations to follow.     7/27 GI OP NOTE    Date of procedure: 07/27/24     Procedure: EGD      Pre-operative diagnosis: Anemia, GI bleeding     Post-operative diagnosis: Retained food in esophagus       Findings:       1. Esophagus: Immediately upon entering the esophagus, there was a significant amount of liquid and pieces of solid food in the esophagus. No obvious impaction noted, but given risk of aspiration the procedure was aborted. No blood was seen.     Impression:  1. Unable to examine stomach today due to retained food in the esophagus. No blood was seen to reflux.   2. Will need to repeat EGD with a more empty esophagus to reduce risk of aspiration.     Recommend:  1. Full liquid diet.  2. Hold anticoagulation if possible.  3. Trend Hgb.   4. Plan for repeat EGD on Monday - will also allow for more time off anticoagulation so APC can be performed safely.  5. Use reglan to empty food out of the stomach as well.   6. Continue PPI IV.     Specimens: none  Blood loss: <1 ml    7/27 INTERNAL MED NOTE       Subjective:  S: Patient underwent EGD but unsuccessful due to retained food products in esophagus.     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 No APC was done at that time secondary to food particles.   - this admission received 1U pRBC transfusion with appropriate response  - s/p EGD 7/27 unsuccessful due to retention of food in esophagus  - GI following, plan for repeat EGD on 7/29  - cont PPI IV BID  - hold further eliquis     Chronic atrial fibrillation anticoagulated with Eliquis.  - hold home eliquis in setting of anemia bleeding  - cont tele monitoring     Chronic Medical Problems:   GERD  Essential hypertension.  Acute on chronic diastolic heart failure.    7/28 GI NOTE    Objective:   Blood pressure 99/63, pulse 97, temperature 98.1 °F (36.7 °C), temperature source Oral, resp. rate 16, height 5' 6\" (1.676 m), weight 190 lb 3.2 oz (86.3 kg), SpO2 91%. Body mass index is 30.7 kg/m².     Assessment and Plan:   Yamini Ocampo is a 79 year old year-old female with history of BMI 33.86, HTN, HLD, DM2, CKD3, Anemia of chronic disease, PUD, GERD, Ventricular diastolic dysfunction, HFpEF, Pulmonary artery HTN, CAD, Chronic Afib (on eliquis) who presents to the ED with acute on chronic diastolic HF and anemia. Patient ultimately underwent repeat EGD during admission. 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 No APC performed. She presents today for follow up.      #GAVE  #DEVEN  #+/- melena     Symptomatic anemia, s/p transfusion and improved. EGD attempted  yesterday showed food/fluid filled esophagus from poor motility. Procedure aborted.     Plan:  -full liquid diet, NPO after MN  -repeat EGD, hopefully esophagus/stoamch more empty   -also has been holding anticoagulation >2 days now so can ablate any GAVE related lesions  -IV protonix     EGD tomorrow, hopefully home tomorrow.     7/28  INTERNAL MED NOTE       Subjective:  S: Patient seen at bedside this morning, in good spirits, denied acute complaints.   Had clears for breakfast, tolerated well.      Objective:  Vital signs:  Temp:  [97.9 °F (36.6 °C)-99.1 °F (37.3 °C)] 98.1 °F (36.7 °C)  Pulse:  [73-97] 97  Resp:  [16] 16  BP: ()/(63-84) 99/63  SpO2:  [91 %-95 %] 91 %      Labs Last 24 Hours:                      BMP         CBC       Other      Na 143 Cl 108 BUN 33 Glu 92     Hb 7.5     PTT - Procal -    K 4.0 CO2 28.0 Cr 1.60     WBC 5.2 >< .0   INR - CRP -    Renal Lytes Endo       Hct 25.9     Trop - D dim -    eGFR - Ca 9.4 POC Gluc  -       LFT     pBNP - Lactic -    eGFR AA - PO4 - A1c -     AST - APk - Prot -   LDL -        Mg - TSH -     ALT - T sis - Alb -               Medications:   Scheduled Medications    sodium ferric gluconate  125 mg Intravenous Once    pantoprazole  40 mg Intravenous Q12H    spironolactone  125 mg Oral Daily    atorvastatin  10 mg Oral Nightly    cyanocobalamin  1,000 mcg Oral Daily    folic acid  1 mg Oral Daily    sertraline  100 mg Oral Daily    bumetanide  1 mg Intravenous BID (Diuretic)                  Assessment & Plan:  ASSESSMENT / PLAN:      Melena, UGIB  Acute Symptomatic Anemia   - pt with outpatient labs revealing hgb 5.9, advised to come to ED  - recent hospitalization in June 2024 for UGIB, 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 No APC was done at that time secondary to food particles.   - this admission received 1U pRBC transfusion with appropriate response  - s/p EGD 7/27 unsuccessful due to retention of food in esophagus  - GI following, plan for repeat EGD on 7/29. NPO @MN  - cont PPI IV BID  - hold further eliquis     Chronic atrial fibrillation anticoagulated with Eliquis.  - hold home eliquis in setting of anemia bleeding  - cont tele monitoring     Acute on chronic diastolic heart failure  - cardiology consulted  - Admission BNP >1200  - pt started on IV  Bumex , anticipate transition to PO regimen soon         Chronic Medical Problems:   GERD  Essential hypertension.        Dispo: pending clinical course, possible dc home tomorrow after EGD     7/28 CARDIOLOGY CONSULT NOTE  Patient is a 79 year old female who was admitted to the hospital for Rectal bleeding:  Very pleasant lady known to me comes back into the hospital after recurrent gastrointestinal hemorrhage.  Patient was noted to have melanotic stools and there is thought of upper GI bleeding because of that.  She denied having any abdominal pain nausea vomiting.  She denied having any chest pain or shortness of breath.  She did notice lower extremity were tight.  She had been feeling extremely tired and she was found to have a very low hemoglobin at 5.9.  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  She has been on Eliquis anticoagulation for her atrial fibrillation.  She has known nonobstructive coronary artery disease as well as moderate pulmonary hypertension and chronic diastolic heart failure.      Scheduled Meds:   Scheduled Medications    sodium ferric gluconate  125 mg Intravenous Once    pantoprazole  40 mg Intravenous Q12H    spironolactone  125 mg Oral Daily    atorvastatin  10 mg Oral Nightly    cyanocobalamin  1,000 mcg Oral Daily    folic acid  1 mg Oral Daily    sertraline  100 mg Oral Daily    bumetanide  1 mg Intravenous BID (Diuretic)            Laboratory Data:        Lab Results   Component Value Date     WBC 5.2 07/28/2024     HGB 7.5 (L) 07/28/2024     HCT 25.9 (L) 07/28/2024     .0 07/28/2024     CREATSERUM 1.60 (H) 07/28/2024     BUN 33 (H) 07/28/2024      07/28/2024     K 4.0 07/28/2024      07/28/2024     CO2 28.0 07/28/2024     GLU 92 07/28/2024     CA 9.4 07/28/2024           IMPRESSION:  Recurrent upper GI bleed.  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  No APC was done at the time secondary to  food particles.  Repeat endoscopy is planned.  Anticoagulation on hold for 48 hours.    Chronic atrial fibrillation.    Acute on chronic diastolic heart failure.    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  Patient is status post TAVR procedure for severe aortic stenosis.    Chronic kidney disease stage IIIb.    Remote history of DVT/PE.    Recommendations: By examination she has mild edema as well as some JVD.  Chest x-ray demonstrating mild pulmonary congestion.  Clinically however she is not with significant overload at this time.  Patient is on Bumex 1 mg IV twice daily along with spironolactone which I will change to 25 mg daily.  Basic metabolic profile reviewed creatinine is 1.6 which is stable from previous testing.  Farxiga can be resumed upon discharge.  Patient was also taking metoprolol and we can resume it as blood pressure overall has been stable in the past. Outpatient Watchman device evaluation discussed with patient.     7/29 GI OP NOTE  Date of procedure: 07/29/24     Procedure: EGD w/control of bleeding     Pre-operative diagnosis: anemia     Post-operative diagnosis: see impression     Medications: MAC     Complications: none     Procedure:  Informed consent was obtained from the patient after the risks of the procedure were discussed, including but not limited to bleeding, perforation, aspiration, infection, or possibility of a missed lesion. After discussions of the risks/benefits and alternatives to this procedure, as well as the planned sedation, the patient was placed in the left lateral decubitus position and begun on continuous blood pressure pulse oximetry and EKG monitoring and this was maintained throughout the procedure. Once an adequate level of sedation was obtained a bite block was placed. Then the lubricated tip of the [id number] diagnostic video upper endoscope was inserted and advanced using direct visualization into the  posterior pharynx and ultimately into the esophagus, stomach, and duodenum.     Complications: None     Findings:       1. Esophagus: Layering fluid in esophagus cleared upon entry.      2. Stomach: We then entered the stomach. No hematin or blood seen. Linear ectasias/erythema in antrum, previously biopsied consistent with vascular ectasias. Given anemia, these were treated with 7F argon plasma coagulation. No bleeding during or after maneuver.      3. Duodenum: The duodenal mucosa appeared normal in the 1st and 2nd portion of the duodenum. Bilious effluent      We then withdrew the instrument from the patient who tolerated the procedure well.      Impression:   1. Gastric antral vascular ectasias s/p APC     Recommend:  1. Continue ppi bid  2. Continue to hold anticoagulation today, can consider resuming tomorrow pending hgb      7/29 CARDIOLOGY NOTE       Patient is a 79 year old female who was admitted to the hospital for Rectal bleeding:  Subjective:  Patient for GI w/u today  No cardiac c/o     Patient admitted with Recurrent GI bleed  Patient has H/O AFIB  H/O Non obstructive CAD and Pulmonary HTN  This time was admitted with Melena and Hb 5.9 upon admission     C Monitor: AFIB         Physical Exam:   Blood pressure 95/64, pulse 112, temperature 98.4 °F (36.9 °C), temperature source Oral, resp. rate 18, height 5' 6\" (1.676 m), weight 190 lb 3.2 oz (86.3 kg), SpO2 95%.  Intake/Output:                Last 3 shifts: [id number]               This shift: No intake/output data recorded.                Vent Settings:       Hemodynamic parameters (last 24 hours):       Scheduled Meds:   Scheduled Medications    spironolactone  25 mg Oral Daily    metoprolol succinate  50 mg Oral Daily Beta Blocker    pantoprazole  40 mg Intravenous Q12H    atorvastatin  10 mg Oral Nightly    cyanocobalamin  1,000 mcg Oral Daily    folic acid  1 mg Oral Daily    sertraline  100 mg Oral Daily    bumetanide  1 mg Intravenous BID  (Diuretic)            Continuous Infusions:   Medication Infusions              Physical Exam:     General: No acute distress.  HEENT: NAD  Neck: No JVD, no bruits.  Cardiac: Normal rate, No murmur.  Lungs: Clear without rhales, rhochi or wheezing.  Abdomen: Soft, non-tender. BS+  Extremities: No edema.    Neurologic: Alert and moving all 4 extremities.  Psychiatry: Patient has calm affect.        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: requiring transfusions: GI service follows     Chronic AFIB, was on SYS AC, currently OFF     Acute on chronic diastolic heart failure     S/P TVAR for severe Aortic stenosis, 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  Patient denied any complaints.   Patient later taken for EGD.        Objective:  Vital signs:  Temp:  [97.5 °F (36.4 °C)-98.9 °F (37.2 °C)] 98.4 °F (36.9 °C)  Pulse:  [] 86  Resp:  [16-26] 18  BP: ()/(25-73) 123/55  SpO2:  [90 %-97 %] 92 %          Assessment & Plan:  ASSESSMENT / PLAN:      Melena, UGIB  Acute Symptomatic Anemia   - pt with outpatient labs revealing hgb 5.9, advised to come to ED  - recent hospitalization in  June 2024 for UGIB, 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 No APC was done at that time secondary to food particles.   - this admission received 1U pRBC transfusion with appropriate response  - s/p EGD 7/27 unsuccessful due to retention of food in esophagus  - GI following  - s/p repeat EGD 7/29 with findings of GAVE s/p APC  - cont PPI IV BID  - hold further eliquis anticoag, consider resuming tomorrow pending  hgb      Chronic atrial fibrillation anticoagulated with Eliquis.  - hold home eliquis in setting of anemia bleeding  - cont tele monitoring     Acute on chronic diastolic heart failure  - cardiology consulted  - Admission BNP >1200  - pt started on IV Bumex , anticipate transition to PO regimen soon   - Farxiga on discharge      Chronic Medical Problems:   GERD  Essential hypertension.

## 2024-07-31 NOTE — ANESTHESIA PREPROCEDURE EVALUATION
Anesthesia PreOp Note    HPI:     Yamini Ocampo is a 79 year old female who presents for preoperative consultation requested by: Alyssa Orellana MD    Date of Surgery: 7/26/2024 - 7/31/2024    Procedure(s):  COLONOSCOPY  CAPSULE ENDOSCOPY  Indication: anemia    Relevant Problems   No relevant active problems       NPO:  Last Liquid Consumption Date: 07/31/24  Last Liquid Consumption Time: 0435  Last Solid Consumption Date: 07/30/24  Last Solid Consumption Time: 0830  Last Liquid Consumption Date: 07/31/24          History Review:  Patient Active Problem List    Diagnosis Date Noted    Rectal bleeding 07/26/2024    Anemia, unspecified type 06/25/2024    GI bleed 06/25/2024    Dyspepsia 01/23/2024    Intestinal metaplasia of gastric mucosa 01/23/2024    Anemia, unspecified 01/05/2024    Iron deficiency anemia secondary to inadequate dietary iron intake 01/05/2024    Acute on chronic congestive heart failure, unspecified heart failure type ([facility]) 03/07/2023    Type 2 diabetes mellitus with diabetic chronic kidney disease ([facility]) 11/30/2022    Anemia due to vitamin B12 deficiency 08/01/2022    Macrocytic anemia 04/26/2022    Hyperpigmented skin lesion 04/26/2022    CREST syndrome ([facility]) 04/12/2022    Type 2 diabetes mellitus with stage 3b chronic kidney disease, without long-term current use of insulin ([facility]) 04/12/2022    Prediabetes 06/30/2021    Diverticulosis of colon     Osteoarthritis of left knee 01/31/2019    Raynaud's disease without gangrene 03/02/2018    Osteoarthritis of right knee 10/12/2017    Osteoarthritis 10/12/2017    History of ischemic colitis 09/28/2017    CKD (chronic kidney disease) stage 3, GFR 30-59 ml/min ([facility]) 06/26/2017    Mesenteric ischemia, chronic ([facility]) 04/26/2017    A-fib ([facility])     Polyp of colon     Gastroesophageal reflux disease without esophagitis     Hiatal hernia with GERD and esophagitis 09/01/2016    Ventral hernia without obstruction or gangrene 05/19/2016    Gastric erythema  04/28/2016    Essential hypertension 08/07/2015    Sleep apnea 10/01/2013    Congestive heart failure (HCC) 08/27/2013    Peripheral vascular disease (HCC) 08/27/2013    Major depressive disorder, single episode, moderate (HCC) 06/20/2011    Vitamin D deficiency 06/20/2011    Class 2 severe obesity due to excess calories with serious comorbidity and body mass index (BMI) of 39.0 to 39.9 in adult (HCC) 03/05/2009       Past Medical History:    Arrhythmia    afib    Back problem    lumbar disc disease    Basal cell carcinoma of nose    Bilateral carpal tunnel syndrome    Cataract    Cellulitis    Deep vein thrombosis (HCC)    unsure which leg, possibly left    Depression    Esophageal reflux    Essential hypertension    Heart disease    Heel spur    Right    Hemorrhoids    Hiatal hernia    High blood pressure    High cholesterol    Hyperlipidemia    Incontinence    Lumbar disc disease    Multiple gastric ulcers    Osteoarthritis    Pulmonary embolism (HCC)    Raynaud disease    Renal disorder    Tubular adenoma of colon    repeat c-scope 1/2020    Type 2 diabetes mellitus with diabetic chronic kidney disease (HCC)    Visual impairment    glasses       Past Surgical History:   Procedure Laterality Date    Angioplasty (coronary)      Appendectomy      Arthroscopy of joint unlisted Right     knee    Bso, omentectomy w/sushil      Carpal tunnel release Bilateral     Cholecystectomy  09/28/2016    Colonoscopy N/A 01/25/2017    Procedure: COLONOSCOPY;  Surgeon: KAHTARINE Prakash MD;  Location: [facility] ENDOSCOPY    Colonoscopy N/A 10/07/2020    Procedure: COLONOSCOPY;  Surgeon: KATHARINE Prakash MD;  Location: [facility] ENDOSCOPY    Egd N/A 12/14/2016    Procedure: ESOPHAGOGASTRODUODENOSCOPY (EGD);  Surgeon: KATHARINE Prakash MD;  Location: [facility] ENDOSCOPY    Egd N/A 01/23/2024    ; gastritis    Egd N/A 07/27/2024    ; retained food    Electrocardiogram, complete  09/26/2013    Scanned to Media Tab    Excision of nose       Basal cell carcinoma    Hernia surgery      Hysterectomy      Knee replacement surgery Right     Other surgical history      Injections and narcotics, POD Bartuci    Removal of heel spur      Total abdom hysterectomy         Facility-Administered Medications Prior to Admission   Medication Dose Route Frequency Provider Last Rate Last Admin    darbepoetin molina (Aranesp) 200 MCG/0.4ML injection 200 mcg  200 mcg Subcutaneous Q4 Week Manan Pizarro MD   200 mcg at 05/29/24 1016     Medications Prior to Admission   Medication Sig Dispense Refill Last Dose    pantoprazole 40 MG Oral Tab EC Take 1 tablet (40 mg total) by mouth 2 (two) times daily before meals. 60 tablet 3 7/26/2024 at 0900    sertraline 100 MG Oral Tab Take 1 tablet (100 mg total) by mouth daily. 90 tablet 3 7/26/2024 at 0900    atorvastatin 10 MG Oral Tab Take 1 tablet (10 mg total) by mouth nightly. 90 tablet 3 7/25/2024 at 2100    folic acid 1 MG Oral Tab Take 1 tablet (1 mg total) by mouth daily. 90 tablet 3 7/26/2024 at 0900    cyanocobalamin 1000 MCG Oral Tab Take 1 tablet (1,000 mcg total) by mouth daily.   7/26/2024 at 0900    ELIQUIS 5 MG Oral Tab Take 1 tablet (5 mg total) by mouth 2 (two) times daily. 180 tablet 0 7/26/2024 at 0900    spironolactone 25 MG Oral Tab Take 5 tablets (125 mg total) by mouth daily.       bumetanide 1 MG Oral Tab Take 1 tablet (1 mg total) by mouth BID (Diuretic). 60 tablet 0     FARXIGA 10 MG Oral Tab Take 1 tablet (10 mg total) by mouth daily.       albuterol 108 (90 Base) MCG/ACT Inhalation Aero Soln SHAKE BEFORE USE AND TAKE 2 PUFFS INTO THE LUNGS EVERY 6 HOURS AS NEEDED       METOPROLOL SUCCINATE ER 50 MG Oral Tablet 24 Hr Take 1 tablet (50 mg total) by mouth in the morning and 1 tablet (50 mg total) before bedtime.    at 0900     Current Facility-Administered Medications Ordered in Epic   Medication Dose Route Frequency Provider Last Rate Last Admin    sodium chloride 0.9% infusion   Intravenous Once Tung  MD Leslie        spironolactone (Aldactone) tab 25 mg  25 mg Oral Daily Arnoldo Corado DO        metoprolol succinate ER (Toprol XL) 24 hr tab 50 mg  50 mg Oral Daily Beta Blocker Arnoldo Corado DO   50 mg at 07/31/24 0537    pantoprazole (Protonix) 40 mg in sodium chloride 0.9% PF 10 mL IV push  40 mg Intravenous Q12H Brigitte Freeman MD   40 mg at 07/31/24 0842    albuterol (Ventolin HFA) 108 (90 Base) MCG/ACT inhaler 2 puff  2 puff Inhalation Q6H PRN Brigitte Freeman MD        atorvastatin (Lipitor) tab 10 mg  10 mg Oral Nightly Brigitte Freeman MD   10 mg at 07/30/24 2012    cyanocobalamin (Vitamin B12) tab 1,000 mcg  1,000 mcg Oral Daily Brigitte Freeman MD   1,000 mcg at 07/30/24 0926    folic acid (Folvite) tab 1 mg  1 mg Oral Daily Brigitte Freeman MD   1 mg at 07/30/24 0926    sertraline (Zoloft) tab 100 mg  100 mg Oral Daily Brigitte Freeman MD   100 mg at 07/30/24 0926    acetaminophen (Tylenol Extra Strength) tab 500 mg  500 mg Oral Q4H PRN Hillary Fitzgerald MD        ondansetron (Zofran) 4 MG/2ML injection 4 mg  4 mg Intravenous Q6H PRN Hillary Fitzgerald MD        metoclopramide (Reglan) 5 mg/mL injection 10 mg  10 mg Intravenous Q8H PRN Hillary Fitzgerald MD   10 mg at 07/27/24 1409    [Held by provider] bumetanide (Bumex) 0.25 MG/ML injection 1 mg  1 mg Intravenous BID (Diuretic) Hillary Fitzgerald MD   1 mg at 07/30/24 1728     No current Epic-ordered outpatient medications on file.       Allergies   Allergen Reactions    Adhesive Tape RASH     Skin off       Family History   Problem Relation Age of Onset    Cancer Father         Skin cancer    Other (Other) Father 97        old age,unknown caused    Heart Attack Mother     Heart Disorder Mother     Hypertension Other         Family H/O    Cancer Other         Lymphoma  Family H/O    Heart Disease Other         Family H/O    Arthritis Other         Close relative  unsure which type    No Known Problems Brother      Social History     Socioeconomic  History    Marital status:    Tobacco Use    Smoking status: Former     Current packs/day: 0.00     Types: Cigarettes     Quit date: 1997     Years since quittin.5    Smokeless tobacco: Never   Vaping Use    Vaping status: Never Used   Substance and Sexual Activity    Alcohol use: No     Comment: rare    Drug use: Never     Comment: edibles occasionally for sleep   Other Topics Concern    Caffeine Concern Yes     Comment: 8 cups coffee/sod daily    Reaction to local anesthetic No       Available pre-op labs reviewed.  Lab Results   Component Value Date    WBC 5.9 2024    RBC 3.33 (L) 2024    HGB 10.1 (L) 2024    HCT 32.7 (L) 2024    MCV 98.2 2024    MCH 30.3 2024    MCHC 30.9 (L) 2024    RDW 20.9 (H) 2024    .0 2024     Lab Results   Component Value Date     (H) 2024    K 3.7 2024     2024    CO2 27.0 2024    BUN 26 (H) 2024    CREATSERUM 1.82 (H) 2024     (H) 2024    PGLU 118 (H) 2024    CA 10.0 2024          Vital Signs:  Body mass index is 30.7 kg/m².   height is 1.676 m (5' 6\") and weight is 86.3 kg (190 lb 3.2 oz). Her oral temperature is 98.2 °F (36.8 °C). Her blood pressure is 138/64 and her pulse is 85. Her respiration is 19 and oxygen saturation is 94%.   Vitals:    24 0533 24 0840 24 1213 24 1345   BP: 112/69 139/65 142/68 138/64   Pulse: 89 110 84 85   Resp: 18 16 18 19   Temp: 98.6 °F (37 °C) 98.3 °F (36.8 °C) 98.2 °F (36.8 °C)    TempSrc: Oral Oral Oral    SpO2: 93% 93% 92% 94%   Weight:       Height:            Anesthesia Evaluation      Airway   Mallampati: II  TM distance: >3 FB  Neck ROM: full  Dental      Pulmonary     breath sounds clear to auscultation  (+) sleep apnea  (-) decreased breath sounds, wheezes  Cardiovascular   Exercise tolerance: poor  (+) hypertension, CHF  (-) murmur    Rhythm: regular  Rate: normal     Neuro/Psych    (+)  neuromuscular disease,  depression      GI/Hepatic/Renal    (+) GERD    Endo/Other    (+) diabetes mellitus  Abdominal                  Anesthesia Plan:   ASA:  3  Plan:   MAC  Informed Consent Plan and Risks Discussed With:  Patient  Use of Blood Products Discussed With:  Patient      I have informed Yamini Ocampo and/or legal guardian or family member of the nature of the anesthetic plan, benefits, risks including possible dental damage if relevant, major complications, and any alternative forms of anesthetic management.   All of the patient's questions were answered to the best of my ability. The patient desires the anesthetic management as planned.  Irvin Meadows MD  7/31/2024 1:51 PM  Present on Admission:  **None**

## 2024-07-31 NOTE — PROGRESS NOTES
Progress Note     Yamini Ocampo Patient Status:  Inpatient    1945 MRN [id number]   Location [facility] 5SW/SE Attending Brigitte Freeman MD   Hosp Day # 5 PCP Kathy Anne MD       Subjective:   S: Patient resting in bed.  Feels fine.  Waiting for colonoscopy.     Review of Systems:   10 point ROS completed and was negative, except for pertinent positive and negatives stated in subjective.    Objective:   Vital signs:  Temp:  [97.7 °F (36.5 °C)-98.9 °F (37.2 °C)] 98.3 °F (36.8 °C)  Pulse:  [] 110  Resp:  [16-18] 16  BP: (112-151)/(65-99) 139/65  SpO2:  [91 %-95 %] 93 %    Wt Readings from Last 6 Encounters:   24 190 lb 3.2 oz (86.3 kg)   24 206 lb (93.4 kg)   24 195 lb 9.6 oz (88.7 kg)   24 211 lb 3.2 oz (95.8 kg)   24 211 lb (95.7 kg)   24 219 lb (99.3 kg)         Physical Exam:      Gen: No acute distress  Pulm: Lungs clear, normal respiratory effort  CV: Heart with regular rate and rhythm  Abd: Abdomen soft, nontender, nondistended, bowel sounds present  Neuro: No acute focal deficits  MSK: moves extremities  Skin: Warm and dry  Psych: Normal affect  Ext: no c/c/e      Results:   Diagnostic Data:      Labs:    Labs Last 24 Hours:   BMP     CBC    Other     Na 149 Cl 111 BUN 26 Glu 100   Hb 10.1   PTT - Procal -   K 3.7 CO2 27.0 Cr 1.82   WBC 5.9 >< .0  INR - CRP -   Renal Lytes Endo    Hct 32.7   Trop - D dim -   eGFR - Ca 10.0 POC Gluc  -    LFT   pBNP - Lactic -   eGFR AA - PO4 - A1c -   AST - APk - Prot -  LDL -     Mg 1.9 TSH -   ALT - T sis - Alb -        COVID-19 Lab Results    COVID-19  Lab Results   Component Value Date    COVID19 Not Detected 2024    COVID19 Not Detected 2023    COVID19 Not Detected 2021       Pro-Calcitonin  No results for input(s): \"PCT\" in the last 168 hours.    Cardiac  No results for input(s): \"TROP\", \"PBNP\" in the last 168 hours.    Creatinine Kinase  No results for input(s): \"CK\" in the  last 168 hours.    Inflammatory Markers  Recent Labs   Lab 07/26/24  1243   KATIE 20.9       Imaging: Imaging data reviewed in Epic.    Medications:    sodium chloride   Intravenous Once    spironolactone  25 mg Oral Daily    metoprolol succinate  50 mg Oral Daily Beta Blocker    pantoprazole  40 mg Intravenous Q12H    atorvastatin  10 mg Oral Nightly    cyanocobalamin  1,000 mcg Oral Daily    folic acid  1 mg Oral Daily    sertraline  100 mg Oral Daily    [Held by provider] bumetanide  1 mg Intravenous BID (Diuretic)       Assessment & Plan:   ASSESSMENT / PLAN:     Melena, UGIB  Acute Symptomatic Anemia   - pt with outpatient labs revealing hgb 5.9, advised to come to ED  - recent hospitalization in June 2024 for UGIB, 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 No APC was done at that time secondary to food particles.   - this admission received 1U pRBC transfusion with appropriate response  - s/p EGD 7/27 unsuccessful due to retention of food in esophagus  - GI following  - s/p repeat EGD 7/29 with findings of GAVE s/p APC  - cont PPI IV BID  - hold further eliquis anticoag for now  - transfuse 1 unit prbc today  - plan colonoscopy +/- VCE tomorrow    Chronic atrial fibrillation anticoagulated with Eliquis.  - hold home eliquis in setting of anemia bleeding  - cont tele monitoring    Acute on chronic diastolic heart failure  - cardiology consulted  - Admission BNP >1200  - pt started on IV Bumex , appears dry today - will hold bumex today  - Farxiga on discharge     TATIANA on CKD stage 3   - hold bumex  - recheck in am    Hypernatremia  - likely dry - hold bumex and encourage po intake     Chronic Medical Problems:   GERD  Essential hypertension.       Dispo: pending clinical course, possible dc home tomorrow       MDM: High   I personally spent time on chart/note review, review of labs/imaging, discussion with patient, physical exam, discussion with staff, consultants, coordinating care, writing  progress note, and discussion of plan of care.

## 2024-07-31 NOTE — PROGRESS NOTES
Patient states that she still has tight legs.  Breathing is improved.  She did receive blood because of anemia.  Endoscopy is planned for tomorrow.    Vitals:    07/30/24 2011   BP: (!) 145/99   Pulse: 98   Resp: 18   Temp: 98.7 °F (37.1 °C)       Intake/Output Summary (Last 24 hours) at 7/30/2024 2019  Last data filed at 7/30/2024 1857  Gross per 24 hour   Intake 957.5 ml   Output 2300 ml   Net -1342.5 ml     Wt Readings from Last 1 Encounters:   07/28/24 190 lb 3.2 oz (86.3 kg)        General: No acute distress.  Neck: Jugular venous pulsations not seen.  Lungs: Clear to auscultation.  Heart: Normal rate.  Systolic murmurs.  Abdomen: Soft. Non tender  Extremities: 1+ edema.  Neurological: Alert. No focal deficits.  Psychiatric: Appropriate mood and affect.  Current Facility-Administered Medications   Medication Dose Route Frequency    sodium chloride 0.9% infusion   Intravenous Once    polyethylene glycol-electrolyte solution (Plenvu) 140 g SOLR 480 mL  480 mL Oral BID@0300,1800    spironolactone (Aldactone) tab 25 mg  25 mg Oral Daily    metoprolol succinate ER (Toprol XL) 24 hr tab 50 mg  50 mg Oral Daily Beta Blocker    pantoprazole (Protonix) 40 mg in sodium chloride 0.9% PF 10 mL IV push  40 mg Intravenous Q12H    albuterol (Ventolin HFA) 108 (90 Base) MCG/ACT inhaler 2 puff  2 puff Inhalation Q6H PRN    atorvastatin (Lipitor) tab 10 mg  10 mg Oral Nightly    cyanocobalamin (Vitamin B12) tab 1,000 mcg  1,000 mcg Oral Daily    folic acid (Folvite) tab 1 mg  1 mg Oral Daily    sertraline (Zoloft) tab 100 mg  100 mg Oral Daily    acetaminophen (Tylenol Extra Strength) tab 500 mg  500 mg Oral Q4H PRN    ondansetron (Zofran) 4 MG/2ML injection 4 mg  4 mg Intravenous Q6H PRN    metoclopramide (Reglan) 5 mg/mL injection 10 mg  10 mg Intravenous Q8H PRN    bumetanide (Bumex) 0.25 MG/ML injection 1 mg  1 mg Intravenous BID (Diuretic)     Medications Prior to Admission   Medication Sig    pantoprazole 40 MG Oral  Tab EC Take 1 tablet (40 mg total) by mouth 2 (two) times daily before meals.    sertraline 100 MG Oral Tab Take 1 tablet (100 mg total) by mouth daily.    atorvastatin 10 MG Oral Tab Take 1 tablet (10 mg total) by mouth nightly.    folic acid 1 MG Oral Tab Take 1 tablet (1 mg total) by mouth daily.    cyanocobalamin 1000 MCG Oral Tab Take 1 tablet (1,000 mcg total) by mouth daily.    ELIQUIS 5 MG Oral Tab Take 1 tablet (5 mg total) by mouth 2 (two) times daily.    spironolactone 25 MG Oral Tab Take 5 tablets (125 mg total) by mouth daily.    bumetanide 1 MG Oral Tab Take 1 tablet (1 mg total) by mouth BID (Diuretic).    FARXIGA 10 MG Oral Tab Take 1 tablet (10 mg total) by mouth daily.    albuterol 108 (90 Base) MCG/ACT Inhalation Aero Soln SHAKE BEFORE USE AND TAKE 2 PUFFS INTO THE LUNGS EVERY 6 HOURS AS NEEDED    METOPROLOL SUCCINATE ER 50 MG Oral Tablet 24 Hr Take 1 tablet (50 mg total) by mouth in the morning and 1 tablet (50 mg total) before bedtime.     No results found.    Lab Results   Component Value Date    WBC 7.6 07/30/2024    HGB 8.7 07/30/2024    HCT 23.2 07/30/2024    .0 07/30/2024    CREATSERUM 1.72 07/30/2024    BUN 32 07/30/2024     07/30/2024    K 4.0 07/30/2024     07/30/2024    CO2 31.0 07/30/2024    GLU 91 07/30/2024    CA 9.5 07/30/2024       IMPRESSION:   Recurrent upper GI bleed. 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 No APC was done at the time secondary to food particles. Repeat endoscopy is planned. Anticoagulation on hold for 48 hours.    Chronic atrial fibrillation.    Acute on chronic diastolic heart failure.    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 Patient is status post TAVR procedure for severe aortic stenosis.    Chronic kidney disease stage IIIb.    Remote history of DVT/PE.    Recommendations:      Continue with IV diuretic.  Anemia has improved status post transfusion.  For endoscopy tomorrow.

## 2024-08-01 ENCOUNTER — APPOINTMENT (OUTPATIENT)
Dept: HEMATOLOGY/ONCOLOGY | Facility: HOSPITAL | Age: 79
End: 2024-08-01
Attending: INTERNAL MEDICINE
Payer: MEDICARE

## 2024-08-01 LAB
ANION GAP SERPL CALC-SCNC: 8 MMOL/L (ref 0–18)
BUN BLD-MCNC: 25 MG/DL (ref 9–23)
BUN/CREAT SERPL: 17.4 (ref 10–20)
CALCIUM BLD-MCNC: 9 MG/DL (ref 8.7–10.4)
CHLORIDE SERPL-SCNC: 107 MMOL/L (ref 98–112)
CO2 SERPL-SCNC: 30 MMOL/L (ref 21–32)
CREAT BLD-MCNC: 1.44 MG/DL
DEPRECATED RDW RBC AUTO: 67.2 FL (ref 35.1–46.3)
EGFRCR SERPLBLD CKD-EPI 2021: 37 ML/MIN/1.73M2 (ref 60–?)
ERYTHROCYTE [DISTWIDTH] IN BLOOD BY AUTOMATED COUNT: 19.8 % (ref 11–15)
GLUCOSE BLD-MCNC: 86 MG/DL (ref 70–99)
HCT VFR BLD AUTO: 26.4 %
HGB BLD-MCNC: 8 G/DL
MAGNESIUM SERPL-MCNC: 1.8 MG/DL (ref 1.6–2.6)
MCH RBC QN AUTO: 29.7 PG (ref 26–34)
MCHC RBC AUTO-ENTMCNC: 30.3 G/DL (ref 31–37)
MCV RBC AUTO: 98.1 FL
OSMOLALITY SERPL CALC.SUM OF ELEC: 304 MOSM/KG (ref 275–295)
PLATELET # BLD AUTO: 219 10(3)UL (ref 150–450)
POTASSIUM SERPL-SCNC: 3.5 MMOL/L (ref 3.5–5.1)
POTASSIUM SERPL-SCNC: 3.8 MMOL/L (ref 3.5–5.1)
RBC # BLD AUTO: 2.69 X10(6)UL
SODIUM SERPL-SCNC: 145 MMOL/L (ref 136–145)
WBC # BLD AUTO: 4.6 X10(3) UL (ref 4–11)

## 2024-08-01 PROCEDURE — 99232 SBSQ HOSP IP/OBS MODERATE 35: CPT | Performed by: PHYSICIAN ASSISTANT

## 2024-08-01 PROCEDURE — 99233 SBSQ HOSP IP/OBS HIGH 50: CPT | Performed by: HOSPITALIST

## 2024-08-01 RX ORDER — SPIRONOLACTONE 25 MG/1
25 TABLET ORAL DAILY
Qty: 30 TABLET | Refills: 0 | Status: SHIPPED | OUTPATIENT
Start: 2024-08-02

## 2024-08-01 RX ORDER — MAGNESIUM OXIDE 400 MG/1
400 TABLET ORAL ONCE
Status: COMPLETED | OUTPATIENT
Start: 2024-08-01 | End: 2024-08-01

## 2024-08-01 RX ORDER — METOPROLOL SUCCINATE 50 MG/1
50 TABLET, EXTENDED RELEASE ORAL
Qty: 30 TABLET | Refills: 0 | Status: SHIPPED | OUTPATIENT
Start: 2024-08-02

## 2024-08-01 NOTE — PLAN OF CARE
Problem: Patient Centered Care  Goal: Patient preferences are identified and integrated in the patient's plan of care  Description: Interventions:  - What would you like us to know as we care for you? \"I am from home with my daughter.\"  - Provide timely, complete, and accurate information to patient/family  - Incorporate patient and family knowledge, values, beliefs, and cultural backgrounds into the planning and delivery of care  - Encourage patient/family to participate in care and decision-making at the level they choose  - Honor patient and family perspectives and choices  Outcome: Progressing     Problem: Patient/Family Goals  Goal: Patient/Family Long Term Goal  Description: Patient's Long Term Goal:  Discharge home     Interventions:  - Monitor vitals  - Monitor labs  - Remote tele   - GI on consult   - Cardiology on consult  - See additional Care Plan goals for specific interventions  Outcome: Progressing  Goal: Patient/Family Short Term Goal  Description: Patient's Short Term Goal: \"to prevent low hgb\"    Interventions:   - Monitor labs  - Monitor vitals  - Replaced PRBCs if Hgb < 7  - Remote tele   - See additional Care Plan goals for specific interventions  Outcome: Progressing     Problem: HEMATOLOGIC - ADULT  Goal: Maintains hematologic stability  Description: INTERVENTIONS  - Assess for signs and symptoms of bleeding or hemorrhage  - Monitor labs and vital signs for trends  - Administer supportive blood products/factors, fluids and medications as ordered and appropriate  - Administer supportive blood products/factors as ordered and appropriate  Outcome: Progressing  Goal: Free from bleeding injury  Description: (Example usage: patient with low platelets)  INTERVENTIONS:  - Avoid intramuscular injections, enemas and rectal medication administration  - Ensure safe mobilization of patient  - Hold pressure on venipuncture sites to achieve adequate hemostasis  - Assess for signs and symptoms of internal  bleeding  - Monitor lab trends  - Patient is to report abnormal signs of bleeding to staff  - Avoid use of toothpicks and dental floss  - Use electric shaver for shaving  - Use soft bristle tooth brush  - Limit straining and forceful nose blowing  Outcome: Progressing     Problem: SAFETY ADULT - FALL  Goal: Free from fall injury  Description: INTERVENTIONS:  - Assess pt frequently for physical needs  - Identify cognitive and physical deficits and behaviors that affect risk of falls.  - [place] fall precautions as indicated by assessment.  - Educate pt/family on patient safety including physical limitations  - Instruct pt to call for assistance with activity based on assessment  - Modify environment to reduce risk of injury  - Provide assistive devices as appropriate  - Consider OT/PT consult to assist with strengthening/mobility  - Encourage toileting schedule  Outcome: Progressing     Problem: DISCHARGE PLANNING  Goal: Discharge to home or other facility with appropriate resources  Description: INTERVENTIONS:  - Identify barriers to discharge w/pt and caregiver  - Include patient/family/discharge partner in discharge planning  - Arrange for needed discharge resources and transportation as appropriate  - Identify discharge learning needs (meds, wound care, etc)  - Arrange for interpreters to assist at discharge as needed  - Consider post-discharge preferences of patient/family/discharge partner  - Complete POLST form as appropriate  - Assess patient's ability to be responsible for managing their own health  - Refer to Case Management Department for coordinating discharge planning if the patient needs post-hospital services based on physician/LIP order or complex needs related to functional status, cognitive ability or social support system  Outcome: Progressing     Problem: CARDIOVASCULAR - ADULT  Goal: Maintains optimal cardiac output and hemodynamic stability  Description: INTERVENTIONS:  - Monitor vital signs,  rhythm, and trends  - Monitor for bleeding, hypotension and signs of decreased cardiac output  - Evaluate effectiveness of vasoactive medications to optimize hemodynamic stability  - Monitor arterial and/or venous puncture sites for bleeding and/or hematoma  - Assess quality of pulses, skin color and temperature  - Assess for signs of decreased coronary artery perfusion - ex. Angina  - Evaluate fluid balance, assess for edema, trend weights  Outcome: Progressing  Goal: Absence of cardiac arrhythmias or at baseline  Description: INTERVENTIONS:  - Continuous cardiac monitoring, monitor vital signs, obtain 12 lead EKG if indicated  - Evaluate effectiveness of antiarrhythmic and heart rate control medications as ordered  - Initiate emergency measures for life threatening arrhythmias  - Monitor electrolytes and administer replacement therapy as ordered  Outcome: Progressing

## 2024-08-01 NOTE — PLAN OF CARE
Problem: Patient Centered Care  Goal: Patient preferences are identified and integrated in the patient's plan of care  Description: Interventions:  - What would you like us to know as we care for you? \"I am from home with my daughter.\"  - Provide timely, complete, and accurate information to patient/family  - Incorporate patient and family knowledge, values, beliefs, and cultural backgrounds into the planning and delivery of care  - Encourage patient/family to participate in care and decision-making at the level they choose  - Honor patient and family perspectives and choices  Outcome: Progressing     Problem: Patient/Family Goals  Goal: Patient/Family Long Term Goal  Description: Patient's Long Term Goal:  Discharge home     Interventions:  - Monitor vitals  - Monitor labs  - Remote tele   - GI on consult   - Cardiology on consult  - See additional Care Plan goals for specific interventions  Outcome: Progressing  Goal: Patient/Family Short Term Goal  Description: Patient's Short Term Goal: \"to prevent low hgb\"    Interventions:   - Monitor labs  - Monitor vitals  - Replaced PRBCs if Hgb < 7  - Remote tele   - See additional Care Plan goals for specific interventions  Outcome: Progressing     Problem: HEMATOLOGIC - ADULT  Goal: Maintains hematologic stability  Description: INTERVENTIONS  - Assess for signs and symptoms of bleeding or hemorrhage  - Monitor labs and vital signs for trends  - Administer supportive blood products/factors, fluids and medications as ordered and appropriate  - Administer supportive blood products/factors as ordered and appropriate  Outcome: Progressing  Goal: Free from bleeding injury  Description: (Example usage: patient with low platelets)  INTERVENTIONS:  - Avoid intramuscular injections, enemas and rectal medication administration  - Ensure safe mobilization of patient  - Hold pressure on venipuncture sites to achieve adequate hemostasis  - Assess for signs and symptoms of internal  bleeding  - Monitor lab trends  - Patient is to report abnormal signs of bleeding to staff  - Avoid use of toothpicks and dental floss  - Use electric shaver for shaving  - Use soft bristle tooth brush  - Limit straining and forceful nose blowing  Outcome: Progressing     Problem: CARDIOVASCULAR - ADULT  Goal: Maintains optimal cardiac output and hemodynamic stability  Description: INTERVENTIONS:  - Monitor vital signs, rhythm, and trends  - Monitor for bleeding, hypotension and signs of decreased cardiac output  - Evaluate effectiveness of vasoactive medications to optimize hemodynamic stability  - Monitor arterial and/or venous puncture sites for bleeding and/or hematoma  - Assess quality of pulses, skin color and temperature  - Assess for signs of decreased coronary artery perfusion - ex. Angina  - Evaluate fluid balance, assess for edema, trend weights  Outcome: Progressing  Goal: Absence of cardiac arrhythmias or at baseline  Description: INTERVENTIONS:  - Continuous cardiac monitoring, monitor vital signs, obtain 12 lead EKG if indicated  - Evaluate effectiveness of antiarrhythmic and heart rate control medications as ordered  - Initiate emergency measures for life threatening arrhythmias  - Monitor electrolytes and administer replacement therapy as ordered  Outcome: Progressing     Problem: SAFETY ADULT - FALL  Goal: Free from fall injury  Description: INTERVENTIONS:  - Assess pt frequently for physical needs  - Identify cognitive and physical deficits and behaviors that affect risk of falls.  - [place] fall precautions as indicated by assessment.  - Educate pt/family on patient safety including physical limitations  - Instruct pt to call for assistance with activity based on assessment  - Modify environment to reduce risk of injury  - Provide assistive devices as appropriate  - Consider OT/PT consult to assist with strengthening/mobility  - Encourage toileting schedule  Outcome: Progressing     Problem:  DISCHARGE PLANNING  Goal: Discharge to home or other facility with appropriate resources  Description: INTERVENTIONS:  - Identify barriers to discharge w/pt and caregiver  - Include patient/family/discharge partner in discharge planning  - Arrange for needed discharge resources and transportation as appropriate  - Identify discharge learning needs (meds, wound care, etc)  - Arrange for interpreters to assist at discharge as needed  - Consider post-discharge preferences of patient/family/discharge partner  - Complete POLST form as appropriate  - Assess patient's ability to be responsible for managing their own health  - Refer to Case Management Department for coordinating discharge planning if the patient needs post-hospital services based on physician/LIP order or complex needs related to functional status, cognitive ability or social support system  Outcome: Progressing

## 2024-08-01 NOTE — CARDIAC REHAB
Met with patient for cardiac rehab follow-up.  Questions answered.  No need for further intervention at this time.  Will continue to monitor patient needs regarding education during this admission.    
Order received and Chart review completed, followed by assessment as to appropriateness of patient to receive Cardiac Rehab. Patient is not appropriate for cardiac rehab intervention at this time due to endoscopy today.  Will continue to monitor patient and when appropriate, begin cardiac rehab education.     
No

## 2024-08-01 NOTE — PROGRESS NOTES
Progress Note     Yamini Ocampo Patient Status:  Inpatient    1945 MRN [id number]   Location [facility] 5SW/SE Attending Brigitte Freeman MD   Hosp Day # 6 PCP Kathy Anne MD       Subjective:   S: Patient resting in bed.  Feels fine.  Pill cam retrieved.  No bloody BM noticed.    Review of Systems:   10 point ROS completed and was negative, except for pertinent positive and negatives stated in subjective.    Objective:   Vital signs:  Temp:  [98.2 °F (36.8 °C)-98.6 °F (37 °C)] 98.6 °F (37 °C)  Pulse:  [] 86  Resp:  [16-25] 18  BP: ()/(44-90) 127/51  SpO2:  [88 %-100 %] 91 %    Wt Readings from Last 6 Encounters:   24 180 lb (81.6 kg)   24 206 lb (93.4 kg)   24 195 lb 9.6 oz (88.7 kg)   24 211 lb 3.2 oz (95.8 kg)   24 211 lb (95.7 kg)   24 219 lb (99.3 kg)         Physical Exam:      Gen: No acute distress  Pulm: Lungs clear, normal respiratory effort  CV: Heart with regular rate and rhythm  Abd: Abdomen soft, nontender, nondistended, bowel sounds present  Neuro: No acute focal deficits  MSK: moves extremities  Skin: Warm and dry  Psych: Normal affect  Ext: no c/c/e      Results:   Diagnostic Data:      Labs:    Labs Last 24 Hours:   BMP     CBC    Other     Na 145 Cl 107 BUN 25 Glu 86   Hb 8.0   PTT - Procal -   K 3.5 CO2 30.0 Cr 1.44   WBC 4.6 >< .0  INR - CRP -   Renal Lytes Endo    Hct 26.4   Trop - D dim -   eGFR - Ca 9.0 POC Gluc  -    LFT   pBNP - Lactic -   eGFR AA - PO4 - A1c -   AST - APk - Prot -  LDL -     Mg 1.8 TSH -   ALT - T sis - Alb -        COVID-19 Lab Results    COVID-19  Lab Results   Component Value Date    COVID19 Not Detected 2024    COVID19 Not Detected 2023    COVID19 Not Detected 2021       Pro-Calcitonin  No results for input(s): \"PCT\" in the last 168 hours.    Cardiac  No results for input(s): \"TROP\", \"PBNP\" in the last 168 hours.    Creatinine Kinase  No results for input(s): \"CK\" in  the last 168 hours.    Inflammatory Markers  Recent Labs   Lab 07/26/24  1243   KATIE 20.9       Imaging: Imaging data reviewed in Epic.    Medications:    magnesium oxide  400 mg Oral Once    sodium chloride   Intravenous Once    spironolactone  25 mg Oral Daily    metoprolol succinate  50 mg Oral Daily Beta Blocker    pantoprazole  40 mg Intravenous Q12H    atorvastatin  10 mg Oral Nightly    cyanocobalamin  1,000 mcg Oral Daily    folic acid  1 mg Oral Daily    sertraline  100 mg Oral Daily    [Held by provider] bumetanide  1 mg Intravenous BID (Diuretic)       Assessment & Plan:   ASSESSMENT / PLAN:     Melena, UGIB  Acute Symptomatic Anemia   - pt with outpatient labs revealing hgb 5.9, advised to come to ED  - recent hospitalization in June 2024 for UGIB, 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 No APC was done at that time secondary to food particles.   - this admission received 1U pRBC transfusion with appropriate response  - s/p EGD 7/27 unsuccessful due to retention of food in esophagus  - GI following  - s/p repeat EGD 7/29 with findings of GAVE s/p APC  - cont PPI IV BID  - hold further eliquis anticoag for now  - transfuse 1 unit prbc today  - s/p colonoscopy + VCE 8/1 - no acute findings in cscope except diverticulosis, await VCE results    Chronic atrial fibrillation anticoagulated with Eliquis.  - per gi ok to restart eliquis  - cont tele monitoring    Acute on chronic diastolic heart failure  - cardiology consulted  - Admission BNP >1200  - pt started on IV Bumex , bumex held yesterday as she appeared dry - resume per cards today  - Farxiga on discharge     TATIANA on CKD stage 3   - improved, monitor    Hypernatremia  - likely dry - improved    Chronic Medical Problems:   GERD  Essential hypertension.       Dispo: pending clinical course, possible dc home tomorrow       MDM: High   I personally spent time on chart/note review, review of labs/imaging, discussion with patient, physical  exam, discussion with staff, consultants, coordinating care, writing progress note, and discussion of plan of care.

## 2024-08-01 NOTE — DIETARY NOTE
Brief Nutrition Note      Patient (pt) screened at  nutrition risk by RN upon admission for poor po and unintentional wt loss.     Admitting diagnosis: Rectal bleeding [K62.5]  Anemia, unspecified type [D64.9]     Patient status: 8/1 :  Visited pt yesterday but pt is on NPO for colonoscopy. Prior days, pt was eating fairly well here ( % of last 7 meals per recorded intake), states appetite is fair. Denies N/V. Pt is ambulatory. Reports diet history of an average 1-2 moderate meals/day but did report no scheduled meal pattern instead she eats when hungry. Following Low sodium diet, no acidic food d/t Acid reflux, and avoiding as much as she can dairy products, as these gives her diarrhea.   Reported significant wt fluctuation ( consistent with EMR wt hx) d/t use of diuretics. States she would be 214# one day and the 2 days later 202#. Evidently admit wt 206#, today's wt 180#, likely r/t volume overload. Held Bumex yesterday.    Re-visited today but pt is sleeping. Diet is resumed. RN reports pt ate well this breakfast.     Poor po resolved. Difficult to discern at this point whether pt actually have true body mass loss d/t significant fluid wt fluctuation.        Nutrition Evaluation and Plan:      Pt is not currently at nutrition risk.     Added Lactose free in diet order, and noted no dairy products ( per pt's request)  unless pt ordered   Pt would like to try Ensure Clear  Oral Nutrition Supplement ( ONS ) daily      Will follow up at length of stay ( LOS) per protocol. Otherwise, please consult RD if patient Nutrition status change or nutrition issues arise.     Diet:        Procedures    Low Fiber/Soft diet Low Fiber/Soft, Lactose Free; Sodium Restriction: 2 GM NA; Fluid Restriction: 2000 ml; Is Patient on Accuchecks? No     Po Intake:  adequate  Percent Meals Eaten (last 6 days)       Date/Time Percent Meals Eaten (%)    07/27/24 1822 80 %    07/28/24 1000 100 %    07/28/24 1300 90 %    07/28/24 1439  80 %    07/28/24 1759 90 %    07/28/24 1840 80 %    07/30/24 1353 100 %    07/31/24 0840 0 %     Percent Meals Eaten (%): pt NPO at 07/31/24 0840            Height: 167.6 cm (5' 6\")  Weight: 81.6 kg (180 lb)  --significant fluid wt changes.   BMI: Body mass index is 29.05 kg/m².  BMI CLASSIFICATION: 25-29.9 kg/m2 - overweight  IBW: 130 lbs        138% IBW  Usual Body Wt: constantly changing.     ?% UBW    WEIGHT HISTORY:    Patient Weight(s) for the past 336 hrs:   Weight   08/01/24 0533 81.6 kg (180 lb)   07/28/24 0504 86.3 kg (190 lb 3.2 oz)   07/27/24 0500 91.5 kg (201 lb 12.8 oz)   07/26/24 2027 92.4 kg (203 lb 12.8 oz)   07/26/24 1743 93.4 kg (206 lb)       Wt Readings from Last 6 Encounters:   08/01/24 81.6 kg (180 lb)   07/26/24 93.4 kg (206 lb)   06/28/24 88.7 kg (195 lb 9.6 oz)   05/13/24 95.8 kg (211 lb 3.2 oz)   05/09/24 95.7 kg (211 lb)   05/03/24 99.3 kg (219 lb)     Labs and Meds:  reviewed:   Bumex held, Folic acid and Vit B12, others noted.         Li Marshall, RD, LDN, [facility]  Clinical Dietitian  [phone]

## 2024-08-01 NOTE — OPERATIVE REPORT
VIDEO CAPSULE ENDOSCOPY REPORT    Yamini Ocampo     1945 Age 79 year old   PCP Kathy Anne MD Gastroenterologist Alyssa Orellana MD     Date of procedure: 2024     Pre-operative diagnosis: anemia    Post-operative diagnosis: see impression    Procedure:  Informed consent was obtained from the patient after the risks of the procedure were discussed, including but not limited to aspiration, missed lesion, and capsule retention. After discussions of the risks/benefits and alternatives to this procedure, the patient signed consent. A standard 8 hour video capsule was administered after the patient prepped with a bowel laxative.     Findings:   The pillcam was placed endoscopically.   1. Stomach entry (1st gastric image): 00:02:52  2. Small bowel (1st duodenal image): 06:58:07. Limited small bowel evaluation, no bleeding foci seen in the limited one hour recording of small bowel. Bilious effluent  3. Colon (1st cecal image): did not reach cecum by end of study      Impression:  1. Incomplete small bowel evaluation     Recommendations:  1. Hgb improved, no overt bleeding  2. Ok to resume anticoagulation  3. If recurrent anemia, consider repeat APC session +/- repeat VCE attempt

## 2024-08-01 NOTE — PROGRESS NOTES
[facility]     Gastroenterology Progress Note    Yamini Ocampo Patient Status:  Inpatient    1945 MRN [id number]   Location [facility] 5SW/SE Attending Mandy Vazquez MD   Hosp Day # 6 PCP Kathy Anne MD       Subjective:   Patient notes some fatigue today.     No abdominal pain, nausea or vomiting.   No CP, SOB, dizziness.   She did not have BM this am    Objective:   Blood pressure 127/51, pulse 86, temperature 98.6 °F (37 °C), temperature source Oral, resp. rate 18, height 5' 6\" (1.676 m), weight 180 lb (81.6 kg), SpO2 91%. Body mass index is 29.05 kg/m².    Gen: awake, alert patient, NAD  HEENT: EOMI, the sclera appears anicteric, oropharynx clear, mucus membranes appear moist  CV: RRR  Lung: no conversational dyspnea   Abdomen: soft NTND abdomen with NABS appreciated   Skin: dry, warm, no jaundice  Ext: no LE edema is evident  Neuro: Alert and interactive  Psych: calm, cooperative    Assessment and Plan:   Yamini Ocampo is a 79 year old year-old female with history of BMI 33.86, HTN, HLD, DM2, CKD3, Anemia of chronic disease, PUD, GERD, Ventricular diastolic dysfunction, HFpEF, Pulmonary artery HTN, CAD, Chronic Afib (on eliquis) who presents to the ED with acute on chronic diastolic HF and anemia. She has known GAVE and underwent APC on . Now with recurrent anemia today to hgb 6.7, no overt bleeding. Concern for alternate source of anemia as there was no hematin staining or old blood in stomach to explain recent bleeding, such as AVMs    Cln/VCE- Colonoscopy incomplete due to poor prep. VCE incomplete, but from what was seen no active bleeding. Plan for PPI BID, iron. Ok to restart Eliquis. Trend hemoglobin, goal >7.     Will have patient follow up with GI as outpatient. If return of drop in hemoglobin will need repeat APC of GAVE.     Case discussed with Alyssa Orellana MD and Vanessa VERDIN.    Vivi Rogers PA-C  [facility]  Gastroenterology  8/1/2024      Results:     Lab Results   Component Value Date    WBC 4.6 08/01/2024    HGB 8.0 (L) 08/01/2024    HCT 26.4 (L) 08/01/2024    .0 08/01/2024    CREATSERUM 1.44 (H) 08/01/2024    BUN 25 (H) 08/01/2024     08/01/2024    K 3.5 08/01/2024     08/01/2024    CO2 30.0 08/01/2024    GLU 86 08/01/2024    CA 9.0 08/01/2024    ALB 3.6 06/25/2024    ALKPHO 64 03/22/2024    BILT 0.7 03/22/2024    TP 6.5 03/22/2024    AST 38 (H) 03/22/2024    ALT 16 03/22/2024    PTT 30.0 01/29/2019    INR 1.92 (H) 06/25/2024    TSH 2.130 05/04/2021    LIP 40 10/27/2023    ESRML 35 (H) 06/14/2017    CRP 0.7 06/14/2017     (H) 06/10/2011    MG 1.8 08/01/2024    PHOS 3.7 06/25/2024    TROP <0.045 05/10/2019    CK 20 (L) 09/05/2017    B12 626 12/20/2023       No results found.

## 2024-08-02 VITALS
HEIGHT: 66 IN | BODY MASS INDEX: 28.93 KG/M2 | TEMPERATURE: 98 F | RESPIRATION RATE: 18 BRPM | OXYGEN SATURATION: 93 % | WEIGHT: 180 LBS | DIASTOLIC BLOOD PRESSURE: 52 MMHG | HEART RATE: 87 BPM | SYSTOLIC BLOOD PRESSURE: 123 MMHG

## 2024-08-02 LAB
ANION GAP SERPL CALC-SCNC: 5 MMOL/L (ref 0–18)
BUN BLD-MCNC: 23 MG/DL (ref 9–23)
BUN/CREAT SERPL: 15.8 (ref 10–20)
CALCIUM BLD-MCNC: 9.1 MG/DL (ref 8.7–10.4)
CHLORIDE SERPL-SCNC: 106 MMOL/L (ref 98–112)
CO2 SERPL-SCNC: 31 MMOL/L (ref 21–32)
CREAT BLD-MCNC: 1.46 MG/DL
DEPRECATED RDW RBC AUTO: 68.8 FL (ref 35.1–46.3)
EGFRCR SERPLBLD CKD-EPI 2021: 36 ML/MIN/1.73M2 (ref 60–?)
ERYTHROCYTE [DISTWIDTH] IN BLOOD BY AUTOMATED COUNT: 19.9 % (ref 11–15)
GLUCOSE BLD-MCNC: 93 MG/DL (ref 70–99)
HCT VFR BLD AUTO: 26.9 %
HGB BLD-MCNC: 8.3 G/DL
MAGNESIUM SERPL-MCNC: 1.8 MG/DL (ref 1.6–2.6)
MCH RBC QN AUTO: 30.2 PG (ref 26–34)
MCHC RBC AUTO-ENTMCNC: 30.9 G/DL (ref 31–37)
MCV RBC AUTO: 97.8 FL
OSMOLALITY SERPL CALC.SUM OF ELEC: 297 MOSM/KG (ref 275–295)
PLATELET # BLD AUTO: 244 10(3)UL (ref 150–450)
POTASSIUM SERPL-SCNC: 3.6 MMOL/L (ref 3.5–5.1)
RBC # BLD AUTO: 2.75 X10(6)UL
SODIUM SERPL-SCNC: 142 MMOL/L (ref 136–145)
WBC # BLD AUTO: 4.9 X10(3) UL (ref 4–11)

## 2024-08-02 PROCEDURE — 99239 HOSP IP/OBS DSCHRG MGMT >30: CPT | Performed by: HOSPITALIST

## 2024-08-02 RX ORDER — BUMETANIDE 1 MG/1
1 TABLET ORAL
Status: DISCONTINUED | OUTPATIENT
Start: 2024-08-02 | End: 2024-08-02

## 2024-08-02 RX ORDER — MAGNESIUM OXIDE 400 MG/1
400 TABLET ORAL ONCE
Status: COMPLETED | OUTPATIENT
Start: 2024-08-02 | End: 2024-08-02

## 2024-08-02 NOTE — PROGRESS NOTES
Patient seen in follow-up no noted abdominal pain nausea or shortness of breath.  Lower extremity edema has improved.    Vitals:    08/02/24 0823   BP: 123/52   Pulse: 87   Resp: 18   Temp: 98.4 °F (36.9 °C)       Intake/Output Summary (Last 24 hours) at 8/2/2024 1013  Last data filed at 8/2/2024 0805  Gross per 24 hour   Intake 910 ml   Output 450 ml   Net 460 ml     Wt Readings from Last 1 Encounters:   08/02/24 180 lb (81.6 kg)        General: No acute distress.  Neck: Jugular venous pulsations not seen.  Lungs: Clear to auscultation.  Heart: Normal rate. SAVANNAH  Abdomen: Soft. Non tender  Extremities: Mild edema.  Neurological: Alert. No focal deficits.  Psychiatric: Appropriate mood and affect.  Current Facility-Administered Medications   Medication Dose Route Frequency    magnesium oxide (Mag-Ox) tab 400 mg  400 mg Oral Once    apixaban (Eliquis) tab 5 mg  5 mg Oral BID    sodium chloride 0.9% infusion   Intravenous Once    spironolactone (Aldactone) tab 25 mg  25 mg Oral Daily    metoprolol succinate ER (Toprol XL) 24 hr tab 50 mg  50 mg Oral Daily Beta Blocker    pantoprazole (Protonix) 40 mg in sodium chloride 0.9% PF 10 mL IV push  40 mg Intravenous Q12H    albuterol (Ventolin HFA) 108 (90 Base) MCG/ACT inhaler 2 puff  2 puff Inhalation Q6H PRN    atorvastatin (Lipitor) tab 10 mg  10 mg Oral Nightly    cyanocobalamin (Vitamin B12) tab 1,000 mcg  1,000 mcg Oral Daily    folic acid (Folvite) tab 1 mg  1 mg Oral Daily    sertraline (Zoloft) tab 100 mg  100 mg Oral Daily    acetaminophen (Tylenol Extra Strength) tab 500 mg  500 mg Oral Q4H PRN    ondansetron (Zofran) 4 MG/2ML injection 4 mg  4 mg Intravenous Q6H PRN    metoclopramide (Reglan) 5 mg/mL injection 10 mg  10 mg Intravenous Q8H PRN    bumetanide (Bumex) 0.25 MG/ML injection 1 mg  1 mg Intravenous BID (Diuretic)     Medications Prior to Admission   Medication Sig    pantoprazole 40 MG Oral Tab EC Take 1 tablet (40 mg total) by mouth 2 (two) times  daily before meals.    sertraline 100 MG Oral Tab Take 1 tablet (100 mg total) by mouth daily.    atorvastatin 10 MG Oral Tab Take 1 tablet (10 mg total) by mouth nightly.    folic acid 1 MG Oral Tab Take 1 tablet (1 mg total) by mouth daily.    cyanocobalamin 1000 MCG Oral Tab Take 1 tablet (1,000 mcg total) by mouth daily.    ELIQUIS 5 MG Oral Tab Take 1 tablet (5 mg total) by mouth 2 (two) times daily.    spironolactone 25 MG Oral Tab Take 5 tablets (125 mg total) by mouth daily.    bumetanide 1 MG Oral Tab Take 1 tablet (1 mg total) by mouth BID (Diuretic).    FARXIGA 10 MG Oral Tab Take 1 tablet (10 mg total) by mouth daily.    albuterol 108 (90 Base) MCG/ACT Inhalation Aero Soln SHAKE BEFORE USE AND TAKE 2 PUFFS INTO THE LUNGS EVERY 6 HOURS AS NEEDED    METOPROLOL SUCCINATE ER 50 MG Oral Tablet 24 Hr Take 1 tablet (50 mg total) by mouth in the morning and 1 tablet (50 mg total) before bedtime.     No results found.    Lab Results   Component Value Date    WBC 4.9 08/02/2024    HGB 8.3 08/02/2024    HCT 26.9 08/02/2024    .0 08/02/2024    CREATSERUM 1.46 08/02/2024    BUN 23 08/02/2024     08/02/2024    K 3.6 08/02/2024     08/02/2024    CO2 31.0 08/02/2024    GLU 93 08/02/2024    CA 9.1 08/02/2024    MG 1.8 08/02/2024       IMPRESSION:   Recurrent upper GI bleed. 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      Chronic atrial fibrillation.    Acute on chronic diastolic heart failure.    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 Patient is status post TAVR procedure for severe aortic stenosis.    Chronic kidney disease stage IIIb.    Remote history of DVT/PE.    Recommendations:      PLAN:  Patient is status post endoscopy no active bleeding noted currently.  Eliquis has been resumed.  Lower extremity edema has resolved.  Discharge planning can be initiated from cardiology standpoint.  Home on po bumex.    Stable from cardiac standpoint  Bumex to PO  Follow  H&H and BMP closely    Cardiac meds:  Atorvastatin 10 mg  Metoprolol XL 50 mg  Spironolactone 25 mg  Eliquis 5 mg q 12  Bumex 1 mg  PO bid      D/W patient and nursing staff      Balwinder Andujar MD  [facility]

## 2024-08-02 NOTE — SPIRITUAL CARE NOTE
Spiritual Care Visit Note    Patient Name: Yamini Ocampo Date of Spiritual Care Visit: 24   : 1945 Primary Dx: Rectal bleeding       Referred By: Referral From:     Spiritual Care Taxonomy:    Intended Effects: Establish rapport and connectedness    Methods: Collaborate with care team member;Offer support    Interventions: Active listening;Ask guided questions;Provide compassionate touch;Silent prayer    Visit Type/Summary:     - Spiritual Care: Consulted with RN prior to visit. Patient and family expressed appreciation for  visit. Provided information regarding how to contact Spiritual Care and left a Spiritual Care information card. Patient expressed thanks for wonderful care here and being discharged today. No other need at this time.    Spiritual Care support can be requested via an Epic consult. For urgent/immediate needs, please contact the On Call  at: [place]: ext [phone]    GEE Rooney CAMII   [phone]

## 2024-08-02 NOTE — CM/SW NOTE
08/02/24 0800   Discharge disposition   Expected discharge disposition Home or Self   Discharge transportation Private car     PEMA met with patient bedside and she is likely cleared to discharge today.    PEMA offered patient HHC as a transition to home from hospital.    The patient politely declined as her granddaughter is an RN and will check on her.    PLAN: DC home no skilled needs    / to remain available for support and/or discharge planning.     Vida Stevenson MSW, LSW [phone]

## 2024-08-02 NOTE — DISCHARGE SUMMARY
[facility]  part of [facility]    Discharge Summary    Yamini Ocampo Patient Status:  Inpatient    1945 MRN [id number]   Location [facility] 5SW/SE Attending Mandy Vazquez MD   Hosp Day # 7 PCP Kathy Anne MD     Date of Admission: 2024      Date of Discharge: 24      Admitting Diagnosis: Rectal bleeding [K62.5]  Anemia, unspecified type [D64.9]    Hospital Discharge Diagnoses:  GI bleed, CHF    Lace+ Score: 83  59-90 High Risk  29-58 Medium Risk  0-28   Low Risk.    TCM Follow-Up Recommendation:  LACE > 58: High Risk of readmission after discharge from the hospital.          Problem List:   Patient Active Problem List   Diagnosis    Congestive heart failure (HCC)    Major depressive disorder, single episode, moderate ([facility])    Class 2 severe obesity due to excess calories with serious comorbidity and body mass index (BMI) of 39.0 to 39.9 in adult ([facility])    Peripheral vascular disease ([facility])    Sleep apnea    Vitamin D deficiency    Essential hypertension    Gastric erythema    Ventral hernia without obstruction or gangrene    Hiatal hernia with GERD and esophagitis    Gastroesophageal reflux disease without esophagitis    A-fib ([facility])    Polyp of colon    Mesenteric ischemia, chronic ([facility])    CKD (chronic kidney disease) stage 3, GFR 30-59 ml/min ([facility])    History of ischemic colitis    Osteoarthritis of right knee    Osteoarthritis    Raynaud's disease without gangrene    Osteoarthritis of left knee    Diverticulosis of colon    Prediabetes    CREST syndrome ([facility])    Type 2 diabetes mellitus with stage 3b chronic kidney disease, without long-term current use of insulin ([facility])    Macrocytic anemia    Hyperpigmented skin lesion    Anemia due to vitamin B12 deficiency    Type 2 diabetes mellitus with diabetic chronic kidney disease (HCC)    Acute on chronic congestive heart failure, unspecified heart failure type (HCC)    Anemia, unspecified    Iron  deficiency anemia secondary to inadequate dietary iron intake    Dyspepsia    Intestinal metaplasia of gastric mucosa    Anemia, unspecified type    GI bleed    Rectal bleeding         Physical Exam:     Gen: No acute distress  Pulm: Lungs clear, normal respiratory effort  CV: Heart with regular rate and rhythm  Abd: Abdomen soft, nontender, nondistended, bowel sounds present  Neuro: No acute focal deficits  MSK: moves extremities  Skin: Warm and dry  Psych: Normal affect  Ext: no c/c/e      History of Present Illness: Per admit MD    The patient is an 79-year-old  female with underlying valvular heart disease status post TAVR procedure and left ventricular diastolic dysfunction who presented to the emergency department after she had a blood test done as an outpatient showing hemoglobin of 5.9 which is the lower baseline usually runs 7.5 to 8. Chemistry profile is still pending. Patient was initiated in the emergency room on IV Bumex and IV Protonix, and she will be admitted to the hospital for further management.     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 No APC was done at that time secondary to food particles.   - this admission received 1U pRBC transfusion with appropriate response  - s/p EGD 7/27 unsuccessful due to retention of food in esophagus  - GI following  - s/p repeat EGD 7/29 with findings of GAVE s/p APC  - cont PPI IV BID  - hold further eliquis anticoag for now  - transfuse 1 unit prbc today  - s/p colonoscopy + VCE 8/1 - no acute findings in cscope except diverticulosis, await VCE results - negative for acute findings     Chronic atrial fibrillation anticoagulated with Eliquis.  - per gi ok to restart eliquis     Acute on chronic diastolic heart failure  - cardiology consulted  - Admission BNP  >1200  - pt started on IV Bumex , bumex held as she appeared dry - resume per cards today  - Farxiga on discharge      TATIANA on CKD stage 3   - improved, monitor     Hypernatremia  - likely dry - improved     Chronic Medical Problems:   GERD  Essential hypertension.    Discharge Condition: Stable    Discharge Medications:      Discharge Medications        CHANGE how you take these medications        Instructions Prescription details   metoprolol succinate ER 50 MG Tb24  Commonly known as: Toprol XL  What changed: when to take this      Take 1 tablet (50 mg total) by mouth Daily Beta Blocker.   Quantity: 30 tablet  Refills: 0     spironolactone 25 MG Tabs  Commonly known as: Aldactone  What changed: how much to take      Take 1 tablet (25 mg total) by mouth daily.   Quantity: 30 tablet  Refills: 0            CONTINUE taking these medications        Instructions Prescription details   albuterol 108 (90 Base) MCG/ACT Aers  Commonly known as: Ventolin HFA      SHAKE BEFORE USE AND TAKE 2 PUFFS INTO THE LUNGS EVERY 6 HOURS AS NEEDED   Refills: 0     atorvastatin 10 MG Tabs  Commonly known as: Lipitor      Take 1 tablet (10 mg total) by mouth nightly.   Quantity: 90 tablet  Refills: 3     bumetanide 1 MG Tabs  Commonly known as: Bumex      Take 1 tablet (1 mg total) by mouth BID (Diuretic).   Quantity: 60 tablet  Refills: 0     cyanocobalamin 1000 MCG Tabs  Commonly known as: Vitamin B12      Take 1 tablet (1,000 mcg total) by mouth daily.   Refills: 0     Eliquis 5 MG Tabs  Generic drug: apixaban      Take 1 tablet (5 mg total) by mouth 2 (two) times daily.   Quantity: 180 tablet  Refills: 0     Farxiga 10 MG Tabs  Generic drug: dapagliflozin      Take 1 tablet (10 mg total) by mouth daily.   Refills: 0     folic acid 1 MG Tabs  Commonly known as: Folvite      Take 1 tablet (1 mg total) by mouth daily.   Quantity: 90 tablet  Refills: 3     pantoprazole 40 MG Tbec  Commonly known as: Protonix      Take 1 tablet (40 mg  total) by mouth 2 (two) times daily before meals.   Quantity: 60 tablet  Refills: 3     sertraline 100 MG Tabs  Commonly known as: Zoloft      Take 1 tablet (100 mg total) by mouth daily.   Quantity: 90 tablet  Refills: 3               Where to Get Your Medications        These medications were sent to [id number] - [place], [place] - [address]  AT [facility], [phone], [phone]  [address], [facility] [address]      Phone: [phone]   metoprolol succinate ER 50 MG Tb24  spironolactone 25 MG Tabs             Mandy Vazquez MD  8/2/2024  11:40 AM    Greater than 30 minutes spent on preparation and coordination of this discharge

## 2024-08-02 NOTE — PROGRESS NOTES
Pt discharged, daughter provided transportation home.  Discharge paperwork and prescriptions reviewed, all questions and concerns addressed.  IV access and tele discontinued.

## 2024-08-02 NOTE — PROGRESS NOTES
Patient seen in follow-up no noted abdominal pain nausea or shortness of breath.  Lower extremity edema has improved.    Vitals:    08/01/24 1731   BP: 148/57   Pulse:    Resp: 18   Temp: 98.2 °F (36.8 °C)       Intake/Output Summary (Last 24 hours) at 8/1/2024 1947  Last data filed at 8/1/2024 1401  Gross per 24 hour   Intake 1080 ml   Output --   Net 1080 ml     Wt Readings from Last 1 Encounters:   08/01/24 180 lb (81.6 kg)        General: No acute distress.  Neck: Jugular venous pulsations not seen.  Lungs: Clear to auscultation.  Heart: Normal rate. SAVANNAH  Abdomen: Soft. Non tender  Extremities: Mild edema.  Neurological: Alert. No focal deficits.  Psychiatric: Appropriate mood and affect.  Current Facility-Administered Medications   Medication Dose Route Frequency    apixaban (Eliquis) tab 5 mg  5 mg Oral BID    sodium chloride 0.9% infusion   Intravenous Once    spironolactone (Aldactone) tab 25 mg  25 mg Oral Daily    metoprolol succinate ER (Toprol XL) 24 hr tab 50 mg  50 mg Oral Daily Beta Blocker    pantoprazole (Protonix) 40 mg in sodium chloride 0.9% PF 10 mL IV push  40 mg Intravenous Q12H    albuterol (Ventolin HFA) 108 (90 Base) MCG/ACT inhaler 2 puff  2 puff Inhalation Q6H PRN    atorvastatin (Lipitor) tab 10 mg  10 mg Oral Nightly    cyanocobalamin (Vitamin B12) tab 1,000 mcg  1,000 mcg Oral Daily    folic acid (Folvite) tab 1 mg  1 mg Oral Daily    sertraline (Zoloft) tab 100 mg  100 mg Oral Daily    acetaminophen (Tylenol Extra Strength) tab 500 mg  500 mg Oral Q4H PRN    ondansetron (Zofran) 4 MG/2ML injection 4 mg  4 mg Intravenous Q6H PRN    metoclopramide (Reglan) 5 mg/mL injection 10 mg  10 mg Intravenous Q8H PRN    bumetanide (Bumex) 0.25 MG/ML injection 1 mg  1 mg Intravenous BID (Diuretic)     Medications Prior to Admission   Medication Sig    pantoprazole 40 MG Oral Tab EC Take 1 tablet (40 mg total) by mouth 2 (two) times daily before meals.    sertraline 100 MG Oral Tab Take 1 tablet  (100 mg total) by mouth daily.    atorvastatin 10 MG Oral Tab Take 1 tablet (10 mg total) by mouth nightly.    folic acid 1 MG Oral Tab Take 1 tablet (1 mg total) by mouth daily.    cyanocobalamin 1000 MCG Oral Tab Take 1 tablet (1,000 mcg total) by mouth daily.    ELIQUIS 5 MG Oral Tab Take 1 tablet (5 mg total) by mouth 2 (two) times daily.    spironolactone 25 MG Oral Tab Take 5 tablets (125 mg total) by mouth daily.    bumetanide 1 MG Oral Tab Take 1 tablet (1 mg total) by mouth BID (Diuretic).    FARXIGA 10 MG Oral Tab Take 1 tablet (10 mg total) by mouth daily.    albuterol 108 (90 Base) MCG/ACT Inhalation Aero Soln SHAKE BEFORE USE AND TAKE 2 PUFFS INTO THE LUNGS EVERY 6 HOURS AS NEEDED    METOPROLOL SUCCINATE ER 50 MG Oral Tablet 24 Hr Take 1 tablet (50 mg total) by mouth in the morning and 1 tablet (50 mg total) before bedtime.     No results found.    Lab Results   Component Value Date    WBC 4.6 08/01/2024    HGB 8.0 08/01/2024    HCT 26.4 08/01/2024    .0 08/01/2024    CREATSERUM 1.44 08/01/2024    BUN 25 08/01/2024     08/01/2024    K 3.8 08/01/2024     08/01/2024    CO2 30.0 08/01/2024    GLU 86 08/01/2024    CA 9.0 08/01/2024    MG 1.8 08/01/2024       IMPRESSION:   Recurrent upper GI bleed. 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      Chronic atrial fibrillation.    Acute on chronic diastolic heart failure.    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 Patient is status post TAVR procedure for severe aortic stenosis.    Chronic kidney disease stage IIIb.    Remote history of DVT/PE.    Recommendations:      PLAN:  Patient is status post endoscopy no active bleeding noted currently.  Eliquis has been resumed.  Lower extremity edema has resolved.  Discharge planning can be initiated from cardiology standpoint.  Home on po bumex.

## 2024-08-02 NOTE — PLAN OF CARE
Problem: Patient Centered Care  Goal: Patient preferences are identified and integrated in the patient's plan of care  Description: Interventions:  - What would you like us to know as we care for you? \"I am from home with my daughter.\"  - Provide timely, complete, and accurate information to patient/family  - Incorporate patient and family knowledge, values, beliefs, and cultural backgrounds into the planning and delivery of care  - Encourage patient/family to participate in care and decision-making at the level they choose  - Honor patient and family perspectives and choices  Outcome: Progressing     Problem: Patient/Family Goals  Goal: Patient/Family Long Term Goal  Description: Patient's Long Term Goal:  Discharge home     Interventions:  - Monitor vitals  - Monitor labs  - Remote tele   - GI on consult   - Cardiology on consult  - See additional Care Plan goals for specific interventions  Outcome: Progressing  Goal: Patient/Family Short Term Goal  Description: Patient's Short Term Goal: \"to prevent low hgb\"    Interventions:   - Monitor labs  - Monitor vitals  - Replaced PRBCs if Hgb < 7  - Remote tele   - See additional Care Plan goals for specific interventions  Outcome: Progressing     Problem: HEMATOLOGIC - ADULT  Goal: Maintains hematologic stability  Description: INTERVENTIONS  - Assess for signs and symptoms of bleeding or hemorrhage  - Monitor labs and vital signs for trends  - Administer supportive blood products/factors, fluids and medications as ordered and appropriate  - Administer supportive blood products/factors as ordered and appropriate  Outcome: Progressing  Goal: Free from bleeding injury  Description: (Example usage: patient with low platelets)  INTERVENTIONS:  - Avoid intramuscular injections, enemas and rectal medication administration  - Ensure safe mobilization of patient  - Hold pressure on venipuncture sites to achieve adequate hemostasis  - Assess for signs and symptoms of internal  bleeding  - Monitor lab trends  - Patient is to report abnormal signs of bleeding to staff  - Avoid use of toothpicks and dental floss  - Use electric shaver for shaving  - Use soft bristle tooth brush  - Limit straining and forceful nose blowing  Outcome: Progressing     Problem: CARDIOVASCULAR - ADULT  Goal: Maintains optimal cardiac output and hemodynamic stability  Description: INTERVENTIONS:  - Monitor vital signs, rhythm, and trends  - Monitor for bleeding, hypotension and signs of decreased cardiac output  - Evaluate effectiveness of vasoactive medications to optimize hemodynamic stability  - Monitor arterial and/or venous puncture sites for bleeding and/or hematoma  - Assess quality of pulses, skin color and temperature  - Assess for signs of decreased coronary artery perfusion - ex. Angina  - Evaluate fluid balance, assess for edema, trend weights  Outcome: Progressing  Goal: Absence of cardiac arrhythmias or at baseline  Description: INTERVENTIONS:  - Continuous cardiac monitoring, monitor vital signs, obtain 12 lead EKG if indicated  - Evaluate effectiveness of antiarrhythmic and heart rate control medications as ordered  - Initiate emergency measures for life threatening arrhythmias  - Monitor electrolytes and administer replacement therapy as ordered  Outcome: Progressing     Problem: SAFETY ADULT - FALL  Goal: Free from fall injury  Description: INTERVENTIONS:  - Assess pt frequently for physical needs  - Identify cognitive and physical deficits and behaviors that affect risk of falls.  - [place] fall precautions as indicated by assessment.  - Educate pt/family on patient safety including physical limitations  - Instruct pt to call for assistance with activity based on assessment  - Modify environment to reduce risk of injury  - Provide assistive devices as appropriate  - Consider OT/PT consult to assist with strengthening/mobility  - Encourage toileting schedule  Outcome: Progressing     Problem:  DISCHARGE PLANNING  Goal: Discharge to home or other facility with appropriate resources  Description: INTERVENTIONS:  - Identify barriers to discharge w/pt and caregiver  - Include patient/family/discharge partner in discharge planning  - Arrange for needed discharge resources and transportation as appropriate  - Identify discharge learning needs (meds, wound care, etc)  - Arrange for interpreters to assist at discharge as needed  - Consider post-discharge preferences of patient/family/discharge partner  - Complete POLST form as appropriate  - Assess patient's ability to be responsible for managing their own health  - Refer to Case Management Department for coordinating discharge planning if the patient needs post-hospital services based on physician/LIP order or complex needs related to functional status, cognitive ability or social support system  Outcome: Progressing     Problem: PAIN - ADULT  Goal: Verbalizes/displays adequate comfort level or patient's stated pain goal  Description: INTERVENTIONS:  - Encourage pt to monitor pain and request assistance  - Assess pain using appropriate pain scale  - Administer analgesics based on type and severity of pain and evaluate response  - Implement non-pharmacological measures as appropriate and evaluate response  - Consider cultural and social influences on pain and pain management  - Manage/alleviate anxiety  - Utilize distraction and/or relaxation techniques  - Monitor for opioid side effects  - Notify MD/LIP if interventions unsuccessful or patient reports new pain  - Anticipate increased pain with activity and pre-medicate as appropriate  Outcome: Progressing     Problem: RISK FOR INFECTION - ADULT  Goal: Absence of fever/infection during anticipated neutropenic period  Description: INTERVENTIONS  - Monitor WBC  - Administer growth factors as ordered  - Implement neutropenic guidelines  Outcome: Progressing

## 2024-08-02 NOTE — PLAN OF CARE
Problem: Patient Centered Care  Goal: Patient preferences are identified and integrated in the patient's plan of care  Description: Interventions:  - What would you like us to know as we care for you? \"I am from home with my daughter.\"  - Provide timely, complete, and accurate information to patient/family  - Incorporate patient and family knowledge, values, beliefs, and cultural backgrounds into the planning and delivery of care  - Encourage patient/family to participate in care and decision-making at the level they choose  - Honor patient and family perspectives and choices  8/1/2024 2103 by Jean-Pierre Viera  Outcome: Progressing  8/1/2024 2103 by Jean-Pierre Viera  Outcome: Progressing     Problem: Patient/Family Goals  Goal: Patient/Family Long Term Goal  Description: Patient's Long Term Goal:  To discharge from hospital     Interventions:  - Monitor vital signs   - Monitor appropriate labs   - Pain management   - Administer medications per order   - Follow MD orders   - Diagnostics per order   - Update/inform patient and family on plan of care   - Discharge planning    - GI on consult   - Cardiology on consult  - See additional Care Plan goals for specific interventions  8/1/2024 2103 by Jean-Pierre Viera  Outcome: Progressing  8/1/2024 2103 by Jean-Pierre Viera  Outcome: Progressing  Goal: Patient/Family Short Term Goal  Description: Patient's Short Term Goal: To have stable Hemoglobin     Interventions:   - Monitor vital signs   - Monitor appropriate labs   - Pain management   - Administer medications per order   - Follow MD orders   - Diagnostics per order   - Update/inform patient and family on plan of care   - See additional Care Plan goals for specific interventions  8/1/2024 2103 by Jean-Pierre Viera  Outcome: Progressing  8/1/2024 2103 by Jean-Pierre Viera  Outcome: Progressing     Problem: HEMATOLOGIC - ADULT  Goal: Maintains hematologic stability  Description: INTERVENTIONS  - Assess for signs and  symptoms of bleeding or hemorrhage  - Monitor labs and vital signs for trends  - Administer supportive blood products/factors, fluids and medications as ordered and appropriate  - Administer supportive blood products/factors as ordered and appropriate  8/1/2024 2103 by Jean-Pierre Viera  Outcome: Progressing  8/1/2024 2103 by Jean-Pierre Viera  Outcome: Progressing  Goal: Free from bleeding injury  Description: (Example usage: patient with low platelets)  INTERVENTIONS:  - Avoid intramuscular injections, enemas and rectal medication administration  - Ensure safe mobilization of patient  - Hold pressure on venipuncture sites to achieve adequate hemostasis  - Assess for signs and symptoms of internal bleeding  - Monitor lab trends  - Patient is to report abnormal signs of bleeding to staff  - Avoid use of toothpicks and dental floss  - Use electric shaver for shaving  - Use soft bristle tooth brush  - Limit straining and forceful nose blowing  8/1/2024 2103 by Jean-Pierre Viera  Outcome: Progressing  8/1/2024 2103 by Jean-Pierre Viera  Outcome: Progressing     Problem: SAFETY ADULT - FALL  Goal: Free from fall injury  Description: INTERVENTIONS:  - Assess pt frequently for physical needs  - Identify cognitive and physical deficits and behaviors that affect risk of falls.  - Parnell fall precautions as indicated by assessment.  - Educate pt/family on patient safety including physical limitations  - Instruct pt to call for assistance with activity based on assessment  - Modify environment to reduce risk of injury  - Provide assistive devices as appropriate  - Consider OT/PT consult to assist with strengthening/mobility  - Encourage toileting schedule  8/1/2024 2103 by Jean-Pierre Viera  Outcome: Progressing  8/1/2024 2103 by Jean-Pierre Viera  Outcome: Progressing     Problem: DISCHARGE PLANNING  Goal: Discharge to home or other facility with appropriate resources  Description: INTERVENTIONS:  - Identify barriers to  discharge w/pt and caregiver  - Include patient/family/discharge partner in discharge planning  - Arrange for needed discharge resources and transportation as appropriate  - Identify discharge learning needs (meds, wound care, etc)  - Arrange for interpreters to assist at discharge as needed  - Consider post-discharge preferences of patient/family/discharge partner  - Complete POLST form as appropriate  - Assess patient's ability to be responsible for managing their own health  - Refer to Case Management Department for coordinating discharge planning if the patient needs post-hospital services based on physician/LIP order or complex needs related to functional status, cognitive ability or social support system  8/1/2024 2103 by Jean-Pierre Viera  Outcome: Progressing  8/1/2024 2103 by Jean-Pierre Viera  Outcome: Progressing     Problem: CARDIOVASCULAR - ADULT  Goal: Maintains optimal cardiac output and hemodynamic stability  Description: INTERVENTIONS:  - Monitor vital signs, rhythm, and trends  - Monitor for bleeding, hypotension and signs of decreased cardiac output  - Evaluate effectiveness of vasoactive medications to optimize hemodynamic stability  - Monitor arterial and/or venous puncture sites for bleeding and/or hematoma  - Assess quality of pulses, skin color and temperature  - Assess for signs of decreased coronary artery perfusion - ex. Angina  - Evaluate fluid balance, assess for edema, trend weights  8/1/2024 2103 by Jean-Pierre Viera  Outcome: Progressing  8/1/2024 2103 by Jean-Pierre Viera  Outcome: Progressing  Goal: Absence of cardiac arrhythmias or at baseline  Description: INTERVENTIONS:  - Continuous cardiac monitoring, monitor vital signs, obtain 12 lead EKG if indicated  - Evaluate effectiveness of antiarrhythmic and heart rate control medications as ordered  - Initiate emergency measures for life threatening arrhythmias  - Monitor electrolytes and administer replacement therapy as  ordered  8/1/2024 2103 by Jean-Pierre Viera  Outcome: Progressing  8/1/2024 2103 by Jean-Pierre Viera  Outcome: Progressing     Monitoring vital signs, stable at this time. No acute changes at this moment. Fall precautions in place- bed alarm on, bed locked in lowest position, call light within reach. Frequent rounding by nursing staff.

## 2024-08-05 ENCOUNTER — PATIENT OUTREACH (OUTPATIENT)
Dept: CASE MANAGEMENT | Age: 79
End: 2024-08-05

## 2024-08-05 ENCOUNTER — TELEPHONE (OUTPATIENT)
Dept: FAMILY MEDICINE CLINIC | Facility: CLINIC | Age: 79
End: 2024-08-05

## 2024-08-05 DIAGNOSIS — D64.9 ANEMIA, UNSPECIFIED TYPE: ICD-10-CM

## 2024-08-05 DIAGNOSIS — Z02.9 ENCOUNTERS FOR UNSPECIFIED ADMINISTRATIVE PURPOSE: Primary | ICD-10-CM

## 2024-08-05 DIAGNOSIS — K62.5 RECTAL BLEEDING: ICD-10-CM

## 2024-08-05 PROCEDURE — 1111F DSCHRG MED/CURRENT MED MERGE: CPT

## 2024-08-05 NOTE — TELEPHONE ENCOUNTER
Spoke to patient for TCM today.  Patient does not have an appointment scheduled at this time.  TCM appointment recommended by 8/9/2024 as patient is a High risk for readmission.  Please advise.    NCM Attempted to schedule PCP office TCM appointment with patient, Patient was a patient of Dr Layne Anne, she is to establish with  Dr Elly Antonio. Due to MD's full schedule message sent to MD's office to request assistance scheduling appointment.    BOOK BY DATE (last date for TCM): 8/16/2024    Clinical staff:  Please notify Dr Antonio of the above and find out where she can fit the patient in for a TCM appointment within the TCM timeframe. Then please call the patient to schedule a TCM appointment within the TCM timeframe.     Thank you!

## 2024-08-05 NOTE — PROGRESS NOTES
Initial Post Discharge Follow Up   Discharge Date: 8/2/24  Contact Date: 8/5/2024    Consent Verification:  Assessment Completed With: Patient  HIPAA Verified?  Yes    Discharge Dx:   Rectal bleeding  Anemia,unspecified type    General:   How have you been since your discharge from the hospital? Patient states she is doing fine. Patient denies fever/chills, no shortness of breath, no chest pain, has occasional coughing sometimes productive especially after drinking juice or eating, patient states she coughs up the juice or food. NCM reviewed with patient that she should eat sitting up straight and stay upright for a couple of hours after eating to help avoid reflux. Patient denies dizziness, states she gets lightheaded, it comes and goes, patient reports this is not new it has been happening for a long time. Patient reminded to stand up slowly and stay hydrated up to any fluid restriction. Patient denies abdominal pain no nausea/vomiting, no further rectal bleeding, no dark tarry stools. NCM instructed patient to change positions frequently, walk as tolerated, rest when needed, stay hydrated and continue to take up to ten deep breaths an hour while awake, or if watching television take a deep breath with every commercial. NCM reviewed discharge instructions, medications, S&S of infection/blood clots with patient, she verbalized understanding of these. Patient denies any further questions or needs at this time.  Do you have any pain since discharge?  No    When you were leaving the hospital were your discharge instructions reviewed with you? Yes  How well were your discharge instructions explained to you?   On a scale of 1-5   1- Very Poor and 5- Very well   Very Well  Do you have any questions about your discharge instructions?  No  Before leaving the hospital was your diagnoses explained to you? Yes  Do you have any questions about your diagnoses? No  Are you able to perform normal daily activities of living as you  have prior to your hospital stay (dressing, bathing, ambulating to the bathroom, etc)? yes  (NCM) Was patient given a different diet per AVS? no, NCM did encourage patient to sit up after eating for a couple of hours to help prevent reflux.      Medications:   CHANGE how you take:  metoprolol succinate ER (Toprol XL)  spironolactone (Aldactone)  Review your updated medication list below.  Current Outpatient Medications   Medication Sig Dispense Refill    metoprolol succinate ER 50 MG Oral Tablet 24 Hr Take 1 tablet (50 mg total) by mouth Daily Beta Blocker. 30 tablet 0    spironolactone 25 MG Oral Tab Take 1 tablet (25 mg total) by mouth daily. 30 tablet 0    pantoprazole 40 MG Oral Tab EC Take 1 tablet (40 mg total) by mouth 2 (two) times daily before meals. 60 tablet 3    bumetanide 1 MG Oral Tab Take 1 tablet (1 mg total) by mouth BID (Diuretic). 60 tablet 0    sertraline 100 MG Oral Tab Take 1 tablet (100 mg total) by mouth daily. 90 tablet 3    atorvastatin 10 MG Oral Tab Take 1 tablet (10 mg total) by mouth nightly. 90 tablet 3    FARXIGA 10 MG Oral Tab Take 1 tablet (10 mg total) by mouth daily.      albuterol 108 (90 Base) MCG/ACT Inhalation Aero Soln SHAKE BEFORE USE AND TAKE 2 PUFFS INTO THE LUNGS EVERY 6 HOURS AS NEEDED      folic acid 1 MG Oral Tab Take 1 tablet (1 mg total) by mouth daily. 90 tablet 3    cyanocobalamin 1000 MCG Oral Tab Take 1 tablet (1,000 mcg total) by mouth daily.      ELIQUIS 5 MG Oral Tab Take 1 tablet (5 mg total) by mouth 2 (two) times daily. 180 tablet 0     Were there any changes to your current medication(s) noted on the AVS? Yes  If so, were these medication changes discussed with you prior to leaving the hospital? Yes  Let's go over your medications together to make sure we are not missing anything. Medications Reviewed  Are there any reasons that keep you from taking your medication as prescribed? No  Are you having any concerns with constipation? No    Discharge  medications reviewed/discussed/and reconciled against outpatient medications with patient.  Any changes or updates to medications sent to PCP.  Patient Acknowledged     Referrals/orders at D/C:  Referrals/orders placed at D/C? no    DME ordered at D/C? No      Discharge orders, AVS reviewed and discussed with patient. Any changes or updates to orders sent to PCP.  Patient Acknowledged      SDOH:   Transportation:    Transportation Needs: No Transportation Needs (7/26/2024)    Transportation Needs     Lack of Transportation: No     Car Seat: Not on file       Financial Strain:   Financial Resource Strain: Low Risk  (3/14/2023)    Financial Resource Strain     Difficulty of Paying Living Expenses: Not very hard     Med Affordability: No          Diagnosis specifics:   When you have GI Bleeding  Call 911 if any of the following occur:   Bleeding from your mouth or anus that can't be stopped  Bleeding along with feeling lightheaded or dizzy    Call your healthcare provider right away if you have any of the following:   Fever of 100.4° F ( 38.0°) or above, or as directed by your provider  Signs of fluid loss (dehydration). These include a dry, sticky mouth, decreased urine output, and very dark urine.  Belly (abdominal) pain      Follow up appointments:      TCC  Was TCC ordered: No      PCP (If no TCC appointment)  Does patient already have a PCP appointment scheduled? No  NCM Attempted to schedule PCP office TCM appointment with patient   If no appointment scheduled: Explain, Patient was a patient of Dr Layne Anne, she is to establish with  Dr Elly Antonio. Due to MD's full schedule message sent to MD's office to request assistance scheduling appointment.    Specialist    Does the patient have any other follow up appointment(s) needing to be scheduled? No  Does the patient need assistance scheduling appointment(s): No    Is there any reason as to why you cannot make your appointment(s)?  No     Needs post D/C:    Now that you are home, are there any needs or concerns you need addressed before your next visit with your PCP?  (DME, meds, questions, etc.): No    Interventions by NCM:   NCM reviewed medications, discharge instructions, S&S of infection/blood clots. Patient instructed to report any new or worsening symptoms, when to call the doctor and when to call 911. Educated patient on the importance of taking all medications as prescribed as well as close F/U with PCP/Specialists. Patient verbalized understanding of these. NCM instructed patient to call PCP with any questions or needs, she states she will.      [facility] referral placed:  Yes    BOOK BY DATE: 8/16/2024

## 2024-08-06 NOTE — TELEPHONE ENCOUNTER
Called patient, no answer. Left a VM to call back.    Please schedule patient with Dr Antonio for a TCM appt. Dr Antonio has opening for 8/14 and 8/15.

## 2024-08-08 ENCOUNTER — OFFICE VISIT (OUTPATIENT)
Dept: INTERNAL MEDICINE CLINIC | Facility: CLINIC | Age: 79
End: 2024-08-08
Payer: MEDICARE

## 2024-08-08 VITALS
DIASTOLIC BLOOD PRESSURE: 66 MMHG | HEART RATE: 92 BPM | WEIGHT: 195.13 LBS | RESPIRATION RATE: 18 BRPM | SYSTOLIC BLOOD PRESSURE: 136 MMHG | BODY MASS INDEX: 31.36 KG/M2 | HEIGHT: 66 IN

## 2024-08-08 DIAGNOSIS — N18.32 STAGE 3B CHRONIC KIDNEY DISEASE (HCC): ICD-10-CM

## 2024-08-08 DIAGNOSIS — D50.8 IRON DEFICIENCY ANEMIA SECONDARY TO INADEQUATE DIETARY IRON INTAKE: ICD-10-CM

## 2024-08-08 DIAGNOSIS — I10 ESSENTIAL HYPERTENSION: ICD-10-CM

## 2024-08-08 DIAGNOSIS — I48.11 LONGSTANDING PERSISTENT ATRIAL FIBRILLATION (HCC): ICD-10-CM

## 2024-08-08 DIAGNOSIS — I50.9 CONGESTIVE HEART FAILURE, UNSPECIFIED HF CHRONICITY, UNSPECIFIED HEART FAILURE TYPE (HCC): ICD-10-CM

## 2024-08-08 DIAGNOSIS — K92.2 UPPER GI BLEED: Primary | ICD-10-CM

## 2024-08-08 PROCEDURE — 99495 TRANSJ CARE MGMT MOD F2F 14D: CPT | Performed by: INTERNAL MEDICINE

## 2024-08-08 NOTE — PROGRESS NOTES
Yamini Ocampo is a 79 year old female.  Chief Complaint   Patient presents with    TCM (Transition Of Care Management)     HPI:    Patient presented today for hospital follow up.    The patient is an 79-year-old  female with underlying valvular heart disease status post TAVR procedure and left ventricular diastolic dysfunction who presented to the emergency department after she had a blood test done as an outpatient showing hemoglobin of 5.9 which is the lower baseline usually runs 7.5 to 8. Patient was initiated in the emergency room on IV Bumex and IV Protonix, she underwent EGD on 7/29 with finding GAVE s/p APC. Discharged with PO bid protonix, but she states that she has been taking it only once a day and feels like her acid reflux has been worst today. No further blood in stool or dark stool. Has diarrhea which according to patient is chronic. No nausea or vomiting.    States has bilateral ankle edema, did not take her bumex today. No shortness of breath or chest pain.   Hospital chart reviewed .      Current Outpatient Medications   Medication Sig Dispense Refill    metoprolol succinate ER 50 MG Oral Tablet 24 Hr Take 1 tablet (50 mg total) by mouth Daily Beta Blocker. 30 tablet 0    spironolactone 25 MG Oral Tab Take 1 tablet (25 mg total) by mouth daily. 30 tablet 0    pantoprazole 40 MG Oral Tab EC Take 1 tablet (40 mg total) by mouth 2 (two) times daily before meals. 60 tablet 3    bumetanide 1 MG Oral Tab Take 1 tablet (1 mg total) by mouth BID (Diuretic). 60 tablet 0    sertraline 100 MG Oral Tab Take 1 tablet (100 mg total) by mouth daily. 90 tablet 3    atorvastatin 10 MG Oral Tab Take 1 tablet (10 mg total) by mouth nightly. 90 tablet 3    FARXIGA 10 MG Oral Tab Take 1 tablet (10 mg total) by mouth daily.      folic acid 1 MG Oral Tab Take 1 tablet (1 mg total) by mouth daily. 90 tablet 3    cyanocobalamin 1000 MCG Oral Tab Take 1 tablet (1,000 mcg total) by mouth daily.      metRONIDAZOLE 500  MG Oral Tab Take 1 tablet (500 mg total) by mouth 3 (three) times daily for 10 days. 30 tablet 0    ciprofloxacin 500 MG Oral Tab Take 1 tablet (500 mg total) by mouth 2 (two) times daily for 10 days. 20 tablet 0    ondansetron 4 MG Oral Tablet Dispersible Take 1 tablet (4 mg total) by mouth every 4 (four) hours as needed for Nausea. 10 tablet 0    albuterol 108 (90 Base) MCG/ACT Inhalation Aero Soln SHAKE BEFORE USE AND TAKE 2 PUFFS INTO THE LUNGS EVERY 6 HOURS AS NEEDED (Patient not taking: Reported on 8/8/2024)      ELIQUIS 5 MG Oral Tab Take 1 tablet (5 mg total) by mouth 2 (two) times daily. (Patient not taking: Reported on 8/8/2024) 180 tablet 0      Past Medical History:    Arrhythmia    afib    Back problem    lumbar disc disease    Basal cell carcinoma of nose    Bilateral carpal tunnel syndrome    Cataract    Cellulitis    Deep vein thrombosis (HCC)    unsure which leg, possibly left    Depression    Esophageal reflux    Essential hypertension    Heart disease    Heel spur    Right    Hemorrhoids    Hiatal hernia    High blood pressure    High cholesterol    Hyperlipidemia    Incontinence    Lumbar disc disease    Multiple gastric ulcers    Osteoarthritis    Pulmonary embolism (HCC)    Raynaud disease    Renal disorder    Tubular adenoma of colon    repeat c-scope 1/2020    Type 2 diabetes mellitus with diabetic chronic kidney disease (HCC)    Visual impairment    glasses      Past Surgical History:   Procedure Laterality Date    Angioplasty (coronary)      Appendectomy      Arthroscopy of joint unlisted Right     knee    Bso, omentectomy w/sushil      Carpal tunnel release Bilateral     Cholecystectomy  09/28/2016    Colonoscopy N/A 01/25/2017    Procedure: COLONOSCOPY;  Surgeon: KATHARINE Prakash MD;  Location: [facility] ENDOSCOPY    Colonoscopy N/A 10/07/2020    Procedure: COLONOSCOPY;  Surgeon: KATHARINE Prakash MD;  Location: [facility] ENDOSCOPY    Colonoscopy N/A 7/31/2024    Procedure: COLONOSCOPY;  Surgeon: Ma,  Alyssa Johnston MD;  Location: [facility] ENDOSCOPY    Egd N/A 2016    Procedure: ESOPHAGOGASTRODUODENOSCOPY (EGD);  Surgeon: KATHARIEN Prakash MD;  Location: [facility] ENDOSCOPY    Egd N/A 2024    ; gastritis    Egd N/A 2024    ; retained food    Electrocardiogram, complete  2013    Scanned to Media Tab    Excision of nose      Basal cell carcinoma    Hernia surgery      Hysterectomy      Knee replacement surgery Right     Other surgical history      Injections and narcotics, POD Bartuci    Removal of heel spur      Total abdom hysterectomy        Social History:  Social History     Socioeconomic History    Marital status:    Tobacco Use    Smoking status: Former     Current packs/day: 0.00     Types: Cigarettes     Quit date: 1997     Years since quittin.6    Smokeless tobacco: Never   Vaping Use    Vaping status: Never Used   Substance and Sexual Activity    Alcohol use: No     Comment: rare    Drug use: Never     Comment: edibles occasionally for sleep   Other Topics Concern    Caffeine Concern Yes     Comment: 8 cups coffee/sod daily    Reaction to local anesthetic No     Social Determinants of Health     Financial Resource Strain: Low Risk  (3/14/2023)    Financial Resource Strain     Difficulty of Paying Living Expenses: Not very hard     Med Affordability: No   Food Insecurity: No Food Insecurity (2024)    Food Insecurity     Food Insecurity: Never true   Transportation Needs: No Transportation Needs (2024)    Transportation Needs     Lack of Transportation: No   Housing Stability: Low Risk  (2024)    Housing Stability     Housing Instability: No      Family History   Problem Relation Age of Onset    Cancer Father         Skin cancer    Other (Other) Father 97        old age,unknown caused    Heart Attack Mother     Heart Disorder Mother     Hypertension Other         Family H/O    Cancer Other         Lymphoma  Family H/O    Heart Disease Other          Family H/O    Arthritis Other         Close relative  unsure which type    No Known Problems Brother       Allergies   Allergen Reactions    Adhesive Tape RASH     Skin off        REVIEW OF SYSTEMS:   Review of Systems   Review of Systems   Constitutional: Negative for activity change, appetite change and fever.   HENT: Negative for congestion and voice change.    Respiratory: Negative for cough and shortness of breath.    Cardiovascular: Negative for chest pain.   Gastrointestinal: Negative for abdominal distention, abdominal pain and vomiting.   Genitourinary: Negative for hematuria.   Skin: Negative for wound.   Psychiatric/Behavioral: Negative for behavioral problems.   Wt Readings from Last 5 Encounters:   08/12/24 180 lb (81.6 kg)   08/08/24 195 lb 1.6 oz (88.5 kg)   08/02/24 180 lb (81.6 kg)   07/26/24 206 lb (93.4 kg)   06/28/24 195 lb 9.6 oz (88.7 kg)     Body mass index is 31.49 kg/m².      EXAM:   /66   Pulse 92   Resp 18   Ht 5' 6\" (1.676 m)   Wt 195 lb 1.6 oz (88.5 kg)   BMI 31.49 kg/m²   Physical Exam   Constitutional:       Appearance: Normal appearance.   HENT:      Head: Normocephalic.   Eyes:      Conjunctiva/sclera: Conjunctivae normal.   Cardiovascular:      Rate and Rhythm: Normal rate and regular rhythm.      Heart sounds: Normal heart sounds. No murmur heard.  Pulmonary:      Effort: Pulmonary effort is normal.      Breath sounds: Normal breath sounds. No rhonchi or rales.   Abdominal:      General: Bowel sounds are normal.      Palpations: Abdomen is soft.      Tenderness: There is no abdominal tenderness.   Musculoskeletal:      Cervical back: Neck supple.      Right lower leg: No edema.      Left lower leg: No edema.   Skin:     General: Skin is warm and dry.   Neurological:      General: No focal deficit present.      Mental Status: He is alert and oriented to person, place, and time. Mental status is at baseline.   Psychiatric:         Mood and Affect: Mood normal.          Behavior: Behavior normal.       ASSESSMENT AND PLAN:   1. Upper GI bleed  2. Iron deficiency anemia secondary to inadequate dietary iron intake  - s/p EGD with findings og GAVE s/p APC on 7/29  - monitor hemoglobin. Will repeat in couple weeks  - continue PPI. Increase to bid  - no dark stool or nausea/vomiting per patient    3. Longstanding persistent atrial fibrillation (HCC)  - stable  - eliquis restarted  - follow up Dr. Corado    4. Congestive heart failure, unspecified HF chronicity, unspecified heart failure type (HCC)  - continue farxiga  - continue bumex  - patient to follow up with cards    5. Stage 3b chronic kidney disease (HCC)  - stable    6. Essential hypertension  - continue metoprolol      The patient indicates understanding of these issues and agrees to the plan.      Elly Antonio MD

## 2024-08-09 NOTE — PAYOR COMM NOTE
--------------  DISCHARGE REVIEW    Payor: [employer] CHOICE/HMO/POS/EPO  Subscriber #:  [id number]  Authorization Number: [id number]    Admit date: 24  Admit time:   8:15 PM  Discharge Date: 2024  1:08 PM       [facility]  part of [facility]    Discharge Summary    Yamini Ocampo Patient Status:  Inpatient    1945 MRN [id number]   Location [facility] 5SW/SE Attending Mandy Vazquez MD   Hosp Day # 7 PCP Kathy Anne MD     Date of Admission: 2024      Date of Discharge: 24      Admitting Diagnosis: Rectal bleeding [K62.5]  Anemia, unspecified type [D64.9]    Hospital Discharge Diagnoses:  GI bleed, CHF    Lace+ Score: 83  59-90 High Risk  29-58 Medium Risk  0-28   Low Risk.    TCM Follow-Up Recommendation:  LACE > 58: High Risk of readmission after discharge from the hospital.          Problem List:   Patient Active Problem List   Diagnosis    Congestive heart failure (HCC)    Major depressive disorder, single episode, moderate ([facility]ly [facility])    Class 2 severe obesity due to excess calories with serious comorbidity and body mass index (BMI) of 39.0 to 39.9 in adult (HCC)    Peripheral vascular disease (HCC)    Sleep apnea    Vitamin D deficiency    Essential hypertension    Gastric erythema    Ventral hernia without obstruction or gangrene    Hiatal hernia with GERD and esophagitis    Gastroesophageal reflux disease without esophagitis    A-fib (HCC)    Polyp of colon    Mesenteric ischemia, chronic (HCC)    CKD (chronic kidney disease) stage 3, GFR 30-59 ml/min (HCC)    History of ischemic colitis    Osteoarthritis of right knee    Osteoarthritis    Raynaud's disease without gangrene    Osteoarthritis of left knee    Diverticulosis of colon    Prediabetes    CREST syndrome (HCC)    Type 2 diabetes mellitus with stage 3b chronic kidney disease, without long-term current use of insulin (HCC)    Macrocytic anemia    Hyperpigmented skin lesion     Anemia due to vitamin B12 deficiency    Type 2 diabetes mellitus with diabetic chronic kidney disease (HCC)    Acute on chronic congestive heart failure, unspecified heart failure type (HCC)    Anemia, unspecified    Iron deficiency anemia secondary to inadequate dietary iron intake    Dyspepsia    Intestinal metaplasia of gastric mucosa    Anemia, unspecified type    GI bleed    Rectal bleeding         Physical Exam:     Gen: No acute distress  Pulm: Lungs clear, normal respiratory effort  CV: Heart with regular rate and rhythm  Abd: Abdomen soft, nontender, nondistended, bowel sounds present  Neuro: No acute focal deficits  MSK: moves extremities  Skin: Warm and dry  Psych: Normal affect  Ext: no c/c/e      History of Present Illness: Per admit MD    The patient is an 79-year-old  female with underlying valvular heart disease status post TAVR procedure and left ventricular diastolic dysfunction who presented to the emergency department after she had a blood test done as an outpatient showing hemoglobin of 5.9 which is the lower baseline usually runs 7.5 to 8. Chemistry profile is still pending. Patient was initiated in the emergency room on IV Bumex and IV Protonix, and she will be admitted to the hospital for further management.     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 No APC was done at that time secondary to food particles.   - this admission received 1U pRBC transfusion with appropriate response  - s/p EGD 7/27 unsuccessful due to retention of food in esophagus  - GI following  - s/p repeat EGD 7/29 with findings of GAVE s/p APC  - cont PPI IV BID  - hold further eliquis anticoag for now  - transfuse 1 unit prbc today  - s/p colonoscopy + VCE 8/1 - no acute findings in cscope except diverticulosis, await  VCE results - negative for acute findings     Chronic atrial fibrillation anticoagulated with Eliquis.  - per gi ok to restart eliquis     Acute on chronic diastolic heart failure  - cardiology consulted  - Admission BNP >1200  - pt started on IV Bumex , bumex held as she appeared dry - resume per cards today  - Farxiga on discharge      TATIANA on CKD stage 3   - improved, monitor     Hypernatremia  - likely dry - improved     Chronic Medical Problems:   GERD  Essential hypertension.    Discharge Condition: Stable    Discharge Medications:      Discharge Medications        CHANGE how you take these medications        Instructions Prescription details   metoprolol succinate ER 50 MG Tb24  Commonly known as: Toprol XL  What changed: when to take this      Take 1 tablet (50 mg total) by mouth Daily Beta Blocker.   Quantity: 30 tablet  Refills: 0     spironolactone 25 MG Tabs  Commonly known as: Aldactone  What changed: how much to take      Take 1 tablet (25 mg total) by mouth daily.   Quantity: 30 tablet  Refills: 0            CONTINUE taking these medications        Instructions Prescription details   albuterol 108 (90 Base) MCG/ACT Aers  Commonly known as: Ventolin HFA      SHAKE BEFORE USE AND TAKE 2 PUFFS INTO THE LUNGS EVERY 6 HOURS AS NEEDED   Refills: 0     atorvastatin 10 MG Tabs  Commonly known as: Lipitor      Take 1 tablet (10 mg total) by mouth nightly.   Quantity: 90 tablet  Refills: 3     bumetanide 1 MG Tabs  Commonly known as: Bumex      Take 1 tablet (1 mg total) by mouth BID (Diuretic).   Quantity: 60 tablet  Refills: 0     cyanocobalamin 1000 MCG Tabs  Commonly known as: Vitamin B12      Take 1 tablet (1,000 mcg total) by mouth daily.   Refills: 0     Eliquis 5 MG Tabs  Generic drug: apixaban      Take 1 tablet (5 mg total) by mouth 2 (two) times daily.   Quantity: 180 tablet  Refills: 0     Farxiga 10 MG Tabs  Generic drug: dapagliflozin      Take 1 tablet (10 mg total) by mouth daily.   Refills: 0      folic acid 1 MG Tabs  Commonly known as: Folvite      Take 1 tablet (1 mg total) by mouth daily.   Quantity: 90 tablet  Refills: 3     pantoprazole 40 MG Tbec  Commonly known as: Protonix      Take 1 tablet (40 mg total) by mouth 2 (two) times daily before meals.   Quantity: 60 tablet  Refills: 3     sertraline 100 MG Tabs  Commonly known as: Zoloft      Take 1 tablet (100 mg total) by mouth daily.   Quantity: 90 tablet  Refills: 3               Where to Get Your Medications        [id number] - [place], [place] - [address] AT [facility] [place], [phone], [phone]  [address], [facility] [address]      Phone: [phone]   metoprolol succinate ER 50 MG Tb24  spironolactone 25 MG Tabs             Mandy Vazquez MD  8/2/2024  11:40 AM    Greater than 30 minutes spent on preparation and coordination of this discharge    Electronically signed by Mandy Vazquez MD on 8/2/2024 11:42 AM         REVIEWER COMMENTS

## 2024-08-12 ENCOUNTER — HOSPITAL ENCOUNTER (EMERGENCY)
Facility: HOSPITAL | Age: 79
Discharge: HOME | End: 2024-08-12
Attending: EMERGENCY MEDICINE
Payer: MEDICARE

## 2024-08-12 ENCOUNTER — TELEPHONE (OUTPATIENT)
Facility: CLINIC | Age: 79
End: 2024-08-12

## 2024-08-12 ENCOUNTER — APPOINTMENT (OUTPATIENT)
Dept: CT IMAGING | Facility: HOSPITAL | Age: 79
End: 2024-08-12
Attending: EMERGENCY MEDICINE
Payer: MEDICARE

## 2024-08-12 VITALS
DIASTOLIC BLOOD PRESSURE: 75 MMHG | SYSTOLIC BLOOD PRESSURE: 146 MMHG | BODY MASS INDEX: 29 KG/M2 | OXYGEN SATURATION: 95 % | TEMPERATURE: 98 F | WEIGHT: 180 LBS | RESPIRATION RATE: 22 BRPM | HEART RATE: 120 BPM

## 2024-08-12 DIAGNOSIS — A09 DIARRHEA OF INFECTIOUS ORIGIN: Primary | ICD-10-CM

## 2024-08-12 LAB
ALBUMIN SERPL-MCNC: 3.9 G/DL (ref 3.2–4.8)
ALP LIVER SERPL-CCNC: 96 U/L
ALT SERPL-CCNC: <7 U/L
ANION GAP SERPL CALC-SCNC: 10 MMOL/L (ref 0–18)
APTT PPP: 29 SECONDS (ref 23–36)
AST SERPL-CCNC: 23 U/L (ref ?–34)
BASOPHILS # BLD AUTO: 0.02 X10(3) UL (ref 0–0.2)
BASOPHILS NFR BLD AUTO: 0.3 %
BILIRUB DIRECT SERPL-MCNC: 0.5 MG/DL (ref ?–0.3)
BILIRUB SERPL-MCNC: 0.9 MG/DL (ref 0.2–1.1)
BUN BLD-MCNC: 34 MG/DL (ref 9–23)
BUN/CREAT SERPL: 21.8 (ref 10–20)
CALCIUM BLD-MCNC: 9 MG/DL (ref 8.7–10.4)
CHLORIDE SERPL-SCNC: 111 MMOL/L (ref 98–112)
CO2 SERPL-SCNC: 21 MMOL/L (ref 21–32)
CREAT BLD-MCNC: 1.56 MG/DL
DEPRECATED RDW RBC AUTO: 69.9 FL (ref 35.1–46.3)
EGFRCR SERPLBLD CKD-EPI 2021: 33 ML/MIN/1.73M2 (ref 60–?)
EOSINOPHIL # BLD AUTO: 0.07 X10(3) UL (ref 0–0.7)
EOSINOPHIL NFR BLD AUTO: 1.1 %
ERYTHROCYTE [DISTWIDTH] IN BLOOD BY AUTOMATED COUNT: 20.1 % (ref 11–15)
GLUCOSE BLD-MCNC: 84 MG/DL (ref 70–99)
HCT VFR BLD AUTO: 29.2 %
HGB BLD-MCNC: 8.8 G/DL
IMM GRANULOCYTES # BLD AUTO: 0.01 X10(3) UL (ref 0–1)
IMM GRANULOCYTES NFR BLD: 0.2 %
INR BLD: 1.31 (ref 0.8–1.2)
LIPASE SERPL-CCNC: 26 U/L (ref 12–53)
LYMPHOCYTES # BLD AUTO: 0.61 X10(3) UL (ref 1–4)
LYMPHOCYTES NFR BLD AUTO: 9.9 %
MAGNESIUM SERPL-MCNC: 1.8 MG/DL (ref 1.6–2.6)
MCH RBC QN AUTO: 30 PG (ref 26–34)
MCHC RBC AUTO-ENTMCNC: 30.1 G/DL (ref 31–37)
MCV RBC AUTO: 99.7 FL
MONOCYTES # BLD AUTO: 0.58 X10(3) UL (ref 0.1–1)
MONOCYTES NFR BLD AUTO: 9.4 %
NEUTROPHILS # BLD AUTO: 4.85 X10 (3) UL (ref 1.5–7.7)
NEUTROPHILS # BLD AUTO: 4.85 X10(3) UL (ref 1.5–7.7)
NEUTROPHILS NFR BLD AUTO: 79.1 %
OSMOLALITY SERPL CALC.SUM OF ELEC: 301 MOSM/KG (ref 275–295)
PLATELET # BLD AUTO: 307 10(3)UL (ref 150–450)
PLATELET MORPHOLOGY: NORMAL
POTASSIUM SERPL-SCNC: 4.5 MMOL/L (ref 3.5–5.1)
PROT SERPL-MCNC: 6.9 G/DL (ref 5.7–8.2)
PROTHROMBIN TIME: 17.1 SECONDS (ref 11.6–14.8)
RBC # BLD AUTO: 2.93 X10(6)UL
SODIUM SERPL-SCNC: 142 MMOL/L (ref 136–145)
WBC # BLD AUTO: 6.1 X10(3) UL (ref 4–11)

## 2024-08-12 PROCEDURE — 80048 BASIC METABOLIC PNL TOTAL CA: CPT | Performed by: EMERGENCY MEDICINE

## 2024-08-12 PROCEDURE — 99285 EMERGENCY DEPT VISIT HI MDM: CPT

## 2024-08-12 PROCEDURE — 85025 COMPLETE CBC W/AUTO DIFF WBC: CPT | Performed by: EMERGENCY MEDICINE

## 2024-08-12 PROCEDURE — 80076 HEPATIC FUNCTION PANEL: CPT | Performed by: EMERGENCY MEDICINE

## 2024-08-12 PROCEDURE — 96361 HYDRATE IV INFUSION ADD-ON: CPT

## 2024-08-12 PROCEDURE — 83690 ASSAY OF LIPASE: CPT | Performed by: EMERGENCY MEDICINE

## 2024-08-12 PROCEDURE — 74177 CT ABD & PELVIS W/CONTRAST: CPT | Performed by: EMERGENCY MEDICINE

## 2024-08-12 PROCEDURE — 85730 THROMBOPLASTIN TIME PARTIAL: CPT | Performed by: EMERGENCY MEDICINE

## 2024-08-12 PROCEDURE — 83735 ASSAY OF MAGNESIUM: CPT | Performed by: EMERGENCY MEDICINE

## 2024-08-12 PROCEDURE — 99284 EMERGENCY DEPT VISIT MOD MDM: CPT

## 2024-08-12 PROCEDURE — 93010 ELECTROCARDIOGRAM REPORT: CPT

## 2024-08-12 PROCEDURE — 85610 PROTHROMBIN TIME: CPT | Performed by: EMERGENCY MEDICINE

## 2024-08-12 PROCEDURE — 96374 THER/PROPH/DIAG INJ IV PUSH: CPT

## 2024-08-12 PROCEDURE — 93005 ELECTROCARDIOGRAM TRACING: CPT

## 2024-08-12 RX ORDER — METOPROLOL TARTRATE 1 MG/ML
5 INJECTION, SOLUTION INTRAVENOUS ONCE
Status: COMPLETED | OUTPATIENT
Start: 2024-08-12 | End: 2024-08-12

## 2024-08-12 RX ORDER — CIPROFLOXACIN 500 MG/1
500 TABLET, FILM COATED ORAL ONCE
Status: COMPLETED | OUTPATIENT
Start: 2024-08-12 | End: 2024-08-12

## 2024-08-12 RX ORDER — ONDANSETRON 4 MG/1
4 TABLET, ORALLY DISINTEGRATING ORAL EVERY 4 HOURS PRN
Qty: 10 TABLET | Refills: 0 | Status: SHIPPED | OUTPATIENT
Start: 2024-08-12 | End: 2024-08-19

## 2024-08-12 RX ORDER — METRONIDAZOLE 500 MG/1
500 TABLET ORAL 3 TIMES DAILY
Qty: 30 TABLET | Refills: 0 | Status: SHIPPED | OUTPATIENT
Start: 2024-08-12 | End: 2024-08-22

## 2024-08-12 RX ORDER — METRONIDAZOLE 500 MG/1
500 TABLET ORAL ONCE
Status: COMPLETED | OUTPATIENT
Start: 2024-08-12 | End: 2024-08-12

## 2024-08-12 RX ORDER — CIPROFLOXACIN 500 MG/1
500 TABLET, FILM COATED ORAL 2 TIMES DAILY
Qty: 20 TABLET | Refills: 0 | Status: SHIPPED | OUTPATIENT
Start: 2024-08-12 | End: 2024-08-22

## 2024-08-12 NOTE — TELEPHONE ENCOUNTER
Vivi    I spoke to Yamini  She reports she had diarrhea that started Thursday night.  She took imodium on Thursday and Friday and was ok Saturday.  She continues to have diarrhea whenever she eats and drinks.  She told me that she didn't eat today.  Her diarrhea is a combination of liquid and mushy stools.   She has no abdominal pain but a constant gurgle.  She has no fevers.    She then told me that she has not urinated in days.  I told her to go to the ER as this is a sign of severe dehydration.  She is agreeable with plan  I told her I would let you know.  She would like either a phone or in person visit with you if you feel it is needed, but I didn't set this up right now since I told her to go to ER and unsure if she will be admitted or not.    Thank you

## 2024-08-12 NOTE — ED PROVIDER NOTES
Patient Seen in: [facility] Emergency Department    History     Chief Complaint   Patient presents with    Diarrhea       HPI    79-year-old female with a history of hypertension, valve replacement on Eliquis presents here today complaining of vomiting and diarrhea for the past 2 days.  States she feels her gas gurgling but no abdominal pain.  No chest pain or shortness of breath.  Patient is very weak.  History reviewed.   Past Medical History:    Arrhythmia    afib    Back problem    lumbar disc disease    Basal cell carcinoma of nose    Bilateral carpal tunnel syndrome    Cataract    Cellulitis    Deep vein thrombosis (HCC)    unsure which leg, possibly left    Depression    Esophageal reflux    Essential hypertension    Heart disease    Heel spur    Right    Hemorrhoids    Hiatal hernia    High blood pressure    High cholesterol    Hyperlipidemia    Incontinence    Lumbar disc disease    Multiple gastric ulcers    Osteoarthritis    Pulmonary embolism (HCC)    Raynaud disease    Renal disorder    Tubular adenoma of colon    repeat c-scope 1/2020    Type 2 diabetes mellitus with diabetic chronic kidney disease (HCC)    Visual impairment    glasses       History reviewed.   Past Surgical History:   Procedure Laterality Date    Angioplasty (coronary)      Appendectomy      Arthroscopy of joint unlisted Right     knee    Bso, omentectomy w/sushil      Carpal tunnel release Bilateral     Cholecystectomy  09/28/2016    Colonoscopy N/A 01/25/2017    Procedure: COLONOSCOPY;  Surgeon: KATHARINE Prakash MD;  Location: [facility] ENDOSCOPY    Colonoscopy N/A 10/07/2020    Procedure: COLONOSCOPY;  Surgeon: KATHARINE Prakash MD;  Location: [facility] ENDOSCOPY    Colonoscopy N/A 7/31/2024    Procedure: COLONOSCOPY;  Surgeon: Alyssa Orellana MD;  Location: [facility] ENDOSCOPY    Egd N/A 12/14/2016    Procedure: ESOPHAGOGASTRODUODENOSCOPY (EGD);  Surgeon: KATHARINE Prakash MD;  Location: [facility] ENDOSCOPY    Egd N/A 01/23/2024    ;  gastritis    Egd N/A 2024    ; retained food    Electrocardiogram, complete  2013    Scanned to Media Tab    Excision of nose      Basal cell carcinoma    Hernia surgery      Hysterectomy      Knee replacement surgery Right     Other surgical history      Injections and narcotics, POD Bartuci    Removal of heel spur      Total abdom hysterectomy           Medications :  (Not in a hospital admission)       Family History   Problem Relation Age of Onset    Cancer Father         Skin cancer    Other (Other) Father 97        old age,unknown caused    Heart Attack Mother     Heart Disorder Mother     Hypertension Other         Family H/O    Cancer Other         Lymphoma  Family H/O    Heart Disease Other         Family H/O    Arthritis Other         Close relative  unsure which type    No Known Problems Brother        Smoking Status:   Social History     Socioeconomic History    Marital status:    Tobacco Use    Smoking status: Former     Current packs/day: 0.00     Types: Cigarettes     Quit date: 1997     Years since quittin.6    Smokeless tobacco: Never   Vaping Use    Vaping status: Never Used   Substance and Sexual Activity    Alcohol use: No     Comment: rare    Drug use: Never     Comment: edibles occasionally for sleep   Other Topics Concern    Caffeine Concern Yes     Comment: 8 cups coffee/sod daily    Reaction to local anesthetic No       Constitutional and vital signs reviewed.      Social History and Family History elements reviewed from today, pertinent positives to the presenting problem noted.    Physical Exam     ED Triage Vitals   BP 24 1758 127/71   Pulse 24 1758 101   Resp 24 1758 18   Temp 24 1758 98 °F (36.7 °C)   Temp src --    SpO2 24 1758 98 %   O2 Device 24 1844 None (Room air)       All measures to prevent infection transmission during my interaction with the patient were taken. Handwashing was performed prior to and after  the exam.  Stethoscope and any equipment used during my examination was cleaned with super sani-cloth germicidal wipes following the exam.     Physical Exam  Vitals and nursing note reviewed.   Cardiovascular:      Rate and Rhythm: Normal rate.      Pulses: Normal pulses.   Abdominal:      Palpations: Abdomen is soft.      Tenderness: There is no abdominal tenderness.   Skin:     General: Skin is warm and dry.   Neurological:      Mental Status: She is alert.   Psychiatric:         Mood and Affect: Mood normal.         ED Course        Labs Reviewed   CBC WITH DIFFERENTIAL WITH PLATELET - Abnormal; Notable for the following components:       Result Value    RBC 2.93 (*)     HGB 8.8 (*)     HCT 29.2 (*)     MCHC 30.1 (*)     RDW-SD 69.9 (*)     RDW 20.1 (*)     Lymphocyte Absolute 0.61 (*)     All other components within normal limits   HEPATIC FUNCTION PANEL (7) - Abnormal; Notable for the following components:    ALT <7 (*)     Bilirubin, Direct 0.5 (*)     All other components within normal limits   PROTHROMBIN TIME (PT) - Abnormal; Notable for the following components:    PT 17.1 (*)     INR 1.31 (*)     All other components within normal limits   RBC MORPHOLOGY SCAN - Abnormal; Notable for the following components:    RBC Morphology See morphology below (*)     All other components within normal limits   BASIC METABOLIC PANEL (8) - Abnormal; Notable for the following components:    BUN 34 (*)     Creatinine 1.56 (*)     BUN/CREA Ratio 21.8 (*)     Calculated Osmolality 301 (*)     eGFR-Cr 33 (*)     All other components within normal limits   LIPASE - Normal   PTT, ACTIVATED - Normal   MAGNESIUM - Normal   URINALYSIS WITH CULTURE REFLEX   GI STOOL PANEL BY PCR   C. DIFFICILE(TOXIGENIC)PCR     EKG  rhythm-atrial flutter  Rate-103  Reading-atrial flutter with a heart rate of 103 right axis deviation RVH  As Interpreted by me    Imaging Results Available and Reviewed while in ED: CT ABDOMEN+PELVIS(CONTRAST  ONLY)(CPT=74177)    Result Date: 8/12/2024  CONCLUSION:  1. Nondilated fluid-filled loops of small bowel throughout the abdomen with additional abnormal fluid throughout the colon.  Findings suggest either a nonspecific infectious/inflammatory enterocolitis or a malabsorption state.  No free intraperitoneal air or well-defined/drainable intra-abdominal collection. 2. Colonic diverticulosis.  No CT evidence of acute diverticulitis. 3. Small to moderate retrocardiac hiatal hernia with partially imaged nonspecific patulous appearance of the thoracic esophagus. 4. Cholecystectomy. Mild extrahepatic biliary ductal dilation, which likely relates to cholecystectomy state; nonetheless, request correlation with obstructive laboratory parameters. 5. Congestive failure with cardiomegaly, small-moderate pericardial effusion, small right as well as trace left pleural effusions, and anasarca. 6. Partially imaged aortic valve repair with coronary artery and mitral annular calcification.  There are also suspected stents at the ostium of the celiac axis as well as right renal artery. 7. Hysterectomy. 8. Small to moderate fat containing umbilical hernia.  No bowel loop involvement or bowel obstruction. 9. Lesser incidental findings as above.   Dictated by (CST): Jorge Bartlett MD on 8/12/2024 at 9:31 PM     Finalized by (CST): Jorge Bartlett MD on 8/12/2024 at 9:39 PM         ED Medications Administered:   Medications   sodium chloride 0.9 % IV bolus 1,000 mL (0 mL Intravenous Stopped 8/12/24 1947)   metoprolol (Lopressor) 5 mg/5mL injection 5 mg (5 mg Intravenous Given 8/12/24 2052)   iopamidol 76% (ISOVUE-370) injection for power injector (80 mL Intravenous Given 8/12/24 2109)   ciprofloxacin (Cipro) tab 500 mg (500 mg Oral Given 8/12/24 2218)   metRONIDAZOLE (Flagyl) tab 500 mg (500 mg Oral Given 8/12/24 2218)         MDM     Vitals:    08/12/24 1900 08/12/24 2015 08/12/24 2145 08/12/24 2215   BP: 138/57 132/54  146/75   Pulse:  84 82 96 120   Resp:  22 20 22   Temp:       SpO2: 96% 97% 94% 95%   Weight:         *I personally reviewed and interpreted all ED vitals.    Pulse Ox: 98%, Room air, Normal     Monitor Interpretation:   normal sinus rhythm as interpreted by me.  The cardiac monitor was ordered to monitor cardiac rate and rhythm.    Differential Diagnosis/ Diagnostic Considerations: Gastroenteritis, colitis,    Complicating Factors: The patient already has does not have any pertinent problems on file. to contribute to the complexity of this ED evaluation.    Medical Decision Making  Amount and/or Complexity of Data Reviewed  External Data Reviewed: labs.     Details: Reviewed previous labs available to compare to to make sure is no acute changes in her labs especially her chronic kidney disease and BUN and creatinine  Labs: ordered. Decision-making details documented in ED Course.  Radiology: ordered. Decision-making details documented in ED Course.  ECG/medicine tests: ordered and independent interpretation performed. Decision-making details documented in ED Course.    Risk  OTC drugs.  Prescription drug management.      Patient feels much better after fluids.  I reviewed all results with patient including labs and CT.  She does have some type of infectious cause of her diarrhea however labs did not show anything acute she does have chronic kidney disease and not much change from her baseline.  Patient is tolerating p.o. fluids rather go home.  Will treating infectious cause of diarrhea with a dose of Cipro and Flagyl here in the ER and the same prescription for home.  Will also give her nausea medication Zofran as needed for home.  Patient does understand to follow-up with her primary care fighter 1 to 2 days.  Return to the ER sooner continue, get worse, unable to follow-up     condition upon leaving the department: Stable    Disposition and Plan     Clinical Impression:  1. Diarrhea of infectious origin         Disposition:  Discharge    Follow-up:  Elly Antonio MD  [address]  [place] [id number] [phone]    Follow up      Alvarado Chowdhury MD  [address]  [facility] [address]  [phone]    Follow up        Medications Prescribed:  Current Discharge Medication List        START taking these medications    Details   metRONIDAZOLE 500 MG Oral Tab Take 1 tablet (500 mg total) by mouth 3 (three) times daily for 10 days.  Qty: 30 tablet, Refills: 0      ciprofloxacin 500 MG Oral Tab Take 1 tablet (500 mg total) by mouth 2 (two) times daily for 10 days.  Qty: 20 tablet, Refills: 0      ondansetron 4 MG Oral Tablet Dispersible Take 1 tablet (4 mg total) by mouth every 4 (four) hours as needed for Nausea.  Qty: 10 tablet, Refills: 0

## 2024-08-12 NOTE — ED INITIAL ASSESSMENT (HPI)
Patient complains of diarrhea for two days, states food and water just go right through her, feels bloated

## 2024-08-12 NOTE — TELEPHONE ENCOUNTER
Patient states that she has been having problems with diarrhea and was told to follow up with Vivi in the office after being discharged from [facility].  Please call.

## 2024-08-12 NOTE — TELEPHONE ENCOUNTER
Agree with ER for lack of urination.     She can be seen on Monday or Friday in discharge clinic whenever discharged.     Vivi Rogers PA-C

## 2024-08-13 ENCOUNTER — PATIENT OUTREACH (OUTPATIENT)
Dept: CASE MANAGEMENT | Age: 79
End: 2024-08-13

## 2024-08-13 LAB
ATRIAL RATE: 396 BPM
Q-T INTERVAL: 308 MS
QRS DURATION: 82 MS
QTC CALCULATION (BEZET): 403 MS
R AXIS: 116 DEGREES
T AXIS: 85 DEGREES
VENTRICULAR RATE: 103 BPM

## 2024-08-13 NOTE — PROGRESS NOTES
Pt had recent ED visit, calling to offer [facility] ED follow-up apt (discharged 08/12)    Dr Elly Antonio  Internal Medicine  [address] [place] [address] [phone]  LVM to call [phone] to assist w/scheduling apt

## 2024-08-13 NOTE — DISCHARGE INSTRUCTIONS
Follow-up with your primary care fighter in 1 to 2 days.  Follow-up with GI as soon as possible.  Return to the ER send continue coming hours, unable follow-up

## 2024-08-14 NOTE — PROGRESS NOTES
Pt had recent ED visit, calling to offer [facility] ED follow-up apt (discharged 08/12)     Dr Elly Antonio  Internal Medicine  [address] [place] [address] [phone]  LVM to call [phone] to assist w/scheduling apt

## 2024-08-15 NOTE — PROGRESS NOTES
Patient left a voice message seeking scheduling assistance.    Dr Elly Antonio  Internal Medicine  [address] [place] [address] [phone]

## 2024-08-15 NOTE — PROGRESS NOTES
Pt had recent ED visit, calling to offer [facility] ED follow-up apt (discharged 08/12)     Dr Elly Antonio  Internal Medicine  [address] [place] [address] [phone]    No answer, left a voicemail with callback information.    Closing encounter.

## 2024-08-15 NOTE — PROGRESS NOTES
Pt had recent ED visit, calling to offer [facility] ED follow-up apt (discharged 08/12)  Responding to patient's voicemail.    Dr Elly Antonio  Internal Medicine  [address] [place] [address] [phone]    No answer, left a voicemail with callback information.    Closing encounter.

## 2024-08-15 NOTE — PROGRESS NOTES
Pt had recent ED visit, calling to offer [facility] ED follow-up apt (discharged 08/12)  Responding to patient's voicemail.    Dr Elly Antonio  Internal Medicine  [address] [place] [address] [phone]  Monday 8/19 @11:40    Confirmed with patient.    Closing encounter.

## 2024-08-15 NOTE — PROGRESS NOTES
Patient left a voice message seeking scheduling assistance.    Dr Elly Antonio  Internal Medicine  [address] [place] [address] [phone]

## 2024-08-18 ENCOUNTER — HOSPITAL ENCOUNTER (EMERGENCY)
Facility: HOSPITAL | Age: 79
Discharge: HOME | End: 2024-08-19
Attending: EMERGENCY MEDICINE
Payer: MEDICARE

## 2024-08-18 DIAGNOSIS — S09.8XXA BLUNT HEAD INJURY, INITIAL ENCOUNTER: ICD-10-CM

## 2024-08-18 DIAGNOSIS — S01.01XA SCALP LACERATION, INITIAL ENCOUNTER: Primary | ICD-10-CM

## 2024-08-18 DIAGNOSIS — S29.012A STRAIN OF THORACIC SPINE: ICD-10-CM

## 2024-08-18 PROCEDURE — 12002 RPR S/N/AX/GEN/TRNK2.6-7.5CM: CPT

## 2024-08-18 PROCEDURE — 99284 EMERGENCY DEPT VISIT MOD MDM: CPT

## 2024-08-18 PROCEDURE — 99283 EMERGENCY DEPT VISIT LOW MDM: CPT

## 2024-08-19 ENCOUNTER — APPOINTMENT (OUTPATIENT)
Dept: CT IMAGING | Facility: HOSPITAL | Age: 79
End: 2024-08-19
Attending: EMERGENCY MEDICINE
Payer: MEDICARE

## 2024-08-19 ENCOUNTER — APPOINTMENT (OUTPATIENT)
Dept: GENERAL RADIOLOGY | Facility: HOSPITAL | Age: 79
End: 2024-08-19
Attending: EMERGENCY MEDICINE
Payer: MEDICARE

## 2024-08-19 VITALS
TEMPERATURE: 98 F | HEART RATE: 66 BPM | SYSTOLIC BLOOD PRESSURE: 138 MMHG | WEIGHT: 190 LBS | HEIGHT: 67 IN | DIASTOLIC BLOOD PRESSURE: 111 MMHG | BODY MASS INDEX: 29.82 KG/M2 | RESPIRATION RATE: 14 BRPM | OXYGEN SATURATION: 96 %

## 2024-08-19 PROCEDURE — 70450 CT HEAD/BRAIN W/O DYE: CPT | Performed by: EMERGENCY MEDICINE

## 2024-08-19 PROCEDURE — 72072 X-RAY EXAM THORAC SPINE 3VWS: CPT | Performed by: EMERGENCY MEDICINE

## 2024-08-19 RX ORDER — LIDOCAINE HCL/EPINEPHRINE/PF 2%-1:200K
20 VIAL (ML) INJECTION ONCE
Status: COMPLETED | OUTPATIENT
Start: 2024-08-19 | End: 2024-08-19

## 2024-08-19 NOTE — ED INITIAL ASSESSMENT (HPI)
Patient presents from home via EMS for mechanical fall. Patient states she lost her balance and hit her head on a table leg, On arrival patient came with C-collar and back board. Noted bleeding to posterior scalp, currently taking Eliquis. Patient denies LOC.

## 2024-08-20 ENCOUNTER — PATIENT OUTREACH (OUTPATIENT)
Dept: CASE MANAGEMENT | Age: 79
End: 2024-08-20

## 2024-08-21 NOTE — ED PROVIDER NOTES
Patient Seen in: [facility] Emergency Department      History     Chief Complaint   Patient presents with    Head Neck Injury    Fall     Stated Complaint: head injury, fall    Subjective:   HPI    79 year old female with multiple medical issues including a fib on eliquis, dvt/PE, skin ca, htn, gerd, hld, DM who presents with mechanical fall at home now with head injury and pain to her upper back.     Objective:   Past Medical History:    Arrhythmia    afib    Back problem    lumbar disc disease    Basal cell carcinoma of nose    Bilateral carpal tunnel syndrome    Cataract    Cellulitis    Deep vein thrombosis (HCC)    unsure which leg, possibly left    Depression    Esophageal reflux    Essential hypertension    Heart disease    Heel spur    Right    Hemorrhoids    Hiatal hernia    High blood pressure    High cholesterol    Hyperlipidemia    Incontinence    Lumbar disc disease    Multiple gastric ulcers    Osteoarthritis    Pulmonary embolism (HCC)    Raynaud disease    Renal disorder    Tubular adenoma of colon    repeat c-scope 1/2020    Type 2 diabetes mellitus with diabetic chronic kidney disease (HCC)    Visual impairment    glasses              Past Surgical History:   Procedure Laterality Date    Angioplasty (coronary)      Appendectomy      Arthroscopy of joint unlisted Right     knee    Bso, omentectomy w/sushil      Carpal tunnel release Bilateral     Cholecystectomy  09/28/2016    Colonoscopy N/A 01/25/2017    Procedure: COLONOSCOPY;  Surgeon: KATHARINE Prakash MD;  Location: [facility] ENDOSCOPY    Colonoscopy N/A 10/07/2020    Procedure: COLONOSCOPY;  Surgeon: KATHARINE Prakash MD;  Location: [facility] ENDOSCOPY    Colonoscopy N/A 7/31/2024    Procedure: COLONOSCOPY;  Surgeon: Alyssa Orellana MD;  Location: [facility] ENDOSCOPY    Egd N/A 12/14/2016    Procedure: ESOPHAGOGASTRODUODENOSCOPY (EGD);  Surgeon: KATHARINE Prakash MD;  Location: [facility] ENDOSCOPY    Egd N/A 01/23/2024    ; gastritis    Egd N/A 07/27/2024     ; retained food    Electrocardiogram, complete  2013    Scanned to Media Tab    Excision of nose      Basal cell carcinoma    Hernia surgery      Hysterectomy      Knee replacement surgery Right     Other surgical history      Injections and narcotics, POD Bartuci    Removal of heel spur      Total abdom hysterectomy                  Social History     Socioeconomic History    Marital status:    Tobacco Use    Smoking status: Former     Current packs/day: 0.00     Types: Cigarettes     Quit date: 1997     Years since quittin.6    Smokeless tobacco: Never   Vaping Use    Vaping status: Never Used   Substance and Sexual Activity    Alcohol use: No     Comment: rare    Drug use: Never     Comment: edibles occasionally for sleep   Other Topics Concern    Caffeine Concern Yes     Comment: 8 cups coffee/sod daily    Reaction to local anesthetic No     Social Determinants of Health     Financial Resource Strain: Low Risk  (3/14/2023)    Financial Resource Strain     Difficulty of Paying Living Expenses: Not very hard     Med Affordability: No   Food Insecurity: No Food Insecurity (2024)    Food Insecurity     Food Insecurity: Never true   Transportation Needs: No Transportation Needs (2024)    Transportation Needs     Lack of Transportation: No   Housing Stability: Low Risk  (2024)    Housing Stability     Housing Instability: No              Review of Systems    Positive for stated Chief Complaint: Head Neck Injury and Fall    Other systems are as noted in HPI.  Constitutional and vital signs reviewed.      All other systems reviewed and negative except as noted above.    Physical Exam     ED Triage Vitals   BP 24 2356 113/60   Pulse 24 2356 88   Resp 24 2356 18   Temp 24 2356 98.2 °F (36.8 °C)   Temp src 24 2356 Oral   SpO2 24 0012 96 %   O2 Device 24 0015 None (Room air)       Current Vitals:   No data recorded        Physical  Exam  Vitals and nursing note reviewed.   Constitutional:       General: She is not in acute distress.     Appearance: Normal appearance. She is well-developed. She is not ill-appearing, toxic-appearing or diaphoretic.   HENT:      Head: Normocephalic. Contusion and laceration (occiput, +bleeding) present.   Eyes:      Conjunctiva/sclera: Conjunctivae normal.      Pupils: Pupils are equal, round, and reactive to light.   Cardiovascular:      Rate and Rhythm: Normal rate and regular rhythm.      Pulses: Normal pulses.      Heart sounds: Normal heart sounds. No murmur heard.  Pulmonary:      Effort: Pulmonary effort is normal. No respiratory distress.      Breath sounds: Normal breath sounds. No wheezing.   Abdominal:      General: There is no distension.      Palpations: Abdomen is soft.      Tenderness: There is no abdominal tenderness. There is no guarding.   Musculoskeletal:         General: Normal range of motion.      Cervical back: Normal, full passive range of motion without pain, normal range of motion and neck supple. No rigidity. No spinous process tenderness or muscular tenderness. Normal range of motion.      Thoracic back: Tenderness and bony tenderness (mild, nonfocal) present.      Lumbar back: Normal.        Back:       Right lower leg: No edema.      Left lower leg: No edema.      Comments: MS 5/5 BUE and BLE with no apparent extremity injury.   Skin:     General: Skin is warm and dry.      Findings: No rash.   Neurological:      Mental Status: She is alert and oriented to person, place, and time.      GCS: GCS eye subscore is 4. GCS verbal subscore is 5. GCS motor subscore is 6.      Sensory: Sensation is intact. No sensory deficit.      Motor: Motor function is intact. No weakness.   Psychiatric:         Attention and Perception: Attention normal.         Mood and Affect: Mood normal.         Behavior: Behavior normal. Behavior is cooperative.         ED Course   Labs Reviewed - No data to  display       ED Course as of 08/21/24 1111  ------------------------------------------------------------  Time: 08/19 0157  Value: CT BRAIN OR HEAD (CPT=70450)  Comment: CT head      IMPRESSION:  Small right parieto-occipital scalp hematoma   No calvarial fracture  No acute intracranial findings  Global cerebral volume loss and chronic microvascular changes in white matter    ------------------------------------------------------------  Time: 08/19 0157  Value: XR THORACIC SPINE (3 VIEWS) (CPT=72072)  Comment: X-ray thoracic spine 4 views    IMPRESSION:  No evidence for traumatic injury  No appreciable fracture.  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  The 3 cm laceration located occiput was anesthetized in the usual fashion. The wound was scrubbed, draped and explored.   There were no deep structures involved.  The wound was repaired with staples .  The wound repair was simple.  The procedure was performed by myself.     Discussed supportive care, wound care, and f/u with pt to be sure sx improving.       Disposition and Plan     Clinical Impression:  1. Scalp laceration, initial encounter    2. Blunt head injury, initial encounter    3. Strain of thoracic spine         Disposition:  Discharge  8/19/2024  2:26 am    Follow-up:  Elly Antonio MD  [address] [place] [address]  [phone]    Schedule an appointment as soon as possible for a visit in 10 day(s)  For staple removal    We recommend that you schedule follow up care with a primary care  provider within the next three months to obtain basic health screening including reassessment of your blood pressure.      Medications Prescribed:  Discharge Medication List as of 8/19/2024  2:53 AM

## 2024-08-21 NOTE — PROGRESS NOTES
Pt had recent ED visit, calling to offer [facility] ED follow-up apt (discharged 08/18)    Dr Elly Antonio  Internal Medicine  [address] [place] [address] [phone]  LVM to call [phone] to assist w/scheduling apt

## 2024-08-22 NOTE — PROGRESS NOTES
Pt had recent ED visit, calling to offer [facility] follow-up apt (discharged 08/18)     Dr Elly Antonio  Internal Medicine  [address] [place] [address] [phone]  Pt declined a sooner apt; pt just made her apt for Tue 09/03

## 2024-08-22 NOTE — TELEPHONE ENCOUNTER
Left message to set up appointment with Vivi  I was going to offer the 10am or the 11am on 8/26 if they are still open when she calls back ok to offer.

## 2024-08-23 NOTE — TELEPHONE ENCOUNTER
Patient contacted, accepted below appointment, and given office directions.  She told me that she is feeling better.  I told her to call us if she needs any assistance between now and below appointment.    Your Appointments      Monday September 09, 2024 1:00 PM  Consult with Vivi Rogers PA-C  [facility] ([facility]) [facility] [address] [address]  [phone]

## 2024-09-09 ENCOUNTER — OFFICE VISIT (OUTPATIENT)
Facility: CLINIC | Age: 79
End: 2024-09-09
Payer: MEDICARE

## 2024-09-09 VITALS
SYSTOLIC BLOOD PRESSURE: 151 MMHG | BODY MASS INDEX: 31.08 KG/M2 | DIASTOLIC BLOOD PRESSURE: 74 MMHG | HEART RATE: 79 BPM | WEIGHT: 198 LBS | HEIGHT: 67 IN

## 2024-09-09 DIAGNOSIS — D50.0 IRON DEFICIENCY ANEMIA DUE TO CHRONIC BLOOD LOSS: Primary | ICD-10-CM

## 2024-09-09 DIAGNOSIS — K31.819 GAVE (GASTRIC ANTRAL VASCULAR ECTASIA): ICD-10-CM

## 2024-09-09 PROCEDURE — 99214 OFFICE O/P EST MOD 30 MIN: CPT | Performed by: PHYSICIAN ASSISTANT

## 2024-09-09 NOTE — PATIENT INSTRUCTIONS
Recommendations:  - get lab work done after next primary care visit  - continue pantoprazole at this time  - start probiotic daily   - can use IB liv a needed (green box that can be picked up at the pharmacy over the counter)  - contact office with any changes in bowel habits

## 2024-09-09 NOTE — PROGRESS NOTES
[facility] - Gastroenterology                                                                                                               Reason for consult:   Chief Complaint   Patient presents with    Follow - Up     Hospital.         Requesting physician or provider: Elly Antonio MD      HPI:   Yamini Ocampo is a 79 year old year-old female with history of BMI 33.86, HTN, HLD, DM2, CKD3, Anemia of chronic disease, PUD, GERD, Ventricular diastolic dysfunction, HFpEF, Pulmonary artery HTN, CAD, Chronic Afib (on eliquis) who presented to the ED first 6/25/2024 with acute on chronic diastolic HF and anemia. Patient ultimately underwent repeat EGD during admission. 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 No APC performed. She presented to discharge clinic for follow up and was having worsening SOB and melena and was sent back to ER. During next admission underwent EGD with gastric antral vascular ectasias s/p APC on EGD, cln with GAVE and video capsule which was incomplete. She did suffer from diarrhea after admission and came to ER on 8/18. Improvement with fluids and was discharge. Presents today for follow up.     Diarrhea- On and off. With associated abdominal cramping. Maybe twice a week. Cramping first, then has a bowel movement and then will feel better.   Stool are brown. No bright red or dark black.   No dark green or mucus in stool.     Doesn't follow a pattern. Has been watching dairy intake.   Greasy food occasionally but not causing diarrhea.     No vomiting, but has had episode of spitting up.   No dysphagia or globus today.   Appetite has been on and off.      Can be short of breath at times, no dizziness CP. SOB at baseline and not worsening.     Feels as though her strength and energy are improving since her last admission.       Pertinent Family Hx:  - No known history of esophageal, gastric or colon cancers  - No known IBD  - No known  liver pathologies     Endoscopy Hx:  -Colonoscopy 7/31/2024  Impression:  GAVE, non-bleeding.  Pillcam deployed into stomach  Poor colon prep, diverticulosis    -VCE 7/31/2024  Impression:  1. Incomplete small bowel evaluation     - EGD 7/29/24  Impression:   1. Gastric antral vascular ectasias s/p APC     - EGD 6/2024  Impression:   1. 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 APC not performed today given food debris in stomach.   2. Food retention in stomach and esophagus     - EGD 1/2024  Impression:  1. Non-erosive reflux disease.  2. Gastric erythema s/p biopsies (mapping) due to hx of gastric IM.     Social Hx:  - No tobacco use  - No ETOH  - Denies cannabis/illicit drug use      Wt Readings from Last 6 Encounters:   09/09/24 198 lb (89.8 kg)   08/18/24 190 lb (86.2 kg)   08/12/24 180 lb (81.6 kg)   08/08/24 195 lb 1.6 oz (88.5 kg)   08/02/24 180 lb (81.6 kg)   07/26/24 206 lb (93.4 kg)        History, Medications, Allergies, ROS:      Past Medical History:    Arrhythmia    afib    Back problem    lumbar disc disease    Basal cell carcinoma of nose    Bilateral carpal tunnel syndrome    Cataract    Cellulitis    Deep vein thrombosis (HCC)    unsure which leg, possibly left    Depression    Esophageal reflux    Essential hypertension    Heart disease    Heel spur    Right    Hemorrhoids    Hiatal hernia    High blood pressure    High cholesterol    Hyperlipidemia    Incontinence    Lumbar disc disease    Multiple gastric ulcers    Osteoarthritis    Pulmonary embolism (HCC)    Raynaud disease    Renal disorder    Tubular adenoma of colon    repeat c-scope 1/2020    Type 2 diabetes mellitus with diabetic chronic kidney disease (HCC)    Visual impairment    glasses      Past Surgical History:   Procedure Laterality Date    Angioplasty (coronary)      Appendectomy      Arthroscopy of joint unlisted Right     knee    Bso, omentectomy w/sushil      Carpal tunnel release Bilateral     Cholecystectomy  09/28/2016    Colonoscopy  N/A 2017    Procedure: COLONOSCOPY;  Surgeon: KATHARINE Prakash MD;  Location: [facility] ENDOSCOPY    Colonoscopy N/A 10/07/2020    Procedure: COLONOSCOPY;  Surgeon: KATHARINE Prakash MD;  Location: [facility] ENDOSCOPY    Colonoscopy N/A 2024    Procedure: COLONOSCOPY;  Surgeon: Alyssa Orellana MD;  Location: [facility] ENDOSCOPY    Egd N/A 2016    Procedure: ESOPHAGOGASTRODUODENOSCOPY (EGD);  Surgeon: KATHARINE Prakash MD;  Location: [facility] ENDOSCOPY    Egd N/A 2024    ; gastritis    Egd N/A 2024    ; retained food    Electrocardiogram, complete  2013    Scanned to Media Tab    Excision of nose      Basal cell carcinoma    Hernia surgery      Hysterectomy      Knee replacement surgery Right     Other surgical history      Injections and narcotics, POD Bartuci    Removal of heel spur      Total abdom hysterectomy        Family Hx:   Family History   Problem Relation Age of Onset    Cancer Father         Skin cancer    Other (Other) Father 97        old age,unknown caused    Heart Attack Mother     Heart Disorder Mother     Hypertension Other         Family H/O    Cancer Other         Lymphoma  Family H/O    Heart Disease Other         Family H/O    Arthritis Other         Close relative  unsure which type    No Known Problems Brother       Social History:   Social History     Socioeconomic History    Marital status:    Tobacco Use    Smoking status: Former     Current packs/day: 0.00     Types: Cigarettes     Quit date: 1997     Years since quittin.7    Smokeless tobacco: Never   Vaping Use    Vaping status: Never Used   Substance and Sexual Activity    Alcohol use: No     Comment: rare    Drug use: Never     Comment: edibles occasionally for sleep   Other Topics Concern    Caffeine Concern Yes     Comment: 8 cups coffee/sod daily    Reaction to local anesthetic No     Social Determinants of Health     Financial Resource Strain: Low Risk  (3/14/2023)    Financial Resource  Strain     Difficulty of Paying Living Expenses: Not very hard     Med Affordability: No   Food Insecurity: No Food Insecurity (7/26/2024)    Food Insecurity     Food Insecurity: Never true   Transportation Needs: No Transportation Needs (7/26/2024)    Transportation Needs     Lack of Transportation: No   Housing Stability: Low Risk  (7/26/2024)    Housing Stability     Housing Instability: No        Medications (Active prior to today's visit):  Current Outpatient Medications   Medication Sig Dispense Refill    metoprolol succinate ER 50 MG Oral Tablet 24 Hr Take 1 tablet (50 mg total) by mouth Daily Beta Blocker. 30 tablet 0    spironolactone 25 MG Oral Tab Take 1 tablet (25 mg total) by mouth daily. 30 tablet 0    pantoprazole 40 MG Oral Tab EC Take 1 tablet (40 mg total) by mouth 2 (two) times daily before meals. 60 tablet 3    bumetanide 1 MG Oral Tab Take 1 tablet (1 mg total) by mouth BID (Diuretic). 60 tablet 0    sertraline 100 MG Oral Tab Take 1 tablet (100 mg total) by mouth daily. 90 tablet 3    atorvastatin 10 MG Oral Tab Take 1 tablet (10 mg total) by mouth nightly. 90 tablet 3    FARXIGA 10 MG Oral Tab Take 1 tablet (10 mg total) by mouth daily.      albuterol 108 (90 Base) MCG/ACT Inhalation Aero Soln       folic acid 1 MG Oral Tab Take 1 tablet (1 mg total) by mouth daily. 90 tablet 3    cyanocobalamin 1000 MCG Oral Tab Take 1 tablet (1,000 mcg total) by mouth daily.      ELIQUIS 5 MG Oral Tab Take 1 tablet (5 mg total) by mouth 2 (two) times daily. 180 tablet 0       Allergies:  Allergies   Allergen Reactions    Adhesive Tape RASH     Skin off       ROS:   CONSTITUTIONAL: negative for fevers, chills, sweats and weight loss  EYES Negative for red eyes, yellow eyes, changes in vision  HEENT: Negative for dysphagia and hoarseness  RESPIRATORY: Negative for cough and shortness of breath  CARDIOVASCULAR: Negative for chest pain, palpitations  GASTROINTESTINAL: See HPI  GENITOURINARY: Negative for dysuria  and frequency  MUSCULOSKELETAL: Negative for arthralgias and myalgias  NEUROLOGICAL: Negative for dizziness and headaches  BEHAVIOR/PSYCH: Negative for anxiety and poor appetite    PHYSICAL EXAM:   Blood pressure 151/74, pulse 79, height 5' 7\" (1.702 m), weight 198 lb (89.8 kg).    GEN: WD/WN, NAD  HEENT: Supple symmetrical, trachea midline  CV: RRR, the extremities are warm and well perfused   LUNGS: No increased work of breathing  ABDOMEN: No scars, normal bowel sounds, soft, non-tender, non-distended no rebound or guarding, no masses, no hepatomegaly  MSK: No redness, no warmth, no swelling of joints  SKIN: No jaundice, no erythema, no rashes  HEMATOLOGIC: No bleeding, no bruising  NEURO: Alert and interactive, normal gait    Labs/Imaging/Procedures:     Patient's pertinent labs and imaging were reviewed and discussed with patient today.     Lab Results   Component Value Date    WBC 6.1 08/12/2024    RBC 2.93 (L) 08/12/2024    HGB 8.8 (L) 08/12/2024    HCT 29.2 (L) 08/12/2024    MCV 99.7 08/12/2024    MCH 30.0 08/12/2024    MCHC 30.1 (L) 08/12/2024    RDW 20.1 (H) 08/12/2024    .0 08/12/2024    MPV 9.2 01/24/2019        Lab Results   Component Value Date    GLU 84 08/12/2024    BUN 34 (H) 08/12/2024    BUNCREA 21.8 (H) 08/12/2024    CREATSERUM 1.56 (H) 08/12/2024    ANIONGAP 10 08/12/2024    GFRNAA 44 (L) 05/23/2022    GFRAA 51 (L) 05/23/2022    CA 9.0 08/12/2024    OSMOCALC 301 (H) 08/12/2024    ALKPHO 96 08/12/2024    AST 23 08/12/2024    ALT <7 (L) 08/12/2024    ALKPHOS 68 06/21/2016    BILT 0.9 08/12/2024    TP 6.9 08/12/2024    ALB 3.9 08/12/2024    GLOBULIN 2.6 (L) 03/22/2024    AGRATIO 1.3 06/21/2016     08/12/2024    K 4.5 08/12/2024     08/12/2024    CO2 21.0 08/12/2024        XR THORACIC SPINE (3 VIEWS) (CPT=72072)    Result Date: 8/19/2024  PROCEDURE: XR THORACIC SPINE (3 VIEWS) (CPT=72072)  COMPARISON: None.  INDICATIONS: Upper back pain post fall yesterday.  TECHNIQUE: Thoracic  spine radiographs (3 views)  FINDINGS:  ALIGNMENT: Normal alignment.  VERTEBRAL BODIES:   There are greater than 4 contiguous thoracic vertebrae showing ventral bridging osteophytes with intact disc spaces, compatible with diffuse idiopathic skeletal hyperostosis (DISH).  No acute fracture or bone lesion.  Interpeduncular distance demonstrates a normal progression. DISC SPACES: Disc height is grossly maintained. OTHER: Post TAVR         CONCLUSION: Scattered mild degenerative change within the thoracic spine.  No acute osseous abnormality.   [facility] provided a preliminary report for this examination. This final report agrees with their preliminary findings.   Dictated by (CST): Jordon Fish MD on 8/19/2024 at 7:15 AM     Finalized by (CST): Jordon Fish MD on 8/19/2024 at 7:16 AM          CT BRAIN OR HEAD (CPT=70450)    Result Date: 8/19/2024  PROCEDURE: CT BRAIN OR HEAD (CPT=70450)  COMPARISON: None.  INDICATIONS: head injury, fall on eliquis  TECHNIQUE: CT images were obtained without contrast material.  Automated exposure control for dose reduction was used.  Dose information is transmitted to the ACR (American College of Radiology) NRDR (National Radiology Data Registry) which includes the Dose Index Registry.  FINDINGS:  CSF SPACES: Ventricles, cisterns, and sulci are appropriate for age.  No hydrocephalus, subarachnoid hemorrhage, or mass.  No midline shift.  CEREBRUM: No edema, hemorrhage, mass, acute infarction, or significant atrophy.  CEREBELLUM: No edema, hemorrhage, mass, acute infarction, or significant atrophy.  BRAINSTEM: No edema, hemorrhage, mass, acute infarction, or significant atrophy.  CALVARIUM: 17 mm diameter hematoma in the posterior-lateral right parietal-occipital scalp.  No acute calvarial fracture. SINUSES: Limited views demonstrate no significant mucosal thickening or fluid.  ORBITS: Limited views are unremarkable.   OTHER: There is calcific atherosclerosis in the cavernous  segments of the internal carotid arteries and vertebral arteries.          CONCLUSION: 2 cm hematoma in the right parietal-occipital scalp.  No acute calvarial fracture.  No acute intracranial process.   [facility] provided a preliminary report for this examination. This final report agrees with their preliminary findings.   Dictated by (CST): Jordon Fish MD on 8/19/2024 at 7:09 AM     Finalized by (CST): Jordon Fish MD on 8/19/2024 at 7:10 AM          CT ABDOMEN+PELVIS(CONTRAST ONLY)(CPT=74177)    Result Date: 8/12/2024  PROCEDURE: CT ABDOMEN + PELVIS (CONTRAST ONLY) (CPT=74177)  COMPARISON: [facility], CT ABDOMEN+PELVIS (CPT=74176), 12/16/2022, 1:48 PM.  [facility], CT ABDOMEN+PELVIS (CPT=74176), 5/09/2024, 8:50 PM.  INDICATIONS: Diarrhea, abdominal distension, hypotension, vomiting.  TECHNIQUE: Multidetector CT images of the abdomen and pelvis were obtained with non-ionic intravenous contrast material. Automated exposure control for dose reduction was used. Adjustment of the mA and/or kV was done based on the patient's size. Iterative reconstruction technique for dose reduction was employed. Oral contrast was ingested.  FINDINGS: LUNG BASES: The heart is moderate to severely enlarged.  Aortic valve repair noted.  Coronary artery and mitral annular calcifications are partially imaged.  There is a small to moderate pericardial effusion.  Small right and trace left pleural effusions.  Associated dependent airspace disease in the right greater than left lower lobes.  Tiny 3-4 mm subpleural micronodule in the right middle lobe (series 3, image 4), which may relate to atelectasis or scarring. LIVER: No enlargement, atrophy, abnormal density, or significant focal lesion is identified.  BILIARY: Cholecystectomy.  Mild 8 mm dilation of the common bile duct and minor central intrahepatic biliary ductal dilation.  These findings appear relatively unchanged.  Stable small  peripherally calcified nodules along the subxiphoid greater omentum, which likely relate to sequelae of a chronic/remote insult. PANCREAS: There is mild diffuse fatty replacement without discernible lesion, fluid collection, or ductal dilatation. SPLEEN: No enlargement.  ADRENALS:   No defined mass or abnormal enlargement.  KIDNEYS:   Symmetric enhancement is seen without evidence of hydronephrosis or underlying solid masses.  Renal cortical scarring and/or atrophy with a small simple-appearing right interpolar renal cyst. GI/MESENTERY:  There is no evidence of bowel obstruction.  Small to moderate retrocardiac hiatal hernia with partially imaged patulous appearance of the distal esophagus; scattered nondilated fluid-filled loops of small bowel throughout the abdomen.  Colonic diverticulosis, please note that segments of the colon are incompletely distended and suboptimally evaluated and there is abnormal fluid throughout nearly the entirety of the colon.  Normal appendix. URINARY BLADDER: Incompletely distended and suboptimally evaluated. PELVIC NODES: No lymphadenopathy.   PELVIC ORGANS: Uterus is atrophic or surgically absent. VASCULATURE:   No aneurysm is detected.  There is a suspected stent at the ostium of the celiac axis.  Patency is not well assessed on this non angiographic exam, but the stent appears grossly patent.  Questionable additional stent at the ostium of the right renal artery.  Moderate to severe atherosclerosis. RETROPERITONEUM: No mass or lymphadenopathy is apparent.  BONES:   Demineralization.  Osteitis pubis.  Multilevel thoracolumbar spine degenerative changes.  Degeneration at both sacroiliac joints. ABDOMINAL WALL: Small to moderate fat containing umbilical hernia.  Anasarca. OTHER: No free air or fluid is seen in the abdomen or pelvis.          CONCLUSION:  1. Nondilated fluid-filled loops of small bowel throughout the abdomen with additional abnormal fluid throughout the colon.   Findings suggest either a nonspecific infectious/inflammatory enterocolitis or a malabsorption state.  No free intraperitoneal air or well-defined/drainable intra-abdominal collection. 2. Colonic diverticulosis.  No CT evidence of acute diverticulitis. 3. Small to moderate retrocardiac hiatal hernia with partially imaged nonspecific patulous appearance of the thoracic esophagus. 4. Cholecystectomy. Mild extrahepatic biliary ductal dilation, which likely relates to cholecystectomy state; nonetheless, request correlation with obstructive laboratory parameters. 5. Congestive failure with cardiomegaly, small-moderate pericardial effusion, small right as well as trace left pleural effusions, and anasarca. 6. Partially imaged aortic valve repair with coronary artery and mitral annular calcification.  There are also suspected stents at the ostium of the celiac axis as well as right renal artery. 7. Hysterectomy. 8. Small to moderate fat containing umbilical hernia.  No bowel loop involvement or bowel obstruction. 9. Lesser incidental findings as above.   Dictated by (CST): Jorge Bartlett MD on 8/12/2024 at 9:31 PM     Finalized by (CST): Jorge Bartlett MD on 8/12/2024 at 9:39 PM                  .  ASSESSMENT/PLAN:   Yamini Ocampo is a 79 year old year-old female with history of BMI 33.86, HTN, HLD, DM2, CKD3, Anemia of chronic disease, PUD, GERD, Ventricular diastolic dysfunction, HFpEF, Pulmonary artery HTN, CAD, Chronic Afib (on eliquis) who presented to the ED first 6/25/2024 with acute on chronic diastolic HF and anemia. Patient ultimately underwent repeat EGD during admission. 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 No APC performed. She presented to discharge clinic for follow up and was having worsening SOB and melena and was sent back to ER. During next admission underwent EGD with gastric antral vascular ectasias s/p APC on EGD, cln with GAVE and video capsule which was incomplete. She did suffer from  diarrhea after admission and came to ER on 8/18. Improvement with fluids and was discharge. Presents today for follow up.     #GAVE  #DEVEN  #hx of melena  -patient notes brown stools, energy improved, fatigue improved  -plan to get lab work done in next 2-4 weeks  -continue PPI BID at this time  -discussed when patient should contact office or go to ER for further evaluation   -follow up as needed    #VCE  -patient saw small capsule in stool   -will hold off on KUB at this time    Orders This Visit:  Orders Placed This Encounter   Procedures    CBC W Differential W Platelet    Iron And Tibc    Ferritin       Meds This Visit:  Requested Prescriptions      No prescriptions requested or ordered in this encounter       Imaging & Referrals:  None      Vivi Rogers PA-C   9/9/2024        This note was partially prepared using Dragon Medical voice recognition dictation software. As a result, errors may occur. When identified, these errors have been corrected. While every attempt is made to correct errors during dictation, discrepancies may still exist.

## 2024-09-12 ENCOUNTER — OFFICE VISIT (OUTPATIENT)
Dept: INTERNAL MEDICINE CLINIC | Facility: CLINIC | Age: 79
End: 2024-09-12
Payer: MEDICARE

## 2024-09-12 VITALS
BODY MASS INDEX: 30.29 KG/M2 | RESPIRATION RATE: 18 BRPM | HEART RATE: 79 BPM | WEIGHT: 193 LBS | DIASTOLIC BLOOD PRESSURE: 70 MMHG | SYSTOLIC BLOOD PRESSURE: 144 MMHG | HEIGHT: 67 IN

## 2024-09-12 DIAGNOSIS — I48.11 LONGSTANDING PERSISTENT ATRIAL FIBRILLATION (HCC): ICD-10-CM

## 2024-09-12 DIAGNOSIS — I10 ESSENTIAL HYPERTENSION: ICD-10-CM

## 2024-09-12 DIAGNOSIS — S01.01XD LACERATION OF SCALP, SUBSEQUENT ENCOUNTER: Primary | ICD-10-CM

## 2024-09-12 PROCEDURE — 99213 OFFICE O/P EST LOW 20 MIN: CPT | Performed by: INTERNAL MEDICINE

## 2024-09-12 NOTE — PROGRESS NOTES
Yamini Ocampo is a 79 year old female.  Chief Complaint   Patient presents with    Staple Removal     HPI:    Patient presented today for removal of staples. Patient was seen on 8/19 for head injury. 7 staples placed to her scalp laceration. 7 staples removed successfully. Patient denies any headaches, pain to the site.     Current Outpatient Medications   Medication Sig Dispense Refill    metoprolol succinate ER 50 MG Oral Tablet 24 Hr Take 1 tablet (50 mg total) by mouth Daily Beta Blocker. 30 tablet 0    spironolactone 25 MG Oral Tab Take 1 tablet (25 mg total) by mouth daily. 30 tablet 0    bumetanide 1 MG Oral Tab Take 1 tablet (1 mg total) by mouth BID (Diuretic). 60 tablet 0    sertraline 100 MG Oral Tab Take 1 tablet (100 mg total) by mouth daily. 90 tablet 3    atorvastatin 10 MG Oral Tab Take 1 tablet (10 mg total) by mouth nightly. 90 tablet 3    FARXIGA 10 MG Oral Tab Take 1 tablet (10 mg total) by mouth daily.      albuterol 108 (90 Base) MCG/ACT Inhalation Aero Soln       folic acid 1 MG Oral Tab Take 1 tablet (1 mg total) by mouth daily. 90 tablet 3    cyanocobalamin 1000 MCG Oral Tab Take 1 tablet (1,000 mcg total) by mouth daily.      ELIQUIS 5 MG Oral Tab Take 1 tablet (5 mg total) by mouth 2 (two) times daily. 180 tablet 0    methylPREDNISolone 4 MG Oral Tablet Therapy Pack Take by oral route. 21 tablet 0    torsemide 20 MG Oral Tab Take 2 tablets (40 mg total) by mouth daily. 180 tablet 0    PANTOPRAZOLE 40 MG Oral Tab EC TAKE 1 TABLET TWICE A  tablet 3      Past Medical History:    Arrhythmia    afib    Back problem    lumbar disc disease    Basal cell carcinoma of nose    Bilateral carpal tunnel syndrome    Cataract    Cellulitis    Deep vein thrombosis (HCC)    unsure which leg, possibly left    Depression    Esophageal reflux    Essential hypertension    Heart disease    Heel spur    Right    Hemorrhoids    Hiatal hernia    High blood pressure    High cholesterol    Hyperlipidemia     Incontinence    Lumbar disc disease    Multiple gastric ulcers    Osteoarthritis    Pulmonary embolism (HCC)    Raynaud disease    Renal disorder    Tubular adenoma of colon    repeat c-scope 2020    Type 2 diabetes mellitus with diabetic chronic kidney disease (HCC)    Visual impairment    glasses      Past Surgical History:   Procedure Laterality Date    Angioplasty (coronary)      Appendectomy      Arthroscopy of joint unlisted Right     knee    Bso, omentectomy w/sushil      Carpal tunnel release Bilateral     Cholecystectomy  2016    Colonoscopy N/A 2017    Procedure: COLONOSCOPY;  Surgeon: KATHARINE Prakash MD;  Location: [facility] ENDOSCOPY    Colonoscopy N/A 10/07/2020    Procedure: COLONOSCOPY;  Surgeon: KATHARINE Prakash MD;  Location: [facility] ENDOSCOPY    Colonoscopy N/A 2024    Procedure: COLONOSCOPY;  Surgeon: Alyssa Orellana MD;  Location: [facility] ENDOSCOPY    Egd N/A 2016    Procedure: ESOPHAGOGASTRODUODENOSCOPY (EGD);  Surgeon: KATHARINE Prakash MD;  Location: [facility] ENDOSCOPY    Egd N/A 2024    ; gastritis    Egd N/A 2024    ; retained food    Electrocardiogram, complete  2013    Scanned to Media Tab    Excision of nose      Basal cell carcinoma    Hernia surgery      Hysterectomy      Knee replacement surgery Right     Other surgical history      Injections and narcotics, POD Bartuci    Removal of heel spur      Total abdom hysterectomy        Social History:  Social History     Socioeconomic History    Marital status:    Tobacco Use    Smoking status: Former     Current packs/day: 0.00     Types: Cigarettes     Quit date: 1997     Years since quittin.7    Smokeless tobacco: Never   Vaping Use    Vaping status: Never Used   Substance and Sexual Activity    Alcohol use: No     Comment: rare    Drug use: Never     Comment: edibles occasionally for sleep   Other Topics Concern    Caffeine Concern Yes     Comment: 8 cups coffee/sod daily    Reaction  to local anesthetic No     Social Determinants of Health     Financial Resource Strain: Low Risk  (3/14/2023)    Financial Resource Strain     Difficulty of Paying Living Expenses: Not very hard     Med Affordability: No   Food Insecurity: No Food Insecurity (7/26/2024)    Food Insecurity     Food Insecurity: Never true   Transportation Needs: No Transportation Needs (7/26/2024)    Transportation Needs     Lack of Transportation: No   Housing Stability: Low Risk  (7/26/2024)    Housing Stability     Housing Instability: No      Family History   Problem Relation Age of Onset    Cancer Father         Skin cancer    Other (Other) Father 97        old age,unknown caused    Heart Attack Mother     Heart Disorder Mother     Hypertension Other         Family H/O    Cancer Other         Lymphoma  Family H/O    Heart Disease Other         Family H/O    Arthritis Other         Close relative  unsure which type    No Known Problems Brother       Allergies   Allergen Reactions    Adhesive Tape RASH     Skin off        REVIEW OF SYSTEMS:   Review of Systems   Review of Systems   Constitutional: Negative for activity change, appetite change and fever.   HENT: Negative for congestion and voice change.    Respiratory: Negative for cough and shortness of breath.    Cardiovascular: Negative for chest pain.   Gastrointestinal: Negative for abdominal distention, abdominal pain and vomiting.   Genitourinary: Negative for hematuria.   Skin: Negative for wound.   Psychiatric/Behavioral: Negative for behavioral problems.   Wt Readings from Last 5 Encounters:   09/12/24 193 lb (87.5 kg)   09/09/24 198 lb (89.8 kg)   08/18/24 190 lb (86.2 kg)   08/12/24 180 lb (81.6 kg)   08/08/24 195 lb 1.6 oz (88.5 kg)     Body mass index is 30.23 kg/m².      EXAM:   /70 (BP Location: Right arm, Patient Position: Sitting, Cuff Size: adult)   Pulse 79   Resp 18   Ht 5' 7\" (1.702 m)   Wt 193 lb (87.5 kg)   BMI 30.23 kg/m²   Physical Exam    Constitutional:       Appearance: Normal appearance.   HENT:      Head: Normocephalic.   Eyes:      Conjunctiva/sclera: Conjunctivae normal.   Cardiovascular:      Rate and Rhythm: Normal rate and regular rhythm.      Heart sounds: Normal heart sounds. No murmur heard.  Pulmonary:      Effort: Pulmonary effort is normal.      Breath sounds: Normal breath sounds. No rhonchi or rales.   Abdominal:      General: Bowel sounds are normal.      Palpations: Abdomen is soft.      Tenderness: There is no abdominal tenderness.   Musculoskeletal:      Cervical back: Neck supple.      Right lower leg: No edema.      Left lower leg: No edema.   Skin:     General: Skin is warm and dry.   Neurological:      General: No focal deficit present.      Mental Status: He is alert and oriented to person, place, and time. Mental status is at baseline.   Psychiatric:         Mood and Affect: Mood normal.         Behavior: Behavior normal.       ASSESSMENT AND PLAN:   1. Laceration of scalp, subsequent encounter  - 7 staples removed  - no bleeding or pain   - laceration healing well     2. Essential hypertension  - well controlled    3. Longstanding persistent atrial fibrillation (HCC)  - on eliquis      The patient indicates understanding of these issues and agrees to the plan.      Elly Antonio MD

## 2024-09-23 RX ORDER — PANTOPRAZOLE SODIUM 40 MG/1
40 TABLET, DELAYED RELEASE ORAL 2 TIMES DAILY
Qty: 180 TABLET | Refills: 3 | Status: SHIPPED | OUTPATIENT
Start: 2024-09-23

## 2024-09-23 NOTE — TELEPHONE ENCOUNTER
Requested Prescriptions     Pending Prescriptions Disp Refills    PANTOPRAZOLE 40 MG Oral Tab EC [Pharmacy Med Name: PANTOPRAZOLE SODIUM DR TABS 40MG] 180 tablet 3     Sig: TAKE 1 TABLET TWICE A DAY       LOV 9/09/2024  LR 7/11/2024    em

## 2024-09-24 ENCOUNTER — TELEPHONE (OUTPATIENT)
Dept: INTERNAL MEDICINE CLINIC | Facility: CLINIC | Age: 79
End: 2024-09-24

## 2024-09-24 DIAGNOSIS — H90.0 CONDUCTIVE HEARING LOSS, BILATERAL: Primary | ICD-10-CM

## 2024-09-24 RX ORDER — TORSEMIDE 20 MG/1
40 TABLET ORAL DAILY
Qty: 180 TABLET | Refills: 0 | Status: SHIPPED | OUTPATIENT
Start: 2024-09-24

## 2024-09-24 NOTE — TELEPHONE ENCOUNTER
The patient calling to state she is seeing Dr. Volodymyr GOLDMAN on Friday 9/27/24 and needs a referral for bilateral hearing loss.    I pended the referral   diagnosis conductive hearing loss.   If correct please sign .  The patient needs by Friday.

## 2024-09-24 NOTE — TELEPHONE ENCOUNTER
Lov-5/3/24  Rtc-none noted  No follow up appt yet  Dr Pizarro Rn noted med Torsemide was discontinued under pt record on 6/28/24 at discharged ,placed on Bumex clarified with pt was not aware of the changes and still taking Torsemide 40  mg or 20  mg it depends if pt is going out. The leg swelling still the same ,keeping it elevated, has plan to see a dermatologist for the leg blisters that itch.  Please sign pending order if appropriate

## 2024-09-24 NOTE — TELEPHONE ENCOUNTER
Patient is requesting referral.     Name of specialist and specialty department :   Dr. Kiko Helm, ENT    Reason for visit with the specialist:  right ear hearing loss about 3 or 4 weeks ago      Address of the specialist office:  [address] [place] [address]    Appointment date:   9/27/24    Phone # [phone]       CSS informed patient the turnaround time for referral is 5-7 business days.  Patient was informed to check their [employer] account for referral status.     Patient call connected to RN  Patient is not sure when she developed the hearing loss and mentions she was recently in the hospital

## 2024-09-27 ENCOUNTER — OFFICE VISIT (OUTPATIENT)
Dept: AUDIOLOGY | Facility: CLINIC | Age: 79
End: 2024-09-27

## 2024-09-27 ENCOUNTER — OFFICE VISIT (OUTPATIENT)
Dept: OTOLARYNGOLOGY | Facility: CLINIC | Age: 79
End: 2024-09-27

## 2024-09-27 DIAGNOSIS — H91.90 HEARING LOSS, UNSPECIFIED HEARING LOSS TYPE, UNSPECIFIED LATERALITY: Primary | ICD-10-CM

## 2024-09-27 DIAGNOSIS — H91.93 BILATERAL HEARING LOSS, UNSPECIFIED HEARING LOSS TYPE: Primary | ICD-10-CM

## 2024-09-27 DIAGNOSIS — H90.3 SENSORINEURAL HEARING LOSS, BILATERAL: ICD-10-CM

## 2024-09-27 PROCEDURE — 92557 COMPREHENSIVE HEARING TEST: CPT | Performed by: AUDIOLOGIST

## 2024-09-27 PROCEDURE — 92567 TYMPANOMETRY: CPT | Performed by: AUDIOLOGIST

## 2024-09-27 PROCEDURE — 99203 OFFICE O/P NEW LOW 30 MIN: CPT | Performed by: OTOLARYNGOLOGY

## 2024-09-27 RX ORDER — METHYLPREDNISOLONE 4 MG
TABLET, DOSE PACK ORAL
Qty: 21 TABLET | Refills: 0 | Status: SHIPPED | OUTPATIENT
Start: 2024-09-27

## 2024-09-27 NOTE — PROGRESS NOTES
Yamini Ocampo is a 79 year old female.    Chief Complaint   Patient presents with    Hearing Loss     Patient is here due to hearing loss and buzzing in ears x 6 weeks ago        HISTORY OF PRESENT ILLNESS  6 weeks ago she noted some buzzing and hearing loss to her left ear which was sudden.  No associated upper respiratory tract illness or viral infection.  States that her hearing seems down the entire time and has not really improved since 6 weeks ago.  No other signs, symptoms or complaints.  Audiogram was performed today demonstrating bilateral sensorineural hearing loss with the left ear being significantly worse by about 20 to 30 dB in certain frequencies compared to the right.  Overall she does have a significant hearing loss in both ears with normal tympanograms and speech discrimination scores.      Social History     Socioeconomic History    Marital status:    Tobacco Use    Smoking status: Former     Current packs/day: 0.00     Types: Cigarettes     Quit date: 1997     Years since quittin.7    Smokeless tobacco: Never   Vaping Use    Vaping status: Never Used   Substance and Sexual Activity    Alcohol use: No     Comment: rare    Drug use: Never     Comment: edibles occasionally for sleep   Other Topics Concern    Caffeine Concern Yes     Comment: 8 cups coffee/sod daily    Reaction to local anesthetic No       Family History   Problem Relation Age of Onset    Cancer Father         Skin cancer    Other (Other) Father 97        old age,unknown caused    Heart Attack Mother     Heart Disorder Mother     Hypertension Other         Family H/O    Cancer Other         Lymphoma  Family H/O    Heart Disease Other         Family H/O    Arthritis Other         Close relative  unsure which type    No Known Problems Brother        Past Medical History:    Arrhythmia    afib    Back problem    lumbar disc disease    Basal cell carcinoma of nose    Bilateral carpal tunnel syndrome    Cataract     Cellulitis    Deep vein thrombosis (HCC)    unsure which leg, possibly left    Depression    Esophageal reflux    Essential hypertension    Heart disease    Heel spur    Right    Hemorrhoids    Hiatal hernia    High blood pressure    High cholesterol    Hyperlipidemia    Incontinence    Lumbar disc disease    Multiple gastric ulcers    Osteoarthritis    Pulmonary embolism (HCC)    Raynaud disease    Renal disorder    Tubular adenoma of colon    repeat c-scope 1/2020    Type 2 diabetes mellitus with diabetic chronic kidney disease (HCC)    Visual impairment    glasses       Past Surgical History:   Procedure Laterality Date    Angioplasty (coronary)      Appendectomy      Arthroscopy of joint unlisted Right     knee    Bso, omentectomy w/sushil      Carpal tunnel release Bilateral     Cholecystectomy  09/28/2016    Colonoscopy N/A 01/25/2017    Procedure: COLONOSCOPY;  Surgeon: KATHARINE Prakash MD;  Location: [facility] ENDOSCOPY    Colonoscopy N/A 10/07/2020    Procedure: COLONOSCOPY;  Surgeon: KATHARINE Prakash MD;  Location: [facility] ENDOSCOPY    Colonoscopy N/A 7/31/2024    Procedure: COLONOSCOPY;  Surgeon: Alyssa Orellana MD;  Location: [facility] ENDOSCOPY    Egd N/A 12/14/2016    Procedure: ESOPHAGOGASTRODUODENOSCOPY (EGD);  Surgeon: KATHARINE Prakash MD;  Location: [facility] ENDOSCOPY    Egd N/A 01/23/2024    ; gastritis    Egd N/A 07/27/2024    ; retained food    Electrocardiogram, complete  09/26/2013    Scanned to Media Tab    Excision of nose      Basal cell carcinoma    Hernia surgery      Hysterectomy      Knee replacement surgery Right     Other surgical history      Injections and narcotics, POD Bartuci    Removal of heel spur      Total abdom hysterectomy           REVIEW OF SYSTEMS    System Neg/Pos Details   Constitutional Negative Fatigue, fever and weight loss.   ENMT Negative Drooling.   Eyes Negative Blurred vision and vision changes.   Respiratory Negative Dyspnea and wheezing.   Cardio Negative Chest pain,  irregular heartbeat/palpitations and syncope.   GI Negative Abdominal pain and diarrhea.   Endocrine Negative Cold intolerance and heat intolerance.   Neuro Negative Tremors.   Psych Negative Anxiety and depression.   Integumentary Negative Frequent skin infections, pigment change and rash.   Hema/Lymph Negative Easy bleeding and easy bruising.           PHYSICAL EXAM    There were no vitals taken for this visit.       Constitutional Normal Overall appearance - Normal.   Psychiatric Normal Orientation - Oriented to time, place, person & situation. Appropriate mood and affect.   Neck Exam Normal Inspection - Normal. Palpation - Normal. Parotid gland - Normal. Thyroid gland - Normal.   Eyes Normal Conjunctiva - Right: Normal, Left: Normal. Pupil - Right: Normal, Left: Normal. Fundus - Right: Normal, Left: Normal.   Neurological Normal Memory - Normal. Cranial nerves - Cranial nerves II through XII grossly intact.   Head/Face Normal Facial features - Normal. Eyebrows - Normal. Skull - Normal.        Nasopharynx Normal External nose - Normal. Lips/teeth/gums - Normal. Tonsils - Normal. Oropharynx - Normal.   Ears Normal Inspection - Right: Normal, Left: Normal. Canal - Right: Normal, Left: Normal. TM - Right: Normal, Left: Normal.   Skin Normal Inspection - Normal.        Lymph Detail Normal Submental. Submandibular. Anterior cervical. Posterior cervical. Supraclavicular.        Nose/Mouth/Throat Normal External nose - Normal. Lips/teeth/gums - Normal. Tonsils - Normal. Oropharynx - Normal.   Nose/Mouth/Throat Normal Nares - Right: Normal Left: Normal. Septum -Normal  Turbinates - Right: Normal, Left: Normal.       Current Outpatient Medications:     methylPREDNISolone 4 MG Oral Tablet Therapy Pack, Take by oral route., Disp: 21 tablet, Rfl: 0    torsemide 20 MG Oral Tab, Take 2 tablets (40 mg total) by mouth daily., Disp: 180 tablet, Rfl: 0    PANTOPRAZOLE 40 MG Oral Tab EC, TAKE 1 TABLET TWICE A DAY, Disp: 180 tablet,  Rfl: 3    metoprolol succinate ER 50 MG Oral Tablet 24 Hr, Take 1 tablet (50 mg total) by mouth Daily Beta Blocker., Disp: 30 tablet, Rfl: 0    spironolactone 25 MG Oral Tab, Take 1 tablet (25 mg total) by mouth daily., Disp: 30 tablet, Rfl: 0    bumetanide 1 MG Oral Tab, Take 1 tablet (1 mg total) by mouth BID (Diuretic)., Disp: 60 tablet, Rfl: 0    sertraline 100 MG Oral Tab, Take 1 tablet (100 mg total) by mouth daily., Disp: 90 tablet, Rfl: 3    atorvastatin 10 MG Oral Tab, Take 1 tablet (10 mg total) by mouth nightly., Disp: 90 tablet, Rfl: 3    FARXIGA 10 MG Oral Tab, Take 1 tablet (10 mg total) by mouth daily., Disp: , Rfl:     albuterol 108 (90 Base) MCG/ACT Inhalation Aero Soln, , Disp: , Rfl:     folic acid 1 MG Oral Tab, Take 1 tablet (1 mg total) by mouth daily., Disp: 90 tablet, Rfl: 3    cyanocobalamin 1000 MCG Oral Tab, Take 1 tablet (1,000 mcg total) by mouth daily., Disp: , Rfl:     ELIQUIS 5 MG Oral Tab, Take 1 tablet (5 mg total) by mouth 2 (two) times daily., Disp: 180 tablet, Rfl: 0  ASSESSMENT AND PLAN    1. Hearing loss, unspecified hearing loss type, unspecified laterality  Appears to have sudden onset hearing loss about 6 weeks ago with obvious asymmetry in hearing on the left.  Start a Medrol Dosepak return to see me in 2 weeks with repeat audiogram.  - Audiology Referral - [facility])        This note was prepared using [employer] voice recognition dictation software. As a result errors may occur. When identified these errors have been corrected. While every attempt is made to correct errors during dictation discrepancies may still exist    Kiko Helm MD    9/27/2024    10:52 AM

## 2024-10-11 ENCOUNTER — OFFICE VISIT (OUTPATIENT)
Dept: OTOLARYNGOLOGY | Facility: CLINIC | Age: 79
End: 2024-10-11
Payer: MEDICARE

## 2024-10-11 ENCOUNTER — OFFICE VISIT (OUTPATIENT)
Dept: AUDIOLOGY | Facility: CLINIC | Age: 79
End: 2024-10-11

## 2024-10-11 DIAGNOSIS — H90.3 SENSORINEURAL HEARING LOSS, BILATERAL: ICD-10-CM

## 2024-10-11 DIAGNOSIS — H91.90 HEARING LOSS, UNSPECIFIED HEARING LOSS TYPE, UNSPECIFIED LATERALITY: Primary | ICD-10-CM

## 2024-10-11 DIAGNOSIS — H90.3 SENSORINEURAL HEARING LOSS (SNHL), BILATERAL: Primary | ICD-10-CM

## 2024-10-11 PROCEDURE — 92552 PURE TONE AUDIOMETRY AIR: CPT | Performed by: AUDIOLOGIST

## 2024-10-11 PROCEDURE — 99213 OFFICE O/P EST LOW 20 MIN: CPT | Performed by: OTOLARYNGOLOGY

## 2024-10-11 NOTE — PROGRESS NOTES
Yamini Ocampo is a 79 year old female.    Chief Complaint   Patient presents with    Follow - Up     Hearing loss reevaluation, Repeat audiogram        HISTORY OF PRESENT ILLNESS  6 weeks ago she noted some buzzing and hearing loss to her left ear which was sudden.  No associated upper respiratory tract illness or viral infection.  States that her hearing seems down the entire time and has not really improved since 6 weeks ago.  No other signs, symptoms or complaints.  Audiogram was performed today demonstrating bilateral sensorineural hearing loss with the left ear being significantly worse by about 20 to 30 dB in certain frequencies compared to the right.  Overall she does have a significant hearing loss in both ears with normal tympanograms and speech discrimination scores.     10/11/24 last visit started on Medrol Dosepak as she could not tolerate high-dose steroids for 11 days.  Noted to have an asymmetric mid frequency and low-frequency sensorineural loss on the left.  She notes no change in her hearing repeat audiogram performed today reveals unchanged sensorineural loss on the left.  Previously noted to have a speech evaluation score of about 50%.      Social History     Socioeconomic History    Marital status:    Tobacco Use    Smoking status: Former     Current packs/day: 0.00     Types: Cigarettes     Quit date: 1997     Years since quittin.7    Smokeless tobacco: Never   Vaping Use    Vaping status: Never Used   Substance and Sexual Activity    Alcohol use: No     Comment: rare    Drug use: Never     Comment: edibles occasionally for sleep   Other Topics Concern    Caffeine Concern Yes     Comment: 8 cups coffee/sod daily    Reaction to local anesthetic No       Family History   Problem Relation Age of Onset    Cancer Father         Skin cancer    Other (Other) Father 97        old age,unknown caused    Heart Attack Mother     Heart Disorder Mother     Hypertension Other         Family  H/O    Cancer Other         Lymphoma  Family H/O    Heart Disease Other         Family H/O    Arthritis Other         Close relative  unsure which type    No Known Problems Brother        Past Medical History:    Arrhythmia    afib    Back problem    lumbar disc disease    Basal cell carcinoma of nose    Bilateral carpal tunnel syndrome    Cataract    Cellulitis    Deep vein thrombosis (HCC)    unsure which leg, possibly left    Depression    Esophageal reflux    Essential hypertension    Heart disease    Heel spur    Right    Hemorrhoids    Hiatal hernia    High blood pressure    High cholesterol    Hyperlipidemia    Incontinence    Lumbar disc disease    Multiple gastric ulcers    Osteoarthritis    Pulmonary embolism (HCC)    Raynaud disease    Renal disorder    Tubular adenoma of colon    repeat c-scope 1/2020    Type 2 diabetes mellitus with diabetic chronic kidney disease (HCC)    Visual impairment    glasses       Past Surgical History:   Procedure Laterality Date    Angioplasty (coronary)      Appendectomy      Arthroscopy of joint unlisted Right     knee    Bso, omentectomy w/sushil      Carpal tunnel release Bilateral     Cholecystectomy  09/28/2016    Colonoscopy N/A 01/25/2017    Procedure: COLONOSCOPY;  Surgeon: KATHARINE Prakash MD;  Location: [facility] ENDOSCOPY    Colonoscopy N/A 10/07/2020    Procedure: COLONOSCOPY;  Surgeon: KATHARINE Prakash MD;  Location: [facility] ENDOSCOPY    Colonoscopy N/A 7/31/2024    Procedure: COLONOSCOPY;  Surgeon: Alyssa Orellana MD;  Location: [facility] ENDOSCOPY    Egd N/A 12/14/2016    Procedure: ESOPHAGOGASTRODUODENOSCOPY (EGD);  Surgeon: KATHARINE Prakash MD;  Location: [facility] ENDOSCOPY    Egd N/A 01/23/2024    ; gastritis    Egd N/A 07/27/2024    ; retained food    Electrocardiogram, complete  09/26/2013    Scanned to Media Tab    Excision of nose      Basal cell carcinoma    Hernia surgery      Hysterectomy      Knee replacement surgery Right     Other surgical history       Injections and narcotics, POD Bartuci    Removal of heel spur      Total abdom hysterectomy           REVIEW OF SYSTEMS    System Neg/Pos Details   Constitutional Negative Fatigue, fever and weight loss.   ENMT Negative Drooling.   Eyes Negative Blurred vision and vision changes.   Respiratory Negative Dyspnea and wheezing.   Cardio Negative Chest pain, irregular heartbeat/palpitations and syncope.   GI Negative Abdominal pain and diarrhea.   Endocrine Negative Cold intolerance and heat intolerance.   Neuro Negative Tremors.   Psych Negative Anxiety and depression.   Integumentary Negative Frequent skin infections, pigment change and rash.   Hema/Lymph Negative Easy bleeding and easy bruising.           PHYSICAL EXAM    There were no vitals taken for this visit.       Constitutional Normal Overall appearance - Normal.   Psychiatric Normal Orientation - Oriented to time, place, person & situation. Appropriate mood and affect.   Neck Exam Normal Inspection - Normal. Palpation - Normal. Parotid gland - Normal. Thyroid gland - Normal.   Eyes Normal Conjunctiva - Right: Normal, Left: Normal. Pupil - Right: Normal, Left: Normal. Fundus - Right: Normal, Left: Normal.   Neurological Normal Memory - Normal. Cranial nerves - Cranial nerves II through XII grossly intact.   Head/Face Normal Facial features - Normal. Eyebrows - Normal. Skull - Normal.        Nasopharynx Normal External nose - Normal. Lips/teeth/gums - Normal. Tonsils - Normal. Oropharynx - Normal.   Ears Normal Inspection - Right: Normal, Left: Normal. Canal - Right: Normal, Left: Normal. TM - Right: Normal, Left: Normal.   Skin Normal Inspection - Normal.        Lymph Detail Normal Submental. Submandibular. Anterior cervical. Posterior cervical. Supraclavicular.        Nose/Mouth/Throat Normal External nose - Normal. Lips/teeth/gums - Normal. Tonsils - Normal. Oropharynx - Normal.   Nose/Mouth/Throat Normal Nares - Right: Normal Left: Normal. Septum -Normal   Turbinates - Right: Normal, Left: Normal.       Current Outpatient Medications:     methylPREDNISolone 4 MG Oral Tablet Therapy Pack, Take by oral route., Disp: 21 tablet, Rfl: 0    torsemide 20 MG Oral Tab, Take 2 tablets (40 mg total) by mouth daily., Disp: 180 tablet, Rfl: 0    PANTOPRAZOLE 40 MG Oral Tab EC, TAKE 1 TABLET TWICE A DAY, Disp: 180 tablet, Rfl: 3    metoprolol succinate ER 50 MG Oral Tablet 24 Hr, Take 1 tablet (50 mg total) by mouth Daily Beta Blocker., Disp: 30 tablet, Rfl: 0    spironolactone 25 MG Oral Tab, Take 1 tablet (25 mg total) by mouth daily., Disp: 30 tablet, Rfl: 0    bumetanide 1 MG Oral Tab, Take 1 tablet (1 mg total) by mouth BID (Diuretic)., Disp: 60 tablet, Rfl: 0    sertraline 100 MG Oral Tab, Take 1 tablet (100 mg total) by mouth daily., Disp: 90 tablet, Rfl: 3    atorvastatin 10 MG Oral Tab, Take 1 tablet (10 mg total) by mouth nightly., Disp: 90 tablet, Rfl: 3    FARXIGA 10 MG Oral Tab, Take 1 tablet (10 mg total) by mouth daily., Disp: , Rfl:     albuterol 108 (90 Base) MCG/ACT Inhalation Aero Soln, , Disp: , Rfl:     folic acid 1 MG Oral Tab, Take 1 tablet (1 mg total) by mouth daily., Disp: 90 tablet, Rfl: 3    cyanocobalamin 1000 MCG Oral Tab, Take 1 tablet (1,000 mcg total) by mouth daily., Disp: , Rfl:     ELIQUIS 5 MG Oral Tab, Take 1 tablet (5 mg total) by mouth 2 (two) times daily., Disp: 180 tablet, Rfl: 0  ASSESSMENT AND PLAN    1. Hearing loss, unspecified hearing loss type, unspecified laterality  Unchanged hearing loss on that left side.  I did recommend that she consider amplification in both ears and I did give her a copy of her hearing test as well as a medical clearance letter.  Repeat audiogram in 6 months to watch for any interval change in the left ear and if worsening hearing will recommend an MRI of the brain and IACs to rule out an intracranial or retrocochlear process.  - Audiology Referral - [place] ([facility])        This note was prepared  using Dragon Medical voice recognition dictation software. As a result errors may occur. When identified these errors have been corrected. While every attempt is made to correct errors during dictation discrepancies may still exist    Kiko Helm MD    10/11/2024    12:21 PM

## 2024-10-15 NOTE — TELEPHONE ENCOUNTER
Patient calling ( name and date of birth of patient verified ) needs a refill of Metoprolol ER     Patient  reports has been taking this medication twice a day BUT last RX was on 8/2/2024  for once a day     Last office visit 9/21/2024  med listed as once a day     She does not record her b/p or HR     Patient would like one month supply to local [facility] she has no medication left       Medication pended for your review / approval for 30 days   ( once a day dosing )

## 2024-10-16 RX ORDER — METOPROLOL SUCCINATE 50 MG/1
50 TABLET, EXTENDED RELEASE ORAL 2 TIMES DAILY
Qty: 60 TABLET | Refills: 2 | Status: SHIPPED | OUTPATIENT
Start: 2024-10-16

## 2024-10-16 NOTE — TELEPHONE ENCOUNTER
Dosing changed to bid, if that is ho wshe has been taking. Tried to reach patient to confirm dosing. No answer.

## 2024-10-25 ENCOUNTER — PATIENT MESSAGE (OUTPATIENT)
Dept: ADMINISTRATIVE | Age: 79
End: 2024-10-25

## 2024-10-28 ENCOUNTER — OFFICE VISIT (OUTPATIENT)
Dept: DERMATOLOGY CLINIC | Facility: CLINIC | Age: 79
End: 2024-10-28

## 2024-10-28 DIAGNOSIS — I73.00 RAYNAUD'S DISEASE WITHOUT GANGRENE: ICD-10-CM

## 2024-10-28 DIAGNOSIS — L65.9 HAIR LOSS: Primary | ICD-10-CM

## 2024-10-28 PROCEDURE — 99213 OFFICE O/P EST LOW 20 MIN: CPT | Performed by: PHYSICIAN ASSISTANT

## 2024-10-28 RX ORDER — CLOBETASOL PROPIONATE 0.5 MG/ML
1 SOLUTION TOPICAL
Qty: 60 ML | Refills: 3 | Status: SHIPPED | OUTPATIENT
Start: 2024-10-28

## 2024-10-28 NOTE — PROGRESS NOTES
HPI:    Patient ID: Yamini Ocampo is a 79 year old female.    Patient presents today to discuss eczema and rash on hands ongoing for approx 3 weeks, would also like to discuss redness and itching in bilateral legs ongoing for approx 3 months. No drainign or tenderness noted. Hx of raynauds syndrome. Hx of allergies to medications noted. Also complaining of hair loss on the top of the scalp. Some itching ntoed. No draining or tenderness noted. No scaling ntoed. No erythema. No family hx of hair loss. She states she has been hospitalized a few times as well.         Review of Systems   Constitutional:  Negative for chills and fever.   Musculoskeletal:  Negative for arthralgias and myalgias.   Skin:  Positive for rash. Negative for color change and wound.            Current Outpatient Medications   Medication Sig Dispense Refill    clobetasol 0.05 % External Solution Apply 1 mL topically daily as needed. 60 mL 3    metoprolol succinate ER 50 MG Oral Tablet 24 Hr Take 1 tablet (50 mg total) by mouth in the morning and 1 tablet (50 mg total) before bedtime. 60 tablet 2    methylPREDNISolone 4 MG Oral Tablet Therapy Pack Take by oral route. 21 tablet 0    torsemide 20 MG Oral Tab Take 2 tablets (40 mg total) by mouth daily. 180 tablet 0    PANTOPRAZOLE 40 MG Oral Tab EC TAKE 1 TABLET TWICE A  tablet 3    spironolactone 25 MG Oral Tab Take 1 tablet (25 mg total) by mouth daily. 30 tablet 0    bumetanide 1 MG Oral Tab Take 1 tablet (1 mg total) by mouth BID (Diuretic). 60 tablet 0    sertraline 100 MG Oral Tab Take 1 tablet (100 mg total) by mouth daily. 90 tablet 3    atorvastatin 10 MG Oral Tab Take 1 tablet (10 mg total) by mouth nightly. 90 tablet 3    FARXIGA 10 MG Oral Tab Take 1 tablet (10 mg total) by mouth daily.      albuterol 108 (90 Base) MCG/ACT Inhalation Aero Soln       folic acid 1 MG Oral Tab Take 1 tablet (1 mg total) by mouth daily. 90 tablet 3    cyanocobalamin 1000 MCG Oral Tab Take 1 tablet  (1,000 mcg total) by mouth daily.      ELIQUIS 5 MG Oral Tab Take 1 tablet (5 mg total) by mouth 2 (two) times daily. 180 tablet 0     Allergies:Allergies[1]   There were no vitals taken for this visit.  There is no height or weight on file to calculate BMI.  PHYSICAL EXAM:   Physical Exam  Constitutional:       General: She is not in acute distress.     Appearance: Normal appearance.   Skin:     General: Skin is warm and dry.      Findings: Rash present.      Comments: Dryness noted on the scalp. No draining or tenderness noted. No scaling noted. No erythema noted. Thinning hair noted.     Raynaud's syndrome on the toes bilaterally. No draining or tenderness noted. No scaling noted. No erythema noted. Color changes noted.    Neurological:      Mental Status: She is alert and oriented to person, place, and time.                ASSESSMENT/PLAN:   1. Hair loss  -After discussion with patient, advised the following:  -Started clobetasol   _Educated to apply M-F and on the weekends.   -Check Vitamin D  -Has orders to check Ferritin   -Followed up based on labs.   -To call or follow-up with worsening symptoms or concerns  -Patient's was agreeable to plan and will comply with discussion above.     - Vitamin D; Future    2. Raynaud's diseasewithout gangrene  -After discussion with patient's, advised the following:  -See rheumatology for raynaud's  -Patient has seen the previously by rheumatology  -To call or follow-up with worsening symptoms or concerns  -Patient's was agreeable to plan and will comply with discussion above.         Orders Placed This Encounter   Procedures    Vitamin D       Meds This Visit:  Requested Prescriptions     Signed Prescriptions Disp Refills    clobetasol 0.05 % External Solution 60 mL 3     Sig: Apply 1 mL topically daily as needed.       Imaging & Referrals:  None         ID#[id number]       [1]   Allergies  Allergen Reactions    Adhesive Tape RASH     Skin off

## 2024-11-02 ENCOUNTER — TELEPHONE (OUTPATIENT)
Facility: CLINIC | Age: 79
End: 2024-11-02

## 2024-11-02 NOTE — TELEPHONE ENCOUNTER
1st reminder letter sent out via My Chart for the following:    Ferritin (Order #[id number]) on 9/9/24     Iron And Tibc (Order #[id number]) on 9/9/24     CBC W Differential W Platelet (Order #[id number]) on 9/9/24

## 2024-11-05 ENCOUNTER — PATIENT OUTREACH (OUTPATIENT)
Dept: CASE MANAGEMENT | Age: 79
End: 2024-11-05

## 2024-11-12 ENCOUNTER — TELEPHONE (OUTPATIENT)
Dept: INTERNAL MEDICINE CLINIC | Facility: CLINIC | Age: 79
End: 2024-11-12

## 2024-11-12 NOTE — TELEPHONE ENCOUNTER
Spoke to patient, appointment for Thursday canceled. I overbooked her for tomorrow 11/13 with Dr. Antonio at 11:20am.

## 2024-11-12 NOTE — TELEPHONE ENCOUNTER
The patient was contacted regarding CCM and wanted me to send a message to her PCP's office.    The patient has a cough with phlegm, she denies a fever but does have chills. The patient has not tried any OTC medications.    Please triage call.    Thank you,    Gissel OAKLEY

## 2024-11-12 NOTE — TELEPHONE ENCOUNTER
Called patient but no answer. Left voice message we can schedule a video appointment at 12:20pm if needed or in person if she prefers with Dr. Antonio.     Please call patient for an appointment

## 2024-11-13 ENCOUNTER — TELEMEDICINE (OUTPATIENT)
Dept: INTERNAL MEDICINE CLINIC | Facility: CLINIC | Age: 79
End: 2024-11-13

## 2024-11-13 DIAGNOSIS — J02.9 ACUTE PHARYNGITIS, UNSPECIFIED ETIOLOGY: Primary | ICD-10-CM

## 2024-11-13 PROCEDURE — 99213 OFFICE O/P EST LOW 20 MIN: CPT | Performed by: INTERNAL MEDICINE

## 2024-11-13 RX ORDER — BENZONATATE 200 MG/1
200 CAPSULE ORAL 3 TIMES DAILY PRN
Qty: 40 CAPSULE | Refills: 1 | Status: SHIPPED | OUTPATIENT
Start: 2024-11-13

## 2024-11-13 RX ORDER — AZITHROMYCIN 250 MG/1
TABLET, FILM COATED ORAL
Qty: 6 TABLET | Refills: 0 | Status: SHIPPED | OUTPATIENT
Start: 2024-11-13 | End: 2024-11-18

## 2024-11-13 NOTE — PROGRESS NOTES
Yamini Ocampo is a 79 year old female.No chief complaint on file.    HPI:    The visit was conducted with the use of interactive audio telecommunication system that permits real time communication between the patient and provider. Patient consent for virtual visit obtained, The patient was made aware of the limitations of the telehealth visit, including treatment limitations as no physical exam could be performed.  The patient was advised to call 911 or to go to the ER in case there was an emergency.patient understands and wishes to proceed    Patient presented today with c/o cough, sore throat, congestion and over all tiredness since last couple days. No fever. Has not triedd any over the counter meds yet.      Current Outpatient Medications   Medication Sig Dispense Refill    azithromycin 250 MG Oral Tab Take 2 tablets (500 mg total) by mouth daily for 1 day, THEN 1 tablet (250 mg total) daily for 4 days. 6 tablet 0    benzonatate 200 MG Oral Cap Take 1 capsule (200 mg total) by mouth 3 (three) times daily as needed for cough. 40 capsule 1    clobetasol 0.05 % External Solution Apply 1 mL topically daily as needed. 60 mL 3    metoprolol succinate ER 50 MG Oral Tablet 24 Hr Take 1 tablet (50 mg total) by mouth in the morning and 1 tablet (50 mg total) before bedtime. 60 tablet 2    methylPREDNISolone 4 MG Oral Tablet Therapy Pack Take by oral route. 21 tablet 0    torsemide 20 MG Oral Tab Take 2 tablets (40 mg total) by mouth daily. 180 tablet 0    PANTOPRAZOLE 40 MG Oral Tab EC TAKE 1 TABLET TWICE A  tablet 3    spironolactone 25 MG Oral Tab Take 1 tablet (25 mg total) by mouth daily. 30 tablet 0    bumetanide 1 MG Oral Tab Take 1 tablet (1 mg total) by mouth BID (Diuretic). 60 tablet 0    sertraline 100 MG Oral Tab Take 1 tablet (100 mg total) by mouth daily. 90 tablet 3    atorvastatin 10 MG Oral Tab Take 1 tablet (10 mg total) by mouth nightly. 90 tablet 3    FARXIGA 10 MG Oral Tab Take 1 tablet (10 mg  total) by mouth daily.      albuterol 108 (90 Base) MCG/ACT Inhalation Aero Soln       folic acid 1 MG Oral Tab Take 1 tablet (1 mg total) by mouth daily. 90 tablet 3    cyanocobalamin 1000 MCG Oral Tab Take 1 tablet (1,000 mcg total) by mouth daily.      ELIQUIS 5 MG Oral Tab Take 1 tablet (5 mg total) by mouth 2 (two) times daily. 180 tablet 0      Past Medical History:    Arrhythmia    afib    Back problem    lumbar disc disease    Basal cell carcinoma of nose    Bilateral carpal tunnel syndrome    Cataract    Cellulitis    Deep vein thrombosis (HCC)    unsure which leg, possibly left    Depression    Esophageal reflux    Essential hypertension    Heart disease    Heel spur    Right    Hemorrhoids    Hiatal hernia    High blood pressure    High cholesterol    Hyperlipidemia    Incontinence    Lumbar disc disease    Multiple gastric ulcers    Osteoarthritis    Pulmonary embolism (HCC)    Raynaud disease    Renal disorder    Tubular adenoma of colon    repeat c-scope 1/2020    Type 2 diabetes mellitus with diabetic chronic kidney disease (HCC)    Visual impairment    glasses      Past Surgical History:   Procedure Laterality Date    Angioplasty (coronary)      Appendectomy      Arthroscopy of joint unlisted Right     knee    Bso, omentectomy w/sushil      Carpal tunnel release Bilateral     Cholecystectomy  09/28/2016    Colonoscopy N/A 01/25/2017    Procedure: COLONOSCOPY;  Surgeon: KATHARINE Prakash MD;  Location: [facility] ENDOSCOPY    Colonoscopy N/A 10/07/2020    Procedure: COLONOSCOPY;  Surgeon: KATHARINE Prakash MD;  Location: [facility] ENDOSCOPY    Colonoscopy N/A 7/31/2024    Procedure: COLONOSCOPY;  Surgeon: Alyssa Orellana MD;  Location: [facility] ENDOSCOPY    Egd N/A 12/14/2016    Procedure: ESOPHAGOGASTRODUODENOSCOPY (EGD);  Surgeon: KATHARINE Prakash MD;  Location: [facility] ENDOSCOPY    Egd N/A 01/23/2024    ; gastritis    Egd N/A 07/27/2024    ; retained food    Electrocardiogram, complete  09/26/2013    Scanned  to Media Tab    Excision of nose      Basal cell carcinoma    Hernia surgery      Hysterectomy      Knee replacement surgery Right     Other surgical history      Injections and narcotics, POD Bartuci    Removal of heel spur      Total abdom hysterectomy        Social History:  Social History     Socioeconomic History    Marital status:    Tobacco Use    Smoking status: Former     Current packs/day: 0.00     Types: Cigarettes     Quit date: 1997     Years since quittin.8    Smokeless tobacco: Never   Vaping Use    Vaping status: Never Used   Substance and Sexual Activity    Alcohol use: No     Comment: rare    Drug use: Never     Comment: edibles occasionally for sleep   Other Topics Concern    Caffeine Concern Yes     Comment: 8 cups coffee/sod daily    Reaction to local anesthetic No    Pt has a pacemaker No    Pt has a defibrillator No     Social Drivers of Health     Financial Resource Strain: Low Risk  (3/14/2023)    Financial Resource Strain     Difficulty of Paying Living Expenses: Not very hard     Med Affordability: No   Food Insecurity: No Food Insecurity (2024)    Food Insecurity     Food Insecurity: Never true   Transportation Needs: No Transportation Needs (2024)    Transportation Needs     Lack of Transportation: No   Housing Stability: Low Risk  (2024)    Housing Stability     Housing Instability: No      Family History   Problem Relation Age of Onset    Cancer Father         Skin cancer    Other (Other) Father 97        old age,unknown caused    Heart Attack Mother     Heart Disorder Mother     Hypertension Other         Family H/O    Cancer Other         Lymphoma  Family H/O    Heart Disease Other         Family H/O    Arthritis Other         Close relative  unsure which type    No Known Problems Brother       Allergies[1]     REVIEW OF SYSTEMS:   Review of Systems   Review of Systems   Constitutional: Negative for activity change, appetite change and fever.   HENT:  Negative for congestion and voice change.    Respiratory: Negative for cough and shortness of breath.    Cardiovascular: Negative for chest pain.   Gastrointestinal: Negative for abdominal distention, abdominal pain and vomiting.   Genitourinary: Negative for hematuria.   Skin: Negative for wound.   Psychiatric/Behavioral: Negative for behavioral problems.   Wt Readings from Last 5 Encounters:   09/12/24 193 lb (87.5 kg)   09/09/24 198 lb (89.8 kg)   08/18/24 190 lb (86.2 kg)   08/12/24 180 lb (81.6 kg)   08/08/24 195 lb 1.6 oz (88.5 kg)     There is no height or weight on file to calculate BMI.      EXAM:   There were no vitals taken for this visit.  Physical Exam   Limited exam secondary to telehealth visit  Patient appears well  Alert and oriented  No cough during exam   Normal bilateral upper ext movements  No tremor noted  Mood stable     ASSESSMENT AND PLAN:   1. Acute pharyngitis, unspecified etiology  - tylenol as needed  - increase hydration   - azithromycin 250 MG Oral Tab; Take 2 tablets (500 mg total) by mouth daily for 1 day, THEN 1 tablet (250 mg total) daily for 4 days.  Dispense: 6 tablet; Refill: 0  - benzonatate 200 MG Oral Cap; Take 1 capsule (200 mg total) by mouth 3 (three) times daily as needed for cough.  Dispense: 40 capsule; Refill: 1      The patient indicates understanding of these issues and agrees to the plan.  Total time spent- 10 minutes    Elly Antonio MD          [1]   Allergies  Allergen Reactions    Adhesive Tape RASH     Skin off

## 2024-11-15 ENCOUNTER — PATIENT OUTREACH (OUTPATIENT)
Dept: CASE MANAGEMENT | Age: 79
End: 2024-11-15

## 2024-11-15 NOTE — PROGRESS NOTES
Attempt to reach Yamini Ocampo to do CCM Welcome Assessment but didn't answer left message for patient to call CCM to do the assessment.    Total time -  4 min.  Total Monthly time-  4 min.   Next Care Manager Follow Up Date: 2-4 Weeks.

## 2024-12-05 ENCOUNTER — APPOINTMENT (OUTPATIENT)
Dept: GENERAL RADIOLOGY | Facility: HOSPITAL | Age: 79
End: 2024-12-05
Attending: EMERGENCY MEDICINE
Payer: MEDICARE

## 2024-12-05 ENCOUNTER — HOSPITAL ENCOUNTER (INPATIENT)
Facility: HOSPITAL | Age: 79
LOS: 5 days | Discharge: HOME | End: 2024-12-10
Attending: EMERGENCY MEDICINE | Admitting: HOSPITALIST
Payer: MEDICARE

## 2024-12-05 ENCOUNTER — NURSE TRIAGE (OUTPATIENT)
Dept: INTERNAL MEDICINE CLINIC | Facility: CLINIC | Age: 79
End: 2024-12-05

## 2024-12-05 DIAGNOSIS — D50.0 IRON DEFICIENCY ANEMIA DUE TO CHRONIC BLOOD LOSS: ICD-10-CM

## 2024-12-05 DIAGNOSIS — K92.2 GASTROINTESTINAL HEMORRHAGE, UNSPECIFIED GASTROINTESTINAL HEMORRHAGE TYPE: ICD-10-CM

## 2024-12-05 DIAGNOSIS — I50.9 ACUTE ON CHRONIC CONGESTIVE HEART FAILURE, UNSPECIFIED HEART FAILURE TYPE (HCC): Primary | ICD-10-CM

## 2024-12-05 PROBLEM — I50.33 ACUTE ON CHRONIC DIASTOLIC (CONGESTIVE) HEART FAILURE (HCC): Status: ACTIVE | Noted: 2024-12-05

## 2024-12-05 LAB
ANION GAP SERPL CALC-SCNC: 11 MMOL/L (ref 0–18)
BASOPHILS # BLD AUTO: 0.06 X10(3) UL (ref 0–0.2)
BASOPHILS NFR BLD AUTO: 1.3 %
BUN BLD-MCNC: 42 MG/DL (ref 9–23)
BUN/CREAT SERPL: 19.2 (ref 10–20)
CALCIUM BLD-MCNC: 9.4 MG/DL (ref 8.7–10.4)
CHLORIDE SERPL-SCNC: 108 MMOL/L (ref 98–112)
CO2 SERPL-SCNC: 25 MMOL/L (ref 21–32)
CREAT BLD-MCNC: 2.19 MG/DL
DEPRECATED RDW RBC AUTO: 65.1 FL (ref 35.1–46.3)
EGFRCR SERPLBLD CKD-EPI 2021: 22 ML/MIN/1.73M2 (ref 60–?)
EOSINOPHIL # BLD AUTO: 0.04 X10(3) UL (ref 0–0.7)
EOSINOPHIL NFR BLD AUTO: 0.9 %
ERYTHROCYTE [DISTWIDTH] IN BLOOD BY AUTOMATED COUNT: 19 % (ref 11–15)
GLUCOSE BLD-MCNC: 113 MG/DL (ref 70–99)
HCT VFR BLD AUTO: 25 %
HGB BLD-MCNC: 7 G/DL
IMM GRANULOCYTES # BLD AUTO: 0.02 X10(3) UL (ref 0–1)
IMM GRANULOCYTES NFR BLD: 0.4 %
LYMPHOCYTES # BLD AUTO: 0.61 X10(3) UL (ref 1–4)
LYMPHOCYTES NFR BLD AUTO: 13.2 %
MCH RBC QN AUTO: 29.3 PG (ref 26–34)
MCHC RBC AUTO-ENTMCNC: 28 G/DL (ref 31–37)
MCV RBC AUTO: 104.6 FL
MONOCYTES # BLD AUTO: 0.39 X10(3) UL (ref 0.1–1)
MONOCYTES NFR BLD AUTO: 8.4 %
NEUTROPHILS # BLD AUTO: 3.5 X10 (3) UL (ref 1.5–7.7)
NEUTROPHILS # BLD AUTO: 3.5 X10(3) UL (ref 1.5–7.7)
NEUTROPHILS NFR BLD AUTO: 75.8 %
NT-PROBNP SERPL-MCNC: ABNORMAL PG/ML (ref ?–450)
OSMOLALITY SERPL CALC.SUM OF ELEC: 309 MOSM/KG (ref 275–295)
PLATELET # BLD AUTO: 290 10(3)UL (ref 150–450)
PLATELET MORPHOLOGY: NORMAL
POTASSIUM SERPL-SCNC: 4.4 MMOL/L (ref 3.5–5.1)
RBC # BLD AUTO: 2.39 X10(6)UL
SODIUM SERPL-SCNC: 144 MMOL/L (ref 136–145)
TROPONIN I SERPL HS-MCNC: 25 NG/L
WBC # BLD AUTO: 4.6 X10(3) UL (ref 4–11)

## 2024-12-05 PROCEDURE — 71045 X-RAY EXAM CHEST 1 VIEW: CPT | Performed by: EMERGENCY MEDICINE

## 2024-12-05 PROCEDURE — 99223 1ST HOSP IP/OBS HIGH 75: CPT | Performed by: HOSPITALIST

## 2024-12-05 RX ORDER — FUROSEMIDE 10 MG/ML
20 INJECTION INTRAMUSCULAR; INTRAVENOUS ONCE
Status: COMPLETED | OUTPATIENT
Start: 2024-12-05 | End: 2024-12-05

## 2024-12-05 RX ORDER — ACETAMINOPHEN 500 MG
500 TABLET ORAL EVERY 4 HOURS PRN
Status: DISCONTINUED | OUTPATIENT
Start: 2024-12-05 | End: 2024-12-08

## 2024-12-05 RX ORDER — METOCLOPRAMIDE HYDROCHLORIDE 5 MG/ML
5 INJECTION INTRAMUSCULAR; INTRAVENOUS EVERY 8 HOURS PRN
Status: DISCONTINUED | OUTPATIENT
Start: 2024-12-05 | End: 2024-12-10

## 2024-12-05 RX ORDER — BUMETANIDE 0.25 MG/ML
1 INJECTION, SOLUTION INTRAMUSCULAR; INTRAVENOUS
Status: DISCONTINUED | OUTPATIENT
Start: 2024-12-06 | End: 2024-12-09

## 2024-12-05 RX ORDER — ONDANSETRON 2 MG/ML
4 INJECTION INTRAMUSCULAR; INTRAVENOUS EVERY 6 HOURS PRN
Status: DISCONTINUED | OUTPATIENT
Start: 2024-12-05 | End: 2024-12-10

## 2024-12-05 RX ORDER — METOPROLOL SUCCINATE 50 MG/1
50 TABLET, EXTENDED RELEASE ORAL DAILY
COMMUNITY
End: 2024-12-10

## 2024-12-05 NOTE — ED INITIAL ASSESSMENT (HPI)
Pt arrives through triage with       complaints of low bp readings, reports lightheadedness. Pt reports low readings for the last couple of days. Pt appears pale. Endorses some SOB denies CP    Pt states she hasn't been taking her dieretics for the last 3-4 days

## 2024-12-05 NOTE — TELEPHONE ENCOUNTER
Please reply to pool: EM RN TRIAGE  Action Requested: Summary for Provider     []  Critical Lab, Recommendations Needed  [] Need Additional Advice  [x]   FYI    []   Need Orders  [] Need Medications Sent to Pharmacy  []  Other     SUMMARY: Patient returned call regarding low blood pressure appointment scheduled for Monday.  Reports blood pressure of 90s/50s and feeling dizzy.  Worse when going from sitting to standing.  Becomes tachycardic and shortness of breath with activity.  Her speech is calm and clear.  There have been discrepancies with her metoprolol.  Nurse advised emergency room evaluation.  Patient agreed and will have a family member take her. Flagged for follow up.     Reason for call: Blood Pressure  Onset: Data Unavailable                       Reason for Disposition   Fall in systolic BP > 20 mm Hg from normal and feeling dizzy, lightheaded, or weak    Protocols used: Blood Pressure - Low-A-OH

## 2024-12-05 NOTE — ED PROVIDER NOTES
Patient Seen in: [facility] Emergency Department    History   No chief complaint on file.      HPI    79-year-old female presents ER with complaint of shortness of breath for the past few days.  Patient with a past medical history of A-fib, CHF, renal disorder.  Patient states that her blood pressure has been low for the last few days so she stopped taking her diuretic.  Patient states she has more swelling in her legs and feels short of breath.  Patient's oxygen saturation 93% on room air    History reviewed.   Past Medical History:    Arrhythmia    afib    Back problem    lumbar disc disease    Basal cell carcinoma of nose    Bilateral carpal tunnel syndrome    Cataract    Cellulitis    Deep vein thrombosis (HCC)    unsure which leg, possibly left    Depression    Esophageal reflux    Essential hypertension    Heart disease    Heel spur    Right    Hemorrhoids    Hiatal hernia    High blood pressure    High cholesterol    Hyperlipidemia    Incontinence    Lumbar disc disease    Multiple gastric ulcers    Osteoarthritis    Pulmonary embolism (HCC)    Raynaud disease    Renal disorder    Tubular adenoma of colon    repeat c-scope 1/2020    Type 2 diabetes mellitus with diabetic chronic kidney disease (HCC)    Visual impairment    glasses       History reviewed.   Past Surgical History:   Procedure Laterality Date    Angioplasty (coronary)      Appendectomy      Arthroscopy of joint unlisted Right     knee    Bso, omentectomy w/sushil      Carpal tunnel release Bilateral     Cholecystectomy  09/28/2016    Colonoscopy N/A 01/25/2017    Procedure: COLONOSCOPY;  Surgeon: KATHARINE Prakash MD;  Location: [facility] ENDOSCOPY    Colonoscopy N/A 10/07/2020    Procedure: COLONOSCOPY;  Surgeon: KATHARINE Prakash MD;  Location: [facility] ENDOSCOPY    Colonoscopy N/A 7/31/2024    Procedure: COLONOSCOPY;  Surgeon: Alyssa Orellana MD;  Location: [facility] ENDOSCOPY    Egd N/A 12/14/2016    Procedure: ESOPHAGOGASTRODUODENOSCOPY (EGD);  Surgeon:  KATHARINE Prakash MD;  Location: [facility] ENDOSCOPY    Egd N/A 2024    ; gastritis    Egd N/A 2024    ; retained food    Electrocardiogram, complete  2013    Scanned to Media Tab    Excision of nose      Basal cell carcinoma    Hernia surgery      Hysterectomy      Knee replacement surgery Right     Other surgical history      Injections and narcotics, POD Bartuci    Removal of heel spur      Total abdom hysterectomy           Medications :  Prescriptions Prior to Admission[1]     Family History   Problem Relation Age of Onset    Cancer Father         Skin cancer    Other (Other) Father 97        old age,unknown caused    Heart Attack Mother     Heart Disorder Mother     Hypertension Other         Family H/O    Cancer Other         Lymphoma  Family H/O    Heart Disease Other         Family H/O    Arthritis Other         Close relative  unsure which type    No Known Problems Brother        Smoking Status:   Social History     Socioeconomic History    Marital status:    Tobacco Use    Smoking status: Former     Current packs/day: 0.00     Types: Cigarettes     Quit date: 1997     Years since quittin.9    Smokeless tobacco: Never   Vaping Use    Vaping status: Never Used   Substance and Sexual Activity    Alcohol use: No     Comment: rare    Drug use: Never     Comment: edibles occasionally for sleep   Other Topics Concern    Caffeine Concern Yes     Comment: 8 cups coffee/sod daily    Reaction to local anesthetic No    Pt has a pacemaker No    Pt has a defibrillator No       ROS  All pertinent positives for the review of systems are mentioned in the HPI  All other organ systems are reviewed and are negative.    Constitutional and vital signs reviewed.      Social History and Family History elements reviewed from today, pertinent positives to the presenting problem noted.    Physical Exam     ED Triage Vitals [24 1637]   /46   Pulse 75   Resp 18   Temp    Temp  src    SpO2 93 %   O2 Device None (Room air)       All measures to prevent infection transmission during my interaction with the patient were taken. The patient was already wearing a droplet mask on my arrival to the room. Personal protective equipment including droplet mask, eye protection, and gloves were worn throughout the duration of the exam.  Handwashing was performed prior to and after the exam.  Stethoscope and any equipment used during my examination was cleaned with super sani-cloth germicidal wipes following the exam.     Physical Exam  Vitals and nursing note reviewed.   Constitutional:       Appearance: She is well-developed.   HENT:      Head: Normocephalic and atraumatic.      Right Ear: External ear normal.      Left Ear: External ear normal.      Nose: Nose normal.   Eyes:      Conjunctiva/sclera: Conjunctivae normal.      Pupils: Pupils are equal, round, and reactive to light.   Cardiovascular:      Rate and Rhythm: Normal rate and regular rhythm.      Heart sounds: Normal heart sounds.   Pulmonary:      Effort: Pulmonary effort is normal.      Breath sounds: Examination of the right-lower field reveals decreased breath sounds. Examination of the left-lower field reveals decreased breath sounds. Decreased breath sounds present.   Abdominal:      General: Bowel sounds are normal.      Palpations: Abdomen is soft.   Musculoskeletal:         General: Normal range of motion.      Cervical back: Normal range of motion and neck supple.      Comments: 3+  edema bilateral lower extremities   Skin:     General: Skin is warm and dry.   Neurological:      Mental Status: She is alert and oriented to person, place, and time.      Deep Tendon Reflexes: Reflexes are normal and symmetric.   Psychiatric:         Behavior: Behavior normal.         Thought Content: Thought content normal.         Judgment: Judgment normal.         ED Course        Labs Reviewed   BASIC METABOLIC PANEL (8) - Abnormal; Notable for the  following components:       Result Value    Glucose 113 (*)     BUN 42 (*)     Creatinine 2.19 (*)     Calculated Osmolality 309 (*)     eGFR-Cr 22 (*)     All other components within normal limits   CBC WITH DIFFERENTIAL WITH PLATELET - Abnormal; Notable for the following components:    RBC 2.39 (*)     HGB 7.0 (*)     HCT 25.0 (*)     .6 (*)     MCHC 28.0 (*)     RDW-SD 65.1 (*)     RDW 19.0 (*)     Lymphocyte Absolute 0.61 (*)     All other components within normal limits   PRO BETA NATRIURETIC PEPTIDE - Abnormal; Notable for the following components:    Pro-Beta Natriuretic Peptide 10,882 (*)     All other components within normal limits   RBC MORPHOLOGY SCAN - Abnormal; Notable for the following components:    RBC Morphology See morphology below (*)     All other components within normal limits   TROPONIN I HIGH SENSITIVITY - Normal     EKG    Rate, intervals and axes as noted on EKG Report.  Rate: 78  Rhythm: Atrial Fibrillation  Reading: No ST deviation, right axis deviation, incomplete right bundle branch block             Imaging Results Available and Reviewed while in ED: No results found.  ED Medications Administered:   Medications   furosemide (Lasix) 10 mg/mL injection 20 mg (has no administration in time range)         MDM     Vitals:    12/05/24 1700 12/05/24 1730 12/05/24 1800 12/05/24 1815   BP: 108/71 116/58 118/78 139/71   Pulse: 74 72 66 71   Resp: 23 23 19 17   SpO2: 95% 93% (!) 88% 96%   Weight:       Height:         *I personally reviewed and interpreted all ED vitals.  I also personally reviewed all labs and imaging if ordered    Pulse Ox: 93%, Room air, Normal     Monitor Interpretation:   normal sinus rhythm    Differential Diagnosis/ Diagnostic Considerations: CHF exacerbation, pneumonia, URI, pleural effusion    Medical Record Review: I personally reviewed available prior medical records for any recent pertinent discharge summaries, testing, and procedures and reviewed those  reports.    Complicating Factors: The patient already has does not have any pertinent problems on file. to contribute to the complexity of this ED evaluation.    Medical Decision Making  79-year-old female presents ER with complaints of shortness of breath.  Patient with a past medical history of congestive heart failure.  Patient states that she has not been taking her diuretics secondary to hypotension.  Patient given Lasix 20 mg IV in the ER.  Patient also with a unit drop in her hemoglobin.  Patient with history of GI bleed in the past and guaiac positive.  Will be admitted for further evaluation.  GI notified of the patient's admission.  Cardiology notified of the admission as well.  Patient's family members bedside made aware of the disposition admission.  Hospitalist notified of the admission as well.    Total Critical Care Time: 32 minutes including time spent examining and re-evaluating the patient, ordering and reviewing laboratory tests, documenting, reviewing previous records, obtaining information from the family, and speaking with consultants, admitting doctors, nurses and medics and excludes any time spent on procedures.      Amount and/or Complexity of Data Reviewed  External Data Reviewed:      Details: Notes reviewed.  Patient with history of CHF currently on Lasix.  Patient with history of GI bleed in 2017.  Labs: ordered. Decision-making details documented in ED Course.  Radiology: ordered and independent interpretation performed. Decision-making details documented in ED Course.     Details: Chest x-ray shows pulmonary congestion reviewed by myself.        Condition upon leaving the department: Stable    Disposition and Plan     Clinical Impression:  1. Acute on chronic congestive heart failure, unspecified heart failure type (HCC)    2. Gastrointestinal hemorrhage, unspecified gastrointestinal hemorrhage type    3. Iron deficiency anemia due to chronic blood loss         Disposition:  Admit    Follow-up:  No follow-up provider specified.    Medications Prescribed:  Current Discharge Medication List          Hospital Problems       Present on Admission  Date Reviewed: 11/13/2024            ICD-10-CM Noted POA    * (Principal) Acute on chronic congestive heart failure, unspecified heart failure type (HCC) I50.9 3/7/2023 Unknown               [1] (Not in a hospital admission)

## 2024-12-05 NOTE — ED QUICK NOTES
Orders for admission, patient is aware of plan and ready to go upstairs. Any questions, please call ED RN Delia at extension [phone].     Patient Covid vaccination status: Fully vaccinated     COVID Test Ordered in ED: None    COVID Suspicion at Admission: N/A    Running Infusions:  None    Mental Status/LOC at time of transport: Aox4, HH    Other pertinent information:   CIWA score: N/A   NIH score:  N/A

## 2024-12-06 ENCOUNTER — APPOINTMENT (OUTPATIENT)
Dept: CV DIAGNOSTICS | Facility: HOSPITAL | Age: 79
End: 2024-12-06
Attending: HOSPITALIST
Payer: MEDICARE

## 2024-12-06 LAB
ANION GAP SERPL CALC-SCNC: 8 MMOL/L (ref 0–18)
ANTIBODY SCREEN: NEGATIVE
BASOPHILS # BLD AUTO: 0.07 X10(3) UL (ref 0–0.2)
BASOPHILS NFR BLD AUTO: 1.2 %
BUN BLD-MCNC: 38 MG/DL (ref 9–23)
BUN/CREAT SERPL: 18.8 (ref 10–20)
CALCIUM BLD-MCNC: 9.4 MG/DL (ref 8.7–10.4)
CHLORIDE SERPL-SCNC: 109 MMOL/L (ref 98–112)
CO2 SERPL-SCNC: 26 MMOL/L (ref 21–32)
CREAT BLD-MCNC: 2.02 MG/DL
DEPRECATED HBV CORE AB SER IA-ACNC: 21 NG/ML
DEPRECATED RDW RBC AUTO: 63.2 FL (ref 35.1–46.3)
EGFRCR SERPLBLD CKD-EPI 2021: 24 ML/MIN/1.73M2 (ref 60–?)
EOSINOPHIL # BLD AUTO: 0.08 X10(3) UL (ref 0–0.7)
EOSINOPHIL NFR BLD AUTO: 1.4 %
ERYTHROCYTE [DISTWIDTH] IN BLOOD BY AUTOMATED COUNT: 19.2 % (ref 11–15)
GLUCOSE BLD-MCNC: 100 MG/DL (ref 70–99)
HCT VFR BLD AUTO: 22.5 %
HGB BLD-MCNC: 6.4 G/DL
HGB BLD-MCNC: 7.9 G/DL
IMM GRANULOCYTES # BLD AUTO: 0.02 X10(3) UL (ref 0–1)
IMM GRANULOCYTES NFR BLD: 0.3 %
IRON SATN MFR SERPL: 6 %
IRON SERPL-MCNC: 25 UG/DL
LYMPHOCYTES # BLD AUTO: 0.62 X10(3) UL (ref 1–4)
LYMPHOCYTES NFR BLD AUTO: 10.7 %
MCH RBC QN AUTO: 29.2 PG (ref 26–34)
MCHC RBC AUTO-ENTMCNC: 28.4 G/DL (ref 31–37)
MCV RBC AUTO: 102.7 FL
MONOCYTES # BLD AUTO: 0.49 X10(3) UL (ref 0.1–1)
MONOCYTES NFR BLD AUTO: 8.5 %
NEUTROPHILS # BLD AUTO: 4.51 X10 (3) UL (ref 1.5–7.7)
NEUTROPHILS # BLD AUTO: 4.51 X10(3) UL (ref 1.5–7.7)
NEUTROPHILS NFR BLD AUTO: 77.9 %
OSMOLALITY SERPL CALC.SUM OF ELEC: 305 MOSM/KG (ref 275–295)
PLATELET # BLD AUTO: 290 10(3)UL (ref 150–450)
POTASSIUM SERPL-SCNC: 4.3 MMOL/L (ref 3.5–5.1)
Q-T INTERVAL: 384 MS
QRS DURATION: 94 MS
QTC CALCULATION (BEZET): 437 MS
R AXIS: 105 DEGREES
RBC # BLD AUTO: 2.19 X10(6)UL
RH BLOOD TYPE: NEGATIVE
SODIUM SERPL-SCNC: 143 MMOL/L (ref 136–145)
T AXIS: 52 DEGREES
TIBC SERPL-MCNC: 431 UG/DL (ref 250–425)
TRANSFERRIN SERPL-MCNC: 289 MG/DL (ref 250–380)
VENTRICULAR RATE: 78 BPM
WBC # BLD AUTO: 5.8 X10(3) UL (ref 4–11)

## 2024-12-06 PROCEDURE — 93306 TTE W/DOPPLER COMPLETE: CPT | Performed by: HOSPITALIST

## 2024-12-06 PROCEDURE — 30233N1 TRANSFUSION OF NONAUTOLOGOUS RED BLOOD CELLS INTO PERIPHERAL VEIN, PERCUTANEOUS APPROACH: ICD-10-PCS | Performed by: HOSPITALIST

## 2024-12-06 PROCEDURE — 99223 1ST HOSP IP/OBS HIGH 75: CPT | Performed by: INTERNAL MEDICINE

## 2024-12-06 PROCEDURE — 99233 SBSQ HOSP IP/OBS HIGH 50: CPT | Performed by: HOSPITALIST

## 2024-12-06 RX ORDER — SODIUM CHLORIDE 9 MG/ML
INJECTION, SOLUTION INTRAVENOUS ONCE
Status: COMPLETED | OUTPATIENT
Start: 2024-12-06 | End: 2024-12-06

## 2024-12-06 RX ORDER — SIMETHICONE 80 MG
320 TABLET,CHEWABLE ORAL ONCE
Status: DISCONTINUED | OUTPATIENT
Start: 2024-12-07 | End: 2024-12-10

## 2024-12-06 RX ORDER — POLYETHYLENE GLYCOL 3350 17 G/17G
34 POWDER, FOR SOLUTION ORAL ONCE
Status: COMPLETED | OUTPATIENT
Start: 2024-12-06 | End: 2024-12-06

## 2024-12-06 RX ORDER — FUROSEMIDE 10 MG/ML
20 INJECTION INTRAMUSCULAR; INTRAVENOUS ONCE
Status: DISCONTINUED | OUTPATIENT
Start: 2024-12-06 | End: 2024-12-09

## 2024-12-06 NOTE — PROGRESS NOTES
[facility]  part of [facility]    Progress Note    Yamini Ocampo Patient Status:  Inpatient    1945 MRN [id number]   Location [facility] 3W/SW Attending Sarah Portillo MD   Hosp Day # 1 PCP Elly Antonio MD       Subjective:   Yamini Ocampo is feeling ok. No chest pain or sob or black stools.     Objective:   Blood pressure 94/60, pulse 87, temperature 98.2 °F (36.8 °C), temperature source Oral, resp. rate 18, height 5' 6\" (1.676 m), weight 191 lb 3.2 oz (86.7 kg), SpO2 93%.    Physical Exam:    General: No acute distress.   Respiratory: Clear to auscultation bilaterally. No wheezes. No rhonchi.  Cardiovascular: S1, S2. Regular rate and rhythm. No murmurs, rubs or gallops.   Abdomen: Soft, nontender, nondistended.  Positive bowel sounds. No rebound or guarding.  Neurologic: No focal neurological deficits.   Musculoskeletal: Moves all extremities.  Extremities: No edema.    Results:     Lab Results   Component Value Date    WBC 5.8 2024    HGB 6.4 (LL) 2024    HCT 22.5 (L) 2024    .0 2024    CREATSERUM 2.02 (H) 2024    BUN 38 (H) 2024     2024    K 4.3 2024     2024    CO2 26.0 2024     (H) 2024    CA 9.4 2024    ALB 3.9 2024    ALKPHO 96 2024    BILT 0.9 2024    TP 6.9 2024    AST 23 2024    ALT <7 (L) 2024    PTT 29.0 2024    INR 1.31 (H) 2024    TSH 2.130 2021    LIP 26 2024    ESRML 35 (H) 2017    CRP 0.7 2017     (H) 06/10/2011    MG 1.8 2024    PHOS 3.7 2024    TROP <0.045 05/10/2019    CK 20 (L) 2017    B12 626 2023       No results found.  EKG 12 Lead    Result Date: 2024  Atrial fibrillation Rightward axis Low voltage QRS, consider pulmonary disease, pericardial effusion, or normal variant Incomplete right bundle branch block Septal infarct (cited on or before  12-AUG-2024) Abnormal ECG When compared with ECG of 12-AUG-2024 18:53, No significant change was found Confirmed by car appiah ([id number]) on 12/6/2024 12:20:48 PM        pantoprazole  40 mg Intravenous Q12H    sodium ferric gluconate  125 mg Intravenous Daily    sodium chloride   Intravenous Once    furosemide  20 mg Intravenous Once    bumetanide  1 mg Intravenous BID (Diuretic)       glycerin-hypromellose-    acetaminophen    ondansetron    metoclopramide    influenza virus vaccine PF    Assessment and Plan:     Acute on chronic diastolic congestive heart failure, unspecified heart failure type (HCC)  - Cards on board  - IV lasix 20 mg given today  -      Gastrointestinal hemorrhage, unspecified gastrointestinal hemorrhage type      Iron deficiency anemia due to chronic blood loss  - transfuse 1 unit of PRBCs  - hold eliquis  - EGD tomorrow   - IV iron   - Hb goal >7        Quality:  DVT Prophylaxis:SCDs  CODE status: Full    MDM.high      BHAVESH MOSER MD  12/06/24

## 2024-12-06 NOTE — CONSULTS
Is this a shared or split note between Advanced Practice Provider and Physician? Yes      [facility], [place]   Gastroenterology Consultation Note    Yamini Ocampo  Patient Status:    Inpatient  Date of Admission:         12/5/2024, Hospital day #1  Attending:   Sarah Portillo MD  PCP:     Elly Antonio MD    Reason for Consultation:  Anemia    History of Present Illness:  Yamini Ocampo is a 79 year old female w/ PMHx of BMI 33.86, HTN, HLD, DM2, CKD3, Anemia of chronic disease, PUD, GERD, Ventricular diastolic dysfunction, HFpEF, Pulmonary artery HTN, CAD, Chronic Afib (on eliquis) who presents to the ED with acute on chronic diastolic HF and anemia.     Pt states that she started to feel like she did the last time she was bleeding in her stomach with fatigue, weakness and increased belching/bloating along with a couple dark/black stools so decided to come to the hospital.  She denies ABD pain, difficult/painful swallowing, bloody stools, constipation/diarrhea, fever, chills, chest pain and unintentional weight loss.  She feels her SOB has much improved overnight and is no longer requiring oxygen.  She is on RA at home.  She denies NSAIDs and last took her eliquis prior to coming to the hospital.    Pertinent Family Hx:  - No known history of esophageal, gastric or colon cancers  - No known IBD  - No known liver pathologies    Endoscopy Hx:  -EGD 6/27/2024:   Impression:   1. 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 APC not performed today given food debris in stomach.   2. Food retention in stomach and esophagus    - Last EGD 7/29/2024 for APC session:   Impression:   1. Gastric antral vascular ectasias s/p APC  -Attempted EGD with pillcam placement along with colonoscopy 7/31/2024:   Impression:  GAVE, non-bleeding.  Pillcam deployed into stomach  Poor colon prep, diverticulosis     Findings:   The pillcam was placed endoscopically.   1. Stomach entry (1st gastric image): 00:02:52  2. Small bowel (1st  duodenal image): 06:58:07. Limited small bowel evaluation, no bleeding foci seen in the limited one hour recording of small bowel. Bilious effluent  3. Colon (1st cecal image): did not reach cecum by end of study     Impression:  1. Incomplete small bowel evaluation     Social Hx:  - No tobacco use  - No ETOH  - Denies cannabis/illicit drug use  - Denies NSAIDs     History:  Past Medical History:    Arrhythmia    afib    Back problem    lumbar disc disease    Basal cell carcinoma of nose    Bilateral carpal tunnel syndrome    Cataract    Cellulitis    Deep vein thrombosis (HCC)    unsure which leg, possibly left    Depression    Esophageal reflux    Essential hypertension    Heart disease    Heel spur    Right    Hemorrhoids    Hiatal hernia    High blood pressure    High cholesterol    Hyperlipidemia    Incontinence    Lumbar disc disease    Multiple gastric ulcers    Osteoarthritis    Pulmonary embolism (HCC)    Raynaud disease    Renal disorder    Tubular adenoma of colon    repeat c-scope 1/2020    Type 2 diabetes mellitus with diabetic chronic kidney disease (HCC)    Visual impairment    glasses     Past Surgical History:   Procedure Laterality Date    Angioplasty (coronary)      Appendectomy      Arthroscopy of joint unlisted Right     knee    Bso, omentectomy w/sushil      Carpal tunnel release Bilateral     Cholecystectomy  09/28/2016    Colonoscopy N/A 01/25/2017    Procedure: COLONOSCOPY;  Surgeon: KATHARINE Prakash MD;  Location: [facility] ENDOSCOPY    Colonoscopy N/A 10/07/2020    Procedure: COLONOSCOPY;  Surgeon: KATHARINE Prakash MD;  Location: [facility] ENDOSCOPY    Colonoscopy N/A 7/31/2024    Procedure: COLONOSCOPY;  Surgeon: Alyssa Orellana MD;  Location: [facility] ENDOSCOPY    Egd N/A 12/14/2016    Procedure: ESOPHAGOGASTRODUODENOSCOPY (EGD);  Surgeon: KATHARINE Prakash MD;  Location: [facility] ENDOSCOPY    Egd N/A 01/23/2024    ; gastritis    Egd N/A 07/27/2024    ; retained food    Electrocardiogram, complete   09/26/2013    Scanned to Media Tab    Excision of nose      Basal cell carcinoma    Hernia surgery      Hysterectomy      Knee replacement surgery Right     Other surgical history      Injections and narcotics, POD Bartuci    Removal of heel spur      Total abdom hysterectomy       Family History   Problem Relation Age of Onset    Cancer Father         Skin cancer    Other (Other) Father 97        old age,unknown caused    Heart Attack Mother     Heart Disorder Mother     Hypertension Other         Family H/O    Cancer Other         Lymphoma  Family H/O    Heart Disease Other         Family H/O    Arthritis Other         Close relative  unsure which type    No Known Problems Brother       reports that she quit smoking about 27 years ago. Her smoking use included cigarettes. She has never used smokeless tobacco. She reports that she does not drink alcohol and does not use drugs.    Allergies:  Allergies[1]    Medications:    Current Facility-Administered Medications:     pantoprazole (Protonix) 40 mg in sodium chloride 0.9% PF 10 mL IV push, 40 mg, Intravenous, Daily    acetaminophen (Tylenol Extra Strength) tab 500 mg, 500 mg, Oral, Q4H PRN    ondansetron (Zofran) 4 MG/2ML injection 4 mg, 4 mg, Intravenous, Q6H PRN    metoclopramide (Reglan) 5 mg/mL injection 5 mg, 5 mg, Intravenous, Q8H PRN    bumetanide (Bumex) 0.25 MG/ML injection 1 mg, 1 mg, Intravenous, BID (Diuretic)    influenza virus trivalent high dose PF (Fluzone HD) 0.5 mL injection (ages >/= 65 years) 0.5 mL, 0.5 mL, Intramuscular, Prior to discharge    Review of Systems:   CONSTITUTIONAL:  negative for fevers, chills, unintentional weight loss   EYES:  negative for diplopia or change in vision   RESPIRATORY:  negative for severe shortness of breath  CARDIOVASCULAR:  negative for crushing sub-sternal chest pain  GASTROINTESTINAL:  see HPI  GENITOURINARY:  negative for dysuria or gross hematuria  INTEGUMENT/BREAST:  SKIN:  negative for jaundice or new  rash   ALLERGIC/IMMUNOLOGIC:  negative for hay fever   ENDOCRINE:  negative for cold intolerance and heat intolerance  MUSCULOSKELETAL:  negative for joint effusion/severe erythema  NEURO: negative for new loss of consciousness or dizziness   BEHAVIOR/PSYCH:  negative for psychotic behavior    Physical Exam:    Blood pressure 103/80, pulse (!) 147, temperature 97.5 °F (36.4 °C), temperature source Oral, resp. rate 18, height 5' 6\" (1.676 m), weight 191 lb 3.2 oz (86.7 kg), SpO2 94%. Body mass index is 30.86 kg/m².    Gen: awake, alert patient, NAD  HEENT: EOMI, the sclera appears anicteric, oropharynx clear, mucus membranes appear moist  CV: RRR  Lung: no conversational dyspnea   Abdomen: soft NTND abdomen with NABS appreciated   Back: No CVA tenderness   Skin: dry, warm, no jaundice  Ext: no LE edema is evident  Neuro: Alert, oriented x4 and interactive  Psych: calm, cooperative    Laboratory Data:  Lab Results   Component Value Date    WBC 5.8 12/06/2024    HGB 6.4 12/06/2024    HCT 22.5 12/06/2024    .0 12/06/2024    CREATSERUM 2.02 12/06/2024    BUN 38 12/06/2024     12/06/2024    K 4.3 12/06/2024     12/06/2024    CO2 26.0 12/06/2024     12/06/2024    CA 9.4 12/06/2024       Imaging:  No results found.    Assessment & Plan   Yamini Ocampo is a 79 year old female w/ PMHx of BMI 33.86, HTN, HLD, DM2, CKD3, Anemia of chronic disease, PUD, GERD, Ventricular diastolic dysfunction, HFpEF, Pulmonary artery HTN, CAD, Chronic Afib (on eliquis) who presents to the ED with acute on chronic diastolic HF and anemia.     #Anemia  #Dark stools  -Labs on admission BUN 42, Cr 2.19, BNP 10,882, Top negative, Hgb 7.0 serum iron 25, Ferritin 21  -Dark/black stools while taking eliquis for Afib  -Last EGD with gastric antral vascular ectasias requiring APC session  -Etiology possible GAVE, AVM, PUD, gastritis   -Discussed EGD along with risks/benefits of procedure who states understanding and is willing to  proceed.    -Pt currently admitted with CHF exacerbation, once cleared by cardiology and optimized for endoscopic sedation will plan for EGD, likely tomorrow given Pt's improvement in SOB and decreased oxygen requirements.    EGD consent: I have discussed the risks, benefits, and alternatives to upper endoscopy/enteroscopy with the patient/primary decision maker [who demonstrated understanding], including but not limited to the risks of bleeding, infection, pain, death, as well as the risks of anesthesia and perforation all leading to prolonged hospitalization, surgical intervention, or even death. I also specifically mentioned the miss rate of upper endoscopy of 5-10% in the best of all circumstances.  The patient has agreed to sign an informed consent and elected to proceed with procedure with possible intervention [i.e. polypectomy, stent placement, etc.] as indicated.     Recommend:  -EGD once optimized from cardiology stance, appreciate recs  -NPO at 0000  -Empiric PPI  -Hold AC as able  -IV iron  -Trend Hgb, transfuse for goal >7, Will need blood transfusion if fine from cardiology standpoint  -Monitor for overt GIB    Thank you for the opportunity to participate in the care of this patient.    Case discussed with Alyssa Orellana MD and Eli VERDIN.    Jocelyn Taylor DNP, FNP-BC  [facility]  12/6/2024          [1]   Allergies  Allergen Reactions    Adhesive Tape RASH     Skin off

## 2024-12-06 NOTE — PROGRESS NOTES
Attempt made to teach patient regarding CHF. Echo tech at bedside. Will attempt Monday if patient is still here.

## 2024-12-06 NOTE — H&P
[facility]    PATIENT'S NAME: CATARINA DAVIS   ATTENDING PHYSICIAN: Sarah Portillo MD   PATIENT ACCOUNT#:   [id number]    LOCATION:  [address] [place] [address]  MEDICAL RECORD #:   [id number]       YOB: 1945  ADMISSION DATE:       12/05/2024    HISTORY AND PHYSICAL EXAMINATION    CHIEF COMPLAINT:  Acute on chronic diastolic heart failure, gastrointestinal bleed, and anemia.    HISTORY OF PRESENT ILLNESS:  The patient is an 79-year-old  female who came into the emergency department for evaluation of progressive dyspnea on exertion, orthopnea.  CBC showed hemoglobin of 7.0 which has dropped from 8.8 since August 2024.  ProBNP was elevated at 10,800.  Troponin was negative.  EKG showed atrial fibrillation, rate controlled.  Chemistry showed GFR of 22, which is slightly below baseline; BUN and creatinine of 42 and 2.19.  Rectal exam showed positive Hemoccult blood brown stool.  Chest x-ray showed heart failure.    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  She had 2 video capsule endoscopies with poor prep.  Hypertension, hyperlipidemia, chronic kidney disease stage 3 to 4, anemia of chronic kidney disease, heart failure, preserved ejection fraction with moderate pulmonary artery hypertension, aortic stenosis status post TAVR, gastroesophageal reflux disease, nonobstructive coronary artery disease.    PAST SURGICAL HISTORY:  Appendectomy, bilateral carpal tunnel release, TAVR, bilateral total knee arthroplasties, nose basal cell carcinoma resection, cholecystectomy, and hysterectomy.    MEDICATIONS:  Please see medication reconciliation list.     ALLERGIES:  Adhesive tape.    FAMILY HISTORY:  Mother had coronary artery disease.  Father had skin cancer.    SOCIAL HISTORY:  No tobacco, alcohol, or drug use.  Noncompliant with low-salt diet or diuretics.  Usually independent for basic  activities of daily living.     REVIEW OF SYSTEMS:  Progressive dyspnea on exertion, orthopnea.  The patient does not take her diuretics as prescribed all the time because it makes her go to the bathroom a lot.  She tried to comply with a low-salt diet.  No chest pain.  She denies any melena.  She had intermittent hemorrhoidal bleed in the last few days.  Other 12-point review of systems is negative.        PHYSICAL EXAMINATION:    GENERAL:  Alert and oriented to time, place, and person.  Mild to moderate distress.    VITAL SIGNS:  Temperature 98.0, pulse 71, respiratory rate 17, blood pressure 139/71, pulse ox 96% on room air.  HEENT:  Atraumatic.  Oropharynx clear.  Moist mucous membranes.  Ears and nose normal.  Eyes:  Anicteric sclerae.  NECK:  Supple.  No lymphadenopathy.  Trachea midline.  Positive jugular venous distention.  LUNGS:  Crackles auscultated on both lung fields.  HEART:  Irregular rhythm.  S1 and S2 auscultated.  No murmur.  ABDOMEN:  Soft, nondistended.  No tenderness.  Positive bowel sounds.  EXTREMITIES:  Edema +2, both legs.  No clubbing or cyanosis.  NEUROLOGIC:  Motor and sensory intact.       ASSESSMENT:    1.   Acute on chronic diastolic heart failure.  2.   Recurrent gastrointestinal bleed.  3.   Posthemorrhagic anemia.  4.   Hypoxemic respiratory failure.  5.   Essential hypertension.    PLAN:  The patient will be admitted to telemetry floor.  IV Bumex.  IV Protonix.  Clear liquid diet.  Gastroenterology and Cardiology consult.  Monitor her clinical status closely.  Hold Eliquis.  Further recommendations to follow.      Dictated By Hillary Fitzgerald MD  d: 12/05/2024 18:37:22  t: 12/06/2024 08:36:02  Job [id number]  FB/

## 2024-12-06 NOTE — PLAN OF CARE
Lives with family. No home oxygen, currently on 1L NC. Ambulates with standby assistance. No BM overnight.   Problem: CARDIOVASCULAR - ADULT  Goal: Maintains optimal cardiac output and hemodynamic stability  Description: INTERVENTIONS:  - Monitor vital signs, rhythm, and trends  - Monitor for bleeding, hypotension and signs of decreased cardiac output  - Evaluate effectiveness of vasoactive medications to optimize hemodynamic stability  - Monitor arterial and/or venous puncture sites for bleeding and/or hematoma  - Assess quality of pulses, skin color and temperature  - Assess for signs of decreased coronary artery perfusion - ex. Angina  - Evaluate fluid balance, assess for edema, trend weights  Outcome: Progressing  Goal: Absence of cardiac arrhythmias or at baseline  Description: INTERVENTIONS:  - Continuous cardiac monitoring, monitor vital signs, obtain 12 lead EKG if indicated  - Evaluate effectiveness of antiarrhythmic and heart rate control medications as ordered  - Initiate emergency measures for life threatening arrhythmias  - Monitor electrolytes and administer replacement therapy as ordered  Outcome: Progressing     Problem: RESPIRATORY - ADULT  Goal: Achieves optimal ventilation and oxygenation  Description: INTERVENTIONS:  - Assess for changes in respiratory status  - Assess for changes in mentation and behavior  - Position to facilitate oxygenation and minimize respiratory effort  - Oxygen supplementation based on oxygen saturation or ABGs  - Provide Smoking Cessation handout, if applicable  - Encourage broncho-pulmonary hygiene including cough, deep breathe, Incentive Spirometry  - Assess the need for suctioning and perform as needed  - Assess and instruct to report SOB or any respiratory difficulty  - Respiratory Therapy support as indicated  - Manage/alleviate anxiety  - Monitor for signs/symptoms of CO2 retention  Outcome: Progressing     Problem: METABOLIC/FLUID AND ELECTROLYTES - ADULT  Goal:  Electrolytes maintained within normal limits  Description: INTERVENTIONS:  - Monitor labs and rhythm and assess patient for signs and symptoms of electrolyte imbalances  - Administer electrolyte replacement as ordered  - Monitor response to electrolyte replacements, including rhythm and repeat lab results as appropriate  - Fluid restriction as ordered  - Instruct patient on fluid and nutrition restrictions as appropriate  Outcome: Progressing  Goal: Hemodynamic stability and optimal renal function maintained  Description: INTERVENTIONS:  - Monitor labs and assess for signs and symptoms of volume excess or deficit  - Monitor intake, output and patient weight  - Monitor urine specific gravity, serum osmolarity and serum sodium as indicated or ordered  - Monitor response to interventions for patient's volume status, including labs, urine output, blood pressure (other measures as available)  - Encourage oral intake as appropriate  - Instruct patient on fluid and nutrition restrictions as appropriate  Outcome: Progressing     Problem: HEMATOLOGIC - ADULT  Goal: Maintains hematologic stability  Description: INTERVENTIONS  - Assess for signs and symptoms of bleeding or hemorrhage  - Monitor labs and vital signs for trends  - Administer supportive blood products/factors, fluids and medications as ordered and appropriate  - Administer supportive blood products/factors as ordered and appropriate  Outcome: Progressing  Goal: Free from bleeding injury  Description: (Example usage: patient with low platelets)  INTERVENTIONS:  - Avoid intramuscular injections, enemas and rectal medication administration  - Ensure safe mobilization of patient  - Hold pressure on venipuncture sites to achieve adequate hemostasis  - Assess for signs and symptoms of internal bleeding  - Monitor lab trends  - Patient is to report abnormal signs of bleeding to staff  - Avoid use of toothpicks and dental floss  - Use electric shaver for shaving  - Use  soft bristle tooth brush  - Limit straining and forceful nose blowing  Outcome: Progressing

## 2024-12-06 NOTE — TELEPHONE ENCOUNTER
ED to Hosp-Admission  Current  12/5/2024 - present (1 day)  [facility]       Hillary Fitzgerald MD  Last attending  Treatment team Acute on chronic congestive heart failure, unspecified heart failure type (HCC)  Principal problem

## 2024-12-06 NOTE — DIETARY NOTE
Dietitian Note     Received consult for \"heart failure diet education.\" Pt remains on CLD for now. Will defer diet education until diet able to advance safely. Plans for EGD. Will f/u back up after weekend.     Heather Ogden RD, LDN  Clinical Dietitian  P: [phone]

## 2024-12-06 NOTE — CONSULTS
[facility]  part of [facility]    Report of Consultation    Yamini Ocampo Patient Status:  Inpatient    1945 MRN [id number]   Location [facility] 3W/SW Attending Sarah Portillo MD   Hosp Day # 1 PCP Elly Antonio MD     Date of Admission:  2024  Date of Consult:  24  Reason for Consultation:   AFIB     History of Present Illness:   Patient is a 79 year old female who was admitted to the hospital for Acute on chronic congestive heart failure, unspecified heart failure type (HCC):  Patient admitted with exertional dyspnea and tiredness x 2-3 days PTA.   Patient has H/O AFIB on Eliquis @ 5 mg bid  H/O CHF -Chronic diastolic heart failure AND AORTIC BIOPROSTHESIS  Initial w/u in ER:  Hb 7.0  Pro BNP 17627  Trop 25  BUN 38  Creat 2.02  EKG with AFIB      Past Medical History  Past Medical History:    Arrhythmia    afib    Back problem    lumbar disc disease    Basal cell carcinoma of nose    Bilateral carpal tunnel syndrome    Cataract    Cellulitis    Deep vein thrombosis (HCC)    unsure which leg, possibly left    Depression    Esophageal reflux    Essential hypertension    Heart disease    Heel spur    Right    Hemorrhoids    Hiatal hernia    High blood pressure    High cholesterol    Hyperlipidemia    Incontinence    Lumbar disc disease    Multiple gastric ulcers    Osteoarthritis    Pulmonary embolism (HCC)    Raynaud disease    Renal disorder    Tubular adenoma of colon    repeat c-scope 2020    Type 2 diabetes mellitus with diabetic chronic kidney disease (HCC)    Visual impairment    glasses       Past Surgical History  Past Surgical History:   Procedure Laterality Date    Angioplasty (coronary)      Appendectomy      Arthroscopy of joint unlisted Right     knee    Bso, omentectomy w/sushil      Carpal tunnel release Bilateral     Cholecystectomy  2016    Colonoscopy N/A 2017    Procedure: COLONOSCOPY;  Surgeon: KATHARINE Prakash MD;  Location: [facility]  ENDOSCOPY    Colonoscopy N/A 10/07/2020    Procedure: COLONOSCOPY;  Surgeon: KATHARINE Prakash MD;  Location: [facility] ENDOSCOPY    Colonoscopy N/A 2024    Procedure: COLONOSCOPY;  Surgeon: Alyssa Orellana MD;  Location: [facility] ENDOSCOPY    Egd N/A 2016    Procedure: ESOPHAGOGASTRODUODENOSCOPY (EGD);  Surgeon: KATHARINE Prakash MD;  Location: [facility] ENDOSCOPY    Egd N/A 2024    ; gastritis    Egd N/A 2024    ; retained food    Electrocardiogram, complete  2013    Scanned to Media Tab    Excision of nose      Basal cell carcinoma    Hernia surgery      Hysterectomy      Knee replacement surgery Right     Other surgical history      Injections and narcotics, POD Bartuci    Removal of heel spur      Total abdom hysterectomy         Family History  Family History   Problem Relation Age of Onset    Cancer Father         Skin cancer    Other (Other) Father 97        old age,unknown caused    Heart Attack Mother     Heart Disorder Mother     Hypertension Other         Family H/O    Cancer Other         Lymphoma  Family H/O    Heart Disease Other         Family H/O    Arthritis Other         Close relative  unsure which type    No Known Problems Brother        Social History  Social History     Socioeconomic History    Marital status:    Tobacco Use    Smoking status: Former     Current packs/day: 0.00     Types: Cigarettes     Quit date: 1997     Years since quittin.9    Smokeless tobacco: Never   Vaping Use    Vaping status: Never Used   Substance and Sexual Activity    Alcohol use: No     Comment: rare    Drug use: Never     Comment: edibles occasionally for sleep   Other Topics Concern    Caffeine Concern Yes     Comment: 8 cups coffee/sod daily    Reaction to local anesthetic No    Pt has a pacemaker No    Pt has a defibrillator No     Social Drivers of Health     Financial Resource Strain: Low Risk  (3/14/2023)    Financial Resource Strain     Difficulty of Paying  Living Expenses: Not very hard     Med Affordability: No   Food Insecurity: No Food Insecurity (12/5/2024)    Food Insecurity     Food Insecurity: Never true   Transportation Needs: No Transportation Needs (12/5/2024)    Transportation Needs     Lack of Transportation: No   Housing Stability: Low Risk  (12/5/2024)    Housing Stability     Housing Instability: No           Current Medications:  Current Facility-Administered Medications   Medication Dose Route Frequency    pantoprazole (Protonix) 40 mg in sodium chloride 0.9% PF 10 mL IV push  40 mg Intravenous Q12H    sodium ferric gluconate (Ferrlecit) 125 mg in sodium chloride 0.9% 100mL IVPB premix  125 mg Intravenous Daily    sodium chloride 0.9% infusion   Intravenous Once    furosemide (Lasix) 10 mg/mL injection 20 mg  20 mg Intravenous Once    acetaminophen (Tylenol Extra Strength) tab 500 mg  500 mg Oral Q4H PRN    ondansetron (Zofran) 4 MG/2ML injection 4 mg  4 mg Intravenous Q6H PRN    metoclopramide (Reglan) 5 mg/mL injection 5 mg  5 mg Intravenous Q8H PRN    bumetanide (Bumex) 0.25 MG/ML injection 1 mg  1 mg Intravenous BID (Diuretic)    influenza virus trivalent high dose PF (Fluzone HD) 0.5 mL injection (ages >/= 65 years) 0.5 mL  0.5 mL Intramuscular Prior to discharge     Medications Prior to Admission   Medication Sig    metoprolol succinate ER 50 MG Oral Tablet 24 Hr Take 1 tablet (50 mg total) by mouth daily.    torsemide 20 MG Oral Tab Take 2 tablets (40 mg total) by mouth daily.    PANTOPRAZOLE 40 MG Oral Tab EC TAKE 1 TABLET TWICE A DAY    sertraline 100 MG Oral Tab Take 1 tablet (100 mg total) by mouth daily.    atorvastatin 10 MG Oral Tab Take 1 tablet (10 mg total) by mouth nightly.    FARXIGA 10 MG Oral Tab Take 1 tablet (10 mg total) by mouth daily.    albuterol 108 (90 Base) MCG/ACT Inhalation Aero Soln     cyanocobalamin 1000 MCG Oral Tab Take 1 tablet (1,000 mcg total) by mouth daily.    ELIQUIS 5 MG Oral Tab Take 1 tablet (5 mg total)  by mouth 2 (two) times daily.       Allergies  Allergies[1]    Review of Systems:   Pertinent items are noted in HPI.    Physical Exam:   Vital Signs:  Blood pressure (!) 89/53, pulse 84, temperature 98.1 °F (36.7 °C), temperature source Oral, resp. rate 18, height 5' 6\" (1.676 m), weight 191 lb 3.2 oz (86.7 kg), SpO2 91%.     Physical Exam  Vitals and nursing note reviewed.   Constitutional:       Appearance: Normal appearance.   HENT:      Head: Normocephalic and atraumatic.      Nose: Nose normal.   Cardiovascular:      Rate and Rhythm: Normal rate. Rhythm irregular.      Pulses: Normal pulses.      Heart sounds: Normal heart sounds.   Pulmonary:      Effort: Pulmonary effort is normal.      Breath sounds: Normal breath sounds.   Abdominal:      General: Bowel sounds are normal.      Palpations: Abdomen is soft.   Musculoskeletal:         General: Normal range of motion.      Cervical back: Neck supple.   Skin:     General: Skin is warm and dry.      Coloration: Skin is pale.   Neurological:      General: No focal deficit present.      Mental Status: She is alert and oriented to person, place, and time.        Results:     Laboratory Data:  Lab Results   Component Value Date    WBC 5.8 12/06/2024    HGB 6.4 (LL) 12/06/2024    HCT 22.5 (L) 12/06/2024    .0 12/06/2024    CREATSERUM 2.02 (H) 12/06/2024    BUN 38 (H) 12/06/2024     12/06/2024    K 4.3 12/06/2024     12/06/2024    CO2 26.0 12/06/2024     (H) 12/06/2024    CA 9.4 12/06/2024    ALB 3.9 08/12/2024    ALKPHO 96 08/12/2024    TP 6.9 08/12/2024    AST 23 08/12/2024    ALT <7 (L) 08/12/2024    PTT 29.0 08/12/2024    INR 1.31 (H) 08/12/2024    PTP 17.1 (H) 08/12/2024    TSH 2.130 05/04/2021    LIP 26 08/12/2024    ESRML 35 (H) 06/14/2017    CRP 0.7 06/14/2017     (H) 06/10/2011    MG 1.8 08/12/2024    PHOS 3.7 06/25/2024    TROP <0.045 05/10/2019    CK 20 (L) 09/05/2017    B12 626 12/20/2023         Imaging:  EKG:  Atrial  fibrillation  Rightward axis  Low voltage QRS, consider pulmonary disease, pericardial effusion, or normal variant  Incomplete right bundle branch block  Septal infarct (cited on or before 12-AUG-2024)  Abnormal ECG  When compared with ECG of 12-AUG-2024 18:53,  No significant change was found     CXR:    Narrative   PROCEDURE: XR CHEST AP PORTABLE  (CPT=71045)  TIME: 1707     COMPARISON: [facility], XR CHEST AP PORTABLE (CPT=71045), 6/28/2024, 10:55 AM.     INDICATIONS: Difficulty in breathing, lightheadedness, and low blood pressure x 3 days.     TECHNIQUE:   Single view.       Findings and impression:     No significant change from recent     Enlarged heart with vascular congestion and small volume bibasilar pleural effusion     No pneumothorax     ECHO 6/2024  ECG rhythm:   Atrial fibrillation     ----------------------------------------------------------------------------     Conclusions:     1. Left ventricle: The cavity size was normal. Wall thickness was normal.      Systolic function was normal. The estimated ejection fraction was 60-65%,      by biplane method of disks. No diagnostic evidence for regional wall      motion abnormalities. Unable to assess LV diastolic function due to heart      rhythm.   2. Left atrium: The left atrial volume was markedly increased.   3. Right atrium: The atrium was markedly dilated.   4. Aortic valve: A bioprosthetic valve is present. There was mild      perivalvular regurgitation. The peak systolic velocity was 3.05m/sec. The      mean systolic gradient was 20mm Hg. The ratio of LVOT to aortic valve      peak velocity was 0.33.   5. Mitral valve: There was mild regurgitation.   6. Tricuspid valve: There was mild-moderate regurgitation.   7. Pulmonary arteries: Systolic pressure was moderately increased, estimated      to be 55mm Hg.   8. Pericardium, extracardiac: A small pericardial effusion was identified      posterior to the heart. There was a left  pleural effusion.   Impressions:  This study is compared with previous dated 03/08/2023:     Impression:   Admitted with Exertional dyspnea and anemia  Hb 7--> 6.4. acute on chronic diastolic heart failure with h/o AFIB on Eliquis 5 mg bid     Recommendations:  Hold Eliquis for now  Blood transfusion with furosemide 20 mg IV after each transfusion  GI w/u   Follow H&H    Currently hemodynamically stable    D/W patient, concerned about Anticoagulation, explained that GI w/u to find out bleeding source, also the dose of eliquis would be 2.5 mg bid when restarted and if still cannot tolerate then will consider Watchman's procedure, patient understood the Rx plan well    D/W nursing staff    Thank you for allowing me to participate in the care of your patient.    Balwinder Andujar MD [facility]  [facility] [address]  [place] [address]  [phone]    This note was prepared using Dragon Medical voice recognition dictation software. As a result errors may occur. When identified these errors have been corrected. While every attempt is made to correct errors during dictation discrepancies may still exist.       [1]   Allergies  Allergen Reactions    Adhesive Tape RASH     Skin off

## 2024-12-06 NOTE — DISCHARGE INSTRUCTIONS
Going Home Instructions  In this section you will find the tools which will guide you through the first few days after you leave the hospital. Continued use of these tools will help you develop the skills necessary to keep your heart failure under control.     Home Care Instructions Following Heart Failure - the most important things to do every day include:   Weigh yourself and review the “Self-Check Plan” sheet every morning.   Call your cardiologist office if you are in the “Pay Attention-Use Caution” (yellow zone) or “Medical Alert-Warning!” (red zone) as outlined in the Self-Check Plan sheet.  Take your medicines as prescribed.  Limit your sodium (salt) intake.  Know when to call your cardiologist, primary doctor, or nurse.  Know when to seek emergency care.      Things for You to Remember:   1. See your doctor or healthcare provider as written on your discharge instructions.  It is important that you attend this appointment to make sure your symptoms are under control.     2. Your recommended sodium intake is 8683-1094 mg daily.    3.  Weigh yourself every day.    4. Some exercise and activity is important to help keep your heart functioning and strong. Unless instructed not to exercise, you may walk at a slow to moderate pace for 10-15 minutes 2-3 days per week to start. Pace your activity to prevent shortness of breath or fatigue. Stop exercising if you develop chest pain, lightheadedness, or significant shortness of breath.       Call Your Cardiologist If:   You gain 2-3 pounds in one day or 5 pounds in one week.  You have more difficulty breathing.  You are getting more tired with normal activity.  You are more short of breath lying down, or awaken at night short of breath.  You have swelling of your feet or legs.  You urinate less often during the day and more often at night.  You have cramps in your legs.  You have blurred vision or see yellowish-green halos around objects of lights.    Go to the  Emergency Room If:   You have pain or tightness in your chest  You are extremely short of breath  You are coughing up pink-frothy mucus  You are traveling and develop symptoms of worsening heart failure      Please follow up with your cardiologist or Advanced Practice Provider as written on your discharge instructions. If you are not provided with an appointment, let your nurse know so you can get an appointment    Please start kelfex tonight and take it 2 times a day for 11 doses NOT 3 times a day for 16 doses  Ok to go home when ok with others  Cbc, bmp, mag, phos 2d to be sent to dr Antonio     Medication List        START taking these medications      acetaminophen 500 MG Tabs  Commonly known as: Tylenol Extra Strength     cephALEXin 500 MG Caps  Commonly known as: Keflex  Take 1 capsule (500 mg total) by mouth 2 (two) times daily. START TONIGHT AND STOP IF RASH PLEASE IGNORE PREVIOUS SCRIPT SENT. KEFLEX SHOULD BE TWICE A DAY FOR 11 DOSES NOT 3 TIMES A DAY FOR 16 DOSES     metoprolol tartrate 25 MG Tabs  Commonly known as: Lopressor  Take 1 tablet (25 mg total) by mouth 2x Daily(Beta Blocker).            CHANGE how you take these medications      apixaban 2.5 MG Tabs  Commonly known as: Eliquis  Take 1 tablet (2.5 mg total) by mouth every 12 (twelve) hours.  What changed:   medication strength  how much to take  when to take this     * pantoprazole 40 MG Tbec  Commonly known as: Protonix  TAKE 1 TABLET TWICE A DAY  What changed: Another medication with the same name was added. Make sure you understand how and when to take each.     * pantoprazole 40 MG Tbec  Commonly known as: Protonix  Take 1 tablet (40 mg total) by mouth 2 (two) times daily before meals.  What changed: You were already taking a medication with the same name, and this prescription was added. Make sure you understand how and when to take each.     torsemide 20 MG Tabs  Commonly known as: Demadex  Take 1 tablet (20 mg total) by mouth daily.  Start  taking on: December 11, 2024  What changed: how much to take           * This list has 2 medication(s) that are the same as other medications prescribed for you. Read the directions carefully, and ask your doctor or other care provider to review them with you.                CONTINUE taking these medications      albuterol 108 (90 Base) MCG/ACT Aers  Commonly known as: Ventolin HFA     atorvastatin 10 MG Tabs  Commonly known as: Lipitor  Take 1 tablet (10 mg total) by mouth nightly.     cyanocobalamin 1000 MCG Tabs  Commonly known as: Vitamin B12     Farxiga 10 MG Tabs  Generic drug: dapagliflozin     sertraline 100 MG Tabs  Commonly known as: Zoloft  Take 1 tablet (100 mg total) by mouth daily.            STOP taking these medications      metoprolol succinate ER 50 MG Tb24  Commonly known as: Toprol XL               Where to Get Your Medications        These medications were sent to [id number] - [place], [place] - [address] AT [facility], [phone], [phone]  [address], [facility] [address]      Phone: [phone]   apixaban 2.5 MG Tabs  cephALEXin 500 MG Caps  metoprolol tartrate 25 MG Tabs  pantoprazole 40 MG Tbec  torsemide 20 MG Tabs

## 2024-12-07 ENCOUNTER — ANESTHESIA EVENT (OUTPATIENT)
Dept: ENDOSCOPY | Facility: HOSPITAL | Age: 79
End: 2024-12-07
Payer: MEDICARE

## 2024-12-07 ENCOUNTER — ANESTHESIA (OUTPATIENT)
Dept: ENDOSCOPY | Facility: HOSPITAL | Age: 79
End: 2024-12-07
Payer: MEDICARE

## 2024-12-07 ENCOUNTER — APPOINTMENT (OUTPATIENT)
Dept: GENERAL RADIOLOGY | Facility: HOSPITAL | Age: 79
End: 2024-12-07
Attending: HOSPITALIST
Payer: MEDICARE

## 2024-12-07 LAB
ANION GAP SERPL CALC-SCNC: 8 MMOL/L (ref 0–18)
BLOOD TYPE BARCODE: 600
BUN BLD-MCNC: 34 MG/DL (ref 9–23)
BUN/CREAT SERPL: 18.8 (ref 10–20)
CALCIUM BLD-MCNC: 9 MG/DL (ref 8.7–10.4)
CHLORIDE SERPL-SCNC: 108 MMOL/L (ref 98–112)
CO2 SERPL-SCNC: 28 MMOL/L (ref 21–32)
CREAT BLD-MCNC: 1.81 MG/DL
DEPRECATED RDW RBC AUTO: 70.7 FL (ref 35.1–46.3)
EGFRCR SERPLBLD CKD-EPI 2021: 28 ML/MIN/1.73M2 (ref 60–?)
ERYTHROCYTE [DISTWIDTH] IN BLOOD BY AUTOMATED COUNT: 20.9 % (ref 11–15)
GLUCOSE BLD-MCNC: 89 MG/DL (ref 70–99)
HCT VFR BLD AUTO: 23.9 %
HGB BLD-MCNC: 7.4 G/DL
MCH RBC QN AUTO: 31.1 PG (ref 26–34)
MCHC RBC AUTO-ENTMCNC: 31 G/DL (ref 31–37)
MCV RBC AUTO: 100.4 FL
OSMOLALITY SERPL CALC.SUM OF ELEC: 305 MOSM/KG (ref 275–295)
PLATELET # BLD AUTO: 229 10(3)UL (ref 150–450)
POTASSIUM SERPL-SCNC: 4 MMOL/L (ref 3.5–5.1)
RBC # BLD AUTO: 2.38 X10(6)UL
SODIUM SERPL-SCNC: 144 MMOL/L (ref 136–145)
UNIT VOLUME: 350 ML
WBC # BLD AUTO: 4.6 X10(3) UL (ref 4–11)

## 2024-12-07 PROCEDURE — 0D568ZZ DESTRUCTION OF STOMACH, VIA NATURAL OR ARTIFICIAL OPENING ENDOSCOPIC: ICD-10-PCS | Performed by: INTERNAL MEDICINE

## 2024-12-07 PROCEDURE — 43270 EGD LESION ABLATION: CPT | Performed by: INTERNAL MEDICINE

## 2024-12-07 PROCEDURE — 71045 X-RAY EXAM CHEST 1 VIEW: CPT | Performed by: HOSPITALIST

## 2024-12-07 PROCEDURE — 99233 SBSQ HOSP IP/OBS HIGH 50: CPT | Performed by: HOSPITALIST

## 2024-12-07 PROCEDURE — 0DJ07ZZ INSPECTION OF UPPER INTESTINAL TRACT, VIA NATURAL OR ARTIFICIAL OPENING: ICD-10-PCS | Performed by: INTERNAL MEDICINE

## 2024-12-07 RX ORDER — LIDOCAINE HYDROCHLORIDE 10 MG/ML
INJECTION, SOLUTION EPIDURAL; INFILTRATION; INTRACAUDAL; PERINEURAL AS NEEDED
Status: DISCONTINUED | OUTPATIENT
Start: 2024-12-07 | End: 2024-12-07 | Stop reason: SURG

## 2024-12-07 RX ORDER — NALOXONE HYDROCHLORIDE 0.4 MG/ML
80 INJECTION, SOLUTION INTRAMUSCULAR; INTRAVENOUS; SUBCUTANEOUS AS NEEDED
Status: DISCONTINUED | OUTPATIENT
Start: 2024-12-07 | End: 2024-12-07

## 2024-12-07 RX ORDER — KETAMINE HYDROCHLORIDE 50 MG/ML
INJECTION, SOLUTION INTRAMUSCULAR; INTRAVENOUS AS NEEDED
Status: DISCONTINUED | OUTPATIENT
Start: 2024-12-07 | End: 2024-12-07 | Stop reason: SURG

## 2024-12-07 RX ORDER — SODIUM CHLORIDE, SODIUM LACTATE, POTASSIUM CHLORIDE, CALCIUM CHLORIDE 600; 310; 30; 20 MG/100ML; MG/100ML; MG/100ML; MG/100ML
INJECTION, SOLUTION INTRAVENOUS CONTINUOUS
Status: DISCONTINUED | OUTPATIENT
Start: 2024-12-07 | End: 2024-12-07

## 2024-12-07 RX ORDER — SODIUM CHLORIDE, SODIUM LACTATE, POTASSIUM CHLORIDE, CALCIUM CHLORIDE 600; 310; 30; 20 MG/100ML; MG/100ML; MG/100ML; MG/100ML
INJECTION, SOLUTION INTRAVENOUS CONTINUOUS PRN
Status: DISCONTINUED | OUTPATIENT
Start: 2024-12-07 | End: 2024-12-07 | Stop reason: SURG

## 2024-12-07 RX ORDER — SODIUM CHLORIDE 9 MG/ML
INJECTION, SOLUTION INTRAVENOUS CONTINUOUS
Status: DISCONTINUED | OUTPATIENT
Start: 2024-12-07 | End: 2024-12-07

## 2024-12-07 RX ORDER — NALOXONE HYDROCHLORIDE 0.4 MG/ML
80 INJECTION, SOLUTION INTRAMUSCULAR; INTRAVENOUS; SUBCUTANEOUS AS NEEDED
Status: DISCONTINUED | OUTPATIENT
Start: 2024-12-07 | End: 2024-12-07 | Stop reason: HOSPADM

## 2024-12-07 RX ORDER — ONDANSETRON 2 MG/ML
4 INJECTION INTRAMUSCULAR; INTRAVENOUS ONCE AS NEEDED
Status: DISCONTINUED | OUTPATIENT
Start: 2024-12-07 | End: 2024-12-07 | Stop reason: HOSPADM

## 2024-12-07 RX ORDER — MIDAZOLAM HYDROCHLORIDE 1 MG/ML
INJECTION INTRAMUSCULAR; INTRAVENOUS AS NEEDED
Status: DISCONTINUED | OUTPATIENT
Start: 2024-12-07 | End: 2024-12-07 | Stop reason: SURG

## 2024-12-07 RX ADMIN — KETAMINE HYDROCHLORIDE 10 MG: 50 INJECTION, SOLUTION INTRAMUSCULAR; INTRAVENOUS at 08:01:00

## 2024-12-07 RX ADMIN — SODIUM CHLORIDE, SODIUM LACTATE, POTASSIUM CHLORIDE, CALCIUM CHLORIDE: 600; 310; 30; 20 INJECTION, SOLUTION INTRAVENOUS at 07:51:00

## 2024-12-07 RX ADMIN — KETAMINE HYDROCHLORIDE 10 MG: 50 INJECTION, SOLUTION INTRAMUSCULAR; INTRAVENOUS at 07:55:00

## 2024-12-07 RX ADMIN — MIDAZOLAM HYDROCHLORIDE 2 MG: 1 INJECTION INTRAMUSCULAR; INTRAVENOUS at 07:53:00

## 2024-12-07 RX ADMIN — LIDOCAINE HYDROCHLORIDE 30 MG: 10 INJECTION, SOLUTION EPIDURAL; INFILTRATION; INTRACAUDAL; PERINEURAL at 07:54:00

## 2024-12-07 NOTE — OPERATIVE REPORT
[facility]      EGD PROCEDURE REPORT    DATE OF PROCEDURE:  12/7/2024    PCP: Elly Antonio MD     PREOPERATIVE DIAGNOSIS:  Iron deficiency anemia, long term use of Eliquis anticoagulation; occult blood in the stool     POSTOPERATIVE DIAGNOSIS:  See impression.     SURGEON:  Issac Doe M.D.     SEDATION:    MAC anesthesia provided by the Anesthesia Service.  MAC anesthesia requested due to age 79, ASA class IV anticipated intolerance of EGD examination under safe doses conscious sedation medications    EGD PROCEDURE:    After the nature and risks of EGD examination with possible APC cautery ablation, possible biopsy were discussed with the patient and all questions answered, informed consent was obtained.  The patient was sedated as above.    Once sedated, the Olympus adult diagnostic gastroscope was placed in the patient's mouth and advanced under direct visualization through the oropharynx into the esophagus, on down through the stomach and pylorus to the second portion of the duodenum.  Retroflexion was performed in the stomach.    Estimated blood loss: none/insignificant       EGD FINDINGS:    Esophagus and GE junction: Some thick secretions pooled in the oropharynx just above the cords which the patient does not appear to be clearing; all suctioned out at the beginning of the case.    Immediately upon intubation of the esophagus, I again encountered a moderate volume of gastric contents and food residue in the upper esophagus.  Unclear why this would be there with no witnessed retching gagging vomiting.  This was aggressively suctioned out and I pushed the food debris down into the stomach in front of the gastroscope.  No coughing or suspected aspiration event.    Moderate volume of gelatinous food debris down the stomach as well, most of which could be suctioned out today.  No blood residue in the stomach on initial evaluation.    Drawing the gastroscope back up to the  esophagus, there was slow oozing of thin red blood from stasis changes distal esophagus and several erosions distal esophagus and immediately below the Z-line.  Secretions and some blood were again cleared.    Stomach: Some food residue and clear nonbloody secretions cleared out from the stomach as above.  Exam of the gastric fundus and body showed only mild nonspecific gastric mucosal erythema and edema.  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  Nothing bleeding in the stomach today.    After completing exam of the esophagus and duodenum, APC cautery ablation (gastric setting) was performed to definitively treat all visualized suspected GAVE tissue gastric antrum.  Each red stripe was carefully burned and ablated from the pylorus out to the gastric antrum.  All visualized telangiectasia areas were ablated today.    Duodenum: Clear secretions present. Normal duodenal bulb and second third portions duodenum.      After completing exam of esophagus stomach and duodenum, the diagnostic gastroscope was withdrawn.  The \"Given\" video capsule deployment catheter system was then mounted on to the gastroscope.  The Given VCE capsule was activated and loaded into the deployment system.    The gastroscope with video capsule out front was then advanced through the oropharynx into the esophagus on down across the stomach and pylorus into the duodenum.  Capsule was released uneventfully into the duodenum.    IMPRESSION:  Retained secretions and food residue in the proximal esophagus with significant stasis changes of uncertain significance.  Widely patent GE junction and LES.  Some spontaneous bleeding from lower esophagus and GE junction mostly due to stasis changes.  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  Successful release of \"Given\" video capsule device in the duodenum for repeat small bowel VCE study    RECOMMENDATIONS:    Supportive care.  Continue to monitor H&H, iron studies.  VCE small bowel enteroscopy report to follow.  Continue to discuss options for future anticoagulation.  Though we made another intervention today, there was friable esophageal and gastric mucosa throughout with some slow bleeding even from the esophagus.  This patient does not appear to be compatible with long-term full dose anticoagulation therapy.

## 2024-12-07 NOTE — ANESTHESIA PREPROCEDURE EVALUATION
Anesthesia PreOp Note    HPI:     Yamini Ocampo is a 79 year old female who presents for preoperative consultation requested by: Issac Doe MD    Date of Surgery: 12/5/2024 - 12/7/2024    Procedure(s):  ESOPHAGOGASTRODUODENOSCOPY (EGD)  CAPSULE ENDOSCOPY  Indication: anemia    Relevant Problems   No relevant active problems       NPO:  Last Liquid Consumption Date: 12/07/24  Last Liquid Consumption Time: 0000  Last Solid Consumption Date: 12/07/24  Last Solid Consumption Time: 0000  Last Liquid Consumption Date: 12/07/24          History Review:  Patient Active Problem List    Diagnosis Date Noted    Gastrointestinal hemorrhage, unspecified gastrointestinal hemorrhage type 12/05/2024    Iron deficiency anemia due to chronic blood loss 12/05/2024    Acute on chronic diastolic (congestive) heart failure ([facility]) 12/05/2024    Rectal bleeding 07/26/2024    Anemia, unspecified type 06/25/2024    GI bleed 06/25/2024    Dyspepsia 01/23/2024    Intestinal metaplasia of gastric mucosa 01/23/2024    Anemia, unspecified 01/05/2024    Iron deficiency anemia secondary to inadequate dietary iron intake 01/05/2024    Acute on chronic congestive heart failure, unspecified heart failure type ([facility]) 03/07/2023    Type 2 diabetes mellitus with diabetic chronic kidney disease ([facility]) 11/30/2022    Anemia due to vitamin B12 deficiency 08/01/2022    Macrocytic anemia 04/26/2022    Hyperpigmented skin lesion 04/26/2022    CREST syndrome ([facility]) 04/12/2022    Type 2 diabetes mellitus with stage 3b chronic kidney disease, without long-term current use of insulin ([facility]) 04/12/2022    Prediabetes 06/30/2021    Diverticulosis of colon     Osteoarthritis of left knee 01/31/2019    Raynaud's disease without gangrene 03/02/2018    Osteoarthritis of right knee 10/12/2017    Osteoarthritis 10/12/2017    History of ischemic colitis 09/28/2017    CKD (chronic kidney disease) stage 3, GFR 30-59 ml/min ([facility]) 06/26/2017    Mesenteric ischemia,  chronic (HCC) 04/26/2017    A-fib (HCC)     Polyp of colon     Gastroesophageal reflux disease without esophagitis     Hiatal hernia with GERD and esophagitis 09/01/2016    Ventral hernia without obstruction or gangrene 05/19/2016    Gastric erythema 04/28/2016    Essential hypertension 08/07/2015    Sleep apnea 10/01/2013    Congestive heart failure (HCC) 08/27/2013    Peripheral vascular disease (HCC) 08/27/2013    Major depressive disorder, single episode, moderate (HCC) 06/20/2011    Vitamin D deficiency 06/20/2011    Class 2 severe obesity due to excess calories with serious comorbidity and body mass index (BMI) of 39.0 to 39.9 in adult (HCC) 03/05/2009       Past Medical History:    Arrhythmia    afib    Back problem    lumbar disc disease    Basal cell carcinoma of nose    Bilateral carpal tunnel syndrome    Cataract    Cellulitis    Deep vein thrombosis (HCC)    unsure which leg, possibly left    Depression    Esophageal reflux    Essential hypertension    Heart disease    Heel spur    Right    Hemorrhoids    Hiatal hernia    High blood pressure    High cholesterol    Hyperlipidemia    Incontinence    Lumbar disc disease    Multiple gastric ulcers    Osteoarthritis    Pulmonary embolism (HCC)    Raynaud disease    Renal disorder    Tubular adenoma of colon    repeat c-scope 1/2020    Type 2 diabetes mellitus with diabetic chronic kidney disease (HCC)    Visual impairment    glasses       Past Surgical History:   Procedure Laterality Date    Angioplasty (coronary)      Appendectomy      Arthroscopy of joint unlisted Right     knee    Bso, omentectomy w/sushil      Carpal tunnel release Bilateral     Cholecystectomy  09/28/2016    Colonoscopy N/A 01/25/2017    Procedure: COLONOSCOPY;  Surgeon: KATHARINE Prakash MD;  Location: [facility] ENDOSCOPY    Colonoscopy N/A 10/07/2020    Procedure: COLONOSCOPY;  Surgeon: KATHARINE Prakash MD;  Location: [facility] ENDOSCOPY    Colonoscopy N/A 7/31/2024    Procedure: COLONOSCOPY;   Surgeon: Alyssa Orellana MD;  Location: [facility] ENDOSCOPY    Egd N/A 2016    Procedure: ESOPHAGOGASTRODUODENOSCOPY (EGD);  Surgeon: KATHARINE Prakash MD;  Location: [facility] ENDOSCOPY    Egd N/A 2024    ; gastritis    Egd N/A 2024    ; retained food    Electrocardiogram, complete  2013    Scanned to Media Tab    Excision of nose      Basal cell carcinoma    Hernia surgery      Hysterectomy      Knee replacement surgery Right     Other surgical history      Injections and narcotics, POD Bartuci    Removal of heel spur      Total abdom hysterectomy         Prescriptions Prior to Admission[1]  Current Medications and Prescriptions Ordered in Epic[2]    Allergies[3]    Family History   Problem Relation Age of Onset    Cancer Father         Skin cancer    Other (Other) Father 97        old age,unknown caused    Heart Attack Mother     Heart Disorder Mother     Hypertension Other         Family H/O    Cancer Other         Lymphoma  Family H/O    Heart Disease Other         Family H/O    Arthritis Other         Close relative  unsure which type    No Known Problems Brother      Social History     Socioeconomic History    Marital status:    Tobacco Use    Smoking status: Former     Current packs/day: 0.00     Types: Cigarettes     Quit date: 1997     Years since quittin.9    Smokeless tobacco: Never   Vaping Use    Vaping status: Never Used   Substance and Sexual Activity    Alcohol use: No     Comment: rare    Drug use: Never     Comment: edibles occasionally for sleep   Other Topics Concern    Caffeine Concern Yes     Comment: 8 cups coffee/sod daily    Reaction to local anesthetic No    Pt has a pacemaker No    Pt has a defibrillator No       Available pre-op labs reviewed.  Lab Results   Component Value Date    WBC 5.8 2024    RBC 2.19 (L) 2024    HGB 7.9 (L) 2024    HCT 22.5 (L) 2024    .7 (H) 2024    MCH 29.2 2024    MCHC 28.4  (L) 12/06/2024    RDW 19.2 (H) 12/06/2024    .0 12/06/2024     Lab Results   Component Value Date     12/06/2024    K 4.3 12/06/2024     12/06/2024    CO2 26.0 12/06/2024    BUN 38 (H) 12/06/2024    CREATSERUM 2.02 (H) 12/06/2024     (H) 12/06/2024    CA 9.4 12/06/2024          Vital Signs:  Body mass index is 29.86 kg/m².   height is 1.676 m (5' 6\") and weight is 83.9 kg (185 lb). Her oral temperature is 98.2 °F (36.8 °C). Her blood pressure is 143/60 and her pulse is 94. Her respiration is 19 and oxygen saturation is 97%.   Vitals:    12/06/24 2041 12/07/24 0111 12/07/24 0431 12/07/24 0734   BP: 110/59  105/64 143/60   Pulse: 99  (!) 124 94   Resp: 18  18 19   Temp: 98.1 °F (36.7 °C)  98.2 °F (36.8 °C)    TempSrc: Oral  Oral    SpO2: 95%  93% 97%   Weight:  84.1 kg (185 lb 8 oz)  83.9 kg (185 lb)   Height:    1.676 m (5' 6\")        Anesthesia Evaluation     Patient summary reviewed and Nursing notes reviewed    No history of anesthetic complications   Airway   Mallampati: II  TM distance: >3 FB  Neck ROM: full  Dental          Pulmonary - normal exam   (+) shortness of breath, sleep apnea  (-) recent URI    ROS comment: Hx of PE   ------------------------------------------------------------------------------      PROCEDURE: XR CHEST AP PORTABLE  (CPT=71045)   TIME: 1707      COMPARISON: [facility], XR CHEST AP PORTABLE (CPT=71045), 6/28/2024, 10:55 AM.      INDICATIONS: Difficulty in breathing, lightheadedness, and low blood pressure x 3 days.      TECHNIQUE:   Single view.       Findings and impression:      No significant change from recent      Enlarged heart with vascular congestion and small volume bibasilar pleural effusion      No pneumothorax   Finalized by (CST): Max Rodriguez MD on 12/05/2024 at 6:00 PM             Cardiovascular   (+) hypertension, valvular problems/murmurs AI, dysrhythmias (A fib), CHF  (-) TILLEY    ECG reviewed  Rhythm: irregular  Rate:  abnormal  ROS comment: ECHO   Conclusions:     1. Left ventricle: The cavity size was normal. Wall thickness was normal.      Systolic function was vigorous. The estimated ejection fraction was      65-70%, by visual assessment. No diagnostic evidence for regional wall      motion abnormalities. Unable to assess LV diastolic function due to heart      rhythm.   2. Right ventricle: The cavity size was mildly increased.   3. Left atrium: The left atrial volume was markedly increased.   4. Right atrium: The atrium was markedly dilated.   5. Aortic valve: A bioprosthetic valve is present. There was mild-moderate      perivalvular regurgitation. The peak systolic velocity was 3.13m/sec. The      mean systolic gradient was 22mm Hg. The valve area (VTI) was 1.26cm^2.      The valve area (VTI) index was 0.64cm^2/m^2.   6. Mitral valve: Transvalvular velocity was increased more than expected.      The findings were consistent with mild stenosis. There was mild      regurgitation. The mean diastolic gradient was 5mm Hg.   7. Tricuspid valve: There was moderate-severe regurgitation.   8. Pulmonary arteries: Systolic pressure was moderately to markedly      increased, in the range of 65mm Hg to 70mm Hg.   9. Pericardium, extracardiac: A small pericardial effusion was identified      circumferential to the heart. There was no evidence of hemodynamic      compromise. There was a left pleural effusion.   Impressions:  This study is compared with previous dated 06/26/2024:   *   ---------------------------------------------------------------------------------  EKG   Atrial fibrillation   Rightward axis   Low voltage QRS, consider pulmonary disease, pericardial effusion, or normal variant   Incomplete right bundle branch block   Septal infarct (cited on or before 12-AUG-2024)   Abnormal ECG   When compared with ECG of 12-AUG-2024 18:53,   No significant change was found   Confirmed by car appiah ([id number]) on 12/6/2024 12:20:48 PM              Neuro/Psych    (+)  neuromuscular disease,  depression    (-) seizures    GI/Hepatic/Renal    (+) hiatal hernia, GERD, PUD, chronic renal disease  (-) liver disease    Endo/Other    (+) diabetes mellitus type 2, arthritis  (-) blood dyscrasia    Comments: CREST syndrome   Raynauds   Abdominal   (+) obese                 Anesthesia Plan:   ASA:  4  Emergent    Plan:   MAC  Informed Consent Plan and Risks Discussed With:  Patient      I have informed Yamini Ocampo and/or legal guardian or family member of the nature of the anesthetic plan, benefits, risks including possible dental damage if relevant, major complications, and any alternative forms of anesthetic management.   All of the patient's questions were answered to the best of my ability. The patient desires the anesthetic management as planned.  Sherry Silva DO  12/7/2024 7:44 AM  Present on Admission:  **None**         [1]   Facility-Administered Medications Prior to Admission   Medication Dose Route Frequency Provider Last Rate Last Admin    darbepoetin molina (Aranesp) 200 MCG/0.4ML injection 200 mcg  200 mcg Subcutaneous Q4 Week Manan Pizarro MD   200 mcg at 05/29/24 1016     Medications Prior to Admission   Medication Sig Dispense Refill Last Dose/Taking    metoprolol succinate ER 50 MG Oral Tablet 24 Hr Take 1 tablet (50 mg total) by mouth daily.   12/4/2024 at  9:00 AM    torsemide 20 MG Oral Tab Take 2 tablets (40 mg total) by mouth daily. 180 tablet 0 12/2/2024    PANTOPRAZOLE 40 MG Oral Tab EC TAKE 1 TABLET TWICE A  tablet 3 12/5/2024 at 10:30 AM    sertraline 100 MG Oral Tab Take 1 tablet (100 mg total) by mouth daily. 90 tablet 3 12/5/2024 at 10:30 AM    atorvastatin 10 MG Oral Tab Take 1 tablet (10 mg total) by mouth nightly. 90 tablet 3 12/5/2024 at 10:30 AM    FARXIGA 10 MG Oral Tab Take 1 tablet (10 mg total) by mouth daily.   12/5/2024 at 10:30 AM    albuterol 108 (90 Base) MCG/ACT Inhalation Aero Soln    Taking     cyanocobalamin 1000 MCG Oral Tab Take 1 tablet (1,000 mcg total) by mouth daily.   12/4/2024 at  9:00 PM    ELIQUIS 5 MG Oral Tab Take 1 tablet (5 mg total) by mouth 2 (two) times daily. 180 tablet 0 12/5/2024 at 10:30 AM   [2]   Current Facility-Administered Medications Ordered in Epic   Medication Dose Route Frequency Provider Last Rate Last Admin    pantoprazole (Protonix) 40 mg in sodium chloride 0.9% PF 10 mL IV push  40 mg Intravenous Q12H Jocelyn Taylor APRN   40 mg at 12/06/24 2119    sodium ferric gluconate (Ferrlecit) 125 mg in sodium chloride 0.9% 100mL IVPB premix  125 mg Intravenous Daily Jocelyn Taylor APRN 100 mL/hr at 12/06/24 1709 125 mg at 12/06/24 1709    furosemide (Lasix) 10 mg/mL injection 20 mg  20 mg Intravenous Once Balwinder Andujar MD        glycerin-hypromellose- (Artificial Tears) 0.2-0.2-1 % ophthalmic solution 1 drop  1 drop Both Eyes QID PRN Sarah Portillo MD   1 drop at 12/06/24 1713    simethicone (Mylicon) chewable tab 320 mg  320 mg Oral Once Issac Doe MD        acetaminophen (Tylenol Extra Strength) tab 500 mg  500 mg Oral Q4H PRN Hlilary Fitzgerald MD        ondansetron (Zofran) 4 MG/2ML injection 4 mg  4 mg Intravenous Q6H PRN Hillary Fitzgerald MD        metoclopramide (Reglan) 5 mg/mL injection 5 mg  5 mg Intravenous Q8H PRN Hillary Fitzgerald MD        bumetanide (Bumex) 0.25 MG/ML injection 1 mg  1 mg Intravenous BID (Diuretic) Hillary Fitzgerald MD   1 mg at 12/06/24 1712    influenza virus trivalent high dose PF (Fluzone HD) 0.5 mL injection (ages >/= 65 years) 0.5 mL  0.5 mL Intramuscular Prior to discharge Hillary Fitzgerald MD         No current [facility]-ordered outpatient medications on file.   [3]   Allergies  Allergen Reactions    Adhesive Tape RASH     Skin off

## 2024-12-07 NOTE — PROGRESS NOTES
Patient seen in follow up. Some abdominal discomfort. S/p EGD today Chart reviewed.  Review of systems: Constitutional: Negative for fever.  Respiratory: Negative for shortness of breath.   Cardiac: Negative for chest pain.  Gastrointestinal: Negative for abdominal pain.    Vitals:    12/07/24 0938   BP: 108/64   Pulse: 86   Resp: 20   Temp: 97.5 °F (36.4 °C)       Intake/Output Summary (Last 24 hours) at 12/7/2024 1116  Last data filed at 12/7/2024 0830  Gross per 24 hour   Intake 1340.5 ml   Output 2150 ml   Net -809.5 ml     Wt Readings from Last 1 Encounters:   12/07/24 185 lb (83.9 kg)        General: No acute distress.  Neck: Jugular venous pulsations not seen.  Lungs: Clear to auscultation.  Heart: Normal rate. No murmurs.  Abdomen: Soft. Non tender  Extremities: No edema.  Neurological: Alert. No focal deficits.  Psychiatric: Appropriate mood and affect.  Current Facility-Administered Medications   Medication Dose Route Frequency    sodium chloride 0.9% infusion   Intravenous Continuous    lactated ringers infusion   Intravenous Continuous    pantoprazole (Protonix) 40 mg in sodium chloride 0.9% PF 10 mL IV push  40 mg Intravenous Q12H    sodium ferric gluconate (Ferrlecit) 125 mg in sodium chloride 0.9% 100mL IVPB premix  125 mg Intravenous Daily    furosemide (Lasix) 10 mg/mL injection 20 mg  20 mg Intravenous Once    glycerin-hypromellose- (Artificial Tears) 0.2-0.2-1 % ophthalmic solution 1 drop  1 drop Both Eyes QID PRN    simethicone (Mylicon) chewable tab 320 mg  320 mg Oral Once    acetaminophen (Tylenol Extra Strength) tab 500 mg  500 mg Oral Q4H PRN    ondansetron (Zofran) 4 MG/2ML injection 4 mg  4 mg Intravenous Q6H PRN    metoclopramide (Reglan) 5 mg/mL injection 5 mg  5 mg Intravenous Q8H PRN    bumetanide (Bumex) 0.25 MG/ML injection 1 mg  1 mg Intravenous BID (Diuretic)    influenza virus trivalent high dose PF (Fluzone HD) 0.5 mL injection (ages >/= 65 years) 0.5 mL  0.5 mL  Intramuscular Prior to discharge     Medications Prior to Admission   Medication Sig    metoprolol succinate ER 50 MG Oral Tablet 24 Hr Take 1 tablet (50 mg total) by mouth daily.    torsemide 20 MG Oral Tab Take 2 tablets (40 mg total) by mouth daily.    PANTOPRAZOLE 40 MG Oral Tab EC TAKE 1 TABLET TWICE A DAY    sertraline 100 MG Oral Tab Take 1 tablet (100 mg total) by mouth daily.    atorvastatin 10 MG Oral Tab Take 1 tablet (10 mg total) by mouth nightly.    FARXIGA 10 MG Oral Tab Take 1 tablet (10 mg total) by mouth daily.    albuterol 108 (90 Base) MCG/ACT Inhalation Aero Soln     cyanocobalamin 1000 MCG Oral Tab Take 1 tablet (1,000 mcg total) by mouth daily.    ELIQUIS 5 MG Oral Tab Take 1 tablet (5 mg total) by mouth 2 (two) times daily.     No results found.    Lab Results   Component Value Date    WBC 4.6 12/07/2024    HGB 7.4 12/07/2024    HCT 23.9 12/07/2024    .0 12/07/2024    CREATSERUM 1.81 12/07/2024    BUN 34 12/07/2024     12/07/2024    K 4.0 12/07/2024     12/07/2024    CO2 28.0 12/07/2024    GLU 89 12/07/2024    CA 9.0 12/07/2024         Impression:   Anemia  Acute on chronic DHF  Afib  Hlp  Htn     Recommendations:  Restart eliquis once ok with GI at 2.5 mg bid  Continue metoprolol/atorvastatin  Continue bumex IV for now. Cr downtrending       Currently hemodynamically stable  [address]  [address]  [place] [address]  Phone: [phone]  [email or web address]

## 2024-12-07 NOTE — PLAN OF CARE
Patient is alert and oriented. 1 unit of PRBC and IV iron were given as ordered. SCDs were placed. Plan is for an EGD tomorrow. Safety measures are in place.     Problem: Patient Centered Care  Goal: Patient preferences are identified and integrated in the patient's plan of care  Description: Interventions:  - What would you like us to know as we care for you? I'm from home with my daughter  - Provide timely, complete, and accurate information to patient/family  - Incorporate patient and family knowledge, values, beliefs, and cultural backgrounds into the planning and delivery of care  - Encourage patient/family to participate in care and decision-making at the level they choose  - Honor patient and family perspectives and choices  Outcome: Progressing     Problem: Patient/Family Goals  Goal: Patient/Family Long Term Goal  Description: Patient's Long Term Goal: discharge home    Interventions:  - Monitor vitals, labs, imaging  - Tele  - on consult  - See additional Care Plan goals for specific interventionsPatient's Long Term Goal: discharge home    Interventions:  - Monitor vitals, labs, imaging  - Tele  - Cards, GI on consult  - See additional Care Plan goals for specific interventions  Outcome: Progressing  Goal: Patient/Family Short Term Goal  Description: Patient's Short Term Goal: feel better    Interventions:   - Activity as tolerated  - Blood transfusion  - Oxygen therapy  - See additional Care Plan goals for specific interventions  Outcome: Progressing     Problem: CARDIOVASCULAR - ADULT  Goal: Maintains optimal cardiac output and hemodynamic stability  Description: INTERVENTIONS:  - Monitor vital signs, rhythm, and trends  - Monitor for bleeding, hypotension and signs of decreased cardiac output  - Evaluate effectiveness of vasoactive medications to optimize hemodynamic stability  - Monitor arterial and/or venous puncture sites for bleeding and/or hematoma  - Assess quality of pulses, skin color and  temperature  - Assess for signs of decreased coronary artery perfusion - ex. Angina  - Evaluate fluid balance, assess for edema, trend weights  Outcome: Progressing  Goal: Absence of cardiac arrhythmias or at baseline  Description: INTERVENTIONS:  - Continuous cardiac monitoring, monitor vital signs, obtain 12 lead EKG if indicated  - Evaluate effectiveness of antiarrhythmic and heart rate control medications as ordered  - Initiate emergency measures for life threatening arrhythmias  - Monitor electrolytes and administer replacement therapy as ordered  Outcome: Progressing     Problem: RESPIRATORY - ADULT  Goal: Achieves optimal ventilation and oxygenation  Description: INTERVENTIONS:  - Assess for changes in respiratory status  - Assess for changes in mentation and behavior  - Position to facilitate oxygenation and minimize respiratory effort  - Oxygen supplementation based on oxygen saturation or ABGs  - Provide Smoking Cessation handout, if applicable  - Encourage broncho-pulmonary hygiene including cough, deep breathe, Incentive Spirometry  - Assess the need for suctioning and perform as needed  - Assess and instruct to report SOB or any respiratory difficulty  - Respiratory Therapy support as indicated  - Manage/alleviate anxiety  - Monitor for signs/symptoms of CO2 retention  Outcome: Progressing     Problem: GASTROINTESTINAL - ADULT  Goal: Maintains or returns to baseline bowel function  Description: INTERVENTIONS:  - Assess bowel function  - Maintain adequate hydration with IV or PO as ordered and tolerated  - Evaluate effectiveness of GI medications  - Encourage mobilization and activity  - Obtain nutritional consult as needed  - Establish a toileting routine/schedule  - Consider collaborating with pharmacy to review patient's medication profile  Outcome: Progressing     Problem: METABOLIC/FLUID AND ELECTROLYTES - ADULT  Goal: Electrolytes maintained within normal limits  Description: INTERVENTIONS:  -  Monitor labs and rhythm and assess patient for signs and symptoms of electrolyte imbalances  - Administer electrolyte replacement as ordered  - Monitor response to electrolyte replacements, including rhythm and repeat lab results as appropriate  - Fluid restriction as ordered  - Instruct patient on fluid and nutrition restrictions as appropriate  Outcome: Progressing  Goal: Hemodynamic stability and optimal renal function maintained  Description: INTERVENTIONS:  - Monitor labs and assess for signs and symptoms of volume excess or deficit  - Monitor intake, output and patient weight  - Monitor urine specific gravity, serum osmolarity and serum sodium as indicated or ordered  - Monitor response to interventions for patient's volume status, including labs, urine output, blood pressure (other measures as available)  - Encourage oral intake as appropriate  - Instruct patient on fluid and nutrition restrictions as appropriate  Outcome: Progressing     Problem: HEMATOLOGIC - ADULT  Goal: Maintains hematologic stability  Description: INTERVENTIONS  - Assess for signs and symptoms of bleeding or hemorrhage  - Monitor labs and vital signs for trends  - Administer supportive blood products/factors, fluids and medications as ordered and appropriate  - Administer supportive blood products/factors as ordered and appropriate  Outcome: Progressing  Goal: Free from bleeding injury  Description: (Example usage: patient with low platelets)  INTERVENTIONS:  - Avoid intramuscular injections, enemas and rectal medication administration  - Ensure safe mobilization of patient  - Hold pressure on venipuncture sites to achieve adequate hemostasis  - Assess for signs and symptoms of internal bleeding  - Monitor lab trends  - Patient is to report abnormal signs of bleeding to staff  - Avoid use of toothpicks and dental floss  - Use electric shaver for shaving  - Use soft bristle tooth brush  - Limit straining and forceful nose  blowing  Outcome: Progressing

## 2024-12-07 NOTE — PROGRESS NOTES
[facility]  part of [facility]    Progress Note    Yamini Ocampo Patient Status:  Inpatient    1945 MRN [id number]   Location [facility] 3W/SW Attending Charly Chang MD   Hosp Day # 2 PCP Elly Antonio MD     Chief Complaint:     Acute on chronic congestive heart failure.       Subjective:   Subjective:    Patient seen and examined  Afebrile, normotensive.    Objective:   Blood pressure 108/64, pulse 86, temperature 97.5 °F (36.4 °C), temperature source Oral, resp. rate 20, height 5' 6\" (1.676 m), weight 185 lb (83.9 kg), SpO2 95%.  Physical Exam      General: no acute distress  HEENT: EOMI PERRLA, atraumatic normocephalic  Cardiac: S1-S2 appreciated  Lungs: Good air entry bilaterally clear to auscultation  Abdomen: Soft nontender nondistended positive bowel sounds  Ext: Peripheral pulses are positive  Neuro: No focal deficits noted  Psych: Normal mood  Skin: No rashes noted  MSK: Full range of motion intact    Results:   Lab Results   Component Value Date    WBC 4.6 2024    HGB 7.4 (L) 2024    HCT 23.9 (L) 2024    .0 2024    CREATSERUM 1.81 (H) 2024    BUN 34 (H) 2024     2024    K 4.0 2024     2024    CO2 28.0 2024    GLU 89 2024    CA 9.0 2024    ALB 3.9 2024    ALKPHO 96 2024    BILT 0.9 2024    TP 6.9 2024    AST 23 2024    ALT <7 (L) 2024    PTT 29.0 2024    INR 1.31 (H) 2024    TSH 2.130 2021    LIP 26 2024    ESRML 35 (H) 2017    CRP 0.7 2017     (H) 06/10/2011    MG 1.8 2024    PHOS 3.7 2024    TROP <0.045 05/10/2019    TROPHS 25 2024    CK 20 (L) 2017    B12 626 2023       No results found.  EKG 12 Lead    Result Date: 2024  Atrial fibrillation Rightward axis Low voltage QRS, consider pulmonary disease, pericardial effusion, or normal variant Incomplete  right bundle branch block Septal infarct (cited on or before 12-AUG-2024) Abnormal ECG When compared with ECG of 12-AUG-2024 18:53, No significant change was found Confirmed by car appiah ([id number]) on 12/6/2024 12:20:48 PM     Assessment & Plan:       Acute on chronic diastolic congestive heart failure, unspecified heart failure type (HCC)  -Cards on board - appreciate recs  -currently on IV Bumex 1 mg twice daily  -Continue monitor strict Is and O's  -Monitor oxygen sats     Gastrointestinal hemorrhage, unspecified gastrointestinal hemorrhage type  -Hemoglobin down to 7.4 today  -Will continue monitor and trend.  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 IV medications requiring close inpatient monitoring.          **Certification      PHYSICIAN Certification of Need for Inpatient Hospitalization - Initial Certification    Patient will require inpatient services that will reasonably be expected to span two midnight's based on the clinical documentation in H+P.   Based on patients current state of illness, I anticipate that, after discharge, patient will require TBD.      Charly Chang MD

## 2024-12-07 NOTE — PLAN OF CARE
Problem: Patient Centered Care  Goal: Patient preferences are identified and integrated in the patient's plan of care  Description: Interventions:  - What would you like us to know as we care for you? I'm from home with my daughter  - Provide timely, complete, and accurate information to patient/family  - Incorporate patient and family knowledge, values, beliefs, and cultural backgrounds into the planning and delivery of care  - Encourage patient/family to participate in care and decision-making at the level they choose  - Honor patient and family perspectives and choices  Outcome: Progressing     Problem: Patient/Family Goals  Goal: Patient/Family Long Term Goal  Description: Patient's Long Term Goal: discharge home    Interventions:  - Monitor vitals, labs, imaging  - Tele  - on consult  - See additional Care Plan goals for specific interventionsPatient's Long Term Goal: discharge home    Interventions:  - Monitor vitals, labs, imaging  - Tele  - Cards, GI on consult  - See additional Care Plan goals for specific interventions  Outcome: Progressing  Goal: Patient/Family Short Term Goal  Description: Patient's Short Term Goal: feel better    Interventions:   - Activity as tolerated  - Blood transfusion  - Oxygen therapy  - See additional Care Plan goals for specific interventions  Outcome: Progressing     Problem: CARDIOVASCULAR - ADULT  Goal: Maintains optimal cardiac output and hemodynamic stability  Description: INTERVENTIONS:  - Monitor vital signs, rhythm, and trends  - Monitor for bleeding, hypotension and signs of decreased cardiac output  - Evaluate effectiveness of vasoactive medications to optimize hemodynamic stability  - Monitor arterial and/or venous puncture sites for bleeding and/or hematoma  - Assess quality of pulses, skin color and temperature  - Assess for signs of decreased coronary artery perfusion - ex. Angina  - Evaluate fluid balance, assess for edema, trend weights  Outcome:  Progressing  Goal: Absence of cardiac arrhythmias or at baseline  Description: INTERVENTIONS:  - Continuous cardiac monitoring, monitor vital signs, obtain 12 lead EKG if indicated  - Evaluate effectiveness of antiarrhythmic and heart rate control medications as ordered  - Initiate emergency measures for life threatening arrhythmias  - Monitor electrolytes and administer replacement therapy as ordered  Outcome: Progressing     Problem: RESPIRATORY - ADULT  Goal: Achieves optimal ventilation and oxygenation  Description: INTERVENTIONS:  - Assess for changes in respiratory status  - Assess for changes in mentation and behavior  - Position to facilitate oxygenation and minimize respiratory effort  - Oxygen supplementation based on oxygen saturation or ABGs  - Provide Smoking Cessation handout, if applicable  - Encourage broncho-pulmonary hygiene including cough, deep breathe, Incentive Spirometry  - Assess the need for suctioning and perform as needed  - Assess and instruct to report SOB or any respiratory difficulty  - Respiratory Therapy support as indicated  - Manage/alleviate anxiety  - Monitor for signs/symptoms of CO2 retention  Outcome: Progressing     Problem: GASTROINTESTINAL - ADULT  Goal: Maintains or returns to baseline bowel function  Description: INTERVENTIONS:  - Assess bowel function  - Maintain adequate hydration with IV or PO as ordered and tolerated  - Evaluate effectiveness of GI medications  - Encourage mobilization and activity  - Obtain nutritional consult as needed  - Establish a toileting routine/schedule  - Consider collaborating with pharmacy to review patient's medication profile  Outcome: Progressing     Problem: METABOLIC/FLUID AND ELECTROLYTES - ADULT  Goal: Electrolytes maintained within normal limits  Description: INTERVENTIONS:  - Monitor labs and rhythm and assess patient for signs and symptoms of electrolyte imbalances  - Administer electrolyte replacement as ordered  - Monitor  response to electrolyte replacements, including rhythm and repeat lab results as appropriate  - Fluid restriction as ordered  - Instruct patient on fluid and nutrition restrictions as appropriate  Outcome: Progressing  Goal: Hemodynamic stability and optimal renal function maintained  Description: INTERVENTIONS:  - Monitor labs and assess for signs and symptoms of volume excess or deficit  - Monitor intake, output and patient weight  - Monitor urine specific gravity, serum osmolarity and serum sodium as indicated or ordered  - Monitor response to interventions for patient's volume status, including labs, urine output, blood pressure (other measures as available)  - Encourage oral intake as appropriate  - Instruct patient on fluid and nutrition restrictions as appropriate  Outcome: Progressing     Problem: HEMATOLOGIC - ADULT  Goal: Maintains hematologic stability  Description: INTERVENTIONS  - Assess for signs and symptoms of bleeding or hemorrhage  - Monitor labs and vital signs for trends  - Administer supportive blood products/factors, fluids and medications as ordered and appropriate  - Administer supportive blood products/factors as ordered and appropriate  Outcome: Progressing  Goal: Free from bleeding injury  Description: (Example usage: patient with low platelets)  INTERVENTIONS:  - Avoid intramuscular injections, enemas and rectal medication administration  - Ensure safe mobilization of patient  - Hold pressure on venipuncture sites to achieve adequate hemostasis  - Assess for signs and symptoms of internal bleeding  - Monitor lab trends  - Patient is to report abnormal signs of bleeding to staff  - Avoid use of toothpicks and dental floss  - Use electric shaver for shaving  - Use soft bristle tooth brush  - Limit straining and forceful nose blowing  Outcome: Progressing

## 2024-12-07 NOTE — ANESTHESIA POSTPROCEDURE EVALUATION
Patient: Yamini Ocampo    Procedure Summary       Date: 12/07/24 Room / Location: [facility] ENDOSCOPY 01 / [facility] ENDOSCOPY    Anesthesia Start: 0751 Anesthesia Stop: 0823    Procedures:       ESOPHAGOGASTRODUODENOSCOPY (EGD) with APC      CAPSULE ENDOSCOPY Diagnosis: (Gastric GAVE, Reflux esophagitis)    Surgeons: Issac Doe MD Anesthesiologist: Sherry Silva DO    Anesthesia Type: MAC ASA Status: 4 - Emergent            Anesthesia Type: MAC    Vitals Value Taken Time   BP 97/69 12/07/24 0821   Temp     Pulse 81 12/07/24 0822   Resp 19 12/07/24 0822   SpO2 93 % 12/07/24 0822   Vitals shown include unfiled device data.    [facility] AN Post Evaluation:   Patient Evaluated in PACU  Patient Participation: complete - patient participated  Level of Consciousness: awake  Pain Score: 0  Pain Management: adequate  Airway Patency:patent  Dental exam unchanged from preop  Yes    Nausea/Vomiting: none  Cardiovascular Status: hemodynamically stable  Respiratory Status: spontaneous ventilation, airway suctioned, oral airway, unassisted, nonlabored ventilation and face mask  Postoperative Hydration stable      Sherry Silva DO  12/7/2024 8:23 AM

## 2024-12-08 LAB
ANION GAP SERPL CALC-SCNC: 7 MMOL/L (ref 0–18)
BASOPHILS # BLD AUTO: 0.07 X10(3) UL (ref 0–0.2)
BASOPHILS NFR BLD AUTO: 1 %
BILIRUB UR QL: NEGATIVE
BUN BLD-MCNC: 32 MG/DL (ref 9–23)
BUN/CREAT SERPL: 17.6 (ref 10–20)
CALCIUM BLD-MCNC: 9.1 MG/DL (ref 8.7–10.4)
CHLORIDE SERPL-SCNC: 106 MMOL/L (ref 98–112)
CO2 SERPL-SCNC: 31 MMOL/L (ref 21–32)
CREAT BLD-MCNC: 1.82 MG/DL
DEPRECATED RDW RBC AUTO: 68.9 FL (ref 35.1–46.3)
EGFRCR SERPLBLD CKD-EPI 2021: 28 ML/MIN/1.73M2 (ref 60–?)
EOSINOPHIL # BLD AUTO: 0.05 X10(3) UL (ref 0–0.7)
EOSINOPHIL NFR BLD AUTO: 0.7 %
ERYTHROCYTE [DISTWIDTH] IN BLOOD BY AUTOMATED COUNT: 20.3 % (ref 11–15)
GLUCOSE BLD-MCNC: 92 MG/DL (ref 70–99)
GLUCOSE UR-MCNC: NORMAL MG/DL
HCT VFR BLD AUTO: 24.3 %
HGB BLD-MCNC: 7.1 G/DL
HGB BLD-MCNC: 8.4 G/DL
IMM GRANULOCYTES # BLD AUTO: 0.02 X10(3) UL (ref 0–1)
IMM GRANULOCYTES NFR BLD: 0.3 %
KETONES UR-MCNC: NEGATIVE MG/DL
LEUKOCYTE ESTERASE UR QL STRIP.AUTO: 500
LYMPHOCYTES # BLD AUTO: 0.58 X10(3) UL (ref 1–4)
LYMPHOCYTES NFR BLD AUTO: 8.1 %
MAGNESIUM SERPL-MCNC: 1.9 MG/DL (ref 1.6–2.6)
MCH RBC QN AUTO: 30.1 PG (ref 26–34)
MCHC RBC AUTO-ENTMCNC: 29.2 G/DL (ref 31–37)
MCV RBC AUTO: 103 FL
MONOCYTES # BLD AUTO: 0.62 X10(3) UL (ref 0.1–1)
MONOCYTES NFR BLD AUTO: 8.6 %
NEUTROPHILS # BLD AUTO: 5.86 X10 (3) UL (ref 1.5–7.7)
NEUTROPHILS # BLD AUTO: 5.86 X10(3) UL (ref 1.5–7.7)
NEUTROPHILS NFR BLD AUTO: 81.3 %
NITRITE UR QL STRIP.AUTO: NEGATIVE
OSMOLALITY SERPL CALC.SUM OF ELEC: 305 MOSM/KG (ref 275–295)
PH UR: 6.5 [PH] (ref 5–8)
PLATELET # BLD AUTO: 235 10(3)UL (ref 150–450)
POTASSIUM SERPL-SCNC: 4.2 MMOL/L (ref 3.5–5.1)
PROT UR-MCNC: NEGATIVE MG/DL
RBC # BLD AUTO: 2.36 X10(6)UL
SODIUM SERPL-SCNC: 144 MMOL/L (ref 136–145)
SP GR UR STRIP: 1.01 (ref 1–1.03)
UROBILINOGEN UR STRIP-ACNC: NORMAL
WBC # BLD AUTO: 7.2 X10(3) UL (ref 4–11)
WBC #/AREA URNS AUTO: >50 /HPF
WBC CLUMPS UR QL AUTO: PRESENT /HPF

## 2024-12-08 PROCEDURE — 99232 SBSQ HOSP IP/OBS MODERATE 35: CPT | Performed by: INTERNAL MEDICINE

## 2024-12-08 PROCEDURE — 99233 SBSQ HOSP IP/OBS HIGH 50: CPT | Performed by: HOSPITALIST

## 2024-12-08 PROCEDURE — 91110 GI TRC IMG INTRAL ESOPH-ILE: CPT | Performed by: INTERNAL MEDICINE

## 2024-12-08 RX ORDER — ACETAMINOPHEN 500 MG
500 TABLET ORAL EVERY 4 HOURS PRN
Status: DISCONTINUED | OUTPATIENT
Start: 2024-12-08 | End: 2024-12-10

## 2024-12-08 RX ORDER — FUROSEMIDE 10 MG/ML
40 INJECTION INTRAMUSCULAR; INTRAVENOUS ONCE
Status: DISCONTINUED | OUTPATIENT
Start: 2024-12-08 | End: 2024-12-09

## 2024-12-08 RX ORDER — SODIUM CHLORIDE 9 MG/ML
INJECTION, SOLUTION INTRAVENOUS ONCE
Status: DISCONTINUED | OUTPATIENT
Start: 2024-12-08 | End: 2024-12-10

## 2024-12-08 NOTE — PROGRESS NOTES
[facility]  part of [facility]    Progress Note    Yamini Ocampo Patient Status:  Inpatient    1945 MRN [id number]   Location [facility] 3W/SW Attending Arturo Echavarria,*   Hosp Day # 3 PCP Elly Antonio MD     Chief Complaint: feels ok    Subjective:     Constitutional:  Negative for diaphoresis and fatigue.   HENT:  Negative for congestion.    Respiratory:  Negative for cough and shortness of breath.    Cardiovascular:  Negative for leg swelling.   Gastrointestinal:  Negative for abdominal pain.   Genitourinary:  Negative for dysuria.   Neurological:  Negative for weakness.   Psychiatric/Behavioral:  Negative for decreased concentration.        Objective:   Blood pressure 138/65, pulse 106, temperature 98.6 °F (37 °C), temperature source Oral, resp. rate 20, height 5' 6\" (1.676 m), weight 178 lb 9.6 oz (81 kg), SpO2 91%.  Physical Exam  Vitals and nursing note reviewed.   Constitutional:       General: She is not in acute distress.     Appearance: She is not toxic-appearing.   HENT:      Head: Normocephalic and atraumatic.   Cardiovascular:      Rate and Rhythm: Tachycardia present.      Heart sounds: Murmur heard.   Pulmonary:      Effort: Pulmonary effort is normal.      Breath sounds: Rhonchi present.   Skin:     Capillary Refill: Capillary refill takes less than 2 seconds.   Neurological:      General: No focal deficit present.      Mental Status: She is alert and oriented to person, place, and time.   Psychiatric:         Behavior: Behavior normal.         Judgment: Judgment normal.         Results:   Lab Results   Component Value Date    WBC 7.2 2024    HGB 7.1 (L) 2024    HCT 24.3 (L) 2024    .0 2024    CREATSERUM 1.82 (H) 2024    BUN 32 (H) 2024     2024    K 4.2 2024     2024    CO2 31.0 2024    GLU 92 2024    CA 9.1 2024    ALB 3.9 2024    ALKPHO 96  08/12/2024    BILT 0.9 08/12/2024    TP 6.9 08/12/2024    AST 23 08/12/2024    ALT <7 (L) 08/12/2024    PTT 29.0 08/12/2024    INR 1.31 (H) 08/12/2024    TSH 2.130 05/04/2021    LIP 26 08/12/2024    ESRML 35 (H) 06/14/2017    CRP 0.7 06/14/2017     (H) 06/10/2011    MG 1.9 12/08/2024    PHOS 3.7 06/25/2024    TROP <0.045 05/10/2019    TROPHS 25 12/05/2024    CK 20 (L) 09/05/2017    B12 626 12/20/2023       XR CHEST AP PORTABLE  (CPT=71045)    Result Date: 12/7/2024  CONCLUSION:   1. Cardiac enlargement with secondary signs of slight fluid overload.  2. Small streaky opacities at the lung bases which could represent atelectasis, infiltrate, or a combination in addition to the small pleural effusions    Dictated by (CST): Colton Hines MD on 12/07/2024 at 3:30 PM     Finalized by (CST): Colton Hines MD on 12/07/2024 at 3:32 PM               Assessment & Plan:       Acute on chronic diastolic congestive heart failure  (HCC)  -Cards on board - appreciate recs  -currently on IV Bumex 1 mg twice daily  -Continue monitor strict Is and O's  -Monitor oxygen sats  Severe tachycardia from anemia may be dangerously stressing heart as hr goes to 150-170     Gastrointestinal hemorrhage, unspecified gastrointestinal hemorrhage type  -Hemoglobin down; will transfuse   -Will continue monitor and trend.  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

## 2024-12-08 NOTE — PROGRESS NOTES
[facility]  GI SERVICE PROGRESS NOTE    Yamini Ocampo Patient Status:  Inpatient    1945 MRN [id number]   Location [facility] 3W/SW Attending Arturo Echavarria,*   Hosp Day # 3 PCP Elly Antonio MD       Subjective:     Tolerated yesterday's EGD with APC cautery ablation of gastric antral telangiectasias well.  \"Given\" capsule device placed during yesterday's exam.    Labs this morning show persistent anemia, trend this admission hemoglobin 6.4g --> 7.9g --> 7.4g --> 7.1g    Bright, looks remarkably well, remembers me from yesterday.  No pain post procedure.  No gross melena overnight.    Stable overnight.   on morning vitals, then 106 then 87-95.  Pulse oximetry 88-91% overnight, now up to 98%.      Objective:   Blood pressure 138/65, pulse 106, temperature 98.6 °F (37 °C), temperature source Oral, resp. rate 20, height 5' 6\" (1.676 m), weight 178 lb 9.6 oz (81 kg), SpO2 91%.    GENERAL: Bright outgoing looks well  HEENT: Normocephalic; pupils equally round and reactive to light; no temporal wasting; no thyromegaly or cervical lymphadenopathy  PULM: Breathing and speaking comfortably  CV: Irregular  ABD: Normoactive bowel sounds; soft/nondistended  EXT: No edema  SKIN: No rash        Results:       Recent Labs   Lab 24  1649 24  0646 24  1727 24  0720 24  0615   RBC 2.39* 2.19*  --  2.38* 2.36*   HGB 7.0* 6.4* 7.9* 7.4* 7.1*   HCT 25.0* 22.5*  --  23.9* 24.3*   .6* 102.7*  --  100.4* 103.0*   MCH 29.3 29.2  --  31.1 30.1   MCHC 28.0* 28.4*  --  31.0 29.2*   RDW 19.0* 19.2*  --  20.9* 20.3*   NEPRELIM 3.50 4.51  --   --  5.86   WBC 4.6 5.8  --  4.6 7.2   .0 290.0  --  229.0 235.0       Lab Results   Component Value Date    INR 1.31 (H) 2024    INR 1.92 (H) 2024    INR 1.0 10/03/2017       Recent Labs   Lab 24  0646 24  0720 24  0615   * 89 92   BUN 38* 34* 32*   CREATSERUM 2.02* 1.81*  1.82*   CA 9.4 9.0 9.1    144 144   K 4.3 4.0 4.2    108 106   CO2 26.0 28.0 31.0       No results for input(s): \"BRIDGETT\", \"LIP\" in the last 168 hours.    Recent Labs   Lab 12/08/24  0615   MG 1.9       No results for input(s): \"URINE\", \"CULTI\", \"BLDSMR\" in the last 168 hours.      XR CHEST AP PORTABLE  (CPT=71045)    Result Date: 12/7/2024  CONCLUSION:   1. Cardiac enlargement with secondary signs of slight fluid overload.  2. Small streaky opacities at the lung bases which could represent atelectasis, infiltrate, or a combination in addition to the small pleural effusions    Dictated by (CST): Colton Hines MD on 12/07/2024 at 3:30 PM     Finalized by (CST): Colton Hines MD on 12/07/2024 at 3:32 PM             Assessment and Plan:     Yamini Ocampo is a 79 year old woman with medical history of BMI 33.86, HTN, HLD, DM2, Stage III CKD, Anemia of chronic disease, PUD, GERD, Ventricular diastolic dysfunction, HFpEF, Pulmonary artery HTN, CAD, Chronic atrial fibrillation on Eliquis anticoagulation who presents to the ED 12/5/2024 afternoon with shortness of breath, acute on chronic diastolic HF and anemia.     Follows with Dr. Arnoldo Corado of Cardiology.     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  Of note, EGD examinations have repeatedly showed food debris and secretions in the upper esophagus.  During yesterday's EGD examination I also found thick secretions in the oropharynx just above the vocal cords which ordinarily would be coughed and cleared.  This patient may be approaching end-of-life.  Eliquis anticoagulation ?low dose 2.5mg BID will again be per Cardiology service.  This patient does not appear to be tolerating systemic anticoagulation well.  She plans to follow-up with Dr. Corado to discuss further.       Thank you for the opportunity to participate in the care of this patient.      Report on the small bowel VCE exam to follow.      Issac oDe MD        Over 25 minutes spent today reviewing today's and recent data; greater than 50% of that time was spent counseling the patient and coordination of care with RN and other involved physicians.    Digital transcription software was utilized to produce this note. The note was proofread for content only. Typographical errors may remain.

## 2024-12-08 NOTE — PROGRESS NOTES
Patient seen in follow up. No CP/SOB. Chart reviewed.  Review of systems: Constitutional: Negative for fever.  Respiratory: Negative for shortness of breath.   Cardiac: Negative for chest pain.  Gastrointestinal: Negative for abdominal pain.    Vitals:    12/08/24 0512   BP: 105/59   Pulse: (!) 128   Resp: 20   Temp: 98 °F (36.7 °C)       Intake/Output Summary (Last 24 hours) at 12/8/2024 0932  Last data filed at 12/8/2024 0150  Gross per 24 hour   Intake 688 ml   Output 2590 ml   Net -1902 ml     Wt Readings from Last 1 Encounters:   12/08/24 178 lb 9.6 oz (81 kg)        General: No acute distress.  Neck: Jugular venous pulsations not seen.  Lungs: Clear to auscultation.  Heart: Normal rate. No murmurs.  Abdomen: Soft. Non tender  Extremities: No edema.  Neurological: Alert. No focal deficits.  Psychiatric: Appropriate mood and affect.  Current Facility-Administered Medications   Medication Dose Route Frequency    pantoprazole (Protonix) 40 mg in sodium chloride 0.9% PF 10 mL IV push  40 mg Intravenous Q12H    sodium ferric gluconate (Ferrlecit) 125 mg in sodium chloride 0.9% 100mL IVPB premix  125 mg Intravenous Daily    furosemide (Lasix) 10 mg/mL injection 20 mg  20 mg Intravenous Once    glycerin-hypromellose- (Artificial Tears) 0.2-0.2-1 % ophthalmic solution 1 drop  1 drop Both Eyes QID PRN    simethicone (Mylicon) chewable tab 320 mg  320 mg Oral Once    acetaminophen (Tylenol Extra Strength) tab 500 mg  500 mg Oral Q4H PRN    ondansetron (Zofran) 4 MG/2ML injection 4 mg  4 mg Intravenous Q6H PRN    metoclopramide (Reglan) 5 mg/mL injection 5 mg  5 mg Intravenous Q8H PRN    bumetanide (Bumex) 0.25 MG/ML injection 1 mg  1 mg Intravenous BID (Diuretic)    influenza virus trivalent high dose PF (Fluzone HD) 0.5 mL injection (ages >/= 65 years) 0.5 mL  0.5 mL Intramuscular Prior to discharge     Medications Prior to Admission   Medication Sig    metoprolol succinate ER 50 MG Oral Tablet 24 Hr Take 1  tablet (50 mg total) by mouth daily.    torsemide 20 MG Oral Tab Take 2 tablets (40 mg total) by mouth daily.    PANTOPRAZOLE 40 MG Oral Tab EC TAKE 1 TABLET TWICE A DAY    sertraline 100 MG Oral Tab Take 1 tablet (100 mg total) by mouth daily.    atorvastatin 10 MG Oral Tab Take 1 tablet (10 mg total) by mouth nightly.    FARXIGA 10 MG Oral Tab Take 1 tablet (10 mg total) by mouth daily.    albuterol 108 (90 Base) MCG/ACT Inhalation Aero Soln     cyanocobalamin 1000 MCG Oral Tab Take 1 tablet (1,000 mcg total) by mouth daily.    ELIQUIS 5 MG Oral Tab Take 1 tablet (5 mg total) by mouth 2 (two) times daily.     XR CHEST AP PORTABLE  (CPT=71045)    Result Date: 12/7/2024  CONCLUSION:   1. Cardiac enlargement with secondary signs of slight fluid overload.  2. Small streaky opacities at the lung bases which could represent atelectasis, infiltrate, or a combination in addition to the small pleural effusions    Dictated by (CST): Colton Hines MD on 12/07/2024 at 3:30 PM     Finalized by (CST): Colton Hines MD on 12/07/2024 at 3:32 PM           Lab Results   Component Value Date    WBC 7.2 12/08/2024    HGB 7.1 12/08/2024    HCT 24.3 12/08/2024    .0 12/08/2024    CREATSERUM 1.82 12/08/2024    BUN 32 12/08/2024     12/08/2024    K 4.2 12/08/2024     12/08/2024    CO2 31.0 12/08/2024    GLU 92 12/08/2024    CA 9.1 12/08/2024    MG 1.9 12/08/2024         Impression:   Anemia  Acute on chronic DHF  Afib  Hlp  Htn     Recommendations:  Restart eliquis once ok with GI at 2.5 mg bid  Continue metoprolol/atorvastatin  Transition to oral torsemide that she takes at home.       Currently hemodynamically stable  [address]  [address]  [place] [address]  Phone: [phone]  [email or web address]

## 2024-12-09 LAB
ANION GAP SERPL CALC-SCNC: 7 MMOL/L (ref 0–18)
BASOPHILS # BLD AUTO: 0.04 X10(3) UL (ref 0–0.2)
BASOPHILS NFR BLD AUTO: 0.7 %
BLOOD TYPE BARCODE: 600
BUN BLD-MCNC: 33 MG/DL (ref 9–23)
BUN/CREAT SERPL: 18.1 (ref 10–20)
CALCIUM BLD-MCNC: 9.1 MG/DL (ref 8.7–10.4)
CHLORIDE SERPL-SCNC: 104 MMOL/L (ref 98–112)
CO2 SERPL-SCNC: 31 MMOL/L (ref 21–32)
CREAT BLD-MCNC: 1.82 MG/DL
DEPRECATED RDW RBC AUTO: 76.3 FL (ref 35.1–46.3)
EGFRCR SERPLBLD CKD-EPI 2021: 28 ML/MIN/1.73M2 (ref 60–?)
EOSINOPHIL # BLD AUTO: 0.1 X10(3) UL (ref 0–0.7)
EOSINOPHIL NFR BLD AUTO: 1.6 %
ERYTHROCYTE [DISTWIDTH] IN BLOOD BY AUTOMATED COUNT: 22.1 % (ref 11–15)
GLUCOSE BLD-MCNC: 83 MG/DL (ref 70–99)
HCT VFR BLD AUTO: 30.2 %
HGB BLD-MCNC: 8.9 G/DL
IMM GRANULOCYTES # BLD AUTO: 0.02 X10(3) UL (ref 0–1)
IMM GRANULOCYTES NFR BLD: 0.3 %
LYMPHOCYTES # BLD AUTO: 0.84 X10(3) UL (ref 1–4)
LYMPHOCYTES NFR BLD AUTO: 13.7 %
MAGNESIUM SERPL-MCNC: 1.9 MG/DL (ref 1.6–2.6)
MCH RBC QN AUTO: 29.3 PG (ref 26–34)
MCHC RBC AUTO-ENTMCNC: 29.5 G/DL (ref 31–37)
MCV RBC AUTO: 99.3 FL
MONOCYTES # BLD AUTO: 0.5 X10(3) UL (ref 0.1–1)
MONOCYTES NFR BLD AUTO: 8.1 %
NEUTROPHILS # BLD AUTO: 4.64 X10 (3) UL (ref 1.5–7.7)
NEUTROPHILS # BLD AUTO: 4.64 X10(3) UL (ref 1.5–7.7)
NEUTROPHILS NFR BLD AUTO: 75.6 %
OSMOLALITY SERPL CALC.SUM OF ELEC: 300 MOSM/KG (ref 275–295)
PHOSPHATE SERPL-MCNC: 3.5 MG/DL (ref 2.4–5.1)
PLATELET # BLD AUTO: 251 10(3)UL (ref 150–450)
POTASSIUM SERPL-SCNC: 4 MMOL/L (ref 3.5–5.1)
RBC # BLD AUTO: 3.04 X10(6)UL
SODIUM SERPL-SCNC: 142 MMOL/L (ref 136–145)
UNIT VOLUME: 350 ML
WBC # BLD AUTO: 6.1 X10(3) UL (ref 4–11)

## 2024-12-09 PROCEDURE — 99233 SBSQ HOSP IP/OBS HIGH 50: CPT | Performed by: HOSPITALIST

## 2024-12-09 PROCEDURE — 99232 SBSQ HOSP IP/OBS MODERATE 35: CPT | Performed by: REGISTERED NURSE

## 2024-12-09 RX ORDER — METOPROLOL TARTRATE 25 MG/1
25 TABLET, FILM COATED ORAL
Status: DISCONTINUED | OUTPATIENT
Start: 2024-12-09 | End: 2024-12-10

## 2024-12-09 RX ORDER — ATORVASTATIN CALCIUM 20 MG/1
20 TABLET, FILM COATED ORAL NIGHTLY
Status: DISCONTINUED | OUTPATIENT
Start: 2024-12-09 | End: 2024-12-10

## 2024-12-09 RX ORDER — TORSEMIDE 20 MG/1
20 TABLET ORAL DAILY
Status: DISCONTINUED | OUTPATIENT
Start: 2024-12-09 | End: 2024-12-10

## 2024-12-09 NOTE — OPERATIVE REPORT
\"GIVEN\" Small Bowel Capsule Endoscopy report    Pre-op Diagnosis: Iron deficiency anemia, occult blood in the stool    Postoperative diagnosis: See impression    Study date: 12/8/2024    Sedation: none    Study details:    Ms. Ocampo is again hospitalized for evaluation of severe iron deficiency anemia.  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    Recent VCE video capsule enteroscopy examination 7/31/2024 was indeterminate due to capsule not reaching the cecum during the study.  VCE study is therefore repeated today.    The patient was given a bowel prep the night before this procedure with a diet of clear liquids and one bottle of magnesium citrate.  On the morning of the procedure, a dose of simethicone medication was administered and the patient was fitted with the recorder vest.  The Given capsule was activated and placed endoscopically during EGD examination as per report.        Study data:    Capsule reached the stomach at 00:01:58  Capsule entered the duodenum at 00:02:26 and reached the cecum at 03:50:17  Small bowel passage time 3hrs 47 min    Study findings:  As per EGD examination, there were impressive stasis changes distal esophagus with friable mucosa and some slow active bleeding distal esophagus captured on initial images.  No blood in the stomach.  Clear bilious chyme present entire length of today's exam of the small bowel.  No fresh or old blood collections encountered anywhere on today's VCE exam.  Single red bolus appreciated at 00:42:04; and bleeding  Brown bilious stool present in the cecum and colon.    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   Mohs Case Number: [id number] Date Of Previous Biopsy (Optional): 08/08/2024 Biopsy Photograph Reviewed: Yes Referring Physician (Optional): BUCK Collins Consent Type: Consent 1 (Standard) Eye Shield Used: No Surgeon Performing Repair (Optional): Isacc Abad MD Initial Size Of Lesion: 0.5 X Size Of Lesion In Cm (Optional): 1.1 Number Of Stages: 1 Primary Defect Length In Cm (Final Defect Size - Required For Flaps/Grafts): 0.8 Primary Defect Width In Cm (Final Defect Size - Required For Flaps/Grafts): 1.4 Primary Defect Depth In Cm (Optional But Required For Some Insurers): 0 Repair Type: Complex Repair Which Instrument Did You Use For Dermabrasion?: Wire Brush Which Eyelid Repair Cpt Are You Using?: 89729 Oculoplastic Surgeon Procedure Text (A): After obtaining clear surgical margins the patient was sent to oculoplastics for surgical repair.  The patient understands they will receive post-surgical care and follow-up from the referring physician's office. Otolaryngologist Procedure Text (A): After obtaining clear surgical margins the patient was sent to otolaryngology for surgical repair.  The patient understands they will receive post-surgical care and follow-up from the referring physician's office. Plastic Surgeon Procedure Text (A): After obtaining clear surgical margins the patient was sent to plastics for surgical repair.  The patient understands they will receive post-surgical care and follow-up from the referring physician's office. Mid-Level Procedure Text (A): After obtaining clear surgical margins the patient was sent to a mid-level provider for surgical repair.  The patient understands they will receive post-surgical care and follow-up from the mid-level provider. Provider Procedure Text (A): After obtaining clear surgical margins the defect was repaired by another provider. Asc Procedure Text (A): After obtaining clear surgical margins the patient was sent to an ASC for surgical repair.  The patient understands they will receive post-surgical care and follow-up from the ASC physician. Simple / Intermediate / Complex Repair - Final Wound Length In Cm: 3.9 Suturegard Retention Suture: 2-0 Nylon Retention Suture Bite Size: 3 mm Length To Time In Minutes Device Was In Place: 10 Width Of Defect Perpendicular To Closure In Cm (Required): 1.7 Undermining Type: Entire Wound Debridement Text: The wound edges were debrided prior to proceeding with the closure to facilitate wound healing. Helical Rim Text: The closure involved the helical rim. Vermilion Border Text: The closure involved the vermilion border. Nostril Rim Text: The closure involved the nostril rim. Retention Suture Text: Retention sutures were placed to support the closure and prevent dehiscence. Area H Indication Text: Tumors in this location are included in Area H (eyelids, eyebrows, nose, lips, chin, ear, pre-auricular, post-auricular, temple, genitalia, hands, feet, ankles and areola).  Tissue conservation is critical in these anatomic locations. Area M Indication Text: Tumors in this location are included in Area M (cheek, forehead, scalp, neck, jawline and pretibial skin).  Mohs surgery is indicated for tumors in these anatomic locations. Area L Indication Text: Tumors in this location are included in Area L (trunk and extremities).  Mohs surgery is indicated for larger tumors, or tumors with aggressive histologic features, in these anatomic locations. Tumor Debulked?: curette Depth Of Tumor Invasion (For Histology): tumor not visualized Perineural Invasion (For Histology - Be Specific If Possible): absent Special Stains Stage 1 - Results: Base On Clearance Noted Above Stage 2: Additional Anesthesia Type: 1% lidocaine with epinephrine Staging Info: By selecting yes to the question above you will include information on AJCC 8 tumor staging in your Mohs note. Information on tumor staging will be automatically added for SCCs on the head and neck. AJCC 8 includes tumor size, tumor depth, perineural involvement and bone invasion. Tumor Depth: Less than 6mm from granular layer and no invasion beyond the subcutaneous fat Why Was The Change Made?: Please Select the Appropriate Response Medical Necessity Statement: Based on my medical judgement, Mohs surgery is the most appropriate treatment for this cancer compared to other treatments. Alternatives Discussed Intro (Do Not Add Period): I discussed alternative treatments to Mohs surgery and specifically discussed the risks and benefits of Consent 1/Introductory Paragraph: The rationale for Mohs was explained to the patient and consent was obtained. The risks, benefits and alternatives to therapy were discussed in detail. Specifically, the risks of infection, scarring, bleeding, prolonged wound healing, incomplete removal, allergy to anesthesia, nerve injury and recurrence were addressed. Prior to the procedure, the treatment site was clearly identified and confirmed by the patient. All components of Universal Protocol/PAUSE Rule completed. Consent 2/Introductory Paragraph: Mohs surgery was explained to the patient and consent was obtained. The risks, benefits and alternatives to therapy were discussed in detail. Specifically, the risks of infection, scarring, bleeding, prolonged wound healing, incomplete removal, allergy to anesthesia, nerve injury and recurrence were addressed. Prior to the procedure, the treatment site was clearly identified and confirmed by the patient. All components of Universal Protocol/PAUSE Rule completed. Consent 3/Introductory Paragraph: I gave the patient a chance to ask questions they had about the procedure.  Following this I explained the Mohs procedure and consent was obtained. The risks, benefits and alternatives to therapy were discussed in detail. Specifically, the risks of infection, scarring, bleeding, prolonged wound healing, incomplete removal, allergy to anesthesia, nerve injury and recurrence were addressed. Prior to the procedure, the treatment site was clearly identified and confirmed by the patient. All components of Universal Protocol/PAUSE Rule completed. Consent (Temporal Branch)/Introductory Paragraph: The rationale for Mohs was explained to the patient and consent was obtained. The risks, benefits and alternatives to therapy were discussed in detail. Specifically, the risks of damage to the temporal branch of the facial nerve, infection, scarring, bleeding, prolonged wound healing, incomplete removal, allergy to anesthesia, and recurrence were addressed. Prior to the procedure, the treatment site was clearly identified and confirmed by the patient. All components of Universal Protocol/PAUSE Rule completed. Consent (Marginal Mandibular)/Introductory Paragraph: The rationale for Mohs was explained to the patient and consent was obtained. The risks, benefits and alternatives to therapy were discussed in detail. Specifically, the risks of damage to the marginal mandibular branch of the facial nerve, infection, scarring, bleeding, prolonged wound healing, incomplete removal, allergy to anesthesia, and recurrence were addressed. Prior to the procedure, the treatment site was clearly identified and confirmed by the patient. All components of Universal Protocol/PAUSE Rule completed. Consent (Spinal Accessory)/Introductory Paragraph: The rationale for Mohs was explained to the patient and consent was obtained. The risks, benefits and alternatives to therapy were discussed in detail. Specifically, the risks of damage to the spinal accessory nerve, infection, scarring, bleeding, prolonged wound healing, incomplete removal, allergy to anesthesia, and recurrence were addressed. Prior to the procedure, the treatment site was clearly identified and confirmed by the patient. All components of Universal Protocol/PAUSE Rule completed. Consent (Near Eyelid Margin)/Introductory Paragraph: The rationale for Mohs was explained to the patient and consent was obtained. The risks, benefits and alternatives to therapy were discussed in detail. Specifically, the risks of ectropion or eyelid deformity, infection, scarring, bleeding, prolonged wound healing, incomplete removal, allergy to anesthesia, nerve injury and recurrence were addressed. Prior to the procedure, the treatment site was clearly identified and confirmed by the patient. All components of Universal Protocol/PAUSE Rule completed. Consent (Ear)/Introductory Paragraph: The rationale for Mohs was explained to the patient and consent was obtained. The risks, benefits and alternatives to therapy were discussed in detail. Specifically, the risks of ear deformity, infection, scarring, bleeding, prolonged wound healing, incomplete removal, allergy to anesthesia, nerve injury and recurrence were addressed. Prior to the procedure, the treatment site was clearly identified and confirmed by the patient. All components of Universal Protocol/PAUSE Rule completed. Consent (Nose)/Introductory Paragraph: The rationale for Mohs was explained to the patient and consent was obtained. The risks, benefits and alternatives to therapy were discussed in detail. Specifically, the risks of nasal deformity, changes in the flow of air through the nose, infection, scarring, bleeding, prolonged wound healing, incomplete removal, allergy to anesthesia, nerve injury and recurrence were addressed. Prior to the procedure, the treatment site was clearly identified and confirmed by the patient. All components of Universal Protocol/PAUSE Rule completed. Consent (Lip)/Introductory Paragraph: The rationale for Mohs was explained to the patient and consent was obtained. The risks, benefits and alternatives to therapy were discussed in detail. Specifically, the risks of lip deformity, changes in the oral aperture, infection, scarring, bleeding, prolonged wound healing, incomplete removal, allergy to anesthesia, nerve injury and recurrence were addressed. Prior to the procedure, the treatment site was clearly identified and confirmed by the patient. All components of Universal Protocol/PAUSE Rule completed. Consent (Scalp)/Introductory Paragraph: The rationale for Mohs was explained to the patient and consent was obtained. The risks, benefits and alternatives to therapy were discussed in detail. Specifically, the risks of changes in hair growth pattern secondary to repair, infection, scarring, bleeding, prolonged wound healing, incomplete removal, allergy to anesthesia, nerve injury and recurrence were addressed. Prior to the procedure, the treatment site was clearly identified and confirmed by the patient. All components of Universal Protocol/PAUSE Rule completed. Detail Level: Detailed Postop Diagnosis: same Anesthesia Volume In Cc: 6 Hemostasis: Electrocautery Estimated Blood Loss (Cc): minimal Brow Lift Text: A midfrontal incision was made medially to the defect to allow access to the tissues just superior to the left eyebrow. Following careful dissection inferiorly in a supraperiosteal plane to the level of the left eyebrow, several 3-0 monocryl sutures were used to resuspend the eyebrow orbicularis oculi muscular unit to the superior frontal bone periosteum. This resulted in an appropriate reapproximation of static eyebrow symmetry and correction of the left brow ptosis. Deep Sutures: 5-0 Vicryl Epidermal Sutures: 6-0 Fast Absorbing Gut Epidermal Closure: running Suturegard Intro: Intraoperative tissue expansion was performed, utilizing the SUTUREGARD device, in order to reduce wound tension. Suturegard Body: The suture ends were repeatedly re-tightened and re-clamped to achieve the desired tissue expansion. Hemigard Intro: Due to skin fragility and wound tension, it was decided to use HEMIGARD adhesive retention suture devices to permit a linear closure. The skin was cleaned and dried for a 6cm distance away from the wound. Excessive hair, if present, was removed to allow for adhesion. Hemigard Postcare Instructions: The HEMIGARD strips are to remain completely dry for at least 5-7 days. Donor Site Anesthesia Type: same as repair anesthesia Epidermal Closure Graft Donor Site (Optional): simple interrupted Graft Donor Site Bandage (Optional-Leave Blank If You Don't Want In Note): Steri-strips and a pressure bandage were applied to the donor site. Closure 2 Information: This tab is for additional flaps and grafts, including complex repair and grafts and complex repair and flaps. You can also specify a different location for the additional defect, if the location is the same you do not need to select a new one. We will insert the automated text for the repair you select below just as we do for solitary flaps and grafts. Please note that at this time if you select a location with a different insurance zone you will need to override the ICD10 and CPT if appropriate. Closure 3 Information: This tab is for additional flaps and grafts above and beyond our usual structured repairs.  Please note if you enter information here it will not currently bill and you will need to add the billing information manually. Wound Care: Mupirocin Dressing: pressure dressing with telfa Wound Care (No Sutures): Petrolatum Dressing (No Sutures): dry sterile dressing Unna Boot Text: An Unna boot was placed to help immobilize the limb and facilitate more rapid healing. Home Suture Removal Text: Patient was provided instructions on removing sutures and will remove their sutures at home.  If they have any questions or difficulties they will call the office. Post-Care Instructions: I reviewed with the patient in detail post-care instructions. Patient is not to engage in any heavy lifting, exercise, or swimming for the next 14 days. Should the patient develop any fevers, chills, bleeding, severe pain patient will contact the office immediately. Pain Refusal Text: I offered to prescribe pain medication but the patient refused to take this medication. Mauc Instructions: By selecting yes to the question below the MAUC number will be added into the note.  This will be calculated automatically based on the diagnosis chosen, the size entered, the body zone selected (H,M,L) and the specific indications you chose. You will also have the option to override the Mohs AUC if you disagree with the automatically calculated number and this option is found in the Case Summary tab. Where Do You Want The Question To Include Opioid Counseling Located?: Case Summary Tab Eye Protection Verbiage: Before proceeding with the stage, a plastic scleral shield was inserted. The globe was anesthetized with a few drops of 1% lidocaine with 1:100,000 epinephrine. Then, an appropriate sized scleral shield was chosen and coated with lacrilube ointment. The shield was gently inserted and left in place for the duration of each stage. After the stage was completed, the shield was gently removed. Mohs Method Verbiage: An incision at a 45 degree angle following the standard Mohs approach was done and the specimen was harvested as a microscopic controlled layer. Surgeon/Pathologist Verbiage (Will Incorporate Name Of Surgeon From Intro If Not Blank): operated in two distinct and integrated capacities as the surgeon and pathologist. Mohs Histo Method Verbiage: Each section was then chromacoded and processed in the Mohs lab using the Mohs protocol and submitted for frozen section, utilizing hematoxylin and eosin histochemical stains. Subsequent Stages Histo Method Verbiage: Using a similar technique to that described above, a thin layer of tissue was removed from all areas where tumor was visible on the previous stage.  The tissue was again oriented, mapped, dyed, and processed as above. Mohs Rapid Report Verbiage: The area of clinically evident tumor was marked with skin marking ink and appropriately hatched.  The initial incision was made following the Mohs approach through the skin.  The specimen was taken to the lab, divided into the necessary number of pieces, chromacoded and processed according to the Mohs protocol.  This was repeated in successive stages until a tumor free defect was achieved. Complex Repair Preamble Text (Leave Blank If You Do Not Want): Extensive wide undermining was performed. Intermediate Repair Preamble Text (Leave Blank If You Do Not Want): Undermining was performed with blunt dissection. Graft Cartilage Fenestration Text: The cartilage was fenestrated with a 2mm punch biopsy to help facilitate graft survival and healing. Non-Graft Cartilage Fenestration Text: The cartilage was fenestrated with a 2mm punch biopsy to help facilitate healing. Secondary Intention Text (Leave Blank If You Do Not Want): The defect will heal with secondary intention. No Repair - Repaired With Adjacent Surgical Defect Text (Leave Blank If You Do Not Want): After obtaining clear surgical margins the defect was repaired concurrently with another surgical defect which was in close approximation. Adjacent Tissue Transfer Text: The defect edges were debeveled with a #15 scalpel blade. Given the location of the defect and the proximity to free margins an adjacent tissue transfer was deemed most appropriate. Using a sterile surgical marker, an appropriate flap was drawn incorporating the defect and placing the expected incisions within the relaxed skin tension lines where possible. The area thus outlined was incised deep to adipose tissue with a #15 scalpel blade. The skin margins were undermined to an appropriate distance in all directions utilizing iris scissors and carried over to close the primary defect. Advancement Flap (Single) Text: The defect edges were debeveled with a #15 scalpel blade. Given the location of the defect and the proximity to free margins a single advancement flap was deemed most appropriate. Using a sterile surgical marker, an appropriate advancement flap was drawn incorporating the defect and placing the expected incisions within the relaxed skin tension lines where possible. The area thus outlined was incised deep to adipose tissue with a #15 scalpel blade. The skin margins were undermined to an appropriate distance in all directions utilizing iris scissors. Following this, the designed flap was advanced and carried over into the primary defect and sutured into place. Advancement Flap (Double) Text: The defect edges were debeveled with a #15 scalpel blade. Given the location of the defect and the proximity to free margins a double advancement flap was deemed most appropriate. Using a sterile surgical marker, the appropriate advancement flaps were drawn incorporating the defect and placing the expected incisions within the relaxed skin tension lines where possible. The area thus outlined was incised deep to adipose tissue with a #15 scalpel blade. The skin margins were undermined to an appropriate distance in all directions utilizing iris scissors. Following this, the designed flaps were advanced and carried over into the primary defect and sutured into place. Advancement-Rotation Flap Text: The defect edges were debeveled with a #15 scalpel blade. Given the location of the defect, shape of the defect and the proximity to free margins an advancement-rotation flap was deemed most appropriate. Using a sterile surgical marker, an appropriate flap was drawn incorporating the defect and placing the expected incisions within the relaxed skin tension lines where possible. The area thus outlined was incised deep to adipose tissue with a #15 scalpel blade. The skin margins were undermined to an appropriate distance in all directions utilizing iris scissors. Following this, the designed flap was carried over into the primary defect and sutured into place. Alar Island Pedicle Flap Text: The defect edges were debeveled with a #15 scalpel blade. Given the location of the defect, shape of the defect and the proximity to the alar rim an island pedicle advancement flap was deemed most appropriate. Using a sterile surgical marker, an appropriate advancement flap was drawn incorporating the defect, outlining the appropriate donor tissue and placing the expected incisions within the nasal ala running parallel to the alar rim. The area thus outlined was incised with a #15 scalpel blade. The skin margins were undermined minimally to an appropriate distance in all directions around the primary defect and laterally outward around the island pedicle utilizing iris scissors.  There was minimal undermining beneath the pedicle flap. Following this, the designed flap was carried over into the primary defect and sutured into place. A-T Advancement Flap Text: The defect edges were debeveled with a #15 scalpel blade. Given the location of the defect, shape of the defect and the proximity to free margins an A-T advancement flap was deemed most appropriate. Using a sterile surgical marker, an appropriate advancement flap was drawn incorporating the defect and placing the expected incisions within the relaxed skin tension lines where possible. The area thus outlined was incised deep to adipose tissue with a #15 scalpel blade. The skin margins were undermined to an appropriate distance in all directions utilizing iris scissors. Following this, the designed flap was advanced and carried over into the primary defect and sutured into place. Banner Transposition Flap Text: The defect edges were debeveled with a #15 scalpel blade. Given the location of the defect and the proximity to free margins a Banner transposition flap was deemed most appropriate. Using a sterile surgical marker, an appropriate flap was drawn around the defect. The area thus outlined was incised deep to adipose tissue with a #15 scalpel blade. The skin margins were undermined to an appropriate distance in all directions utilizing iris scissors. Following this, the designed flap was carried into the primary defect and sutured into place. Bilateral Helical Rim Advancement Flap Text: The defect edges were debeveled with a #15 blade scalpel.  Given the location of the defect and the proximity to free margins (helical rim) a bilateral helical rim advancement flap was deemed most appropriate. Using a sterile surgical marker, the appropriate advancement flaps were drawn incorporating the defect and placing the expected incisions between the helical rim and antihelix where possible.  The area thus outlined was incised through and through with a #15 scalpel blade.  With a skin hook and iris scissors, the flaps were gently and sharply undermined and freed up. Following this, the designed flaps were placed into the primary defect and sutured into place. Bilateral Rotation Flap Text: The defect edges were debeveled with a #15 scalpel blade. Given the location of the defect, shape of the defect and the proximity to free margins a bilateral rotation flap was deemed most appropriate. Using a sterile surgical marker, an appropriate rotation flap was drawn incorporating the defect and placing the expected incisions within the relaxed skin tension lines where possible. The area thus outlined was incised deep to adipose tissue with a #15 scalpel blade. The skin margins were undermined to an appropriate distance in all directions utilizing iris scissors. Following this, the designed flap was carried over into the primary defect and sutured into place. Bilobed Flap Text: The defect edges were debeveled with a #15 scalpel blade. Given the location of the defect and the proximity to free margins a bilobe flap was deemed most appropriate. Using a sterile surgical marker, an appropriate bilobe flap drawn around the defect. The area thus outlined was incised deep to adipose tissue with a #15 scalpel blade. The skin margins were undermined to an appropriate distance in all directions utilizing iris scissors. Following this, the designed flap was carried over into the primary defect and sutured into place. Bilobed Transposition Flap Text: The defect edges were debeveled with a #15 scalpel blade. Given the location of the defect and the proximity to free margins a bilobed transposition flap was deemed most appropriate. Using a sterile surgical marker, an appropriate bilobe flap drawn around the defect. The area thus outlined was incised deep to adipose tissue with a #15 scalpel blade. The skin margins were undermined to an appropriate distance in all directions utilizing iris scissors. Following this, the designed flap was carried over into the primary defect and sutured into place. Bi-Rhombic Flap Text: The defect edges were debeveled with a #15 scalpel blade. Given the location of the defect and the proximity to free margins a bi-rhombic flap was deemed most appropriate. Using a sterile surgical marker, an appropriate rhombic flap was drawn incorporating the defect. The area thus outlined was incised deep to adipose tissue with a #15 scalpel blade. The skin margins were undermined to an appropriate distance in all directions utilizing iris scissors. Following this, the designed flap was carried over into the primary defect and sutured into place. Burow's Advancement Flap Text: The defect edges were debeveled with a #15 scalpel blade. Given the location of the defect and the proximity to free margins a Burow's advancement flap was deemed most appropriate. Using a sterile surgical marker, the appropriate advancement flap was drawn incorporating the defect and placing the expected incisions within the relaxed skin tension lines where possible. The area thus outlined was incised deep to adipose tissue with a #15 scalpel blade. The skin margins were undermined to an appropriate distance in all directions utilizing iris scissors. Following this, the designed flap was advanced and carried over into the primary defect and sutured into place. Chonodrocutaneous Helical Advancement Flap Text: The defect edges were debeveled with a #15 scalpel blade. Given the location of the defect and the proximity to free margins a chondrocutaneous helical advancement flap was deemed most appropriate. Using a sterile surgical marker, the appropriate advancement flap was drawn incorporating the defect and placing the expected incisions within the relaxed skin tension lines where possible. The area thus outlined was incised deep to adipose tissue with a #15 scalpel blade. The skin margins were undermined to an appropriate distance in all directions utilizing iris scissors. Following this, the designed flap was advanced and carried over into the primary defect and sutured into place. Crescentic Advancement Flap Text: The defect edges were debeveled with a #15 scalpel blade. Given the location of the defect and the proximity to free margins a crescentic advancement flap was deemed most appropriate. Using a sterile surgical marker, the appropriate advancement flap was drawn incorporating the defect and placing the expected incisions within the relaxed skin tension lines where possible. The area thus outlined was incised deep to adipose tissue with a #15 scalpel blade. The skin margins were undermined to an appropriate distance in all directions utilizing iris scissors. Following this, the designed flap was advanced and carried over into the primary defect and sutured into place. Dorsal Nasal Flap Text: The defect edges were debeveled with a #15 scalpel blade. Given the location of the defect and the proximity to free margins a dorsal nasal flap was deemed most appropriate. Using a sterile surgical marker, an appropriate dorsal nasal flap was drawn around the defect. The area thus outlined was incised deep to adipose tissue with a #15 scalpel blade. The skin margins were undermined to an appropriate distance in all directions utilizing iris scissors. Following this, the designed flap was carried into the primary defect and sutured into place. Double Island Pedicle Flap Text: The defect edges were debeveled with a #15 scalpel blade. Given the location of the defect, shape of the defect and the proximity to free margins a double island pedicle advancement flap was deemed most appropriate. Using a sterile surgical marker, an appropriate advancement flap was drawn incorporating the defect, outlining the appropriate donor tissue and placing the expected incisions within the relaxed skin tension lines where possible. The area thus outlined was incised deep to adipose tissue with a #15 scalpel blade. The skin margins were undermined to an appropriate distance in all directions around the primary defect and laterally outward around the island pedicle utilizing iris scissors.  There was minimal undermining beneath the pedicle flap. Following this, the flap was carried over into the primary defect and sutured into place. Double O-Z Flap Text: The defect edges were debeveled with a #15 scalpel blade. Given the location of the defect, shape of the defect and the proximity to free margins a Double O-Z flap was deemed most appropriate. Using a sterile surgical marker, an appropriate transposition flap was drawn incorporating the defect and placing the expected incisions within the relaxed skin tension lines where possible. The area thus outlined was incised deep to adipose tissue with a #15 scalpel blade. The skin margins were undermined to an appropriate distance in all directions utilizing iris scissors. Following this, the designed flap was carried over into the primary defect and sutured into place. Double O-Z Plasty Text: The defect edges were debeveled with a #15 scalpel blade. Given the location of the defect, shape of the defect and the proximity to free margins a Double O-Z plasty (double transposition flap) was deemed most appropriate. Using a sterile surgical marker, the appropriate transposition flaps were drawn incorporating the defect and placing the expected incisions within the relaxed skin tension lines where possible. The area thus outlined was incised deep to adipose tissue with a #15 scalpel blade. The skin margins were undermined to an appropriate distance in all directions utilizing iris scissors. Hemostasis was achieved with electrocautery. The flaps were then transposed and carried over into place, one clockwise and the other counterclockwise, and anchored with interrupted buried subcutaneous sutures. Double Z Plasty Text: The lesion was extirpated to the level of the fat with a #15 scalpel blade. Given the location of the defect, shape of the defect and the proximity to free margins a double Z-plasty was deemed most appropriate for repair. Using a sterile surgical marker, the appropriate transposition arms of the double Z-plasty were drawn incorporating the defect and placing the expected incisions within the relaxed skin tension lines where possible. The area thus outlined was incised deep to adipose tissue with a #15 scalpel blade. The skin margins were undermined to an appropriate distance in all directions utilizing iris scissors. The opposing transposition arms were then transposed and carried over into place in opposite direction and anchored with interrupted buried subcutaneous sutures. Ear Star Wedge Flap Text: The defect edges were debeveled with a #15 blade scalpel.  Given the location of the defect and the proximity to free margins (helical rim) an ear star wedge flap was deemed most appropriate. Using a sterile surgical marker, the appropriate flap was drawn incorporating the defect and placing the expected incisions between the helical rim and antihelix where possible.  The area thus outlined was incised through and through with a #15 scalpel blade. Following this, the designed flap was carried over into the primary defect and sutured into place. Flip-Flop Flap Text: The defect edges were debeveled with a #15 blade scalpel.  Given the location of the defect and the proximity to free margins a flip-flop flap was deemed most appropriate. Using a sterile surgical marker, the appropriate flap was drawn incorporating the defect and placing the expected incisions between the helical rim and antihelix where possible.  The area thus outlined was incised through and through with a #15 scalpel blade. Following this, the designed flap was carried over into the primary defect and sutured into place. Hatchet Flap Text: The defect edges were debeveled with a #15 scalpel blade. Given the location of the defect, shape of the defect and the proximity to free margins a hatchet flap was deemed most appropriate. Using a sterile surgical marker, an appropriate hatchet flap was drawn incorporating the defect and placing the expected incisions within the relaxed skin tension lines where possible. The area thus outlined was incised deep to adipose tissue with a #15 scalpel blade. The skin margins were undermined to an appropriate distance in all directions utilizing iris scissors. Following this, the designed flap was carried over into the primary defect and sutured into place. Helical Rim Advancement Flap Text: The defect edges were debeveled with a #15 blade scalpel.  Given the location of the defect and the proximity to free margins (helical rim) a double helical rim advancement flap was deemed most appropriate. Using a sterile surgical marker, the appropriate advancement flaps were drawn incorporating the defect and placing the expected incisions between the helical rim and antihelix where possible.  The area thus outlined was incised through and through with a #15 scalpel blade.  With a skin hook and iris scissors, the flaps were gently and sharply undermined and freed up. Folllowing this, the designed flaps were carried over into the primary defect and sutured into place. H Plasty Text: Given the location of the defect, shape of the defect and the proximity to free margins a H-plasty was deemed most appropriate for repair. Using a sterile surgical marker, the appropriate advancement arms of the H-plasty were drawn incorporating the defect and placing the expected incisions within the relaxed skin tension lines where possible. The area thus outlined was incised deep to adipose tissue with a #15 scalpel blade. The skin margins were undermined to an appropriate distance in all directions utilizing iris scissors.  The opposing advancement arms were then advanced and carried over into place in opposite direction and anchored with interrupted buried subcutaneous sutures. Island Pedicle Flap Text: The defect edges were debeveled with a #15 scalpel blade. Given the location of the defect, shape of the defect and the proximity to free margins an island pedicle advancement flap was deemed most appropriate. Using a sterile surgical marker, an appropriate advancement flap was drawn incorporating the defect, outlining the appropriate donor tissue and placing the expected incisions within the relaxed skin tension lines where possible. The area thus outlined was incised deep to adipose tissue with a #15 scalpel blade. The skin margins were undermined to an appropriate distance in all directions around the primary defect and laterally outward around the island pedicle utilizing iris scissors.  There was minimal undermining beneath the pedicle flap. Following this, the flap was carried over into the primary defect and sutured into place. Island Pedicle Flap With Canthal Suspension Text: The defect edges were debeveled with a #15 scalpel blade. Given the location of the defect, shape of the defect and the proximity to free margins an island pedicle advancement flap was deemed most appropriate. Using a sterile surgical marker, an appropriate advancement flap was drawn incorporating the defect, outlining the appropriate donor tissue and placing the expected incisions within the relaxed skin tension lines where possible. The area thus outlined was incised deep to adipose tissue with a #15 scalpel blade. The skin margins were undermined to an appropriate distance in all directions around the primary defect and laterally outward around the island pedicle utilizing iris scissors.  There was minimal undermining beneath the pedicle flap. A suspension suture was placed in the canthal tendon to prevent tension and prevent ectropion. Following this, the designed flap was placed into the primary defect and sutured into place. Island Pedicle Flap-Requiring Vessel Identification Text: The defect edges were debeveled with a #15 scalpel blade. Given the location of the defect, shape of the defect and the proximity to free margins an island pedicle advancement flap was deemed most appropriate. Using a sterile surgical marker, an appropriate advancement flap was drawn, based on the axial vessel mentioned above, incorporating the defect, outlining the appropriate donor tissue and placing the expected incisions within the relaxed skin tension lines where possible. The area thus outlined was incised deep to adipose tissue with a #15 scalpel blade. The skin margins were undermined to an appropriate distance in all directions around the primary defect and laterally outward around the island pedicle utilizing iris scissors.  There was minimal undermining beneath the pedicle flap. Following this, the designed flap was carried over into the primary defect and sutured into place. Keystone Flap Text: The defect edges were debeveled with a #15 scalpel blade. Given the location of the defect, shape of the defect a keystone flap was deemed most appropriate. Using a sterile surgical marker, an appropriate keystone flap was drawn incorporating the defect, outlining the appropriate donor tissue and placing the expected incisions within the relaxed skin tension lines where possible. The area thus outlined was incised deep to adipose tissue with a #15 scalpel blade. The skin margins were undermined to an appropriate distance in all directions around the primary defect and laterally outward around the flap utilizing iris scissors. Following this, the designed flap was carried into the primary defect and sutured into place. Melolabial Transposition Flap Text: The defect edges were debeveled with a #15 scalpel blade. Given the location of the defect and the proximity to free margins a melolabial flap was deemed most appropriate. Using a sterile surgical marker, an appropriate melolabial transposition flap was drawn incorporating the defect. The area thus outlined was incised deep to adipose tissue with a #15 scalpel blade. The skin margins were undermined to an appropriate distance in all directions utilizing iris scissors. Following this, the designed flap was carried over into the primary defect and sutured into place. Mercedes Flap Text: The defect edges were debeveled with a #15 scalpel blade. Given the location of the defect, shape of the defect and the proximity to free margins a Mercedes flap was deemed most appropriate. Using a sterile surgical marker, an appropriate advancement flap was drawn incorporating the defect and placing the expected incisions within the relaxed skin tension lines where possible. The area thus outlined was incised deep to adipose tissue with a #15 scalpel blade. The skin margins were undermined to an appropriate distance in all directions utilizing iris scissors. Following this, the designed flap was advanced and carried over into the primary defect and sutured into place. Modified Advancement Flap Text: The defect edges were debeveled with a #15 scalpel blade. Given the location of the defect, shape of the defect and the proximity to free margins a modified advancement flap was deemed most appropriate. Using a sterile surgical marker, an appropriate advancement flap was drawn incorporating the defect and placing the expected incisions within the relaxed skin tension lines where possible. The area thus outlined was incised deep to adipose tissue with a #15 scalpel blade. The skin margins were undermined to an appropriate distance in all directions utilizing iris scissors. Following this, the designed flap was advanced and carried over into the primary defect and sutured into place. Mucosal Advancement Flap Text: Given the location of the defect, shape of the defect and the proximity to free margins a mucosal advancement flap was deemed most appropriate. Incisions were made with a 15 blade scalpel in the appropriate fashion along the cutaneous vermilion border and the mucosal lip. The remaining actinically damaged mucosal tissue was excised.  The mucosal advancement flap was then elevated to the gingival sulcus with care taken to preserve the neurovascular structures and advanced into the primary defect. Care was taken to ensure that precise realignment of the vermilion border was achieved. Muscle Hinge Flap Text: The defect edges were debeveled with a #15 scalpel blade.  Given the size, depth and location of the defect and the proximity to free margins a muscle hinge flap was deemed most appropriate. Using a sterile surgical marker, an appropriate hinge flap was drawn incorporating the defect. The area thus outlined was incised with a #15 scalpel blade. The skin margins were undermined to an appropriate distance in all directions utilizing iris scissors. Following this, the designed flap was carried into the primary defect and sutured into place. Mustarde Flap Text: The defect edges were debeveled with a #15 scalpel blade.  Given the size, depth and location of the defect and the proximity to free margins a Mustarde flap was deemed most appropriate. Using a sterile surgical marker, an appropriate flap was drawn incorporating the defect. The area thus outlined was incised with a #15 scalpel blade. The skin margins were undermined to an appropriate distance in all directions utilizing iris scissors. Following this, the designed flap was carried into the primary defect and sutured into place. Nasal Turnover Hinge Flap Text: The defect edges were debeveled with a #15 scalpel blade.  Given the size, depth, location of the defect and the defect being full thickness a nasal turnover hinge flap was deemed most appropriate. Using a sterile surgical marker, an appropriate hinge flap was drawn incorporating the defect. The area thus outlined was incised with a #15 scalpel blade. The flap was designed to recreate the nasal mucosal lining and the alar rim. The skin margins were undermined to an appropriate distance in all directions utilizing iris scissors. Following this, the designed flap was carried over into the primary defect and sutured into place Nasalis-Muscle-Based Myocutaneous Island Pedicle Flap Text: Using a #15 blade, an incision was made around the donor flap to the level of the nasalis muscle. Wide lateral undermining was then performed in both the subcutaneous plane above the nasalis muscle, and in a submuscular plane just above periosteum. This allowed the formation of a free nasalis muscle axial pedicle (based on the angular artery) which was still attached to the actual cutaneous flap, increasing its mobility and vascular viability. Hemostasis was obtained with pinpoint electrocoagulation. The flap was mobilized into position and the pivotal anchor points positioned and stabilized with buried interrupted sutures. Subcutaneous and dermal tissues were closed in a multilayered fashion with sutures. Tissue redundancies were excised, and the epidermal edges were apposed without significant tension and sutured with sutures. Nasalis Myocutaneous Flap Text: Using a #15 blade, an incision was made around the donor flap to the level of the nasalis muscle. Wide lateral undermining was then performed in both the subcutaneous plane above the nasalis muscle, and in a submuscular plane just above periosteum. This allowed the formation of a free nasalis muscle axial pedicle which was still attached to the actual cutaneous flap, increasing its mobility and vascular viability. Hemostasis was obtained with pinpoint electrocoagulation. The flap was mobilized into position and the pivotal anchor points positioned and stabilized with buried interrupted sutures. Subcutaneous and dermal tissues were closed in a multilayered fashion with sutures. Tissue redundancies were excised, and the epidermal edges were apposed without significant tension and sutured with sutures. Nasolabial Transposition Flap Text: The defect edges were debeveled with a #15 scalpel blade.  Given the size, depth and location of the defect and the proximity to free margins a nasolabial transposition flap was deemed most appropriate. Using a sterile surgical marker, an appropriate flap was drawn incorporating the defect. The area thus outlined was incised with a #15 scalpel blade. The skin margins were undermined to an appropriate distance in all directions utilizing iris scissors. Following this, the designed flap was carried into the primary defect and sutured into place. Orbicularis Oris Muscle Flap Text: The defect edges were debeveled with a #15 scalpel blade.  Given that the defect affected the competency of the oral sphincter an orbicularis oris muscle flap was deemed most appropriate to restore this competency and normal muscle function.  Using a sterile surgical marker, an appropriate flap was drawn incorporating the defect. The area thus outlined was incised with a #15 scalpel blade. Following this, the designed flap was carried over into the primary defect and sutured into place. O-T Advancement Flap Text: The defect edges were debeveled with a #15 scalpel blade. Given the location of the defect, shape of the defect and the proximity to free margins an O-T advancement flap was deemed most appropriate. Using a sterile surgical marker, an appropriate advancement flap was drawn incorporating the defect and placing the expected incisions within the relaxed skin tension lines where possible. The area thus outlined was incised deep to adipose tissue with a #15 scalpel blade. The skin margins were undermined to an appropriate distance in all directions utilizing iris scissors. Following this, the designed flap was advanced and carried over into the primary defect and sutured into place. O-T Plasty Text: The defect edges were debeveled with a #15 scalpel blade. Given the location of the defect, shape of the defect and the proximity to free margins an O-T plasty was deemed most appropriate. Using a sterile surgical marker, an appropriate O-T plasty was drawn incorporating the defect and placing the expected incisions within the relaxed skin tension lines where possible. The area thus outlined was incised deep to adipose tissue with a #15 scalpel blade. The skin margins were undermined to an appropriate distance in all directions utilizing iris scissors. Following this, the designed flap was carried over into the primary defect and sutured into place. O-L Flap Text: The defect edges were debeveled with a #15 scalpel blade. Given the location of the defect, shape of the defect and the proximity to free margins an O-L flap was deemed most appropriate. Using a sterile surgical marker, an appropriate advancement flap was drawn incorporating the defect and placing the expected incisions within the relaxed skin tension lines where possible. The area thus outlined was incised deep to adipose tissue with a #15 scalpel blade. The skin margins were undermined to an appropriate distance in all directions utilizing iris scissors. Following this, the designed flap was advanced and carried over into the primary defect and sutured into place. O-Z Flap Text: The defect edges were debeveled with a #15 scalpel blade. Given the location of the defect, shape of the defect and the proximity to free margins an O-Z flap was deemed most appropriate. Using a sterile surgical marker, an appropriate transposition flap was drawn incorporating the defect and placing the expected incisions within the relaxed skin tension lines where possible. The area thus outlined was incised deep to adipose tissue with a #15 scalpel blade. The skin margins were undermined to an appropriate distance in all directions utilizing iris scissors. Following this, the designed flap was carried over into the primary defect and sutured into place. O-Z Plasty Text: The defect edges were debeveled with a #15 scalpel blade. Given the location of the defect, shape of the defect and the proximity to free margins an O-Z plasty (double transposition flap) was deemed most appropriate. Using a sterile surgical marker, the appropriate transposition flaps were drawn incorporating the defect and placing the expected incisions within the relaxed skin tension lines where possible. The area thus outlined was incised deep to adipose tissue with a #15 scalpel blade. The skin margins were undermined to an appropriate distance in all directions utilizing iris scissors. Hemostasis was achieved with electrocautery. The flaps were then transposed and carried over into place, one clockwise and the other counterclockwise, and anchored with interrupted buried subcutaneous sutures. Peng Advancement Flap Text: The defect edges were debeveled with a #15 scalpel blade. Given the location of the defect, shape of the defect and the proximity to free margins a Peng advancement flap was deemed most appropriate. Using a sterile surgical marker, an appropriate advancement flap was drawn incorporating the defect and placing the expected incisions within the relaxed skin tension lines where possible. The area thus outlined was incised deep to adipose tissue with a #15 scalpel blade. The skin margins were undermined to an appropriate distance in all directions utilizing iris scissors. Following this, the designed flap was advanced and carried over into the primary defect and sutured into place. Rectangular Flap Text: The defect edges were debeveled with a #15 scalpel blade. Given the location of the defect and the proximity to free margins a rectangular flap was deemed most appropriate. Using a sterile surgical marker, an appropriate rectangular flap was drawn incorporating the defect. The area thus outlined was incised deep to adipose tissue with a #15 scalpel blade. The skin margins were undermined to an appropriate distance in all directions utilizing iris scissors. Following this, the designed flap was carried over into the primary defect and sutured into place. Rhombic Flap Text: The defect edges were debeveled with a #15 scalpel blade. Given the location of the defect and the proximity to free margins a rhombic flap was deemed most appropriate. Using a sterile surgical marker, an appropriate rhombic flap was drawn incorporating the defect. The area thus outlined was incised deep to adipose tissue with a #15 scalpel blade. The skin margins were undermined to an appropriate distance in all directions utilizing iris scissors. Following this, the designed flap was carried over into the primary defect and sutured into place. Rhomboid Transposition Flap Text: The defect edges were debeveled with a #15 scalpel blade. Given the location of the defect and the proximity to free margins a rhomboid transposition flap was deemed most appropriate. Using a sterile surgical marker, an appropriate rhomboid flap was drawn incorporating the defect. The area thus outlined was incised deep to adipose tissue with a #15 scalpel blade. The skin margins were undermined to an appropriate distance in all directions utilizing iris scissors. Following this, the designed flap was carried over into the primary defect and sutured into place. Rotation Flap Text: The defect edges were debeveled with a #15 scalpel blade. Given the location of the defect, shape of the defect and the proximity to free margins a rotation flap was deemed most appropriate. Using a sterile surgical marker, an appropriate rotation flap was drawn incorporating the defect and placing the expected incisions within the relaxed skin tension lines where possible. The area thus outlined was incised deep to adipose tissue with a #15 scalpel blade. The skin margins were undermined to an appropriate distance in all directions utilizing iris scissors. Following this, the designed flap was carried over into the primary defect and sutured into place. Spiral Flap Text: The defect edges were debeveled with a #15 scalpel blade. Given the location of the defect, shape of the defect and the proximity to free margins a spiral flap was deemed most appropriate. Using a sterile surgical marker, an appropriate rotation flap was drawn incorporating the defect and placing the expected incisions within the relaxed skin tension lines where possible. The area thus outlined was incised deep to adipose tissue with a #15 scalpel blade. The skin margins were undermined to an appropriate distance in all directions utilizing iris scissors. Following this, the designed flap was carried over into the primary defect and sutured into place. Staged Advancement Flap Text: The defect edges were debeveled with a #15 scalpel blade. Given the location of the defect, shape of the defect and the proximity to free margins a staged advancement flap was deemed most appropriate. Using a sterile surgical marker, an appropriate advancement flap was drawn incorporating the defect and placing the expected incisions within the relaxed skin tension lines where possible. The area thus outlined was incised deep to adipose tissue with a #15 scalpel blade. The skin margins were undermined to an appropriate distance in all directions utilizing iris scissors. Following this, the designed flap was carried over into the primary defect and sutured into place. Star Wedge Flap Text: The defect edges were debeveled with a #15 scalpel blade. Given the location of the defect, shape of the defect and the proximity to free margins a star wedge flap was deemed most appropriate. Using a sterile surgical marker, an appropriate rotation flap was drawn incorporating the defect and placing the expected incisions within the relaxed skin tension lines where possible. The area thus outlined was incised deep to adipose tissue with a #15 scalpel blade. The skin margins were undermined to an appropriate distance in all directions utilizing iris scissors. Following this, the designed flap was carried over into the primary defect and sutured into place. Transposition Flap Text: The defect edges were debeveled with a #15 scalpel blade. Given the location of the defect and the proximity to free margins a transposition flap was deemed most appropriate. Using a sterile surgical marker, an appropriate transposition flap was drawn incorporating the defect. The area thus outlined was incised deep to adipose tissue with a #15 scalpel blade. The skin margins were undermined to an appropriate distance in all directions utilizing iris scissors. Following this, the designed flap was carried over into the primary defect and sutured into place. Trilobed Flap Text: The defect edges were debeveled with a #15 scalpel blade. Given the location of the defect and the proximity to free margins a trilobed flap was deemed most appropriate. Using a sterile surgical marker, an appropriate trilobed flap was drawn around the defect. The area thus outlined was incised deep to adipose tissue with a #15 scalpel blade. The skin margins were undermined to an appropriate distance in all directions utilizing iris scissors. Following this, the designed flap was carried into the primary defect and sutured into place. V-Y Flap Text: The defect edges were debeveled with a #15 scalpel blade. Given the location of the defect, shape of the defect and the proximity to free margins a V-Y flap was deemed most appropriate. Using a sterile surgical marker, an appropriate advancement flap was drawn incorporating the defect and placing the expected incisions within the relaxed skin tension lines where possible. The area thus outlined was incised deep to adipose tissue with a #15 scalpel blade. The skin margins were undermined to an appropriate distance in all directions utilizing iris scissors. Following this, the designed flap was advanced and carried over into the primary defect and sutured into place. V-Y Plasty Text: The defect edges were debeveled with a #15 scalpel blade. Given the location of the defect, shape of the defect and the proximity to free margins an V-Y advancement flap was deemed most appropriate. Using a sterile surgical marker, an appropriate advancement flap was drawn incorporating the defect and placing the expected incisions within the relaxed skin tension lines where possible. The area thus outlined was incised deep to adipose tissue with a #15 scalpel blade. The skin margins were undermined to an appropriate distance in all directions utilizing iris scissors. Following this, the designed flap was advanced and carried over into the primary defect and sutured into place. W Plasty Text: The lesion was extirpated to the level of the fat with a #15 scalpel blade. Given the location of the defect, shape of the defect and the proximity to free margins a W-plasty was deemed most appropriate for repair. Using a sterile surgical marker, the appropriate transposition arms of the W-plasty were drawn incorporating the defect and placing the expected incisions within the relaxed skin tension lines where possible. The area thus outlined was incised deep to adipose tissue with a #15 scalpel blade. The skin margins were undermined to an appropriate distance in all directions utilizing iris scissors. The opposing transposition arms were then transposed and carried over into place in opposite direction and anchored with interrupted buried subcutaneous sutures. Z Plasty Text: The lesion was extirpated to the level of the fat with a #15 scalpel blade. Given the location of the defect, shape of the defect and the proximity to free margins a Z-plasty was deemed most appropriate for repair. Using a sterile surgical marker, the appropriate transposition arms of the Z-plasty were drawn incorporating the defect and placing the expected incisions within the relaxed skin tension lines where possible. The area thus outlined was incised deep to adipose tissue with a #15 scalpel blade. The skin margins were undermined to an appropriate distance in all directions utilizing iris scissors. The opposing transposition arms were then transposed and carried over into place in opposite direction and anchored with interrupted buried subcutaneous sutures. Zygomaticofacial Flap Text: Given the location of the defect, shape of the defect and the proximity to free margins a zygomaticofacial flap was deemed most appropriate for repair. Using a sterile surgical marker, the appropriate flap was drawn incorporating the defect and placing the expected incisions within the relaxed skin tension lines where possible. The area thus outlined was incised deep to adipose tissue with a #15 scalpel blade with preservation of a vascular pedicle.  The skin margins were undermined to an appropriate distance in all directions utilizing iris scissors. The flap was then carried over into the defect and anchored with interrupted buried subcutaneous sutures. Abbe Flap (Lower To Upper Lip) Text: The defect of the upper lip was assessed and measured.  Given the location and size of the defect, an Abbe flap was deemed most appropriate. Using a sterile surgical marker, an appropriate Abbe flap was measured and drawn on the lower lip. Local anesthesia was then infiltrated. A scalpel was then used to incise the upper lip through and through the skin, vermilion, muscle and mucosa, leaving the flap pedicled on the opposite side.  The flap was then rotated and transferred to the lower lip defect.  The flap was then sutured into place with a three layer technique, closing the orbicularis oris muscle layer with subcutaneous buried sutures, followed by a mucosal layer and an epidermal layer. Abbe Flap (Upper To Lower Lip) Text: The defect of the lower lip was assessed and measured.  Given the location and size of the defect, an Abbe flap was deemed most appropriate. Using a sterile surgical marker, an appropriate Abbe flap was measured and drawn on the upper lip. Local anesthesia was then infiltrated.  A scalpel was then used to incise the upper lip through and through the skin, vermilion, muscle and mucosa, leaving the flap pedicled on the opposite side.  The flap was then rotated and transferred to the lower lip defect.  The flap was then sutured into place with a three layer technique, closing the orbicularis oris muscle layer with subcutaneous buried sutures, followed by a mucosal layer and an epidermal layer. Cheek Interpolation Flap Text: A decision was made to reconstruct the defect utilizing an interpolation axial flap and a staged reconstruction.  A telfa template was made of the defect.  This telfa template was then used to outline the Cheek Interpolation flap.  The donor area for the pedicle flap was then injected with anesthesia.  The flap was excised through the skin and subcutaneous tissue down to the layer of the underlying musculature.  The interpolation flap was carefully excised within this deep plane to maintain its blood supply.  The edges of the donor site were undermined.   The donor site was closed in a primary fashion.  The pedicle was then rotated into position and sutured.  Once the tube was sutured into place, adequate blood supply was confirmed with blanching and refill.  The pedicle was then wrapped with xeroform gauze and dressed appropriately with a telfa and gauze bandage to ensure continued blood supply and protect the attached pedicle. Cheek-To-Nose Interpolation Flap Text: A decision was made to reconstruct the defect utilizing an interpolation axial flap and a staged reconstruction.  A telfa template was made of the defect.  This telfa template was then used to outline the Cheek-To-Nose Interpolation flap.  The donor area for the pedicle flap was then injected with anesthesia.  The flap was excised through the skin and subcutaneous tissue down to the layer of the underlying musculature.  The interpolation flap was carefully excised within this deep plane to maintain its blood supply.  The edges of the donor site were undermined.   The donor site was closed in a primary fashion.  The pedicle was then rotated into position and sutured.  Once the tube was sutured into place, adequate blood supply was confirmed with blanching and refill.  The pedicle was then wrapped with xeroform gauze and dressed appropriately with a telfa and gauze bandage to ensure continued blood supply and protect the attached pedicle. Estlander Flap (Lower To Upper Lip) Text: The defect of the lower lip was assessed and measured.  Given the location and size of the defect, an Estlander flap was deemed most appropriate. Using a sterile surgical marker, an appropriate Estlander flap was measured and drawn on the upper lip. Local anesthesia was then infiltrated. A scalpel was then used to incise the lateral aspect of the flap, through skin, muscle and mucosa, leaving the flap pedicled medially.  The flap was then rotated and positioned to fill the lower lip defect.  The flap was then sutured into place with a three layer technique, closing the orbicularis oris muscle layer with subcutaneous buried sutures, followed by a mucosal layer and an epidermal layer. Interpolation Flap Text: A decision was made to reconstruct the defect utilizing an interpolation axial flap and a staged reconstruction.  A telfa template was made of the defect.  This telfa template was then used to outline the interpolation flap.  The donor area for the pedicle flap was then injected with anesthesia.  The flap was excised through the skin and subcutaneous tissue down to the layer of the underlying musculature.  The interpolation flap was carefully excised within this deep plane to maintain its blood supply.  The edges of the donor site were undermined.   The donor site was closed in a primary fashion.  The pedicle was then rotated into position and sutured.  Once the tube was sutured into place, adequate blood supply was confirmed with blanching and refill.  The pedicle was then wrapped with xeroform gauze and dressed appropriately with a telfa and gauze bandage to ensure continued blood supply and protect the attached pedicle. Melolabial Interpolation Flap Text: A decision was made to reconstruct the defect utilizing an interpolation axial flap and a staged reconstruction.  A telfa template was made of the defect.  This telfa template was then used to outline the melolabial interpolation flap.  The donor area for the pedicle flap was then injected with anesthesia.  The flap was excised through the skin and subcutaneous tissue down to the layer of the underlying musculature.  The pedicle flap was carefully excised within this deep plane to maintain its blood supply.  The edges of the donor site were undermined.   The donor site was closed in a primary fashion.  The pedicle was then rotated into position and sutured.  Once the tube was sutured into place, adequate blood supply was confirmed with blanching and refill.  The pedicle was then wrapped with xeroform gauze and dressed appropriately with a telfa and gauze bandage to ensure continued blood supply and protect the attached pedicle. Mastoid Interpolation Flap Text: A decision was made to reconstruct the defect utilizing an interpolation axial flap and a staged reconstruction.  A telfa template was made of the defect.  This telfa template was then used to outline the mastoid interpolation flap.  The donor area for the pedicle flap was then injected with anesthesia.  The flap was excised through the skin and subcutaneous tissue down to the layer of the underlying musculature.  The pedicle flap was carefully excised within this deep plane to maintain its blood supply.  The edges of the donor site were undermined.   The donor site was closed in a primary fashion.  The pedicle was then rotated into position and sutured.  Once the tube was sutured into place, adequate blood supply was confirmed with blanching and refill.  The pedicle was then wrapped with xeroform gauze and dressed appropriately with a telfa and gauze bandage to ensure continued blood supply and protect the attached pedicle. Paramedian Forehead Flap Text: A decision was made to reconstruct the defect utilizing an interpolation axial flap and a staged reconstruction.  A telfa template was made of the defect.  This telfa template was then used to outline the paramedian forehead pedicle flap.  The donor area for the pedicle flap was then injected with anesthesia.  The flap was excised through the skin and subcutaneous tissue down to the layer of the underlying musculature.  The pedicle flap was carefully excised within this deep plane to maintain its blood supply.  The edges of the donor site were undermined.   The donor site was closed in a primary fashion.  The pedicle was then rotated into position and sutured.  Once the tube was sutured into place, adequate blood supply was confirmed with blanching and refill.  The pedicle was then wrapped with xeroform gauze and dressed appropriately with a telfa and gauze bandage to ensure continued blood supply and protect the attached pedicle. Posterior Auricular Interpolation Flap Text: A decision was made to reconstruct the defect utilizing an interpolation axial flap and a staged reconstruction.  A telfa template was made of the defect.  This telfa template was then used to outline the posterior auricular interpolation flap.  The donor area for the pedicle flap was then injected with anesthesia.  The flap was excised through the skin and subcutaneous tissue down to the layer of the underlying musculature.  The pedicle flap was carefully excised within this deep plane to maintain its blood supply.  The edges of the donor site were undermined.   The donor site was closed in a primary fashion.  The pedicle was then rotated into position and sutured.  Once the tube was sutured into place, adequate blood supply was confirmed with blanching and refill.  The pedicle was then wrapped with xeroform gauze and dressed appropriately with a telfa and gauze bandage to ensure continued blood supply and protect the attached pedicle. Cheiloplasty (Complex) Text: A decision was made to reconstruct the defect with a  cheiloplasty.  The defect was undermined extensively.  Additional orbicularis oris muscle was excised with a 15 blade scalpel.  The defect was converted into a full thickness wedge to facilite a better cosmetic result.  Small vessels were then tied off with 5-0 monocyrl. The orbicularis oris, superficial fascia, adipose and dermis were then reapproximated.  After the deeper layers were approximated the epidermis was reapproximated with particular care given to realign the vermilion border. Cheiloplasty (Less Than 50%) Text: A decision was made to reconstruct the defect with a  cheiloplasty.  The defect was undermined extensively.  Additional orbicularis oris muscle was excised with a 15 blade scalpel.  The defect was converted into a full thickness wedge, of less than 50% of the vertical height of the lip, to facilite a better cosmetic result.  Small vessels were then tied off with 5-0 monocyrl. The orbicularis oris, superficial fascia, adipose and dermis were then reapproximated.  After the deeper layers were approximated the epidermis was reapproximated with particular care given to realign the vermilion border. Ear Wedge Repair Text: A wedge excision was completed by carrying down an excision through the full thickness of the ear and cartilage with an inward facing Burow's triangle. The wound was then closed in a layered fashion. Full Thickness Lip Wedge Repair (Flap) Text: Given the location of the defect and the proximity to free margins a full thickness wedge repair was deemed most appropriate. Using a sterile surgical marker, the appropriate repair was drawn incorporating the defect and placing the expected incisions perpendicular to the vermilion border.  The vermilion border was also meticulously outlined to ensure appropriate reapproximation during the repair.  The area thus outlined was incised through and through with a #15 scalpel blade.  The muscularis and dermis were reaproximated with deep sutures following hemostasis. Care was taken to realign the vermilion border before proceeding with the superficial closure.  Once the vermilion was realigned the superfical and mucosal closure was finished. Burow's Graft Text: The defect edges were debeveled with a #15 scalpel blade. Given the location of the defect, shape of the defect, the proximity to free margins and the presence of a standing cone deformity a Burow's skin graft was deemed most appropriate. The standing cone was removed and this tissue was then trimmed to the shape of the primary defect. The adipose tissue was also removed until only dermis and epidermis were left.  The skin margins of the secondary defect were undermined to an appropriate distance in all directions utilizing iris scissors.  The secondary defect was closed with interrupted buried subcutaneous sutures.  The skin edges were then re-apposed with running  sutures.  The skin graft was then placed in the primary defect and oriented appropriately. Cartilage Graft Text: The defect edges were debeveled with a #15 scalpel blade. Given the location of the defect, shape of the defect, the fact the defect involved a full thickness cartilage defect a cartilage graft was deemed most appropriate.  An appropriate donor site was identified, cleansed, and anesthetized. The cartilage graft was then harvested and transferred to the recipient site, oriented appropriately and then sutured into place.  The secondary defect was then repaired using a primary closure. Composite Graft Text: The defect edges were debeveled with a #15 scalpel blade. Given the location of the defect, shape of the defect, the proximity to free margins and the fact the defect was full thickness a composite graft was deemed most appropriate.  The defect was outline and then transferred to the donor site.  A full thickness graft was then excised from the donor site. The graft was then placed in the primary defect, oriented appropriately and then sutured into place.  The secondary defect was then repaired using a primary closure. Epidermal Autograft Text: The defect edges were debeveled with a #15 scalpel blade. Given the location of the defect, shape of the defect and the proximity to free margins an epidermal autograft was deemed most appropriate. Using a sterile surgical marker, the primary defect shape was transferred to the donor site. The epidermal graft was then harvested.  The skin graft was then placed in the primary defect and oriented appropriately. Dermal Autograft Text: The defect edges were debeveled with a #15 scalpel blade. Given the location of the defect, shape of the defect and the proximity to free margins a dermal autograft was deemed most appropriate. Using a sterile surgical marker, the primary defect shape was transferred to the donor site. The area thus outlined was incised deep to adipose tissue with a #15 scalpel blade.  The harvested graft was then trimmed of adipose and epidermal tissue until only dermis was left.  The skin graft was then placed in the primary defect and oriented appropriately. Ftsg Text: The defect edges were debeveled with a #15 scalpel blade. Given the location of the defect, shape of the defect and the proximity to free margins a full thickness skin graft was deemed most appropriate. Using a sterile surgical marker, the primary defect shape was transferred to the donor site. The area thus outlined was incised deep to adipose tissue with a #15 scalpel blade.  The harvested graft was then trimmed of adipose tissue until only dermis and epidermis was left.  The skin margins of the secondary defect were undermined to an appropriate distance in all directions utilizing iris scissors.  The secondary defect was closed with interrupted buried subcutaneous sutures.  The skin edges were then re-apposed with running  sutures.  The skin graft was then placed in the primary defect and oriented appropriately. Pinch Graft Text: The defect edges were debeveled with a #15 scalpel blade. Given the location of the defect, shape of the defect and the proximity to free margins a pinch graft was deemed most appropriate. Using a sterile surgical marker, the primary defect shape was transferred to the donor site. The area thus outlined was incised deep to adipose tissue with a #15 scalpel blade.  The harvested graft was then trimmed of adipose tissue until only dermis and epidermis was left. The skin margins of the secondary defect were undermined to an appropriate distance in all directions utilizing iris scissors.  The secondary defect was closed with interrupted buried subcutaneous sutures.  The skin edges were then re-apposed with running  sutures.  The skin graft was then placed in the primary defect and oriented appropriately. Skin Substitute Text: The defect edges were debeveled with a #15 scalpel blade. Given the location of the defect, shape of the defect and the proximity to free margins a skin substitute graft was deemed most appropriate.  The graft material was trimmed to fit the size of the defect. The graft was then placed in the primary defect and oriented appropriately. Split-Thickness Skin Graft Text: The defect edges were debeveled with a #15 scalpel blade. Given the location of the defect, shape of the defect and the proximity to free margins a split thickness skin graft was deemed most appropriate. Using a sterile surgical marker, the primary defect shape was transferred to the donor site. The split thickness graft was then harvested.  The skin graft was then placed in the primary defect and oriented appropriately. Tissue Cultured Epidermal Autograft Text: The defect edges were debeveled with a #15 scalpel blade. Given the location of the defect, shape of the defect and the proximity to free margins a tissue cultured epidermal autograft was deemed most appropriate.  The graft was then trimmed to fit the size of the defect.  The graft was then placed in the primary defect and oriented appropriately. Xenograft Text: The defect edges were debeveled with a #15 scalpel blade. Given the location of the defect, shape of the defect and the proximity to free margins a xenograft was deemed most appropriate.  The graft was then trimmed to fit the size of the defect.  The graft was then placed in the primary defect and oriented appropriately. Complex Repair And Flap Additional Text (Will Appearing After The Standard Complex Repair Text): The complex repair was not sufficient to completely close the primary defect. The remaining additional defect was repaired with the flap mentioned below. Complex Repair And Graft Additional Text (Will Appearing After The Standard Complex Repair Text): The complex repair was not sufficient to completely close the primary defect. The remaining additional defect was repaired with the graft mentioned below. Eyelid Full Thickness Repair - 21852: The eyelid defect was full thickness which required a wedge repair of the eyelid. Special care was taken to ensure that the eyelid margin was realligned when placing sutures. Eyelid Partial Thickness Repair - 10307: The eyelid defect was partial thickness which required a wedge repair of the eyelid. Special care was taken to ensure that the eyelid margin was realligned when placing sutures. Intermediate Repair And Flap Additional Text (Will Appearing After The Standard Complex Repair Text): The intermediate repair was not sufficient to completely close the primary defect. The remaining additional defect was repaired with the flap mentioned below. Intermediate Repair And Graft Additional Text (Will Appearing After The Standard Complex Repair Text): The intermediate repair was not sufficient to completely close the primary defect. The remaining additional defect was repaired with the graft mentioned below. Localized Dermabrasion With 15 Blade Text: The patient was draped in routine manner.  Localized dermabrasion using a 15 blade was performed in routine manner to papillary dermis. This spot dermabrasion is being performed to complete skin cancer reconstruction. It also will eliminate the other sun damaged precancerous cells that are known to be part of the regional effect of a lifetime's worth of sun exposure. This localized dermabrasion is therapeutic and should not be considered cosmetic in any regard. Localized Dermabrasion With Sand Papertext: The patient was draped in routine manner.  Localized dermabrasion using sterile sand paper was performed in routine manner to papillary dermis. This spot dermabrasion is being performed to complete skin cancer reconstruction. It also will eliminate the other sun damaged precancerous cells that are known to be part of the regional effect of a lifetime's worth of sun exposure. This localized dermabrasion is therapeutic and should not be considered cosmetic in any regard. Localized Dermabrasion With Wire Brush Text: The patient was draped in routine manner.  Localized dermabrasion using 3 x 17 mm wire brush was performed in routine manner to papillary dermis. This spot dermabrasion is being performed to complete skin cancer reconstruction. It also will eliminate the other sun damaged precancerous cells that are known to be part of the regional effect of a lifetime's worth of sun exposure. This localized dermabrasion is therapeutic and should not be considered cosmetic in any regard. Purse String (Simple) Text: Given the location of the defect and the characteristics of the surrounding skin a purse string closure was deemed most appropriate.  Undermining was performed circumferentially around the surgical defect.  A purse string suture was then placed and tightened. Purse String (Intermediate) Text: Given the location of the defect and the characteristics of the surrounding skin a purse string intermediate closure was deemed most appropriate.  Undermining was performed circumferentially around the surgical defect.  A purse string suture was then placed and tightened. Partial Purse String (Simple) Text: Given the location of the defect and the characteristics of the surrounding skin a simple purse string closure was deemed most appropriate.  Undermining was performed circumferentially around the surgical defect.  A purse string suture was then placed and tightened. Wound tension only allowed a partial closure of the circular defect. Partial Purse String (Intermediate) Text: Given the location of the defect and the characteristics of the surrounding skin an intermediate purse string closure was deemed most appropriate.  Undermining was performed circumferentially around the surgical defect.  A purse string suture was then placed and tightened. Wound tension only allowed a partial closure of the circular defect. Tarsorrhaphy Text: A tarsorrhaphy was performed using Frost sutures. Manual Repair Warning Statement: We plan on removing the manually selected variable below in favor of our much easier automatic structured text blocks found in the previous tab. We decided to do this to help make the flow better and give you the full power of structured data. Manual selection is never going to be ideal in our platform and I would encourage you to avoid using manual selection from this point on, especially since I will be sunsetting this feature. It is important that you do one of two things with the customized text below. First, you can save all of the text in a word file so you can have it for future reference. Second, transfer the text to the appropriate area in the Library tab. Lastly, if there is a flap or graft type which we do not have you need to let us know right away so I can add it in before the variable is hidden. No need to panic, we plan to give you roughly 6 months to make the change. Same Histology In Subsequent Stages Text: The pattern and morphology of the tumor is as described in the first stage. No Residual Tumor Seen Histology Text: There were no malignant cells seen in the sections examined. Inflammation Suggestive Of Cancer Camouflage Histology Text: There was a dense lymphocytic infiltrate which prevented adequate histologic evaluation of adjacent structures. Incidental Superficial Basal Cell Carcinoma Histology Text: large nests or islands of malignant basaltic cells with central, haphazard cell arrangement and peripheral palisading Incidental Actinic Keratosis Histology Text: partial thickness cellular atypia with loss of granular layer and compact para-keratosis Bcc Histology Text: There were numerous aggregates of basaloid cells. Bcc Infiltrative Histology Text: There were numerous aggregates of basaloid cells demonstrating an infiltrative pattern. Scc Histology Text: There are diffuse cellular and nuclear atypia. Cells are pink and polygonal in shape with scattered intercellular bridges. Mart-1 - Positive Histology Text: MART-1 staining demonstrates areas of higher density and clustering of melanocytes with Pagetoid spread upwards within the epidermis. The surgical margins are positive for tumor cells. Mart-1 - Negative Histology Text: MART-1 staining demonstrates a normal density and pattern of melanocytes along the dermal-epidermal junction. The surgical margins are negative for tumor cells. Information: Selecting Yes will display possible errors in your note based on the variables you have selected. This validation is only offered as a suggestion for you. PLEASE NOTE THAT THE VALIDATION TEXT WILL BE REMOVED WHEN YOU FINALIZE YOUR NOTE. IF YOU WANT TO FAX A PRELIMINARY NOTE YOU WILL NEED TO TOGGLE THIS TO 'NO' IF YOU DO NOT WANT IT IN YOUR FAXED NOTE. Bill 59 Modifier?: No - Continue to Bill 79 Modifier

## 2024-12-09 NOTE — PLAN OF CARE
1 unit of PRBC and IV iron were given as ordered. Call light within reach. Safety precautions in place.    Problem: Patient Centered Care  Goal: Patient preferences are identified and integrated in the patient's plan of care  Description: Interventions:  - What would you like us to know as we care for you? I'm from home with my daughter  - Provide timely, complete, and accurate information to patient/family  - Incorporate patient and family knowledge, values, beliefs, and cultural backgrounds into the planning and delivery of care  - Encourage patient/family to participate in care and decision-making at the level they choose  - Honor patient and family perspectives and choices  Outcome: Progressing     Problem: Patient/Family Goals  Goal: Patient/Family Long Term Goal  Description: Patient's Long Term Goal: discharge home    Interventions:  - Monitor vitals, labs, imaging  - Tele  - on consult  - See additional Care Plan goals for specific interventionsPatient's Long Term Goal: discharge home    Interventions:  - Monitor vitals, labs, imaging  - Tele  - Cards, GI on consult  - See additional Care Plan goals for specific interventions  Outcome: Progressing  Goal: Patient/Family Short Term Goal  Description: Patient's Short Term Goal: feel better    Interventions:   - Activity as tolerated  - Blood transfusion  - Oxygen therapy  - See additional Care Plan goals for specific interventions  Outcome: Progressing     Problem: CARDIOVASCULAR - ADULT  Goal: Maintains optimal cardiac output and hemodynamic stability  Description: INTERVENTIONS:  - Monitor vital signs, rhythm, and trends  - Monitor for bleeding, hypotension and signs of decreased cardiac output  - Evaluate effectiveness of vasoactive medications to optimize hemodynamic stability  - Monitor arterial and/or venous puncture sites for bleeding and/or hematoma  - Assess quality of pulses, skin color and temperature  - Assess for signs of decreased coronary artery  perfusion - ex. Angina  - Evaluate fluid balance, assess for edema, trend weights  Outcome: Progressing  Goal: Absence of cardiac arrhythmias or at baseline  Description: INTERVENTIONS:  - Continuous cardiac monitoring, monitor vital signs, obtain 12 lead EKG if indicated  - Evaluate effectiveness of antiarrhythmic and heart rate control medications as ordered  - Initiate emergency measures for life threatening arrhythmias  - Monitor electrolytes and administer replacement therapy as ordered  Outcome: Progressing     Problem: RESPIRATORY - ADULT  Goal: Achieves optimal ventilation and oxygenation  Description: INTERVENTIONS:  - Assess for changes in respiratory status  - Assess for changes in mentation and behavior  - Position to facilitate oxygenation and minimize respiratory effort  - Oxygen supplementation based on oxygen saturation or ABGs  - Provide Smoking Cessation handout, if applicable  - Encourage broncho-pulmonary hygiene including cough, deep breathe, Incentive Spirometry  - Assess the need for suctioning and perform as needed  - Assess and instruct to report SOB or any respiratory difficulty  - Respiratory Therapy support as indicated  - Manage/alleviate anxiety  - Monitor for signs/symptoms of CO2 retention  Outcome: Progressing     Problem: GASTROINTESTINAL - ADULT  Goal: Maintains or returns to baseline bowel function  Description: INTERVENTIONS:  - Assess bowel function  - Maintain adequate hydration with IV or PO as ordered and tolerated  - Evaluate effectiveness of GI medications  - Encourage mobilization and activity  - Obtain nutritional consult as needed  - Establish a toileting routine/schedule  - Consider collaborating with pharmacy to review patient's medication profile  Outcome: Progressing     Problem: METABOLIC/FLUID AND ELECTROLYTES - ADULT  Goal: Electrolytes maintained within normal limits  Description: INTERVENTIONS:  - Monitor labs and rhythm and assess patient for signs and symptoms  of electrolyte imbalances  - Administer electrolyte replacement as ordered  - Monitor response to electrolyte replacements, including rhythm and repeat lab results as appropriate  - Fluid restriction as ordered  - Instruct patient on fluid and nutrition restrictions as appropriate  Outcome: Progressing  Goal: Hemodynamic stability and optimal renal function maintained  Description: INTERVENTIONS:  - Monitor labs and assess for signs and symptoms of volume excess or deficit  - Monitor intake, output and patient weight  - Monitor urine specific gravity, serum osmolarity and serum sodium as indicated or ordered  - Monitor response to interventions for patient's volume status, including labs, urine output, blood pressure (other measures as available)  - Encourage oral intake as appropriate  - Instruct patient on fluid and nutrition restrictions as appropriate  Outcome: Progressing     Problem: HEMATOLOGIC - ADULT  Goal: Maintains hematologic stability  Description: INTERVENTIONS  - Assess for signs and symptoms of bleeding or hemorrhage  - Monitor labs and vital signs for trends  - Administer supportive blood products/factors, fluids and medications as ordered and appropriate  - Administer supportive blood products/factors as ordered and appropriate  Outcome: Progressing  Goal: Free from bleeding injury  Description: (Example usage: patient with low platelets)  INTERVENTIONS:  - Avoid intramuscular injections, enemas and rectal medication administration  - Ensure safe mobilization of patient  - Hold pressure on venipuncture sites to achieve adequate hemostasis  - Assess for signs and symptoms of internal bleeding  - Monitor lab trends  - Patient is to report abnormal signs of bleeding to staff  - Avoid use of toothpicks and dental floss  - Use electric shaver for shaving  - Use soft bristle tooth brush  - Limit straining and forceful nose blowing  Outcome: Progressing

## 2024-12-09 NOTE — PLAN OF CARE
AxOx4, 2L NC, Afib, Cont x2, SBA walker.   PLAN: monitor hgb, PO torsemide, restart   Problem: Patient Centered Care  Goal: Patient preferences are identified and integrated in the patient's plan of care  Description: Interventions:  - What would you like us to know as we care for you? I'm from home with my daughter  - Provide timely, complete, and accurate information to patient/family  - Incorporate patient and family knowledge, values, beliefs, and cultural backgrounds into the planning and delivery of care  - Encourage patient/family to participate in care and decision-making at the level they choose  - Honor patient and family perspectives and choices  Outcome: Progressing     Problem: Patient/Family Goals  Goal: Patient/Family Long Term Goal  Description: Patient's Long Term Goal: discharge home    Interventions:  - Monitor vitals, labs, imaging  - Tele  - on consult  - See additional Care Plan goals for specific interventionsPatient's Long Term Goal: discharge home    Interventions:  - Monitor vitals, labs, imaging  - Tele  - Cards, GI on consult  - See additional Care Plan goals for specific interventions  Outcome: Progressing  Goal: Patient/Family Short Term Goal  Description: Patient's Short Term Goal: feel better    Interventions:   - Activity as tolerated  - Blood transfusion  - Oxygen therapy  - See additional Care Plan goals for specific interventions  Outcome: Progressing     Problem: CARDIOVASCULAR - ADULT  Goal: Maintains optimal cardiac output and hemodynamic stability  Description: INTERVENTIONS:  - Monitor vital signs, rhythm, and trends  - Monitor for bleeding, hypotension and signs of decreased cardiac output  - Evaluate effectiveness of vasoactive medications to optimize hemodynamic stability  - Monitor arterial and/or venous puncture sites for bleeding and/or hematoma  - Assess quality of pulses, skin color and temperature  - Assess for signs of decreased coronary artery perfusion - ex.  Angina  - Evaluate fluid balance, assess for edema, trend weights  Outcome: Progressing  Goal: Absence of cardiac arrhythmias or at baseline  Description: INTERVENTIONS:  - Continuous cardiac monitoring, monitor vital signs, obtain 12 lead EKG if indicated  - Evaluate effectiveness of antiarrhythmic and heart rate control medications as ordered  - Initiate emergency measures for life threatening arrhythmias  - Monitor electrolytes and administer replacement therapy as ordered  Outcome: Progressing     Problem: RESPIRATORY - ADULT  Goal: Achieves optimal ventilation and oxygenation  Description: INTERVENTIONS:  - Assess for changes in respiratory status  - Assess for changes in mentation and behavior  - Position to facilitate oxygenation and minimize respiratory effort  - Oxygen supplementation based on oxygen saturation or ABGs  - Provide Smoking Cessation handout, if applicable  - Encourage broncho-pulmonary hygiene including cough, deep breathe, Incentive Spirometry  - Assess the need for suctioning and perform as needed  - Assess and instruct to report SOB or any respiratory difficulty  - Respiratory Therapy support as indicated  - Manage/alleviate anxiety  - Monitor for signs/symptoms of CO2 retention  Outcome: Progressing     Problem: GASTROINTESTINAL - ADULT  Goal: Maintains or returns to baseline bowel function  Description: INTERVENTIONS:  - Assess bowel function  - Maintain adequate hydration with IV or PO as ordered and tolerated  - Evaluate effectiveness of GI medications  - Encourage mobilization and activity  - Obtain nutritional consult as needed  - Establish a toileting routine/schedule  - Consider collaborating with pharmacy to review patient's medication profile  Outcome: Progressing     Problem: METABOLIC/FLUID AND ELECTROLYTES - ADULT  Goal: Electrolytes maintained within normal limits  Description: INTERVENTIONS:  - Monitor labs and rhythm and assess patient for signs and symptoms of electrolyte  imbalances  - Administer electrolyte replacement as ordered  - Monitor response to electrolyte replacements, including rhythm and repeat lab results as appropriate  - Fluid restriction as ordered  - Instruct patient on fluid and nutrition restrictions as appropriate  Outcome: Progressing  Goal: Hemodynamic stability and optimal renal function maintained  Description: INTERVENTIONS:  - Monitor labs and assess for signs and symptoms of volume excess or deficit  - Monitor intake, output and patient weight  - Monitor urine specific gravity, serum osmolarity and serum sodium as indicated or ordered  - Monitor response to interventions for patient's volume status, including labs, urine output, blood pressure (other measures as available)  - Encourage oral intake as appropriate  - Instruct patient on fluid and nutrition restrictions as appropriate  Outcome: Progressing     Problem: HEMATOLOGIC - ADULT  Goal: Maintains hematologic stability  Description: INTERVENTIONS  - Assess for signs and symptoms of bleeding or hemorrhage  - Monitor labs and vital signs for trends  - Administer supportive blood products/factors, fluids and medications as ordered and appropriate  - Administer supportive blood products/factors as ordered and appropriate  Outcome: Progressing  Goal: Free from bleeding injury  Description: (Example usage: patient with low platelets)  INTERVENTIONS:  - Avoid intramuscular injections, enemas and rectal medication administration  - Ensure safe mobilization of patient  - Hold pressure on venipuncture sites to achieve adequate hemostasis  - Assess for signs and symptoms of internal bleeding  - Monitor lab trends  - Patient is to report abnormal signs of bleeding to staff  - Avoid use of toothpicks and dental floss  - Use electric shaver for shaving  - Use soft bristle tooth brush  - Limit straining and forceful nose blowing  Outcome: Progressing

## 2024-12-09 NOTE — PROGRESS NOTES
Heart Failure Nurse  Progress Note    Patient was evaluated by the Heart Failure Nurse  for understanding, verbalization, demonstration, and recall of education related to heart failure, overall adherence to the behaviors necessary to maintain a compensated status, and risk for readmission. Folder left with patient at bedside.    Patient assessment:Patient up to chair. Very pleasant awake and alert. Patient was aware of the necessary needs for this diagnosis of heart failure. She weighs herself routinely and lives with her daughter and granddtr. Follow up with [facility] has been established already.    Patient is able to verbalize signs/symptoms fluid overload/impending HF exacerbation and who to contact with problems                                          __x_ yes  ___ no      Patient is following a 2000 mg sodium diet                                             __x_ yes  ___ no    If no, barriers to 2000 mg sodium diet:_______________________________________________    Patient informed of 2-Part dietician classes that is free if sign up within 30 days of discharge or $40  ___ yes  ___x no      Patient is adherent to medication regimen                                              _x__ yes  ___ no    If no, barriers to medication regimen:    Patient has sufficient funds to purchase medication                      _x__ yes  ___ no      Patient has a scale in the home              __x_ yes  ___ no      Patient is adherent to daily weight monitoring                                        __x_ yes   ___ no    If no, barriers to daily weight monitoring:    Symptom Tracker Worksheet reviewed with patient  __xx_ yes   ___ no      Patient verbalizes understanding of stoplight/heart failure zones          x__ yes   ___ no      Patient understands the importance of 7-day follow-up appointment      __x_ yes  ___ no    Appointment Date: Alberto 12/19/24          Patient has adequate transportation to attend follow-up  appointments    ___ yes  ___ no  x  If no, was referral to Social Work made  ___yes  ___ no      Family/Friend present during education: none    Additional consultations required: none    Harsha Dixon RN HF  [phone] [id number]

## 2024-12-09 NOTE — PLAN OF CARE
AxOx4, 2L NC, Afib, Cont x2, SBA walker.   PLAN: monitor hgb, IV bumex BID, GI  Problem: Patient Centered Care  Goal: Patient preferences are identified and integrated in the patient's plan of care  Description: Interventions:  - What would you like us to know as we care for you? I'm from home with my daughter  - Provide timely, complete, and accurate information to patient/family  - Incorporate patient and family knowledge, values, beliefs, and cultural backgrounds into the planning and delivery of care  - Encourage patient/family to participate in care and decision-making at the level they choose  - Honor patient and family perspectives and choices  Outcome: Progressing     Problem: Patient/Family Goals  Goal: Patient/Family Long Term Goal  Description: Patient's Long Term Goal: discharge home    Interventions:  - Monitor vitals, labs, imaging  - Tele  - on consult  - See additional Care Plan goals for specific interventionsPatient's Long Term Goal: discharge home    Interventions:  - Monitor vitals, labs, imaging  - Tele  - Cards, GI on consult  - See additional Care Plan goals for specific interventions  Outcome: Progressing  Goal: Patient/Family Short Term Goal  Description: Patient's Short Term Goal: feel better    Interventions:   - Activity as tolerated  - Blood transfusion  - Oxygen therapy  - See additional Care Plan goals for specific interventions  Outcome: Progressing     Problem: CARDIOVASCULAR - ADULT  Goal: Maintains optimal cardiac output and hemodynamic stability  Description: INTERVENTIONS:  - Monitor vital signs, rhythm, and trends  - Monitor for bleeding, hypotension and signs of decreased cardiac output  - Evaluate effectiveness of vasoactive medications to optimize hemodynamic stability  - Monitor arterial and/or venous puncture sites for bleeding and/or hematoma  - Assess quality of pulses, skin color and temperature  - Assess for signs of decreased coronary artery perfusion - ex. Angina  -  Evaluate fluid balance, assess for edema, trend weights  Outcome: Progressing  Goal: Absence of cardiac arrhythmias or at baseline  Description: INTERVENTIONS:  - Continuous cardiac monitoring, monitor vital signs, obtain 12 lead EKG if indicated  - Evaluate effectiveness of antiarrhythmic and heart rate control medications as ordered  - Initiate emergency measures for life threatening arrhythmias  - Monitor electrolytes and administer replacement therapy as ordered  Outcome: Progressing     Problem: RESPIRATORY - ADULT  Goal: Achieves optimal ventilation and oxygenation  Description: INTERVENTIONS:  - Assess for changes in respiratory status  - Assess for changes in mentation and behavior  - Position to facilitate oxygenation and minimize respiratory effort  - Oxygen supplementation based on oxygen saturation or ABGs  - Provide Smoking Cessation handout, if applicable  - Encourage broncho-pulmonary hygiene including cough, deep breathe, Incentive Spirometry  - Assess the need for suctioning and perform as needed  - Assess and instruct to report SOB or any respiratory difficulty  - Respiratory Therapy support as indicated  - Manage/alleviate anxiety  - Monitor for signs/symptoms of CO2 retention  Outcome: Progressing     Problem: GASTROINTESTINAL - ADULT  Goal: Maintains or returns to baseline bowel function  Description: INTERVENTIONS:  - Assess bowel function  - Maintain adequate hydration with IV or PO as ordered and tolerated  - Evaluate effectiveness of GI medications  - Encourage mobilization and activity  - Obtain nutritional consult as needed  - Establish a toileting routine/schedule  - Consider collaborating with pharmacy to review patient's medication profile  Outcome: Progressing     Problem: METABOLIC/FLUID AND ELECTROLYTES - ADULT  Goal: Electrolytes maintained within normal limits  Description: INTERVENTIONS:  - Monitor labs and rhythm and assess patient for signs and symptoms of electrolyte  imbalances  - Administer electrolyte replacement as ordered  - Monitor response to electrolyte replacements, including rhythm and repeat lab results as appropriate  - Fluid restriction as ordered  - Instruct patient on fluid and nutrition restrictions as appropriate  Outcome: Progressing  Goal: Hemodynamic stability and optimal renal function maintained  Description: INTERVENTIONS:  - Monitor labs and assess for signs and symptoms of volume excess or deficit  - Monitor intake, output and patient weight  - Monitor urine specific gravity, serum osmolarity and serum sodium as indicated or ordered  - Monitor response to interventions for patient's volume status, including labs, urine output, blood pressure (other measures as available)  - Encourage oral intake as appropriate  - Instruct patient on fluid and nutrition restrictions as appropriate  Outcome: Progressing     Problem: HEMATOLOGIC - ADULT  Goal: Maintains hematologic stability  Description: INTERVENTIONS  - Assess for signs and symptoms of bleeding or hemorrhage  - Monitor labs and vital signs for trends  - Administer supportive blood products/factors, fluids and medications as ordered and appropriate  - Administer supportive blood products/factors as ordered and appropriate  Outcome: Progressing  Goal: Free from bleeding injury  Description: (Example usage: patient with low platelets)  INTERVENTIONS:  - Avoid intramuscular injections, enemas and rectal medication administration  - Ensure safe mobilization of patient  - Hold pressure on venipuncture sites to achieve adequate hemostasis  - Assess for signs and symptoms of internal bleeding  - Monitor lab trends  - Patient is to report abnormal signs of bleeding to staff  - Avoid use of toothpicks and dental floss  - Use electric shaver for shaving  - Use soft bristle tooth brush  - Limit straining and forceful nose blowing  Outcome: Progressing

## 2024-12-09 NOTE — CARDIAC REHAB
Pt was seen and educated by Heart Failure  today. Will remove Cardiac Rehab consult at this time, as patient is not a candidate for outpatient Phase 2 Cardiac Rehab.  Nia Nicole RN

## 2024-12-09 NOTE — PROGRESS NOTES
[facility]  part of Merged with [facility]    Progress Note    Yamini Ocamop Patient Status:  Inpatient    1945 MRN [id number]   Location [facility] 3W/SW Attending Arturo Echavarria,*   Hosp Day # 4 PCP Elly Antonio MD     Chief Complaint: feels ok    Subjective:     Constitutional:  Negative for diaphoresis and fatigue.   HENT:  Negative for congestion.    Respiratory:  Negative for cough and shortness of breath.    Cardiovascular:  Negative for leg swelling.   Gastrointestinal:  Negative for abdominal pain.   Genitourinary:  Negative for dysuria.   Neurological:  Negative for weakness.   Psychiatric/Behavioral:  Negative for decreased concentration.        Objective:   Blood pressure 112/67, pulse 87, temperature 98.2 °F (36.8 °C), temperature source Oral, resp. rate 18, height 5' 6\" (1.676 m), weight 178 lb 8 oz (81 kg), SpO2 92%.  Physical Exam  Vitals and nursing note reviewed.   Constitutional:       General: She is not in acute distress.     Appearance: She is not toxic-appearing.   HENT:      Head: Normocephalic and atraumatic.   Cardiovascular:      Rate and Rhythm: Tachycardia present.      Heart sounds: Murmur heard.   Pulmonary:      Effort: Pulmonary effort is normal.      Breath sounds: Rhonchi present.   Skin:     Capillary Refill: Capillary refill takes less than 2 seconds.   Neurological:      General: No focal deficit present.      Mental Status: She is alert and oriented to person, place, and time.   Psychiatric:         Behavior: Behavior normal.         Judgment: Judgment normal.         Results:   Lab Results   Component Value Date    WBC 6.1 2024    HGB 8.9 (L) 2024    HCT 30.2 (L) 2024    .0 2024    CREATSERUM 1.82 (H) 2024    BUN 33 (H) 2024     2024    K 4.0 2024     2024    CO2 31.0 2024    GLU 83 2024    CA 9.1 2024    ALB 3.9 2024    ALKPHO 96 2024     BILT 0.9 08/12/2024    TP 6.9 08/12/2024    AST 23 08/12/2024    ALT <7 (L) 08/12/2024    PTT 29.0 08/12/2024    INR 1.31 (H) 08/12/2024    TSH 2.130 05/04/2021    LIP 26 08/12/2024    ESRML 35 (H) 06/14/2017    CRP 0.7 06/14/2017     (H) 06/10/2011    MG 1.9 12/09/2024    PHOS 3.5 12/09/2024    TROP <0.045 05/10/2019    TROPHS 25 12/05/2024    CK 20 (L) 09/05/2017    B12 626 12/20/2023       No results found.        Assessment & Plan:       Acute on chronic diastolic congestive heart failure  (HCC)  -Cards on board - appreciate recs  -currently on IV Bumex 1 mg twice daily  -Continue monitor strict Is and O's  -Monitor oxygen sats  Severe tachycardia from anemia may be dangerously stressing heart as hr goes to 150-170     Gastrointestinal hemorrhage, unspecified gastrointestinal hemorrhage type  -Hemoglobin down; will transfuse   -Will continue monitor and trend.  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

## 2024-12-09 NOTE — PROGRESS NOTES
[facility]     Gastroenterology Progress Note    Yamini Ocampo Patient Status:  Inpatient    1945 MRN [id number]   Location [facility] 3W/SW Attending Arturo Echavarria,*   Hosp Day # 4 PCP Elly Antonio MD       Subjective:   Pt sitting up in chair, feeling well  Tolerating diet, having some belching but otherwise no dyspepsia   No bloody/black stools  No fever, chills  No chest pain, SOB  Objective:   Blood pressure 139/59, pulse 91, temperature 98.2 °F (36.8 °C), temperature source Oral, resp. rate 18, height 5' 6\" (1.676 m), weight 178 lb 8 oz (81 kg), SpO2 98%. Body mass index is 28.81 kg/m².    Gen: awake, alert patient, NAD  HEENT: EOMI, the sclera appears anicteric, oropharynx clear, mucus membranes appear moist  CV: RRR  Lung: no conversational dyspnea   Abdomen: soft NTND abdomen with NABS appreciated   Skin: dry, warm, no jaundice  Ext: no LE edema is evident  Neuro: Alert and interactive  Psych: calm, cooperative    Assessment and Plan:   Yamini Ocampo is a 79 year old woman with medical history of BMI 33.86, HTN, HLD, DM2, Stage III CKD, Anemia of chronic disease, PUD, GERD, Ventricular diastolic dysfunction, HFpEF, Pulmonary artery HTN, CAD, Chronic atrial fibrillation on Eliquis anticoagulation who presents to the ED 2024 afternoon with shortness of breath, acute on chronic diastolic HF and anemia.      Follows with Dr. Arnoldo Corado of Cardiology.     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   Awaiting read  -Hgb stable after transfusion yesterday, without overt GIB, though AC has not been restarted.  Pt planning to discuss with cardiologist at her appointment 12/19.        Recommend:  -VCE read, in process  -Trend Hgb, monitor for overt GIB  -PPI BID  -Diet as tolerated  -Eliquis anticoagulation ?low dose 2.5mg, per cardiology    Case discussed with Alyssa Orellana MD and Patricia VERDIN.    Jocelyn Taylor [facility], [facility]  12/9/2024      Results:     Lab Results   Component Value Date    WBC 6.1 12/09/2024    HGB 8.9 (L) 12/09/2024    HCT 30.2 (L) 12/09/2024    .0 12/09/2024    CREATSERUM 1.82 (H) 12/09/2024    BUN 33 (H) 12/09/2024     12/09/2024    K 4.0 12/09/2024     12/09/2024    CO2 31.0 12/09/2024    GLU 83 12/09/2024    CA 9.1 12/09/2024    ALB 3.9 08/12/2024    ALKPHO 96 08/12/2024    BILT 0.9 08/12/2024    TP 6.9 08/12/2024    AST 23 08/12/2024    ALT <7 (L) 08/12/2024    PTT 29.0 08/12/2024    INR 1.31 (H) 08/12/2024    TSH 2.130 05/04/2021    LIP 26 08/12/2024    ESRML 35 (H) 06/14/2017    CRP 0.7 06/14/2017     (H) 06/10/2011    MG 1.9 12/09/2024    PHOS 3.5 12/09/2024    TROP <0.045 05/10/2019    CK 20 (L) 09/05/2017    B12 626 12/20/2023       XR CHEST AP PORTABLE  (CPT=71045)    Result Date: 12/7/2024  CONCLUSION:   1. Cardiac enlargement with secondary signs of slight fluid overload.  2. Small streaky opacities at the lung bases which could represent atelectasis, infiltrate, or a combination in addition to the small pleural effusions    Dictated by (CST): Colton Hines MD on 12/07/2024 at 3:30 PM     Finalized by (CST): Colton Hines MD on 12/07/2024 at 3:32 PM

## 2024-12-09 NOTE — DIETARY NOTE
Dietitian Note     Received \"heart failure diet education.\" Pt seen and given education by HF  today 12/9. No further diet education warranted at this time. Will clear consult. Please consult RD for other nutrition interventions as needed.         Heather Ogden RD, LDN  Clinical Dietitian  P: [phone]

## 2024-12-09 NOTE — PROGRESS NOTES
Patient seen in follow up. No CP/SOB. Chart reviewed.  Review of systems: Constitutional: Negative for fever.  Respiratory: Negative for shortness of breath.   Cardiac: Negative for chest pain.  Gastrointestinal: Negative for abdominal pain.    Vitals:    12/09/24 0735   BP: 139/59   Pulse: 91   Resp: 18   Temp: 98.2 °F (36.8 °C)       Intake/Output Summary (Last 24 hours) at 12/9/2024 1251  Last data filed at 12/9/2024 0900  Gross per 24 hour   Intake 1016 ml   Output 1300 ml   Net -284 ml     Wt Readings from Last 1 Encounters:   12/09/24 178 lb 8 oz (81 kg)        General: No acute distress.  Neck: Jugular venous pulsations not seen.  Lungs: Clear to auscultation.  Heart: Normal rate. No murmurs.  Abdomen: Soft. Non tender  Extremities: No edema.  Neurological: Alert. No focal deficits.  Psychiatric: Appropriate mood and affect.  Current Facility-Administered Medications   Medication Dose Route Frequency    torsemide (Demadex) tab 20 mg  20 mg Oral Daily    sodium ferric gluconate (Ferrlecit) 125 mg in sodium chloride 0.9% 100mL IVPB premix  125 mg Intravenous Daily    sodium chloride 0.9% infusion   Intravenous Once    acetaminophen (Tylenol Extra Strength) tab 500 mg  500 mg Oral Q4H PRN    cefTRIAXone (Rocephin) 1 g in sodium chloride 0.9% 100 mL IVPB-ADDV  1 g Intravenous Daily    pantoprazole (Protonix) 40 mg in sodium chloride 0.9% PF 10 mL IV push  40 mg Intravenous Q12H    glycerin-hypromellose- (Artificial Tears) 0.2-0.2-1 % ophthalmic solution 1 drop  1 drop Both Eyes QID PRN    simethicone (Mylicon) chewable tab 320 mg  320 mg Oral Once    ondansetron (Zofran) 4 MG/2ML injection 4 mg  4 mg Intravenous Q6H PRN    metoclopramide (Reglan) 5 mg/mL injection 5 mg  5 mg Intravenous Q8H PRN    influenza virus trivalent high dose PF (Fluzone HD) 0.5 mL injection (ages >/= 65 years) 0.5 mL  0.5 mL Intramuscular Prior to discharge     Medications Prior to Admission   Medication Sig    metoprolol  succinate ER 50 MG Oral Tablet 24 Hr Take 1 tablet (50 mg total) by mouth daily.    torsemide 20 MG Oral Tab Take 2 tablets (40 mg total) by mouth daily.    PANTOPRAZOLE 40 MG Oral Tab EC TAKE 1 TABLET TWICE A DAY    sertraline 100 MG Oral Tab Take 1 tablet (100 mg total) by mouth daily.    atorvastatin 10 MG Oral Tab Take 1 tablet (10 mg total) by mouth nightly.    FARXIGA 10 MG Oral Tab Take 1 tablet (10 mg total) by mouth daily.    albuterol 108 (90 Base) MCG/ACT Inhalation Aero Soln     cyanocobalamin 1000 MCG Oral Tab Take 1 tablet (1,000 mcg total) by mouth daily.    ELIQUIS 5 MG Oral Tab Take 1 tablet (5 mg total) by mouth 2 (two) times daily.     XR CHEST AP PORTABLE  (CPT=71045)    Result Date: 12/7/2024  CONCLUSION:   1. Cardiac enlargement with secondary signs of slight fluid overload.  2. Small streaky opacities at the lung bases which could represent atelectasis, infiltrate, or a combination in addition to the small pleural effusions    Dictated by (CST): Colton Hines MD on 12/07/2024 at 3:30 PM     Finalized by (CST): Colton Hines MD on 12/07/2024 at 3:32 PM           Lab Results   Component Value Date    WBC 6.1 12/09/2024    HGB 8.9 12/09/2024    HCT 30.2 12/09/2024    .0 12/09/2024    CREATSERUM 1.82 12/09/2024    BUN 33 12/09/2024     12/09/2024    K 4.0 12/09/2024     12/09/2024    CO2 31.0 12/09/2024    GLU 83 12/09/2024    CA 9.1 12/09/2024    MG 1.9 12/09/2024    PHOS 3.5 12/09/2024     ECHO:  ECG rhythm:   Atrial fibrillation     ----------------------------------------------------------------------------     Conclusions:     1. Left ventricle: The cavity size was normal. Wall thickness was normal.      Systolic function was vigorous. The estimated ejection fraction was      65-70%, by visual assessment. No diagnostic evidence for regional wall      motion abnormalities. Unable to assess LV diastolic function due to heart      rhythm.   2. Right ventricle: The cavity  size was mildly increased.   3. Left atrium: The left atrial volume was markedly increased.   4. Right atrium: The atrium was markedly dilated.   5. Aortic valve: A bioprosthetic valve is present. There was mild-moderate      perivalvular regurgitation. The peak systolic velocity was 3.13m/sec. The      mean systolic gradient was 22mm Hg. The valve area (VTI) was 1.26cm^2.      The valve area (VTI) index was 0.64cm^2/m^2.   6. Mitral valve: Transvalvular velocity was increased more than expected.      The findings were consistent with mild stenosis. There was mild      regurgitation. The mean diastolic gradient was 5mm Hg.   7. Tricuspid valve: There was moderate-severe regurgitation.   8. Pulmonary arteries: Systolic pressure was moderately to markedly      increased, in the range of 65mm Hg to 70mm Hg.   9. Pericardium, extracardiac: A small pericardial effusion was identified      circumferential to the heart. There was no evidence of hemodynamic      compromise. There was a left pleural effusion.   Impressions:  This study is compared with previous dated 06/26/2024     Impression:   Anemia due to GIB, requires transfusions  Acute on chronic DHF  Afib with intermittent RVR  Hlp  Htn     Recommendations:  Restart eliquis once ok with GI at 2.5 mg bid, as it seems the GIB and low H&H continues. Patient will benefit with Watchman's procedure, will schedule as OP  Metoprolol 25 mg bid and Atorvastatin 20 mg resumed  Transitioned to oral torsemide that she takes at home.       Currently hemodynamically stable    D/W patient and nursing staff    Per GI service, Eliquis to be held till capsule test results    Balwinder Andujar MD  [address]   [address]  [place] [address]  Phone: [phone]  [email or web address]

## 2024-12-10 VITALS
RESPIRATION RATE: 18 BRPM | HEART RATE: 75 BPM | TEMPERATURE: 98 F | HEIGHT: 66 IN | WEIGHT: 178.5 LBS | DIASTOLIC BLOOD PRESSURE: 56 MMHG | OXYGEN SATURATION: 94 % | BODY MASS INDEX: 28.69 KG/M2 | SYSTOLIC BLOOD PRESSURE: 134 MMHG

## 2024-12-10 LAB
ANION GAP SERPL CALC-SCNC: 8 MMOL/L (ref 0–18)
BASOPHILS # BLD AUTO: 0.05 X10(3) UL (ref 0–0.2)
BASOPHILS NFR BLD AUTO: 1.1 %
BUN BLD-MCNC: 36 MG/DL (ref 9–23)
BUN/CREAT SERPL: 20.1 (ref 10–20)
CALCIUM BLD-MCNC: 9.2 MG/DL (ref 8.7–10.4)
CHLORIDE SERPL-SCNC: 105 MMOL/L (ref 98–112)
CO2 SERPL-SCNC: 31 MMOL/L (ref 21–32)
CREAT BLD-MCNC: 1.79 MG/DL
DEPRECATED RDW RBC AUTO: 71.7 FL (ref 35.1–46.3)
EGFRCR SERPLBLD CKD-EPI 2021: 28 ML/MIN/1.73M2 (ref 60–?)
EOSINOPHIL # BLD AUTO: 0.15 X10(3) UL (ref 0–0.7)
EOSINOPHIL NFR BLD AUTO: 3.3 %
ERYTHROCYTE [DISTWIDTH] IN BLOOD BY AUTOMATED COUNT: 21.2 % (ref 11–15)
GLUCOSE BLD-MCNC: 81 MG/DL (ref 70–99)
HCT VFR BLD AUTO: 28.9 %
HGB BLD-MCNC: 8.6 G/DL
IMM GRANULOCYTES # BLD AUTO: 0.02 X10(3) UL (ref 0–1)
IMM GRANULOCYTES NFR BLD: 0.4 %
LYMPHOCYTES # BLD AUTO: 0.86 X10(3) UL (ref 1–4)
LYMPHOCYTES NFR BLD AUTO: 18.8 %
MAGNESIUM SERPL-MCNC: 2 MG/DL (ref 1.6–2.6)
MCH RBC QN AUTO: 30 PG (ref 26–34)
MCHC RBC AUTO-ENTMCNC: 29.8 G/DL (ref 31–37)
MCV RBC AUTO: 100.7 FL
MONOCYTES # BLD AUTO: 0.42 X10(3) UL (ref 0.1–1)
MONOCYTES NFR BLD AUTO: 9.2 %
NEUTROPHILS # BLD AUTO: 3.08 X10 (3) UL (ref 1.5–7.7)
NEUTROPHILS # BLD AUTO: 3.08 X10(3) UL (ref 1.5–7.7)
NEUTROPHILS NFR BLD AUTO: 67.2 %
NT-PROBNP SERPL-MCNC: 4910 PG/ML (ref ?–450)
OSMOLALITY SERPL CALC.SUM OF ELEC: 305 MOSM/KG (ref 275–295)
PHOSPHATE SERPL-MCNC: 4 MG/DL (ref 2.4–5.1)
PLATELET # BLD AUTO: 274 10(3)UL (ref 150–450)
POTASSIUM SERPL-SCNC: 4 MMOL/L (ref 3.5–5.1)
RBC # BLD AUTO: 2.87 X10(6)UL
SODIUM SERPL-SCNC: 144 MMOL/L (ref 136–145)
WBC # BLD AUTO: 4.6 X10(3) UL (ref 4–11)

## 2024-12-10 PROCEDURE — 99239 HOSP IP/OBS DSCHRG MGMT >30: CPT | Performed by: HOSPITALIST

## 2024-12-10 PROCEDURE — 99233 SBSQ HOSP IP/OBS HIGH 50: CPT | Performed by: INTERNAL MEDICINE

## 2024-12-10 RX ORDER — PANTOPRAZOLE SODIUM 40 MG/1
40 TABLET, DELAYED RELEASE ORAL
Qty: 60 TABLET | Refills: 0 | Status: SHIPPED | OUTPATIENT
Start: 2024-12-10

## 2024-12-10 RX ORDER — ACETAMINOPHEN 500 MG
500 TABLET ORAL EVERY 6 HOURS PRN
Status: SHIPPED | COMMUNITY
Start: 2024-12-10

## 2024-12-10 RX ORDER — CEPHALEXIN 500 MG/1
500 CAPSULE ORAL 2 TIMES DAILY
Qty: 11 CAPSULE | Refills: 0 | Status: SHIPPED | OUTPATIENT
Start: 2024-12-10

## 2024-12-10 RX ORDER — TORSEMIDE 20 MG/1
20 TABLET ORAL DAILY
Qty: 30 TABLET | Refills: 0 | Status: SHIPPED | OUTPATIENT
Start: 2024-12-11

## 2024-12-10 RX ORDER — METOPROLOL TARTRATE 25 MG/1
25 TABLET, FILM COATED ORAL
Qty: 60 TABLET | Refills: 0 | Status: SHIPPED | OUTPATIENT
Start: 2024-12-10

## 2024-12-10 RX ORDER — POTASSIUM CHLORIDE 750 MG/1
10 TABLET, EXTENDED RELEASE ORAL
Qty: 15 TABLET | Refills: 0 | Status: SHIPPED | OUTPATIENT
Start: 2024-12-11

## 2024-12-10 RX ORDER — CEPHALEXIN 500 MG/1
500 CAPSULE ORAL 3 TIMES DAILY
Qty: 16 CAPSULE | Refills: 0 | Status: SHIPPED | OUTPATIENT
Start: 2024-12-10 | End: 2024-12-10

## 2024-12-10 NOTE — PLAN OF CARE
Problem: Patient Centered Care  Goal: Patient preferences are identified and integrated in the patient's plan of care  Description: Interventions:  - What would you like us to know as we care for you? I'm from home with my daughter  - Provide timely, complete, and accurate information to patient/family  - Incorporate patient and family knowledge, values, beliefs, and cultural backgrounds into the planning and delivery of care  - Encourage patient/family to participate in care and decision-making at the level they choose  - Honor patient and family perspectives and choices  Outcome: Adequate for Discharge     Problem: Patient/Family Goals  Goal: Patient/Family Long Term Goal  Description: Patient's Long Term Goal: discharge home    Interventions:  - Monitor vitals, labs, imaging  - Tele  - GI, cards on consult  - See additional Care Plan goals for specific interventionsPatient's Long Term Goal: discharge home    Interventions:  - Monitor vitals, labs, imaging  - Tele  - Cards, GI on consult  - See additional Care Plan goals for specific interventions  Outcome: Adequate for Discharge  Goal: Patient/Family Short Term Goal  Description: Patient's Short Term Goal: feel better    Interventions:   - Activity as tolerated  - Blood transfusion  - Oxygen therapy  - See additional Care Plan goals for specific interventions  Outcome: Adequate for Discharge     Problem: CARDIOVASCULAR - ADULT  Goal: Maintains optimal cardiac output and hemodynamic stability  Description: INTERVENTIONS:  - Monitor vital signs, rhythm, and trends  - Monitor for bleeding, hypotension and signs of decreased cardiac output  - Evaluate effectiveness of vasoactive medications to optimize hemodynamic stability  - Monitor arterial and/or venous puncture sites for bleeding and/or hematoma  - Assess quality of pulses, skin color and temperature  - Assess for signs of decreased coronary artery perfusion - ex. Angina  - Evaluate fluid balance, assess for  edema, trend weights  Outcome: Adequate for Discharge  Goal: Absence of cardiac arrhythmias or at baseline  Description: INTERVENTIONS:  - Continuous cardiac monitoring, monitor vital signs, obtain 12 lead EKG if indicated  - Evaluate effectiveness of antiarrhythmic and heart rate control medications as ordered  - Initiate emergency measures for life threatening arrhythmias  - Monitor electrolytes and administer replacement therapy as ordered  Outcome: Adequate for Discharge     Problem: RESPIRATORY - ADULT  Goal: Achieves optimal ventilation and oxygenation  Description: INTERVENTIONS:  - Assess for changes in respiratory status  - Assess for changes in mentation and behavior  - Position to facilitate oxygenation and minimize respiratory effort  - Oxygen supplementation based on oxygen saturation or ABGs  - Provide Smoking Cessation handout, if applicable  - Encourage broncho-pulmonary hygiene including cough, deep breathe, Incentive Spirometry  - Assess the need for suctioning and perform as needed  - Assess and instruct to report SOB or any respiratory difficulty  - Respiratory Therapy support as indicated  - Manage/alleviate anxiety  - Monitor for signs/symptoms of CO2 retention  Outcome: Adequate for Discharge     Problem: GASTROINTESTINAL - ADULT  Goal: Maintains or returns to baseline bowel function  Description: INTERVENTIONS:  - Assess bowel function  - Maintain adequate hydration with IV or PO as ordered and tolerated  - Evaluate effectiveness of GI medications  - Encourage mobilization and activity  - Obtain nutritional consult as needed  - Establish a toileting routine/schedule  - Consider collaborating with pharmacy to review patient's medication profile  Outcome: Adequate for Discharge     Problem: METABOLIC/FLUID AND ELECTROLYTES - ADULT  Goal: Electrolytes maintained within normal limits  Description: INTERVENTIONS:  - Monitor labs and rhythm and assess patient for signs and symptoms of electrolyte  imbalances  - Administer electrolyte replacement as ordered  - Monitor response to electrolyte replacements, including rhythm and repeat lab results as appropriate  - Fluid restriction as ordered  - Instruct patient on fluid and nutrition restrictions as appropriate  Outcome: Adequate for Discharge  Goal: Hemodynamic stability and optimal renal function maintained  Description: INTERVENTIONS:  - Monitor labs and assess for signs and symptoms of volume excess or deficit  - Monitor intake, output and patient weight  - Monitor urine specific gravity, serum osmolarity and serum sodium as indicated or ordered  - Monitor response to interventions for patient's volume status, including labs, urine output, blood pressure (other measures as available)  - Encourage oral intake as appropriate  - Instruct patient on fluid and nutrition restrictions as appropriate  Outcome: Adequate for Discharge     Problem: HEMATOLOGIC - ADULT  Goal: Maintains hematologic stability  Description: INTERVENTIONS  - Assess for signs and symptoms of bleeding or hemorrhage  - Monitor labs and vital signs for trends  - Administer supportive blood products/factors, fluids and medications as ordered and appropriate  - Administer supportive blood products/factors as ordered and appropriate  Outcome: Adequate for Discharge  Goal: Free from bleeding injury  Description: (Example usage: patient with low platelets)  INTERVENTIONS:  - Avoid intramuscular injections, enemas and rectal medication administration  - Ensure safe mobilization of patient  - Hold pressure on venipuncture sites to achieve adequate hemostasis  - Assess for signs and symptoms of internal bleeding  - Monitor lab trends  - Patient is to report abnormal signs of bleeding to staff  - Avoid use of toothpicks and dental floss  - Use electric shaver for shaving  - Use soft bristle tooth brush  - Limit straining and forceful nose blowing  Outcome: Adequate for Discharge

## 2024-12-10 NOTE — PLAN OF CARE
Patient AO x4. Using purewick to void overnight. Remains on O2 at 1L NC. Call light within reach, fall precautions. Plan to monitor H/H and re-starting Eliquis.   Problem: Patient Centered Care  Goal: Patient preferences are identified and integrated in the patient's plan of care  Description: Interventions:  - What would you like us to know as we care for you? I'm from home with my daughter  - Provide timely, complete, and accurate information to patient/family  - Incorporate patient and family knowledge, values, beliefs, and cultural backgrounds into the planning and delivery of care  - Encourage patient/family to participate in care and decision-making at the level they choose  - Honor patient and family perspectives and choices  Outcome: Progressing     Problem: Patient/Family Goals  Goal: Patient/Family Long Term Goal  Description: Patient's Long Term Goal: discharge home    Interventions:  - Monitor vitals, labs, imaging  - Tele  - on consult  - See additional Care Plan goals for specific interventionsPatient's Long Term Goal: discharge home    Interventions:  - Monitor vitals, labs, imaging  - Tele  - Cards, GI on consult  - See additional Care Plan goals for specific interventions  Outcome: Progressing  Goal: Patient/Family Short Term Goal  Description: Patient's Short Term Goal: feel better    Interventions:   - Activity as tolerated  - Blood transfusion  - Oxygen therapy  - See additional Care Plan goals for specific interventions  Outcome: Progressing     Problem: CARDIOVASCULAR - ADULT  Goal: Maintains optimal cardiac output and hemodynamic stability  Description: INTERVENTIONS:  - Monitor vital signs, rhythm, and trends  - Monitor for bleeding, hypotension and signs of decreased cardiac output  - Evaluate effectiveness of vasoactive medications to optimize hemodynamic stability  - Monitor arterial and/or venous puncture sites for bleeding and/or hematoma  - Assess quality of pulses, skin color and  temperature  - Assess for signs of decreased coronary artery perfusion - ex. Angina  - Evaluate fluid balance, assess for edema, trend weights  Outcome: Progressing  Goal: Absence of cardiac arrhythmias or at baseline  Description: INTERVENTIONS:  - Continuous cardiac monitoring, monitor vital signs, obtain 12 lead EKG if indicated  - Evaluate effectiveness of antiarrhythmic and heart rate control medications as ordered  - Initiate emergency measures for life threatening arrhythmias  - Monitor electrolytes and administer replacement therapy as ordered  Outcome: Progressing     Problem: RESPIRATORY - ADULT  Goal: Achieves optimal ventilation and oxygenation  Description: INTERVENTIONS:  - Assess for changes in respiratory status  - Assess for changes in mentation and behavior  - Position to facilitate oxygenation and minimize respiratory effort  - Oxygen supplementation based on oxygen saturation or ABGs  - Provide Smoking Cessation handout, if applicable  - Encourage broncho-pulmonary hygiene including cough, deep breathe, Incentive Spirometry  - Assess the need for suctioning and perform as needed  - Assess and instruct to report SOB or any respiratory difficulty  - Respiratory Therapy support as indicated  - Manage/alleviate anxiety  - Monitor for signs/symptoms of CO2 retention  Outcome: Progressing     Problem: GASTROINTESTINAL - ADULT  Goal: Maintains or returns to baseline bowel function  Description: INTERVENTIONS:  - Assess bowel function  - Maintain adequate hydration with IV or PO as ordered and tolerated  - Evaluate effectiveness of GI medications  - Encourage mobilization and activity  - Obtain nutritional consult as needed  - Establish a toileting routine/schedule  - Consider collaborating with pharmacy to review patient's medication profile  Outcome: Progressing     Problem: METABOLIC/FLUID AND ELECTROLYTES - ADULT  Goal: Electrolytes maintained within normal limits  Description: INTERVENTIONS:  -  Monitor labs and rhythm and assess patient for signs and symptoms of electrolyte imbalances  - Administer electrolyte replacement as ordered  - Monitor response to electrolyte replacements, including rhythm and repeat lab results as appropriate  - Fluid restriction as ordered  - Instruct patient on fluid and nutrition restrictions as appropriate  Outcome: Progressing  Goal: Hemodynamic stability and optimal renal function maintained  Description: INTERVENTIONS:  - Monitor labs and assess for signs and symptoms of volume excess or deficit  - Monitor intake, output and patient weight  - Monitor urine specific gravity, serum osmolarity and serum sodium as indicated or ordered  - Monitor response to interventions for patient's volume status, including labs, urine output, blood pressure (other measures as available)  - Encourage oral intake as appropriate  - Instruct patient on fluid and nutrition restrictions as appropriate  Outcome: Progressing     Problem: HEMATOLOGIC - ADULT  Goal: Maintains hematologic stability  Description: INTERVENTIONS  - Assess for signs and symptoms of bleeding or hemorrhage  - Monitor labs and vital signs for trends  - Administer supportive blood products/factors, fluids and medications as ordered and appropriate  - Administer supportive blood products/factors as ordered and appropriate  Outcome: Progressing  Goal: Free from bleeding injury  Description: (Example usage: patient with low platelets)  INTERVENTIONS:  - Avoid intramuscular injections, enemas and rectal medication administration  - Ensure safe mobilization of patient  - Hold pressure on venipuncture sites to achieve adequate hemostasis  - Assess for signs and symptoms of internal bleeding  - Monitor lab trends  - Patient is to report abnormal signs of bleeding to staff  - Avoid use of toothpicks and dental floss  - Use electric shaver for shaving  - Use soft bristle tooth brush  - Limit straining and forceful nose  blowing  Outcome: Progressing

## 2024-12-10 NOTE — DISCHARGE PLANNING
Patient was provided with discharge instructions, education, and follow up information. Prescriptions were already sent electronically to patient's pharmacy. Patient verbalizes understanding of follow up information, specifically UTI prevention, GI bleeding precautions, CHF discharge instructions, limiting salt, fluids, checking blood pressure and daily weights, signs and symptoms to monitor and when to call MD or EMS. Patient has no questions after reviewing all instructions and will be going home with her daughter.     Solange VERDIN, Discharge Leader [id number]

## 2024-12-10 NOTE — DISCHARGE SUMMARY
Dc summary#[id number]  > 30 min spent on dc    Hospital Discharge Diagnoses: chf/uti/gi bleed    Lace+ Score: 82  59-90 High Risk  29-58 Medium Risk  0-28   Low Risk.    TCM Follow-Up Recommendation:  LACE > 58: High Risk of readmission after discharge from the hospital.  Tcm fu recommended

## 2024-12-10 NOTE — PROGRESS NOTES
[facility]     Gastroenterology Progress Note    Yamini Ocampo Patient Status:  Inpatient    1945 MRN [id number]   Location [facility] 3W/SW Attending Arturo Echavarria,*   Hosp Day # 5 PCP Elly Antonio MD       Subjective:   No bowel movement.  No abdominal pain, nausea, emesis.  Tolerating diet.  Objective:   Blood pressure 134/56, pulse 75, temperature 97.9 °F (36.6 °C), temperature source Oral, resp. rate 18, height 5' 6\" (1.676 m), weight 178 lb 8 oz (81 kg), SpO2 94%. Body mass index is 28.81 kg/m².    Gen: awake, alert patient, NAD  HEENT: EOMI, the sclera appears anicteric, oropharynx clear, mucus membranes appear moist  CV: RRR  Lung: no conversational dyspnea   Abdomen: soft NTND abdomen with NABS appreciated   Skin: dry, warm, no jaundice  Ext: no LE edema is evident  Neuro: Alert and interactive  Psych: calm, cooperative    Assessment and Plan:   Yamini Ocampo is a 79 year old woman with medical history of BMI 33.86, HTN, HLD, DM2, Stage III CKD, Anemia of chronic disease, PUD, GERD, Ventricular diastolic dysfunction, HFpEF, Pulmonary artery HTN, CAD, Chronic atrial fibrillation on Eliquis anticoagulation who presents to the ED 2024 afternoon with shortness of breath, acute on chronic diastolic HF and anemia.       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 VCE without focal source of bleeding.     Discussed risks and benefits of anticoagulation with cardiology.  She may require recurrent intermittent transfusions and IV iron for chronic occult GI bleed.    GI will sign off, please call with questions    Alyssa Orellana MD      Results:     Lab Results   Component Value Date    WBC 4.6 12/10/2024    HGB 8.6 (L) 12/10/2024    HCT 28.9 (L) 12/10/2024    .0 12/10/2024    CREATSERUM 1.79 (H) 12/10/2024    BUN 36 (H) 12/10/2024     12/10/2024    K 4.0 12/10/2024    CL  105 12/10/2024    CO2 31.0 12/10/2024    GLU 81 12/10/2024    CA 9.2 12/10/2024    ALB 3.9 08/12/2024    ALKPHO 96 08/12/2024    BILT 0.9 08/12/2024    TP 6.9 08/12/2024    AST 23 08/12/2024    ALT <7 (L) 08/12/2024    PTT 29.0 08/12/2024    INR 1.31 (H) 08/12/2024    TSH 2.130 05/04/2021    LIP 26 08/12/2024    ESRML 35 (H) 06/14/2017    CRP 0.7 06/14/2017     (H) 06/10/2011    MG 2.0 12/10/2024    PHOS 4.0 12/10/2024    TROP <0.045 05/10/2019    CK 20 (L) 09/05/2017    B12 626 12/20/2023       No results found.

## 2024-12-11 ENCOUNTER — TELEPHONE (OUTPATIENT)
Dept: INTERNAL MEDICINE CLINIC | Facility: CLINIC | Age: 79
End: 2024-12-11

## 2024-12-11 ENCOUNTER — PATIENT OUTREACH (OUTPATIENT)
Dept: CASE MANAGEMENT | Age: 79
End: 2024-12-11

## 2024-12-11 DIAGNOSIS — I50.33 ACUTE ON CHRONIC DIASTOLIC (CONGESTIVE) HEART FAILURE (HCC): ICD-10-CM

## 2024-12-11 DIAGNOSIS — Z02.9 ENCOUNTERS FOR ADMINISTRATIVE PURPOSE: Primary | ICD-10-CM

## 2024-12-11 PROCEDURE — 1111F DSCHRG MED/CURRENT MED MERGE: CPT

## 2024-12-11 NOTE — PROGRESS NOTES
Transitional Care Management   Discharge Date: 12/10/24  Contact Date: 2024    Assessment:  TCM Initial Assessment    General:  Assessment completed with: Patient  Patient Subjective: Spoke with patient who reports she has been feeling ok. Patient states she is very tired. The patient denies chest pain, shortness of breath, fever, chills, sweats, nausea, vomiting, diarrhea. Patient reports she has had a bm and denies dark/tarry stool. The patient reports the swelling in her lower extremities is about the same since leaving the hospital.The patient reports she awoke this morning with a headache rated a 5/10 in intensity. Patient states she did get a headache in the hospital as well.The patient denies any vision changes or numbness/weakness on one side of her face or body. She reports she plans on going today to  some Tylenol for the headache. The patient has not weighed herself yet but does state she usually does. The patient does not check her blood pressure at home. The patient denies any questions or concerns at this time.  Chief Complaint: chf/uti/gi bleed  Verify patient name and  with patient/ caregiver: Yes    Hospital Stay/Discharge:  Tell me what you understand of why you were in the hospital or emergency department: patient states she was becoming more short of breath  Prior to leaving the hospital were your Discharge Instructions reviewed with you?: Yes  Did you receive a copy of your written Discharge Instructions?: Yes  What questions do you have about your Discharge Instructions?: patient denies  Do you feel better or worse since you left the hospital or emergency department?: Better    Follow - Up Appointment:  Do you have a follow-up appointment?: Yes  Date: 24  Physician: Dr. Andujar  Are there any barriers to getting to your follow-up appointment?: No    Home Health/DME:  Prior to leaving the hospital was Home Health (HH) arranged for you?: N/A  Are HH needs identified by staff  during the assessment?: No     Prior to leaving the hospital or emergency department was Durable Medical Equipment (DME), medical supplies, or infusions arranged for you?: N/A  Are DME/medical supply/infusions needs identified by staff during this assessment?: No     Medications/Diet:  Did any of your medications change, during or after your hospital stay or ED visit?: Yes  Do you have your new or updated medications?: No  Do you understand what your medications are for and possible side effects?: Yes  Are there any reasons that keep you from taking your medication as prescribed?: No  Any concerns about medication refills?: No    Were you given a different diet per your Discharge Instructions?: Yes  Diet Type: low sodium  Reason: dx of CHF  Are there any barriers to following that diet?: No     Questions/Concerns:  Do you have any questions or concerns that have not been discussed?: No         Nursing Interventions:    [facility] advised patient to weigh first thing in the morning after voiding and before meals/fluids. Continue with following advised sodium and fluid restriction. To notify health care provider if she gains 2-3 lbs., overnight or 5 lbs in 7 days, if increased swelling in legs,abdomen, increased difficulty breathing, or any worsening s/s. The patient verbalized understanding.      Nurse care Manager reviewed the newly prescribed medications with the patient and she states she is going today to  because they were not available yesterday at the pharmacy. [facility] offered to call pharmacy for patient--  Patient states she called [facility] about an hour ago and was told that everything is ready for .    [facility] attempted to schedule an appointment with Dr. Antonio however patient declined stating she will go on [facility] tomorrow to schedule this appointment herself and she has other appointments she needs to schedule as well. A telephone encounter has been sent to the PCP office as an FYI/per TCM protocol.      [facility] offered assistance with scheduling Gastroenterology appointment and patient declined stating she will call.   Patient reports Cardiology appointments scheduled for 12/19/2024.     All d/c instructions reviewed with pt.  Reviewed when to call MD vs when to go to ER/call 911.  Educated pt on the importance of taking all meds as prescribed as well as close f/u with PCP/specialists.  Pt verbalized understanding and will contact office with any further questions or concerns.         Medication Review:   Current Outpatient Medications   Medication Sig Dispense Refill    apixaban 2.5 MG Oral Tab Take 1 tablet (2.5 mg total) by mouth every 12 (twelve) hours. 60 tablet 0    torsemide 20 MG Oral Tab Take 1 tablet (20 mg total) by mouth daily. 30 tablet 0    acetaminophen 500 MG Oral Tab Take 1 tablet (500 mg total) by mouth every 6 (six) hours as needed for Pain or Fever.      metoprolol tartrate 25 MG Oral Tab Take 1 tablet (25 mg total) by mouth 2x Daily(Beta Blocker). 60 tablet 0    pantoprazole 40 MG Oral Tab EC Take 1 tablet (40 mg total) by mouth 2 (two) times daily before meals. 60 tablet 0    cephALEXin 500 MG Oral Cap Take 1 capsule (500 mg total) by mouth 2 (two) times daily. START TONIGHT AND STOP IF RASH PLEASE IGNORE PREVIOUS SCRIPT SENT. KEFLEX SHOULD BE TWICE A DAY FOR 11 DOSES NOT 3 TIMES A DAY FOR 16 DOSES 11 capsule 0    potassium chloride 10 MEQ Oral Tab CR Take 1 tablet (10 mEq total) by mouth Every Monday, Wednesday, and Friday. 15 tablet 0    PANTOPRAZOLE 40 MG Oral Tab EC TAKE 1 TABLET TWICE A  tablet 3    sertraline 100 MG Oral Tab Take 1 tablet (100 mg total) by mouth daily. 90 tablet 3    atorvastatin 10 MG Oral Tab Take 1 tablet (10 mg total) by mouth nightly. 90 tablet 3    FARXIGA 10 MG Oral Tab Take 1 tablet (10 mg total) by mouth daily.      albuterol 108 (90 Base) MCG/ACT Inhalation Aero Soln       cyanocobalamin 1000 MCG Oral Tab Take 1 tablet (1,000 mcg total) by mouth daily.        Did patient review medications using current pill bottles and not just a medication list?  No  Discharge medications reviewed/discussed/and reconciled against outpatient medications with patient.  Any changes or updates to medications sent to primary care provider.  Patient Acknowledged    Diagnosis specifics:  CHF:   With your CHF diagnosis weighing yourself is very important  How often are you weighing yourself? Every other day   Is there any reason you are unable to weigh yourself daily?  No  What was your weight yesterday?   Wt Readings from Last 1 Encounters:   12/09/24 178 lb 8 oz     Pt reports 178 lbs    Today? Did not weigh   Were you told about any fluid restrictions? No  Have you noticed any shortness of breath or waking up short of breath? no  Since discharge do you feel you are urinating more or less?  Patient reports this is about the same for her     Are you urinating more at night? no    Do you notice any pain or swelling in your abdomen?   no      Ankles or Legs?   yes  Questions/Concerns: n/a         Follow-up Appointments:      Transitional Care Clinic  Was TCC Ordered: No      Primary Care Provider (If no TCC appointment)  Does patient already have a PCP appointment scheduled? No  Nurse Care Manager Attempted to schedule PCP office TCM appointment with patient   -If no appointment scheduled: Explain     NCM attempted to schedule an appointment with Dr. Antonio however patient declined stating she will go on [facility] tomorrow to schedule this appointment herself and she has other appointments she needs to schedule as well. A telephone encounter has been sent to the PCP office as an FYI/per TCM protocol.     Specialist  Does the patient have any other follow-up appointment(s) that need to be scheduled? Yes   -If yes: Nurse Care Manager reviewed upcoming specialist appointments with patient: Yes    Issac Doe MD GASTROENTEROLOGY Follow up  [address]  [facility]  [id number]  [phone]      -Does the patient need assistance scheduling appointment(s): No    CCM referral placed:  Not Applicable- Patient is current with CCM services

## 2024-12-11 NOTE — TELEPHONE ENCOUNTER
Spoke to patient for TCM today.  Patient does not have a TCM appointment scheduled at this time.      Nurse Care Manager attempted to schedule an appointment with Dr. Antonio however patient declined stating [facility] to schedule this appointment herself and she has other appointments she needs to schedule as well.     Per guidelines patient should  be seen within seven days by 12/17/2024.     Otherwise, last date for TCM: 12/24/2024     Clinical staff:  Please follow-up with patient and try to get them to schedule as patient would greatly benefit from a TCM appointment.  Thank you!

## 2024-12-13 NOTE — DISCHARGE SUMMARY
[facility]    PATIENT'S NAME: CATARINA DAVIS   ATTENDING PHYSICIAN: Arturo Echavarria MD   PATIENT ACCOUNT#:   [id number]    LOCATION:  [address] [place] [address]  MEDICAL RECORD #:   [id number]       YOB: 1945  ADMISSION DATE:       12/05/2024      DISCHARGE DATE:  12/10/2024    DISCHARGE SUMMARY    Please note about 40 minutes were spent preparing this discharge.     DISCHARGE DIAGNOSIS:  Congestive heart failure, urinary tract infection, and gastrointestinal bleeding.    HISTORY AND HOSPITAL COURSE:  This is a very pleasant 79-year-old white female who appears younger than her stated age, who presents with a history of progressive dyspnea on exertion and a significant drop in hemoglobin down to 7.  She was admitted to the hospital and seen by Dr. Andujar, her cardiologist, and GI.  She had an EGD performed which showed gastric antral venous disease, and the GAVE tissue was cauterized and all visual telangiectasias were ablated.  She also had a video capsule released during the same procedure.  The small capsule revealed some distal esophageal bleeding, no blood in the stomach, no blood in the small bowel.  She was changed over to oral diuretics by Cardiology and deemed stable to be discharged by both GI and Cardiology, and so I discharged her home.    PHYSICAL EXAMINATION:    VITAL SIGNS:  Temperature 97.9, pulse 75, respiratory rate 18, blood pressure 134/56, 94%.  LUNGS:  Occasional rhonchi.  HEART:  Normal S1, S2.  No S3.   ABDOMEN:  Soft, nontender.   EXTREMITIES:  Without significant edema.  NEUROLOGIC:  She is alert and oriented, friendly and cooperative.     LABORATORY STUDIES:  Please see chart.    ASSESSMENT AND PLAN:    1.   Acute on chronic diastolic congestive heart failure.  Patient given diuretics.  Follow up as per Cardiology.  2.   Probably upper gastrointestinal hemorrhage, gastric antral vascular ectasia disease.  Appreciate GI.  Continue Protonix.  3.   Acute respiratory failure on  chronic.  Patient was able to be weaned off oxygen and seemed to be doing well.  4.   Acute gastrointestinal blood loss anemia.  5.   Atrial fibrillation.  Patient restarted on low-dose Eliquis.  6.   DVT prophylaxis.  She was on Eliquis near the end of her stay, but she had SCDs while she was actively bleeding.    7.   Bioprosthetic aortic valve with murmur.    CONDITION UPON DISCHARGE:  Stable.    CODE STATUS:  Full Code per records.  Not discussed during this hospitalization.    ACTIVITY:  As tolerated.    DIET:  Low fat, low salt.    FOLLOWUP:  With Ms. Brandon in 1 week, Dr. Andujar in 1 week, Dr. Doe as needed.  Follow up with primary, Dr. Antonio, in a few days for followup advice and recheck of labs and also CBC, BMP, magnesium and phosphorus in 2 days to be sent to Dr. Antonio.    DISCHARGE MEDICATIONS:    1.   Ventolin 2 puffs 4 times a day as needed.  2.   Lipitor 10 mg nightly.  3.   Vitamin B12, 1000 mcg daily.  4.   Aranesp 200 mcg daily.  5.   Farxiga 10 mg daily.  6.   Protonix 40 mg twice a day.  7.   Sertraline 100 mg daily.  Watch for severe muscle stiffness, fever.  8.   Eliquis 2.5 mg twice a day.  Watch for bleeding.  9.   Torsemide 20 mg daily.  10.   Tylenol 500 mg q.6 h. as needed.  Watch total daily Tylenol, limit to 3 g.   11.   Metoprolol 25 mg twice a day.  12.   Keflex 500 mg twice a day.  Start tonight and continue for 11 total doses.  13.   Potassium chloride 10 mEq every Monday, Wednesday, and Friday.    RISK OF READMISSION:  Actually high.  TCM followup recommended.    Dictated By Arturo Echavarria MD  d: 12/10/2024 20:16:58  t: 12/12/2024 16:28:37  Job [id number]  [facility]/

## 2024-12-20 ENCOUNTER — PATIENT OUTREACH (OUTPATIENT)
Dept: CASE MANAGEMENT | Age: 79
End: 2024-12-20

## 2024-12-20 NOTE — PROGRESS NOTES
Attempt to reach Yamini Ocampo to do CCM Monthly call for December. Left message for patient to call CCM to do monthly.    Total time -  4 min.  Total Monthly time-  4 min.   Next Care Manager Follow Up Date: 2-4 Weeks.

## 2025-01-07 ENCOUNTER — PATIENT OUTREACH (OUTPATIENT)
Dept: CASE MANAGEMENT | Age: 80
End: 2025-01-07

## 2025-01-07 DIAGNOSIS — I48.11 LONGSTANDING PERSISTENT ATRIAL FIBRILLATION (HCC): ICD-10-CM

## 2025-01-07 DIAGNOSIS — R10.13 DYSPEPSIA: ICD-10-CM

## 2025-01-07 DIAGNOSIS — K21.00 HIATAL HERNIA WITH GERD AND ESOPHAGITIS: ICD-10-CM

## 2025-01-07 DIAGNOSIS — N18.32 TYPE 2 DIABETES MELLITUS WITH STAGE 3B CHRONIC KIDNEY DISEASE, WITHOUT LONG-TERM CURRENT USE OF INSULIN (HCC): Primary | ICD-10-CM

## 2025-01-07 DIAGNOSIS — K21.9 GASTROESOPHAGEAL REFLUX DISEASE WITHOUT ESOPHAGITIS: ICD-10-CM

## 2025-01-07 DIAGNOSIS — E66.812 CLASS 2 SEVERE OBESITY DUE TO EXCESS CALORIES WITH SERIOUS COMORBIDITY AND BODY MASS INDEX (BMI) OF 39.0 TO 39.9 IN ADULT (HCC): ICD-10-CM

## 2025-01-07 DIAGNOSIS — I10 ESSENTIAL HYPERTENSION: ICD-10-CM

## 2025-01-07 DIAGNOSIS — I50.9 ACUTE ON CHRONIC CONGESTIVE HEART FAILURE, UNSPECIFIED HEART FAILURE TYPE (HCC): ICD-10-CM

## 2025-01-07 DIAGNOSIS — D64.9 ANEMIA, UNSPECIFIED TYPE: ICD-10-CM

## 2025-01-07 DIAGNOSIS — E11.22 TYPE 2 DIABETES MELLITUS WITH STAGE 3B CHRONIC KIDNEY DISEASE, WITHOUT LONG-TERM CURRENT USE OF INSULIN (HCC): Primary | ICD-10-CM

## 2025-01-07 DIAGNOSIS — E66.01 CLASS 2 SEVERE OBESITY DUE TO EXCESS CALORIES WITH SERIOUS COMORBIDITY AND BODY MASS INDEX (BMI) OF 39.0 TO 39.9 IN ADULT (HCC): ICD-10-CM

## 2025-01-07 DIAGNOSIS — M17.12 PRIMARY OSTEOARTHRITIS OF LEFT KNEE: ICD-10-CM

## 2025-01-07 DIAGNOSIS — D51.9 ANEMIA DUE TO VITAMIN B12 DEFICIENCY, UNSPECIFIED B12 DEFICIENCY TYPE: ICD-10-CM

## 2025-01-07 DIAGNOSIS — F32.1 MAJOR DEPRESSIVE DISORDER, SINGLE EPISODE, MODERATE (HCC): ICD-10-CM

## 2025-01-07 DIAGNOSIS — Z87.19 HISTORY OF ISCHEMIC COLITIS: ICD-10-CM

## 2025-01-07 DIAGNOSIS — K44.9 HIATAL HERNIA WITH GERD AND ESOPHAGITIS: ICD-10-CM

## 2025-01-07 NOTE — PROGRESS NOTES
CCM tried reaching out to patient to do a welcome assessment, she didn't answer and a voicemail was left to call me back.

## 2025-01-09 NOTE — PROGRESS NOTES
Spoke to Yamini at length about Chronic Care Management, current care plan and performed CCM call with Yamini reviewed meds and compliance.     Interventions for following categories based on Chronic Care Management  initial call.      I want to know a little about you.  What do you do in your spare time? The patient likes to go out with her daughter to places and more now that she is not driving because her DL .  Are you retired or what do you do for a living? The patient is retired.    Social support:  Do you live with someone? The patient lives with her daughter, her granddaughter and great granddaughter.   Do you have someone you can turn to when you are very stressed and or need help? The patient does have someone she can talk to if needed.   Who? Her best friend Radha and her granddaughter who lives in [facility] [place] [facility] [place] but keeps in touch with er often.  Do you have someone you check in with or talk to on a regular basis? Yes, her family who lives with her.  Are you responsible for anyone's care? Not at 100% but she does help her daughter who just got on disability and can sometimes have a hard time walking.  How are you doing with that? The patient feels she is fine dealing with the situation.  Do you feel you take time for yourself too? Per patient she definitely has time for herself as well.   Do you have any pets at home? Yes the patient has three dogs and a cat.    Let's talk about stress.  How much stress do you feel you have in your life? The patient feels she has a maximum amount of stress.   What stress's you out? There is one person who causes her stress.  How do you manage this stress? The patient has learned to deal with it and she likes play games in the computer to distract herself, she likes to organize her house, watch tv, she likes to cook too.    Nutrition: Let's talk about eating habits  Any special diet you are put on?  The patient is trying to follow a low salt diet.  Do you have any barriers to  keeping with this diet? None  Do you eat three small meals a day? The patient eats more like to meals a day and fruits and yoghurt for snacks.  Do you eat a variety of fruits and veggies? Yes but she has to be selective because she has GERD and sometimes fruits and vegetables irritates and upsets her stomach.  How do you stay hydrated? The patient likes to hydrate with water.    Exercise   Do you exercise? What do you do? The patient is not exercising at the moment due to left foot pain.   How often? N/A  How long? N/A  If not, are you up and moving in and around your home? The patient is capable to do her house chores and move around the house on her own with no issues.    Medication review:  Update medications with meds from other providers as well  Is there any reason you are unable to take your medications as prescribed? None  Do you take them every day on time? The patient takes them every day as prescribed.  Reasons why you don't? N/A  Do you have any questions about your medications? Side effects? Etc.? The patient would like to know if any of her medications are causing her hair loss, she feels she is losing a big amount of hair.    Meds: Detailed medication review with Yamini. Discussed with Yamini if any potential barriers are present that could prevent compliance with medication regimen. At this time, no barriers reported. Yamini reports is stable on all medications and denies experiencing any side effects.    Care Team:  Review specialists and add to care team.  Dr Pizarro, Nephrology        Dr Orellana, BON AYERS and Jasmyne MARTINEZ, both GI team.  Disease specific:   Do you feel you have a good understanding of your chronic conditions? The patient vocalizing having good understanding of her health issues.    Do you have chronic pain: The patient stated she does not have chronic or persistent pain.    Follow up:  You can call me anytime you have a question or need help with achieving your  goals.  I will follow up with you in a few weeks to see how you are doing and if we need to change anything to help you meet your needs.  Remember: what works for one person may not always work for another. We will continue to work on what will help you meet your needs.    Care Plan: Reviewed and updated where needed.  Health  Follow-up:  Time Spent This Encounter Total: 28 min medical record review, telephone communication, care plan updates where needed, and education. Provided acknowledgment and validation to patient's concerns.   Monthly Minute Total including today: 30    Physical assessment, complete health history, and need for Chronic Care Management established by Elly Antonio MD.

## 2025-01-14 NOTE — PROGRESS NOTES
HPI:   Preet Martin is a 64year old female who presents for a MA (Medicare Advantage) [facility] (Once per calendar year).     Patient reports that since last visit she has had increased stress at home, her daughter and granddaughter have been diagnosed Patient repost having a lot of back pain. Currently on Percocet, it helps relieve her pain.   Patient will call clinic back to schedule an appt with PCP. She needs to coordinate with her daughter as she will be attending the appt with patient.   Feeling down, depressed, or hopeless: Several days  3. Trouble falling or staying asleep, or sleeping too much: Not at all  4. Feeling tired or having little energy: Not at all  5. Poor appetite or overeating: Nearly every day  6.  Feeling bad about yoursel without obstruction or gangrene     Hiatal hernia with GERD and esophagitis     Gastroesophageal reflux disease without esophagitis     Chronic diarrhea     A-fib (HCC)     Polyp of colon     Mesenteric ischemia, chronic (HCC)     CKD (chronic kidney disea past medical history of Arrhythmia, Back problem, Basal cell carcinoma of nose, Bilateral carpal tunnel syndrome, Cataract, Cellulitis, Deep vein thrombosis (HCC), Depression, Esophageal reflux, Heel spur, Hiatal hernia, High blood pressure, High cholester dyspepsia, dysphagia, melena, odynophagia and reflux symptoms  Musculoskeletal:positive for arthralgias, negative for muscle weakness  Behavioral/Psych: positive for irritability and stress, negative for aggressive behavior, anxiety, decreased appetite and Eye Chart Acuity: 20/20   Both Eyes Visual Acuity: Uncorrected Both Eyes Chart Acuity: 20/20   Able To Tolerate Visual Acuity: Yes      General Appearance:  Alert, cooperative, no distress, appears stated age   Head:  Normocephalic, without obvious abnorma complication, without long-term current use of insulin ([facility] [address]  History of, patient has been diet controlled, she does not have any new episodes of hypoglycemia but noted significant weight gain since last visit, will consider the option to add GLP, explaine General Health     In the past six months, have you lost more than 10 pounds without trying?: 2 - No  Has your appetite been poor?: No  How does the patient maintain a good energy level?: Daily Walks  How would you describe your daily physical activity results found for: CHLAMYDIA No flowsheet data found. Screening Mammogram      Mammogram Annually to 74, then as discussed There are no preventive care reminders to display for this patient.  Update Health Maintenance if applicable     Immunizations (Barryene Erb Template: BERNARDA HUYNH MEDICARE ANNUAL ASSESSMENT FEMALE [57412]

## 2025-01-27 ENCOUNTER — OFFICE VISIT (OUTPATIENT)
Dept: INTERNAL MEDICINE CLINIC | Facility: CLINIC | Age: 80
End: 2025-01-27

## 2025-01-27 VITALS
SYSTOLIC BLOOD PRESSURE: 149 MMHG | DIASTOLIC BLOOD PRESSURE: 80 MMHG | BODY MASS INDEX: 29 KG/M2 | HEIGHT: 66 IN | HEART RATE: 75 BPM

## 2025-01-27 DIAGNOSIS — K31.811 GASTRIC HEMORRHAGE DUE TO GASTRIC ANTRAL VASCULAR ECTASIA (GAVE): ICD-10-CM

## 2025-01-27 DIAGNOSIS — N18.4 TYPE 2 DIABETES MELLITUS WITH STAGE 4 CHRONIC KIDNEY DISEASE, WITHOUT LONG-TERM CURRENT USE OF INSULIN (HCC): ICD-10-CM

## 2025-01-27 DIAGNOSIS — E66.01 CLASS 2 SEVERE OBESITY DUE TO EXCESS CALORIES WITH SERIOUS COMORBIDITY AND BODY MASS INDEX (BMI) OF 39.0 TO 39.9 IN ADULT (HCC): ICD-10-CM

## 2025-01-27 DIAGNOSIS — N18.32 STAGE 3B CHRONIC KIDNEY DISEASE (HCC): ICD-10-CM

## 2025-01-27 DIAGNOSIS — I48.21 PERMANENT ATRIAL FIBRILLATION (HCC): ICD-10-CM

## 2025-01-27 DIAGNOSIS — F32.1 MAJOR DEPRESSIVE DISORDER, SINGLE EPISODE, MODERATE (HCC): ICD-10-CM

## 2025-01-27 DIAGNOSIS — D50.8 IRON DEFICIENCY ANEMIA SECONDARY TO INADEQUATE DIETARY IRON INTAKE: ICD-10-CM

## 2025-01-27 DIAGNOSIS — E66.812 CLASS 2 SEVERE OBESITY DUE TO EXCESS CALORIES WITH SERIOUS COMORBIDITY AND BODY MASS INDEX (BMI) OF 39.0 TO 39.9 IN ADULT (HCC): ICD-10-CM

## 2025-01-27 DIAGNOSIS — M34.1 CREST SYNDROME (HCC): ICD-10-CM

## 2025-01-27 DIAGNOSIS — E11.22 TYPE 2 DIABETES MELLITUS WITH STAGE 4 CHRONIC KIDNEY DISEASE, WITHOUT LONG-TERM CURRENT USE OF INSULIN (HCC): ICD-10-CM

## 2025-01-27 DIAGNOSIS — I50.32 CHRONIC DIASTOLIC CONGESTIVE HEART FAILURE (HCC): Primary | ICD-10-CM

## 2025-01-27 DIAGNOSIS — I73.00 RAYNAUD'S DISEASE WITHOUT GANGRENE: ICD-10-CM

## 2025-01-27 PROBLEM — I50.33 ACUTE ON CHRONIC DIASTOLIC (CONGESTIVE) HEART FAILURE (HCC): Status: RESOLVED | Noted: 2024-12-05 | Resolved: 2025-01-27

## 2025-01-27 PROCEDURE — 99499 UNLISTED E&M SERVICE: CPT | Performed by: INTERNAL MEDICINE

## 2025-01-27 PROCEDURE — 99214 OFFICE O/P EST MOD 30 MIN: CPT | Performed by: INTERNAL MEDICINE

## 2025-01-27 RX ORDER — DAPAGLIFLOZIN 10 MG/1
10 TABLET, FILM COATED ORAL DAILY
Qty: 90 TABLET | Refills: 3 | Status: SHIPPED | OUTPATIENT
Start: 2025-01-27

## 2025-01-27 RX ORDER — KETOCONAZOLE 20 MG/ML
SHAMPOO, SUSPENSION TOPICAL
Qty: 500 ML | Refills: 3 | Status: SHIPPED | OUTPATIENT
Start: 2025-01-27

## 2025-01-27 RX ORDER — SERTRALINE HYDROCHLORIDE 100 MG/1
100 TABLET, FILM COATED ORAL DAILY
Qty: 90 TABLET | Refills: 3 | Status: SHIPPED | OUTPATIENT
Start: 2025-01-27

## 2025-01-27 RX ORDER — METOPROLOL TARTRATE 25 MG/1
25 TABLET, FILM COATED ORAL
Qty: 180 TABLET | Refills: 3 | Status: SHIPPED | OUTPATIENT
Start: 2025-01-27

## 2025-01-27 NOTE — PROGRESS NOTES
Yamini Ocampo is a 79 year old female.  Hospital follow up   HPI:      Patient presented today for follow-up.  She was admitted to [facility] in December for acute congestive heart failure and GI bleed.  She underwent endoscopy which showed gastric antral venous disease that was cauterized all visual telangiectasias were ablated.  She received IV diuretics while in the hospital which were then changed to oral by cardiology.  She follows up with Dr. Andujar from cardiology.  She states she has been doing well overall since discharge but feels like her Raynaud's disease has been flaring up.  She has also been undergoing some home projects because heat is not working currently therefore she feels like her fingers are always cold.  She has an appointment coming up with rheumatology for Raynaud's.  She has been restarted on Eliquis 2.5 mg twice a day although she remains high risk for GI bleeding.    Current Outpatient Medications   Medication Sig Dispense Refill    apixaban 2.5 MG Oral Tab Take 1 tablet (2.5 mg total) by mouth every 12 (twelve) hours. 180 tablet 3    metoprolol tartrate 25 MG Oral Tab Take 1 tablet (25 mg total) by mouth 2x Daily(Beta Blocker). 180 tablet 3    FARXIGA 10 MG Oral Tab Take 1 tablet (10 mg total) by mouth daily. 90 tablet 3    sertraline 100 MG Oral Tab Take 1 tablet (100 mg total) by mouth daily. 90 tablet 3    ketoconazole 2 % External Shampoo APPLY TO AFFECTED AREAS FOR 5 MINUTES THEN WASH OFF ONCE DAILY FOR 5-7 DAYS THEN EVERY 2-3 DAYS FOR MAINTENANCE as NEEDED DEPENDING ON YOUR SYMPTOMS. 500 mL 3    torsemide 20 MG Oral Tab Take 1 tablet (20 mg total) by mouth daily. 30 tablet 0    pantoprazole 40 MG Oral Tab EC Take 1 tablet (40 mg total) by mouth 2 (two) times daily before meals. 60 tablet 0    potassium chloride 10 MEQ Oral Tab CR Take 1 tablet (10 mEq total) by mouth Every Monday, Wednesday, and Friday. 15 tablet 0    atorvastatin 10 MG Oral Tab Take 1 tablet (10 mg total) by  mouth nightly. 90 tablet 3    cyanocobalamin 1000 MCG Oral Tab Take 1 tablet (1,000 mcg total) by mouth daily.      acetaminophen 500 MG Oral Tab Take 1 tablet (500 mg total) by mouth every 6 (six) hours as needed for Pain or Fever. (Patient not taking: Reported on 1/27/2025)      albuterol 108 (90 Base) MCG/ACT Inhalation Aero Soln         Past Medical History:    Arrhythmia    afib    Back problem    lumbar disc disease    Basal cell carcinoma of nose    Bilateral carpal tunnel syndrome    Cataract    Cellulitis    Deep vein thrombosis (HCC)    unsure which leg, possibly left    Depression    Esophageal reflux    Essential hypertension    Heart disease    Heel spur    Right    Hemorrhoids    Hiatal hernia    High blood pressure    High cholesterol    Hyperlipidemia    Incontinence    Lumbar disc disease    Multiple gastric ulcers    Osteoarthritis    Pulmonary embolism (HCC)    Raynaud disease    Renal disorder    Tubular adenoma of colon    repeat c-scope 1/2020    Type 2 diabetes mellitus with diabetic chronic kidney disease (HCC)    Visual impairment    glasses      Past Surgical History:   Procedure Laterality Date    Angioplasty (coronary)      Appendectomy      Arthroscopy of joint unlisted Right     knee    Bso, omentectomy w/sushil      Carpal tunnel release Bilateral     Cholecystectomy  09/28/2016    Colonoscopy N/A 01/25/2017    Procedure: COLONOSCOPY;  Surgeon: KATHARINE Prakash MD;  Location: [facility] ENDOSCOPY    Colonoscopy N/A 10/07/2020    Procedure: COLONOSCOPY;  Surgeon: KATHARINE Prakash MD;  Location: [facility] ENDOSCOPY    Colonoscopy N/A 7/31/2024    Procedure: COLONOSCOPY;  Surgeon: Alyssa Orellana MD;  Location: [facility] ENDOSCOPY    Egd N/A 12/14/2016    Procedure: ESOPHAGOGASTRODUODENOSCOPY (EGD);  Surgeon: KATHARINE Prakash MD;  Location: [facility] ENDOSCOPY    Egd N/A 01/23/2024    ; gastritis    Egd N/A 07/27/2024    ; retained food    Electrocardiogram, complete  09/26/2013    Scanned to Media  Tab    Excision of nose      Basal cell carcinoma    Hernia surgery      Hysterectomy      Knee replacement surgery Right     Other surgical history      Injections and narcotics, POD Bartuci    Removal of heel spur      Total abdom hysterectomy        Social History:  Social History     Socioeconomic History    Marital status:    Tobacco Use    Smoking status: Former     Current packs/day: 0.00     Types: Cigarettes     Quit date: 1997     Years since quittin.0    Smokeless tobacco: Never   Vaping Use    Vaping status: Never Used   Substance and Sexual Activity    Alcohol use: No     Comment: rare    Drug use: Never     Comment: edibles occasionally for sleep   Other Topics Concern    Caffeine Concern Yes     Comment: 8 cups coffee/sod daily    Reaction to local anesthetic No    Pt has a pacemaker No    Pt has a defibrillator No     Social Drivers of Health     Financial Resource Strain: Low Risk  (3/14/2023)    Financial Resource Strain     Difficulty of Paying Living Expenses: Not very hard     Med Affordability: No   Food Insecurity: No Food Insecurity (2024)    Food Insecurity     Food Insecurity: Never true   Transportation Needs: No Transportation Needs (2024)    Transportation Needs     Lack of Transportation: No   Housing Stability: Low Risk  (2024)    Housing Stability     Housing Instability: No      Family History   Problem Relation Age of Onset    Cancer Father         Skin cancer    Other (Other) Father 97        old age,unknown caused    Heart Attack Mother     Heart Disorder Mother     Hypertension Other         Family H/O    Cancer Other         Lymphoma  Family H/O    Heart Disease Other         Family H/O    Arthritis Other         Close relative  unsure which type    No Known Problems Brother       Allergies[1]     REVIEW OF SYSTEMS:   Review of Systems   Review of Systems   Constitutional: Negative for activity change, appetite change and fever.   HENT: Negative  for congestion and voice change.    Respiratory: Negative for cough and shortness of breath.    Cardiovascular: Negative for chest pain.   Gastrointestinal: Negative for abdominal distention, abdominal pain and vomiting.   Genitourinary: Negative for hematuria.   Skin: Negative for wound.   Psychiatric/Behavioral: Negative for behavioral problems.   Wt Readings from Last 5 Encounters:   12/09/24 178 lb 8 oz (81 kg)   09/12/24 193 lb (87.5 kg)   09/09/24 198 lb (89.8 kg)   08/18/24 190 lb (86.2 kg)   08/12/24 180 lb (81.6 kg)     Body mass index is 28.81 kg/m².      EXAM:   /80   Pulse 75   Ht 5' 6\" (1.676 m)   BMI 28.81 kg/m²   Physical Exam   Constitutional:       Appearance: Normal appearance.   HENT:      Head: Normocephalic.   Eyes:      Conjunctiva/sclera: Conjunctivae normal.   Cardiovascular:      Rate and Rhythm: Normal rate and regular rhythm.      Heart sounds: Normal heart sounds. No murmur heard.  Pulmonary:      Effort: Pulmonary effort is normal.      Breath sounds: Normal breath sounds. No rhonchi or rales.   Abdominal:      General: Bowel sounds are normal.      Palpations: Abdomen is soft.      Tenderness: There is no abdominal tenderness.   Musculoskeletal:      Cervical back: Neck supple.      Right lower leg: No edema.      Left lower leg: No edema.   Skin:     General: Skin is warm and dry.   Neurological:      General: No focal deficit present.      Mental Status: He is alert and oriented to person, place, and time. Mental status is at baseline.   Psychiatric:         Mood and Affect: Mood normal.         Behavior: Behavior normal.       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)  - no further active bleeding per patient  - monitor hemoglobin   - Patient to return for annual physical with all the blood work  Orders:    CBC W Differential W Platelet [E]; Future    CREST syndrome (HCC)  - follow up rheum       Raynaud's disease without gangrene  - follow up rheum        Permanent atrial fibrillation (HCC)  - on metoprolol  - on eliquis  - follow up cardiology        Class 2 severe obesity due to excess calories with serious comorbidity and body mass index (BMI) of 39.0 to 39.9 in adult ([facility])  - diet and exercise counseling        Type 2 diabetes mellitus with stage 4 chronic kidney disease, without long-term current use of insulin ([facility])  - low carb diet   - check [id number]         Stage 3b chronic kidney disease ([facility])  - stable  - check labs        Major depressive disorder, single episode, moderate ([facility])  - continue sertraline  Orders:    sertraline 100 MG Oral Tab; Take 1 tablet (100 mg total) by mouth daily.    Iron deficiency anemia secondary to inadequate dietary iron intake  - monitor hemoglobin   Orders:    CBC W Differential W Platelet [E]; Future         The patient indicates understanding of these issues and agrees to the plan.  No follow-ups on file.    Elly Antonio MD        [1]   Allergies  Allergen Reactions    Adhesive Tape RASH     Skin off

## 2025-01-27 NOTE — ASSESSMENT & PLAN NOTE
- follows up with cardiology  - Patient is on Farxiga, toresemide and metoprolol  - monitor home weights  - no shortness of breath  - lower ext edema is improving  Orders:    Comp Metabolic Panel (14); Future

## 2025-01-27 NOTE — ASSESSMENT & PLAN NOTE
- continue sertraline  Orders:    sertraline 100 MG Oral Tab; Take 1 tablet (100 mg total) by mouth daily.

## 2025-01-27 NOTE — ASSESSMENT & PLAN NOTE
- no further active bleeding per patient  - monitor hemoglobin   - Patient to return for annual physical with all the blood work  Orders:    CBC W Differential W Platelet [E]; Future

## 2025-03-13 ENCOUNTER — OFFICE VISIT (OUTPATIENT)
Dept: RHEUMATOLOGY | Facility: CLINIC | Age: 80
End: 2025-03-13
Payer: MEDICARE

## 2025-03-13 VITALS
DIASTOLIC BLOOD PRESSURE: 84 MMHG | BODY MASS INDEX: 29.25 KG/M2 | HEART RATE: 80 BPM | HEIGHT: 66 IN | WEIGHT: 182 LBS | SYSTOLIC BLOOD PRESSURE: 132 MMHG

## 2025-03-13 DIAGNOSIS — I73.01 RAYNAUD DISEASE WITH GANGRENE (HCC): ICD-10-CM

## 2025-03-13 DIAGNOSIS — I27.20 PULMONARY HYPERTENSION (HCC): ICD-10-CM

## 2025-03-13 DIAGNOSIS — M34.1 CREST SYNDROME (HCC): Primary | ICD-10-CM

## 2025-03-13 PROCEDURE — G2211 COMPLEX E/M VISIT ADD ON: HCPCS | Performed by: INTERNAL MEDICINE

## 2025-03-13 PROCEDURE — 99214 OFFICE O/P EST MOD 30 MIN: CPT | Performed by: INTERNAL MEDICINE

## 2025-03-13 RX ORDER — SILDENAFIL CITRATE 20 MG/1
20 TABLET ORAL 3 TIMES DAILY
Qty: 90 TABLET | Refills: 5 | Status: SHIPPED | OUTPATIENT
Start: 2025-03-13

## 2025-03-13 NOTE — PATIENT INSTRUCTIONS
1.  Restart sildenifil 20mg three times a day  2. Cont. metoprolol  3. Return to clinic in 6 months. .   4. Cont.  Sertraline   5. f/u with dr. bojorquez   6. Cont. meds for acid reflux   7. Cont. eliquis

## 2025-03-13 NOTE — PROGRESS NOTES
Yamini Ocampo is a 80 year old female who presents for   Chief Complaint   Patient presents with    Raynaud's Syndrome     Pt is here for routine check up, for hands and feet, pt states back pain and feet pain for today    .   HPI:     I had the pleasure of seeing Yamini Ocampo on 7/19/2017 for evaluation.     She sees Dr. Joaquin and Dr. Florentino. She recnetly had a posistive JESSICA 1:2560 centromere pattern. C/w CREST -   She is seeing Dr. Florentino for a couple of months and he ortdered the JESSICA test.     She was in the hosptial for 14 days for dehydration.  She gained 30 pounds and was put on lasix.   She was in the hopstial in April 2017.   She had been on lasix since then and taking her off of lasix it's improving.   She has problem that her espohagus doesn't work and she doesn't digest very well. She had a hard time to eat and drink. She was spitting up a lot and she is much better now.   She has been to [facility] and went to see dr. Prakash and she had EGD.   [facility] had this dyphagia since 1/2016. It started really bad at that time. But it started prior to that.   She wasn't feeling well and started losing weight b/c not keeping food down. Then it was really serious in 9/2016. She got nissen fundoplication done on 9/2016. She then lost 100 pouns since then as well.   She felt that nothing really helped though and it only resovled on it's own and depending on how she is eating.     Then in April 2017 with the dehyrdation .   She was bad shape and had a PEG placed b/c feeding tube was not heplings. She was using it for medications and also was to use it for feeding but she also had a stent placed near her colon b/c of decreased blood supply to her colon - PVD and after that her symptoms seem to have improved.   She still has PEG but this is planning on being removed in 8/7/2017 b/c she doesn't need it.   She had chose at the time not to have the gastric bypass 2 years ago.   She had consultaiton in 5/19/2017 - at  [facility] and thought there might be a reason was related to the surgery - possibly vagal nerve surgery causing diarrhea. And esophageal outflow obstruction - .   She thkns she had a motility study in [place] but not able to lay down.     She also has diarrhea.   She has trouble with Raynaud's. Her fingers turn blue, and purple. aobut 6 months she has noticed that.   She can't remember. No digital ulcer/sores.   She gets cellulitis of her calves in both legs that comes and goes.   She has hard nodules on her toes.     She has very dry eyes - she is getting cataract sx on Friday.     She also has seen cardiologist - sees Dr. Corado - x 2 years.   She has hx of afib. - she used to have it all the time then it stopped. recnelty it started it again in the hosptialization.   Not on blood thinner.     She has hx of PE in the past. Not on long term anticoagulation.     12/20/2017  She learned how to eat for her esophageal problems. She takes a lot of tums.  daily  - 4 to 6 daily and it helps. She takes gas medication 4 dailyi  and it helps.   She did see [facility] in 5/2017 - she's not keen on using more drugs on her system. -   Her ryanaud's is her tawana problems - her right 3rd finger- hurts and small pinpoint digital ulcer starting on her nail bed.   She feels purple all the time.   She has lost weight - was 224 pounds - 4 weeks ago and now 208 pounds - she is not trying. She feels she's eating more than usual.   occl diarrhea, no diuretics -   afib was bad during  Knee sx - it's ok now - off heart monitor now -   Had blood clot in her right knee after sx - so increased eliquis right now.     3/16/2018  Her left 3rd finger finger nail got infected and the pus came out - she has hand warmers.   sildenifile 20mg tid started 3 months. ago. Iand her amlodipien is 10mg a day. It's not helping the raynauds.   She feels it's not helping.   She's feels her bp has been chanell.   Her duaghter has psoriatic arthritis.   Her  house temp is 68 degrees to 70 degrees.   Her fingers hurt when she hands them down.   She's supposted to lose weight. Dr. sparks wants her weight down. She gets violent diarrhea where she can't go out of the hourse.   bryce has stomahc issues.- esophageal dysmoliity. She hsa ups and downs with what she can eat.   No recent echo -   Able to climb a few stairs without sob.   Her ryanaud's is her tawana complaint.     4/16/2018  She added the fluoxeine and it's been better with her Raynaud's. She wears gloves all the time. Her left 3rd figner is better. No infection now.   Her right 3rd finger is the worst now  - most tender. No ulcerations now. Her right 3rd finger nail is fallilng off a little bit.   She's still can get pain in her fingers at times.   Her left 3rd finger is the worst. Her right 2nd figner is once and a while and her right 3rd figner. Her hands can turn purple wihtout gloves.   It's still there but she's better. Than before.   Her weight is the same.   She wears a lot fo double layers.   She has appt. With cards.   Her stomach has been very acidic  - she's not on protonix. She was thinking to restart it. She has terrible diarrhea. It alternates with constipation. This is going on for a long time.     9/17/2018  She feels her raynaud's is much better in the summer. She is doing ok. She has no pain. No splitting of her finger tips. She has had no issues. Her left 3rd figner is better. No infections.   She is getting a personal heater. She gets more Raynaud's in the grocery store.   She has no oral ulcers.     1/21/2019  She is having really bad Raynaud's. It's in her feet and hands.   She did take fluoxetine but forgot to take it for the last few months.   Her left 3rd figner opening up but it healed.   She has 8/10 pain in her hands and feet. She has battery gloves.   In the summer she feels like it's much better but still has to watch air conditioning.   She's getting knee sx on 1/29 -      6/8/2021  She hasn't been here in 2 years.   The right 2nd finger had finger peel and had raw skin.   This winter was really terrible for her Raynaud's. She had infections in her fingers.   Her right 3rd finer as well. It was mostly her righ thand.   Her right and left thumbs. The cuticles get so dry . Then they crack and her nails are brittle.   If a fan blows on her hands then they get so cold. Her pain it's not bad - tihg tnow her left 5th finger is 3/10 .   Her right h2nd finger always hurts and it's much better.   She's using vit amin E.   Her meds have changd.   She's off sildenafil and fluoxetine. She's still on amlodpine   She's on sertraline 25mg ad ay and amlodpine 10mg a day -     Had both knee surgeries done     4/28/2022  She doesn't feel as good.   She has a lot of trouble with her acid reflex.   Also her rayaud's is not good. She has a reddish discloration on her hands all the time and it's harder to warm ehr hands up.   She has increased calcinosis   She has not been to see dr. corado recently   She has not been on sildenafil   She was in [place] for several months. Her hands are better there.   She has a dry spot on tip of her right 2nd finger that is not an ulcer but an area where one can develop.   She is using heating warmers and using gloves in the grocery store. Has a COOPA.     Her last echo in 7/2021 - she had RVP 55mmHg   She never returned to clinic and she lost coverage for dr. Corado. , her cardiologist who was to follow her pulm htn.   Her breathins is fine   She has anemia - and on B12 .     6/15/2022  She thought she had CHF - but her cardiologist said now. She was dx with afib - and now on eliquis. She feels fine now.   She fees much better.   She has lower leg edema - still there but it's better. Dr. stoddard and dr. corado following.   5/27/20222 - she had ultrasound showing small DVT - she's on eliquis. She sees Dr. Lucas - for the DVT follow up .   On 4/12 [facility] had a smaller  dvt noted.   Getting repeat Echo on 6/22. She is going to get a carotid artery ultrasound -   She devilty feels better than 2 months ago.   The raynaud's is better.   When it got cold her neails cracked and spit. And no nails now.   The raynaud's in 2nd fingers better.   Her sob is much better now    3/30/2023  She had a large blister on her left thumb creas - she got a steroid injection for it. It's better now.   She her 2nd finger - has slight healing uler at the tip. It takes a long time to heal. She has hit it accidetly.     She had a follow up with dr. corado . She goes 4/5/20223 - and getting ct chest to see if she needs valve replacemet or valve reconstruction. Has severe aortic stenosis on 3/8/2023 -   Was hospitialized 2-3 weeks for volume overload.   She is on diurietics.   She has very little energy.   She's off amloidpine and lisinopril.   She fnishied a pred 40mg x 5 days prior to going in to the [facility].   Trying hard to keep the GERD control. She takes a liquid that helps her. She is to follow up wtioh dr. Prakash, GI.     3/13/2025  Her raynadu's is severe.   She felt she started getting ulcers under the tips of her fingers about 6 months ago. Got wrose this weinter. -     She got the AV replaced.   She feels she has slight swelling.   But her raynaud's in her feet and hands are worse.   She hurts a lot.   Has appt. With dr. Corado - in 3 weeks.     Ech on 12/2024 - reviewed - RVP is 65-70mmHg -   Was on sildeniafil in the past - not n this now.         Wt Readings from Last 2 Encounters:   03/13/25 182 lb (82.6 kg)   12/09/24 178 lb 8 oz (81 kg)     Body mass index is 29.38 kg/m².      Current Outpatient Medications   Medication Sig Dispense Refill    apixaban 2.5 MG Oral Tab Take 1 tablet (2.5 mg total) by mouth every 12 (twelve) hours. 180 tablet 3    metoprolol tartrate 25 MG Oral Tab Take 1 tablet (25 mg total) by mouth 2x Daily(Beta Blocker). 180 tablet 3    FARXIGA 10 MG Oral Tab Take 1  tablet (10 mg total) by mouth daily. 90 tablet 3    sertraline 100 MG Oral Tab Take 1 tablet (100 mg total) by mouth daily. 90 tablet 3    ketoconazole 2 % External Shampoo APPLY TO AFFECTED AREAS FOR 5 MINUTES THEN WASH OFF ONCE DAILY FOR 5-7 DAYS THEN EVERY 2-3 DAYS FOR MAINTENANCE as NEEDED DEPENDING ON YOUR SYMPTOMS. 500 mL 3    torsemide 20 MG Oral Tab Take 1 tablet (20 mg total) by mouth daily. 30 tablet 0    acetaminophen 500 MG Oral Tab Take 1 tablet (500 mg total) by mouth every 6 (six) hours as needed for Pain or Fever.      pantoprazole 40 MG Oral Tab EC Take 1 tablet (40 mg total) by mouth 2 (two) times daily before meals. 60 tablet 0    potassium chloride 10 MEQ Oral Tab CR Take 1 tablet (10 mEq total) by mouth Every Monday, Wednesday, and Friday. 15 tablet 0    atorvastatin 10 MG Oral Tab Take 1 tablet (10 mg total) by mouth nightly. 90 tablet 3    cyanocobalamin 1000 MCG Oral Tab Take 1 tablet (1,000 mcg total) by mouth daily.      albuterol 108 (90 Base) MCG/ACT Inhalation Aero Soln         Past Medical History:    Arrhythmia    afib    Back problem    lumbar disc disease    Basal cell carcinoma of nose    Bilateral carpal tunnel syndrome    Cataract    Cellulitis    Deep vein thrombosis (HCC)    unsure which leg, possibly left    Depression    Esophageal reflux    Essential hypertension    Heart disease    Heel spur    Right    Hemorrhoids    Hiatal hernia    High blood pressure    High cholesterol    Hyperlipidemia    Incontinence    Lumbar disc disease    Multiple gastric ulcers    Osteoarthritis    Pulmonary embolism (HCC)    Raynaud disease    Renal disorder    Tubular adenoma of colon    repeat c-scope 1/2020    Type 2 diabetes mellitus with diabetic chronic kidney disease (HCC)    Visual impairment    glasses      Past Surgical History:   Procedure Laterality Date    Angioplasty (coronary)      Appendectomy      Arthroscopy of joint unlisted Right     knee    Bso, omentectomy w/sushil      Carpal  tunnel release Bilateral     Cholecystectomy  2016    Colonoscopy N/A 2017    Procedure: COLONOSCOPY;  Surgeon: KATHARINE Prakash MD;  Location: [facility] ENDOSCOPY    Colonoscopy N/A 10/07/2020    Procedure: COLONOSCOPY;  Surgeon: KATHARINE Prakash MD;  Location: [facility] ENDOSCOPY    Colonoscopy N/A 2024    Procedure: COLONOSCOPY;  Surgeon: Alyssa Orellana MD;  Location: [facility] ENDOSCOPY    Egd N/A 2016    Procedure: ESOPHAGOGASTRODUODENOSCOPY (EGD);  Surgeon: KATHARINE Prakash MD;  Location: [facility] ENDOSCOPY    Egd N/A 2024    ; gastritis    Egd N/A 2024    ; retained food    Electrocardiogram, complete  2013    Scanned to Media Tab    Excision of nose      Basal cell carcinoma    Hernia surgery      Hysterectomy      Knee replacement surgery Right     Other surgical history      Injections and narcotics, POD Bartuci    Removal of heel spur      Total abdom hysterectomy        Family History   Problem Relation Age of Onset    Cancer Father         Skin cancer    Other (Other) Father 97        old age,unknown caused    Heart Attack Mother     Heart Disorder Mother     Hypertension Other         Family H/O    Cancer Other         Lymphoma  Family H/O    Heart Disease Other         Family H/O    Arthritis Other         Close relative  unsure which type    No Known Problems Brother       Social History:  Social History     Socioeconomic History    Marital status:    Tobacco Use    Smoking status: Former     Current packs/day: 0.00     Types: Cigarettes     Quit date: 1997     Years since quittin.2    Smokeless tobacco: Never   Vaping Use    Vaping status: Never Used   Substance and Sexual Activity    Alcohol use: No     Comment: rare    Drug use: Never     Comment: edibles occasionally for sleep   Other Topics Concern    Caffeine Concern Yes     Comment: 8 cups coffee/sod daily    Reaction to local anesthetic No    Pt has a pacemaker No    Pt has a defibrillator  No     Social Drivers of Health     Food Insecurity: No Food Insecurity (12/5/2024)    Food Insecurity     Food Insecurity: Never true   Transportation Needs: No Transportation Needs (12/5/2024)    Transportation Needs     Lack of Transportation: No   Housing Stability: Low Risk  (12/5/2024)    Housing Stability     Housing Instability: No           REVIEW OF SYSTEMS:   Review Of Systems:  Fatigue  Constitutional:No fever, no change in weight or appetitie  Derm: No rashes, she was getting mouth ulcers in apirl 2-17, - but they went away, diffuse alopecia - going on for 5 years. , no photosensitivity, no psoriasis  Getting more bruising,   Hx of skin cancer - treated twice on her nose 25 years ago and she limits the sun exposure.   No skin tightness -   Has itching a lot. Gets little bumps on hands. - telengectasa on legs and palms occl.   HEENT: No dry eyes, no dry mouth, Raynaud's, no nasal ulcers, no parotid swelling, no neck pain, no jaw pain, no temple pain  Eyes: No visual changes,   CVS: No chest pain, no heart disease  RS: No SOB, has dry  Cough, No Pleurtic pain,   GI: No nausea, no vomiiting, no abominal pain, no hx of ulcer, no gastritis, no heartburn, has dyshpagia, no BRBPR or melena  Had seen GI exentnesivlty -   Cough related to eating.   Tried several PPIs - doesn't make a difference for her   She watches the portions of her food.   She went to [facility] and local GI as well.   : no dysuria, urinary incontinence -   Hx of renal artery stenosis   Neuro: No numbness or tingling, no headache,   Psych: no hx of anxiety or depression  ENDO: no hx of thyroid disease, no hx of DM  Joint/Muscluskeltal: see HPI, no swelling in hands, no joint pains.   Gets leg swelling with humidity. Mostly the right,   All other ROS are negative.     EXAM:   /84   Pulse 80   Ht 5' 6\" (1.676 m)   Wt 182 lb (82.6 kg)   BMI 29.38 kg/m²   HEENT: Clear oropharynx, no oral ulcers, EOM intact, clear sclera, PERRLA,  pleasant, no acute distress, no CAD, no neck tendnerness, good ROM,   No rashes  CVS: RRR, 3/6  murmurs  RS: CTAB, no crackles, no rhonchi  ABD: Soft Non tender,  Joint exam:   No joint tendner.   Right 2nd finger raw peeled skin - healed ulcer   Right 3rd finger helad.   Fingers have good cap refill but +2=3 sec. In hands and feet  telengctasia noted over fingers - yuniel left fingers.   Left 5th finger senstive to touch as well - no ganrene - no paronhychia better -   Fingers cool to touchf -   Fingers white   Feet purpslish discloration , cool to touch with no ucerations.     Component      Latest Ref Rng & Units 10/15/2017 10/14/2017 10/13/2017   Glucose      70 - 99 mg/dL   110 (H)   Sodium      136 - 144 mmol/L   140   Potassium      3.3 - 5.1 mmol/L   4.7   Chloride      95 - 110 mmol/L   106   Carbon Dioxide, Total      22 - 32 mmol/L   27   BUN      8 - 20 mg/dL   21 (H)   CREATININE      0.50 - 1.50 mg/dL   1.34   CALCIUM      8.5 - 10.5 mg/dL   8.8   BUN/CREA RATIO      10.0 - 20.0   15.7   ANION GAP      0 - 18 mmol/L   7   CALCULATED OSMOLALITY      275 - 295 mOsm/kg   294   GFR, Non-      >=60   39 (L)   GFR, -American      >=60   47 (L)   WBC      4.0 - 11.0 K/UL 5.6 7.1 7.9   RBC      3.70 - 5.40 M/UL 3.21 (L) 3.19 (L) 3.25 (L)   Hemoglobin      12.0 - 16.0 g/dL 10.5 (L) 10.4 (L) 10.6 (L)   Hematocrit      35.0 - 48.0 % 31.5 (L) 31.5 (L) 32.1 (L)   MCV      80.0 - 100.0 fL 98.2 98.8 98.9   MCH      27.0 - 32.0 pg 32.8 (H) 32.5 (H) 32.7 (H)   MCHC      32.0 - 37.0 g/dl 33.5 32.9 33.1   RDW      11.0 - 15.0 % 14.2 14.7 14.5   Platelet Count      140 - 400 K/ 169 171   MEAN PLATELET VOLUME      7.4 - 10.3 fL 9.2 9.1 9.2     Component      Latest Ref Rng & Units 10/3/2017 9/5/2017   HDL Cholesterol      mg/dL 51    CHOLESTEROL, TOTAL      110 - 200 mg/dL 176    Triglycerides      1 - 149 mg/dL 100    NON HDL CHOL      <130 mg/dL 125    LDL Cholesterol Calc      0 - 99 mg/dL 105  (H)    Case Report           FINAL DIAGNOSIS           CLINICAL INFORMATION           GROSS DESCRIPTION           Interpretation           Anti-Sjogren's A      Negative  Negative   Anti-Sjogren's B      Negative  Negative   PT      11.8 - 14.5 seconds 12.8    INR      0.9 - 1.2 1.0    ABO GROUPING       A    RH FACTOR       Negative    ALDOLASE, SERUM      1.5 - 8.1 U/L  4.6   CK      38 - 234 U/L  20 (L)   Anti-Smith Antibody      Negative  Negative   Scleroderma (Scl-70) (REDD) Antibody, IgG      0 - 40 AU/mL  0   Anti-Ocampo/RNP Antibody      Negative  Negative   Anti Double Strand DNA      <10  <10   APTT      23.2 - 35.3 seconds 30.3    MRSA SCREEN BY PCR      Negative Negative    ANTIBODY SCREEN       Negative        Component      Latest Ref Rng & Units 3/11/2023 3/11/2023           4:32 AM  4:32 AM   WBC      4.0 - 11.0 x10(3) uL  4.7   RBC      3.80 - 5.30 x10(6)uL  2.94 (L)   Hemoglobin      12.0 - 16.0 g/dL  8.8 (L)   Hematocrit      35.0 - 48.0 %  29.1 (L)   MCV      80.0 - 100.0 fL  99.0   MCH      26.0 - 34.0 pg  29.9   MCHC      31.0 - 37.0 g/dL  30.2 (L)   RDW-SD      35.1 - 46.3 fL  52.7 (H)   RDW      11.0 - 15.0 %  14.9   Platelet Count      150.0 - 450.0 10(3)uL  264.0   Prelim Neutrophil Abs      1.50 - 7.70 x10 (3) uL  3.14   Neutrophils Absolute      1.50 - 7.70 x10(3) uL  3.14   Lymphocytes Absolute      1.00 - 4.00 x10(3) uL  0.90 (L)   Monocytes Absolute      0.10 - 1.00 x10(3) uL  0.52   Eosinophils Absolute      0.00 - 0.70 x10(3) uL  0.11   Basophils Absolute      0.00 - 0.20 x10(3) uL  0.05   Immature Granulocyte Absolute      0.00 - 1.00 x10(3) uL  0.01   Neutrophils %      %  66.4   Lymphocytes %      %  19.0   Monocytes %      %  11.0   Eosinophils %      %  2.3   Basophils %      %  1.1   Immature Granulocyte %      %  0.2   Glucose      70 - 99 mg/dL  96   Sodium      136 - 145 mmol/L  143   Potassium      3.5 - 5.1 mmol/L 4.3 4.3   Chloride      98 - 112 mmol/L  111   Carbon  Dioxide, Total      21.0 - 32.0 mmol/L  28.0   ANION GAP      0 - 18 mmol/L  4   BUN      7 - 18 mg/dL  21 (H)   CREATININE      0.55 - 1.02 mg/dL  1.41 (H)   BUN/CREATININE RATIO      10.0 - 20.0  14.9   CALCIUM      8.5 - 10.1 mg/dL  9.0   CALCULATED OSMOLALITY      275 - 295 mOsm/kg  299 (H)   eGFR-Cr      >=60 mL/min/1.73m2  38 (L)       4/22/2017 - 2decho   1. Left ventricle: The cavity size was below normal. Wall thickness was     increased in a pattern of mild LVH. Systolic function was normal. The     estimated ejection fraction was 65-70%. Wall motion was normal; there were     no regional wall motion abnormalities. Doppler parameters are consistent     with high ventricular filling pressure.  2. Mitral valve: Mild regurgitation.  3. Left atrium: The atrium was moderately to severely dilated.  4. Pulmonary arteries: PA peak pressure: 47mm Hg (S).  Compared to the prior study there has been no significant interval change    6/28/2017  Upper GI   CONCLUSION:             1. Marked narrowing at the cardioesophageal junction status post fundoplication which appears somewhat longer and more narrower than December 8, 2016.  2. Mild to moderate distention of the proximal esophagus with limited peristaltic activity.  3. Delayed in gastric emptying with extensive food and secretions within the stomach.  4. Normal visualized small bowel.  5. Osteoarthritis.  6. Status post cholecystectomy.  7. Arterial stents.  8. Endoclip right lower quadrant.  9. Atherosclerosis.    3/8/2023 - 2decho  1. Left ventricle: The cavity size was normal. Wall thickness was normal.      Systolic function was normal. The estimated ejection fraction was 55-60%,      by biplane method of disks.   2. Right ventricle: The cavity size was increased.   3. Left atrium: The atrium was markedly dilated.   4. Right atrium: The atrium was dilated.   5. Atrial septum: There was no atrial level shunt.   6. Aortic valve: The findings were consistent with  severe stenosis. The peak      systolic velocity is 4.05m/sec. The mean systolic gradient is 39mm Hg.      The valve area (VTI) is 0.71cm^2. The valve area (VTI) index is      0.35cm^2/m^2.   7. Pulmonary arteries: Systolic pressure could not be accurately estimated.       12/6/2024 - 2decho  1. Left ventricle: The cavity size was normal. Wall thickness was normal.      Systolic function was vigorous. The estimated ejection fraction was      65-70%, by visual assessment. No diagnostic evidence for regional wall      motion abnormalities. Unable to assess LV diastolic function due to heart      rhythm.   2. Right ventricle: The cavity size was mildly increased.   3. Left atrium: The left atrial volume was markedly increased.   4. Right atrium: The atrium was markedly dilated.   5. Aortic valve: A bioprosthetic valve is present. There was mild-moderate      perivalvular regurgitation. The peak systolic velocity was 3.13m/sec. The      mean systolic gradient was 22mm Hg. The valve area (VTI) was 1.26cm^2.      The valve area (VTI) index was 0.64cm^2/m^2.   6. Mitral valve: Transvalvular velocity was increased more than expected.      The findings were consistent with mild stenosis. There was mild      regurgitation. The mean diastolic gradient was 5mm Hg.   7. Tricuspid valve: There was moderate-severe regurgitation.   8. Pulmonary arteries: Systolic pressure was moderately to markedly      increased, in the range of 65mm Hg to 70mm Hg.   9. Pericardium, extracardiac: A small pericardial effusion was identified      circumferential to the heart. There was no evidence of hemodynamic      compromise. There was a left pleural effusion.   Impressions:  This study is compared with previous dated 06/26/2024:     ASSESSMENT AND PLAN:   Yamini Ocampo is a 80 year old female who presents for   Chief Complaint   Patient presents with    Raynaud's Syndrome     Pt is here for routine check up, for hands and feet, pt states back  pain and feet pain for today        1. JESSICA 1:2560 centromere -c/w CREST -with pulmo htn   Rayandu's worse now - was much better  controlled -in thepast  and increased GERD  Hx of severe aortic stenosis - now s/p bioprosthetic aortic valve and was recently hospitalized.   She has been off  amlodipine 10mg ad ay -and sildenifile 20mg tid  2 years -    And she is also Back on fluoxetine 20mg a day -  which helps her Raynauds'   [id number] - Moderate to severe pulmonary hypertension -on 2decho  will restart  sildenfil 20mg tid -s  Cont. sildenafil 20mg tid - - yuniel b/c of sob/Springer and raynau'ds   Last echo in 7/2021 - showed pulm HTN 55mmHg.   - in the past her rayauds' was really bad with digial ulceration forming in right 3rd finger  ,, ulceration improved but nail is chanign -  Now the right 3rd nd finger tip has a crack - early ulcer  Also she has hx of pulm htn  - will restart sildenifil    Has severe issues with Esphageal dysmotility. Also she has  Raynaud's and  Few telengecatasias  Hx of PEG tube in 4/2017 and removed in 8/2017.   She is manageing her esophageal dysmotilty with eating small portions throughout the day starting in 1/2016 and multiple complications - has had [facility] GI 2nd opinion in 5/2017 -   - overall she's off PPI - shes' on tums and gasx -    follow up with dr. zhong - on pepcid , pantoprazole, and just finished abx   New onset aortic stenosis - severe  - planning surgery - she was taken off several medications including sildenafil after her hospitalization -        2. CRI - f/u dr. humphrey  - cr is 1.29 -   3. Mod pulm hypertension and Hx of afb -, severe aortic stenosis -  sees Dr. Kerr in august - will forward notes and note o pulmonary hypertension on latest echo  -   - now back on sildenifile 20mg tid - for pulmonary hypertension.   - she's seen cardiology - just saw him last echo. Getting repeat echo in 2021 - due for another one in 6/2022  4.Knee surgeries in sept/ocotber 2017 - -  hollis tknee  - done - recovered well   May be in 2018 - the left knee.  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 - - haven't had them b/c of cortisone shots -   Planning left knee in 11/2018 -   5. righ tknee dvt - on eliquis -   6. Depression/fibromyalgia -  -  now on fluoxetine 20mg a day   7. afib - on eliqus   8. DVT in left leg - had to take 4 elliquis daily for 7 days - , now back on daily  - f/u withdr. Ivey/, hematology -   9. Anemia - follow up with dr. Ivey -         Summary:  1.  Restart sildenifil 20mg three times a day  2. Cont. metoprolol  3. Return to clinic in 6 months. .   4. Cont.  Sertraline   5. f/u with dr. bojorquez   6. Cont. meds for acid reflux   7. Cont. eliquis         Alma Mullins MD  3/13/2025   2:13 PM       is applicable because the patient's medical record notes reflects the ongoing nature of the continuous longitudinal relationship of care, and the medical record indicates that there is ongoing treatment of a serious/complex medical condition.

## 2025-04-17 DIAGNOSIS — E78.5 HYPERLIPIDEMIA, UNSPECIFIED HYPERLIPIDEMIA TYPE: ICD-10-CM

## 2025-04-21 RX ORDER — ATORVASTATIN CALCIUM 10 MG/1
10 TABLET, FILM COATED ORAL NIGHTLY
Qty: 90 TABLET | Refills: 3 | Status: SHIPPED | OUTPATIENT
Start: 2025-04-21

## 2025-04-21 NOTE — TELEPHONE ENCOUNTER
Please review.  Protocol failed / Has no protocol.    Last written by Dr. Tillman  No Active/ Future labs for patient to complete      Requested Prescriptions   Pending Prescriptions Disp Refills    atorvastatin 10 MG Oral Tab 90 tablet 3     Sig: Take 1 tablet (10 mg total) by mouth nightly.       Cholesterol Medication Protocol Failed - 4/21/2025  4:26 PM        Failed - Lipid panel within past 12 months        Passed - ALT < 80        Passed - ALT resulted within past year        Passed - In person appointment or virtual visit in the past 12 mos or appointment in next 3 mos        Passed - Medication is active on med list

## 2025-05-27 ENCOUNTER — APPOINTMENT (OUTPATIENT)
Dept: GENERAL RADIOLOGY | Facility: HOSPITAL | Age: 80
End: 2025-05-27
Attending: EMERGENCY MEDICINE
Payer: MEDICARE

## 2025-05-27 ENCOUNTER — OFFICE VISIT (OUTPATIENT)
Dept: INTERNAL MEDICINE CLINIC | Facility: CLINIC | Age: 80
End: 2025-05-27
Payer: MEDICARE

## 2025-05-27 ENCOUNTER — HOSPITAL ENCOUNTER (INPATIENT)
Facility: HOSPITAL | Age: 80
LOS: 3 days | Discharge: HOME HEALTH | End: 2025-05-30
Attending: EMERGENCY MEDICINE | Admitting: EMERGENCY MEDICINE
Payer: MEDICARE

## 2025-05-27 ENCOUNTER — HOSPITAL ENCOUNTER (INPATIENT)
Facility: HOSPITAL | Age: 80
LOS: 3 days | Discharge: HOME HEALTH | End: 2025-05-30
Attending: EMERGENCY MEDICINE
Payer: MEDICARE

## 2025-05-27 VITALS
HEART RATE: 142 BPM | BODY MASS INDEX: 29 KG/M2 | DIASTOLIC BLOOD PRESSURE: 54 MMHG | HEIGHT: 66 IN | SYSTOLIC BLOOD PRESSURE: 127 MMHG

## 2025-05-27 DIAGNOSIS — L03.115 CELLULITIS OF RIGHT LOWER EXTREMITY: ICD-10-CM

## 2025-05-27 DIAGNOSIS — I10 ESSENTIAL HYPERTENSION: ICD-10-CM

## 2025-05-27 DIAGNOSIS — I50.32 CHRONIC DIASTOLIC CONGESTIVE HEART FAILURE (HCC): Primary | ICD-10-CM

## 2025-05-27 DIAGNOSIS — I50.9 ACUTE ON CHRONIC CONGESTIVE HEART FAILURE, UNSPECIFIED HEART FAILURE TYPE (HCC): Primary | ICD-10-CM

## 2025-05-27 DIAGNOSIS — I73.00 RAYNAUD'S DISEASE WITHOUT GANGRENE: ICD-10-CM

## 2025-05-27 DIAGNOSIS — R06.09 DYSPNEA ON EXERTION: ICD-10-CM

## 2025-05-27 DIAGNOSIS — I48.11 LONGSTANDING PERSISTENT ATRIAL FIBRILLATION (HCC): ICD-10-CM

## 2025-05-27 PROBLEM — E87.70 HYPERVOLEMIA: Status: ACTIVE | Noted: 2025-05-27

## 2025-05-27 LAB
ALBUMIN SERPL-MCNC: 3.5 G/DL (ref 3.2–4.8)
ALBUMIN/GLOB SERPL: 1.2 {RATIO} (ref 1–2)
ALP LIVER SERPL-CCNC: 107 U/L (ref 55–142)
ALT SERPL-CCNC: 19 U/L (ref 10–49)
ANION GAP SERPL CALC-SCNC: 8 MMOL/L (ref 0–18)
AST SERPL-CCNC: 58 U/L (ref ?–34)
BASOPHILS # BLD AUTO: 0.05 X10(3) UL (ref 0–0.2)
BASOPHILS NFR BLD AUTO: 0.7 %
BILIRUB SERPL-MCNC: 0.8 MG/DL (ref 0.2–1.1)
BUN BLD-MCNC: 40 MG/DL (ref 9–23)
BUN BLD-MCNC: 42 MG/DL (ref 9–23)
BUN/CREAT SERPL: 16.7 (ref 10–20)
CALCIUM BLD-MCNC: 8.6 MG/DL (ref 8.7–10.4)
CHLORIDE SERPL-SCNC: 107 MMOL/L (ref 98–112)
CO2 SERPL-SCNC: 26 MMOL/L (ref 21–32)
CREAT BLD-MCNC: 2.39 MG/DL (ref 0.55–1.02)
DEPRECATED RDW RBC AUTO: 64.2 FL (ref 35.1–46.3)
EGFRCR SERPLBLD CKD-EPI 2021: 20 ML/MIN/1.73M2 (ref 60–?)
EOSINOPHIL # BLD AUTO: 0.03 X10(3) UL (ref 0–0.7)
EOSINOPHIL NFR BLD AUTO: 0.4 %
ERYTHROCYTE [DISTWIDTH] IN BLOOD BY AUTOMATED COUNT: 16.3 % (ref 11–15)
EST. AVERAGE GLUCOSE BLD GHB EST-MCNC: 77 MG/DL (ref 68–126)
GLOBULIN PLAS-MCNC: 3 G/DL (ref 2–3.5)
GLUCOSE BLD-MCNC: 93 MG/DL (ref 70–99)
GLUCOSE BLDC GLUCOMTR-MCNC: 129 MG/DL (ref 70–99)
GLUCOSE BLDC GLUCOMTR-MCNC: 76 MG/DL (ref 70–99)
HBA1C MFR BLD: 4.3 % (ref ?–5.7)
HCT VFR BLD AUTO: 32.1 % (ref 35–48)
HGB BLD-MCNC: 9.9 G/DL (ref 12–16)
IMM GRANULOCYTES # BLD AUTO: 0.02 X10(3) UL (ref 0–1)
IMM GRANULOCYTES NFR BLD: 0.3 %
LYMPHOCYTES # BLD AUTO: 0.44 X10(3) UL (ref 1–4)
LYMPHOCYTES NFR BLD AUTO: 6.4 %
MCH RBC QN AUTO: 35.5 PG (ref 26–34)
MCHC RBC AUTO-ENTMCNC: 30.8 G/DL (ref 31–37)
MCV RBC AUTO: 115.1 FL (ref 80–100)
MONOCYTES # BLD AUTO: 0.49 X10(3) UL (ref 0.1–1)
MONOCYTES NFR BLD AUTO: 7.1 %
NEUTROPHILS # BLD AUTO: 5.83 X10 (3) UL (ref 1.5–7.7)
NEUTROPHILS # BLD AUTO: 5.83 X10(3) UL (ref 1.5–7.7)
NEUTROPHILS NFR BLD AUTO: 85.1 %
NT-PROBNP SERPL-MCNC: ABNORMAL PG/ML (ref ?–450)
OSMOLALITY SERPL CALC.SUM OF ELEC: 301 MOSM/KG (ref 275–295)
PLATELET # BLD AUTO: 371 10(3)UL (ref 150–450)
POTASSIUM SERPL-SCNC: 5.4 MMOL/L (ref 3.5–5.1)
PROT SERPL-MCNC: 6.5 G/DL (ref 5.7–8.2)
Q-T INTERVAL: 362 MS
QRS DURATION: 76 MS
QTC CALCULATION (BEZET): 433 MS
R AXIS: 135 DEGREES
RBC # BLD AUTO: 2.79 X10(6)UL (ref 3.8–5.3)
SODIUM SERPL-SCNC: 141 MMOL/L (ref 136–145)
T AXIS: 123 DEGREES
VENTRICULAR RATE: 86 BPM
WBC # BLD AUTO: 6.9 X10(3) UL (ref 4–11)

## 2025-05-27 PROCEDURE — 99223 1ST HOSP IP/OBS HIGH 75: CPT | Performed by: INTERNAL MEDICINE

## 2025-05-27 PROCEDURE — 99214 OFFICE O/P EST MOD 30 MIN: CPT | Performed by: INTERNAL MEDICINE

## 2025-05-27 PROCEDURE — 71045 X-RAY EXAM CHEST 1 VIEW: CPT | Performed by: EMERGENCY MEDICINE

## 2025-05-27 RX ORDER — PANTOPRAZOLE SODIUM 40 MG/1
40 TABLET, DELAYED RELEASE ORAL
Status: DISCONTINUED | OUTPATIENT
Start: 2025-05-27 | End: 2025-05-30

## 2025-05-27 RX ORDER — NICOTINE POLACRILEX 4 MG
30 LOZENGE BUCCAL
Status: DISCONTINUED | OUTPATIENT
Start: 2025-05-27 | End: 2025-05-30

## 2025-05-27 RX ORDER — HYDROCODONE BITARTRATE AND ACETAMINOPHEN 5; 325 MG/1; MG/1
2 TABLET ORAL EVERY 4 HOURS PRN
Status: DISCONTINUED | OUTPATIENT
Start: 2025-05-27 | End: 2025-05-30

## 2025-05-27 RX ORDER — METOPROLOL SUCCINATE 50 MG/1
50 TABLET, EXTENDED RELEASE ORAL DAILY
COMMUNITY

## 2025-05-27 RX ORDER — POLYETHYLENE GLYCOL 3350 17 G/17G
17 POWDER, FOR SOLUTION ORAL DAILY PRN
Status: DISCONTINUED | OUTPATIENT
Start: 2025-05-27 | End: 2025-05-30

## 2025-05-27 RX ORDER — ONDANSETRON 2 MG/ML
4 INJECTION INTRAMUSCULAR; INTRAVENOUS EVERY 6 HOURS PRN
Status: DISCONTINUED | OUTPATIENT
Start: 2025-05-27 | End: 2025-05-30

## 2025-05-27 RX ORDER — ACETAMINOPHEN 325 MG/1
650 TABLET ORAL EVERY 4 HOURS PRN
Status: DISCONTINUED | OUTPATIENT
Start: 2025-05-27 | End: 2025-05-30

## 2025-05-27 RX ORDER — SERTRALINE HYDROCHLORIDE 100 MG/1
100 TABLET, FILM COATED ORAL DAILY
Status: DISCONTINUED | OUTPATIENT
Start: 2025-05-28 | End: 2025-05-30

## 2025-05-27 RX ORDER — BUMETANIDE 0.25 MG/ML
2 INJECTION, SOLUTION INTRAMUSCULAR; INTRAVENOUS ONCE
Status: COMPLETED | OUTPATIENT
Start: 2025-05-27 | End: 2025-05-27

## 2025-05-27 RX ORDER — ATORVASTATIN CALCIUM 10 MG/1
10 TABLET, FILM COATED ORAL NIGHTLY
Status: DISCONTINUED | OUTPATIENT
Start: 2025-05-27 | End: 2025-05-30

## 2025-05-27 RX ORDER — BISACODYL 10 MG
10 SUPPOSITORY, RECTAL RECTAL
Status: DISCONTINUED | OUTPATIENT
Start: 2025-05-27 | End: 2025-05-30

## 2025-05-27 RX ORDER — FUROSEMIDE 10 MG/ML
40 INJECTION INTRAMUSCULAR; INTRAVENOUS
Status: DISCONTINUED | OUTPATIENT
Start: 2025-05-27 | End: 2025-05-28

## 2025-05-27 RX ORDER — METOPROLOL SUCCINATE 50 MG/1
50 TABLET, EXTENDED RELEASE ORAL
Status: DISCONTINUED | OUTPATIENT
Start: 2025-05-28 | End: 2025-05-30

## 2025-05-27 RX ORDER — HYDROCODONE BITARTRATE AND ACETAMINOPHEN 5; 325 MG/1; MG/1
1 TABLET ORAL EVERY 4 HOURS PRN
Status: DISCONTINUED | OUTPATIENT
Start: 2025-05-27 | End: 2025-05-30

## 2025-05-27 RX ORDER — IPRATROPIUM BROMIDE AND ALBUTEROL SULFATE 2.5; .5 MG/3ML; MG/3ML
3 SOLUTION RESPIRATORY (INHALATION) EVERY 6 HOURS PRN
Status: DISCONTINUED | OUTPATIENT
Start: 2025-05-27 | End: 2025-05-30

## 2025-05-27 RX ORDER — ACETAMINOPHEN 500 MG
500 TABLET ORAL EVERY 4 HOURS PRN
Status: DISCONTINUED | OUTPATIENT
Start: 2025-05-27 | End: 2025-05-30

## 2025-05-27 RX ORDER — DEXTROSE MONOHYDRATE 25 G/50ML
50 INJECTION, SOLUTION INTRAVENOUS
Status: DISCONTINUED | OUTPATIENT
Start: 2025-05-27 | End: 2025-05-30

## 2025-05-27 RX ORDER — NICOTINE POLACRILEX 4 MG
15 LOZENGE BUCCAL
Status: DISCONTINUED | OUTPATIENT
Start: 2025-05-27 | End: 2025-05-30

## 2025-05-27 RX ORDER — SENNOSIDES 8.6 MG
17.2 TABLET ORAL NIGHTLY PRN
Status: DISCONTINUED | OUTPATIENT
Start: 2025-05-27 | End: 2025-05-30

## 2025-05-27 NOTE — CONSULTS
[facility]  part of [facility]    Report of Consultation    Yamini Ocampo Patient Status:  Inpatient    1945 MRN [id number]   Location [facility] 3W/SW Attending Sofya Godoy MD   Hosp Day # 0 PCP Elly Antonio MD     Date of Admission:  2025  Date of Consult:  25  Reason for Consultation:   Acute CHF    History of Present Illness:   Patient is a 80 year old female who was admitted to the hospital for Acute on chronic congestive heart failure, unspecified heart failure type (HCC):  Admitted from ER 80 year old female with history of atrial fibrillation, hypertension, hyperlipidemia, diabetes, on torsemide, here with complaints of increased bilateral lower extremity edema up to the thighs, and increased shortness of breath over the last 2 to 3 days.  Aggravating factors are exertion, limiting factors are none.  Patient went to her regular doctor who sent her to the emergency department for further evaluation.  EMS reports her blood pressure was in the systolic 80s.     Initial labs:  Pro BNP 39722  BUN 40  Creat 2.39  Hb 9.9  EKG: AFIB  Denies chest pains  Dyspnea with minimal exertion  No syncope/presyncope  No palpitations        Past Medical History  Past Medical History[1]    Past Surgical History  Past Surgical History[2]    Family History  Family History[3]    Social History  Short Social Hx on File[4]        Current Medications:  Current Hospital Medications[5]  Prescriptions Prior to Admission[6]    Allergies  Allergies[7]    Review of Systems:   Pertinent items are noted in HPI.    Physical Exam:   Vital Signs:  Blood pressure 112/61, pulse 82, temperature 98.2 °F (36.8 °C), temperature source Oral, resp. rate 18, height 5' 7\" (1.702 m), weight 180 lb (81.6 kg), SpO2 90%.     Physical Exam  Vitals and nursing note reviewed.   Constitutional:       Appearance: She is obese.   HENT:      Head: Normocephalic and atraumatic.      Mouth/Throat:       Mouth: Mucous membranes are moist.   Cardiovascular:      Rate and Rhythm: Normal rate. Rhythm irregular.      Heart sounds: Murmur heard.   Pulmonary:      Effort: Pulmonary effort is normal.      Breath sounds: Rales present.   Musculoskeletal:      Cervical back: Neck supple.      Right lower leg: Edema present.      Left lower leg: Edema present.   Skin:     General: Skin is warm and dry.   Neurological:      General: No focal deficit present.      Mental Status: She is alert and oriented to person, place, and time.        Results:     Laboratory Data:  Lab Results   Component Value Date    WBC 6.9 05/27/2025    HGB 9.9 (L) 05/27/2025    HCT 32.1 (L) 05/27/2025    .0 05/27/2025    CREATSERUM 2.39 (H) 05/27/2025    BUN 42 (H) 05/27/2025     05/27/2025    K 5.4 (H) 05/27/2025     05/27/2025    CO2 26.0 05/27/2025    GLU 93 05/27/2025    CA 8.6 (L) 05/27/2025    ALB 3.5 05/27/2025    ALKPHO 107 05/27/2025    TP 6.5 05/27/2025    AST 58 (H) 05/27/2025    ALT 19 05/27/2025    PTT 29.0 08/12/2024    INR 1.31 (H) 08/12/2024    PTP 17.1 (H) 08/12/2024    TSH 2.130 05/04/2021    LIP 26 08/12/2024    ESRML 35 (H) 06/14/2017    CRP 0.7 06/14/2017     (H) 06/10/2011    MG 2.0 12/10/2024    PHOS 4.0 12/10/2024    TROP <0.045 05/10/2019    CK 20 (L) 09/05/2017    B12 626 12/20/2023         Imaging:  EKG:  Atrial fibrillation   Right axis deviation   Possible Right ventricular hypertrophy   Septal infarct (cited on or before 12-AUG-2024)   Abnormal ECG   When compared with ECG of 05-DEC-2024 16:36,   Nonspecific T wave abnormality, improved in Anterior-lateral leads   Confirmed by MAYTE TOMLINSON JORDAN ([id number]) on 5/27/2025 2:13:35 PM     ECHO: 12/6/24  ECG rhythm:   Atrial fibrillation     ----------------------------------------------------------------------------     Conclusions:     1. Left ventricle: The cavity size was normal. Wall thickness was normal.      Systolic function was vigorous. The  estimated ejection fraction was      65-70%, by visual assessment. No diagnostic evidence for regional wall      motion abnormalities. Unable to assess LV diastolic function due to heart      rhythm.   2. Right ventricle: The cavity size was mildly increased.   3. Left atrium: The left atrial volume was markedly increased.   4. Right atrium: The atrium was markedly dilated.   5. Aortic valve: A bioprosthetic valve is present. There was mild-moderate      perivalvular regurgitation. The peak systolic velocity was 3.13m/sec. The      mean systolic gradient was 22mm Hg. The valve area (VTI) was 1.26cm^2.      The valve area (VTI) index was 0.64cm^2/m^2.   6. Mitral valve: Transvalvular velocity was increased more than expected.      The findings were consistent with mild stenosis. There was mild      regurgitation. The mean diastolic gradient was 5mm Hg.   7. Tricuspid valve: There was moderate-severe regurgitation.   8. Pulmonary arteries: Systolic pressure was moderately to markedly      increased, in the range of 65mm Hg to 70mm Hg.   9. Pericardium, extracardiac: A small pericardial effusion was identified      circumferential to the heart. There was no evidence of hemodynamic      compromise. There was a left pleural effusion.     Impression:     Acute on chronic congestive heart failure, unspecified heart failure type (HCC)  Patient has chronic Diastolic heart failure with normal systolic function and Aortic bioprosthesis with perivalvular regurgitation, also Mild MS with Mod TR with Severe Pulm HTN      Hypervolemia    Leg swelling: CXR pending      Cellulitis of right lower extremity  Patient has chronic bilat LE edema with now with cellulitis    Chronic AFIB  H/O PE/DVT  Anemia of chronic disease and blood loss        Recommendations:  Patient states she is compliant with meds and diet at home  Will need Gentle diuresis with IV Diuretics and follow Renal function and lyes with Mg closely  Repeat limited ECHO  for Pulm pressures and Aortic bioprothesis  Daily weights and I&Os with goal to keep 1 liter negative balance      D/W patient and nursing staff    Thank you for allowing me to participate in the care of your patient.    Balwinder Andujar MD [facility]  [facility]  [address]  [place] [address]  [phone]    This note was prepared using Dragon Medical voice recognition dictation software. As a result errors may occur. When identified these errors have been corrected. While every attempt is made to correct errors during dictation discrepancies may still exist.         [1]   Past Medical History:   Arrhythmia    afib    Back problem    lumbar disc disease    Basal cell carcinoma of nose    Bilateral carpal tunnel syndrome    Cataract    Cellulitis    Deep vein thrombosis (HCC)    unsure which leg, possibly left    Depression    Esophageal reflux    Essential hypertension    Heart disease    Heel spur    Right    Hemorrhoids    Hiatal hernia    High blood pressure    High cholesterol    Hyperlipidemia    Incontinence    Lumbar disc disease    Multiple gastric ulcers    Osteoarthritis    Pulmonary embolism (HCC)    Raynaud disease    Renal disorder    Tubular adenoma of colon    repeat c-scope 1/2020    Type 2 diabetes mellitus with diabetic chronic kidney disease (HCC)    Visual impairment    glasses   [2]   Past Surgical History:  Procedure Laterality Date    Angioplasty (coronary)      Appendectomy      Arthroscopy of joint unlisted Right     knee    Bso, omentectomy w/sushil      Carpal tunnel release Bilateral     Cholecystectomy  09/28/2016    Colonoscopy N/A 01/25/2017    Procedure: COLONOSCOPY;  Surgeon: KATHARINE Prakash MD;  Location: [facility] ENDOSCOPY    Colonoscopy N/A 10/07/2020    Procedure: COLONOSCOPY;  Surgeon: KATHARINE Prakash MD;  Location: [facility] ENDOSCOPY    Colonoscopy N/A 7/31/2024    Procedure: COLONOSCOPY;  Surgeon: Alyssa Orellana MD;  Location: [facility] ENDOSCOPY    Egd N/A  2016    Procedure: ESOPHAGOGASTRODUODENOSCOPY (EGD);  Surgeon: KATHARINE Prakash MD;  Location: [facility] ENDOSCOPY    Egd N/A 2024    ; gastritis    Egd N/A 2024    ; retained food    Electrocardiogram, complete  2013    Scanned to Media Tab    Excision of nose      Basal cell carcinoma    Hernia surgery      Hysterectomy      Knee replacement surgery Right     Other surgical history      Injections and narcotics, POD Bartuci    Removal of heel spur      Total abdom hysterectomy     [3]   Family History  Problem Relation Age of Onset    Cancer Father         Skin cancer    Other (Other) Father 97        old age,unknown caused    Heart Attack Mother     Heart Disorder Mother     Hypertension Other         Family H/O    Cancer Other         Lymphoma  Family H/O    Heart Disease Other         Family H/O    Arthritis Other         Close relative  unsure which type    No Known Problems Brother    [4]   Social History  Socioeconomic History    Marital status:    Tobacco Use    Smoking status: Former     Current packs/day: 0.00     Types: Cigarettes     Quit date: 1997     Years since quittin.4    Smokeless tobacco: Never   Vaping Use    Vaping status: Never Used   Substance and Sexual Activity    Alcohol use: No     Comment: rare    Drug use: Never     Comment: edibles occasionally for sleep   Other Topics Concern    Caffeine Concern Yes     Comment: 8 cups coffee/sod daily    Reaction to local anesthetic No    Pt has a pacemaker No    Pt has a defibrillator No     Social Drivers of Health     Food Insecurity: No Food Insecurity (2025)    NCSS - Food Insecurity     Worried About Running Out of Food in the Last Year: No     Ran Out of Food in the Last Year: No   Transportation Needs: No Transportation Needs (2025)    NCSS - Transportation     Lack of Transportation: No   Housing Stability: Not At Risk (2025)    NCSS - Housing/Utilities     Has Housing: Yes      Worried About Losing Housing: No     Unable to Get Utilities: No   [5]   Current Facility-Administered Medications   Medication Dose Route Frequency    [START ON 5/28/2025] ceFAZolin (Ancef) 2g in 10mL IV syringe premix  2 g Intravenous Q12H    ipratropium-albuterol (Duoneb) 0.5-2.5 (3) MG/3ML inhalation solution 3 mL  3 mL Nebulization Q6H PRN    furosemide (Lasix) 10 mg/mL injection 40 mg  40 mg Intravenous BID (Diuretic)    apixaban (Eliquis) tab 2.5 mg  2.5 mg Oral q12h    atorvastatin (Lipitor) tab 10 mg  10 mg Oral Nightly    [START ON 5/28/2025] metoprolol succinate ER (Toprol XL) 24 hr tab 50 mg  50 mg Oral Daily Beta Blocker    pantoprazole (Protonix) DR tab 40 mg  40 mg Oral BID AC    [START ON 5/28/2025] sertraline (Zoloft) tab 100 mg  100 mg Oral Daily    glucose (Dex4) 15 GM/59ML oral liquid 15 g  15 g Oral Q15 Min PRN    Or    glucose (Glutose) 40% oral gel 15 g  15 g Oral Q15 Min PRN    Or    glucose-vitamin C (Dex-4) chewable tab 4 tablet  4 tablet Oral Q15 Min PRN    Or    dextrose 50% injection 50 mL  50 mL Intravenous Q15 Min PRN    Or    glucose (Dex4) 15 GM/59ML oral liquid 30 g  30 g Oral Q15 Min PRN    Or    glucose (Glutose) 40% oral gel 30 g  30 g Oral Q15 Min PRN    Or    glucose-vitamin C (Dex-4) chewable tab 8 tablet  8 tablet Oral Q15 Min PRN    acetaminophen (Tylenol Extra Strength) tab 500 mg  500 mg Oral Q4H PRN    acetaminophen (Tylenol) tab 650 mg  650 mg Oral Q4H PRN    Or    HYDROcodone-acetaminophen (Norco) 5-325 MG per tab 1 tablet  1 tablet Oral Q4H PRN    Or    HYDROcodone-acetaminophen (Norco) 5-325 MG per tab 2 tablet  2 tablet Oral Q4H PRN    ondansetron (Zofran) 4 MG/2ML injection 4 mg  4 mg Intravenous Q6H PRN    polyethylene glycol (PEG 3350) (Miralax) 17 g oral packet 17 g  17 g Oral Daily PRN    sennosides (Senokot) tab 17.2 mg  17.2 mg Oral Nightly PRN    bisacodyl (Dulcolax) 10 MG rectal suppository 10 mg  10 mg Rectal Daily PRN    insulin aspart (NovoLOG) 100  Units/mL FlexPen 1-5 Units  1-5 Units Subcutaneous TID CC and HS   [6]   Medications Prior to Admission   Medication Sig    metoprolol succinate ER 50 MG Oral Tablet 24 Hr Take 1 tablet (50 mg total) by mouth daily.    atorvastatin 10 MG Oral Tab Take 1 tablet (10 mg total) by mouth nightly.    sildenafil 20 MG Oral Tab Take 1 tablet (20 mg total) by mouth 3 (three) times daily.    apixaban 2.5 MG Oral Tab Take 1 tablet (2.5 mg total) by mouth every 12 (twelve) hours.    FARXIGA 10 MG Oral Tab Take 1 tablet (10 mg total) by mouth daily.    sertraline 100 MG Oral Tab Take 1 tablet (100 mg total) by mouth daily.    ketoconazole 2 % External Shampoo APPLY TO AFFECTED AREAS FOR 5 MINUTES THEN WASH OFF ONCE DAILY FOR 5-7 DAYS THEN EVERY 2-3 DAYS FOR MAINTENANCE as NEEDED DEPENDING ON YOUR SYMPTOMS.    torsemide 20 MG Oral Tab Take 1 tablet (20 mg total) by mouth daily. (Patient taking differently: Take 1 tablet (20 mg total) by mouth 2 (two) times daily.)    acetaminophen 500 MG Oral Tab Take 1 tablet (500 mg total) by mouth every 6 (six) hours as needed.    pantoprazole 40 MG Oral Tab EC Take 1 tablet (40 mg total) by mouth 2 (two) times daily before meals.    cyanocobalamin 1000 MCG Oral Tab Take 1 tablet (1,000 mcg total) by mouth in the morning.    metoprolol tartrate 25 MG Oral Tab Take 1 tablet (25 mg total) by mouth 2x Daily(Beta Blocker). (Patient not taking: Reported on 5/27/2025)   [7]   Allergies  Allergen Reactions    Adhesive Tape RASH     Skin off

## 2025-05-27 NOTE — RESPIRATORY THERAPY NOTE
Pt does not regularly use cpap at home and is declining one provided by the hospital during this admission

## 2025-05-27 NOTE — ED PROVIDER NOTES
Patient Seen in: [facility] Emergency Department    History     Chief Complaint   Patient presents with    Swelling Edema       HPI    History is provided by patient/independent historian: Patient, EMS  80 year old female with history of atrial fibrillation, hypertension, hyperlipidemia, diabetes, on torsemide, here with complaints of increased bilateral lower extremity edema up to the thighs, and increased shortness of breath over the last 2 to 3 days.  Aggravating factors are exertion, limiting factors are none.  Patient went to her regular doctor who sent her to the emergency department for further evaluation.  EMS reports her blood pressure was in the systolic 80s.    History reviewed. Past Medical History[1]      History reviewed. Past Surgical History[2]      Home Medications reviewed :  Prescriptions Prior to Admission[3]      History reviewed. Social Hx on file[4]      ROS  Review of Systems   Respiratory:  Positive for shortness of breath.    Cardiovascular:  Positive for leg swelling. Negative for chest pain.   All other systems reviewed and are negative.     All other pertinent organ systems are reviewed and are negative.      Physical Exam     ED Triage Vitals   BP 05/27/25 1356 (!) 89/49   Pulse 05/27/25 1356 88   Resp 05/27/25 1356 19   Temp 05/27/25 1356 98.6 °F (37 °C)   Temp src 05/27/25 1356 Temporal   SpO2 05/27/25 1356 94 %   O2 Device 05/27/25 1355 None (Room air)     Vital signs reviewed.      Physical Exam  Vitals and nursing note reviewed.   Cardiovascular:      Pulses: Normal pulses.      Comments: Bilateral lower extremity edema up to the thighs  Pulmonary:      Effort: No respiratory distress.      Comments: No conversational dyspnea, no accessory muscle use  Abdominal:      General: There is no distension.   Musculoskeletal:      Comments: RLE erythema   Neurological:      Mental Status: She is alert.         ED Course       Labs:     Labs Reviewed   CBC WITH DIFFERENTIAL WITH  PLATELET - Abnormal; Notable for the following components:       Result Value    RBC 2.79 (*)     HGB 9.9 (*)     HCT 32.1 (*)     .1 (*)     MCH 35.5 (*)     MCHC 30.8 (*)     RDW-SD 64.2 (*)     RDW 16.3 (*)     Lymphocyte Absolute 0.44 (*)     All other components within normal limits   COMP METABOLIC PANEL (14) - Abnormal; Notable for the following components:    BUN 40 (*)     Creatinine 2.39 (*)     Calcium, Total 8.6 (*)     Calculated Osmolality 301 (*)     eGFR-Cr 20 (*)     AST 58 (*)     All other components within normal limits   PRO BETA NATRIURETIC PEPTIDE - Abnormal; Notable for the following components:    Pro-Beta Natriuretic Peptide 14,636 (*)     All other components within normal limits   REDRAW BMP - Abnormal; Notable for the following components:    Potassium 5.4 (*)     BUN 42 (*)     All other components within normal limits   MD BLOOD SMEAR CONSULT   HEMOGLOBIN A1C   RAINBOW DRAW BLUE   RAINBOW DRAW GOLD         My EKG Interpretation: EKG    Rate, intervals and axes as noted on EKG Report.  Rate: 86  Rhythm: Atrial Fibrillation  Reading: normal for rate, abnormal for rhythm, no acute ST elevation           As reviewed and Interpreted by me      Imaging Results Available and Reviewed while in ED:   No results found.  My review and independent interpretation of XR images: fluid overload. Radiology report corroborates this in addition to other details as reported by them.      Decision rules/scores evaluated: none      Diagnostic labs/tests considered but not ordered: none    ED Medications Administered:   Medications   ceFAZolin (Ancef) 2g in 10mL IV syringe premix (has no administration in time range)   ipratropium-albuterol (Duoneb) 0.5-2.5 (3) MG/3ML inhalation solution 3 mL (has no administration in time range)   furosemide (Lasix) 10 mg/mL injection 40 mg (has no administration in time range)   apixaban (Eliquis) tab 2.5 mg (has no administration in time range)   atorvastatin  (Lipitor) tab 10 mg (has no administration in time range)   metoprolol succinate ER (Toprol XL) 24 hr tab 50 mg (has no administration in time range)   pantoprazole (Protonix) DR tab 40 mg (has no administration in time range)   sertraline (Zoloft) tab 100 mg (has no administration in time range)   glucose (Dex4) 15 GM/59ML oral liquid 15 g (has no administration in time range)     Or   glucose (Glutose) 40% oral gel 15 g (has no administration in time range)     Or   glucose-vitamin C (Dex-4) chewable tab 4 tablet (has no administration in time range)     Or   dextrose 50% injection 50 mL (has no administration in time range)     Or   glucose (Dex4) 15 GM/59ML oral liquid 30 g (has no administration in time range)     Or   glucose (Glutose) 40% oral gel 30 g (has no administration in time range)     Or   glucose-vitamin C (Dex-4) chewable tab 8 tablet (has no administration in time range)   acetaminophen (Tylenol Extra Strength) tab 500 mg (has no administration in time range)   acetaminophen (Tylenol) tab 650 mg (has no administration in time range)     Or   HYDROcodone-acetaminophen (Norco) 5-325 MG per tab 1 tablet (has no administration in time range)     Or   HYDROcodone-acetaminophen (Norco) 5-325 MG per tab 2 tablet (has no administration in time range)   ondansetron (Zofran) 4 MG/2ML injection 4 mg (has no administration in time range)   polyethylene glycol (PEG 3350) (Miralax) 17 g oral packet 17 g (has no administration in time range)   sennosides (Senokot) tab 17.2 mg (has no administration in time range)   bisacodyl (Dulcolax) 10 MG rectal suppository 10 mg (has no administration in time range)   insulin aspart (NovoLOG) 100 Units/mL FlexPen 1-5 Units (has no administration in time range)   sodium chloride 0.9 % IV bolus 500 mL (0 mL Intravenous Stopped 5/27/25 7365)   bumetanide (Bumex) 0.25 MG/ML injection 2 mg (2 mg Intravenous Given 5/27/25 4246)                MDM       Medical Decision  Making      Differential Diagnosis: After obtaining the patient's history, performing the physical exam and reviewing the diagnostics, multiple initial diagnoses were considered based on the presenting problem including ACS, CHF exacerbation, TATIANA, infection    External document review: I personally reviewed available external medical records for any recent pertinent discharge summaries, testing, and procedures - the findings are as follows: today visit with Dr. Antonio for CHF    Complicating Factors: The patient already  has a past medical history of Arrhythmia, Back problem, Basal cell carcinoma of nose, Bilateral carpal tunnel syndrome, Cataract, Cellulitis, Deep vein thrombosis (HCC), Depression, Esophageal reflux, Essential hypertension, Heart disease, Heel spur, Hemorrhoids, Hiatal hernia, High blood pressure, High cholesterol, Hyperlipidemia, Incontinence, Lumbar disc disease (2012), Multiple gastric ulcers (04/25/2017), Osteoarthritis, Pulmonary embolism (HCC), Raynaud disease, Renal disorder, Tubular adenoma of colon (01/2017), Type 2 diabetes mellitus with diabetic chronic kidney disease (HCC) (11/30/2022), and Visual impairment. to contribute to the complexity of this ED evaluation.    Procedures performed: none    Discussed management with physician/appropriate source: Dr. Burns, Dr. Andujar    Considered admission/deescalation of care for: hypotension    Social determinants of health affecting patient care: none    Prescription medications considered: discussed continuing current medication regimen    The patient requires continuous monitoring for: leg swelling, SOB    Shared decision making: discussed possible admission    Critical Care Time:  Total critical care time for this patient was 30 minutes exclusive of separately billable procedures, but in obtaining history, performing a physical exam, bedside monitoring of interventions, collecting and interpreting test, and discussion with  consultants.        Disposition and Plan     Clinical Impression:  1. Acute on chronic congestive heart failure, unspecified heart failure type (HCC)    2. Cellulitis of right lower extremity        Disposition:  Admit    Follow-up:  No follow-up provider specified.    Medications Prescribed:  Current Discharge Medication List          Hospital Problems       Present on Admission  Date Reviewed: 5/27/2025          ICD-10-CM Noted POA    Hypervolemia E87.70 5/27/2025 Unknown                     [1]   Past Medical History:   Arrhythmia    afib    Back problem    lumbar disc disease    Basal cell carcinoma of nose    Bilateral carpal tunnel syndrome    Cataract    Cellulitis    Deep vein thrombosis (HCC)    unsure which leg, possibly left    Depression    Esophageal reflux    Essential hypertension    Heart disease    Heel spur    Right    Hemorrhoids    Hiatal hernia    High blood pressure    High cholesterol    Hyperlipidemia    Incontinence    Lumbar disc disease    Multiple gastric ulcers    Osteoarthritis    Pulmonary embolism (HCC)    Raynaud disease    Renal disorder    Tubular adenoma of colon    repeat c-scope 1/2020    Type 2 diabetes mellitus with diabetic chronic kidney disease (HCC)    Visual impairment    glasses   [2]   Past Surgical History:  Procedure Laterality Date    Angioplasty (coronary)      Appendectomy      Arthroscopy of joint unlisted Right     knee    Bso, omentectomy w/sushil      Carpal tunnel release Bilateral     Cholecystectomy  09/28/2016    Colonoscopy N/A 01/25/2017    Procedure: COLONOSCOPY;  Surgeon: KATHARINE Prakash MD;  Location: [facility] ENDOSCOPY    Colonoscopy N/A 10/07/2020    Procedure: COLONOSCOPY;  Surgeon: KATHARINE Prakash MD;  Location: [facility] ENDOSCOPY    Colonoscopy N/A 7/31/2024    Procedure: COLONOSCOPY;  Surgeon: Alyssa Orellana MD;  Location: [facility] ENDOSCOPY    Egd N/A 12/14/2016    Procedure: ESOPHAGOGASTRODUODENOSCOPY (EGD);  Surgeon: KATHARINE Prakash MD;  Location: [facility]  ENDOSCOPY    Egd N/A 2024    ; gastritis    Egd N/A 2024    ; retained food    Electrocardiogram, complete  2013    Scanned to Media Tab    Excision of nose      Basal cell carcinoma    Hernia surgery      Hysterectomy      Knee replacement surgery Right     Other surgical history      Injections and narcotics, POD Bartuci    Removal of heel spur      Total abdom hysterectomy     [3] (Not in a hospital admission)   [4]   Social History  Socioeconomic History    Marital status:    Tobacco Use    Smoking status: Former     Current packs/day: 0.00     Types: Cigarettes     Quit date: 1997     Years since quittin.4    Smokeless tobacco: Never   Vaping Use    Vaping status: Never Used   Substance and Sexual Activity    Alcohol use: No     Comment: rare    Drug use: Never     Comment: edibles occasionally for sleep   Other Topics Concern    Caffeine Concern Yes     Comment: 8 cups coffee/sod daily    Reaction to local anesthetic No    Pt has a pacemaker No    Pt has a defibrillator No

## 2025-05-27 NOTE — ED QUICK NOTES
Orders for admission, patient is aware of plan and ready to go upstairs. Any questions, please call ED RN Marina at extension [phone].     Patient Covid vaccination status: Fully vaccinated     COVID Test Ordered in ED: None    COVID Suspicion at Admission: N/A    Running Infusions: Medication Infusions[1]     Mental Status/LOC at time of transport: A/Ox4    Other pertinent information:   CIWA score: N/A   NIH score:  N/A             [1]

## 2025-05-27 NOTE — PROGRESS NOTES
Yamini Ocampo is a 80 year old female.  Chief Complaint   Patient presents with    Follow - Up     HPI:         The following individual(s) verbally consented to be recorded using ambient AI listening technology and understand that they can each withdraw their consent to this listening technology at any point by asking the clinician to turn off or pause the recording:    Patient name: Yamini Ocampo    History of Present Illness  Yamini Ocampo is an 80 year old female with essential tremors who presents with worsening leg edema and shortness of breath. She is accompanied by her daughter, Ariana.    She has been experiencing significant leg edema for the past week, with the right leg being more affected than the left. Despite taking torsemide 20 mg twice daily, the swelling persists. She has a history of a blood clot in her lungs and is currently on apixaban also for atrial fibrillation     She reports worsening shortness of breath over the last few days, becoming winded with minimal exertion. Her daughter notes increased fatigue and confusion. Wet sounds are noted when coughing.    Her essential tremors have worsened over the past week. She also mentions having Raynaud's, which complicates the measurement of her oxygen saturation.    She confirms taking all her medications, including the diuretic, about an hour before the visit.   No chest pain         Current Medications[1]   Past Medical History[2]   Past Surgical History[3]   Social History:  Short Social Hx on File[4]   Family History[5]   Allergies[6]     REVIEW OF SYSTEMS:   Review of Systems   Review of Systems   Constitutional: Negative for activity change, appetite change and fever.   HENT: Negative for congestion and voice change.    Respiratory: Negative for cough and shortness of breath.    Cardiovascular: Negative for chest pain.   Gastrointestinal: Negative for abdominal distention, abdominal pain and vomiting.   Genitourinary: Negative for hematuria.    Skin: Negative for wound.   Psychiatric/Behavioral: Negative for behavioral problems.   Wt Readings from Last 5 Encounters:   03/13/25 182 lb (82.6 kg)   12/09/24 178 lb 8 oz (81 kg)   09/12/24 193 lb (87.5 kg)   09/09/24 198 lb (89.8 kg)   08/18/24 190 lb (86.2 kg)     Body mass index is 29.38 kg/m².      EXAM:   /54   Pulse (!) 142   Ht 5' 6\" (1.676 m)   BMI 29.38 kg/m²   Physical Exam   Constitutional:       Appearance: Normal appearance.   HENT:      Head: Normocephalic.   Eyes:      Conjunctiva/sclera: Conjunctivae normal.   Breast:  Normal bilateral breast exam. No palpable masses or nodules.   No nipples asymmetry or discharge. No skin changes   Cardiovascular:      Rate and Rhythm: Normal rate and regular rhythm.      Heart sounds: Normal heart sounds. No murmur heard.  Pulmonary:      Effort: Pulmonary effort is normal.      Breath sounds: Normal breath sounds. No rhonchi or rales.   Abdominal:      General: Bowel sounds are normal.      Palpations: Abdomen is soft.      Tenderness: There is no abdominal tenderness.   Musculoskeletal:      Cervical back: Neck supple.      Right lower leg: No edema.      Left lower leg: No edema.   Skin:     General: Skin is warm and dry.   Neurological:      General: No focal deficit present.      Mental Status: He is alert and oriented to person, place, and time. Mental status is at baseline.   Psychiatric:         Mood and Affect: Mood normal.         Behavior: Behavior normal.       ASSESSMENT AND PLAN:   Assessment & Plan       Assessment & Plan  Heart failure  Acute exacerbation of chronic heart failure with worsening edema and dyspnea over the past week. Possible fluid overload due to inadequate response to oral diuretics. Differential includes potential deep vein thrombosis or inadequate cardiac output. Tachycardia at 142 bpm, and blood pressure at 127/54 mmHg. On anticoagulation therapy for atrial fibrillation.  - Immediate evaluation in the emergency  room for potential IV diuretics and further cardiac assessment.  - Patient and family agrees. Daughter will be driving her    Edema  Bilateral leg edema, right leg worse than left, persisting for about a week despite increased diuretic use. Likely related to heart failure exacerbation. Reports frequent urination but inadequate fluid reduction.  - Evaluate in the emergency room for potential IV diuretics and further management.    Essential tremor  Worsening of tremors over the past week.    Essential hypertension  - blood pressure is ok in the office    Raynaud's disease  - stable            The patient indicates understanding of these issues and agrees to the plan.  Patient sent to ER  Called and updated ER triage     Elly Antonio MD     This note was created by [employer] voice recognition. Errors in content may be related to improper recognition by the system; efforts to review and correct have been done but errors may still exist. Please be advised the primary purpose of this note is for me to communicate medical care. Standard sentence structure is not always used. Medical terminology and medical abbreviations may be used. There may be grammatical, typographical, and automated fill ins that may have errors missed in proofreading.        [1]   Current Outpatient Medications   Medication Sig Dispense Refill    metoprolol succinate ER 50 MG Oral Tablet 24 Hr       atorvastatin 10 MG Oral Tab Take 1 tablet (10 mg total) by mouth nightly. 90 tablet 3    sildenafil 20 MG Oral Tab Take 1 tablet (20 mg total) by mouth 3 (three) times daily. 90 tablet 5    apixaban 2.5 MG Oral Tab Take 1 tablet (2.5 mg total) by mouth every 12 (twelve) hours. 180 tablet 3    metoprolol tartrate 25 MG Oral Tab Take 1 tablet (25 mg total) by mouth 2x Daily(Beta Blocker). 180 tablet 3    FARXIGA 10 MG Oral Tab Take 1 tablet (10 mg total) by mouth daily. 90 tablet 3    sertraline 100 MG Oral Tab Take 1 tablet (100 mg total) by mouth  daily. 90 tablet 3    ketoconazole 2 % External Shampoo APPLY TO AFFECTED AREAS FOR 5 MINUTES THEN WASH OFF ONCE DAILY FOR 5-7 DAYS THEN EVERY 2-3 DAYS FOR MAINTENANCE as NEEDED DEPENDING ON YOUR SYMPTOMS. 500 mL 3    torsemide 20 MG Oral Tab Take 1 tablet (20 mg total) by mouth daily. 30 tablet 0    acetaminophen 500 MG Oral Tab Take 1 tablet (500 mg total) by mouth every 6 (six) hours as needed for Pain or Fever.      pantoprazole 40 MG Oral Tab EC Take 1 tablet (40 mg total) by mouth 2 (two) times daily before meals. 60 tablet 0    potassium chloride 10 MEQ Oral Tab CR Take 1 tablet (10 mEq total) by mouth Every Monday, Wednesday, and Friday. 15 tablet 0    cyanocobalamin 1000 MCG Oral Tab Take 1 tablet (1,000 mcg total) by mouth daily.      albuterol 108 (90 Base) MCG/ACT Inhalation Aero Soln  (Patient not taking: Reported on 5/27/2025)     [2]   Past Medical History:   Arrhythmia    afib    Back problem    lumbar disc disease    Basal cell carcinoma of nose    Bilateral carpal tunnel syndrome    Cataract    Cellulitis    Deep vein thrombosis (HCC)    unsure which leg, possibly left    Depression    Esophageal reflux    Essential hypertension    Heart disease    Heel spur    Right    Hemorrhoids    Hiatal hernia    High blood pressure    High cholesterol    Hyperlipidemia    Incontinence    Lumbar disc disease    Multiple gastric ulcers    Osteoarthritis    Pulmonary embolism (HCC)    Raynaud disease    Renal disorder    Tubular adenoma of colon    repeat c-scope 1/2020    Type 2 diabetes mellitus with diabetic chronic kidney disease (HCC)    Visual impairment    glasses   [3]   Past Surgical History:  Procedure Laterality Date    Angioplasty (coronary)      Appendectomy      Arthroscopy of joint unlisted Right     knee    Bso, omentectomy w/sushil      Carpal tunnel release Bilateral     Cholecystectomy  09/28/2016    Colonoscopy N/A 01/25/2017    Procedure: COLONOSCOPY;  Surgeon: KATHARINE Prakash MD;  Location:  [facility] ENDOSCOPY    Colonoscopy N/A 10/07/2020    Procedure: COLONOSCOPY;  Surgeon: KATHARINE Prakash MD;  Location: [facility] ENDOSCOPY    Colonoscopy N/A 2024    Procedure: COLONOSCOPY;  Surgeon: Alyssa Orellana MD;  Location: [facility] ENDOSCOPY    Egd N/A 2016    Procedure: ESOPHAGOGASTRODUODENOSCOPY (EGD);  Surgeon: KATHARINE Prakash MD;  Location: [facility] ENDOSCOPY    Egd N/A 2024    ; gastritis    Egd N/A 2024    ; retained food    Electrocardiogram, complete  2013    Scanned to Media Tab    Excision of nose      Basal cell carcinoma    Hernia surgery      Hysterectomy      Knee replacement surgery Right     Other surgical history      Injections and narcotics, POD Bartuci    Removal of heel spur      Total abdom hysterectomy     [4]   Social History  Socioeconomic History    Marital status:    Tobacco Use    Smoking status: Former     Current packs/day: 0.00     Types: Cigarettes     Quit date: 1997     Years since quittin.4    Smokeless tobacco: Never   Vaping Use    Vaping status: Never Used   Substance and Sexual Activity    Alcohol use: No     Comment: rare    Drug use: Never     Comment: edibles occasionally for sleep   Other Topics Concern    Caffeine Concern Yes     Comment: 8 cups coffee/sod daily    Reaction to local anesthetic No    Pt has a pacemaker No    Pt has a defibrillator No     Social Drivers of Health     Food Insecurity: No Food Insecurity (2024)    Food Insecurity     Food Insecurity: Never true   Transportation Needs: No Transportation Needs (2024)    Transportation Needs     Lack of Transportation: No   Housing Stability: Low Risk  (2024)    Housing Stability     Housing Instability: No   [5]   Family History  Problem Relation Age of Onset    Cancer Father         Skin cancer    Other (Other) Father 97        old age,unknown caused    Heart Attack Mother     Heart Disorder Mother     Hypertension Other         Family H/O     Cancer Other         Lymphoma  Family H/O    Heart Disease Other         Family H/O    Arthritis Other         Close relative  unsure which type    No Known Problems Brother    [6]   Allergies  Allergen Reactions    Adhesive Tape RASH     Skin off

## 2025-05-27 NOTE — H&P
[facility]  part of [facility]  HISTORY AND PHYSICAL       Yamini Ocampo Patient Status:  Emergency    1945  80 year old CSN [id number]   Location  Attending Cori Ko MD     PCP Elly Antonio MD         DATE OF ADMISSION: 2025     CHIEF COMPLAINT: Shortness of breath    HISTORY OF PRESENT ILLNESS  This is a 80 year oldfemale who presented complaining of shortness of breath.  Patient dated symptom started around 2 to 3 days prior to admission.  Denied associated cough or fever/chills.  Did note development of lower extremity swelling.  Patient stated she went to see her physician who recommended she present to ED for further evaluation.  Patient states has been taking her diuretics at home as directed.  Denies recent increased intake of salt or fluids.  Patient denies current chest pain, abdominal pain, nausea vomiting, lightheadedness or dizziness.  Upon further review, patient states has been having some increased pain in her right lower extremity with development of redness and warmth.  She initially attributed to the swelling in her Naun's.  Patient did states she has noted some increase in the redness, working up her leg.    PAST MEDICAL HISTORY  Past Medical History[1]     PAST SURGICAL HISTORY  Past Surgical History[2]    ALLERGIES   Adhesive tape    MEDICATIONS  Patient's Medications   New Prescriptions    No medications on file   Previous Medications    ACETAMINOPHEN 500 MG ORAL TAB    Take 1 tablet (500 mg total) by mouth every 6 (six) hours as needed for Pain or Fever.    ALBUTEROL 108 (90 BASE) MCG/ACT INHALATION AERO SOLN        APIXABAN 2.5 MG ORAL TAB    Take 1 tablet (2.5 mg total) by mouth every 12 (twelve) hours.    ATORVASTATIN 10 MG ORAL TAB    Take 1 tablet (10 mg total) by mouth nightly.    CYANOCOBALAMIN 1000 MCG ORAL TAB    Take 1 tablet (1,000 mcg total) by mouth daily.    FARXIGA 10 MG ORAL TAB    Take 1 tablet (10 mg total) by mouth daily.     KETOCONAZOLE 2 % EXTERNAL SHAMPOO    APPLY TO AFFECTED AREAS FOR 5 MINUTES THEN WASH OFF ONCE DAILY FOR 5-7 DAYS THEN EVERY 2-3 DAYS FOR MAINTENANCE as NEEDED DEPENDING ON YOUR SYMPTOMS.    METOPROLOL SUCCINATE ER 50 MG ORAL TABLET 24 HR        METOPROLOL TARTRATE 25 MG ORAL TAB    Take 1 tablet (25 mg total) by mouth 2x Daily(Beta Blocker).    PANTOPRAZOLE 40 MG ORAL TAB EC    Take 1 tablet (40 mg total) by mouth 2 (two) times daily before meals.    POTASSIUM CHLORIDE 10 MEQ ORAL TAB CR    Take 1 tablet (10 mEq total) by mouth Every Monday, Wednesday, and Friday.    SERTRALINE 100 MG ORAL TAB    Take 1 tablet (100 mg total) by mouth daily.    SILDENAFIL 20 MG ORAL TAB    Take 1 tablet (20 mg total) by mouth 3 (three) times daily.    TORSEMIDE 20 MG ORAL TAB    Take 1 tablet (20 mg total) by mouth daily.   Modified Medications    No medications on file   Discontinued Medications    No medications on file       SOCIAL HISTORY  Social Hx on file[3]    FAMILY HISTORY  Family History[4]    PHYSICAL EXAM  Vital signs:  /64   Pulse 68   Temp 98.6 °F (37 °C) (Temporal)   Resp 26   Ht 5' 7\" (1.702 m)   Wt 180 lb (81.6 kg)   SpO2 96%   BMI 28.19 kg/m²      GENERAL:  Awake and alert, in no acute distress.  HEART:  Irregular rhythm.  Regular rate   LUNGS:  Air entry was decreased.  No increased work of breathing or wheezes   ABDOMEN: Soft and non-tender.    EXTREMITIES: + Bilateral lower extremity edema appreciated  SKIN: Redness and warmth over right lower extremity from calf down to foot.  Small signs of excoriations, but no clear open wounds.  PSYCHIATRIC: Normal mood      IMAGING    No results found.     Data:  Recent Labs   Lab 05/27/25  1358   RBC 2.79*   HGB 9.9*   HCT 32.1*   .1*   MCH 35.5*   MCHC 30.8*   RDW 16.3*   NEPRELIM 5.83   WBC 6.9   .0     Recent Labs   Lab 05/27/25  1358   GLU 93   BUN 40*   CREATSERUM 2.39*   CA 8.6*   ALB 3.5         CO2 26.0   ALKPHO 107   AST  58*   ALT 19   BILT 0.8   TP 6.5       ASSESSMENT/PLAN    Acute on chronic heart failure with preserved ejection fraction   - Last known EF 65-70 from echocardiogram in December 2024  -Patient presenting with progressive shortness of breath and lower extremity swelling.  Noted to have grossly elevated BNP.  - Starting diuresis with Lasix 40mg IV BID  - Check Echo  - Strict I&Os, Daily weights, Fluid restriction  - Cardiology on consult, appreciate recs.     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  - Starting empiric antibiotics with cefazolin  - Elevation of limb as able  - Diuretics as above  - Continue to monitor    Chronic Atrial Fibrillation  -Rates currently controlled  -Continue metoprolol for rate control  -Continue chronic anticoagulation with eliquis  -Telemetry monitoring  -Cardiology on consult, appreciate further recs    Type 2 DM   - Monitoring Blood glucose with POC checks  - SSI to cover hyperglycemia  - Hypoglycemia protocol  - Will monitor and adjust agents as needed.        HTN  - controlled  - CPM  - Monitor and adjust as needed        Plan of care discussed with patient at bedside.  Discussed with ED physician and RN. Decision made that pt needs hospitalization for further management/monitoring.      Milton Burns MD    This note was prepared using Dragon Medical voice recognition dictation software. As a result errors may occur. When identified these errors have been corrected. While every attempt is made to correct errors during dictation discrepancies may still exist         [1]   Past Medical History:   Arrhythmia    afib    Back problem    lumbar disc disease    Basal cell carcinoma of nose    Bilateral carpal tunnel syndrome    Cataract    Cellulitis    Deep vein thrombosis (HCC)    unsure which  leg, possibly left    Depression    Esophageal reflux    Essential hypertension    Heart disease    Heel spur    Right    Hemorrhoids    Hiatal hernia    High blood pressure    High cholesterol    Hyperlipidemia    Incontinence    Lumbar disc disease    Multiple gastric ulcers    Osteoarthritis    Pulmonary embolism (HCC)    Raynaud disease    Renal disorder    Tubular adenoma of colon    repeat c-scope 2020    Type 2 diabetes mellitus with diabetic chronic kidney disease (HCC)    Visual impairment    glasses   [2]   Past Surgical History:  Procedure Laterality Date    Angioplasty (coronary)      Appendectomy      Arthroscopy of joint unlisted Right     knee    Bso, omentectomy w/sushil      Carpal tunnel release Bilateral     Cholecystectomy  2016    Colonoscopy N/A 2017    Procedure: COLONOSCOPY;  Surgeon: KATHARINE Prakash MD;  Location: [facility] ENDOSCOPY    Colonoscopy N/A 10/07/2020    Procedure: COLONOSCOPY;  Surgeon: KATHARINE Prakash MD;  Location: [facility] ENDOSCOPY    Colonoscopy N/A 2024    Procedure: COLONOSCOPY;  Surgeon: Alyssa Orellana MD;  Location: [facility] ENDOSCOPY    Egd N/A 2016    Procedure: ESOPHAGOGASTRODUODENOSCOPY (EGD);  Surgeon: KATHARINE Prakash MD;  Location: [facility] ENDOSCOPY    Egd N/A 2024    ; gastritis    Egd N/A 2024    ; retained food    Electrocardiogram, complete  2013    Scanned to Media Tab    Excision of nose      Basal cell carcinoma    Hernia surgery      Hysterectomy      Knee replacement surgery Right     Other surgical history      Injections and narcotics, POD Bartuci    Removal of heel spur      Total abdom hysterectomy     [3]   Social History  Socioeconomic History    Marital status:    Tobacco Use    Smoking status: Former     Current packs/day: 0.00     Types: Cigarettes     Quit date: 1997     Years since quittin.4    Smokeless tobacco: Never   Vaping Use    Vaping status: Never Used   Substance and Sexual  Activity    Alcohol use: No     Comment: rare    Drug use: Never     Comment: edibles occasionally for sleep   Other Topics Concern    Caffeine Concern Yes     Comment: 8 cups coffee/sod daily    Reaction to local anesthetic No    Pt has a pacemaker No    Pt has a defibrillator No   [4]   Family History  Problem Relation Age of Onset    Cancer Father         Skin cancer    Other (Other) Father 97        old age,unknown caused    Heart Attack Mother     Heart Disorder Mother     Hypertension Other         Family H/O    Cancer Other         Lymphoma  Family H/O    Heart Disease Other         Family H/O    Arthritis Other         Close relative  unsure which type    No Known Problems Brother

## 2025-05-27 NOTE — ED INITIAL ASSESSMENT (HPI)
Pt A/Ox4 presented to ED via EMS c/o bilateral lower leg swelling and increased shortness of breath for approx 2-3 days. EMS reported she was at her doctors appointment and her doctor recommended she should go to the ER. EMS found pt in the waiting room. Pt denied nausea, vomiting, abdominal pain, CP, lightheadedness, dizziness.

## 2025-05-28 ENCOUNTER — APPOINTMENT (OUTPATIENT)
Dept: CV DIAGNOSTICS | Facility: HOSPITAL | Age: 80
End: 2025-05-28
Attending: INTERNAL MEDICINE
Payer: MEDICARE

## 2025-05-28 ENCOUNTER — APPOINTMENT (OUTPATIENT)
Dept: PICC SERVICES | Facility: HOSPITAL | Age: 80
End: 2025-05-28
Attending: INTERNAL MEDICINE
Payer: MEDICARE

## 2025-05-28 LAB
ANION GAP SERPL CALC-SCNC: 8 MMOL/L (ref 0–18)
BASOPHILS # BLD AUTO: 0.05 X10(3) UL (ref 0–0.2)
BASOPHILS NFR BLD AUTO: 1.2 %
BUN BLD-MCNC: 41 MG/DL (ref 9–23)
BUN/CREAT SERPL: 19.3 (ref 10–20)
CALCIUM BLD-MCNC: 8.4 MG/DL (ref 8.7–10.4)
CHLORIDE SERPL-SCNC: 106 MMOL/L (ref 98–112)
CHOLEST SERPL-MCNC: 132 MG/DL (ref ?–200)
CO2 SERPL-SCNC: 28 MMOL/L (ref 21–32)
CREAT BLD-MCNC: 2.12 MG/DL (ref 0.55–1.02)
DEPRECATED RDW RBC AUTO: 61.8 FL (ref 35.1–46.3)
EGFRCR SERPLBLD CKD-EPI 2021: 23 ML/MIN/1.73M2 (ref 60–?)
EOSINOPHIL # BLD AUTO: 0.07 X10(3) UL (ref 0–0.7)
EOSINOPHIL NFR BLD AUTO: 1.7 %
ERYTHROCYTE [DISTWIDTH] IN BLOOD BY AUTOMATED COUNT: 17.9 % (ref 11–15)
GLUCOSE BLD-MCNC: 85 MG/DL (ref 70–99)
GLUCOSE BLDC GLUCOMTR-MCNC: 123 MG/DL (ref 70–99)
GLUCOSE BLDC GLUCOMTR-MCNC: 124 MG/DL (ref 70–99)
GLUCOSE BLDC GLUCOMTR-MCNC: 88 MG/DL (ref 70–99)
GLUCOSE BLDC GLUCOMTR-MCNC: 90 MG/DL (ref 70–99)
HCT VFR BLD AUTO: 28.5 % (ref 35–48)
HDLC SERPL-MCNC: 75 MG/DL (ref 40–59)
HGB BLD-MCNC: 8.8 G/DL (ref 12–16)
IMM GRANULOCYTES # BLD AUTO: 0.02 X10(3) UL (ref 0–1)
IMM GRANULOCYTES NFR BLD: 0.5 %
LDLC SERPL CALC-MCNC: 46 MG/DL (ref ?–100)
LYMPHOCYTES # BLD AUTO: 0.63 X10(3) UL (ref 1–4)
LYMPHOCYTES NFR BLD AUTO: 15.1 %
MAGNESIUM SERPL-MCNC: 2 MG/DL (ref 1.6–2.6)
MCH RBC QN AUTO: 33.3 PG (ref 26–34)
MCHC RBC AUTO-ENTMCNC: 30.9 G/DL (ref 31–37)
MCV RBC AUTO: 108 FL (ref 80–100)
MONOCYTES # BLD AUTO: 0.41 X10(3) UL (ref 0.1–1)
MONOCYTES NFR BLD AUTO: 9.9 %
NEUTROPHILS # BLD AUTO: 2.98 X10 (3) UL (ref 1.5–7.7)
NEUTROPHILS # BLD AUTO: 2.98 X10(3) UL (ref 1.5–7.7)
NEUTROPHILS NFR BLD AUTO: 71.6 %
NONHDLC SERPL-MCNC: 57 MG/DL (ref ?–130)
OSMOLALITY SERPL CALC.SUM OF ELEC: 303 MOSM/KG (ref 275–295)
PLATELET # BLD AUTO: 328 10(3)UL (ref 150–450)
POTASSIUM SERPL-SCNC: 4.4 MMOL/L (ref 3.5–5.1)
RBC # BLD AUTO: 2.64 X10(6)UL (ref 3.8–5.3)
SODIUM SERPL-SCNC: 142 MMOL/L (ref 136–145)
TRIGL SERPL-MCNC: 47 MG/DL (ref 30–149)
VLDLC SERPL CALC-MCNC: 7 MG/DL (ref 0–30)
WBC # BLD AUTO: 4.2 X10(3) UL (ref 4–11)

## 2025-05-28 PROCEDURE — 93306 TTE W/DOPPLER COMPLETE: CPT | Performed by: INTERNAL MEDICINE

## 2025-05-28 PROCEDURE — 99233 SBSQ HOSP IP/OBS HIGH 50: CPT | Performed by: HOSPITALIST

## 2025-05-28 RX ORDER — METOPROLOL TARTRATE 25 MG/1
25 TABLET, FILM COATED ORAL
Qty: 180 TABLET | Refills: 3 | Status: SHIPPED | OUTPATIENT
Start: 2025-05-28 | End: 2025-05-30

## 2025-05-28 RX ORDER — FUROSEMIDE 10 MG/ML
40 INJECTION INTRAMUSCULAR; INTRAVENOUS DAILY
Status: DISCONTINUED | OUTPATIENT
Start: 2025-05-28 | End: 2025-05-29

## 2025-05-28 NOTE — PHYSICAL THERAPY NOTE
PHYSICAL THERAPY EVALUATION - INPATIENT     Room Number: 301/301-A  Evaluation Date: 5/28/2025  Type of Evaluation: Initial   Physician Order: PT Eval and Treat    Presenting Problem: Acute CHF weakness  Co-Morbidities : RLE cellulitis, Dyspnea, CHC, Anemia, DM, CAD, Afib  Reason for Therapy: Mobility Dysfunction and Discharge Planning    PHYSICAL THERAPY ASSESSMENT   Patient is a 80 year old female admitted 5/27/2025 for Acute CHF weakness.  Prior to admission, patient's baseline is indep with all mobility and ADL's at home lives with daughter.  Patient is currently functioning below baseline with bed mobility, transfers, gait, stair negotiation, maintaining seated position, standing prolonged periods, and performing household tasks.  Patient is requiring stand-by assist as a result of the following impairments: decreased functional strength, decreased endurance/aerobic capacity, impaired standing balance, impaired coordination, decreased muscular endurance, and medical status.  Physical Therapy will continue to follow for duration of hospitalization.    Patient will benefit from continued skilled PT Services at discharge to promote prior level of function.  Anticipate patient will return home with home health PT.    PLAN DURING HOSPITALIZATION  Nursing Mobility Recommendation : 1 Assist  PT Device Recommendation: Rolling walker  PT Treatment Plan: Bed mobility, Body mechanics, Endurance, Energy conservation, Patient education, Range of motion, Strengthening, Transfer training, Balance training  Rehab Potential : Good  Frequency (Obs): 3-5x/week     PHYSICAL THERAPY MEDICAL/SOCIAL HISTORY   History related to current admission:     History is provided by patient/independent historian: Patient, EMS  80 year old female with history of atrial fibrillation, hypertension, hyperlipidemia, diabetes, on torsemide, here with complaints of increased bilateral lower extremity edema up to the thighs, and increased shortness of  breath over the last 2 to 3 days.  Aggravating factors are exertion, limiting factors are none.  Patient went to her regular doctor who sent her to the emergency department for further evaluation.  EMS reports her blood pressure was in the systolic 80s.     Problem List  Principal Problem:    Acute on chronic congestive heart failure, unspecified heart failure type (HCC)  Active Problems:    Hypervolemia    Cellulitis of right lower extremity      HOME SITUATION  Type of Home: House  Home Layout: One level  Stairs to Enter : 5   Railing: Yes              Lives With: Daughter, Family              Prior Level of [place]: Indep with all mobility and ADL's lives at home with with daughter.     SUBJECTIVE  I feel limited by weakness and shortness of breath walking but I can sit up in the chair a while for my lungs.     PHYSICAL THERAPY EXAMINATION   OBJECTIVE  Precautions: Bed/chair alarm  Fall Risk: Standard fall risk    WEIGHT BEARING RESTRICTION       PAIN ASSESSMENT  Ratin          COGNITION  Overall Cognitive Status:  WFL - within functional limits    RANGE OF MOTION AND STRENGTH ASSESSMENT  Upper extremity ROM and strength are within functional limits   Lower extremity ROM is within functional limits   Lower extremity strength is within functional limits 4/5    BALANCE  Static Sitting: Fair +  Dynamic Sitting: Fair  Static Standing: Fair -  Dynamic Standing: Fair -       O2 WALK  Oxygen Therapy  SPO2% on Oxygen at Rest: 93  At rest oxygen flow (liters per minute): 1.5  SPO2% Ambulation on Oxygen: 91  Ambulation oxygen flow (liters per minute): 1.5    AM-PAC '6-Clicks' INPATIENT SHORT FORM - BASIC MOBILITY  How much difficulty does the patient currently have...  Patient Difficulty: Turning over in bed (including adjusting bedclothes, sheets and blankets)?: A Little   Patient Difficulty: Sitting down on and standing up from a chair with arms (e.g., wheelchair, bedside commode, etc.): A Little   Patient  Difficulty: Moving from lying on back to sitting on the side of the bed?: A Little   How much help from another person does the patient currently need...   Help from Another: Moving to and from a bed to a chair (including a wheelchair)?: A Little   Help from Another: Need to walk in hospital room?: A Little   Help from Another: Climbing 3-5 steps with a railing?: A Lot     AM-PAC Score:  Raw Score: 17   Approx Degree of Impairment: 50.57%   Standardized Score (AM-PAC Scale): 42.13   CMS Modifier (G-Code): CK    FUNCTIONAL ABILITY STATUS  Functional Mobility/Gait Assessment  Gait Assistance: Supervision  Distance (ft): 20  Assistive Device: None  Pattern: Shuffle (prefers no RW decreased step length)  Rolling: supervision  Supine to Sit: supervision  Sit to Supine: stand-by assist  Sit to Stand: stand-by assist    Exercise/Education Provided:  Bed mobility  Body mechanics  Energy conservation  Functional activity tolerated  Gait training  Posture  Strengthening  Lower therapeutic exercise:  Ankle pumps  Heel slides  LAQ  Transfer training    Skilled Therapy Provided: Pt ed with bed mobility and transfers with SBA with line management. Pt is resistant to use a RW prefers walking with SBA likes to remain indep. Pt ed with gait progression amb 20' with shuffling unsteady gait decreased step length. Pt ed with therex in chair x 10 reps for LE strength and ROM. Pt will benefit from [facility] PT with family assist as medical progress allows. Pt self what she will do with PT d/t decreased endurance.     The patient's Approx Degree of Impairment: 50.57% has been calculated based on documentation in the [facility] '6 clicks' Inpatient Basic Mobility Short Form.  Research supports that patients with this level of impairment may benefit from [facility] PT with family assist.    Final disposition will be made by interdisciplinary medical team.    Patient End of Session: Up in chair, With  staff, Needs met, Call light within reach, RN aware of  session/findings, All patient questions and concerns addressed, Alarm set    CURRENT GOALS  Goals to be met by: 6/15/2025  Patient Goal Patient's self-stated goal is: Return home   Goal #1 Patient is able to demonstrate supine - sit EOB @ level: independent     Goal #1   Current Status    Goal #2 Patient is able to demonstrate transfers Sit to/from Stand at assistance level: independent with none     Goal #2  Current Status    Goal #3 Patient is able to ambulate 300 feet with assist device: none at assistance level: independent   Goal #3   Current Status    Goal #4 Patient will negotiate 5 stairs/one curb w/ assistive device and supervision   Goal #4   Current Status    Goal #5 Patient to demonstrate independence with home activity/exercise instructions provided to patient in preparation for discharge.   Goal #5   Current Status    Goal #6    Goal #6  Current Status      Patient Evaluation Complexity Level:  History Low - no personal factors and/or co-morbidities   Examination of body systems Low -  addressing 1-2 elements   Clinical Presentation Low- Stable   Clinical Decision Making  Low Complexity     Gait Trainin minutes

## 2025-05-28 NOTE — CM/SW NOTE
05/28/25 0900   CM/SW Referral Data   Referral Source Social Work (self-referral)   Reason for Referral Discharge planning   Informant Patient   Medical Hx   Does patient have an established PCP? Yes   Patient Info   Patient's Home Environment House   Number of Levels in Home 2   Number of Stair in Home 5   Patient lives with Daughter;Grandchild   Patient Status Prior to Admission   Independent with ADLs and Mobility Yes       Met with patient at bedside.     Verified home address and confirmed patient lives with family.   There are 2 levels, with 5 stairs to enter with side rails    She does have DME at home but was not using: walker, wheelchair, cane.     No recent HH services.     PT recommending HH.  Referrals for home health and [facility] was sent via [employer].     147 pm Met with patient at bedside. HH list provided. Pending choice.     SW/BETHANY to follow up     DC PLAN: Home with home health services-pending choice  and medical clearance    Mila ANGLIN RN Case manager

## 2025-05-28 NOTE — PROGRESS NOTES
Heart Failure Nurse  Progress Note    Patient was seen for Heart Failure Coaching on 12/9/24    Patient is alert and oriented, lives with her daughter Ariana. Patient was engaged in the conversation.     Patient is adherent to daily weight monitoring?                                      ___ yes   _X__ no    If no, barriers to daily weight monitoring: Patient said she will weigh herself daily moving forward, discussed the importance.     Patient is adherent to revieing the Symptom Tracker Worksheet daily?  ___ yes   __X_ no    If no, barriers to reviewing daily: Patient given a new copy, and reviewed with patient again.     Patient is following a 2000 mg sodium diet                                             _X__ yes  ___ no  Patient declined need for consult to dietician.     If no, barriers to 2000 mg sodium diet:    Patient is adherent to medication regimen                                              __X_ yes  ___ no    If no, barriers to medication regimen:    Patient went to follow-up appointment(s)      __X_ yes  ___ no    If no, barriers to attending appointment:    Instructed patient to let their nurse know if they have any questions or need reeducation regarding heart failure and their nurse can contact the Heart Failure Coaches who will come see them again.      Iman Mehta RN   Heart Failure   Extension [phone]

## 2025-05-28 NOTE — PLAN OF CARE
Problem: CARDIOVASCULAR - ADULT  Goal: Maintains optimal cardiac output and hemodynamic stability  Description: INTERVENTIONS:  - Monitor vital signs, rhythm, and trends  - Monitor for bleeding, hypotension and signs of decreased cardiac output  - Evaluate effectiveness of vasoactive medications to optimize hemodynamic stability  - Monitor arterial and/or venous puncture sites for bleeding and/or hematoma  - Assess quality of pulses, skin color and temperature  - Assess for signs of decreased coronary artery perfusion - ex. Angina  - Evaluate fluid balance, assess for edema, trend weights  Outcome: Progressing  Goal: Absence of cardiac arrhythmias or at baseline  Description: INTERVENTIONS:  - Continuous cardiac monitoring, monitor vital signs, obtain 12 lead EKG if indicated  - Evaluate effectiveness of antiarrhythmic and heart rate control medications as ordered  - Initiate emergency measures for life threatening arrhythmias  - Monitor electrolytes and administer replacement therapy as ordered  Outcome: Progressing     Problem: PAIN - ADULT  Goal: Verbalizes/displays adequate comfort level or patient's stated pain goal  Description: INTERVENTIONS:  - Encourage pt to monitor pain and request assistance  - Assess pain using appropriate pain scale  - Administer analgesics based on type and severity of pain and evaluate response  - Implement non-pharmacological measures as appropriate and evaluate response  - Consider cultural and social influences on pain and pain management  - Manage/alleviate anxiety  - Utilize distraction and/or relaxation techniques  - Monitor for opioid side effects  - Notify MD/LIP if interventions unsuccessful or patient reports new pain  - Anticipate increased pain with activity and pre-medicate as appropriate  Outcome: Progressing     Problem: RISK FOR INFECTION - ADULT  Goal: Absence of fever/infection during anticipated neutropenic period  Description: INTERVENTIONS  - Monitor WBC  -  Administer growth factors as ordered  - Implement neutropenic guidelines  Outcome: Progressing     Problem: RESPIRATORY - ADULT  Goal: Achieves optimal ventilation and oxygenation  Description: INTERVENTIONS:  - Assess for changes in respiratory status  - Assess for changes in mentation and behavior  - Position to facilitate oxygenation and minimize respiratory effort  - Oxygen supplementation based on oxygen saturation or ABGs  - Provide Smoking Cessation handout, if applicable  - Encourage broncho-pulmonary hygiene including cough, deep breathe, Incentive Spirometry  - Assess the need for suctioning and perform as needed  - Assess and instruct to report SOB or any respiratory difficulty  - Respiratory Therapy support as indicated  - Manage/alleviate anxiety  - Monitor for signs/symptoms of CO2 retention  Outcome: Progressing

## 2025-05-28 NOTE — PROGRESS NOTES
Seen in follow-up.  Office notes reviewed.  Patient states that she had been compliant with her torsemide although according to the EMR it says that it was filled in December but patient states that she had it filled from somewhere else.  She states that she has been compliant with Farxiga.  She is off spironolactone.  Over the long weekend patient had been noncompliant with salt intake.    Vitals:    05/28/25 0517   BP: 115/70   Pulse: 88   Resp: 17   Temp: 98 °F (36.7 °C)       Intake/Output Summary (Last 24 hours) at 5/28/2025 0704  Last data filed at 5/28/2025 0400  Gross per 24 hour   Intake 100 ml   Output 1400 ml   Net -1300 ml     Wt Readings from Last 1 Encounters:   05/28/25 187 lb (84.8 kg)        General: No acute distress.  Neck: Jugular venous pulsations high.  Lungs: Clear to auscultation.  Heart: Normal rate. No murmurs.  Abdomen: Soft. Non tender  Extremities: Bilateral edema.  Neurological: Alert. No focal deficits.  Psychiatric: Appropriate mood and affect.  Current Facility-Administered Medications   Medication Dose Route Frequency    furosemide (Lasix) 10 mg/mL injection 40 mg  40 mg Intravenous Daily    ceFAZolin (Ancef) 2g in 10mL IV syringe premix  2 g Intravenous Q12H    ipratropium-albuterol (Duoneb) 0.5-2.5 (3) MG/3ML inhalation solution 3 mL  3 mL Nebulization Q6H PRN    apixaban (Eliquis) tab 2.5 mg  2.5 mg Oral q12h    atorvastatin (Lipitor) tab 10 mg  10 mg Oral Nightly    metoprolol succinate ER (Toprol XL) 24 hr tab 50 mg  50 mg Oral Daily Beta Blocker    pantoprazole (Protonix) DR tab 40 mg  40 mg Oral BID AC    sertraline (Zoloft) tab 100 mg  100 mg Oral Daily    glucose (Dex4) 15 GM/59ML oral liquid 15 g  15 g Oral Q15 Min PRN    Or    glucose (Glutose) 40% oral gel 15 g  15 g Oral Q15 Min PRN    Or    glucose-vitamin C (Dex-4) chewable tab 4 tablet  4 tablet Oral Q15 Min PRN    Or    dextrose 50% injection 50 mL  50 mL Intravenous Q15 Min PRN    Or    glucose (Dex4) 15  GM/59ML oral liquid 30 g  30 g Oral Q15 Min PRN    Or    glucose (Glutose) 40% oral gel 30 g  30 g Oral Q15 Min PRN    Or    glucose-vitamin C (Dex-4) chewable tab 8 tablet  8 tablet Oral Q15 Min PRN    acetaminophen (Tylenol Extra Strength) tab 500 mg  500 mg Oral Q4H PRN    acetaminophen (Tylenol) tab 650 mg  650 mg Oral Q4H PRN    Or    HYDROcodone-acetaminophen (Norco) 5-325 MG per tab 1 tablet  1 tablet Oral Q4H PRN    Or    HYDROcodone-acetaminophen (Norco) 5-325 MG per tab 2 tablet  2 tablet Oral Q4H PRN    ondansetron (Zofran) 4 MG/2ML injection 4 mg  4 mg Intravenous Q6H PRN    polyethylene glycol (PEG 3350) (Miralax) 17 g oral packet 17 g  17 g Oral Daily PRN    sennosides (Senokot) tab 17.2 mg  17.2 mg Oral Nightly PRN    bisacodyl (Dulcolax) 10 MG rectal suppository 10 mg  10 mg Rectal Daily PRN    insulin aspart (NovoLOG) 100 Units/mL FlexPen 1-5 Units  1-5 Units Subcutaneous TID CC and HS     Medications Prior to Admission   Medication Sig    metoprolol succinate ER 50 MG Oral Tablet 24 Hr Take 1 tablet (50 mg total) by mouth daily.    atorvastatin 10 MG Oral Tab Take 1 tablet (10 mg total) by mouth nightly.    sildenafil 20 MG Oral Tab Take 1 tablet (20 mg total) by mouth 3 (three) times daily.    apixaban 2.5 MG Oral Tab Take 1 tablet (2.5 mg total) by mouth every 12 (twelve) hours.    FARXIGA 10 MG Oral Tab Take 1 tablet (10 mg total) by mouth daily.    sertraline 100 MG Oral Tab Take 1 tablet (100 mg total) by mouth daily.    ketoconazole 2 % External Shampoo APPLY TO AFFECTED AREAS FOR 5 MINUTES THEN WASH OFF ONCE DAILY FOR 5-7 DAYS THEN EVERY 2-3 DAYS FOR MAINTENANCE as NEEDED DEPENDING ON YOUR SYMPTOMS.    torsemide 20 MG Oral Tab Take 1 tablet (20 mg total) by mouth daily. (Patient taking differently: Take 1 tablet (20 mg total) by mouth 2 (two) times daily.)    acetaminophen 500 MG Oral Tab Take 1 tablet (500 mg total) by mouth every 6 (six) hours as needed.    pantoprazole 40 MG Oral Tab EC  Take 1 tablet (40 mg total) by mouth 2 (two) times daily before meals.    cyanocobalamin 1000 MCG Oral Tab Take 1 tablet (1,000 mcg total) by mouth in the morning.    metoprolol tartrate 25 MG Oral Tab Take 1 tablet (25 mg total) by mouth 2x Daily(Beta Blocker). (Patient not taking: Reported on 5/27/2025)     XR CHEST AP PORTABLE  (CPT=71045)  Result Date: 5/27/2025  CONCLUSION:   Cardiomegaly with mild bilateral interstitial opacity likely edema.  Moderate right pleural effusion with mild adjacent parenchymal opacity which may reflect atelectasis with or without superimposed pneumonia.  Small left pleural effusion.     Dictated by (CST): Wilian James MD on 5/27/2025 at 6:32 PM     Finalized by (CST): Wilian James MD on 5/27/2025 at 6:33 PM          Lab Results   Component Value Date    WBC 6.9 05/27/2025    HGB 9.9 05/27/2025    HCT 32.1 05/27/2025    .0 05/27/2025    CREATSERUM 2.39 05/27/2025    BUN 42 05/27/2025     05/27/2025    K 5.4 05/27/2025     05/27/2025    CO2 26.0 05/27/2025    GLU 93 05/27/2025    CA 8.6 05/27/2025    ALB 3.5 05/27/2025    ALKPHO 107 05/27/2025    BILT 0.8 05/27/2025    TP 6.5 05/27/2025    AST 58 05/27/2025    ALT 19 05/27/2025       IMPRESSION:   Acute on chronic congestive heart failure, unspecified heart failure type (HCC)  Patient has acute on chronic Diastolic heart failure with normal systolic function and Aortic bioprosthesis with perivalvular regurgitation, also Mild MS with Mod TR with Severe Pulm HTN            Cellulitis of right lower extremity  Patient has chronic bilat LE edema with now with cellulitis     Chronic AFIB  H/O PE/DVT  Anemia of chronic disease and blood loss  CKD4     PLAN:  Proceed with repeat echocardiogram.  Change furosemide to 40 mg IV once daily.  Rising creatinine noted.  Outpatient can resume the Farxiga again.  Low-salt diet discussed.  Probably stay 1 more day here and switch to oral torsemide 40 mg daily in the form  of 20 mg tablets.  Continue with Eliquis anticoagulation.  Continue with beta-blocker.

## 2025-05-28 NOTE — DISCHARGE INSTRUCTIONS
Going Home Instructions  In this section you will find the tools which will guide you through the first few days after you leave the hospital. Continued use of these tools will help you develop the skills necessary to keep your heart failure under control.     Home Care Instructions Following Heart Failure - the most important things to do every day include:   Weigh yourself and review the “Self-Check Plan” sheet every morning.   Call your cardiologist office if you are in the “Pay Attention-Use Caution” (yellow zone) or “Medical Alert-Warning!” (red zone) as outlined in the Self-Check Plan sheet.  Take your medicines as prescribed.  Limit your sodium (salt) intake.  Know when to call your cardiologist, primary doctor, or nurse.  Know when to seek emergency care.      Things for You to Remember:   1. See your doctor or healthcare provider as written on your discharge instructions.  It is important that you attend this appointment to make sure your symptoms are under control.     2. Your recommended sodium intake is 4701-4797 mg daily.    3.  Weigh yourself every day.    4. Some exercise and activity is important to help keep your heart functioning and strong. Unless instructed not to exercise, you may walk at a slow to moderate pace for 10-15 minutes 2-3 days per week to start. Pace your activity to prevent shortness of breath or fatigue. Stop exercising if you develop chest pain, lightheadedness, or significant shortness of breath.       Call Your Cardiologist If:   You gain 2-3 pounds in one day or 5 pounds in one week.  You have more difficulty breathing.  You are getting more tired with normal activity.  You are more short of breath lying down, or awaken at night short of breath.  You have swelling of your feet or legs.  You urinate less often during the day and more often at night.  You have cramps in your legs.  You have blurred vision or see yellowish-green halos around objects of lights.    Go to the  Emergency Room If:   You have pain or tightness in your chest  You are extremely short of breath  You are coughing up pink-frothy mucus  You are traveling and develop symptoms of worsening heart failure      ** Please follow up with your cardiologist or Advanced Practice Provider as written on your discharge instructions. If you are not provided with an appointment, let your nurse know so you can get an appointment**      Home Health: Sometimes managing your health at home requires assistance.  The [facility] team has recognized your preference to use [facility].  A representative from the home health agency will contact you or your family to schedule your first visit.      [address] [address]  Phone: [phone]  Fax: [phone]      Home Oxygen;  [facility]  [address] [place] [address]  Phone: [phone]  Fax: [phone]

## 2025-05-29 LAB
ANION GAP SERPL CALC-SCNC: 7 MMOL/L (ref 0–18)
BASOPHILS # BLD AUTO: 0.05 X10(3) UL (ref 0–0.2)
BASOPHILS NFR BLD AUTO: 1.1 %
BNP SERPL-MCNC: 936 PG/ML (ref ?–100)
BUN BLD-MCNC: 42 MG/DL (ref 9–23)
BUN/CREAT SERPL: 20.3 (ref 10–20)
CALCIUM BLD-MCNC: 8.5 MG/DL (ref 8.7–10.4)
CHLORIDE SERPL-SCNC: 105 MMOL/L (ref 98–112)
CO2 SERPL-SCNC: 30 MMOL/L (ref 21–32)
CREAT BLD-MCNC: 2.07 MG/DL (ref 0.55–1.02)
DEPRECATED RDW RBC AUTO: 61.6 FL (ref 35.1–46.3)
EGFRCR SERPLBLD CKD-EPI 2021: 23 ML/MIN/1.73M2 (ref 60–?)
EOSINOPHIL # BLD AUTO: 0.08 X10(3) UL (ref 0–0.7)
EOSINOPHIL NFR BLD AUTO: 1.8 %
ERYTHROCYTE [DISTWIDTH] IN BLOOD BY AUTOMATED COUNT: 17.4 % (ref 11–15)
GLUCOSE BLD-MCNC: 88 MG/DL (ref 70–99)
GLUCOSE BLDC GLUCOMTR-MCNC: 100 MG/DL (ref 70–99)
GLUCOSE BLDC GLUCOMTR-MCNC: 109 MG/DL (ref 70–99)
GLUCOSE BLDC GLUCOMTR-MCNC: 113 MG/DL (ref 70–99)
GLUCOSE BLDC GLUCOMTR-MCNC: 119 MG/DL (ref 70–99)
GLUCOSE BLDC GLUCOMTR-MCNC: 63 MG/DL (ref 70–99)
GLUCOSE BLDC GLUCOMTR-MCNC: 69 MG/DL (ref 70–99)
GLUCOSE BLDC GLUCOMTR-MCNC: 74 MG/DL (ref 70–99)
HCT VFR BLD AUTO: 29.7 % (ref 35–48)
HGB BLD-MCNC: 9.2 G/DL (ref 12–16)
IMM GRANULOCYTES # BLD AUTO: 0.01 X10(3) UL (ref 0–1)
IMM GRANULOCYTES NFR BLD: 0.2 %
LYMPHOCYTES # BLD AUTO: 0.61 X10(3) UL (ref 1–4)
LYMPHOCYTES NFR BLD AUTO: 13.7 %
MCH RBC QN AUTO: 33.9 PG (ref 26–34)
MCHC RBC AUTO-ENTMCNC: 31 G/DL (ref 31–37)
MCV RBC AUTO: 109.6 FL (ref 80–100)
MONOCYTES # BLD AUTO: 0.41 X10(3) UL (ref 0.1–1)
MONOCYTES NFR BLD AUTO: 9.2 %
NEUTROPHILS # BLD AUTO: 3.29 X10 (3) UL (ref 1.5–7.7)
NEUTROPHILS # BLD AUTO: 3.29 X10(3) UL (ref 1.5–7.7)
NEUTROPHILS NFR BLD AUTO: 74 %
OSMOLALITY SERPL CALC.SUM OF ELEC: 304 MOSM/KG (ref 275–295)
PLATELET # BLD AUTO: 329 10(3)UL (ref 150–450)
POTASSIUM SERPL-SCNC: 4.8 MMOL/L (ref 3.5–5.1)
RBC # BLD AUTO: 2.71 X10(6)UL (ref 3.8–5.3)
SODIUM SERPL-SCNC: 142 MMOL/L (ref 136–145)
WBC # BLD AUTO: 4.5 X10(3) UL (ref 4–11)

## 2025-05-29 PROCEDURE — 99233 SBSQ HOSP IP/OBS HIGH 50: CPT | Performed by: HOSPITALIST

## 2025-05-29 RX ORDER — FUROSEMIDE 10 MG/ML
40 INJECTION INTRAMUSCULAR; INTRAVENOUS
Status: DISCONTINUED | OUTPATIENT
Start: 2025-05-29 | End: 2025-05-30

## 2025-05-29 RX ORDER — TORSEMIDE 20 MG/1
40 TABLET ORAL DAILY
Status: DISCONTINUED | OUTPATIENT
Start: 2025-05-29 | End: 2025-05-29

## 2025-05-29 NOTE — OCCUPATIONAL THERAPY NOTE
OCCUPATIONAL THERAPY EVALUATION - INPATIENT     Room Number: 301/301-A  Evaluation Date: 5/29/2025  Type of Evaluation: Initial  Presenting Problem: Acute on chronic congestive heart failure (BLE edema (RLE cellulitis) and SOB, h/o DM, Afib, HLD, HTN, Raynauds)    Physician Order: IP Consult to Occupational Therapy  Reason for Therapy: ADL/IADL Dysfunction and Discharge Planning    OCCUPATIONAL THERAPY ASSESSMENT   RN cleared pt for participation in OT session. Upon arrival, pt was supine in bed and agreeable to activity. Pt was left in bedside chair and alarm was activated. Call light and all needs left in reach. Handoff given to RN.    Patient is a 80 year old female admitted 5/27/2025 for BLE edema, SOB, Acute CHF.  Prior to admission, pt was I with all ADLs and IADLs without the use of A.D.  Patient is currently requiring up to SBA for ADLs overall along with for supine to sit, sup for sitting at EOB, SBA for STS, and for functional transfer with RW. Pt tolerated about 2 minutes of standing.  Pt has the following impairments: decreased endurance, pain, impaired standing balance, decreased muscular endurance, medical status, and increased O2 needs from baseline.    ACTIVITY TOLERANCE  Pulse: 95  Heart Rate Source: Monitor  Oxygen Therapy  SPO2% on Oxygen at Rest: 94  At rest oxygen flow (liters per minute): 1.5    Education provided  Educated pt about role of OT and hospital therapy process as well as proper safety techniques including proper hand placement, body mechanics, safety techniques, importance of repositioning, energy conservation strategies (AE for bathing and purchasing information). Pt/family verbalized/demonstrated good carryover.    Patient will benefit from continued skilled OT Services at discharge to promote prior level of function and safety with additional support and return home with home health OT    PLAN  OT Treatment Plan: Balance activities;Energy conservation/work simplification  techniques;Continued evaluation;Compensatory technique education;Fine motor coordination activities;Neuromuscluar reeducation;Equipment eval/education;Patient/Family training;Patient/Family education;Cognitive reorientation;Endurance training;UE strengthening/ROM;Functional transfer training;Visual perceptual training;IADL training;ADL training      OCCUPATIONAL THERAPY MEDICAL/SOCIAL HISTORY     Problem List  Principal Problem:    Acute on chronic congestive heart failure, unspecified heart failure type (HCC)  Active Problems:    Hypervolemia    Cellulitis of right lower extremity    Past Medical History  Past Medical History[1]    Past Surgical History  Past Surgical History[2]    HOME SITUATION  Type of Home: House  Home Layout: One level  Lives With: Family (daughter, granddaughter, great granddaughter)     Toilet and Equipment: Standard height toilet  Shower/Tub and Equipment: Walk-in shower  Other Equipment: None    SUBJECTIVE  \"My feet hurt from the Raynauds\"    OCCUPATIONAL THERAPY EXAMINATION      OBJECTIVE  Precautions: Bed/chair alarm  Fall Risk: Standard fall risk    PAIN ASSESSMENT  Rating: Unable to rate  Location: BLE    COGNITION  Alert and oriented X4    RANGE OF MOTION   Upper extremity ROM is within functional limits     STRENGTH ASSESSMENT  Upper extremity strength is within functional limits     COORDINATION  Gross Motor: WFL   Fine Motor: WFL     ACTIVITIES OF DAILY LIVING ASSESSMENT  AM-PAC ‘6-Clicks’ Inpatient Daily Activity Short Form  How much help from another person does the patient currently need…  -   Putting on and taking off regular lower body clothing?: A Little  -   Bathing (including washing, rinsing, drying)?: A Little  -   Toileting, which includes using toilet, bedpan or urinal? : A Little  -   Putting on and taking off regular upper body clothing?: A Little  -   Taking care of personal grooming such as brushing teeth?: A Little  -   Eating meals?: None    AM-PAC Score:  Score:  19  Approx Degree of Impairment: 42.8%  Standardized Score (AM-PAC Scale): 40.22  CMS Modifier (G-Code): CK       FUNCTIONAL TRANSFER ASSESSMENT  Sit to supine: SBA  STS: SBA  Toilet Transfer: SBA    FUNCTIONAL ADL ASSESSMENT  Feeding: I         Cut, scoop/chen, bring to mouth, swallow    Grooming: SBA for standing grooming      Bathing: SBA for standing bathing     UB dressing: Set up assist to retrieve clothing     LB dressing: Set up-SBA for retrieval and socks/shoes    Toileting: SBA for clothing management while standing     The patient's Approx Degree of Impairment: 42.8% has been calculated based on documentation in the [facility] '6 clicks' Inpatient Daily Activity Short Form.  Research supports that patients with this level of impairment may benefit from home health. Final disposition will be made by interdisciplinary medical team.    OT Goals  Patients self stated goal is: none stated      Patient will complete functional transfer with Sup  Comment:     Patient will complete toileting with Sup  Comment:     Patient will tolerate standing for 3 minutes in prep for adls with Sup   Comment:          Goals  on: 06/15/25  Frequency: 3-5x/week    Patient Evaluation Complexity Level:   Occupational Profile/Medical History MODERATE - Expanded review of history including review of medical or therapy record   Specific performance deficits impacting engagement in ADL/IADL MODERATE  3 - 5 performance deficits   Client Assessment/Performance Deficits MODERATE - Comorbidities and min to mod modifications of tasks    Clinical Decision Making MODERATE - Analysis of occupational profile, detailed assessments, several treatment options    Overall Complexity MODERATE     OT Session Time: 15 minutes  Self-Care Home Management: 8 minutes    LEXUS Del [facility]ario [facility]  Inpatient Rehabilitation  Occupational Therapy  [phone]         [1]   Past Medical History:   Arrhythmia    afib     Back problem    lumbar disc disease    Basal cell carcinoma of nose    Bilateral carpal tunnel syndrome    Cataract    Cellulitis    Deep vein thrombosis (HCC)    unsure which leg, possibly left    Depression    Esophageal reflux    Essential hypertension    Heart disease    Heel spur    Right    Hemorrhoids    Hiatal hernia    High blood pressure    High cholesterol    Hyperlipidemia    Incontinence    Lumbar disc disease    Multiple gastric ulcers    Osteoarthritis    Pulmonary embolism (HCC)    Raynaud disease    Renal disorder    Tubular adenoma of colon    repeat c-scope 1/2020    Type 2 diabetes mellitus with diabetic chronic kidney disease (HCC)    Visual impairment    glasses   [2]   Past Surgical History:  Procedure Laterality Date    Angioplasty (coronary)      Appendectomy      Arthroscopy of joint unlisted Right     knee    Bso, omentectomy w/sushil      Carpal tunnel release Bilateral     Cholecystectomy  09/28/2016    Colonoscopy N/A 01/25/2017    Procedure: COLONOSCOPY;  Surgeon: KATHARINE Prakash MD;  Location: [facility] ENDOSCOPY    Colonoscopy N/A 10/07/2020    Procedure: COLONOSCOPY;  Surgeon: KATHARINE Prakash MD;  Location: [facility] ENDOSCOPY    Colonoscopy N/A 7/31/2024    Procedure: COLONOSCOPY;  Surgeon: Alyssa Orellana MD;  Location: [facility] ENDOSCOPY    Egd N/A 12/14/2016    Procedure: ESOPHAGOGASTRODUODENOSCOPY (EGD);  Surgeon: KATHARINE Prakash MD;  Location: [facility] ENDOSCOPY    Egd N/A 01/23/2024    ; gastritis    Egd N/A 07/27/2024    ; retained food    Electrocardiogram, complete  09/26/2013    Scanned to Media Tab    Excision of nose      Basal cell carcinoma    Hernia surgery      Hysterectomy      Knee replacement surgery Right     Other surgical history      Injections and narcotics, POD Bartuci    Removal of heel spur      Total abdom hysterectomy

## 2025-05-29 NOTE — PROGRESS NOTES
Patient A&ox3, VSS on 1LO2NC. Patient's O2 sat= 85% on room air at rest. MD aware. Patient has crackles to left lung, diminished lung sounds, patient is back on IV lasix at this time and will not discharge today.  POC communicated to patient and understanding verbalized.

## 2025-05-29 NOTE — PROGRESS NOTES
[facility]  part of [facility]    Progress Note    Yamini Ocampo Patient Status:  Inpatient    1945 MRN [id number]   Location [facility] 3W/SW Attending Steffany Waddell,    Hosp Day # 2 PCP Elly Antonio MD     Chief complaint sob     Subjective:   Yamini Ocampo is a(n) 80 year old female pt feeling better today wants to go home   Still on oxygen   85% on ra at rest     ROS:   No cp, sob   No c/d   No n/v     Objective:     Blood pressure 134/65, pulse 95, temperature 97.8 °F (36.6 °C), temperature source Oral, resp. rate 18, height 5' 6\" (1.676 m), weight 183 lb 9.6 oz (83.3 kg), SpO2 (!) 85%.      Intake/Output Summary (Last 24 hours) at 2025 1322  Last data filed at 2025 1204  Gross per 24 hour   Intake 831 ml   Output 1175 ml   Net -344 ml       Patient Weight(s) for the past 336 hrs:   Weight   25 0652 183 lb 9.6 oz (83.3 kg)   25 0400 187 lb (84.8 kg)   25 1827 202 lb (91.6 kg)   25 1531 202 lb (91.6 kg)   25 1356 180 lb (81.6 kg)           General appearance: alert, appears stated age and cooperative  Pulmonary:  clear to auscultation bilaterally  Cardiovascular: S1, S2 normal, no murmur, click, rub or gallop, regular rate and rhythm  Abdominal: soft, non-tender; bowel sounds normal; no masses,  no organomegaly  Extremities: 1 + edema b/l  atraumatic, no cyanosis or edema        Medicines:     Current Hospital Medications[1]            Blood Sugar Medications            FARXIGA 10 MG Oral Tab            Blood Pressure and Cardiac Medications            metoprolol tartrate 25 MG Oral Tab    metoprolol succinate ER 50 MG Oral Tablet 24 Hr    sildenafil 20 MG Oral Tab            Medication Infusions[2]        Lab Results   Component Value Date    WBC 4.5 2025    HGB 9.2 (L) 2025    HCT 29.7 (L) 2025    .0 2025    CREATSERUM 2.07 (H) 2025    BUN 42 (H) 2025     2025    K 4.8  05/29/2025     05/29/2025    CO2 30.0 05/29/2025    GLU 88 05/29/2025    CA 8.5 (L) 05/29/2025    ALB 3.5 05/27/2025    ALKPHO 107 05/27/2025    BILT 0.8 05/27/2025    TP 6.5 05/27/2025    AST 58 (H) 05/27/2025    ALT 19 05/27/2025    PTT 29.0 08/12/2024    INR 1.31 (H) 08/12/2024    TSH 2.130 05/04/2021    LIP 26 08/12/2024    ESRML 35 (H) 06/14/2017    CRP 0.7 06/14/2017     (H) 06/10/2011    MG 2.0 05/28/2025    PHOS 4.0 12/10/2024    TROP <0.045 05/10/2019    CK 20 (L) 09/05/2017    B12 626 12/20/2023       XR CHEST AP PORTABLE  (CPT=71045)  Result Date: 5/27/2025  CONCLUSION:   Cardiomegaly with mild bilateral interstitial opacity likely edema.  Moderate right pleural effusion with mild adjacent parenchymal opacity which may reflect atelectasis with or without superimposed pneumonia.  Small left pleural effusion.     Dictated by (CST): Wilian James MD on 5/27/2025 at 6:32 PM     Finalized by (CST): Wilian James MD on 5/27/2025 at 6:33 PM          EKG 12 Lead  Result Date: 5/27/2025  Atrial fibrillation Right axis deviation Possible Right ventricular hypertrophy Septal infarct (cited on or before 12-AUG-2024) Abnormal ECG When compared with ECG of 05-DEC-2024 16:36, Nonspecific T wave abnormality, improved in Anterior-lateral leads Confirmed by MAYTE TOMLINSON, AURA ([id number]) on 5/27/2025 2:13:35 PM      Results:     CBC:    Lab Results   Component Value Date    WBC 4.5 05/29/2025    WBC 4.2 05/28/2025    WBC 6.9 05/27/2025     Lab Results   Component Value Date    HGB 9.2 (L) 05/29/2025    HGB 8.8 (L) 05/28/2025    HGB 9.9 (L) 05/27/2025      Lab Results   Component Value Date    .0 05/29/2025    .0 05/28/2025    .0 05/27/2025       Recent Labs   Lab 05/27/25  1358 05/27/25  1454 05/28/25  0756 05/29/25  0622   GLU 93  --  85 88   BUN 40* 42* 41* 42*   CREATSERUM 2.39*  --  2.12* 2.07*   CA 8.6*  --  8.4* 8.5*     --  142 142   K  --  5.4* 4.4 4.8     --   106 105   CO2 26.0  --  28.0 30.0           Assessment and Plan:       Acute on chronic heart failure with preserved ejection fraction   - Last known EF 65-70 from echocardiogram in December 2024  -Patient presenting with progressive shortness of breath and lower extremity swelling.  Noted to have grossly elevated BNP.  - cont diuresis with Lasix 40mg IV BID  - Check Echo - ef 65% no wma, rv increased in size, left atrium volume increased, biopros valve aortic valvemild regrug  mod to sev TR , pulm art pressure increased to 83   - Strict I&Os, Daily weights, Fluid restriction  - Cardiology on consult, appreciate recs.   - wean oxygen, check o 2 with walking      Acute Kidney Injury on CKD  III   - cont Diuresis - improved   - Avoiding Nephrotoxic agents  - Cont to monitor     Possible cellulitis  - Patient with increased lower extremity swelling, redness and warmth, worse over right lower extremity.  - cont cefazolin day #3  - Elevation of limb as able  - Diuretics as above  - Continue to monitor     Chronic Atrial Fibrillation  -Rates currently controlled  -Continue metoprolol for rate control  -Continue chronic anticoagulation with eliquis  -Telemetry monitoring  -Cardiology on consult, appreciate further recs     Type 2 DM   - Monitoring Blood glucose with POC checks  - SSI to cover hyperglycemia  - Hypoglycemia protocol  - Will monitor and adjust agents as needed.         HTN  - controlled  - CPM  - Monitor and adjust as needed                    Steffany Waddell DO         Chart reviewed, including current vitals, notes, labs and imaging  Labs ordered and medications adjusted as outlined above  Coordinate care with care team/consultants  Discussed with patient results of tests, management plan as outlined above, and the need for ongoing hospitalization  D/w RN     MDM high          PHYSICIAN Certification of Need for Inpatient Hospitalization - Initial Certification    Patient will require inpatient services  that will reasonably be expected to span two midnight's based on the clinical documentation in H+P.   Based on patients current state of illness, I anticipate that, after discharge, patient will require TBD.        Supplementary Documentation:   DVT Mechanical Prophylaxis:   SCDs,    DVT Pharmacologic Prophylaxis   Medication    apixaban (Eliquis) tab 2.5 mg                Code Status: Full Code  Garber: External urinary catheter in place  Garber Duration (in days):   Central line:    ABNER: 5/29/2025        **Certification      PHYSICIAN Certification of Need for Inpatient Hospitalization - Initial Certification    Patient will require inpatient services that will reasonably be expected to span two midnight's based on the clinical documentation in H+P.   Based on patients current state of illness, I anticipate that, after discharge, patient will require TBD.                    [1]   Current Facility-Administered Medications   Medication Dose Route Frequency    furosemide (Lasix) 10 mg/mL injection 40 mg  40 mg Intravenous BID (Diuretic)    ceFAZolin (Ancef) 2g in 10mL IV syringe premix  2 g Intravenous Q12H    ipratropium-albuterol (Duoneb) 0.5-2.5 (3) MG/3ML inhalation solution 3 mL  3 mL Nebulization Q6H PRN    apixaban (Eliquis) tab 2.5 mg  2.5 mg Oral q12h    atorvastatin (Lipitor) tab 10 mg  10 mg Oral Nightly    metoprolol succinate ER (Toprol XL) 24 hr tab 50 mg  50 mg Oral Daily Beta Blocker    pantoprazole (Protonix) DR tab 40 mg  40 mg Oral BID AC    sertraline (Zoloft) tab 100 mg  100 mg Oral Daily    glucose (Dex4) 15 GM/59ML oral liquid 15 g  15 g Oral Q15 Min PRN    Or    glucose (Glutose) 40% oral gel 15 g  15 g Oral Q15 Min PRN    Or    glucose-vitamin C (Dex-4) chewable tab 4 tablet  4 tablet Oral Q15 Min PRN    Or    dextrose 50% injection 50 mL  50 mL Intravenous Q15 Min PRN    Or    glucose (Dex4) 15 GM/59ML oral liquid 30 g  30 g Oral Q15 Min PRN    Or    glucose (Glutose) 40% oral gel 30 g  30 g  Oral Q15 Min PRN    Or    glucose-vitamin C (Dex-4) chewable tab 8 tablet  8 tablet Oral Q15 Min PRN    acetaminophen (Tylenol Extra Strength) tab 500 mg  500 mg Oral Q4H PRN    acetaminophen (Tylenol) tab 650 mg  650 mg Oral Q4H PRN    Or    HYDROcodone-acetaminophen (Norco) 5-325 MG per tab 1 tablet  1 tablet Oral Q4H PRN    Or    HYDROcodone-acetaminophen (Norco) 5-325 MG per tab 2 tablet  2 tablet Oral Q4H PRN    ondansetron (Zofran) 4 MG/2ML injection 4 mg  4 mg Intravenous Q6H PRN    polyethylene glycol (PEG 3350) (Miralax) 17 g oral packet 17 g  17 g Oral Daily PRN    sennosides (Senokot) tab 17.2 mg  17.2 mg Oral Nightly PRN    bisacodyl (Dulcolax) 10 MG rectal suppository 10 mg  10 mg Rectal Daily PRN    insulin aspart (NovoLOG) 100 Units/mL FlexPen 1-5 Units  1-5 Units Subcutaneous TID CC and HS   [2]

## 2025-05-29 NOTE — PROGRESS NOTES
Physician Clarification    Additional information related to the patient's condition    TATIANA on CKD4    This note is part of the patient's medical record.

## 2025-05-29 NOTE — PLAN OF CARE
A&O4. On O2 supplement (see chart). On IV abx. Safety precautions in place.     Problem: Patient Centered Care  Goal: Patient preferences are identified and integrated in the patient's plan of care  Description: Interventions:  - What would you like us to know as we care for you? Im from home   - Provide timely, complete, and accurate information to patient/family  - Incorporate patient and family knowledge, values, beliefs, and cultural backgrounds into the planning and delivery of care  - Encourage patient/family to participate in care and decision-making at the level they choose  - Honor patient and family perspectives and choices  Outcome: Progressing     Problem: Patient/Family Goals  Goal: Patient/Family Long Term Goal  Description: Patient's Long Term Goal: Get stronger     Interventions:  - See additional Care Plan goals for specific interventions  Outcome: Progressing  Goal: Patient/Family Short Term Goal  Description: Patient's Short Term Goal: Go home     Interventions:   - See additional Care Plan goals for specific interventions  Outcome: Progressing     Problem: CARDIOVASCULAR - ADULT  Goal: Maintains optimal cardiac output and hemodynamic stability  Description: INTERVENTIONS:  - Monitor vital signs, rhythm, and trends  - Monitor for bleeding, hypotension and signs of decreased cardiac output  - Evaluate effectiveness of vasoactive medications to optimize hemodynamic stability  - Monitor arterial and/or venous puncture sites for bleeding and/or hematoma  - Assess quality of pulses, skin color and temperature  - Assess for signs of decreased coronary artery perfusion - ex. Angina  - Evaluate fluid balance, assess for edema, trend weights  Outcome: Progressing  Goal: Absence of cardiac arrhythmias or at baseline  Description: INTERVENTIONS:  - Continuous cardiac monitoring, monitor vital signs, obtain 12 lead EKG if indicated  - Evaluate effectiveness of antiarrhythmic and heart rate control medications  as ordered  - Initiate emergency measures for life threatening arrhythmias  - Monitor electrolytes and administer replacement therapy as ordered  Outcome: Progressing     Problem: PAIN - ADULT  Goal: Verbalizes/displays adequate comfort level or patient's stated pain goal  Description: INTERVENTIONS:  - Encourage pt to monitor pain and request assistance  - Assess pain using appropriate pain scale  - Administer analgesics based on type and severity of pain and evaluate response  - Implement non-pharmacological measures as appropriate and evaluate response  - Consider cultural and social influences on pain and pain management  - Manage/alleviate anxiety  - Utilize distraction and/or relaxation techniques  - Monitor for opioid side effects  - Notify MD/LIP if interventions unsuccessful or patient reports new pain  - Anticipate increased pain with activity and pre-medicate as appropriate  Outcome: Progressing     Problem: RISK FOR INFECTION - ADULT  Goal: Absence of fever/infection during anticipated neutropenic period  Description: INTERVENTIONS  - Monitor WBC  - Administer growth factors as ordered  - Implement neutropenic guidelines  Outcome: Progressing     Problem: RESPIRATORY - ADULT  Goal: Achieves optimal ventilation and oxygenation  Description: INTERVENTIONS:  - Assess for changes in respiratory status  - Assess for changes in mentation and behavior  - Position to facilitate oxygenation and minimize respiratory effort  - Oxygen supplementation based on oxygen saturation or ABGs  - Provide Smoking Cessation handout, if applicable  - Encourage broncho-pulmonary hygiene including cough, deep breathe, Incentive Spirometry  - Assess the need for suctioning and perform as needed  - Assess and instruct to report SOB or any respiratory difficulty  - Respiratory Therapy support as indicated  - Manage/alleviate anxiety  - Monitor for signs/symptoms of CO2 retention  Outcome: Progressing     Problem: GENITOURINARY -  ADULT  Goal: Absence of urinary retention  Description: INTERVENTIONS:  - Assess patient’s ability to void and empty bladder  - Monitor intake/output and perform bladder scan as needed  - Follow urinary retention protocol/standard of care  - Consider collaborating with pharmacy to review patient's medication profile  - Implement strategies to promote bladder emptying  Outcome: Progressing

## 2025-05-29 NOTE — PROGRESS NOTES
Seen in follow up. No CP. Still with dyspnea, pox 85% on RA. No palpitations.    Vitals:    05/29/25 0900   BP:    Pulse: 95   Resp:    Temp:        Intake/Output Summary (Last 24 hours) at 5/29/2025 1014  Last data filed at 5/29/2025 0000  Gross per 24 hour   Intake 480 ml   Output 975 ml   Net -495 ml     Wt Readings from Last 1 Encounters:   05/29/25 183 lb 9.6 oz (83.3 kg)        General: No acute distress.  Neck: Jugular venous pulsations high.  Lungs: Clear to auscultation.  Heart: Normal rate. No murmurs.  Abdomen: Soft. Non tender  Extremities: Bilateral edema.  Neurological: Alert. No focal deficits.  Psychiatric: Appropriate mood and affect.  Current Facility-Administered Medications   Medication Dose Route Frequency    furosemide (Lasix) 10 mg/mL injection 40 mg  40 mg Intravenous Daily    ceFAZolin (Ancef) 2g in 10mL IV syringe premix  2 g Intravenous Q12H    ipratropium-albuterol (Duoneb) 0.5-2.5 (3) MG/3ML inhalation solution 3 mL  3 mL Nebulization Q6H PRN    apixaban (Eliquis) tab 2.5 mg  2.5 mg Oral q12h    atorvastatin (Lipitor) tab 10 mg  10 mg Oral Nightly    metoprolol succinate ER (Toprol XL) 24 hr tab 50 mg  50 mg Oral Daily Beta Blocker    pantoprazole (Protonix) DR tab 40 mg  40 mg Oral BID AC    sertraline (Zoloft) tab 100 mg  100 mg Oral Daily    glucose (Dex4) 15 GM/59ML oral liquid 15 g  15 g Oral Q15 Min PRN    Or    glucose (Glutose) 40% oral gel 15 g  15 g Oral Q15 Min PRN    Or    glucose-vitamin C (Dex-4) chewable tab 4 tablet  4 tablet Oral Q15 Min PRN    Or    dextrose 50% injection 50 mL  50 mL Intravenous Q15 Min PRN    Or    glucose (Dex4) 15 GM/59ML oral liquid 30 g  30 g Oral Q15 Min PRN    Or    glucose (Glutose) 40% oral gel 30 g  30 g Oral Q15 Min PRN    Or    glucose-vitamin C (Dex-4) chewable tab 8 tablet  8 tablet Oral Q15 Min PRN    acetaminophen (Tylenol Extra Strength) tab 500 mg  500 mg Oral Q4H PRN    acetaminophen (Tylenol) tab 650 mg  650 mg Oral Q4H PRN     Or    HYDROcodone-acetaminophen (Norco) 5-325 MG per tab 1 tablet  1 tablet Oral Q4H PRN    Or    HYDROcodone-acetaminophen (Norco) 5-325 MG per tab 2 tablet  2 tablet Oral Q4H PRN    ondansetron (Zofran) 4 MG/2ML injection 4 mg  4 mg Intravenous Q6H PRN    polyethylene glycol (PEG 3350) (Miralax) 17 g oral packet 17 g  17 g Oral Daily PRN    sennosides (Senokot) tab 17.2 mg  17.2 mg Oral Nightly PRN    bisacodyl (Dulcolax) 10 MG rectal suppository 10 mg  10 mg Rectal Daily PRN    insulin aspart (NovoLOG) 100 Units/mL FlexPen 1-5 Units  1-5 Units Subcutaneous TID CC and HS     Medications Prior to Admission   Medication Sig    metoprolol succinate ER 50 MG Oral Tablet 24 Hr Take 1 tablet (50 mg total) by mouth daily.    atorvastatin 10 MG Oral Tab Take 1 tablet (10 mg total) by mouth nightly.    sildenafil 20 MG Oral Tab Take 1 tablet (20 mg total) by mouth 3 (three) times daily.    apixaban 2.5 MG Oral Tab Take 1 tablet (2.5 mg total) by mouth every 12 (twelve) hours.    FARXIGA 10 MG Oral Tab Take 1 tablet (10 mg total) by mouth daily.    sertraline 100 MG Oral Tab Take 1 tablet (100 mg total) by mouth daily.    ketoconazole 2 % External Shampoo APPLY TO AFFECTED AREAS FOR 5 MINUTES THEN WASH OFF ONCE DAILY FOR 5-7 DAYS THEN EVERY 2-3 DAYS FOR MAINTENANCE as NEEDED DEPENDING ON YOUR SYMPTOMS.    torsemide 20 MG Oral Tab Take 1 tablet (20 mg total) by mouth daily. (Patient taking differently: Take 1 tablet (20 mg total) by mouth 2 (two) times daily.)    acetaminophen 500 MG Oral Tab Take 1 tablet (500 mg total) by mouth every 6 (six) hours as needed.    pantoprazole 40 MG Oral Tab EC Take 1 tablet (40 mg total) by mouth 2 (two) times daily before meals.    cyanocobalamin 1000 MCG Oral Tab Take 1 tablet (1,000 mcg total) by mouth in the morning.    metoprolol tartrate 25 MG Oral Tab Take 1 tablet (25 mg total) by mouth 2x Daily(Beta Blocker).     XR CHEST AP PORTABLE  (CPT=71045)  Result Date:  5/27/2025  CONCLUSION:   Cardiomegaly with mild bilateral interstitial opacity likely edema.  Moderate right pleural effusion with mild adjacent parenchymal opacity which may reflect atelectasis with or without superimposed pneumonia.  Small left pleural effusion.     Dictated by (CST): Wilian James MD on 5/27/2025 at 6:32 PM     Finalized by (CST): Wilian James MD on 5/27/2025 at 6:33 PM            Lab Results   Component Value Date    WBC 4.5 05/29/2025    HGB 9.2 05/29/2025    HCT 29.7 05/29/2025    .0 05/29/2025    CREATSERUM 2.07 05/29/2025    BUN 42 05/29/2025     05/29/2025    K 4.8 05/29/2025     05/29/2025    CO2 30.0 05/29/2025    GLU 88 05/29/2025    CA 8.5 05/29/2025     Echo:  Conclusions:     1. Left ventricle: The cavity size was normal. Wall thickness was mildly      increased. Systolic function was normal. The estimated ejection fraction      was 60-65%, by visual assessment. No diagnostic evidence for regional      wall motion abnormalities. Unable to assess LV diastolic function due to      heart rhythm.   2. Right ventricle: The cavity size was increased.   3. Left atrium: The left atrial volume was markedly increased.   4. Right atrium: The atrium was dilated.   5. Aortic valve: A bioprosthetic valve is present. There was mild      regurgitation. The peak systolic velocity was 2.2m/sec. The mean systolic      gradient was 11mm Hg. The valve area (VTI) was 1.15cm^2. The valve area      (VTI) index was 0.59cm^2/m^2.   6. Mitral valve: There was mild regurgitation.   7. Tricuspid valve: There was moderate-severe regurgitation.   8. Pulmonary arteries: Systolic pressure was markedly increased, estimated      to be 83mm Hg. Estimated pulmonary artery diastolic pressure was 29mm Hg.   9. Pericardium, extracardiac: A moderate pericardial effusion was      identified. There was no evidence of hemodynamic compromise.   Impressions:  This study is compared with previous dated  12/6/2024:         IMPRESSION:   Acute on chronic congestive heart failure, unspecified heart failure type (HCC)  Patient has acute on chronic Diastolic heart failure with normal systolic function and Aortic bioprosthesis with perivalvular regurgitation, also Mild MS with Mod TR with Severe Pulm HTN     Cellulitis of right lower extremity  Patient has chronic bilat LE edema with now with cellulitis     Chronic AFIB    H/O PE/DVT    Anemia of chronic disease and blood loss    CKD4     PLAN:   Still requiring oxygen  Will continue IV lasix for now

## 2025-05-29 NOTE — CM/SW NOTE
SW met w/ pt at bedside as f/up for HH.  Pt confirmed reviewing list provided yesterday. Pt wishes to speak w/ her dtr today and then make a choice.    Pt is aware to call SW at phone # provided once choice is made.    Pt is currently on 1L O2 w/ no home oxygen. Pt's RN to wean as appropriate.    UPDATE: notified by RN MD Pat would like pt to DC home on O2. RN will need to complete O2 walk.    Documentation for O2 sats: (RN to add to progress note)  Patient's O2 sat on room air is ____% at rest. Pt's O2 sat on room is ____% when ambulating, and ____% on ____ liter while ambulating.     (Reminder: The \"at rest\" and \"while ambulating\" are required statements. If patient is non ambulatory you can use \"with exertion\" such as during a transfer to assess their O2 level)    MD TO DOCUMENT AFTER O2 SATS:  I had a face to face visit with Yamini Ocampo and I reviewed the patient's walking O2 study completed by nursing on 05/29/25. The patient is mobile in their home and is in need of a portable oxygen tank. The home oxygen will improve the Yamini Ocampo's condition.    Once sats completed, SW to enter MDO for cosignature.    SW spoke to Caitlyn martin/ [facility] - informed of new referral. Per her request, clinical sent in [facility].    UPDATE: per RN, now cardiology will keep pt another day.    PLAN: Home w/ HH & HME O2- pending choice, O2 sats/MD verb/MDO, & med clear      SW/CM to remain available for support and/or discharge planning.       MS IsamarW, LSW [id number]

## 2025-05-30 ENCOUNTER — TELEPHONE (OUTPATIENT)
Dept: INTERNAL MEDICINE CLINIC | Facility: CLINIC | Age: 80
End: 2025-05-30

## 2025-05-30 VITALS
RESPIRATION RATE: 18 BRPM | HEART RATE: 81 BPM | BODY MASS INDEX: 29.35 KG/M2 | DIASTOLIC BLOOD PRESSURE: 52 MMHG | HEIGHT: 66 IN | WEIGHT: 182.63 LBS | SYSTOLIC BLOOD PRESSURE: 132 MMHG | OXYGEN SATURATION: 91 % | TEMPERATURE: 98 F

## 2025-05-30 LAB
ANION GAP SERPL CALC-SCNC: 5 MMOL/L (ref 0–18)
BASOPHILS # BLD AUTO: 0.05 X10(3) UL (ref 0–0.2)
BASOPHILS NFR BLD AUTO: 1 %
BUN BLD-MCNC: 41 MG/DL (ref 9–23)
BUN/CREAT SERPL: 20.2 (ref 10–20)
CALCIUM BLD-MCNC: 8.4 MG/DL (ref 8.7–10.4)
CHLORIDE SERPL-SCNC: 103 MMOL/L (ref 98–112)
CO2 SERPL-SCNC: 32 MMOL/L (ref 21–32)
CREAT BLD-MCNC: 2.03 MG/DL (ref 0.55–1.02)
DEPRECATED RDW RBC AUTO: 61 FL (ref 35.1–46.3)
EGFRCR SERPLBLD CKD-EPI 2021: 24 ML/MIN/1.73M2 (ref 60–?)
EOSINOPHIL # BLD AUTO: 0.1 X10(3) UL (ref 0–0.7)
EOSINOPHIL NFR BLD AUTO: 1.9 %
ERYTHROCYTE [DISTWIDTH] IN BLOOD BY AUTOMATED COUNT: 17.3 % (ref 11–15)
GLUCOSE BLD-MCNC: 92 MG/DL (ref 70–99)
GLUCOSE BLDC GLUCOMTR-MCNC: 107 MG/DL (ref 70–99)
GLUCOSE BLDC GLUCOMTR-MCNC: 68 MG/DL (ref 70–99)
GLUCOSE BLDC GLUCOMTR-MCNC: 89 MG/DL (ref 70–99)
HCT VFR BLD AUTO: 28.3 % (ref 35–48)
HGB BLD-MCNC: 8.7 G/DL (ref 12–16)
IMM GRANULOCYTES # BLD AUTO: 0.02 X10(3) UL (ref 0–1)
IMM GRANULOCYTES NFR BLD: 0.4 %
LYMPHOCYTES # BLD AUTO: 0.63 X10(3) UL (ref 1–4)
LYMPHOCYTES NFR BLD AUTO: 12.2 %
MCH RBC QN AUTO: 33.7 PG (ref 26–34)
MCHC RBC AUTO-ENTMCNC: 30.7 G/DL (ref 31–37)
MCV RBC AUTO: 109.7 FL (ref 80–100)
MONOCYTES # BLD AUTO: 0.49 X10(3) UL (ref 0.1–1)
MONOCYTES NFR BLD AUTO: 9.5 %
NEUTROPHILS # BLD AUTO: 3.87 X10 (3) UL (ref 1.5–7.7)
NEUTROPHILS # BLD AUTO: 3.87 X10(3) UL (ref 1.5–7.7)
NEUTROPHILS NFR BLD AUTO: 75 %
NT-PROBNP SERPL-MCNC: ABNORMAL PG/ML (ref ?–450)
OSMOLALITY SERPL CALC.SUM OF ELEC: 300 MOSM/KG (ref 275–295)
PLATELET # BLD AUTO: 248 10(3)UL (ref 150–450)
POTASSIUM SERPL-SCNC: 4.5 MMOL/L (ref 3.5–5.1)
RBC # BLD AUTO: 2.58 X10(6)UL (ref 3.8–5.3)
SODIUM SERPL-SCNC: 140 MMOL/L (ref 136–145)
WBC # BLD AUTO: 5.2 X10(3) UL (ref 4–11)

## 2025-05-30 PROCEDURE — 99239 HOSP IP/OBS DSCHRG MGMT >30: CPT | Performed by: HOSPITALIST

## 2025-05-30 RX ORDER — TORSEMIDE 20 MG/1
40 TABLET ORAL DAILY
Status: DISCONTINUED | OUTPATIENT
Start: 2025-05-31 | End: 2025-05-30

## 2025-05-30 RX ORDER — FUROSEMIDE 10 MG/ML
40 INJECTION INTRAMUSCULAR; INTRAVENOUS ONCE
Status: COMPLETED | OUTPATIENT
Start: 2025-05-30 | End: 2025-05-30

## 2025-05-30 RX ORDER — CEFADROXIL 500 MG/1
500 CAPSULE ORAL DAILY
Qty: 7 CAPSULE | Refills: 0 | Status: SHIPPED | OUTPATIENT
Start: 2025-05-30 | End: 2025-06-06

## 2025-05-30 RX ORDER — TORSEMIDE 20 MG/1
40 TABLET ORAL DAILY
COMMUNITY
End: 2025-05-30

## 2025-05-30 RX ORDER — TORSEMIDE 20 MG/1
40 TABLET ORAL DAILY
Qty: 60 TABLET | Refills: 0 | Status: SHIPPED | OUTPATIENT
Start: 2025-05-30

## 2025-05-30 NOTE — CM/SW NOTE
05/30/25 1400   Discharge disposition   Expected discharge disposition Home-Health   Post Acute Care Provider   ([facility])   DME/Infusion Providers [facility]  (1L O2)   Discharge transportation Private car     Per chart, pt has DC order for today.  [facility] verbiage [facility]  Notified liaison Caitlyn of this and that pt is cleared for DC. Per Caitlyn, she will verify insurance and deliver portable O2 tank when confirmed.    [facility] notified of pt's DC via [facility] messenger.    Pt is cleared from SW/CM stand point once portable O2 tank is delivered to pt's bedside by [facility] (HME). Pt's RN Naye has been updated.      PLAN: Home w/ [facility] & HME O2          HOLA Penn, LSW [id number]

## 2025-05-30 NOTE — PLAN OF CARE
Pt had 2x episodes of 8 bts Vtach with 1 beat in between. Pt asymptomatic. Cardiology made aware; no new orders. Safety precautions in place.     Problem: Patient Centered Care  Goal: Patient preferences are identified and integrated in the patient's plan of care  Description: Interventions:  - What would you like us to know as we care for you? Im from home   - Provide timely, complete, and accurate information to patient/family  - Incorporate patient and family knowledge, values, beliefs, and cultural backgrounds into the planning and delivery of care  - Encourage patient/family to participate in care and decision-making at the level they choose  - Honor patient and family perspectives and choices  Outcome: Progressing     Problem: Patient/Family Goals  Goal: Patient/Family Long Term Goal  Description: Patient's Long Term Goal: Get stronger     Interventions:  - See additional Care Plan goals for specific interventions  Outcome: Progressing  Goal: Patient/Family Short Term Goal  Description: Patient's Short Term Goal: Go home     Interventions:   - See additional Care Plan goals for specific interventions  Outcome: Progressing     Problem: CARDIOVASCULAR - ADULT  Goal: Maintains optimal cardiac output and hemodynamic stability  Description: INTERVENTIONS:  - Monitor vital signs, rhythm, and trends  - Monitor for bleeding, hypotension and signs of decreased cardiac output  - Evaluate effectiveness of vasoactive medications to optimize hemodynamic stability  - Monitor arterial and/or venous puncture sites for bleeding and/or hematoma  - Assess quality of pulses, skin color and temperature  - Assess for signs of decreased coronary artery perfusion - ex. Angina  - Evaluate fluid balance, assess for edema, trend weights  Outcome: Progressing     Problem: PAIN - ADULT  Goal: Verbalizes/displays adequate comfort level or patient's stated pain goal  Description: INTERVENTIONS:  - Encourage pt to monitor pain and request  assistance  - Assess pain using appropriate pain scale  - Administer analgesics based on type and severity of pain and evaluate response  - Implement non-pharmacological measures as appropriate and evaluate response  - Consider cultural and social influences on pain and pain management  - Manage/alleviate anxiety  - Utilize distraction and/or relaxation techniques  - Monitor for opioid side effects  - Notify MD/LIP if interventions unsuccessful or patient reports new pain  - Anticipate increased pain with activity and pre-medicate as appropriate  Outcome: Progressing     Problem: RISK FOR INFECTION - ADULT  Goal: Absence of fever/infection during anticipated neutropenic period  Description: INTERVENTIONS  - Monitor WBC  - Administer growth factors as ordered  - Implement neutropenic guidelines  Outcome: Progressing     Problem: RESPIRATORY - ADULT  Goal: Achieves optimal ventilation and oxygenation  Description: INTERVENTIONS:  - Assess for changes in respiratory status  - Assess for changes in mentation and behavior  - Position to facilitate oxygenation and minimize respiratory effort  - Oxygen supplementation based on oxygen saturation or ABGs  - Provide Smoking Cessation handout, if applicable  - Encourage broncho-pulmonary hygiene including cough, deep breathe, Incentive Spirometry  - Assess the need for suctioning and perform as needed  - Assess and instruct to report SOB or any respiratory difficulty  - Respiratory Therapy support as indicated  - Manage/alleviate anxiety  - Monitor for signs/symptoms of CO2 retention  Outcome: Progressing     Problem: GENITOURINARY - ADULT  Goal: Absence of urinary retention  Description: INTERVENTIONS:  - Assess patient’s ability to void and empty bladder  - Monitor intake/output and perform bladder scan as needed  - Follow urinary retention protocol/standard of care  - Consider collaborating with pharmacy to review patient's medication profile  - Implement strategies to  promote bladder emptying  Outcome: Progressing     Problem: CARDIOVASCULAR - ADULT  Goal: Absence of cardiac arrhythmias or at baseline  Description: INTERVENTIONS:  - Continuous cardiac monitoring, monitor vital signs, obtain 12 lead EKG if indicated  - Evaluate effectiveness of antiarrhythmic and heart rate control medications as ordered  - Initiate emergency measures for life threatening arrhythmias  - Monitor electrolytes and administer replacement therapy as ordered  Outcome: Not Progressing

## 2025-05-30 NOTE — PROGRESS NOTES
05/30/25 0850   Mobility   O2 walk? Yes   SPO2% on Room Air at Rest 85   SPO2% on Oxygen at Rest 90   At rest oxygen flow (liters per minute) 1   SPO2% Ambulation on Room Air 81   SPO2% Ambulation on Oxygen 90   Ambulation oxygen flow (liters per minute) 1

## 2025-05-30 NOTE — PLAN OF CARE
Pt is alertx4, Po torsemide 40mg daily per cards, follow up outpt 1-2 weeks. Pt is medically clear for DC, Plan for pt to DC home with daughter.   Problem: CARDIOVASCULAR - ADULT  Goal: Maintains optimal cardiac output and hemodynamic stability  Description: INTERVENTIONS:  - Monitor vital signs, rhythm, and trends  - Monitor for bleeding, hypotension and signs of decreased cardiac output  - Evaluate effectiveness of vasoactive medications to optimize hemodynamic stability  - Monitor arterial and/or venous puncture sites for bleeding and/or hematoma  - Assess quality of pulses, skin color and temperature  - Assess for signs of decreased coronary artery perfusion - ex. Angina  - Evaluate fluid balance, assess for edema, trend weights  Outcome: Progressing  Goal: Absence of cardiac arrhythmias or at baseline  Description: INTERVENTIONS:  - Continuous cardiac monitoring, monitor vital signs, obtain 12 lead EKG if indicated  - Evaluate effectiveness of antiarrhythmic and heart rate control medications as ordered  - Initiate emergency measures for life threatening arrhythmias  - Monitor electrolytes and administer replacement therapy as ordered  Outcome: Progressing     Problem: PAIN - ADULT  Goal: Verbalizes/displays adequate comfort level or patient's stated pain goal  Description: INTERVENTIONS:  - Encourage pt to monitor pain and request assistance  - Assess pain using appropriate pain scale  - Administer analgesics based on type and severity of pain and evaluate response  - Implement non-pharmacological measures as appropriate and evaluate response  - Consider cultural and social influences on pain and pain management  - Manage/alleviate anxiety  - Utilize distraction and/or relaxation techniques  - Monitor for opioid side effects  - Notify MD/LIP if interventions unsuccessful or patient reports new pain  - Anticipate increased pain with activity and pre-medicate as appropriate  Outcome: Progressing     Problem:  RISK FOR INFECTION - ADULT  Goal: Absence of fever/infection during anticipated neutropenic period  Description: INTERVENTIONS  - Monitor WBC  - Administer growth factors as ordered  - Implement neutropenic guidelines  Outcome: Progressing

## 2025-05-30 NOTE — PROGRESS NOTES
Seen in follow up. No CP. Still with dyspnea but better  No palpitations.  HR & BP stable  ~ 1.5-2.0 liter negative on I&Os  Remains on O2  Leg swelling remarkable improved    Vitals:    05/30/25 0843   BP: 145/57   Pulse: 86   Resp: 18   Temp: 98 °F (36.7 °C)       Intake/Output Summary (Last 24 hours) at 5/30/2025 1112  Last data filed at 5/30/2025 1009  Gross per 24 hour   Intake 807 ml   Output 2550 ml   Net -1743 ml     Wt Readings from Last 1 Encounters:   05/30/25 182 lb 9.6 oz (82.8 kg)        General: No acute distress.  Neck: Jugular venous pulsations high.  Lungs: Clear to auscultation.  Heart: Normal rate. No murmurs.  Abdomen: Soft. Non tender  Extremities: Bilateral edema.  Neurological: Alert. No focal deficits.  Psychiatric: Appropriate mood and affect.  Current Facility-Administered Medications   Medication Dose Route Frequency    furosemide (Lasix) 10 mg/mL injection 40 mg  40 mg Intravenous BID (Diuretic)    ceFAZolin (Ancef) 2g in 10mL IV syringe premix  2 g Intravenous Q12H    ipratropium-albuterol (Duoneb) 0.5-2.5 (3) MG/3ML inhalation solution 3 mL  3 mL Nebulization Q6H PRN    apixaban (Eliquis) tab 2.5 mg  2.5 mg Oral q12h    atorvastatin (Lipitor) tab 10 mg  10 mg Oral Nightly    metoprolol succinate ER (Toprol XL) 24 hr tab 50 mg  50 mg Oral Daily Beta Blocker    pantoprazole (Protonix) DR tab 40 mg  40 mg Oral BID AC    sertraline (Zoloft) tab 100 mg  100 mg Oral Daily    glucose (Dex4) 15 GM/59ML oral liquid 15 g  15 g Oral Q15 Min PRN    Or    glucose (Glutose) 40% oral gel 15 g  15 g Oral Q15 Min PRN    Or    glucose-vitamin C (Dex-4) chewable tab 4 tablet  4 tablet Oral Q15 Min PRN    Or    dextrose 50% injection 50 mL  50 mL Intravenous Q15 Min PRN    Or    glucose (Dex4) 15 GM/59ML oral liquid 30 g  30 g Oral Q15 Min PRN    Or    glucose (Glutose) 40% oral gel 30 g  30 g Oral Q15 Min PRN    Or    glucose-vitamin C (Dex-4) chewable tab 8 tablet  8 tablet Oral Q15 Min PRN     acetaminophen (Tylenol Extra Strength) tab 500 mg  500 mg Oral Q4H PRN    acetaminophen (Tylenol) tab 650 mg  650 mg Oral Q4H PRN    Or    HYDROcodone-acetaminophen (Norco) 5-325 MG per tab 1 tablet  1 tablet Oral Q4H PRN    Or    HYDROcodone-acetaminophen (Norco) 5-325 MG per tab 2 tablet  2 tablet Oral Q4H PRN    ondansetron (Zofran) 4 MG/2ML injection 4 mg  4 mg Intravenous Q6H PRN    polyethylene glycol (PEG 3350) (Miralax) 17 g oral packet 17 g  17 g Oral Daily PRN    sennosides (Senokot) tab 17.2 mg  17.2 mg Oral Nightly PRN    bisacodyl (Dulcolax) 10 MG rectal suppository 10 mg  10 mg Rectal Daily PRN     Medications Prior to Admission   Medication Sig    metoprolol succinate ER 50 MG Oral Tablet 24 Hr Take 1 tablet (50 mg total) by mouth daily.    atorvastatin 10 MG Oral Tab Take 1 tablet (10 mg total) by mouth nightly.    sildenafil 20 MG Oral Tab Take 1 tablet (20 mg total) by mouth 3 (three) times daily.    apixaban 2.5 MG Oral Tab Take 1 tablet (2.5 mg total) by mouth every 12 (twelve) hours.    FARXIGA 10 MG Oral Tab Take 1 tablet (10 mg total) by mouth daily.    sertraline 100 MG Oral Tab Take 1 tablet (100 mg total) by mouth daily.    ketoconazole 2 % External Shampoo APPLY TO AFFECTED AREAS FOR 5 MINUTES THEN WASH OFF ONCE DAILY FOR 5-7 DAYS THEN EVERY 2-3 DAYS FOR MAINTENANCE as NEEDED DEPENDING ON YOUR SYMPTOMS.    torsemide 20 MG Oral Tab Take 1 tablet (20 mg total) by mouth daily. (Patient taking differently: Take 1 tablet (20 mg total) by mouth 2 (two) times daily.)    acetaminophen 500 MG Oral Tab Take 1 tablet (500 mg total) by mouth every 6 (six) hours as needed.    pantoprazole 40 MG Oral Tab EC Take 1 tablet (40 mg total) by mouth 2 (two) times daily before meals.    cyanocobalamin 1000 MCG Oral Tab Take 1 tablet (1,000 mcg total) by mouth in the morning.    metoprolol tartrate 25 MG Oral Tab Take 1 tablet (25 mg total) by mouth 2x Daily(Beta Blocker).     No results found.      Lab Results    Component Value Date    WBC 5.2 05/30/2025    HGB 8.7 05/30/2025    HCT 28.3 05/30/2025    .0 05/30/2025    CREATSERUM 2.03 05/30/2025    BUN 41 05/30/2025     05/30/2025    K 4.5 05/30/2025     05/30/2025    CO2 32.0 05/30/2025    GLU 92 05/30/2025    CA 8.4 05/30/2025     Echo:  Conclusions:     1. Left ventricle: The cavity size was normal. Wall thickness was mildly      increased. Systolic function was normal. The estimated ejection fraction      was 60-65%, by visual assessment. No diagnostic evidence for regional      wall motion abnormalities. Unable to assess LV diastolic function due to      heart rhythm.   2. Right ventricle: The cavity size was increased.   3. Left atrium: The left atrial volume was markedly increased.   4. Right atrium: The atrium was dilated.   5. Aortic valve: A bioprosthetic valve is present. There was mild      regurgitation. The peak systolic velocity was 2.2m/sec. The mean systolic      gradient was 11mm Hg. The valve area (VTI) was 1.15cm^2. The valve area      (VTI) index was 0.59cm^2/m^2.   6. Mitral valve: There was mild regurgitation.   7. Tricuspid valve: There was moderate-severe regurgitation.   8. Pulmonary arteries: Systolic pressure was markedly increased, estimated      to be 83mm Hg. Estimated pulmonary artery diastolic pressure was 29mm Hg.   9. Pericardium, extracardiac: A moderate pericardial effusion was      identified. There was no evidence of hemodynamic compromise.   Impressions:  This study is compared with previous dated 12/6/2024:         IMPRESSION:   Acute on chronic congestive heart failure, unspecified heart failure type (HCC)  Patient has acute on chronic Diastolic heart failure with normal systolic function and Aortic bioprosthesis with perivalvular regurgitation, also Mild MS with Mod TR with Severe Pulm HTN     Cellulitis of right lower extremity  Patient has chronic bilat LE edema with now with cellulitis     Chronic AFIB    H/O  PE/DVT    Anemia of chronic disease and blood loss    CKD4     PLAN:   Still requiring oxygen: walk test today  Will switch to PO Torsemide 40 mg from am  Follow I&Os and daily weights  Close eye on Renal function : creat 1.8-2.0 range  Partly due to chronic venous insufficiency with dependent edema,Nightly leg elevation advised  Increase ambulation with PT    F/U in office in 1-2 weeks post discharge      D/W patient and nursing staff    Balwinder Andujar MD  [facility]

## 2025-05-30 NOTE — PROGRESS NOTES
[facility]  part of [facility]    Progress Note    Yamini Ocampo Patient Status:  Inpatient    1945 MRN [id number]   Location [facility] 3W/SW Attending Steffany Waddell,    Hosp Day # 3 PCP Elly Antonio MD     Chief complaint sob     Subjective:   Yamini Ocampo is a(n) 80 year old female pt feeling better today wants to go home   Still on oxygen   85% on ra at rest     ROS:   No cp, sob   No c/d   No n/v     Objective:     Blood pressure 145/57, pulse 86, temperature 98 °F (36.7 °C), temperature source Oral, resp. rate 18, height 5' 6\" (1.676 m), weight 182 lb 9.6 oz (82.8 kg), SpO2 90%.      Intake/Output Summary (Last 24 hours) at 2025 1350  Last data filed at 2025 1009  Gross per 24 hour   Intake 696 ml   Output 2550 ml   Net -1854 ml       Patient Weight(s) for the past 336 hrs:   Weight   25 0613 182 lb 9.6 oz (82.8 kg)   25 0652 183 lb 9.6 oz (83.3 kg)   25 0400 187 lb (84.8 kg)   25 1827 202 lb (91.6 kg)   25 1531 202 lb (91.6 kg)   25 1356 180 lb (81.6 kg)           General appearance: alert, appears stated age and cooperative  Pulmonary:  clear to auscultation bilaterally  Cardiovascular: S1, S2 normal, no murmur, click, rub or gallop, regular rate and rhythm  Abdominal: soft, non-tender; bowel sounds normal; no masses,  no organomegaly  Extremities: 1 + edema b/l  atraumatic, no cyanosis or edema        Medicines:     Current Hospital Medications[1]            Blood Sugar Medications            FARXIGA 10 MG Oral Tab            Blood Pressure and Cardiac Medications            metoprolol tartrate 25 MG Oral Tab    metoprolol succinate ER 50 MG Oral Tablet 24 Hr    sildenafil 20 MG Oral Tab            Medication Infusions[2]        Lab Results   Component Value Date    WBC 5.2 2025    HGB 8.7 (L) 2025    HCT 28.3 (L) 2025    .0 2025    CREATSERUM 2.03 (H) 2025    BUN 41 (H)  05/30/2025     05/30/2025    K 4.5 05/30/2025     05/30/2025    CO2 32.0 05/30/2025    GLU 92 05/30/2025    CA 8.4 (L) 05/30/2025    ALB 3.5 05/27/2025    ALKPHO 107 05/27/2025    BILT 0.8 05/27/2025    TP 6.5 05/27/2025    AST 58 (H) 05/27/2025    ALT 19 05/27/2025    PTT 29.0 08/12/2024    INR 1.31 (H) 08/12/2024    TSH 2.130 05/04/2021    LIP 26 08/12/2024    ESRML 35 (H) 06/14/2017    CRP 0.7 06/14/2017     (H) 06/10/2011    MG 2.0 05/28/2025    PHOS 4.0 12/10/2024    TROP <0.045 05/10/2019    CK 20 (L) 09/05/2017    B12 626 12/20/2023       No results found.            Results:     CBC:    Lab Results   Component Value Date    WBC 5.2 05/30/2025    WBC 4.5 05/29/2025    WBC 4.2 05/28/2025     Lab Results   Component Value Date    HGB 8.7 (L) 05/30/2025    HGB 9.2 (L) 05/29/2025    HGB 8.8 (L) 05/28/2025      Lab Results   Component Value Date    .0 05/30/2025    .0 05/29/2025    .0 05/28/2025       Recent Labs   Lab 05/28/25  0756 05/29/25  0622 05/30/25  0545   GLU 85 88 92   BUN 41* 42* 41*   CREATSERUM 2.12* 2.07* 2.03*   CA 8.4* 8.5* 8.4*    142 140   K 4.4 4.8 4.5    105 103   CO2 28.0 30.0 32.0           Assessment and Plan:       Acute on chronic heart failure with preserved ejection fraction   - Last known EF 65-70 from echocardiogram in December 2024  -Patient presenting with progressive shortness of breath and lower extremity swelling.  Noted to have grossly elevated BNP.  - cont diuresis with Lasix 40mg IV BID  - Check Echo - ef 65% no wma, rv increased in size, left atrium volume increased, biopros valve aortic valvemild regrug  mod to sev TR , pulm art pressure increased to 83   - Strict I&Os, Daily weights, Fluid restriction  - Cardiology on consult, appreciate recs.   - wean oxygen, check o 2 with walking      Acute Kidney Injury on CKD  III   - cont Diuresis - improved   - Avoiding Nephrotoxic agents  - Cont to monitor     Possible  cellulitis  - Patient with increased lower extremity swelling, redness and warmth, worse over right lower extremity.  - cont cefazolin day #3  - Elevation of limb as able  - Diuretics as above  - Continue to monitor     Chronic Atrial Fibrillation  -Rates currently controlled  -Continue metoprolol for rate control  -Continue chronic anticoagulation with eliquis  -Telemetry monitoring  -Cardiology on consult, appreciate further recs     Type 2 DM   - Monitoring Blood glucose with POC checks  - SSI to cover hyperglycemia  - Hypoglycemia protocol  - Will monitor and adjust agents as needed.         HTN  - controlled  - CPM  - Monitor and adjust as needed         MD TO DOCUMENT AFTER O2 SATS:  I had a face to face visit with Yamini Ocampo and I reviewed the patient's walking O2 study completed by nursing on 05/30/25. The patient is mobile in their home and is in need of a portable oxygen tank. The home oxygen will improve the Yamini Ocampo's condition.                Steffany Waddell, DO         Chart reviewed, including current vitals, notes, labs and imaging  Labs ordered and medications adjusted as outlined above  Coordinate care with care team/consultants  Discussed with patient results of tests, management plan as outlined above, and the need for ongoing hospitalization  D/w RN     MDM high          PHYSICIAN Certification of Need for Inpatient Hospitalization - Initial Certification    Patient will require inpatient services that will reasonably be expected to span two midnight's based on the clinical documentation in H+P.   Based on patients current state of illness, I anticipate that, after discharge, patient will require TBD.        Supplementary Documentation:   DVT Mechanical Prophylaxis:   SCDs,    DVT Pharmacologic Prophylaxis   Medication    apixaban (Eliquis) tab 2.5 mg                Code Status: Full Code  Garber: External urinary catheter in place  Garber Duration (in days):   Central line:    ABNER:  5/31/2025        **Certification      PHYSICIAN Certification of Need for Inpatient Hospitalization - Initial Certification    Patient will require inpatient services that will reasonably be expected to span two midnight's based on the clinical documentation in H+P.   Based on patients current state of illness, I anticipate that, after discharge, patient will require TBD.                    [1]   Current Facility-Administered Medications   Medication Dose Route Frequency    [START ON 5/31/2025] torsemide (Demadex) tab 40 mg  40 mg Oral Daily    furosemide (Lasix) 10 mg/mL injection 40 mg  40 mg Intravenous Once    ceFAZolin (Ancef) 2g in 10mL IV syringe premix  2 g Intravenous Q12H    ipratropium-albuterol (Duoneb) 0.5-2.5 (3) MG/3ML inhalation solution 3 mL  3 mL Nebulization Q6H PRN    apixaban (Eliquis) tab 2.5 mg  2.5 mg Oral q12h    atorvastatin (Lipitor) tab 10 mg  10 mg Oral Nightly    metoprolol succinate ER (Toprol XL) 24 hr tab 50 mg  50 mg Oral Daily Beta Blocker    pantoprazole (Protonix) DR tab 40 mg  40 mg Oral BID AC    sertraline (Zoloft) tab 100 mg  100 mg Oral Daily    glucose (Dex4) 15 GM/59ML oral liquid 15 g  15 g Oral Q15 Min PRN    Or    glucose (Glutose) 40% oral gel 15 g  15 g Oral Q15 Min PRN    Or    glucose-vitamin C (Dex-4) chewable tab 4 tablet  4 tablet Oral Q15 Min PRN    Or    dextrose 50% injection 50 mL  50 mL Intravenous Q15 Min PRN    Or    glucose (Dex4) 15 GM/59ML oral liquid 30 g  30 g Oral Q15 Min PRN    Or    glucose (Glutose) 40% oral gel 30 g  30 g Oral Q15 Min PRN    Or    glucose-vitamin C (Dex-4) chewable tab 8 tablet  8 tablet Oral Q15 Min PRN    acetaminophen (Tylenol Extra Strength) tab 500 mg  500 mg Oral Q4H PRN    acetaminophen (Tylenol) tab 650 mg  650 mg Oral Q4H PRN    Or    HYDROcodone-acetaminophen (Norco) 5-325 MG per tab 1 tablet  1 tablet Oral Q4H PRN    Or    HYDROcodone-acetaminophen (Norco) 5-325 MG per tab 2 tablet  2 tablet Oral Q4H PRN    ondansetron  (Zofran) 4 MG/2ML injection 4 mg  4 mg Intravenous Q6H PRN    polyethylene glycol (PEG 3350) (Miralax) 17 g oral packet 17 g  17 g Oral Daily PRN    sennosides (Senokot) tab 17.2 mg  17.2 mg Oral Nightly PRN    bisacodyl (Dulcolax) 10 MG rectal suppository 10 mg  10 mg Rectal Daily PRN   [2]

## 2025-05-30 NOTE — TELEPHONE ENCOUNTER
I received a call from Iris VERDIN from [facility]. She was to make sure that you will sign orders for Home health for patient.  Please advise.    RN ok to leave a  message with Dr. Antonio reply as her voicemail is confidential. Thank you

## 2025-05-30 NOTE — CM/SW NOTE
Per chart, O2 sats completed by RN Naye - pt indicating need for 1L O2.    MD TO DOCUMENT AFTER O2 SATS:  I had a face to face visit with Yamini Ocampo and I reviewed the patient's walking O2 study completed by nursing on 05/30/25. The patient is mobile in their home and is in need of a portable oxygen tank. The home oxygen will improve the Yamini Ocampo's condition.    MDO entered - requested MD to complete above & cosign MDO as appropriate.    UPDATE: SW met w/ pt at bedside. Confirmed HH choice is [facility].     [facility] reserved in [facility]. HH instructions added to pt's AVS.    PLAN: Home w/ [facility] & HME O2- pending MD verb/MARCIAL cosigned, & med clear        SW/CM to remain available for support and/or discharge planning.         MS IsamarW, LSW [id number]

## 2025-06-02 ENCOUNTER — PATIENT OUTREACH (OUTPATIENT)
Dept: CASE MANAGEMENT | Age: 80
End: 2025-06-02

## 2025-06-02 NOTE — PROGRESS NOTES
Per DAVID request  Hospital follow-up appt / Discharged 5/30 [facility]      PCP Request :  Elly Antonio MD  [address] [place] [address]  [phone]          Attempt #1:  Left message on voicemail for patient to call transitions specialist back to schedule follow up appointments. Provided Transitions specialist scheduling phone number [phone].

## 2025-06-03 NOTE — PROGRESS NOTES
Per DAVID request  Hospital follow-up appt / Discharged 5/30 [facility]        PCP Request :  Elly Antonio MD  [address] [place] [address]  [phone]  Per DAVID request / Thursday 6/05 @11:40am w/ Dr. Elly Antonio (existing appt)        Closing encounter

## 2025-06-09 ENCOUNTER — OFFICE VISIT (OUTPATIENT)
Dept: INTERNAL MEDICINE CLINIC | Facility: CLINIC | Age: 80
End: 2025-06-09
Payer: MEDICARE

## 2025-06-09 VITALS
HEIGHT: 66 IN | BODY MASS INDEX: 29 KG/M2 | HEART RATE: 80 BPM | SYSTOLIC BLOOD PRESSURE: 107 MMHG | DIASTOLIC BLOOD PRESSURE: 57 MMHG

## 2025-06-09 DIAGNOSIS — D50.8 IRON DEFICIENCY ANEMIA SECONDARY TO INADEQUATE DIETARY IRON INTAKE: ICD-10-CM

## 2025-06-09 DIAGNOSIS — M79.601 RIGHT ARM PAIN: ICD-10-CM

## 2025-06-09 DIAGNOSIS — I10 ESSENTIAL HYPERTENSION: ICD-10-CM

## 2025-06-09 DIAGNOSIS — I73.00 RAYNAUD'S DISEASE WITHOUT GANGRENE: ICD-10-CM

## 2025-06-09 DIAGNOSIS — I50.32 CHRONIC DIASTOLIC CONGESTIVE HEART FAILURE (HCC): Primary | ICD-10-CM

## 2025-06-09 DIAGNOSIS — I48.11 LONGSTANDING PERSISTENT ATRIAL FIBRILLATION (HCC): ICD-10-CM

## 2025-06-09 PROCEDURE — 99214 OFFICE O/P EST MOD 30 MIN: CPT | Performed by: INTERNAL MEDICINE

## 2025-06-09 NOTE — ASSESSMENT & PLAN NOTE
Persistent dyspnea with variable severity, exacerbated by physical activity. Recent chest x-ray indicated pulmonary edema likely due to fluid overload. Improvement with diuresis noted, but dyspnea persists. Oxygen therapy at 1 L/min is ongoing, though recent oxygen saturation levels are unavailable due to difficulty obtaining readings. Lung auscultation shows improved air movement, and she reports improved ability to take deep breaths. No consistent pattern in dyspnea severity, but overall improvement noted.  - Continue oxygen therapy at 1 L/min  - Order chest x-ray to rule out pneumonia or other complications  - Monitor dyspnea and adjust oxygen therapy as needed  - Follow up with cardiologist for further management of fluid overload  - on toresemide 20 mg bid   - check labs     Orders:    Comp Metabolic Panel (14); Future    XR CHEST PA + LAT CHEST (CPT=71046); Future

## 2025-06-09 NOTE — PROGRESS NOTES
Yamini Ocampo is a 80 year old female.  Chief Complaint   Patient presents with    ER F/U     Patient was discharged from [facility] on 05/30/25      HPI:          The following individual(s) verbally consented to be recorded using ambient AI listening technology and understand that they can each withdraw their consent to this listening technology at any point by asking the clinician to turn off or pause the recording:    Patient name: Yamini Ocampo  History of Present Illness  Yamini Ocampo is an 80 year old female who presents with fluid retention and shortness of breath for follow-up after recent hospitalization. She is accompanied by her daughter, Ariana.    She experiences persistent shortness of breath, which has not improved significantly since her recent discharge from the hospital. Her breathing is described as 'up and down' with no consistent pattern, and she becomes winded easily with activity. She is able to perform basic tasks like cooking and using the bathroom but becomes short of breath with additional exertion. She uses supplemental oxygen at one liter per minute, which she removes while cooking due to safety concerns. She has not been able to obtain a recent oxygen saturation reading due to difficulty with the equipment.    She has experienced significant fluid retention, which has been improving. She notes a weight loss from 180 to 173 pounds, attributing this to the loss of excess fluid. She is currently taking torasemide, 40 mg daily, to manage her fluid retention. The swelling in her thighs has decreased, allowing her to 'squeeze' them. Occasionally, she forgets to take her medication at night due to frequent urination disrupting her sleep.    No chest pain is reported, but she mentions muscle pain in her upper body. She has been using her right arm extensively and feels muscle knots.    She has a history of low hemoglobin, which was noted during her recent hospitalization. No dark stools  are reported, which could indicate gastrointestinal bleeding.    She has a home nurse, Sarah, who visits regularly to assist with her care. She has not been able to engage in home exercises due to a recent episode of vomiting and general illness.       Current Medications[1]   Past Medical History[2]   Past Surgical History[3]   Social History:  Short Social Hx on File[4]   Family History[5]   Allergies[6]     REVIEW OF SYSTEMS:   Review of Systems   Review of Systems   Constitutional: Negative for activity change, appetite change and fever.   HENT: Negative for congestion and voice change.    Respiratory: Negative for cough and shortness of breath.    Cardiovascular: Negative for chest pain.   Gastrointestinal: Negative for abdominal distention, abdominal pain and vomiting.   Genitourinary: Negative for hematuria.   Skin: Negative for wound.   Psychiatric/Behavioral: Negative for behavioral problems.   Wt Readings from Last 5 Encounters:   05/30/25 182 lb 9.6 oz (82.8 kg)   03/13/25 182 lb (82.6 kg)   12/09/24 178 lb 8 oz (81 kg)   09/12/24 193 lb (87.5 kg)   09/09/24 198 lb (89.8 kg)     Body mass index is 29.47 kg/m².      EXAM:   /57   Pulse 80   Ht 5' 6\" (1.676 m)   BMI 29.47 kg/m²   Physical Exam   Constitutional:       Appearance: Normal appearance.   HENT:      Head: Normocephalic.   Eyes:      Conjunctiva/sclera: Conjunctivae normal.   Breast:  Normal bilateral breast exam. No palpable masses or nodules.   No nipples asymmetry or discharge. No skin changes   Cardiovascular:      Rate and Rhythm: Normal rate and regular rhythm.      Heart sounds: Normal heart sounds. No murmur heard.  Pulmonary:      Effort: Pulmonary effort is normal.      Breath sounds: Normal breath sounds. No rhonchi or rales.   Abdominal:      General: Bowel sounds are normal.      Palpations: Abdomen is soft.      Tenderness: There is no abdominal tenderness.   Musculoskeletal:      Cervical back: Neck supple.      Right  lower leg: No edema.      Left lower leg: No edema.   Skin:     General: Skin is warm and dry.   Neurological:      General: No focal deficit present.      Mental Status: He is alert and oriented to person, place, and time. Mental status is at baseline.   Psychiatric:         Mood and Affect: Mood normal.         Behavior: Behavior normal.       ASSESSMENT AND PLAN:      Assessment & Plan  Chronic diastolic congestive heart failure (HCC)  Persistent dyspnea with variable severity, exacerbated by physical activity. Recent chest x-ray indicated pulmonary edema likely due to fluid overload. Improvement with diuresis noted, but dyspnea persists. Oxygen therapy at 1 L/min is ongoing, though recent oxygen saturation levels are unavailable due to difficulty obtaining readings. Lung auscultation shows improved air movement, and she reports improved ability to take deep breaths. No consistent pattern in dyspnea severity, but overall improvement noted.  - Continue oxygen therapy at 1 L/min  - Order chest x-ray to rule out pneumonia or other complications  - Monitor dyspnea and adjust oxygen therapy as needed  - Follow up with cardiologist for further management of fluid overload  - on toresemide 20 mg bid   - check labs     Orders:    Comp Metabolic Panel (14); Future    XR CHEST PA + LAT CHEST (CPT=71046); Future    Longstanding persistent atrial fibrillation (HCC)  - stable   Orders:    Comp Metabolic Panel (14); Future    Essential hypertension  - better controlled       Raynaud's disease without gangrene  - stable  - unable to read pulse ox readings        Iron deficiency anemia secondary to inadequate dietary iron intake  -recheck cbc   Previous lab results indicated low hemoglobin levels. No signs of gastrointestinal bleeding such as melena reported. Anemia may be contributing to fatigue and dyspnea.  - Check hemoglobin levels with blood work  - Monitor for signs of bleeding or worsening anemia  Orders:    CBC W  Differential W Platelet [E]; Future    Right arm pain  Muscle pain in the right arm, likely due to overuse and prolonged bed rest during recent hospitalization. Pain is mild, with palpable muscle knots. She has been using the right arm extensively.  - Encourage stretching exercises and physical therapy  - Use heating pads or warm water bottles for pain relief  - Utilize a massager with heat for muscle relaxation            The patient indicates understanding of these issues and agrees to the plan.      Elly Antonio MD     This note was created by Dragon voice recognition. Errors in content may be related to improper recognition by the system; efforts to review and correct have been done but errors may still exist. Please be advised the primary purpose of this note is for me to communicate medical care. Standard sentence structure is not always used. Medical terminology and medical abbreviations may be used. There may be grammatical, typographical, and automated fill ins that may have errors missed in proofreading.          [1]   Current Outpatient Medications   Medication Sig Dispense Refill    torsemide 20 MG Oral Tab Take 2 tablets (40 mg total) by mouth daily. 60 tablet 0    metoprolol succinate ER 50 MG Oral Tablet 24 Hr Take 1 tablet (50 mg total) by mouth daily.      atorvastatin 10 MG Oral Tab Take 1 tablet (10 mg total) by mouth nightly. 90 tablet 3    sildenafil 20 MG Oral Tab Take 1 tablet (20 mg total) by mouth 3 (three) times daily. 90 tablet 5    apixaban 2.5 MG Oral Tab Take 1 tablet (2.5 mg total) by mouth every 12 (twelve) hours. 180 tablet 3    FARXIGA 10 MG Oral Tab Take 1 tablet (10 mg total) by mouth daily. 90 tablet 3    sertraline 100 MG Oral Tab Take 1 tablet (100 mg total) by mouth daily. 90 tablet 3    ketoconazole 2 % External Shampoo APPLY TO AFFECTED AREAS FOR 5 MINUTES THEN WASH OFF ONCE DAILY FOR 5-7 DAYS THEN EVERY 2-3 DAYS FOR MAINTENANCE as NEEDED DEPENDING ON YOUR SYMPTOMS.  500 mL 3    acetaminophen 500 MG Oral Tab Take 1 tablet (500 mg total) by mouth every 6 (six) hours as needed.      pantoprazole 40 MG Oral Tab EC Take 1 tablet (40 mg total) by mouth 2 (two) times daily before meals. 60 tablet 0    cyanocobalamin 1000 MCG Oral Tab Take 1 tablet (1,000 mcg total) by mouth in the morning.     [2]   Past Medical History:   Arrhythmia    afib    Back problem    lumbar disc disease    Basal cell carcinoma of nose    Bilateral carpal tunnel syndrome    Cataract    Cellulitis    Deep vein thrombosis (HCC)    unsure which leg, possibly left    Depression    Esophageal reflux    Essential hypertension    Heart disease    Heel spur    Right    Hemorrhoids    Hiatal hernia    High blood pressure    High cholesterol    Hyperlipidemia    Incontinence    Lumbar disc disease    Multiple gastric ulcers    Osteoarthritis    Pulmonary embolism (HCC)    Raynaud disease    Renal disorder    Tubular adenoma of colon    repeat c-scope 1/2020    Type 2 diabetes mellitus with diabetic chronic kidney disease (HCC)    Visual impairment    glasses   [3]   Past Surgical History:  Procedure Laterality Date    Angioplasty (coronary)      Appendectomy      Arthroscopy of joint unlisted Right     knee    Bso, omentectomy w/sushil      Carpal tunnel release Bilateral     Cholecystectomy  09/28/2016    Colonoscopy N/A 01/25/2017    Procedure: COLONOSCOPY;  Surgeon: KATHARINE Prakash MD;  Location: [facility] ENDOSCOPY    Colonoscopy N/A 10/07/2020    Procedure: COLONOSCOPY;  Surgeon: KATHARINE Prakash MD;  Location: [facility] ENDOSCOPY    Colonoscopy N/A 7/31/2024    Procedure: COLONOSCOPY;  Surgeon: Alyssa Orellana MD;  Location: [facility] ENDOSCOPY    Egd N/A 12/14/2016    Procedure: ESOPHAGOGASTRODUODENOSCOPY (EGD);  Surgeon: KATHARINE Prakash MD;  Location: [facility] ENDOSCOPY    Egd N/A 01/23/2024    ; gastritis    Egd N/A 07/27/2024    ; retained food    Electrocardiogram, complete  09/26/2013    Scanned to Media Tab     Excision of nose      Basal cell carcinoma    Hernia surgery      Hysterectomy      Knee replacement surgery Right     Other surgical history      Injections and narcotics, POD Bartuci    Removal of heel spur      Total abdom hysterectomy     [4]   Social History  Socioeconomic History    Marital status:    Tobacco Use    Smoking status: Former     Current packs/day: 0.00     Types: Cigarettes     Quit date: 1997     Years since quittin.4    Smokeless tobacco: Never   Vaping Use    Vaping status: Never Used   Substance and Sexual Activity    Alcohol use: No     Comment: rare    Drug use: Never     Comment: edibles occasionally for sleep   Other Topics Concern    Caffeine Concern Yes     Comment: 8 cups coffee/sod daily    Reaction to local anesthetic No    Pt has a pacemaker No    Pt has a defibrillator No     Social Drivers of Health     Food Insecurity: No Food Insecurity (2025)    NCSS - Food Insecurity     Worried About Running Out of Food in the Last Year: No     Ran Out of Food in the Last Year: No   Transportation Needs: No Transportation Needs (2025)    NCSS - Transportation     Lack of Transportation: No   Housing Stability: Not At Risk (2025)    NCSS - Housing/Utilities     Has Housing: Yes     Worried About Losing Housing: No     Unable to Get Utilities: No   [5]   Family History  Problem Relation Age of Onset    Cancer Father         Skin cancer    Other (Other) Father 97        old age,unknown caused    Heart Attack Mother     Heart Disorder Mother     Hypertension Other         Family H/O    Cancer Other         Lymphoma  Family H/O    Heart Disease Other         Family H/O    Arthritis Other         Close relative  unsure which type    No Known Problems Brother    [6]   Allergies  Allergen Reactions    Adhesive Tape RASH     Skin off

## 2025-06-09 NOTE — DISCHARGE SUMMARY
[facility]  part of [facility]    Discharge Summary    Yamini Ocampo Patient Status:  Inpatient    1945 MRN [id number]   Location [facility] 3W/SW Attending No att. providers found   Hosp Day # 3 PCP Elly Antonio MD     Date of Admission: 2025 Disposition: Home Health Care Services     Date of Discharge: 25    Admitting Diagnosis: Cellulitis of right lower extremity [L03.115]  Acute on chronic congestive heart failure, unspecified heart failure type (HCC) [I50.9]    Hospital Discharge Diagnoses: cellulitis, chf     Hospital Discharge Diagnoses: cellulitis , chf     Lace+ Score: 71  59-90 High Risk  29-58 Medium Risk  0-28   Low Risk.    TCM Follow-Up Recommendation:  LACE > 58: High Risk of readmission after discharge from the hospital.          Lace+ Score: 71  59-90 High Risk  29-58 Medium Risk  0-28   Low Risk    Risk of readmission: Yamini Ocampo has High Risk of readmission after discharge from the hospital.    Problem List: Problem List[1]    Reason for Admission:     Physical Exam:   General appearance: alert, appears stated age and cooperative  Pulmonary:  clear to auscultation bilaterally  Cardiovascular: S1, S2 normal, no murmur, click, rub or gallop, regular rate and rhythm  Abdominal: soft, non-tender; bowel sounds normal; no masses,  no organomegaly  Extremities: extremities normal, atraumatic, no cyanosis or edema  Psychiatric: calm        History of Present Illness:     This is a 80 year oldfemale who presented complaining of shortness of breath.  Patient dated symptom started around 2 to 3 days prior to admission.  Denied associated cough or fever/chills.  Did note development of lower extremity swelling.  Patient stated she went to see her physician who recommended she present to ED for further evaluation.  Patient states has been taking her diuretics at home as directed.  Denies recent increased intake of salt or fluids.  Patient denies current  chest pain, abdominal pain, nausea vomiting, lightheadedness or dizziness.  Upon further review, patient states has been having some increased pain in her right lower extremity with development of redness and warmth.  She initially attributed to the swelling in her Naun's.  Patient did states she has noted some increase in the redness, working up her leg.     Hospital Course:     Acute on chronic heart failure with preserved ejection fraction   - Last known EF 65-70 from echocardiogram in December 2024  -Patient presenting with progressive shortness of breath and lower extremity swelling.  Noted to have grossly elevated BNP.  - cont diuresis with Lasix 40mg IV BID  - Check Echo - ef 65% no wma, rv increased in size, left atrium volume increased, biopros valve aortic valvemild regrug  mod to sev TR , pulm art pressure increased to 83   - Strict I&Os, Daily weights, Fluid restriction  - Cardiology on consult, appreciate recs.   - wean oxygen, check o 2 with walking       Acute Kidney Injury on CKD  III   - cont Diuresis - improved   - Avoiding Nephrotoxic agents  - Cont to monitor     Possible cellulitis  - Patient with increased lower extremity swelling, redness and warmth, worse over right lower extremity.  - cont cefazolin day #3  - Elevation of limb as able  - Diuretics as above  - Continue to monitor     Chronic Atrial Fibrillation  -Rates currently controlled  -Continue metoprolol for rate control  -Continue chronic anticoagulation with eliquis  -Telemetry monitoring  -Cardiology on consult, appreciate further recs     Type 2 DM   - Monitoring Blood glucose with POC checks  - SSI to cover hyperglycemia  - Hypoglycemia protocol  - Will monitor and adjust agents as needed.         HTN  - controlled  - CPM  - Monitor and adjust as needed          MD TO DOCUMENT AFTER O2 SATS:  I had a face to face visit with Yamini Ocampo and I reviewed the patient's walking O2 study completed by nursing on 05/30/25. The patient  is mobile in their home and is in need of a portable oxygen tank. The home oxygen will improve the Yamini Ocampo's condition.                     Steffany Waddell DO           Chart reviewed, including current vitals, notes, labs and imaging  Labs ordered and medications adjusted as outlined above  Coordinate care with care team/consultants  Discussed with patient results of tests, management plan as outlined above, and the need for ongoing hospitalization  D/w RN     MDM high              PHYSICIAN Certification of Need for Inpatient Hospitalization - Initial Certification     Patient will require inpatient services that will reasonably be expected to span two midnight's based on the clinical documentation in H+P.   Based on patients current state of illness, I anticipate that, after discharge, patient will require TBD.           Supplementary Documentation:   DVT Mechanical Prophylaxis:   SCDs,        DVT Pharmacologic Prophylaxis   Medication    apixaban (Eliquis) tab 2.5 mg                 Code Status: Full Code  Garber: External urinary catheter in place  Garber Duration (in days):   Central line:    ABNER: 5/31/2025           **Certification        PHYSICIAN Certification of Need for Inpatient Hospitalization - Initial Certification     Patient will require inpatient services that will reasonably be expected to span two midnight's based on the clinical documentation in H+P.   Based on patients current state of illness, I anticipate that, after discharge, patient will require TBD.       Consultations: Dr Andujar     Procedures: none     Complications: none     Discharge Condition: Good    Discharge Medications:      Discharge Medications        CHANGE how you take these medications        Instructions Prescription details   torsemide 20 MG Tabs  Commonly known as: Demadex  What changed: Another medication with the same name was removed. Continue taking this medication, and follow the directions you see here.      Take  2 tablets (40 mg total) by mouth daily.   Quantity: 60 tablet  Refills: 0            CONTINUE taking these medications        Instructions Prescription details   acetaminophen 500 MG Tabs  Commonly known as: Tylenol Extra Strength      Take 1 tablet (500 mg total) by mouth every 6 (six) hours as needed.   Refills: 0     apixaban 2.5 MG Tabs  Commonly known as: Eliquis      Take 1 tablet (2.5 mg total) by mouth every 12 (twelve) hours.   Quantity: 180 tablet  Refills: 3     atorvastatin 10 MG Tabs  Commonly known as: Lipitor      Take 1 tablet (10 mg total) by mouth nightly.   Quantity: 90 tablet  Refills: 3     cyanocobalamin 1000 MCG Tabs  Commonly known as: Vitamin B12      Take 1 tablet (1,000 mcg total) by mouth in the morning.   Refills: 0     Farxiga 10 MG Tabs  Generic drug: dapagliflozin      Take 1 tablet (10 mg total) by mouth daily.   Quantity: 90 tablet  Refills: 3     ketoconazole 2 % Sham  Commonly known as: Nizoral      APPLY TO AFFECTED AREAS FOR 5 MINUTES THEN WASH OFF ONCE DAILY FOR 5-7 DAYS THEN EVERY 2-3 DAYS FOR MAINTENANCE as NEEDED DEPENDING ON YOUR SYMPTOMS.   Quantity: 500 mL  Refills: 3     metoprolol succinate ER 50 MG Tb24  Commonly known as: Toprol XL      Take 1 tablet (50 mg total) by mouth daily.   Refills: 0     pantoprazole 40 MG Tbec  Commonly known as: Protonix      Take 1 tablet (40 mg total) by mouth 2 (two) times daily before meals.   Quantity: 60 tablet  Refills: 0     sertraline 100 MG Tabs  Commonly known as: Zoloft      Take 1 tablet (100 mg total) by mouth daily.   Quantity: 90 tablet  Refills: 3     sildenafil 20 MG Tabs  Commonly known as: Revatio      Take 1 tablet (20 mg total) by mouth 3 (three) times daily.   Quantity: 90 tablet  Refills: 5            STOP taking these medications      metoprolol tartrate 25 MG Tabs  Commonly known as: Lopressor               ASK your doctor about these medications        Instructions Prescription details   cefadroxil 500 MG  Caps  Commonly known as: DURICEF  Ask about: Should I take this medication?      Take 1 capsule (500 mg total) by mouth daily for 7 days.   Stop taking on: June 6, 2025  Quantity: 7 capsule  Refills: 0               Where to Get Your Medications        These medications were sent to [id number] - [place], [place] - [address] AT [facility], [phone], [phone]  [address], [facility] [address]      Phone: [phone]   cefadroxil 500 MG Caps  torsemide 20 MG Tabs         Follow up Visits: Follow-up with pcp and cards  in 1 week    Follow up Labs: none      Other Discharge Instructions: none     Steffany Waddell DO  6/9/2025  6:28 AM    > 35 min          [1]   Patient Active Problem List  Diagnosis    Congestive heart failure ([facility])    Major depressive disorder, single episode, moderate ([facility])    Class 2 severe obesity due to excess calories with serious comorbidity and body mass index (BMI) of 39.0 to 39.9 in adult ([facility])    Peripheral vascular disease    Sleep apnea    Vitamin D deficiency    Essential hypertension    Gastric erythema    Ventral hernia without obstruction or gangrene    Hiatal hernia with GERD and esophagitis    Gastroesophageal reflux disease without esophagitis    A-fib ([facility])    Polyp of colon    Mesenteric ischemia, chronic ([facility])    CKD (chronic kidney disease) stage 3, GFR 30-59 ml/min ([facility])    History of ischemic colitis    Osteoarthritis of right knee    Osteoarthritis    Raynaud's disease without gangrene    Osteoarthritis of left knee    Diverticulosis of colon    Prediabetes    CREST syndrome ([facility])    Type 2 diabetes mellitus with stage 3b chronic kidney disease, without long-term current use of insulin ([facility])    Macrocytic anemia    Hyperpigmented skin lesion    Anemia due to vitamin B12 deficiency    Type 2 diabetes mellitus with diabetic chronic kidney disease ([facility])    Acute on chronic congestive heart failure, unspecified  heart failure type (HCC)    Anemia, unspecified    Iron deficiency anemia secondary to inadequate dietary iron intake    Dyspepsia    Intestinal metaplasia of gastric mucosa    Anemia, unspecified type    GI bleed    Rectal bleeding    Gastrointestinal hemorrhage, unspecified gastrointestinal hemorrhage type    Iron deficiency anemia due to chronic blood loss    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

## 2025-06-09 NOTE — ASSESSMENT & PLAN NOTE
-recheck cbc   Previous lab results indicated low hemoglobin levels. No signs of gastrointestinal bleeding such as melena reported. Anemia may be contributing to fatigue and dyspnea.  - Check hemoglobin levels with blood work  - Monitor for signs of bleeding or worsening anemia  Orders:    CBC W Differential W Platelet [E]; Future

## 2025-06-10 NOTE — PROGRESS NOTES
[facility] - Gastroenterology                                                                                                               Reason for consult: f/u    Requesting physician or provider: Elly Antonio MD    Chief Complaint   Patient presents with    Consult     For constipation       HPI:   Yamini Ocampo is a 80 year old year-old female with history of morbid obesity, depression, acid reflux, basal cell skin cancer, hx of PE, OA, hiatal hernia s/p fundoplication (2016), GERD:     she is here today for f/U  She is here today with her daughter    constipation    Hgb 8.7 (5/30/25)     she moves her bowels daily.  May have 2-3 bms/day.  Stools tend to be mushy.  Has incomplete evacuation.   Sx chronic.  she denies brbpr and/or melena.  Has tried miralax as needed.    she denies acid reflux and/or heartburn while on pantoprazole.  she denies dysphagia, odynophagia and/or globus. she denies abdominal pain. she denies nausea and/or vomiting.  she denies recent change in appetite and/or unintentional weight loss.    Eats a lot of non-dairy cheese for flavor  Can't have salt or pepper    O2 - 2L NC  NSAIDS: no  Tobacco: no  Alcohol: no  Illicit drugs: edible CBD gummy for sleep aide     No FHx GI malignancy, ibd, celiac     No history of adverse reaction to sedation  No KENNETH  Eliquis  No pacemaker/defibrillator  No pain medications and/or sleep aides        VCE 12/2024    Study data:     Capsule reached the stomach at 00:01:58  Capsule entered the duodenum at 00:02:26 and reached the cecum at 03:50:17  Small bowel passage time 3hrs 47 min     Study findings:  As per EGD examination, there were impressive stasis changes distal esophagus with friable mucosa and some slow active bleeding distal esophagus captured on initial images.  No blood in the stomach.  Clear bilious chyme present entire length of today's exam of the small bowel.  No fresh or old blood  collections encountered anywhere on today's VCE exam.  Single red bolus appreciated at 00:42:04; and bleeding  Brown bilious stool present in the cecum and colon.     Recommendations:     Continue close monitoring of CBC and iron studies on anticoagulation therapy  See EGD reports for description of recent `GAVE' gastric antral vascular ectasia finding and APC ablation treatment    EGD w/ APC 12/2024    IMPRESSION:  Retained secretions and food residue in the proximal esophagus with significant stasis changes of uncertain significance.  Widely patent GE junction and LES.  Some spontaneous bleeding from lower esophagus and GE junction mostly due to stasis changes.  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  Successful release of \"Given\" video capsule device in the duodenum for repeat small bowel VCE study     RECOMMENDATIONS:     Supportive care.  Continue to monitor H&H, iron studies.  VCE small bowel enteroscopy report to follow.  Continue to discuss options for future anticoagulation.  Though we made another intervention today, there was friable esophageal and gastric mucosa throughout with some slow bleeding even from the esophagus.  This patient does not appear to be compatible with long-term full dose anticoagulation therapy.    EGD 7/2024    Impression:  1. Unable to examine stomach today due to retained food in the esophagus. No blood was seen to reflux.   2. Will need to repeat EGD with a more empty esophagus to reduce risk of aspiration.     Recommend:  1. Full liquid diet.  2. Hold anticoagulation if possible.  3. Trend Hgb.   4. Plan for repeat EGD on Monday - will also allow for more time off anticoagulation so APC can be performed safely.  5. Use reglan to empty food out of the stomach as well.   6. Continue PPI IV.    EGD 1/2024    Impression:  1. Non-erosive reflux disease.  2. Gastric erythema s/p biopsies  (mapping) due to hx of gastric IM.     Recommend:  1. Await pathology.  2. Avoid caffeine, chocolate, peppermint, and alcohol. Also avoid lying down flat or in a recumbent position for 3 hours after a meal.   3. Can hold off on further endoscopy given age/co-morbidities.   4. Resume blood thinner tomorrow, monitor for bleeding.      Colonoscopy 10/7/20 normal to TI. Path neg for microscopic colitis      EGD w/ Dr. Prakash (11/2021)  path -- no obvious fungus noted but high suspicion for fungal infection. Started on diflucan 200mg/day 14 days.  Foci complete IM  Reflux esophagitis  No evidence of dysplasia or carcinoma     consider repeat egd in 3 years for mapping.       Wt Readings from Last 6 Encounters:   06/16/25 179 lb (81.2 kg)   05/30/25 182 lb 9.6 oz (82.8 kg)   03/13/25 182 lb (82.6 kg)   12/09/24 178 lb 8 oz (81 kg)   09/12/24 193 lb (87.5 kg)   09/09/24 198 lb (89.8 kg)        History, Medications, Allergies, ROS:      Past Medical History[1]   Past Surgical History[2]   Family Hx: Family History[3]   Social History: Short Social Hx on File[4]     Medications (Active prior to today's visit):  Current Medications[5]    Allergies:  Allergies[6]    ROS:   CONSTITUTIONAL: negative for fevers, chills, sweats and weight loss  EYES Negative for red eyes, yellow eyes, changes in vision  HEENT: Negative for dysphagia and hoarseness  RESPIRATORY: Negative for cough and shortness of breath  CARDIOVASCULAR: Negative for chest pain, palpitations  GASTROINTESTINAL: See HPI  GENITOURINARY: Negative for dysuria and frequency  MUSCULOSKELETAL: Negative for arthralgias and myalgias  NEUROLOGICAL: Negative for dizziness and headaches  BEHAVIOR/PSYCH: Negative for anxiety and poor appetite    PHYSICAL EXAM:   Blood pressure 96/53, pulse 79, height 5' 6\" (1.676 m), weight 179 lb (81.2 kg).    GEN: WD/WN, NAD  HEENT: Supple symmetrical, trachea midline  CV: RRR, the extremities are warm and well perfused   LUNGS: No increased  work of breathing  ABDOMEN: No scars, normal bowel sounds, soft, non-tender, non-distended no rebound or guarding, no masses, no hepatomegaly  MSK: No redness, no warmth, no swelling of joints  SKIN: No jaundice, no erythema, no rashes  HEMATOLOGIC: No bleeding, no bruising  NEURO: Alert and interactive, wheelchair    Labs/Imaging/Procedures:     Patient's pertinent labs and imaging were reviewed and discussed with patient today.        .  ASSESSMENT/PLAN:   Yamini Ocampo is a 80 year old year-old female with history of morbid obesity, depression, acid reflux, basal cell skin cancer, hx of PE, OA, hiatal hernia s/p fundoplication (2016), GERD:     #constipation  Sx chronic. Uses miralax prn. Has mushy stools and incomplete evacuation.  No brbpr, melena. Weight fluctuates.  Suspect constipation w/ component of pfd.  Recommendations below.    #gave  Hgb stable from overall trend. No brbpr, melena.  CTM    -monitor hgb  -continue pantoprazole  -limit cheese  -squatty potty  -miralax and adjust the dose as needed  -fiber in diet, trial fiber supplement in evening  -consider probiotic (align)  -prunes, pear, kiwi      Orders This Visit:  No orders of the defined types were placed in this encounter.      Meds This Visit:  Requested Prescriptions      No prescriptions requested or ordered in this encounter       Imaging & Referrals:  None      Jasmyne Harp, APRN   6/10/2025        This note was partially prepared using Dragon Medical voice recognition dictation software. As a result, errors may occur. When identified, these errors have been corrected. While every attempt is made to correct errors during dictation, discrepancies may still exist.          [1]   Past Medical History:   Arrhythmia    afib    Arthritis    Back problem    lumbar disc disease    Basal cell carcinoma of nose    Bilateral carpal tunnel syndrome    Cataract    Cellulitis    Congestive heart disease (HCC)    Deep vein thrombosis (HCC)    unsure  which leg, possibly left    Depression    Eczema    Esophageal reflux    Essential hypertension    Headache    Heart disease    Heel spur    Right    Hemorrhoids    Hiatal hernia    High blood pressure    High cholesterol    Hyperlipidemia    Incontinence    Lumbar disc disease    Multiple gastric ulcers    Osteoarthritis    Pulmonary embolism (HCC)    Raynaud disease    Renal disorder    Tubular adenoma of colon    repeat c-scope 2020    Type 2 diabetes mellitus with diabetic chronic kidney disease (HCC)    Visual impairment    glasses   [2]   Past Surgical History:  Procedure Laterality Date    Angioplasty (coronary)      Appendectomy      Arthroscopy of joint unlisted Right     knee    Bso, omentectomy w/sushil      Carpal tunnel release Bilateral     Cholecystectomy  2016    Colonoscopy N/A 2017    Procedure: COLONOSCOPY;  Surgeon: KATHARINE Prakash MD;  Location: [facility] ENDOSCOPY    Colonoscopy N/A 10/07/2020    Procedure: COLONOSCOPY;  Surgeon: KATHARINE Prakash MD;  Location: [facility] ENDOSCOPY    Colonoscopy N/A 2024    Procedure: COLONOSCOPY;  Surgeon: Alyssa Orellana MD;  Location: [facility] ENDOSCOPY    Egd N/A 2016    Procedure: ESOPHAGOGASTRODUODENOSCOPY (EGD);  Surgeon: KATHARINE Prakash MD;  Location: [facility] ENDOSCOPY    Egd N/A 2024    ; gastritis    Egd N/A 2024    ; retained food    Electrocardiogram, complete  2013    Scanned to Media Tab    Excision of nose      Basal cell carcinoma    Hernia surgery      Hysterectomy      Knee replacement surgery Right           Other surgical history      Injections and narcotics, POD Bartuci    Removal of heel spur      Skin surgery      Total abdom hysterectomy      Vaginal hysterectomy  years ago   [3]   Family History  Problem Relation Age of Onset    Cancer Father         Skin cancer    Other (Other) Father 97        old age,unknown caused    Heart Attack Mother     Heart Disorder Mother     Hypertension Other          Family H/O    Cancer Other         Lymphoma  Family H/O    Heart Disease Other         Family H/O    Arthritis Other         Close relative  unsure which type    No Known Problems Brother    [4]   Social History  Socioeconomic History    Marital status:    Tobacco Use    Smoking status: Former     Current packs/day: 0.00     Types: Cigarettes     Quit date: 1997     Years since quittin.4    Smokeless tobacco: Never   Vaping Use    Vaping status: Never Used   Substance and Sexual Activity    Alcohol use: No     Comment: rare    Drug use: Never     Comment: edibles occasionally for sleep   Other Topics Concern    Caffeine Concern Yes     Comment: 8 cups coffee/sod daily    Reaction to local anesthetic No    Pt has a pacemaker No    Pt has a defibrillator No     Social Drivers of Health     Food Insecurity: No Food Insecurity (2025)    NCSS - Food Insecurity     Worried About Running Out of Food in the Last Year: No     Ran Out of Food in the Last Year: No   Transportation Needs: No Transportation Needs (2025)    NCSS - Transportation     Lack of Transportation: No   Housing Stability: Not At Risk (2025)    NCSS - Housing/Utilities     Has Housing: Yes     Worried About Losing Housing: No     Unable to Get Utilities: No   [5]   Current Outpatient Medications   Medication Sig Dispense Refill    torsemide 20 MG Oral Tab Take 2 tablets (40 mg total) by mouth daily. 60 tablet 0    metoprolol succinate ER 50 MG Oral Tablet 24 Hr Take 1 tablet (50 mg total) by mouth daily.      atorvastatin 10 MG Oral Tab Take 1 tablet (10 mg total) by mouth nightly. 90 tablet 3    sildenafil 20 MG Oral Tab Take 1 tablet (20 mg total) by mouth 3 (three) times daily. 90 tablet 5    apixaban 2.5 MG Oral Tab Take 1 tablet (2.5 mg total) by mouth every 12 (twelve) hours. 180 tablet 3    FARXIGA 10 MG Oral Tab Take 1 tablet (10 mg total) by mouth daily. 90 tablet 3    sertraline 100 MG Oral Tab Take 1 tablet (100  mg total) by mouth daily. 90 tablet 3    ketoconazole 2 % External Shampoo APPLY TO AFFECTED AREAS FOR 5 MINUTES THEN WASH OFF ONCE DAILY FOR 5-7 DAYS THEN EVERY 2-3 DAYS FOR MAINTENANCE as NEEDED DEPENDING ON YOUR SYMPTOMS. 500 mL 3    acetaminophen 500 MG Oral Tab Take 1 tablet (500 mg total) by mouth every 6 (six) hours as needed.      pantoprazole 40 MG Oral Tab EC Take 1 tablet (40 mg total) by mouth 2 (two) times daily before meals. 60 tablet 0    cyanocobalamin 1000 MCG Oral Tab Take 1 tablet (1,000 mcg total) by mouth in the morning.     [6]   Allergies  Allergen Reactions    Adhesive Tape RASH     Skin off

## 2025-06-12 ENCOUNTER — TELEPHONE (OUTPATIENT)
Dept: FAMILY MEDICINE CLINIC | Facility: CLINIC | Age: 80
End: 2025-06-12

## 2025-06-16 ENCOUNTER — OFFICE VISIT (OUTPATIENT)
Facility: CLINIC | Age: 80
End: 2025-06-16
Payer: MEDICARE

## 2025-06-16 VITALS
WEIGHT: 179 LBS | HEIGHT: 66 IN | BODY MASS INDEX: 28.77 KG/M2 | SYSTOLIC BLOOD PRESSURE: 96 MMHG | DIASTOLIC BLOOD PRESSURE: 53 MMHG | HEART RATE: 79 BPM

## 2025-06-16 DIAGNOSIS — K59.00 CONSTIPATION, UNSPECIFIED CONSTIPATION TYPE: Primary | ICD-10-CM

## 2025-06-16 DIAGNOSIS — K31.819 GAVE (GASTRIC ANTRAL VASCULAR ECTASIA): ICD-10-CM

## 2025-06-16 PROCEDURE — 99215 OFFICE O/P EST HI 40 MIN: CPT | Performed by: NURSE PRACTITIONER

## 2025-06-16 NOTE — PATIENT INSTRUCTIONS
-monitor hgb  -continue pantoprazole  -limit cheese  -squatty potty  -miralax and adjust the dose as needed  -fiber in diet, trial fiber supplement in evening  -consider probiotic (align)  -prunes, pear, kiwi

## 2025-06-19 ENCOUNTER — TELEPHONE (OUTPATIENT)
Dept: INTERNAL MEDICINE CLINIC | Facility: CLINIC | Age: 80
End: 2025-06-19

## 2025-06-19 NOTE — TELEPHONE ENCOUNTER
[facility] order #[id number] and [id number] received. Faxed, confirmation received.          certification and plan of care order #[id number] received. Reviewed and signed by provider. Faxed, confirmation received.

## 2025-06-20 ENCOUNTER — TELEPHONE (OUTPATIENT)
Dept: INTERNAL MEDICINE CLINIC | Facility: CLINIC | Age: 80
End: 2025-06-20

## 2025-06-20 NOTE — TELEPHONE ENCOUNTER
[facility] order for certification period 06/04/25-08/02/25 received.    Placed on providers desk for review

## 2025-06-25 ENCOUNTER — TELEPHONE (OUTPATIENT)
Dept: INTERNAL MEDICINE CLINIC | Facility: CLINIC | Age: 80
End: 2025-06-25

## 2025-06-25 NOTE — DIETARY NOTE
[facility] would like for Dr Scales's nurse to call her and let her know if you faxed back a signed order. It was last faxed to our office on 6/17/25.Please call.     [phone]   PM  NUTRITION NOTE UPDATE:   4/29 @1320 Clarified with RN Dr Maxine Barnes desires Gtube feeds: Ensure TID as bolus after meals (soft diet) and 200 ml H20 flush to follow each can.  Ensure Tid will provide pt ~750 kcals, 27 g

## 2025-06-25 NOTE — TELEPHONE ENCOUNTER
[facility] order #:[id number] received. Reviewed and signed by provider. Faxed, confirmation received.

## 2025-07-14 ENCOUNTER — LAB ENCOUNTER (OUTPATIENT)
Dept: LAB | Age: 80
End: 2025-07-14
Attending: INTERNAL MEDICINE
Payer: MEDICARE

## 2025-07-14 DIAGNOSIS — D50.8 IRON DEFICIENCY ANEMIA SECONDARY TO INADEQUATE DIETARY IRON INTAKE: ICD-10-CM

## 2025-07-14 DIAGNOSIS — I50.32 CHRONIC DIASTOLIC CONGESTIVE HEART FAILURE (HCC): ICD-10-CM

## 2025-07-14 DIAGNOSIS — K31.811 GASTRIC HEMORRHAGE DUE TO GASTRIC ANTRAL VASCULAR ECTASIA (GAVE): ICD-10-CM

## 2025-07-14 DIAGNOSIS — D50.0 IRON DEFICIENCY ANEMIA DUE TO CHRONIC BLOOD LOSS: ICD-10-CM

## 2025-07-14 DIAGNOSIS — I48.11 LONGSTANDING PERSISTENT ATRIAL FIBRILLATION (HCC): ICD-10-CM

## 2025-07-14 LAB
ALBUMIN SERPL-MCNC: 3.9 G/DL (ref 3.2–4.8)
ALBUMIN/GLOB SERPL: 1.3 {RATIO} (ref 1–2)
ALP LIVER SERPL-CCNC: 84 U/L (ref 55–142)
ALT SERPL-CCNC: 11 U/L (ref 10–49)
ANION GAP SERPL CALC-SCNC: 12 MMOL/L (ref 0–18)
AST SERPL-CCNC: 32 U/L (ref ?–34)
BASOPHILS # BLD AUTO: 0.07 X10(3) UL (ref 0–0.2)
BASOPHILS NFR BLD AUTO: 1.6 %
BILIRUB SERPL-MCNC: 0.9 MG/DL (ref 0.2–1.1)
BUN BLD-MCNC: 52 MG/DL (ref 9–23)
BUN/CREAT SERPL: 20.3 (ref 10–20)
CALCIUM BLD-MCNC: 9.6 MG/DL (ref 8.7–10.4)
CHLORIDE SERPL-SCNC: 100 MMOL/L (ref 98–112)
CO2 SERPL-SCNC: 29 MMOL/L (ref 21–32)
CREAT BLD-MCNC: 2.56 MG/DL (ref 0.55–1.02)
DEPRECATED HBV CORE AB SER IA-ACNC: 76 NG/ML (ref 50–306)
DEPRECATED RDW RBC AUTO: 58.9 FL (ref 35.1–46.3)
EGFRCR SERPLBLD CKD-EPI 2021: 18 ML/MIN/1.73M2 (ref 60–?)
EOSINOPHIL # BLD AUTO: 0.07 X10(3) UL (ref 0–0.7)
EOSINOPHIL NFR BLD AUTO: 1.6 %
ERYTHROCYTE [DISTWIDTH] IN BLOOD BY AUTOMATED COUNT: 14.9 % (ref 11–15)
FASTING STATUS PATIENT QL REPORTED: NO
GLOBULIN PLAS-MCNC: 2.9 G/DL (ref 2–3.5)
GLUCOSE BLD-MCNC: 90 MG/DL (ref 70–99)
HCT VFR BLD AUTO: 32 % (ref 35–48)
HGB BLD-MCNC: 10 G/DL (ref 12–16)
IMM GRANULOCYTES # BLD AUTO: 0.02 X10(3) UL (ref 0–1)
IMM GRANULOCYTES NFR BLD: 0.5 %
IRON SATN MFR SERPL: 41 % (ref 15–50)
IRON SERPL-MCNC: 107 UG/DL (ref 50–170)
LYMPHOCYTES # BLD AUTO: 0.66 X10(3) UL (ref 1–4)
LYMPHOCYTES NFR BLD AUTO: 14.9 %
MCH RBC QN AUTO: 34.2 PG (ref 26–34)
MCHC RBC AUTO-ENTMCNC: 31.3 G/DL (ref 31–37)
MCV RBC AUTO: 109.6 FL (ref 80–100)
MONOCYTES # BLD AUTO: 0.37 X10(3) UL (ref 0.1–1)
MONOCYTES NFR BLD AUTO: 8.4 %
NEUTROPHILS # BLD AUTO: 3.23 X10 (3) UL (ref 1.5–7.7)
NEUTROPHILS # BLD AUTO: 3.23 X10(3) UL (ref 1.5–7.7)
NEUTROPHILS NFR BLD AUTO: 73 %
OSMOLALITY SERPL CALC.SUM OF ELEC: 306 MOSM/KG (ref 275–295)
PLATELET # BLD AUTO: 319 10(3)UL (ref 150–450)
POTASSIUM SERPL-SCNC: 4.7 MMOL/L (ref 3.5–5.1)
PROT SERPL-MCNC: 6.8 G/DL (ref 5.7–8.2)
RBC # BLD AUTO: 2.92 X10(6)UL (ref 3.8–5.3)
SODIUM SERPL-SCNC: 141 MMOL/L (ref 136–145)
TOTAL IRON BINDING CAPACITY: 261 UG/DL (ref 250–425)
TRANSFERRIN SERPL-MCNC: 185 MG/DL (ref 250–380)
WBC # BLD AUTO: 4.4 X10(3) UL (ref 4–11)

## 2025-07-14 PROCEDURE — 36415 COLL VENOUS BLD VENIPUNCTURE: CPT

## 2025-07-14 PROCEDURE — 84466 ASSAY OF TRANSFERRIN: CPT

## 2025-07-14 PROCEDURE — 80053 COMPREHEN METABOLIC PANEL: CPT

## 2025-07-14 PROCEDURE — 82728 ASSAY OF FERRITIN: CPT

## 2025-07-14 PROCEDURE — 85025 COMPLETE CBC W/AUTO DIFF WBC: CPT

## 2025-07-14 PROCEDURE — 83540 ASSAY OF IRON: CPT

## 2025-07-16 ENCOUNTER — PATIENT OUTREACH (OUTPATIENT)
Dept: CASE MANAGEMENT | Age: 80
End: 2025-07-16

## 2025-07-16 NOTE — PROGRESS NOTES
The patient is maintaining good health and is stable on her chronic conditions.     Patient is being graduated from [facility] and can contact their PCP office if they are in need of more guidance in the future

## 2025-07-22 ENCOUNTER — TELEPHONE (OUTPATIENT)
Dept: INTERNAL MEDICINE CLINIC | Facility: CLINIC | Age: 80
End: 2025-07-22

## 2025-07-22 NOTE — TELEPHONE ENCOUNTER
Condition update:  Sarah VERDIN for [facility] reporting patient with low blood pressure over the past 2 days.  On Sunday blood pressure was 87/46.  She encouraged patient to drink more water.  Today 100/60.  Last Saturday patient has a little diarrhea.  Denies dizziness.    Meds:  Sildenafil for pulmonary hypertention.  Torsemide takes 2 of 20 mg tabs daily (40 mg)  Metoprolol succinate ER 50 mg    Dr Antonio, please advise, do you want Sarah VERDIN to notify cardiologist ([facility] cardiovascular specialist).

## 2025-07-23 ENCOUNTER — TELEPHONE (OUTPATIENT)
Dept: INTERNAL MEDICINE CLINIC | Facility: CLINIC | Age: 80
End: 2025-07-23

## 2025-07-23 NOTE — TELEPHONE ENCOUNTER
Called and spoke with Sarah,  nurse. Sarah states she did speak with patient's daughter and blood pressure was back up to her normal range \"130's\". Relayed MD's message. Sarah verbalized understanding.

## 2025-07-29 ENCOUNTER — TELEPHONE (OUTPATIENT)
Dept: INTERNAL MEDICINE CLINIC | Facility: CLINIC | Age: 80
End: 2025-07-29

## 2025-07-31 ENCOUNTER — OFFICE VISIT (OUTPATIENT)
Dept: INTERNAL MEDICINE CLINIC | Facility: CLINIC | Age: 80
End: 2025-07-31

## 2025-07-31 ENCOUNTER — APPOINTMENT (OUTPATIENT)
Dept: GENERAL RADIOLOGY | Facility: HOSPITAL | Age: 80
End: 2025-07-31
Attending: EMERGENCY MEDICINE

## 2025-07-31 ENCOUNTER — HOSPITAL ENCOUNTER (INPATIENT)
Facility: HOSPITAL | Age: 80
LOS: 4 days | Discharge: HOME HEALTH | End: 2025-08-04
Attending: EMERGENCY MEDICINE | Admitting: HOSPITALIST

## 2025-07-31 VITALS
HEART RATE: 123 BPM | DIASTOLIC BLOOD PRESSURE: 88 MMHG | HEIGHT: 66 IN | SYSTOLIC BLOOD PRESSURE: 127 MMHG | BODY MASS INDEX: 29 KG/M2

## 2025-07-31 DIAGNOSIS — I49.9 IRREGULAR CARDIAC RHYTHM: ICD-10-CM

## 2025-07-31 DIAGNOSIS — J90 PLEURAL EFFUSION: ICD-10-CM

## 2025-07-31 DIAGNOSIS — I50.32 CHRONIC DIASTOLIC CONGESTIVE HEART FAILURE (HCC): Primary | ICD-10-CM

## 2025-07-31 DIAGNOSIS — I73.9 PERIPHERAL VASCULAR DISEASE: Primary | ICD-10-CM

## 2025-07-31 DIAGNOSIS — N18.9 CHRONIC RENAL IMPAIRMENT, UNSPECIFIED CKD STAGE: ICD-10-CM

## 2025-07-31 LAB
ALBUMIN SERPL-MCNC: 3.9 G/DL (ref 3.2–4.8)
ALBUMIN/GLOB SERPL: 1.4 (ref 1–2)
ALP LIVER SERPL-CCNC: 83 U/L (ref 55–142)
ALT SERPL-CCNC: 11 U/L (ref 10–49)
ANION GAP SERPL CALC-SCNC: 9 MMOL/L (ref 0–18)
AST SERPL-CCNC: 38 U/L (ref ?–34)
BASOPHILS # BLD AUTO: 0.05 X10(3) UL (ref 0–0.2)
BASOPHILS NFR BLD AUTO: 0.9 %
BILIRUB SERPL-MCNC: 0.8 MG/DL (ref 0.2–1.1)
BUN BLD-MCNC: 50 MG/DL (ref 9–23)
BUN/CREAT SERPL: 19.6 (ref 10–20)
CALCIUM BLD-MCNC: 9 MG/DL (ref 8.7–10.4)
CHLORIDE SERPL-SCNC: 104 MMOL/L (ref 98–112)
CO2 SERPL-SCNC: 27 MMOL/L (ref 21–32)
CREAT BLD-MCNC: 2.55 MG/DL (ref 0.55–1.02)
DEPRECATED RDW RBC AUTO: 57.2 FL (ref 35.1–46.3)
EGFRCR SERPLBLD CKD-EPI 2021: 18 ML/MIN/1.73M2 (ref 60–?)
EOSINOPHIL # BLD AUTO: 0.05 X10(3) UL (ref 0–0.7)
EOSINOPHIL NFR BLD AUTO: 0.9 %
ERYTHROCYTE [DISTWIDTH] IN BLOOD BY AUTOMATED COUNT: 15.3 % (ref 11–15)
GLOBULIN PLAS-MCNC: 2.8 G/DL (ref 2–3.5)
GLUCOSE BLD-MCNC: 95 MG/DL (ref 70–99)
HCT VFR BLD AUTO: 31.4 % (ref 35–48)
HGB BLD-MCNC: 9.8 G/DL (ref 12–16)
IMM GRANULOCYTES # BLD AUTO: 0.02 X10(3) UL (ref 0–1)
IMM GRANULOCYTES NFR BLD: 0.4 %
LYMPHOCYTES # BLD AUTO: 0.6 X10(3) UL (ref 1–4)
LYMPHOCYTES NFR BLD AUTO: 10.8 %
MAGNESIUM SERPL-MCNC: 2.1 MG/DL (ref 1.6–2.6)
MCH RBC QN AUTO: 33.8 PG (ref 26–34)
MCHC RBC AUTO-ENTMCNC: 31.2 G/DL (ref 31–37)
MCV RBC AUTO: 108.3 FL (ref 80–100)
MONOCYTES # BLD AUTO: 0.26 X10(3) UL (ref 0.1–1)
MONOCYTES NFR BLD AUTO: 4.7 %
NEUTROPHILS # BLD AUTO: 4.59 X10 (3) UL (ref 1.5–7.7)
NEUTROPHILS # BLD AUTO: 4.59 X10(3) UL (ref 1.5–7.7)
NEUTROPHILS NFR BLD AUTO: 82.3 %
NT-PROBNP SERPL-MCNC: ABNORMAL PG/ML (ref ?–450)
OSMOLALITY SERPL CALC.SUM OF ELEC: 303 MOSM/KG (ref 275–295)
PLATELET # BLD AUTO: 338 10(3)UL (ref 150–450)
POTASSIUM SERPL-SCNC: 4.2 MMOL/L (ref 3.5–5.1)
PROT SERPL-MCNC: 6.7 G/DL (ref 5.7–8.2)
Q-T INTERVAL: 362 MS
QRS DURATION: 84 MS
QTC CALCULATION (BEZET): 430 MS
R AXIS: 127 DEGREES
RBC # BLD AUTO: 2.9 X10(6)UL (ref 3.8–5.3)
SODIUM SERPL-SCNC: 140 MMOL/L (ref 136–145)
T AXIS: 90 DEGREES
TROPONIN I SERPL HS-MCNC: 32 NG/L (ref ?–34)
VENTRICULAR RATE: 85 BPM
WBC # BLD AUTO: 5.6 X10(3) UL (ref 4–11)

## 2025-07-31 PROCEDURE — 99214 OFFICE O/P EST MOD 30 MIN: CPT | Performed by: NURSE PRACTITIONER

## 2025-07-31 PROCEDURE — 99223 1ST HOSP IP/OBS HIGH 75: CPT | Performed by: HOSPITALIST

## 2025-07-31 PROCEDURE — 99223 1ST HOSP IP/OBS HIGH 75: CPT | Performed by: INTERNAL MEDICINE

## 2025-07-31 PROCEDURE — 71045 X-RAY EXAM CHEST 1 VIEW: CPT | Performed by: EMERGENCY MEDICINE

## 2025-07-31 RX ORDER — SILDENAFIL CITRATE 20 MG/1
20 TABLET ORAL 3 TIMES DAILY
Status: DISCONTINUED | OUTPATIENT
Start: 2025-07-31 | End: 2025-08-04

## 2025-07-31 RX ORDER — ONDANSETRON 2 MG/ML
4 INJECTION INTRAMUSCULAR; INTRAVENOUS EVERY 6 HOURS PRN
Status: DISCONTINUED | OUTPATIENT
Start: 2025-07-31 | End: 2025-08-04

## 2025-07-31 RX ORDER — ACETAMINOPHEN 500 MG
500 TABLET ORAL EVERY 4 HOURS PRN
Status: DISCONTINUED | OUTPATIENT
Start: 2025-07-31 | End: 2025-08-04

## 2025-07-31 RX ORDER — BUMETANIDE 0.25 MG/ML
2 INJECTION, SOLUTION INTRAMUSCULAR; INTRAVENOUS ONCE
Status: COMPLETED | OUTPATIENT
Start: 2025-07-31 | End: 2025-07-31

## 2025-07-31 RX ORDER — METOCLOPRAMIDE HYDROCHLORIDE 5 MG/ML
5 INJECTION INTRAMUSCULAR; INTRAVENOUS EVERY 8 HOURS PRN
Status: DISCONTINUED | OUTPATIENT
Start: 2025-07-31 | End: 2025-08-04

## 2025-07-31 RX ORDER — SERTRALINE HYDROCHLORIDE 100 MG/1
100 TABLET, FILM COATED ORAL DAILY
Status: DISCONTINUED | OUTPATIENT
Start: 2025-08-01 | End: 2025-08-04

## 2025-07-31 RX ORDER — BUMETANIDE 0.25 MG/ML
1 INJECTION, SOLUTION INTRAMUSCULAR; INTRAVENOUS
Status: DISCONTINUED | OUTPATIENT
Start: 2025-07-31 | End: 2025-08-03

## 2025-07-31 RX ORDER — METOPROLOL SUCCINATE 50 MG/1
50 TABLET, EXTENDED RELEASE ORAL DAILY
Status: DISCONTINUED | OUTPATIENT
Start: 2025-08-01 | End: 2025-08-04

## 2025-07-31 RX ORDER — PANTOPRAZOLE SODIUM 40 MG/1
40 TABLET, DELAYED RELEASE ORAL
Status: DISCONTINUED | OUTPATIENT
Start: 2025-07-31 | End: 2025-08-04

## 2025-07-31 RX ORDER — ATORVASTATIN CALCIUM 10 MG/1
10 TABLET, FILM COATED ORAL DAILY
Status: DISCONTINUED | OUTPATIENT
Start: 2025-07-31 | End: 2025-08-04

## 2025-08-01 ENCOUNTER — APPOINTMENT (OUTPATIENT)
Dept: CV DIAGNOSTICS | Facility: HOSPITAL | Age: 80
End: 2025-08-01
Attending: INTERNAL MEDICINE

## 2025-08-01 LAB
ANION GAP SERPL CALC-SCNC: 9 MMOL/L (ref 0–18)
BASOPHILS # BLD AUTO: 0.06 X10(3) UL (ref 0–0.2)
BASOPHILS NFR BLD AUTO: 1.2 %
BUN BLD-MCNC: 53 MG/DL (ref 9–23)
BUN/CREAT SERPL: 22.2 (ref 10–20)
CALCIUM BLD-MCNC: 8.6 MG/DL (ref 8.7–10.4)
CHLORIDE SERPL-SCNC: 104 MMOL/L (ref 98–112)
CO2 SERPL-SCNC: 28 MMOL/L (ref 21–32)
CREAT BLD-MCNC: 2.39 MG/DL (ref 0.55–1.02)
DEPRECATED RDW RBC AUTO: 58.7 FL (ref 35.1–46.3)
EGFRCR SERPLBLD CKD-EPI 2021: 20 ML/MIN/1.73M2 (ref 60–?)
EOSINOPHIL # BLD AUTO: 0.15 X10(3) UL (ref 0–0.7)
EOSINOPHIL NFR BLD AUTO: 2.9 %
ERYTHROCYTE [DISTWIDTH] IN BLOOD BY AUTOMATED COUNT: 14.7 % (ref 11–15)
GLUCOSE BLD-MCNC: 85 MG/DL (ref 70–99)
HCT VFR BLD AUTO: 29.7 % (ref 35–48)
HGB BLD-MCNC: 9.3 G/DL (ref 12–16)
IMM GRANULOCYTES # BLD AUTO: 0.01 X10(3) UL (ref 0–1)
IMM GRANULOCYTES NFR BLD: 0.2 %
LYMPHOCYTES # BLD AUTO: 0.65 X10(3) UL (ref 1–4)
LYMPHOCYTES NFR BLD AUTO: 12.5 %
MCH RBC QN AUTO: 34.2 PG (ref 26–34)
MCHC RBC AUTO-ENTMCNC: 31.3 G/DL (ref 31–37)
MCV RBC AUTO: 109.2 FL (ref 80–100)
MONOCYTES # BLD AUTO: 0.47 X10(3) UL (ref 0.1–1)
MONOCYTES NFR BLD AUTO: 9.1 %
NEUTROPHILS # BLD AUTO: 3.85 X10 (3) UL (ref 1.5–7.7)
NEUTROPHILS # BLD AUTO: 3.85 X10(3) UL (ref 1.5–7.7)
NEUTROPHILS NFR BLD AUTO: 74.1 %
OSMOLALITY SERPL CALC.SUM OF ELEC: 306 MOSM/KG (ref 275–295)
PLATELET # BLD AUTO: 259 10(3)UL (ref 150–450)
POTASSIUM SERPL-SCNC: 4 MMOL/L (ref 3.5–5.1)
RBC # BLD AUTO: 2.72 X10(6)UL (ref 3.8–5.3)
SODIUM SERPL-SCNC: 141 MMOL/L (ref 136–145)
WBC # BLD AUTO: 5.2 X10(3) UL (ref 4–11)

## 2025-08-01 PROCEDURE — 93321 DOPPLER ECHO F-UP/LMTD STD: CPT | Performed by: INTERNAL MEDICINE

## 2025-08-01 PROCEDURE — 99232 SBSQ HOSP IP/OBS MODERATE 35: CPT | Performed by: INTERNAL MEDICINE

## 2025-08-01 PROCEDURE — 76376 3D RENDER W/INTRP POSTPROCES: CPT | Performed by: INTERNAL MEDICINE

## 2025-08-01 PROCEDURE — 99222 1ST HOSP IP/OBS MODERATE 55: CPT | Performed by: INTERNAL MEDICINE

## 2025-08-01 PROCEDURE — 93308 TTE F-UP OR LMTD: CPT | Performed by: INTERNAL MEDICINE

## 2025-08-01 PROCEDURE — 99233 SBSQ HOSP IP/OBS HIGH 50: CPT | Performed by: STUDENT IN AN ORGANIZED HEALTH CARE EDUCATION/TRAINING PROGRAM

## 2025-08-01 PROCEDURE — 93325 DOPPLER ECHO COLOR FLOW MAPG: CPT | Performed by: INTERNAL MEDICINE

## 2025-08-02 ENCOUNTER — APPOINTMENT (OUTPATIENT)
Dept: GENERAL RADIOLOGY | Facility: HOSPITAL | Age: 80
End: 2025-08-02
Attending: INTERNAL MEDICINE

## 2025-08-02 PROBLEM — N18.4 CKD (CHRONIC KIDNEY DISEASE) STAGE 4, GFR 15-29 ML/MIN (HCC): Status: ACTIVE | Noted: 2025-08-02

## 2025-08-02 LAB
ANION GAP SERPL CALC-SCNC: 5 MMOL/L (ref 0–18)
BUN BLD-MCNC: 55 MG/DL (ref 9–23)
BUN/CREAT SERPL: 23.5 (ref 10–20)
CALCIUM BLD-MCNC: 8.6 MG/DL (ref 8.7–10.4)
CHLORIDE SERPL-SCNC: 103 MMOL/L (ref 98–112)
CO2 SERPL-SCNC: 30 MMOL/L (ref 21–32)
CREAT BLD-MCNC: 2.34 MG/DL (ref 0.55–1.02)
EGFRCR SERPLBLD CKD-EPI 2021: 20 ML/MIN/1.73M2 (ref 60–?)
GLUCOSE BLD-MCNC: 93 MG/DL (ref 70–99)
MAGNESIUM SERPL-MCNC: 2 MG/DL (ref 1.6–2.6)
OSMOLALITY SERPL CALC.SUM OF ELEC: 301 MOSM/KG (ref 275–295)
POTASSIUM SERPL-SCNC: 4.2 MMOL/L (ref 3.5–5.1)
SODIUM SERPL-SCNC: 138 MMOL/L (ref 136–145)

## 2025-08-02 PROCEDURE — 99232 SBSQ HOSP IP/OBS MODERATE 35: CPT | Performed by: INTERNAL MEDICINE

## 2025-08-02 PROCEDURE — 99233 SBSQ HOSP IP/OBS HIGH 50: CPT | Performed by: STUDENT IN AN ORGANIZED HEALTH CARE EDUCATION/TRAINING PROGRAM

## 2025-08-02 PROCEDURE — 99233 SBSQ HOSP IP/OBS HIGH 50: CPT | Performed by: INTERNAL MEDICINE

## 2025-08-02 PROCEDURE — 71045 X-RAY EXAM CHEST 1 VIEW: CPT | Performed by: INTERNAL MEDICINE

## 2025-08-02 RX ORDER — GUAIFENESIN 600 MG/1
600 TABLET, EXTENDED RELEASE ORAL 2 TIMES DAILY
Status: DISCONTINUED | OUTPATIENT
Start: 2025-08-02 | End: 2025-08-04

## 2025-08-03 ENCOUNTER — APPOINTMENT (OUTPATIENT)
Dept: ULTRASOUND IMAGING | Facility: HOSPITAL | Age: 80
End: 2025-08-03
Attending: INTERNAL MEDICINE

## 2025-08-03 ENCOUNTER — APPOINTMENT (OUTPATIENT)
Dept: GENERAL RADIOLOGY | Facility: HOSPITAL | Age: 80
End: 2025-08-03
Attending: INTERNAL MEDICINE

## 2025-08-03 LAB
ANION GAP SERPL CALC-SCNC: 6 MMOL/L (ref 0–18)
BASOPHILS # BLD AUTO: 0.03 X10(3) UL (ref 0–0.2)
BASOPHILS NFR BLD AUTO: 0.7 %
BASOPHILS NFR PLR: 1 %
BUN BLD-MCNC: 57 MG/DL (ref 9–23)
BUN/CREAT SERPL: 27.7 (ref 10–20)
CALCIUM BLD-MCNC: 8.7 MG/DL (ref 8.7–10.4)
CHLORIDE SERPL-SCNC: 104 MMOL/L (ref 98–112)
CO2 SERPL-SCNC: 30 MMOL/L (ref 21–32)
COLOR FLD: YELLOW
CREAT BLD-MCNC: 2.06 MG/DL (ref 0.55–1.02)
DEPRECATED RDW RBC AUTO: 58.3 FL (ref 35.1–46.3)
EGFRCR SERPLBLD CKD-EPI 2021: 24 ML/MIN/1.73M2 (ref 60–?)
EOSINOPHIL # BLD AUTO: 0.09 X10(3) UL (ref 0–0.7)
EOSINOPHIL NFR BLD AUTO: 2 %
EOSINOPHIL NFR PLR: 0 %
ERYTHROCYTE [DISTWIDTH] IN BLOOD BY AUTOMATED COUNT: 15.4 % (ref 11–15)
GLUCOSE BLD-MCNC: 96 MG/DL (ref 70–99)
HCT VFR BLD AUTO: 29.3 % (ref 35–48)
HGB BLD-MCNC: 9.3 G/DL (ref 12–16)
IMM GRANULOCYTES # BLD AUTO: 0.02 X10(3) UL (ref 0–1)
IMM GRANULOCYTES NFR BLD: 0.4 %
INR BLD: 1.09 (ref 0.8–1.2)
LDH FLD L TO P-CCNC: 193 U/L
LYMPHOCYTES # BLD AUTO: 0.57 X10(3) UL (ref 1–4)
LYMPHOCYTES NFR BLD AUTO: 12.7 %
LYMPHOCYTES NFR PLR: 50 %
MCH RBC QN AUTO: 33.5 PG (ref 26–34)
MCHC RBC AUTO-ENTMCNC: 31.7 G/DL (ref 31–37)
MCV RBC AUTO: 105.4 FL (ref 80–100)
MONOCYTES # BLD AUTO: 0.38 X10(3) UL (ref 0.1–1)
MONOCYTES NFR BLD AUTO: 8.5 %
MONOS+MACROS NFR PLR: 43 %
NEUTROPHILS # BLD AUTO: 3.4 X10 (3) UL (ref 1.5–7.7)
NEUTROPHILS # BLD AUTO: 3.4 X10(3) UL (ref 1.5–7.7)
NEUTROPHILS NFR BLD AUTO: 75.7 %
NEUTROPHILS NFR PLR: 6 %
OSMOLALITY SERPL CALC.SUM OF ELEC: 306 MOSM/KG (ref 275–295)
PLATELET # BLD AUTO: 309 10(3)UL (ref 150–450)
POTASSIUM SERPL-SCNC: 4.2 MMOL/L (ref 3.5–5.1)
PROT PLR-MCNC: 3.4 G/DL
PROTHROMBIN TIME: 14.8 SECONDS (ref 11.6–14.8)
RBC # BLD AUTO: 2.78 X10(6)UL (ref 3.8–5.3)
RBC # FLD: 119 /CUMM (ref ?–1)
SODIUM SERPL-SCNC: 140 MMOL/L (ref 136–145)
TOTAL CELLS COUNTED FLD: 100
TOTAL CELLS COUNTED PLR: 199 /CUMM (ref ?–1)
TURBIDITY CSF QL: CLEAR
WBC # BLD AUTO: 4.5 X10(3) UL (ref 4–11)
WBC # PLR: 199 /CUMM

## 2025-08-03 PROCEDURE — 0W993ZZ DRAINAGE OF RIGHT PLEURAL CAVITY, PERCUTANEOUS APPROACH: ICD-10-PCS | Performed by: INTERNAL MEDICINE

## 2025-08-03 PROCEDURE — 32555 ASPIRATE PLEURA W/ IMAGING: CPT | Performed by: INTERNAL MEDICINE

## 2025-08-03 PROCEDURE — 99233 SBSQ HOSP IP/OBS HIGH 50: CPT | Performed by: INTERNAL MEDICINE

## 2025-08-03 PROCEDURE — 99233 SBSQ HOSP IP/OBS HIGH 50: CPT | Performed by: STUDENT IN AN ORGANIZED HEALTH CARE EDUCATION/TRAINING PROGRAM

## 2025-08-03 RX ORDER — TORSEMIDE 20 MG/1
40 TABLET ORAL
Status: DISCONTINUED | OUTPATIENT
Start: 2025-08-03 | End: 2025-08-04

## 2025-08-04 ENCOUNTER — TELEPHONE (OUTPATIENT)
Dept: INTERNAL MEDICINE CLINIC | Facility: CLINIC | Age: 80
End: 2025-08-04

## 2025-08-04 LAB
ANION GAP SERPL CALC-SCNC: 9 MMOL/L (ref 0–18)
BASOPHILS # BLD AUTO: 0.04 X10(3) UL (ref 0–0.2)
BASOPHILS NFR BLD AUTO: 0.7 %
BUN BLD-MCNC: 54 MG/DL (ref 9–23)
BUN/CREAT SERPL: 26.6 (ref 10–20)
CALCIUM BLD-MCNC: 9 MG/DL (ref 8.7–10.4)
CHLORIDE SERPL-SCNC: 102 MMOL/L (ref 98–112)
CO2 SERPL-SCNC: 29 MMOL/L (ref 21–32)
CREAT BLD-MCNC: 2.03 MG/DL (ref 0.55–1.02)
DEPRECATED RDW RBC AUTO: 58.3 FL (ref 35.1–46.3)
EGFRCR SERPLBLD CKD-EPI 2021: 24 ML/MIN/1.73M2 (ref 60–?)
EOSINOPHIL # BLD AUTO: 0.09 X10(3) UL (ref 0–0.7)
EOSINOPHIL NFR BLD AUTO: 1.5 %
ERYTHROCYTE [DISTWIDTH] IN BLOOD BY AUTOMATED COUNT: 15.5 % (ref 11–15)
GLUCOSE BLD-MCNC: 110 MG/DL (ref 70–99)
HCT VFR BLD AUTO: 31.2 % (ref 35–48)
HGB BLD-MCNC: 9.7 G/DL (ref 12–16)
IMM GRANULOCYTES # BLD AUTO: 0.02 X10(3) UL (ref 0–1)
IMM GRANULOCYTES NFR BLD: 0.3 %
LYMPHOCYTES # BLD AUTO: 0.59 X10(3) UL (ref 1–4)
LYMPHOCYTES NFR BLD AUTO: 9.7 %
MCH RBC QN AUTO: 33.4 PG (ref 26–34)
MCHC RBC AUTO-ENTMCNC: 31.1 G/DL (ref 31–37)
MCV RBC AUTO: 107.6 FL (ref 80–100)
MONOCYTES # BLD AUTO: 0.46 X10(3) UL (ref 0.1–1)
MONOCYTES NFR BLD AUTO: 7.5 %
NEUTROPHILS # BLD AUTO: 4.9 X10 (3) UL (ref 1.5–7.7)
NEUTROPHILS # BLD AUTO: 4.9 X10(3) UL (ref 1.5–7.7)
NEUTROPHILS NFR BLD AUTO: 80.3 %
NON GYNE INTERPRETATION: NEGATIVE
NT-PROBNP SERPL-MCNC: 6355 PG/ML (ref ?–450)
OSMOLALITY SERPL CALC.SUM OF ELEC: 305 MOSM/KG (ref 275–295)
PLATELET # BLD AUTO: 279 10(3)UL (ref 150–450)
POTASSIUM SERPL-SCNC: 4.1 MMOL/L (ref 3.5–5.1)
RBC # BLD AUTO: 2.9 X10(6)UL (ref 3.8–5.3)
SODIUM SERPL-SCNC: 140 MMOL/L (ref 136–145)
WBC # BLD AUTO: 6.1 X10(3) UL (ref 4–11)

## 2025-08-04 PROCEDURE — 99239 HOSP IP/OBS DSCHRG MGMT >30: CPT | Performed by: STUDENT IN AN ORGANIZED HEALTH CARE EDUCATION/TRAINING PROGRAM

## 2025-08-04 PROCEDURE — 99232 SBSQ HOSP IP/OBS MODERATE 35: CPT | Performed by: INTERNAL MEDICINE

## 2025-08-05 ENCOUNTER — PATIENT OUTREACH (OUTPATIENT)
Age: 80
End: 2025-08-05

## 2025-08-05 ENCOUNTER — PATIENT OUTREACH (OUTPATIENT)
Dept: CASE MANAGEMENT | Age: 80
End: 2025-08-05

## 2025-08-06 VITALS
DIASTOLIC BLOOD PRESSURE: 46 MMHG | BODY MASS INDEX: 26.71 KG/M2 | WEIGHT: 166.19 LBS | HEIGHT: 66 IN | TEMPERATURE: 98 F | OXYGEN SATURATION: 93 % | SYSTOLIC BLOOD PRESSURE: 134 MMHG | HEART RATE: 85 BPM | RESPIRATION RATE: 18 BRPM

## 2025-08-18 ENCOUNTER — OFFICE VISIT (OUTPATIENT)
Dept: INTERNAL MEDICINE CLINIC | Facility: CLINIC | Age: 80
End: 2025-08-18

## 2025-08-18 VITALS
HEART RATE: 80 BPM | BODY MASS INDEX: 26.36 KG/M2 | HEIGHT: 66 IN | WEIGHT: 164 LBS | SYSTOLIC BLOOD PRESSURE: 132 MMHG | DIASTOLIC BLOOD PRESSURE: 63 MMHG

## 2025-08-18 DIAGNOSIS — I48.11 LONGSTANDING PERSISTENT ATRIAL FIBRILLATION (HCC): ICD-10-CM

## 2025-08-18 DIAGNOSIS — R19.7 DIARRHEA, UNSPECIFIED TYPE: ICD-10-CM

## 2025-08-18 DIAGNOSIS — N17.9 AKI (ACUTE KIDNEY INJURY): Primary | ICD-10-CM

## 2025-08-18 DIAGNOSIS — I10 ESSENTIAL HYPERTENSION: ICD-10-CM

## 2025-08-18 DIAGNOSIS — N18.4 CKD (CHRONIC KIDNEY DISEASE) STAGE 4, GFR 15-29 ML/MIN (HCC): ICD-10-CM

## 2025-08-18 DIAGNOSIS — I50.32 CHRONIC DIASTOLIC CONGESTIVE HEART FAILURE (HCC): ICD-10-CM

## 2025-08-18 PROCEDURE — 99214 OFFICE O/P EST MOD 30 MIN: CPT | Performed by: INTERNAL MEDICINE

## 2025-08-20 ENCOUNTER — TELEPHONE (OUTPATIENT)
Dept: INTERNAL MEDICINE CLINIC | Facility: CLINIC | Age: 80
End: 2025-08-20

## 2025-08-21 ENCOUNTER — TELEPHONE (OUTPATIENT)
Dept: INTERNAL MEDICINE CLINIC | Facility: CLINIC | Age: 80
End: 2025-08-21

## (undated) DIAGNOSIS — R09.82 POSTNASAL DRIP: ICD-10-CM

## (undated) DIAGNOSIS — D53.9 MACROCYTIC ANEMIA: Primary | ICD-10-CM

## (undated) DIAGNOSIS — E11.9 TYPE 2 DIABETES MELLITUS WITHOUT COMPLICATION, WITHOUT LONG-TERM CURRENT USE OF INSULIN (HCC): ICD-10-CM

## (undated) DEVICE — ABDOMINAL PAD: Brand: CURITY

## (undated) DEVICE — PROXIMATE SKIN STAPLERS (35 WIDE) CONTAINS 35 STAINLESS STEEL STAPLES (FIXED HEAD): Brand: PROXIMATE

## (undated) DEVICE — 60 ML SYRINGE REGULAR TIP: Brand: MONOJECT

## (undated) DEVICE — POLAR CARE CUBE COOLING UNIT

## (undated) DEVICE — Device

## (undated) DEVICE — FAN SPRAY KIT: Brand: PULSAVAC®

## (undated) DEVICE — 25MM TIBIAL FEMALE HEX

## (undated) DEVICE — KIT ENDO ORCAPOD 160/180/190

## (undated) DEVICE — MEDI-VAC NON-CONDUCTIVE SUCTION TUBING: Brand: CARDINAL HEALTH

## (undated) DEVICE — KIT CLEAN ENDOKIT 1.1OZ GOWNX2

## (undated) DEVICE — SUTURE ETHIBOND 2 V-37

## (undated) DEVICE — CONMED SCOPE SAVER BITE BLOCK, 20X27 MM: Brand: SCOPE SAVER

## (undated) DEVICE — FORCEP RADIAL JAW 4

## (undated) DEVICE — SUTURE VICRYL 2-0 FS-1

## (undated) DEVICE — KIT DRN 1/8IN PVC 3 SPRG EVAC

## (undated) DEVICE — BLADE SAW SAGITTAL 19.5

## (undated) DEVICE — 35 ML SYRINGE REGULAR TIP: Brand: MONOJECT

## (undated) DEVICE — SYRINGE, LUER SLIP, STERILE, 60ML: Brand: MEDLINE

## (undated) DEVICE — TROCAR PIN

## (undated) DEVICE — Device: Brand: DUAL NARE NASAL CANNULAE FEMALE LUER CON 7FT O2 TUBE

## (undated) DEVICE — LINE MNTR ADLT SET O2 INTMD

## (undated) DEVICE — TRAY SRGPRP PVP IOD WT SCRB SM

## (undated) DEVICE — Device: Brand: DEFENDO AIR/WATER/SUCTION AND BIOPSY VALVE

## (undated) DEVICE — CLIP RESOLUTION 235CM

## (undated) DEVICE — TRAY SKIN PREP PVP-1

## (undated) DEVICE — MEDI-VAC NON-CONDUCTIVE SUCTION TUBING 6MM X 1.8M (6FT.) L: Brand: CARDINAL HEALTH

## (undated) DEVICE — CAPSULE ENDOSCP 11.4X26.2MM BIOCOMPATIBLE

## (undated) DEVICE — REM POLYHESIVE ADULT PATIENT RETURN ELECTRODE: Brand: VALLEYLAB

## (undated) DEVICE — TOTAL KNEE: Brand: MEDLINE INDUSTRIES, INC.

## (undated) DEVICE — ZIMMER® STERILE DISPOSABLE TOURNIQUET CUFF WITH PLC, DUAL PORT, SINGLE BLADDER, 34 IN. (86 CM)

## (undated) DEVICE — FIAPC® PROBE W/ FILTER 2200 SC OD 2.3MM/6.9FR; L 2.2M/7.2FT: Brand: ERBE

## (undated) DEVICE — YANKAUER,BULB TIP,W/O VENT,RIGID,STERILE: Brand: MEDLINE

## (undated) DEVICE — FIAPC® PROBE W/ FILTER 2200 A OD 2.3MM/6.9FR; L 2.2M/7.2FT: Brand: ERBE

## (undated) DEVICE — CEMENT MIXING SYSTEM WITH FEMORAL BREAKWAY NOZZLE: Brand: REVOLUTION

## (undated) DEVICE — CO2 CANNULA,SSOFT,ADLT,7O2,4CO2,FEMALE: Brand: MEDLINE

## (undated) DEVICE — BATTERY

## (undated) DEVICE — BLADE SW .5IN MCHC 2.75IN

## (undated) DEVICE — V2 SPECIMEN COLLECTION MANIFOLD KIT: Brand: NEPTUNE

## (undated) DEVICE — DRAPE SHEET LG

## (undated) DEVICE — GIJAW SINGLE-USE BIOPSY FORCEPS WITH NEEDLE: Brand: GIJAW

## (undated) DEVICE — ENCORE® LATEX MICRO SIZE 8, STERILE LATEX POWDER-FREE SURGICAL GLOVE: Brand: ENCORE

## (undated) DEVICE — PAD THRP 16IN WRPON MU LNG STM

## (undated) DEVICE — STERILE LATEX POWDER-FREE SURGICAL GLOVESWITH NITRILE COATING: Brand: PROTEXIS

## (undated) DEVICE — DEVICE ENDOSCP DEL SHTH L180CM DIA2.5MM

## (undated) DEVICE — Device: Brand: CUSTOM PROCEDURE KIT

## (undated) DEVICE — ZIMMER® STERILE DISPOSABLE TOURNIQUET CUFF WITH PLC, DUAL PORT, SINGLE BLADDER, 30 IN. (76 CM)

## (undated) DEVICE — SOL  .9 1000ML BTL

## (undated) DEVICE — SNARE LARIAT HEXAGONAL 10X28

## (undated) DEVICE — ENDOSCOPY PACK UPPER: Brand: MEDLINE INDUSTRIES, INC.

## (undated) DEVICE — GUIDE PIN

## (undated) DEVICE — ENCORE® LATEX MICRO SIZE 7.5, STERILE LATEX POWDER-FREE SURGICAL GLOVE: Brand: ENCORE

## (undated) DEVICE — MASK PROC W/VISOR ANTIGLARE

## (undated) DEVICE — SOL  .9 3000ML

## (undated) DEVICE — YANKAUER SUCTION INSTRUMENT NO CONTROL VENT, BULB TIP, CLEAR: Brand: YANKAUER

## (undated) DEVICE — Device: Brand: POWER-FLO®

## (undated) DEVICE — SUTURE VICRYL 0 CP-1

## (undated) NOTE — LETTER
[facility], [place]  Authorization for Invasive Procedures  1.  I hereby authorize Dr. Narendra Carballo , my physician and whomever may be designated as the doctor's assistant, to perform the following operation and/or procedure:  Ke performed for the purposes of advancing medicine, science, and/or education, provided my identity is not revealed. If the procedure has been videotaped, the physician/surgeon will obtain the original videotape.  The hospital will not be responsible for stor My signature below affirms that prior to the time of the procedure, I have explained to the patient and/or her legal representative, the risks and benefits involved in the proposed treatment and any reasonable alternative to the proposed treatment.  I have

## (undated) NOTE — LETTER
09/25/20        Pio Stuart  [address] [facility]      Dear Elian Lagunas,    [address] records indicate that you have outstanding lab work and or testing that was ordered for you and has not yet been completed:  Orders Placed This Encounter      Co

## (undated) NOTE — LETTER
[address], [place]  Authorization for Invasive Procedures  1.  I hereby authorize Dr. Domi Rich , my physician and whomever may be designated as the doctor's assistant, to perform the following operation and/or procedure:  Dianne performed for the purposes of advancing medicine, science, and/or education, provided my identity is not revealed. If the procedure has been videotaped, the physician/surgeon will obtain the original videotape.  The hospital will not be responsible for stor My signature below affirms that prior to the time of the procedure, I have explained to the patient and/or her legal representative, the risks and benefits involved in the proposed treatment and any reasonable alternative to the proposed treatment.  I have

## (undated) NOTE — LETTER
7/5/2024          Yamini Ocampo        [address] [place] [address]         Dear Yamini,    This letter is to inform you that our office has made several attempts to reach you by phone without success.  We were attempting to contact you by phone regarding a hospital follow up appointment.     Please contact our office at the number listed below as soon as you receive this letter to discuss this issue and to make the necessary changes in our system to your contact information.  Thank you for your cooperation.        Sincerely,    Alyssa Johnston Ma, MD  [facility]  [address]  [facility] [address]  [phone]        Document electronically generated by:  Deepthi FRANCO RN

## (undated) NOTE — Clinical Note
Jerilyn Neumann - unfortunately Sabrina Weldon continues to lose weight. Her gastric emptying scan is very abnormal, suspect severe dysmotility of her stomach/esophagus. I will admit her to hospital for PICC/TPN.  G-tube feeds may exacerbate the problem so no plans for th

## (undated) NOTE — LETTER
[facility]  [address], [place], [place]    Authorization for Surgical Operation and Procedure                               I hereby authorize KATHARINE Prakash MD, my physician and his/her assistants (if applicable), which may include medical students, residents, and/or fellows, to perform the following surgical operation/ procedure and administer such anesthesia as may be determined necessary by my physician: Operation/Procedure name (s) ESOPHAGOGASTRODUODENOSCOPY (EGD) on Yamini Ocampo   2.   I recognize that during the surgical operation/procedure, unforeseen conditions may necessitate additional or different procedures than those listed above.  I, therefore, further authorize and request that the above-named surgeon, assistants, or designees perform such procedures as are, in their judgment, necessary and desirable.    3.   My surgeon/physician has discussed prior to my surgery the potential benefits, risks and side effects of this procedure; the likelihood of achieving goals; and potential problems that might occur during recuperation.  They also discussed reasonable alternatives to the procedure, including risks, benefits, and side effects related to the alternatives and risks related to not receiving this procedure.  I have had all my questions answered and I acknowledge that no guarantee has been made as to the result that may be obtained.    4.   Should the need arise during my operation/procedure, which includes change of level of care prior to discharge, I also consent to the administration of blood and/or blood products.  Further, I understand that despite careful testing and screening of blood or blood products by collecting agencies, I may still be subject to ill effects as a result of receiving a blood transfusion and/or blood products.  The following are some, but not all, of the potential risks that can occur: fever and allergic reactions, hemolytic reactions, transmission of  diseases such as Hepatitis, AIDS and Cytomegalovirus (CMV) and fluid overload.  In the event that I wish to have an autologous transfusion of my own blood, or a directed donor transfusion, I will discuss this with my physician.  Check only if Refusing Blood or Blood Products  I understand refusal of blood or blood products as deemed necessary by my physician may have serious consequences to my condition to include possible death. I hereby assume responsibility for my refusal and release the hospital, its personnel, and my physicians from any responsibility for the consequences of my refusal.    o  Refuse   5.   I authorize the use of any specimen, organs, tissues, body parts or foreign objects that may be removed from my body during the operation/procedure for diagnosis, research or teaching purposes and their subsequent disposal by hospital authorities.  I also authorize the release of specimen test results and/or written reports to my treating physician on the hospital medical staff or other referring or consulting physicians involved in my care, at the discretion of the Pathologist or my treating physician.    6.   I consent to the photographing or videotaping of the operations or procedures to be performed, including appropriate portions of my body for medical, scientific, or educational purposes, provided my identity is not revealed by the pictures or by descriptive texts accompanying them.  If the procedure has been photographed/videotaped, the surgeon will obtain the original picture, image, videotape or CD.  The hospital will not be responsible for storage, release or maintenance of the picture, image, tape or CD.    7.   I consent to the presence of a  or observers in the operating room as deemed necessary by my physician or their designees.    8.   I recognize that in the event my procedure results in extended X-Ray/fluoroscopy time, I may develop a skin reaction.    9. If I have a Do Not  Attempt Resuscitation (DNAR) order in place, that status will be suspended while in the operating room, procedural suite, and during the recovery period unless otherwise explicitly stated by me (or a person authorized to consent on my behalf). The surgeon or my attending physician will determine when the applicable recovery period ends for purposes of reinstating the DNAR order.  10. Patients having a sterilization procedure: I understand that if the procedure is successful the results will be permanent and it will therefore be impossible for me to inseminate, conceive, or bear children.  I also understand that the procedure is intended to result in sterility, although the result has not been guaranteed.   11. I acknowledge that my physician has explained sedation/analgesia administration to me including the risk and benefits I consent to the administration of sedation/analgesia as may be necessary or desirable in the judgment of my physician.    I CERTIFY THAT I HAVE READ AND FULLY UNDERSTAND THE ABOVE CONSENT TO OPERATION and/or OTHER PROCEDURE.     ____________________________________  _________________________________        ______________________________  Signature of Patient    Signature of Responsible Person                Printed Name of Responsible Person                                      ____________________________________  _____________________________                ________________________________  Signature of Witness        Date  Time         Relationship to Patient    STATEMENT OF PHYSICIAN My signature below affirms that prior to the time of the procedure; I have explained to the patient and/or his/her legal representative, the risks and benefits involved in the proposed treatment and any reasonable alternative to the proposed treatment. I have also explained the risks and benefits involved in refusal of the proposed treatment and alternatives to the proposed treatment and have answered the  patient's questions. If I have a significant financial interest in a co-management agreement or a significant financial interest in any product or implant, or other significant relationship used in this procedure/surgery, I have disclosed this and had a discussion with my patient.     _____________________________________________________              _____________________________  (Signature of Physician)                                                                                         (Date)                                   (Time)  Patient Name: Yamini Ocampo      : 1945      Printed: 2024     Medical Record #: [id number]                                      Page 1 of 1

## (undated) NOTE — LETTER
08/18/20        [address] [facility]      Dear Ricky Blancas,    [address] records indicate that you have outstanding lab work and or testing that was ordered for you and has not yet been completed:  Orders Placed This Encounter      TS

## (undated) NOTE — LETTER
[facility]  Administration of Anesthesia  1. I Javan Lyn, or _________________________________ acting on his/her behalf, (Patient) (Dependent/Representative) request to receive anesthesia for my pending procedure/operation/treatment. pressure, difficulty urinating, slowing of the baby's heart rate and headache. Rare risks include infections, high spinal         block, spinal bleeding, seizure, cardiac arrest and death.   7.   AWARENESS: I understand that it is possible (but unlike ________________________________________________  (Date) (Time)                                                                                                  (Responsible person in case of minor/ unconscious pt) /Relationship    My signature below affir

## (undated) NOTE — IP AVS SNAPSHOT
Patient Demographics     Address  [address] [place] [address] Phone  [phone] (Home) *Preferred* E-mail Address  [email or web address]      Emergency Contact(s)     Name Relation Home Work [employer] Daughter  Take 1 tablet (17.2 mg total) by mouth nightly. Sasha Roman MD         triamcinolone acetonide 0.1 % Crea  Commonly known as:  KENALOG      Apply topically 2 (two) times daily as needed.    Bryanna Lin, DO               Where to Get Your Medicatio Pulse  90 Filed at 10/15/2017 1347   Resp  18 Filed at 10/15/2017 1347   Temp  98.2 °F (36.8 °C) Filed at 10/15/2017 1347   SpO2  93 % Filed at 10/15/2017 1347      Patient's Most Recent Weight    Flowsheet Row Most Recent Value   Patient Weight  100.2 kg Radha Michel is a(n) 74year old female. Pt. With h/o severe osteoarthritis of right knee. Pt. Failed conservative management.  Shee complains of debilitating pain.  She elects right total knee arthroplasty.  Surgery risks including but not limited to fx/ n • Heart Disorder Mother    • Hypertension Other      Family H/O   • Cancer Other      Lymphoma  Family H/O   • Heart Disease Other      Family H/O   • Arthritis Other      Close relative  unsure which type      reports that she has quit smoking.  She has ne No peritoneal signs. No ascites. Liver is within normal limits. Spleen is not palpable. Extremities: right knee painful ROM. Positive crepitance. Positive effusion. ROM 2-100. Antalgic gait. Neuro intact. Skin: Normal texture and turgor.   Katelin Owens PATIENT'S NAME: Chante Trey PHYSICIAN: Breanna Ellis MD   CONSULTING PHYSICIAN: Arnoldo Arango DO   PATIENT ACCOUNT#:   [de-identified]    LOCATION:  [address] [place] [address] RECORD #:   [id number]       YOB: 1945  ADMISSION DA NEUROLOGIC:  Alert and appropriate. PSYCHIATRIC:  The patient is not delusional.    Telemetry strips are reviewed, demonstrating pauses, the longest one at 12 seconds. Microperfusion imaging stress test in 10/2017 with normal myocardial perfusion.   Echoc History of Present Illness     Lupe Lucas is a(n) 74year old female with history of chronic A. fib, chronic kidney disease, hypertension who presents for right knee arthroplasty for osteoarthritis.        Hospital Course     For details of surgery emelyn Refills:  0     HYDROcodone-acetaminophen 5-325 MG Tabs  Commonly known as:  NORCO      Take 1-2 tablets by mouth every 4 (four) hours as needed.    Quantity:  40 tablet  Refills:  0     metoprolol Tartrate 25 MG Tabs  Commonly known as:  Deidre Topete [place] [address] [facility] [address]  [phone]                   Hospital Discharge Diagnoses: osteoarthritis knee    TCM Diagnosis at discharge from Hospital: Other: osteoarthritis knee; still recommend for TCM follow-up    Please note that only patients enrolled in the linear nodular, pleural-based opacity along the lateral right midlung, a new finding. The lungs are otherwise clear. BONES: Mild DJD spine. OTHER: Negative.    Dictated by (CST): Christy Mejía MD on 10/03/2017 at 8:44     Approved by (CST): Dm Precautions: None    WEIGHT BEARING STATUS  Weight Bearing Restriction: R lower extremity        R Lower Extremity: Weight Bearing as Tolerated       PAIN ASSESSMENT   Ratin  Location: right knee, front  Management Techniques:  Activity promotion;Reposi Patient End of Session: Up in chair;Call light within reach;RN aware of session/findings    CURRENT GOALS    Goals to be met by: 10/25/17  Patient Goal Patient's self-stated goal is: to go home   Goal #1 Patient is able to demonstrate supine - sit EOB @ le PHYSICAL THERAPY KNEE TREATMENT NOTE - INPATIENT     Room Number: 808/014-O             Presenting Problem: R TKA    Problem List  Principal Problem:    Osteoarthritis of right knee  Active Problems:    Essential hypertension    A-fib (HCC)    CKD (chroni setting;24 hour care/supervision    PLAN  PT Treatment Plan: Bed mobility; Patient education;Gait training;Neuromuscular re-educate;Range of motion;Strengthening;Stair training;Transfer training;Balance training    SUBJECTIVE[MJ.1]  \"i am ready to get up\" extension with faciltation provided. Step to pattern, slow ced    Additional Information:[MJ.1] patient reports with Raynaud's disease it is sometimes difficult to assess HR/SaO2 using pulse oximeter.  No problems during today's session[MJ.2]    Exercis Goal #4   Current Status  [MJ.1]NT[MJ.2]   Goal #5  AROM 0 degrees extension to 90 degrees flexion     Goal #5   Current Status  [MJ. 1]Progressin-86* today, pt able to bend knee with minimal increase in pain[MJ.2]   Goal #6 Patient independently perfor Osteoarthritis of right knee  Active Problems:    Essential hypertension    A-fib (HCC)    CKD (chronic kidney disease) stage 3, GFR 30-59 ml/min      ASSESSMENT   Nurse , A[SF.1]n[SF.2]n, approves pt participation in session.  Pt pleasant and cooperative -   Taking care of personal grooming such as brushing teeth?: None  -   Eating meals?: None    AM-PAC Score:  Score: 22  Approx Degree of Impairment: 25.8%  Standardized Score (AM-PAC Scale): 47.1  CMS Modifier (G-Code): MANUEL    FUNCTIONAL TRANSFER ASSESSMEN Reason for Therapy: ADL/IADL Dysfunction and Discharge Planning    OCCUPATIONAL THERAPY ASSESSMENT     Patient is a 74year old female admitted 10/12/2017 for R TKA.   In this OT evaluation patient presents with the following impairments: activity tolerance 9/28/16 : CHOLECYSTECTOMY  1/25/2017: COLONOSCOPY N/A      Comment: Procedure: COLONOSCOPY;  Surgeon: Felix Olmos MD;  Location: [address] [place] [address] ENDOSCOPY  12/14/2016: EGD N/A      Comment: Procedure: ESOPHAGOGASTRODUODENOSCOPY (EGD); NEUROLOGICAL FINDINGS                   ACTIVITIES OF DAILY LIVING ASSESSMENT  AM-PAC ‘6-Clicks’ Inpatient Daily Activity Short Form  How much help from another person does the patient currently need…  -   Putting on and taking off regular Patient will complete self care task at sink level with MOD I   Comment:    Patient will verbally recall transfer techniques for shower/tub. Comment:         Goals  on:  Frequency:[KI.1]     Attribution Key    KI. 1 - Debi Romero, OT on 10/14/2

## (undated) NOTE — LETTER
No referring provider defined for this encounter. 06/26/17        Patient: Ly Lee   YOB: 1945   Date of Visit: 6/26/2017       Dear  Dr. Mikaela Humphreys,      Thank you for referring Ly Lee to my practice.   Please find my potassium chloride on hold. She knows the importance of following a low-salt diet very carefully. Unfortunately she does eat out a fair amount. Leg elevation and support hose were also recommended. Daily weights.   Recommend repeating a CBC and renal pa

## (undated) NOTE — LETTER
[address], [place]  Authorization for Invasive Procedures  Date: 3/9/2023           Time: 2200    1. I hereby authorize Dr. Indigo Renner, my physician and his/her assistants (if applicable), which may include medical students, residents, and/or fellows, to perform the following surgical operation/ procedure and administer such anesthesia as may be determined necessary by my physician:  Operation/Procedure name (s)  Cardiac Catheterization, Left Ventricular Cineangiography, Bilateral Selective Coronary Angiography and/or Right Heart Catheterization; possible Percutaneous Transluminal Coronary Angioplasty, Coronary Atherectomy, Coronary Stent, Intracoronary Thrombolytic therapy, Antiplatelet therapy and/or Intravascular Ultrasound on [facility]   2.   I recognize that during the surgical operation/procedure, unforeseen conditions may necessitate additional or different procedures than those listed above. I, therefore, further authorize and request that the above-named surgeon, assistants, or designees perform such procedures as are, in their judgment, necessary and desirable. 3.   My surgeon/physician has discussed prior to my surgery the potential benefits, risks and side effects of this procedure; the likelihood of achieving goals; and potential problems that might occur during recuperation. They also discussed reasonable alternatives to the procedure, including risks, benefits, and side effects related to the alternatives and risks related to not receiving this procedure. I have had all my questions answered and I acknowledge that no guarantee has been made as to the result that may be obtained. 4.   Should the need arise during my operation/procedure, which includes change of level of care prior to discharge, I also consent to the administration of blood and/or blood products.   Further, I understand that despite careful testing and screening of blood or blood products by collecting agencies, I may still be subject to ill effects as a result of receiving a blood transfusion and/or blood products. The following are some, but not all, of the potential risks that can occur: fever and allergic reactions, hemolytic reactions, transmission of diseases such as Hepatitis, AIDS and Cytomegalovirus (CMV) and fluid overload. In the event that I wish to have an autologous transfusion of my own blood, or a directed donor transfusion, I will discuss this with my physician. Check only if Refusing Blood or Blood Products  I understand refusal of blood or blood products as deemed necessary by my physician may have serious consequences to my condition to include possible death. I hereby assume responsibility for my refusal and release the hospital, its personnel, and my physicians from any responsibility for the consequences of my refusal.          o  Refuse      5. I authorize the use of any specimen, organs, tissues, body parts or foreign objects that may be removed from my body during the operation/procedure for diagnosis, research or teaching purposes and their subsequent disposal by hospital authorities. I also authorize the release of specimen test results and/or written reports to my treating physician on the hospital medical staff or other referring or consulting physicians involved in my care, at the discretion of the Pathologist or my treating physician. 6.   I consent to the photographing or videotaping of the operations or procedures to be performed, including appropriate portions of my body for medical, scientific, or educational purposes, provided my identity is not revealed by the pictures or by descriptive texts accompanying them. If the procedure has been photographed/videotaped, the surgeon will obtain the original picture, image, videotape or CD.   The hospital will not be responsible for storage, release or maintenance of the picture, image, tape or CD.    7.   I consent to the presence of a  or observers in the operating room as deemed necessary by my physician or their designees. 8.   I recognize that in the event my procedure results in extended X-Ray/fluoroscopy time, I may develop a skin reaction. 9. If I have a Do Not Attempt Resuscitation (DNAR) order in place, that status will be suspended while in the operating room, procedural suite, and during the recovery period unless otherwise explicitly stated by me (or a person authorized to consent on my behalf). The surgeon or my attending physician will determine when the applicable recovery period ends for purposes of reinstating the DNAR order. 10. Patients having a sterilization procedure: I understand that if the procedure is successful the results will be permanent and it will therefore be impossible for me to inseminate, conceive, or bear children. I also understand that the procedure is intended to result in sterility, although the result has not been guaranteed. 11. I acknowledge that my physician has explained sedation/analgesia administration to me including the risk and benefits I consent to the administration of sedation/analgesia as may be necessary or desirable in the judgment of my physician.     I CERTIFY THAT I HAVE READ AND FULLY UNDERSTAND THE ABOVE CONSENT TO OPERATION and/or OTHER PROCEDURE.        ____________________________________       _________________________________      ______________________________  Signature of Patient         Signature of Responsible Person        Printed Name of Responsible Person    ____________________________________      _________________________________      ______________________________       Signature of Witness          Relationship to Patient                       Date                                       Time  Patient Name: Inga Olivera     : 1945                 Printed: 2023      Medical Record #: [id number] Page 1 of 2         STATEMENT OF PHYSICIAN My signature below affirms that prior to the time of the procedure; I have explained to the patient and/or his/her legal representative, the risks and benefits involved in the proposed treatment and any reasonable alternative to the proposed treatment. I have also explained the risks and benefits involved in refusal of the proposed treatment and alternatives to the proposed treatment and have answered the patient's questions.  If I have a significant financial interest in a co-management agreement or a significant financial interest in any product or implant, or other significant relationship used in this procedure/surgery, I have disclosed this and had a discussion with my patient.     _______________________________________________________________ _____________________________  Maira Mcarthur of Physician)                                                                                         (Date)                                   (Time)  Patient Name: Mohsen Postal     : 1945                 Printed: 2023      Medical Record #: [id number]                      Page 2 of 2

## (undated) NOTE — LETTER
[address], [place]  Authorization for Invasive Procedures  1.  I hereby authorize Dr. Alida Gonzales , my physician and whomever may be designated as the doctor's assistant, to perform the following operation and/or procedure:  Eso occur: fever and allergic reactions, hemolytic reactions, transmission of disease such as hepatitis, AIDS, cytomegalovirus (CMV), and flluid overload.  In the event that I wish to have autologous transfusions of my own blood, or a directed donor transfusion Signature of Patient:  ________________________________________________ Date: _________Time: _________    Responsible person in case of minor or unconscious: _____________________________Relationship: ____________     Witness Signature: _______________

## (undated) NOTE — Clinical Note
No referring provider defined for this encounter. 05/31/2017        Patient: Ky Li   YOB: 1945   Date of Visit: 5/31/2017       Dear  Dr. Cholo Dunn,      Thank you for referring Ky Li to my practice.   Please find Social history quit smoking cigarettes over 30 years ago. Family history past with kidney stones but otherwise no other renal pathology. Review of systems her primary concern is that of dysphagia.   Denies any chest pain, shortness of breath, GI or urinar Thank you again for allowing me to participate in the care of your patient. If you have any questions please feel free to call.            Sincerely,   Renata Cunha MD   [facility]  [address] [facility], [address] [place] [address]

## (undated) NOTE — LETTER
07/09/21    [address]      Dear Sonu Reynolds,    [address] [facility] records indicate that you have outstanding lab work and or testing that was ordered for you and has not yet been completed:  Please call central scheduling to make a

## (undated) NOTE — Clinical Note
Apologies for the slightly lengthy note - I think cholestyramine is our next best option for loose stools after CLN. PBM

## (undated) NOTE — Clinical Note
Chase Sanders -the G-tube placed by IR does not seem to be working; I will notify them to see if they can exchange it. 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 Mesenteric ischemia may have been causing her weight loss and difficulty eating.  She w

## (undated) NOTE — Clinical Note
TCM assessment completed.  A telephone encounter has been sent to clinical staff for assistance with scheduling an appointment.  Thank you.

## (undated) NOTE — Clinical Note
ANDREE, TCM call made, see [facility] notes. [facility] Attempted to schedule PCP office TCM appointment with patient, Patient was a patient of Dr Layne Anne, she is to establish with  Dr Elly Antonio. Due to MD's full schedule message sent to MD's office to request assistance scheduling appointment.

## (undated) NOTE — LETTER
[facility]  Administration of Anesthesia  IYamini agree to be cared for by a physician anesthesiologist alone and/or with a nurse anesthetist, who is specially trained to monitor me and give me medicine to put me to sleep or keep me comfortable during my procedure    I understand that my anesthesiologist and/or anesthetist is not an employee or agent of [facility] or [employer]. He or she works for [facility]    As the patient asking for anesthesia services, I agree to:  Allow the anesthesiologist (anesthesia doctor) to give me medicine and do additional procedures as necessary. Some examples are: Starting or using an “IV” to give me medicine, fluids or blood during my procedure, and having a breathing tube placed to help me breathe when I’m asleep (intubation). In the event that my heart stops working properly, I understand that my anesthesiologist will make every effort to sustain my life, unless otherwise directed by [facility] Do Not Resuscitate documents.  Tell my anesthesia doctor before my procedure:  If I am pregnant.  The last time that I ate or drank.  iii. All of the medicines I take (including prescriptions, herbal supplements, and pills I can buy without a prescription (including street drugs/illegal medications). Failure to inform my anesthesiologist about these medicines may increase my risk of anesthetic complications.  iv.If I am allergic to anything or have had a reaction to anesthesia before.  I understand how the anesthesia medicine will help me (benefits).  I understand that with any type of anesthesia medicine there are risks:  The most common risks are: nausea, vomiting, sore throat, muscle soreness, damage to my eyes, mouth, or teeth (from breathing tube placement).  Rare risks include: remembering what happened during my procedure, allergic reactions to medications, injury to my airway, heart, lungs, vision, nerves, or muscles  and in extremely rare instances death.  My doctor has explained to me other choices available to me for my care (alternatives).  Pregnant Patients (“epidural”):  I understand that the risks of having an epidural (medicine given into my back to help control pain during labor), include itching, low blood pressure, difficulty urinating, headache or slowing of the baby’s heart. Very rare risks include infection, bleeding, seizure, irregular heart rhythms and nerve injury.  Regional Anesthesia (“spinal”, “epidural”, & “nerve blocks”):  I understand that rare but potential complications include headache, bleeding, infection, seizure, irregular heart rhythms, and nerve injury.    _____________________________________________________________________________  Patient (or Representative) Signature/Relationship to Patient  Date   Time    _____________________________________________________________________________   Name (if used)    Language/Organization   Time    _____________________________________________________________________________  Nurse Anesthetist Signature     Date   Time  _____________________________________________________________________________  Anesthesiologist Signature     Date   Time  I have discussed the procedure and information above with the patient (or patient’s representative) and answered their questions. The patient or their representative has agreed to have anesthesia services.    _____________________________________________________________________________  Witness        Date   Time  I have verified that the signature is that of the patient or patient’s representative, and that it was signed before the procedure  Patient Name: Yamini Ocampo     : 1945                 Printed: 2024 at 8:25 AM    Medical Record #: [id number]                                            Page 1 of 1  ----------ANESTHESIA CONSENT----------

## (undated) NOTE — Clinical Note
TCM call completed. Patient is scheduled for a follow-up visit (non-TCM) on 3/20/23. TE was sent to the office to assist in updating visit type. Thank you!

## (undated) NOTE — LETTER
1/24/2019              [facility]         Dear Dr. Shital Tee MD,    Austin Mark was seen today for her preop exam.  She will be cleared once her labs ekg and cxr are acceptable.       Sincerely,

## (undated) NOTE — LETTER
8/6/2019              [address] [place] [address]         Dear Jose Flores,    This letter is to inform you that our office has made several attempts to reach you by phone without success.   We were attempting to contact you

## (undated) NOTE — IP AVS SNAPSHOT
Patient Demographics     Address  [address] [place] [address] Phone  [phone] (Home) *Preferred* E-mail Address  [email or web address]      Emergency Contact(s)     Name Relation Home Work [employer] Daughter  Next dose due: Tomorrow am      Take by mouth. FLUoxetine HCl 60 MG Tabs  Next dose due: Tomorrow am      Take 60 mg by mouth daily. Stop taking on:  2/23/2019   Delisa Khan.  Jemal,          HYDROcodone-acetaminophen 5-325 MG Tabs  Commonly k 02/02/19 1702 Given      [id number] HYDROcodone-acetaminophen (NORCO) 5-325 MG per tab 1 tablet 02/02/19 2125 Given      [id number] HYDROcodone-acetaminophen (NORCO) 5-325 MG per tab 1 tablet 02/03/19 0508 Given      [id number] HYDROcodone-acetaminophen ([facility] RBC 3.36 3.80 - 5.30 x10(6)uL  [facility]   HGB 10.9 12.0 - 16.0 g/dL  [facility]   HCT 34.5 35.0 - 48.0 %  [facility]   .0 150.0 - 450.0 10(3)uL — [facility]   .7 80.0 - 100.0 fL H [facility]   MCH 32.4 26.0 - 34.0 pg [facility] • Esophageal reflux    • Heel spur     Right   • Hiatal hernia    • High blood pressure    • High cholesterol    • Incontinence    • Lumbar disc disease 2012   • Osteoarthritis    • Pulmonary embolism (HCC)    • Renal disorder    • Tubular adenoma of colon ELIQUIS 2.5 MG Oral Tab TK 1 T PO BID Disp:  Rfl: 1 1/29/2019 at Unknown time   AmLODIPine Besylate 10 MG Oral Tab Take 1 tablet (10 mg total) by mouth daily.  Disp: 90 tablet Rfl: 1 1/29/2019 at Unknown time   ATORVASTATIN 10 MG Oral Tab TAKE 1 TABLET(10 M extension, lateral rotation and lateral flexion of cervical spine. No JVD. Supple. Lungs: Clear to auscultation bilaterally. Cardiac: Regular rate and rhythm. No murmur. Abdomen:  Soft, non-distended, non-tender, with no rebound or guarding.   No perit Imaging Results (HF patients)    Chest X-Ray Results (HF patients only)    Xr Chest Pa + Lat Chest (cpt=71046)    Result Date: 1/29/2019  PROCEDURE: XR CHEST PA + LAT CHEST (CPT=71020)  COMPARISON: [facility], XR CHEST AP PORTABLE (CPT=7104 PHYSICAL THERAPY ASSESSMENT     AM treatment session: Consulted w/ RN prior to seeing pt. Pt was received sitting in recliner chair. Pt completed transfers w/ CGA. Pt amb 125 ft w/ RW & CGA demonstrating good heel to toe gait pattern.  Reviewed therapeutic O2 WALK                  AM-PAC '6-Clicks' INPATIENT SHORT FORM - BASIC MOBILITY  How much difficulty does the patient currently have. ..  -   Turning over in bed (including adjusting bedclothes, sheets and blankets)?: A Little   -   Sitting down on and sta Current Status Up/down 4 stairs w/ HR & CGA   Goal #5  AROM 0 degrees extension to 95 degrees flexion     Goal #5   Current Status IN PROGRESS   Goal #6 Patient independently performs home exercise program for ROM/strengthening per the instructions provide much effort needed to ambulate. Performed TKR exercises as able. Pt left up in chair with all needs in reach, nursing aware. PM: Pt sleeping upon arrival but agreeable for therapy.  C/o 7-8/10 pain with activity-again had received pain meds prior to sess Right   • Hiatal hernia    • High blood pressure    • High cholesterol    • Incontinence    • Lumbar disc disease 2012   • Osteoarthritis    • Pulmonary embolism (HCC)    • Renal disorder    • Tubular adenoma of colon 1/2017    repeat c-scope 1/2020   • V Management Techniques:  Activity promotion;Repositioning;Relaxation    COGNITION  · Overall Cognitive Status:  WFL - within functional limits    RANGE OF MOTION AND STRENGTH ASSESSMENT  Upper extremity ROM and strength are within functional limits    Lower Exercise/Education Provided:  Bed mobility  Functional activity tolerated  Gait training  Strengthening  Lower therapeutic exercise: Ankle pumps  Heel slides  LAQ  Quad sets  SAQ  SLR  Transfer training    Patient End of Session: Up in chair;Needs met; Alpesh Active Problems:    Essential hypertension    A-fib (HCC)    Mesenteric ischemia, chronic (HCC)    CKD (chronic kidney disease) stage 3, GFR 30-59 ml/min (HCC)    Raynaud's disease without gangrene      OCCUPATIONAL THERAPY ASSESSMENT     RN okayed OT to s -   Bathing (including washing, rinsing, drying)?: A Little  -   Toileting, which includes using toilet, bedpan or urinal? : A Little  -   Putting on and taking off regular upper body clothing?: None  -   Taking care of personal grooming such as brushing t increased pain during weight bearing on surgical leg - cued once for hand placement for sit to stand - pt ambulated slowly to bathroom with CGA - transition into sitting min a using grab bars via kicking left leg out - (pt able to pull underwear down witho CKD (chronic kidney disease) stage 3, GFR 30-59 ml/min (HCC)    Raynaud's disease without gangrene      Past Medical History  Past Medical History:   Diagnosis Date   • Arrhythmia     afib   • Back problem     lumbar disc disease   • Basal cell carcinoma Assistive Device(s) Used: none    Prior Level of [place]: pt has a RW and cane - not using - pt is independent in ADLS/IADLS- is very independent   Stay on one level   SUBJECTIVE  \"please let me do it myself\"    OCCUPATIONAL THERAPY EXAMINATION Education Provided: Role of OT, continue plan of care, transfer training, pain mgmt     Patient End of Session: Up in chair;Needs met;Call light within reach;RN aware of session/findings; All patient questions and concerns addressed; Alarm set    OT Goals  P

## (undated) NOTE — LETTER
[place] [address]  Authorization for Invasive Procedures  Date: 7/29/24           Time:     I hereby authorize Dr. Alyssa Orellana, my physician and his/her assistants (if applicable), which may include medical students, residents, and/or fellows, to perform the following surgical operation/ procedure and administer such anesthesia as may be determined necessary by my physicianEsophagogastroduodenoscopy (EGD) :  on Eliasjr WEST Ocampo  2.   I recognize that during the surgical operation/procedure, unforeseen conditions may necessitate additional or different procedures than those listed above.  I, therefore, further authorize and request that the above-named surgeon, assistants, or designees perform such procedures as are, in their judgment, necessary and desirable.    3.   My surgeon/physician has discussed prior to my surgery the potential benefits, risks and side effects of this procedure; the likelihood of achieving goals; and potential problems that might occur during recuperation.  They also discussed reasonable alternatives to the procedure, including risks, benefits, and side effects related to the alternatives and risks related to not receiving this procedure.  I have had all my questions answered and I acknowledge that no guarantee has been made as to the result that may be obtained.    4.   Should the need arise during my operation/procedure, which includes change of level of care prior to discharge, I also consent to the administration of blood and/or blood products.  Further, I understand that despite careful testing and screening of blood or blood products by collecting agencies, I may still be subject to ill effects as a result of receiving a blood transfusion and/or blood products.  The following are some, but not all, of the potential risks that can occur: fever and allergic reactions, hemolytic reactions, transmission of diseases such as Hepatitis, AIDS and Cytomegalovirus (CMV) and  fluid overload.  In the event that I wish to have an autologous transfusion of my own blood, or a directed donor transfusion, I will discuss this with my physician.   Check only if Refusing Blood or Blood Products  I understand refusal of blood or blood products as deemed necessary by my physician may have serious consequences to my condition to include possible death. I hereby assume responsibility for my refusal and release the hospital, its personnel, and my physicians from any responsibility for the consequences of my refusal.         o  Refuse         5.   I authorize the use of any specimen, organs, tissues, body parts or foreign objects that may be removed from my body during the operation/procedure for diagnosis, research or teaching purposes and their subsequent disposal by hospital authorities.  I also authorize the release of specimen test results and/or written reports to my treating physician on the hospital medical staff or other referring or consulting physicians involved in my care, at the discretion of the Pathologist or my treating physician.    6.   I consent to the photographing or videotaping of the operations or procedures to be performed, including appropriate portions of my body for medical, scientific, or educational purposes, provided my identity is not revealed by the pictures or by descriptive texts accompanying them.  If the procedure has been photographed/videotaped, the surgeon will obtain the original picture, image, videotape or CD.  The hospital will not be responsible for storage, release or maintenance of the picture, image, tape or CD.    7.   I consent to the presence of a  or observers in the operating room as deemed necessary by my physician or their designees.    8.   I recognize that in the event my procedure results in extended X-Ray/fluoroscopy time, I may develop a skin reaction.    9. If I have a Do Not Attempt Resuscitation (DNAR) order in place, that  status will be suspended while in the operating room, procedural suite, and during the recovery period unless otherwise explicitly stated by me (or a person authorized to consent on my behalf). The surgeon or my attending physician will determine when the applicable recovery period ends for purposes of reinstating the DNAR order.  10. Patients having a sterilization procedure: I understand that if the procedure is successful the results will be permanent and it will therefore be impossible for me to inseminate, conceive, or bear children.  I also understand that the procedure is intended to result in sterility, although the result has not been guaranteed.   11. I acknowledge that my physician has explained sedation/analgesia administration to me including the risk and benefits I consent to the administration of sedation/analgesia as may be necessary or desirable in the judgment of my physician.    I CERTIFY THAT I HAVE READ AND FULLY UNDERSTAND THE ABOVE CONSENT TO OPERATION and/or OTHER PROCEDURE.        ____________________________________       _________________________________      ______________________________  Signature of Patient         Signature of Responsible Person        Printed Name of Responsible Person        ____________________________________      _________________________________      ______________________________       Signature of Witness          Relationship to Patient                       Date                                       Time  Patient Name: Yamini Ocampo  : 1945    Reviewed: 2024   Printed: 2024  Medical Record #: [id number] Page 1 of 2             STATEMENT OF PHYSICIAN My signature below affirms that prior to the time of the procedure; I have explained to the patient and/or his/her legal representative, the risks and benefits involved in the proposed treatment and any reasonable alternative to the proposed treatment. I have also explained the risks and  benefits involved in refusal of the proposed treatment and alternatives to the proposed treatment and have answered the patient's questions. If I have a significant financial interest in a co-management agreement or a significant financial interest in any product or implant, or other significant relationship used in this procedure/surgery, I have disclosed this and had a discussion with my patient.     _______________________________________________________________ _____________________________  (Signature of Physician)                                                                                         (Date)                                   (Time)  Patient Name: Yamini Ocampo  : 1945    Reviewed: 2024   Printed: 2024  Medical Record #: [id number] Page 2 of 2

## (undated) NOTE — MR AVS SNAPSHOT
[facility]  [address] [place] [address]  [phone]               Thank you for choosing us for your health care visit with Flora Montoya MD.  We are glad to serve you and happy to provide you with this summary of your visit. TraMADol HCl 50 MG Tabs   Take 0.5 tablets (25 mg total) by mouth every 6 (six) hours as needed for Pain. Commonly known as:  ULTRAM           triamcinolone acetonide 0.1 % Crea   Apply topically 2 (two) times daily as needed.    Commonly known as:  [employer]

## (undated) NOTE — LETTER
12/3/2019    [address] [facility] [address]            Dear [facility],      [address] [facility] records indicate that you are due for an appointment for a Office Visit in January 2020 with Oriana Young PA-C at [facility]

## (undated) NOTE — LETTER
[facility]  Administration of Anesthesia  IYamini agree to be cared for by a physician anesthesiologist alone and/or with a nurse anesthetist, who is specially trained to monitor me and give me medicine to put me to sleep or keep me comfortable during my procedure    I understand that my anesthesiologist and/or anesthetist is not an employee or agent of [facility] or [employer]. He or she works for [facility]    As the patient asking for anesthesia services, I agree to:  Allow the anesthesiologist (anesthesia doctor) to give me medicine and do additional procedures as necessary. Some examples are: Starting or using an “IV” to give me medicine, fluids or blood during my procedure, and having a breathing tube placed to help me breathe when I’m asleep (intubation). In the event that my heart stops working properly, I understand that my anesthesiologist will make every effort to sustain my life, unless otherwise directed by [facility] Do Not Resuscitate documents.  Tell my anesthesia doctor before my procedure:  If I am pregnant.  The last time that I ate or drank.  iii. All of the medicines I take (including prescriptions, herbal supplements, and pills I can buy without a prescription (including street drugs/illegal medications). Failure to inform my anesthesiologist about these medicines may increase my risk of anesthetic complications.  iv.If I am allergic to anything or have had a reaction to anesthesia before.  I understand how the anesthesia medicine will help me (benefits).  I understand that with any type of anesthesia medicine there are risks:  The most common risks are: nausea, vomiting, sore throat, muscle soreness, damage to my eyes, mouth, or teeth (from breathing tube placement).  Rare risks include: remembering what happened during my procedure, allergic reactions to medications, injury to my airway, heart, lungs, vision, nerves, or muscles  and in extremely rare instances death.  My doctor has explained to me other choices available to me for my care (alternatives).  Pregnant Patients (“epidural”):  I understand that the risks of having an epidural (medicine given into my back to help control pain during labor), include itching, low blood pressure, difficulty urinating, headache or slowing of the baby’s heart. Very rare risks include infection, bleeding, seizure, irregular heart rhythms and nerve injury.  Regional Anesthesia (“spinal”, “epidural”, & “nerve blocks”):  I understand that rare but potential complications include headache, bleeding, infection, seizure, irregular heart rhythms, and nerve injury.    _____________________________________________________________________________  Patient (or Representative) Signature/Relationship to Patient  Date   Time    _____________________________________________________________________________   Name (if used)    Language/Organization   Time    _____________________________________________________________________________  Nurse Anesthetist Signature     Date   Time  _____________________________________________________________________________  Anesthesiologist Signature     Date   Time  I have discussed the procedure and information above with the patient (or patient’s representative) and answered their questions. The patient or their representative has agreed to have anesthesia services.    _____________________________________________________________________________  Witness        Date   Time  I have verified that the signature is that of the patient or patient’s representative, and that it was signed before the procedure  Patient Name: Yamini Ocampo     : 1945                 Printed: 2024 at 7:35 AM    Medical Record #: [id number]                                            Page 1 of 1  ----------ANESTHESIA CONSENT----------

## (undated) NOTE — LETTER
[facility]  Administration of Anesthesia  IYamini agree to be cared for by a physician anesthesiologist alone and/or with a nurse anesthetist, who is specially trained to monitor me and give me medicine to put me to sleep or keep me comfortable during my procedure    I understand that my anesthesiologist and/or anesthetist is not an employee or agent of [facility] or [employer]. He or she works for [facility]    As the patient asking for anesthesia services, I agree to:  Allow the anesthesiologist (anesthesia doctor) to give me medicine and do additional procedures as necessary. Some examples are: Starting or using an “IV” to give me medicine, fluids or blood during my procedure, and having a breathing tube placed to help me breathe when I’m asleep (intubation). In the event that my heart stops working properly, I understand that my anesthesiologist will make every effort to sustain my life, unless otherwise directed by [facility] Do Not Resuscitate documents.  Tell my anesthesia doctor before my procedure:  If I am pregnant.  The last time that I ate or drank.  iii. All of the medicines I take (including prescriptions, herbal supplements, and pills I can buy without a prescription (including street drugs/illegal medications). Failure to inform my anesthesiologist about these medicines may increase my risk of anesthetic complications.  iv.If I am allergic to anything or have had a reaction to anesthesia before.  I understand how the anesthesia medicine will help me (benefits).  I understand that with any type of anesthesia medicine there are risks:  The most common risks are: nausea, vomiting, sore throat, muscle soreness, damage to my eyes, mouth, or teeth (from breathing tube placement).  Rare risks include: remembering what happened during my procedure, allergic reactions to medications, injury to my airway, heart, lungs, vision, nerves, or muscles  and in extremely rare instances death.  My doctor has explained to me other choices available to me for my care (alternatives).  Pregnant Patients (“epidural”):  I understand that the risks of having an epidural (medicine given into my back to help control pain during labor), include itching, low blood pressure, difficulty urinating, headache or slowing of the baby’s heart. Very rare risks include infection, bleeding, seizure, irregular heart rhythms and nerve injury.  Regional Anesthesia (“spinal”, “epidural”, & “nerve blocks”):  I understand that rare but potential complications include headache, bleeding, infection, seizure, irregular heart rhythms, and nerve injury.    _____________________________________________________________________________  Patient (or Representative) Signature/Relationship to Patient  Date   Time    _____________________________________________________________________________   Name (if used)    Language/Organization   Time    _____________________________________________________________________________  Nurse Anesthetist Signature     Date   Time  _____________________________________________________________________________  Anesthesiologist Signature     Date   Time  I have discussed the procedure and information above with the patient (or patient’s representative) and answered their questions. The patient or their representative has agreed to have anesthesia services.    _____________________________________________________________________________  Witness        Date   Time  I have verified that the signature is that of the patient or patient’s representative, and that it was signed before the procedure  Patient Name: Yamini Ocampo     : 1945                 Printed: 2024 at 9:43 AM    Medical Record #: [id number]                                            Page 1 of 1  ----------ANESTHESIA CONSENT----------

## (undated) NOTE — LETTER
[facility]  Administration of Anesthesia  IYamini agree to be cared for by a physician anesthesiologist alone and/or with a nurse anesthetist, who is specially trained to monitor me and give me medicine to put me to sleep or keep me comfortable during my procedure    I understand that my anesthesiologist and/or anesthetist is not an employee or agent of [facility] or [employer]. He or she works for [facility]    As the patient asking for anesthesia services, I agree to:  Allow the anesthesiologist (anesthesia doctor) to give me medicine and do additional procedures as necessary. Some examples are: Starting or using an “IV” to give me medicine, fluids or blood during my procedure, and having a breathing tube placed to help me breathe when I’m asleep (intubation). In the event that my heart stops working properly, I understand that my anesthesiologist will make every effort to sustain my life, unless otherwise directed by [facility] Do Not Resuscitate documents.  Tell my anesthesia doctor before my procedure:  If I am pregnant.  The last time that I ate or drank.  iii. All of the medicines I take (including prescriptions, herbal supplements, and pills I can buy without a prescription (including street drugs/illegal medications). Failure to inform my anesthesiologist about these medicines may increase my risk of anesthetic complications.  iv.If I am allergic to anything or have had a reaction to anesthesia before.  I understand how the anesthesia medicine will help me (benefits).  I understand that with any type of anesthesia medicine there are risks:  The most common risks are: nausea, vomiting, sore throat, muscle soreness, damage to my eyes, mouth, or teeth (from breathing tube placement).  Rare risks include: remembering what happened during my procedure, allergic reactions to medications, injury to my airway, heart, lungs, vision, nerves, or muscles  and in extremely rare instances death.  My doctor has explained to me other choices available to me for my care (alternatives).  Pregnant Patients (“epidural”):  I understand that the risks of having an epidural (medicine given into my back to help control pain during labor), include itching, low blood pressure, difficulty urinating, headache or slowing of the baby’s heart. Very rare risks include infection, bleeding, seizure, irregular heart rhythms and nerve injury.  Regional Anesthesia (“spinal”, “epidural”, & “nerve blocks”):  I understand that rare but potential complications include headache, bleeding, infection, seizure, irregular heart rhythms, and nerve injury.    _____________________________________________________________________________  Patient (or Representative) Signature/Relationship to Patient  Date   Time    _____________________________________________________________________________   Name (if used)    Language/Organization   Time    _____________________________________________________________________________  Nurse Anesthetist Signature     Date   Time  _____________________________________________________________________________  Anesthesiologist Signature     Date   Time  I have discussed the procedure and information above with the patient (or patient’s representative) and answered their questions. The patient or their representative has agreed to have anesthesia services.    _____________________________________________________________________________  Witness        Date   Time  I have verified that the signature is that of the patient or patient’s representative, and that it was signed before the procedure  Patient Name: Yamini Ocampo     : 1945                 Printed: 2024 at 7:10 AM    Medical Record #: [id number]                                            Page 1 of 1  ----------ANESTHESIA CONSENT----------

## (undated) NOTE — IP AVS SNAPSHOT
[facility]    [address], [place], [place] ~ [phone]                Discharge Summary   1/25/2017    [facility]           Admission Information        Provider Department    1/25/2017 Maxine Duckworth MD [facility] Endoscop medications prescribed for you. Read the directions carefully, and ask your doctor or other care provider to review them with you.       ASK your doctor about these medications     Vitamin D 2000 UNITS Caps                 Patient Instructions       [place] plans. To get signed up and covered, please call [phone] and ask to get set up for an insurance coverage that is in-network with [facility].         Visit Information        Department Dept Phone    1/25/2017 10:43 AM [facility] Endosc

## (undated) NOTE — LETTER
[facility]  Administration of Anesthesia  IYamini agree to be cared for by a physician anesthesiologist alone and/or with a nurse anesthetist, who is specially trained to monitor me and give me medicine to put me to sleep or keep me comfortable during my procedure    I understand that my anesthesiologist and/or anesthetist is not an employee or agent of [facility] or [employer]. He or she works for [facility]    As the patient asking for anesthesia services, I agree to:  Allow the anesthesiologist (anesthesia doctor) to give me medicine and do additional procedures as necessary. Some examples are: Starting or using an “IV” to give me medicine, fluids or blood during my procedure, and having a breathing tube placed to help me breathe when I’m asleep (intubation). In the event that my heart stops working properly, I understand that my anesthesiologist will make every effort to sustain my life, unless otherwise directed by [facility] Do Not Resuscitate documents.  Tell my anesthesia doctor before my procedure:  If I am pregnant.  The last time that I ate or drank.  iii. All of the medicines I take (including prescriptions, herbal supplements, and pills I can buy without a prescription (including street drugs/illegal medications). Failure to inform my anesthesiologist about these medicines may increase my risk of anesthetic complications.  iv.If I am allergic to anything or have had a reaction to anesthesia before.  I understand how the anesthesia medicine will help me (benefits).  I understand that with any type of anesthesia medicine there are risks:  The most common risks are: nausea, vomiting, sore throat, muscle soreness, damage to my eyes, mouth, or teeth (from breathing tube placement).  Rare risks include: remembering what happened during my procedure, allergic reactions to medications, injury to my airway, heart, lungs, vision, nerves, or muscles  and in extremely rare instances death.  My doctor has explained to me other choices available to me for my care (alternatives).  Pregnant Patients (“epidural”):  I understand that the risks of having an epidural (medicine given into my back to help control pain during labor), include itching, low blood pressure, difficulty urinating, headache or slowing of the baby’s heart. Very rare risks include infection, bleeding, seizure, irregular heart rhythms and nerve injury.  Regional Anesthesia (“spinal”, “epidural”, & “nerve blocks”):  I understand that rare but potential complications include headache, bleeding, infection, seizure, irregular heart rhythms, and nerve injury.    _____________________________________________________________________________  Patient (or Representative) Signature/Relationship to Patient  Date   Time    _____________________________________________________________________________   Name (if used)    Language/Organization   Time    _____________________________________________________________________________  Nurse Anesthetist Signature     Date   Time  _____________________________________________________________________________  Anesthesiologist Signature     Date   Time  I have discussed the procedure and information above with the patient (or patient’s representative) and answered their questions. The patient or their representative has agreed to have anesthesia services.    _____________________________________________________________________________  Witness        Date   Time  I have verified that the signature is that of the patient or patient’s representative, and that it was signed before the procedure  Patient Name: Yamini Ocampo     : 1945                 Printed: 2024 at 9:02 AM    Medical Record #: [id number]                                            Page 1 of 1  ----------ANESTHESIA CONSENT----------

## (undated) NOTE — LETTER
[facility] 5SW/SE  [address]  [facility] [address]  [phone]    Blood Transfusion Consent    In the course of your treatment, it may become necessary to administer a transfusion of blood or blood components. This form provides basic information concerning this procedure and, if signed by you, authorizes its administration. By signing this form, you agree that all of your questions about the administration of blood or blood products have been answered by the ordering medical professional or designee.    Description of Procedure  Blood is introduced into one of your veins, commonly in the arm, using a sterilized disposable needle. The amount of blood transfused, and whether the transfusion will be of blood or blood components is a judgement the physician will make based on your particular needs.    Risks  The transfusion is a common procedure of low risk.  MINOR AND TEMPORARY REACTIONS ARE NOT UNCOMMON, including a slight bruise, swelling or local reaction in the area where the needle pierces your skin, or a nonserious reaction to the transfused material itself, including headache, fever or mild skin reaction, such as rash.  Serious reactions are possible, though very unlikely, and include severe allergic reaction (shock) and destruction (hemolysis) of transfused blood cells.  Infectious diseases which are known to be transmitted by blood transfusion include certain types of viral Hepatitis(liver infection from a virus), Human Immunodeficiency Virus (HIV-1,2) infection, a viral infection known to cause Acquired Immunodeficiency Syndrome (AIDS), as well as certain other bacterial, viral, and parasitic diseases. While a minimal risk of acquiring an infectious disease from transfused blood exists, in accordance with the Federal and State law, all due care has been taken in donor selection and testing to avoid transmission of disease.    Alternatives  If loss of blood poses serious threats during your  treatment, THERE IS NO EFFECTIVE ALTERNATIVE TO BLOOD TRANSFUSION. However, if you have any further questions on this matter, your provider will fully explain the alternatives to you if it has not already been done.    I, ______________________________, have read/had read to me the above. I understand the matters bearing on the decision whether or not to authorize a transfusion of blood or blood components. I have no questions which have not been answered to my full satisfaction. I hereby consent to such transfusion as my physician may deem necessary or advisable in the course of my treatment.    ______________________________________________                    ___________________________  (Signature of Patient or Responsible party in case of minor,                 (Printed Name of Patient or incompetent, or unconscious patient)              Responsible Party)    ___________________________               _____________________  (Relationship to Patient if not self)                                    (Date and Time)    __________________________                                                           ______________________              (Signature of Witness)               (Printed Name of Witness)     Language line ()    Telephone/Verbal/Video Consent    __________________________                     ____________________  (Signature of 2nd Witness           (Printed Name of 2nd  Telephone/Verbal/Video Consent)           Witness)    Patient Name: Yamini Ocampo     : 1945                 Printed: 2024     Medical Record #: [id number]      Rev: 2023

## (undated) NOTE — LETTER
06/18/20        [facility]      Dear Sabrina Weldon,    [facility] records indicate that you have outstanding lab work and or testing that was ordered for you and has not yet been completed:  Orders Placed This Encounter      TS

## (undated) NOTE — LETTER
[facility] 3W/SW  [address] [facility] [address]  [phone]    Blood Transfusion Consent    In the course of your treatment, it may become necessary to administer a transfusion of blood or blood components. This form provides basic information concerning this procedure and, if signed by you, authorizes its administration. By signing this form, you agree that all of your questions about the administration of blood or blood products have been answered by the ordering medical professional or designee.    Description of Procedure  Blood is introduced into one of your veins, commonly in the arm, using a sterilized disposable needle. The amount of blood transfused, and whether the transfusion will be of blood or blood components is a judgement the physician will make based on your particular needs.    Risks  The transfusion is a common procedure of low risk.  MINOR AND TEMPORARY REACTIONS ARE NOT UNCOMMON, including a slight bruise, swelling or local reaction in the area where the needle pierces your skin, or a nonserious reaction to the transfused material itself, including headache, fever or mild skin reaction, such as rash.  Serious reactions are possible, though very unlikely, and include severe allergic reaction (shock) and destruction (hemolysis) of transfused blood cells.  Infectious diseases which are known to be transmitted by blood transfusion include certain types of viral Hepatitis(liver infection from a virus), Human Immunodeficiency Virus (HIV-1,2) infection, a viral infection known to cause Acquired Immunodeficiency Syndrome (AIDS), as well as certain other bacterial, viral, and parasitic diseases. While a minimal risk of acquiring an infectious disease from transfused blood exists, in accordance with the Federal and State law, all due care has been taken in donor selection and testing to avoid transmission of disease.    Alternatives  If loss of blood poses serious threats during your  treatment, THERE IS NO EFFECTIVE ALTERNATIVE TO BLOOD TRANSFUSION. However, if you have any further questions on this matter, your provider will fully explain the alternatives to you if it has not already been done.    I, ______________________________, have read/had read to me the above. I understand the matters bearing on the decision whether or not to authorize a transfusion of blood or blood components. I have no questions which have not been answered to my full satisfaction. I hereby consent to such transfusion as my physician may deem necessary or advisable in the course of my treatment.    ______________________________________________                    ___________________________  (Signature of Patient or Responsible party in case of minor,                 (Printed Name of Patient or incompetent, or unconscious patient)              Responsible Party)    ___________________________               _____________________  (Relationship to Patient if not self)                                    (Date and Time)    __________________________                                                           ______________________              (Signature of Witness)               (Printed Name of Witness)     Language line ()    Telephone/Verbal/Video Consent    __________________________                     ____________________  (Signature of 2nd Witness           (Printed Name of 2nd  Telephone/Verbal/Video Consent)           Witness)    Patient Name: Yamini Ocampo     : 1945                 Printed: 2024     Medical Record #: [id number]      Rev: 2023

## (undated) NOTE — Clinical Note
Hi,   Just FYI she is still reporting reflux complaints while on high dose bid ppi and pepcid bid. Some of the symptoms seemed more sinus and I referred her to ENT. She says felt improved after diflucan, so I gave her a 2nd course. I told her to decrease ppi to once daily and use pepcid 40 at night. I asked her to call back after completion of antifungal and my plan was going to be to add carafate and refer to tertiary to w/u refractory reflux if she is still symptomatic. I think there is a stress component too and I asked her to discuss with PCP. Thanks!   Rahul Fernandes

## (undated) NOTE — LETTER
[address], [place], [place]     AUTHORIZATION FOR SURGICAL OPERATION OR PROCEDURE    1.    I hereby authorize Dr. Riddhi Otero MD, my Physician(s) and whomever may be designated as the doctor's Assistant, to perform t donor transfusion, I will discuss this with my         Physician. 5.   I consent to the photographing of procedure(s) to be performed for the purposes of advancing medicine, science         and/or education, provided my identity is not revealed.  If the pr (Relationship to Patient)      _______________________________________________________________ ____________________________  (Witness signature)                                                                                                  (Date)

## (undated) NOTE — LETTER
[place] [address]  Authorization for Invasive Procedures  Date: 7/30/2024           Time: 1040    I hereby authorize Dr. Alyssa Orellana, my physician and his/her assistants (if applicable), which may include medical students, residents, and/or fellows, to perform the following surgical operation/ procedure and administer such anesthesia as may be determined necessary by my physician: colonoscopy, esophagogastroduodenoscopy, video capsule endoscopy on Yamini Ocampo  2.   I recognize that during the surgical operation/procedure, unforeseen conditions may necessitate additional or different procedures than those listed above.  I, therefore, further authorize and request that the above-named surgeon, assistants, or designees perform such procedures as are, in their judgment, necessary and desirable.    3.   My surgeon/physician has discussed prior to my surgery the potential benefits, risks and side effects of this procedure; the likelihood of achieving goals; and potential problems that might occur during recuperation.  They also discussed reasonable alternatives to the procedure, including risks, benefits, and side effects related to the alternatives and risks related to not receiving this procedure.  I have had all my questions answered and I acknowledge that no guarantee has been made as to the result that may be obtained.    4.   Should the need arise during my operation/procedure, which includes change of level of care prior to discharge, I also consent to the administration of blood and/or blood products.  Further, I understand that despite careful testing and screening of blood or blood products by collecting agencies, I may still be subject to ill effects as a result of receiving a blood transfusion and/or blood products.  The following are some, but not all, of the potential risks that can occur: fever and allergic reactions, hemolytic reactions, transmission of diseases such as  Hepatitis, AIDS and Cytomegalovirus (CMV) and fluid overload.  In the event that I wish to have an autologous transfusion of my own blood, or a directed donor transfusion, I will discuss this with my physician.   Check only if Refusing Blood or Blood Products  I understand refusal of blood or blood products as deemed necessary by my physician may have serious consequences to my condition to include possible death. I hereby assume responsibility for my refusal and release the hospital, its personnel, and my physicians from any responsibility for the consequences of my refusal.         o  Refuse         5.   I authorize the use of any specimen, organs, tissues, body parts or foreign objects that may be removed from my body during the operation/procedure for diagnosis, research or teaching purposes and their subsequent disposal by hospital authorities.  I also authorize the release of specimen test results and/or written reports to my treating physician on the hospital medical staff or other referring or consulting physicians involved in my care, at the discretion of the Pathologist or my treating physician.    6.   I consent to the photographing or videotaping of the operations or procedures to be performed, including appropriate portions of my body for medical, scientific, or educational purposes, provided my identity is not revealed by the pictures or by descriptive texts accompanying them.  If the procedure has been photographed/videotaped, the surgeon will obtain the original picture, image, videotape or CD.  The hospital will not be responsible for storage, release or maintenance of the picture, image, tape or CD.    7.   I consent to the presence of a  or observers in the operating room as deemed necessary by my physician or their designees.    8.   I recognize that in the event my procedure results in extended X-Ray/fluoroscopy time, I may develop a skin reaction.    9. If I have a Do Not  Attempt Resuscitation (DNAR) order in place, that status will be suspended while in the operating room, procedural suite, and during the recovery period unless otherwise explicitly stated by me (or a person authorized to consent on my behalf). The surgeon or my attending physician will determine when the applicable recovery period ends for purposes of reinstating the DNAR order.  10. Patients having a sterilization procedure: I understand that if the procedure is successful the results will be permanent and it will therefore be impossible for me to inseminate, conceive, or bear children.  I also understand that the procedure is intended to result in sterility, although the result has not been guaranteed.   11. I acknowledge that my physician has explained sedation/analgesia administration to me including the risk and benefits I consent to the administration of sedation/analgesia as may be necessary or desirable in the judgment of my physician.    I CERTIFY THAT I HAVE READ AND FULLY UNDERSTAND THE ABOVE CONSENT TO OPERATION and/or OTHER PROCEDURE.        ____________________________________       _________________________________      ______________________________  Signature of Patient         Signature of Responsible Person        Printed Name of Responsible Person        ____________________________________      _________________________________      ______________________________       Signature of Witness          Relationship to Patient                       Date                                       Time  Patient Name: Yamini Ocampo  : 1945    Reviewed: 2024   Printed: 2024  Medical Record #: [id number] Page 1 of 2             STATEMENT OF PHYSICIAN My signature below affirms that prior to the time of the procedure; I have explained to the patient and/or his/her legal representative, the risks and benefits involved in the proposed treatment and any reasonable alternative to the proposed  treatment. I have also explained the risks and benefits involved in refusal of the proposed treatment and alternatives to the proposed treatment and have answered the patient's questions. If I have a significant financial interest in a co-management agreement or a significant financial interest in any product or implant, or other significant relationship used in this procedure/surgery, I have disclosed this and had a discussion with my patient.     _______________________________________________________________ _____________________________  (Signature of Physician)                                                                                         (Date)                                   (Time)  Patient Name: Yamini Ocampo  : 1945    Reviewed: 2024   Printed: 2024  Medical Record #: [id number] Page 2 of 2

## (undated) NOTE — LETTER
[address], [place], [place]     AUTHORIZATION FOR SURGICAL OPERATION OR PROCEDURE    1.    I hereby authorize Dr. Vishal Bliss or Greyson Hair , my Physician(s) and whomever may be designated as the doctor's Assistant, to Ashley Dash donor transfusion, I will discuss this with my         Physician. 5.   I consent to the photographing of procedure(s) to be performed for the purposes of advancing medicine, science         and/or education, provided my identity is not revealed.  If the pr (Relationship to Patient)      _______________________________________________________________ ____________________________  (Witness signature)                                                                                                  (Date)

## (undated) NOTE — LETTER
[place] [address]  Authorization for Invasive Procedures  Date: December 6, 2024         Time: 12:21 PM    I hereby authorize Dr. Issac Doe, my physician and his/her assistants (if applicable), which may include medical students, residents, and/or fellows, to perform the following surgical operation/ procedure and administer such anesthesia as may be determined necessary by my physician: Esophagogastroduodenoscopy on Yamini Ocampo  2.   I recognize that during the surgical operation/procedure, unforeseen conditions may necessitate additional or different procedures than those listed above.  I, therefore, further authorize and request that the above-named surgeon, assistants, or designees perform such procedures as are, in their judgment, necessary and desirable.    3.   My surgeon/physician has discussed prior to my surgery the potential benefits, risks and side effects of this procedure; the likelihood of achieving goals; and potential problems that might occur during recuperation.  They also discussed reasonable alternatives to the procedure, including risks, benefits, and side effects related to the alternatives and risks related to not receiving this procedure.  I have had all my questions answered and I acknowledge that no guarantee has been made as to the result that may be obtained.    4.   Should the need arise during my operation/procedure, which includes change of level of care prior to discharge, I also consent to the administration of blood and/or blood products.  Further, I understand that despite careful testing and screening of blood or blood products by collecting agencies, I may still be subject to ill effects as a result of receiving a blood transfusion and/or blood products.  The following are some, but not all, of the potential risks that can occur: fever and allergic reactions, hemolytic reactions, transmission of diseases such as Hepatitis, AIDS and  Cytomegalovirus (CMV) and fluid overload.  In the event that I wish to have an autologous transfusion of my own blood, or a directed donor transfusion, I will discuss this with my physician.   Check only if Refusing Blood or Blood Products  I understand refusal of blood or blood products as deemed necessary by my physician may have serious consequences to my condition to include possible death. I hereby assume responsibility for my refusal and release the hospital, its personnel, and my physicians from any responsibility for the consequences of my refusal.         o  Refuse         5.   I authorize the use of any specimen, organs, tissues, body parts or foreign objects that may be removed from my body during the operation/procedure for diagnosis, research or teaching purposes and their subsequent disposal by hospital authorities.  I also authorize the release of specimen test results and/or written reports to my treating physician on the hospital medical staff or other referring or consulting physicians involved in my care, at the discretion of the Pathologist or my treating physician.    6.   I consent to the photographing or videotaping of the operations or procedures to be performed, including appropriate portions of my body for medical, scientific, or educational purposes, provided my identity is not revealed by the pictures or by descriptive texts accompanying them.  If the procedure has been photographed/videotaped, the surgeon will obtain the original picture, image, videotape or CD.  The hospital will not be responsible for storage, release or maintenance of the picture, image, tape or CD.    7.   I consent to the presence of a  or observers in the operating room as deemed necessary by my physician or their designees.    8.   I recognize that in the event my procedure results in extended X-Ray/fluoroscopy time, I may develop a skin reaction.    9. If I have a Do Not Attempt Resuscitation  (DNAR) order in place, that status will be suspended while in the operating room, procedural suite, and during the recovery period unless otherwise explicitly stated by me (or a person authorized to consent on my behalf). The surgeon or my attending physician will determine when the applicable recovery period ends for purposes of reinstating the DNAR order.  10. Patients having a sterilization procedure: I understand that if the procedure is successful the results will be permanent and it will therefore be impossible for me to inseminate, conceive, or bear children.  I also understand that the procedure is intended to result in sterility, although the result has not been guaranteed.   11. I acknowledge that my physician has explained sedation/analgesia administration to me including the risk and benefits I consent to the administration of sedation/analgesia as may be necessary or desirable in the judgment of my physician.    I CERTIFY THAT I HAVE READ AND FULLY UNDERSTAND THE ABOVE CONSENT TO OPERATION and/or OTHER PROCEDURE.        ____________________________________       _________________________________      ______________________________  Signature of Patient         Signature of Responsible Person        Printed Name of Responsible Person        ____________________________________      _________________________________      ______________________________       Signature of Witness          Relationship to Patient                       Date                                       Time  Patient Name: Yamini Ocampo  : 1945    Reviewed: 2024   Printed: 2024  Medical Record #: [id number] Page 1 of 2             STATEMENT OF PHYSICIAN My signature below affirms that prior to the time of the procedure; I have explained to the patient and/or his/her legal representative, the risks and benefits involved in the proposed treatment and any reasonable alternative to the proposed treatment. I have  also explained the risks and benefits involved in refusal of the proposed treatment and alternatives to the proposed treatment and have answered the patient's questions. If I have a significant financial interest in a co-management agreement or a significant financial interest in any product or implant, or other significant relationship used in this procedure/surgery, I have disclosed this and had a discussion with my patient.     _______________________________________________________________ _____________________________  (Signature of Physician)                                                                                         (Date)                                   (Time)  Patient Name: Yamini Ocampo  : 1945    Reviewed: 2024   Printed: 2024  Medical Record #: [id number] Page 2 of 2

## (undated) NOTE — LETTER
[facility] 4W/SW/SE  [address]  [facility] [address]  [phone]    Blood Transfusion Consent    In the course of your treatment, it may become necessary to administer a transfusion of blood or blood components. This form provides basic information concerning this procedure and, if signed by you, authorizes its administration. By signing this form, you agree that all of your questions about the administration of blood or blood products have been answered by the ordering medical professional or designee.    Description of Procedure  Blood is introduced into one of your veins, commonly in the arm, using a sterilized disposable needle. The amount of blood transfused, and whether the transfusion will be of blood or blood components is a judgement the physician will make based on your particular needs.    Risks  The transfusion is a common procedure of low risk.  MINOR AND TEMPORARY REACTIONS ARE NOT UNCOMMON, including a slight bruise, swelling or local reaction in the area where the needle pierces your skin, or a nonserious reaction to the transfused material itself, including headache, fever or mild skin reaction, such as rash.  Serious reactions are possible, though very unlikely, and include severe allergic reaction (shock) and destruction (hemolysis) of transfused blood cells.  Infectious diseases which are known to be transmitted by blood transfusion include certain types of viral Hepatitis(liver infection from a virus), Human Immunodeficiency Virus (HIV-1,2) infection, a viral infection known to cause Acquired Immunodeficiency Syndrome (AIDS), as well as certain other bacterial, viral, and parasitic diseases. While a minimal risk of acquiring an infectious disease from transfused blood exists, in accordance with the Federal and State law, all due care has been taken in donor selection and testing to avoid transmission of disease.    Alternatives  If loss of blood poses serious threats during your  treatment, THERE IS NO EFFECTIVE ALTERNATIVE TO BLOOD TRANSFUSION. However, if you have any further questions on this matter, your provider will fully explain the alternatives to you if it has not already been done.    I, ______________________________, have read/had read to me the above. I understand the matters bearing on the decision whether or not to authorize a transfusion of blood or blood components. I have no questions which have not been answered to my full satisfaction. I hereby consent to such transfusion as my physician may deem necessary or advisable in the course of my treatment.    ______________________________________________                    ___________________________  (Signature of Patient or Responsible party in case of minor,                 (Printed Name of Patient or incompetent, or unconscious patient)              Responsible Party)    ___________________________               _____________________  (Relationship to Patient if not self)                                    (Date and Time)    __________________________                                                           ______________________              (Signature of Witness)               (Printed Name of Witness)     Language line ()    Telephone/Verbal/Video Consent    __________________________                     ____________________  (Signature of 2nd Witness           (Printed Name of 2nd  Telephone/Verbal/Video Consent)           Witness)    Patient Name: Yamini Ocampo     : 1945                 Printed: 2024     Medical Record #: [id number]      Rev: 2023

## (undated) NOTE — LETTER
11/2/2024          Yamini Ocampo    [address] [place] [address]         Dear Yamini,    Our records indicate that the tests ordered for you by Vivi Rogers PA-C  have not been done.            Ferritin (Order #[id number]) on 9/9/24     Iron And Tibc (Order #[id number]) on 9/9/24     CBC W Differential W Platelet (Order #[id number]) on 9/9/24       If you have, in fact, already completed the tests or you do not wish to have the tests done, please contact our office at THE NUMBER LISTED BELOW.  Otherwise, please proceed with the testing.           Sincerely,    Vivi Rogers PA-C  [facility]  [address] [id number] [phone]

## (undated) NOTE — LETTER
[facility]  Administration of Anesthesia  1. I [employer]ha [employer], or _________________________________ acting on his/her behalf, (Patient) (Dependent/Representative) request to receive anesthesia for my pending procedure/operation/treatment. pressure, difficulty urinating, slowing of the baby's heart rate and headache. Rare risks include infections, high spinal         block, spinal bleeding, seizure, cardiac arrest and death.   7.   AWARENESS: I understand that it is possible (but unlike ________________________________________________  (Date) (Time)                                                                                                  (Responsible person in case of minor/ unconscious pt) /Relationship    My signature below affir

## (undated) NOTE — MR AVS SNAPSHOT
[address] [facility]  [address] [place] [address]  [phone]               Thank you for choosing us for your health care visit with Reji De Jesus MD.  We are glad to serve you and happy to provide you with this summary of your visit. Take 1 tablet (10 mg total) by mouth 2 (two) times daily as needed (nausea). Commonly known as:  REGLAN           Metoprolol Tartrate 50 MG Tabs   Take 1 tablet (50 mg total) by mouth 2 (two) times daily.    Commonly known as:  LOPRESSOR           [facility] discharge instructions in [employer] by going to Visits < Admission Summaries. If you've been to the Emergency Department or your doctor's office, you can view your past visit information in [employer] by going to Visits < Visit Summaries. [facility] questions?

## (undated) NOTE — MR AVS SNAPSHOT
[facility]  [address] [place] [address], [address] [facility]               Thank you for choosing us for your health care visit with Cyril Brunner. DO Jemal.   We are glad to serve you and happy to provide you with this summary Generic drug:  sucralfate   TAKE 10 ML BY MOUTH FOUR TIMES DAILY BEFORE A MEAL AND NIGHTLY           diphenoxylate-atropine 2.5-0.025 MG Tabs   Take 1 tablet by mouth 3 (three) times daily as needed for Diarrhea.    Commonly known as:  LOMOTIL           FLU Call [phone] for help. [employer] is NOT to be used for urgent needs. For medical emergencies, dial 911.            Visit [facility] online at  [facility] [place]

## (undated) NOTE — [place] [address]  Authorization for Invasive Procedures  Date: 06/26/2024           Time: 1525    I hereby authorize Dr. Orellana, my physician and his/her assistants (if applicable), which may include medical students, residents, and/or fellows, to perform the following surgical operation/ procedure and administer such anesthesia as may be determined necessary by my physician: Esophagogastroduodenoscopy with possible biopsy and control of bleeding  on Yamini WEST Ocampo  2.   I recognize that during the surgical operation/procedure, unforeseen conditions may necessitate additional or different procedures than those listed above.  I, therefore, further authorize and request that the above-named surgeon, assistants, or designees perform such procedures as are, in their judgment, necessary and desirable.    3.   My surgeon/physician has discussed prior to my surgery the potential benefits, risks and side effects of this procedure; the likelihood of achieving goals; and potential problems that might occur during recuperation.  They also discussed reasonable alternatives to the procedure, including risks, benefits, and side effects related to the alternatives and risks related to not receiving this procedure.  I have had all my questions answered and I acknowledge that no guarantee has been made as to the result that may be obtained.    4.   Should the need arise during my operation/procedure, which includes change of level of care prior to discharge, I also consent to the administration of blood and/or blood products.  Further, I understand that despite careful testing and screening of blood or blood products by collecting agencies, I may still be subject to ill effects as a result of receiving a blood transfusion and/or blood products.  The following are some, but not all, of the potential risks that can occur: fever and allergic reactions, hemolytic reactions, transmission of diseases such as  Hepatitis, AIDS and Cytomegalovirus (CMV) and fluid overload.  In the event that I wish to have an autologous transfusion of my own blood, or a directed donor transfusion, I will discuss this with my physician.   Check only if Refusing Blood or Blood Products  I understand refusal of blood or blood products as deemed necessary by my physician may have serious consequences to my condition to include possible death. I hereby assume responsibility for my refusal and release the hospital, its personnel, and my physicians from any responsibility for the consequences of my refusal.         o  Refuse         5.   I authorize the use of any specimen, organs, tissues, body parts or foreign objects that may be removed from my body during the operation/procedure for diagnosis, research or teaching purposes and their subsequent disposal by hospital authorities.  I also authorize the release of specimen test results and/or written reports to my treating physician on the hospital medical staff or other referring or consulting physicians involved in my care, at the discretion of the Pathologist or my treating physician.    6.   I consent to the photographing or videotaping of the operations or procedures to be performed, including appropriate portions of my body for medical, scientific, or educational purposes, provided my identity is not revealed by the pictures or by descriptive texts accompanying them.  If the procedure has been photographed/videotaped, the surgeon will obtain the original picture, image, videotape or CD.  The hospital will not be responsible for storage, release or maintenance of the picture, image, tape or CD.    7.   I consent to the presence of a  or observers in the operating room as deemed necessary by my physician or their designees.    8.   I recognize that in the event my procedure results in extended X-Ray/fluoroscopy time, I may develop a skin reaction.    9. If I have a Do Not  Attempt Resuscitation (DNAR) order in place, that status will be suspended while in the operating room, procedural suite, and during the recovery period unless otherwise explicitly stated by me (or a person authorized to consent on my behalf). The surgeon or my attending physician will determine when the applicable recovery period ends for purposes of reinstating the DNAR order.  10. Patients having a sterilization procedure: I understand that if the procedure is successful the results will be permanent and it will therefore be impossible for me to inseminate, conceive, or bear children.  I also understand that the procedure is intended to result in sterility, although the result has not been guaranteed.   11. I acknowledge that my physician has explained sedation/analgesia administration to me including the risk and benefits I consent to the administration of sedation/analgesia as may be necessary or desirable in the judgment of my physician.    I CERTIFY THAT I HAVE READ AND FULLY UNDERSTAND THE ABOVE CONSENT TO OPERATION and/or OTHER PROCEDURE.        ____________________________________       _________________________________      ______________________________  Signature of Patient         Signature of Responsible Person        Printed Name of Responsible Person        ____________________________________      _________________________________      ______________________________       Signature of Witness          Relationship to Patient                       Date                                       Time  Patient Name: Yamini Ocampo  : 1945    Reviewed: 2024   Printed: 2024  Medical Record #: [id number] Page 1 of 2             STATEMENT OF PHYSICIAN My signature below affirms that prior to the time of the procedure; I have explained to the patient and/or his/her legal representative, the risks and benefits involved in the proposed treatment and any reasonable alternative to the proposed  treatment. I have also explained the risks and benefits involved in refusal of the proposed treatment and alternatives to the proposed treatment and have answered the patient's questions. If I have a significant financial interest in a co-management agreement or a significant financial interest in any product or implant, or other significant relationship used in this procedure/surgery, I have disclosed this and had a discussion with my patient.     _______________________________________________________________ _____________________________  (Signature of Physician)                                                                                         (Date)                                   (Time)  Patient Name: Yamini Ocampo  : 1945    Reviewed: 2024   Printed: 2024  Medical Record #: [id number] Page 2 of 2

## (undated) NOTE — ED AVS SNAPSHOT
Ajay Rosario   MRN: [id number]    Department:  [facility] Emergency Department   Date of Visit:  7/20/2018           Disclosure     Insurance plans vary and the physician(s) referred by the ER may not be covered by your plan.  Please contact you within the next three months to obtain basic health screening including reassessment of your blood pressure.     IF THERE IS ANY CHANGE OR WORSENING OF YOUR CONDITION, CALL YOUR PRIMARY CARE PHYSICIAN AT ONCE OR RETURN IMMEDIATELY TO THE EMERGENCY DEPARTMEN

## (undated) NOTE — IP AVS SNAPSHOT
[facility]            (For Outpatient Use Only) Initial Admit Date: 1/31/2019   Inpt/Obs Admit Date: Inpt: 1/31/19 / Obs: N/A   Discharge Date:    Cynthia Omer:  [de-identified]   MRN: [de-identified]   CSN: [id number]        ENCOUNTER  Patient Class Subscriber ID:  Pt Rel to Subscriber:    Hospital Account Financial Class: Medicare    February 3, 2019

## (undated) NOTE — LETTER
11/12/2019              [address]         Dear Deny Walker records indicate that the tests XR UGI  W / ESOPHAGRAM  [facility] )    ordered for you by Cynthia Aquino PA-C  have not been done.   If you

## (undated) NOTE — LETTER
[facility]  Administration of Anesthesia  IYamini agree to be cared for by a physician anesthesiologist alone and/or with a nurse anesthetist, who is specially trained to monitor me and give me medicine to put me to sleep or keep me comfortable during my procedure    I understand that my anesthesiologist and/or anesthetist is not an employee or agent of [facility] or [employer]. He or she works for [facility]    As the patient asking for anesthesia services, I agree to:  Allow the anesthesiologist (anesthesia doctor) to give me medicine and do additional procedures as necessary. Some examples are: Starting or using an “IV” to give me medicine, fluids or blood during my procedure, and having a breathing tube placed to help me breathe when I’m asleep (intubation). In the event that my heart stops working properly, I understand that my anesthesiologist will make every effort to sustain my life, unless otherwise directed by [facility] Do Not Resuscitate documents.  Tell my anesthesia doctor before my procedure:  If I am pregnant.  The last time that I ate or drank.  iii. All of the medicines I take (including prescriptions, herbal supplements, and pills I can buy without a prescription (including street drugs/illegal medications). Failure to inform my anesthesiologist about these medicines may increase my risk of anesthetic complications.  iv.If I am allergic to anything or have had a reaction to anesthesia before.  I understand how the anesthesia medicine will help me (benefits).  I understand that with any type of anesthesia medicine there are risks:  The most common risks are: nausea, vomiting, sore throat, muscle soreness, damage to my eyes, mouth, or teeth (from breathing tube placement).  Rare risks include: remembering what happened during my procedure, allergic reactions to medications, injury to my airway, heart, lungs, vision, nerves, or muscles  and in extremely rare instances death.  My doctor has explained to me other choices available to me for my care (alternatives).  Pregnant Patients (“epidural”):  I understand that the risks of having an epidural (medicine given into my back to help control pain during labor), include itching, low blood pressure, difficulty urinating, headache or slowing of the baby’s heart. Very rare risks include infection, bleeding, seizure, irregular heart rhythms and nerve injury.  Regional Anesthesia (“spinal”, “epidural”, & “nerve blocks”):  I understand that rare but potential complications include headache, bleeding, infection, seizure, irregular heart rhythms, and nerve injury.    _____________________________________________________________________________  Patient (or Representative) Signature/Relationship to Patient  Date   Time    _____________________________________________________________________________   Name (if used)    Language/Organization   Time    _____________________________________________________________________________  Nurse Anesthetist Signature     Date   Time  _____________________________________________________________________________  Anesthesiologist Signature     Date   Time  I have discussed the procedure and information above with the patient (or patient’s representative) and answered their questions. The patient or their representative has agreed to have anesthesia services.    _____________________________________________________________________________  Witness        Date   Time  I have verified that the signature is that of the patient or patient’s representative, and that it was signed before the procedure  Patient Name: Yamini Ocampo     : 1945                 Printed: 2024 at 7:24 AM    Medical Record #: [id number]                                            Page 1 of 1  ----------ANESTHESIA CONSENT----------

## (undated) NOTE — LETTER
[facility]  Administration of Anesthesia  IYamini agree to be cared for by a physician anesthesiologist alone and/or with a nurse anesthetist, who is specially trained to monitor me and give me medicine to put me to sleep or keep me comfortable during my procedure    I understand that my anesthesiologist and/or anesthetist is not an employee or agent of [facility] or [employer]. He or she works for [facility]    As the patient asking for anesthesia services, I agree to:  Allow the anesthesiologist (anesthesia doctor) to give me medicine and do additional procedures as necessary. Some examples are: Starting or using an “IV” to give me medicine, fluids or blood during my procedure, and having a breathing tube placed to help me breathe when I’m asleep (intubation). In the event that my heart stops working properly, I understand that my anesthesiologist will make every effort to sustain my life, unless otherwise directed by [facility] Do Not Resuscitate documents.  Tell my anesthesia doctor before my procedure:  If I am pregnant.  The last time that I ate or drank.  iii. All of the medicines I take (including prescriptions, herbal supplements, and pills I can buy without a prescription (including street drugs/illegal medications). Failure to inform my anesthesiologist about these medicines may increase my risk of anesthetic complications.  iv.If I am allergic to anything or have had a reaction to anesthesia before.  I understand how the anesthesia medicine will help me (benefits).  I understand that with any type of anesthesia medicine there are risks:  The most common risks are: nausea, vomiting, sore throat, muscle soreness, damage to my eyes, mouth, or teeth (from breathing tube placement).  Rare risks include: remembering what happened during my procedure, allergic reactions to medications, injury to my airway, heart, lungs, vision, nerves, or muscles  and in extremely rare instances death.  My doctor has explained to me other choices available to me for my care (alternatives).  Pregnant Patients (“epidural”):  I understand that the risks of having an epidural (medicine given into my back to help control pain during labor), include itching, low blood pressure, difficulty urinating, headache or slowing of the baby’s heart. Very rare risks include infection, bleeding, seizure, irregular heart rhythms and nerve injury.  Regional Anesthesia (“spinal”, “epidural”, & “nerve blocks”):  I understand that rare but potential complications include headache, bleeding, infection, seizure, irregular heart rhythms, and nerve injury.    _____________________________________________________________________________  Patient (or Representative) Signature/Relationship to Patient  Date   Time    _____________________________________________________________________________   Name (if used)    Language/Organization   Time    _____________________________________________________________________________  Nurse Anesthetist Signature     Date   Time  _____________________________________________________________________________  Anesthesiologist Signature     Date   Time  I have discussed the procedure and information above with the patient (or patient’s representative) and answered their questions. The patient or their representative has agreed to have anesthesia services.    _____________________________________________________________________________  Witness        Date   Time  I have verified that the signature is that of the patient or patient’s representative, and that it was signed before the procedure  Patient Name: Yamini Ocampo     : 1945                 Printed: 2024 at 11:05 AM    Medical Record #: [id number]                                            Page 1 of 1  ----------ANESTHESIA CONSENT----------

## (undated) NOTE — LETTER
[facility]  [address], [place], [place]    Authorization for Surgical Operation and Procedure                               I hereby authorize Issac Doe MD, my physician and his/her assistants (if applicable), which may include medical students, residents, and/or fellows, to perform the following surgical operation/ procedure and administer such anesthesia as may be determined necessary by my physician: Operation/Procedure name (s) ESOPHAGOGASTRODUODENOSCOPY (EGD) AND POSSIBLE CAPSULE ENDOSCOPY on Yamini Ocampo   2.   I recognize that during the surgical operation/procedure, unforeseen conditions may necessitate additional or different procedures than those listed above.  I, therefore, further authorize and request that the above-named surgeon, assistants, or designees perform such procedures as are, in their judgment, necessary and desirable.    3.   My surgeon/physician has discussed prior to my surgery the potential benefits, risks and side effects of this procedure; the likelihood of achieving goals; and potential problems that might occur during recuperation.  They also discussed reasonable alternatives to the procedure, including risks, benefits, and side effects related to the alternatives and risks related to not receiving this procedure.  I have had all my questions answered and I acknowledge that no guarantee has been made as to the result that may be obtained.    4.   Should the need arise during my operation/procedure, which includes change of level of care prior to discharge, I also consent to the administration of blood and/or blood products.  Further, I understand that despite careful testing and screening of blood or blood products by collecting agencies, I may still be subject to ill effects as a result of receiving a blood transfusion and/or blood products.  The following are some, but not all, of the potential risks that can occur: fever and allergic  reactions, hemolytic reactions, transmission of diseases such as Hepatitis, AIDS and Cytomegalovirus (CMV) and fluid overload.  In the event that I wish to have an autologous transfusion of my own blood, or a directed donor transfusion, I will discuss this with my physician.  Check only if Refusing Blood or Blood Products  I understand refusal of blood or blood products as deemed necessary by my physician may have serious consequences to my condition to include possible death. I hereby assume responsibility for my refusal and release the hospital, its personnel, and my physicians from any responsibility for the consequences of my refusal.    o  Refuse   5.   I authorize the use of any specimen, organs, tissues, body parts or foreign objects that may be removed from my body during the operation/procedure for diagnosis, research or teaching purposes and their subsequent disposal by hospital authorities.  I also authorize the release of specimen test results and/or written reports to my treating physician on the hospital medical staff or other referring or consulting physicians involved in my care, at the discretion of the Pathologist or my treating physician.    6.   I consent to the photographing or videotaping of the operations or procedures to be performed, including appropriate portions of my body for medical, scientific, or educational purposes, provided my identity is not revealed by the pictures or by descriptive texts accompanying them.  If the procedure has been photographed/videotaped, the surgeon will obtain the original picture, image, videotape or CD.  The hospital will not be responsible for storage, release or maintenance of the picture, image, tape or CD.    7.   I consent to the presence of a  or observers in the operating room as deemed necessary by my physician or their designees.    8.   I recognize that in the event my procedure results in extended X-Ray/fluoroscopy time, I may  develop a skin reaction.    9. If I have a Do Not Attempt Resuscitation (DNAR) order in place, that status will be suspended while in the operating room, procedural suite, and during the recovery period unless otherwise explicitly stated by me (or a person authorized to consent on my behalf). The surgeon or my attending physician will determine when the applicable recovery period ends for purposes of reinstating the DNAR order.  10. Patients having a sterilization procedure: I understand that if the procedure is successful the results will be permanent and it will therefore be impossible for me to inseminate, conceive, or bear children.  I also understand that the procedure is intended to result in sterility, although the result has not been guaranteed.   11. I acknowledge that my physician has explained sedation/analgesia administration to me including the risk and benefits I consent to the administration of sedation/analgesia as may be necessary or desirable in the judgment of my physician.    I CERTIFY THAT I HAVE READ AND FULLY UNDERSTAND THE ABOVE CONSENT TO OPERATION and/or OTHER PROCEDURE.     ____________________________________  _________________________________        ______________________________  Signature of Patient    Signature of Responsible Person                Printed Name of Responsible Person                                      ____________________________________  _____________________________                ________________________________  Signature of Witness        Date  Time         Relationship to Patient    STATEMENT OF PHYSICIAN My signature below affirms that prior to the time of the procedure; I have explained to the patient and/or his/her legal representative, the risks and benefits involved in the proposed treatment and any reasonable alternative to the proposed treatment. I have also explained the risks and benefits involved in refusal of the proposed treatment and alternatives to  the proposed treatment and have answered the patient's questions. If I have a significant financial interest in a co-management agreement or a significant financial interest in any product or implant, or other significant relationship used in this procedure/surgery, I have disclosed this and had a discussion with my patient.     _____________________________________________________              _____________________________  (Signature of Physician)                                                                                         (Date)                                   (Time)  Patient Name: Yamini WEST Terrence      : 1945      Printed: 2024     Medical Record #: [id number]                                      Page 1 of 1

## (undated) NOTE — LETTER
Hospital Discharge Documentation  Please phone to schedule a hospital follow up appointment.     From: [address]  Phone: [phone]    Patient discharged time/date: 5/2/2017  6:00 PM  Patient discharge disposition:  Home or Self Polyarthropathy or polyarthritis     Pulmonary embolism and infarction ([address]     Peripheral vascular disease (HCC)     Rosacea     Sleep apnea     Tibialis tendinitis     Vitamin D deficiency     Essential hypertension     Edema     Ventral hernia witho - monitor blood sugars carefully. Has been low. TPN to stop today     2. Gastric dysmotility - peg tube     3. Thyroid nodule - will need outpt us     4. Afib. Cards following. Cont eliquis and metoprolol     5. Fluid overload. Cont lasix.  Cards following -Levaquin 500mg PO daily x 3 more days  -Tube-feeds:  12 hours, maximum 50ml/hour, concentrated tube-feeds  -Continue soft diet  -Noted she was started on erythromycin to help with gastric motility, will discontinue now  -DVT ppx  -D/c TPN      Procedures: Last time this was given:  5 mg on 4/22/2017  9:36 AM   Commonly known as:  REGLAN        Take 1 tablet (10 mg total) by mouth 3 (three) times daily before meals.     Stop taking on:  6/1/2017   Quantity:  90 tablet   Refills:  1       Metoprolol Tartrate 5

## (undated) NOTE — LETTER
01/08/21        [address]      Dear Qiana Lester,    [facility] indicate that you have outstanding lab work and or testing that was ordered for you and has not yet been completed:   CDIFFICILE TOXIGENIC PCR (OPT)   GI

## (undated) NOTE — LETTER
[facility]  Administration of Anesthesia  1. Sunday Chato, or _________________________________ acting on her behalf, (Patient) (Dependent/Representative) request to receive anesthesia for my pending procedure/operation/treatment.   A ph bleeding, seizure, cardiac arrest and death. 7. AWARENESS: I understand that it is possible (but unlikely) to have explicit memory of events from the operating room while under general anesthesia.   8. ELECTROCONVULSIVE THERAPY PATIENTS: This consent serve below affirms that prior to the time of the procedure, I have explained to the patient and/or his/her guardian, the risks and benefits of undergoing anesthesia, as well as any reasonable alternatives.     ___________________________________________________

## (undated) NOTE — MR AVS SNAPSHOT
[facility]  [address] [place] [address], [address] [place] [address]  [facility]               Thank you for choosing us for your health care visit with Kesha Arguelles. DO Jemal.   We are glad to serve you and happy to provide you with this summary Commonly known as:  ADALAT CC           PEG 3350-KCl-Na Bicarb-NaCl 420 G Solr   As directed.    Commonly known as:  TRILYTE           spironolactone 50 MG Tabs   1 1/2 tabs day   Commonly known as:  ALDACTONE           triamcinolone acetonide 0.1 % Crea If you are confident that your benefit plan will not require a referral or authorization, such as [place] Medicaid, please feel free to schedule your appointment immediately.  However, if you are unsure about the requirements for authorization, please wait

## (undated) NOTE — MR AVS SNAPSHOT
[address] [facility] [address] [phone]               Thank you for choosing us for your health care visit with Vicky Presley MD.  We are glad to serve you and happy to provide you with this summary of your visit. Metoprolol Tartrate 50 MG Tabs   Take 1 tablet (50 mg total) by mouth 2 (two) times daily.    Commonly known as:  LOPRESSOR           * NIFEdipine ER 90 MG Tb24   TK 1 T PO QD   Commonly known as:  ADALAT CC           * NIFEdipine ER 90 MG Tb24   TAKE 1 TA

## (undated) NOTE — Clinical Note
Melissa Peters is doing better after the EGD and dil. The PPI, along with carafate and reglan seem to be helping. She says her upper GI sx have gotten better and now having some \"great days\" where she can eat well.  Unfortunately, she has been having diarrh

## (undated) NOTE — LETTER
9/28/2017              [facility]         Dear Dr. Rosie Floyd,    Our patient Jessi Nickerson was seen today for her preop exam.  She was told by her cardiologist that she may stom the Eliquis 3 days prior to

## (undated) NOTE — LETTER
Patient Name: Javan Lyn  : 1945  MRN: [id number]  Patient Address: [place] [address]      Coronavirus Disease 2019 (COVID-19)     [facility] is committed to the safety and well-being of our patients, members, em carefully. If your symptoms get worse, call your healthcare provider immediately. 3. Get rest and stay hydrated.    4. If you have a medical appointment, call the healthcare provider ahead of time and tell them that you have or may have COVID-19.  5. For m of fever-reducing medications; and  · Improvement in respiratory symptoms (e.g., cough, shortness of breath); and  · At least 10 days have passed since symptoms first appeared OR if asymptomatic patient or date of symptom onset is unclear then use 10 days donors must:    · Have had a confirmed diagnosis of COVID-19  · Be symptom-free for at least 14 days*    *Some people will be required to have a repeat COVID-19 test in order to be eligible to donate.  If you’re instructed by Ryan that a repeat test is r

## (undated) NOTE — LETTER
[address] [place], [place]  Authorization for Surgical Operation and Procedure                                                                                           1. I hereby authorize Eric Mcleod MD, my physician and his/her assistants (if applicable), which may include medical students, residents, and/or fellows, to perform the following surgical operation/ procedure and administer such anesthesia as may be determined necessary by my physician: Operation/Procedure name (s) ESOPHAGOGASTRODUODENOSCOPY (EGD) on [address]   2.   I recognize that during the surgical operation/procedure, unforeseen conditions may necessitate additional or different procedures than those listed above. I, therefore, further authorize and request that the above-named surgeon, assistants, or designees perform such procedures as are, in their judgment, necessary and desirable. 3.   My surgeon/physician has discussed prior to my surgery the potential benefits, risks and side effects of this procedure; the likelihood of achieving goals; and potential problems that might occur during recuperation. They also discussed reasonable alternatives to the procedure, including risks, benefits, and side effects related to the alternatives and risks related to not receiving this procedure. I have had all my questions answered and I acknowledge that no guarantee has been made as to the result that may be obtained. 4.   Should the need arise during my operation/procedure, which includes change of level of care prior to discharge, I also consent to the administration of blood and/or blood products. Further, I understand that despite careful testing and screening of blood or blood products by collecting agencies, I may still be subject to ill effects as a result of receiving a blood transfusion and/or blood products.   The following are some, but not all, of the potential risks that can occur: fever and allergic reactions, hemolytic reactions, transmission of diseases such as Hepatitis, AIDS and Cytomegalovirus (CMV) and fluid overload. In the event that I wish to have an autologous transfusion of my own blood, or a directed donor transfusion, I will discuss this with my physician. Check only if Refusing Blood or Blood Products  I understand refusal of blood or blood products as deemed necessary by my physician may have serious consequences to my condition to include possible death. I hereby assume responsibility for my refusal and release the hospital, its personnel, and my physicians from any responsibility for the consequences of my refusal.    o  Refuse   5. I authorize the use of any specimen, organs, tissues, body parts or foreign objects that may be removed from my body during the operation/procedure for diagnosis, research or teaching purposes and their subsequent disposal by hospital authorities. I also authorize the release of specimen test results and/or written reports to my treating physician on the hospital medical staff or other referring or consulting physicians involved in my care, at the discretion of the Pathologist or my treating physician. 6.   I consent to the photographing or videotaping of the operations or procedures to be performed, including appropriate portions of my body for medical, scientific, or educational purposes, provided my identity is not revealed by the pictures or by descriptive texts accompanying them. If the procedure has been photographed/videotaped, the surgeon will obtain the original picture, image, videotape or CD. The hospital will not be responsible for storage, release or maintenance of the picture, image, tape or CD.    7.   I consent to the presence of a  or observers in the operating room as deemed necessary by my physician or their designees.     8.   I recognize that in the event my procedure results in extended X-Ray/fluoroscopy time, I may develop a skin reaction. 9. If I have a Do Not Attempt Resuscitation (DNAR) order in place, that status will be suspended while in the operating room, procedural suite, and during the recovery period unless otherwise explicitly stated by me (or a person authorized to consent on my behalf). The surgeon or my attending physician will determine when the applicable recovery period ends for purposes of reinstating the DNAR order. 10. Patients having a sterilization procedure: I understand that if the procedure is successful the results will be permanent and it will therefore be impossible for me to inseminate, conceive, or bear children. I also understand that the procedure is intended to result in sterility, although the result has not been guaranteed. 11. I acknowledge that my physician has explained sedation/analgesia administration to me including the risk and benefits I consent to the administration of sedation/analgesia as may be necessary or desirable in the judgment of my physician. I CERTIFY THAT I HAVE READ AND FULLY UNDERSTAND THE ABOVE CONSENT TO OPERATION and/or OTHER PROCEDURE.     _________________________________________ _________________________________     ___________________________________  Signature of Patient     Signature of Responsible Person                   Printed Name of Responsible Person                              _________________________________________ ______________________________        ___________________________________  Signature of Witness         Date  Time         Relationship to Patient    STATEMENT OF PHYSICIAN My signature below affirms that prior to the time of the procedure; I have explained to the patient and/or his/her legal representative, the risks and benefits involved in the proposed treatment and any reasonable alternative to the proposed treatment.  I have also explained the risks and benefits involved in refusal of the proposed treatment and alternatives to the proposed treatment and have answered the patient's questions.  If I have a significant financial interest in a co-management agreement or a significant financial interest in any product or implant, or other significant relationship used in this procedure/surgery, I have disclosed this and had a discussion with my patient.     _______________________________________________________________ _____________________________  Renetta García)                                                                                         (Date)                                   (Time)  Patient Name: Greg Adams    : 1945   Printed: 3/9/2023      Medical Record #: [id number]                                              Page 1 of 1

## (undated) NOTE — LETTER
9/8/2021              [address] [place] [address]         Dear Malu Reyes,    This letter is to inform you that our office has made several attempts to reach you by phone without success.   We were attempting to contact you

## (undated) NOTE — LETTER
[facility], [place]  Authorization for Invasive Procedures  1.  I hereby authorize Dr. Cosmo Lennox** , my physician and whomever may be designated as the doctor's assistant, to perform the following operation and/or procedure:  *Re performed for the purposes of advancing medicine, science, and/or education, provided my identity is not revealed. If the procedure has been videotaped, the physician/surgeon will obtain the original videotape.  The hospital will not be responsible for stor My signature below affirms that prior to the time of the procedure, I have explained to the patient and/or her legal representative, the risks and benefits involved in the proposed treatment and any reasonable alternative to the proposed treatment.  I have

## (undated) NOTE — MR AVS SNAPSHOT
[facility]  [address] [place] [address], [address] [place] [address]  Luz Marina Naseem               Thank you for choosing us for your health care visit with Julieta Horowitz. DO Jemal.   We are glad to serve you and happy to provide you with this summary [place], [address]  If you have signed up to view your medical records via [address], you will have access to information and instructions regarding this appointment.    It is important to bring a list of any medications you are taking, including vitamins a 1 1/2 tabs day   Commonly known as:  ALDACTONE           sucralfate 1 GM/10ML Susp   Take 10 mL (1 g total) by mouth 4 (four) times daily before meals and nightly.    Commonly known as:  CARAFATE           triamcinolone acetonide 0.1 % Crea   Apply topicall physician's office. At that time, you will be provided with any authorization numbers or be assured that none are required. You can then schedule your appointment.  Failure to obtain required authorization numbers can create reimbursement difficulties for y

## (undated) NOTE — ED AVS SNAPSHOT
Heidi Abraham   MRN: [id number]    Department:  [facility]   Date of Visit:  5/10/2019           Disclosure     Insurance plans vary and the physician(s) referred by the ER may not be covered by your plan.  Please contact you within the next three months to obtain basic health screening including reassessment of your blood pressure.     IF THERE IS ANY CHANGE OR WORSENING OF YOUR CONDITION, CALL YOUR PRIMARY CARE PHYSICIAN AT ONCE OR RETURN IMMEDIATELY TO THE EMERGENCY DEPARTMEN

## (undated) NOTE — LETTER
[facility]  [address], [place], [place]  Authorization for Surgical Operation and Procedure                                                                                           I hereby authorize KATHARINE Prakash MD, my physician and his/her assistants (if applicable), which may include medical students, residents, and/or fellows, to perform the following surgical operation/ procedure and administer such anesthesia as may be determined necessary by my physician: Operation/Procedure name (s) ESOPHAGOGASTRODUODENOSCOPY on Yamini Ocampo   2.   I recognize that during the surgical operation/procedure, unforeseen conditions may necessitate additional or different procedures than those listed above.  I, therefore, further authorize and request that the above-named surgeon, assistants, or designees perform such procedures as are, in their judgment, necessary and desirable.    3.   My surgeon/physician has discussed prior to my surgery the potential benefits, risks and side effects of this procedure; the likelihood of achieving goals; and potential problems that might occur during recuperation.  They also discussed reasonable alternatives to the procedure, including risks, benefits, and side effects related to the alternatives and risks related to not receiving this procedure.  I have had all my questions answered and I acknowledge that no guarantee has been made as to the result that may be obtained.    4.   Should the need arise during my operation/procedure, which includes change of level of care prior to discharge, I also consent to the administration of blood and/or blood products.  Further, I understand that despite careful testing and screening of blood or blood products by collecting agencies, I may still be subject to ill effects as a result of receiving a blood transfusion and/or blood products.  The following are some, but not all, of the potential risks that can occur: fever and  allergic reactions, hemolytic reactions, transmission of diseases such as Hepatitis, AIDS and Cytomegalovirus (CMV) and fluid overload.  In the event that I wish to have an autologous transfusion of my own blood, or a directed donor transfusion, I will discuss this with my physician.  Check only if Refusing Blood or Blood Products  I understand refusal of blood or blood products as deemed necessary by my physician may have serious consequences to my condition to include possible death. I hereby assume responsibility for my refusal and release the hospital, its personnel, and my physicians from any responsibility for the consequences of my refusal.    o  Refuse   5.   I authorize the use of any specimen, organs, tissues, body parts or foreign objects that may be removed from my body during the operation/procedure for diagnosis, research or teaching purposes and their subsequent disposal by hospital authorities.  I also authorize the release of specimen test results and/or written reports to my treating physician on the hospital medical staff or other referring or consulting physicians involved in my care, at the discretion of the Pathologist or my treating physician.    6.   I consent to the photographing or videotaping of the operations or procedures to be performed, including appropriate portions of my body for medical, scientific, or educational purposes, provided my identity is not revealed by the pictures or by descriptive texts accompanying them.  If the procedure has been photographed/videotaped, the surgeon will obtain the original picture, image, videotape or CD.  The hospital will not be responsible for storage, release or maintenance of the picture, image, tape or CD.    7.   I consent to the presence of a  or observers in the operating room as deemed necessary by my physician or their designees.    8.   I recognize that in the event my procedure results in extended X-Ray/fluoroscopy  time, I may develop a skin reaction.    9. If I have a Do Not Attempt Resuscitation (DNAR) order in place, that status will be suspended while in the operating room, procedural suite, and during the recovery period unless otherwise explicitly stated by me (or a person authorized to consent on my behalf). The surgeon or my attending physician will determine when the applicable recovery period ends for purposes of reinstating the DNAR order.  10. Patients having a sterilization procedure: I understand that if the procedure is successful the results will be permanent and it will therefore be impossible for me to inseminate, conceive, or bear children.  I also understand that the procedure is intended to result in sterility, although the result has not been guaranteed.   11. I acknowledge that my physician has explained sedation/analgesia administration to me including the risk and benefits I consent to the administration of sedation/analgesia as may be necessary or desirable in the judgment of my physician.    I CERTIFY THAT I HAVE READ AND FULLY UNDERSTAND THE ABOVE CONSENT TO OPERATION and/or OTHER PROCEDURE.     _________________________________________ _________________________________     ___________________________________  Signature of Patient     Signature of Responsible Person                   Printed Name of Responsible Person                              _________________________________________ ______________________________        ___________________________________  Signature of Witness         Date  Time         Relationship to Patient    STATEMENT OF PHYSICIAN My signature below affirms that prior to the time of the procedure; I have explained to the patient and/or his/her legal representative, the risks and benefits involved in the proposed treatment and any reasonable alternative to the proposed treatment. I have also explained the risks and benefits involved in refusal of the proposed treatment and  alternatives to the proposed treatment and have answered the patient's questions. If I have a significant financial interest in a co-management agreement or a significant financial interest in any product or implant, or other significant relationship used in this procedure/surgery, I have disclosed this and had a discussion with my patient.     _______________________________________________________________ _____________________________  (Signature of Physician)                                                                                         (Date)                                   (Time)  Patient Name: Yamini Ocampo    : 1945   Printed: 2024      Medical Record #: [id number]                                              Page 1 of 1

## (undated) NOTE — LETTER
02/25/21        [facility]  [address] [facility]      Dear Virgen Lott,    [facility] records indicate that you have outstanding lab work and or testing that was ordered for you and has not yet been completed:  Orders Placed This Encounter      Co

## (undated) NOTE — MR AVS SNAPSHOT
[address]  [phone]               Thank you for choosing us for your health care visit with Christiano Zafar MD.  We are glad to serve you and happy to provide you with this summary of your visit. Assoc Dx:  CKD (chronic kidney disease) stage 3, GFR 30-59 ml/min [N18.3], Anemia, unspecified type [D64.9]           Complement C4, Serum    Complete by:   May 31, 2017 (Approximate)    Assoc Dx:  CKD (chronic kidney disease) stage 3, GFR 30-59 ml/min [N1 [facility] ([facility] ([facility])  [address]   [address] [facility],  Rigoe Mert Paredes Said, [address] [facility]  [address] [facility] access hospital  [address] [place] [address]    [facility] Metoprolol Tartrate 50 MG Tabs   Take 1 tablet (50 mg total) by mouth 2 (two) times daily.    Commonly known as:  LOPRESSOR           Pantoprazole Sodium 40 MG Tbec   Take 1 tablet (40 mg total) by mouth every morning before breakfast.   Commonly known as:

## (undated) NOTE — LETTER
[facility]  Administration of Anesthesia  1. I Florian Schwab, or _________________________________ acting on his/her behalf, (Patient) (Dependent/Representative) request to receive anesthesia for my pending procedure/operation/treatment. pressure, difficulty urinating, slowing of the baby's heart rate and headache. Rare risks include infections, high spinal         block, spinal bleeding, seizure, cardiac arrest and death.   7.   AWARENESS: I understand that it is possible (but unlike ________________________________________________  (Date) (Time)                                                                                                  (Responsible person in case of minor/ unconscious pt) /Relationship    My signature below affir

## (undated) NOTE — MR AVS SNAPSHOT
[facility]  [address] [place] [address], [address] [place] [address]               Thank you for choosing us for your health care visit with Mario Cao. DO Jemal.   We are glad to serve you and happy to provide you with this summary Phone:  [phone]   Fax:  [phone]    Diagnoses:  Gastric dysmotility   Order:  Julieta Gaucher - Internal        Comment:  DR CAREY COSTELLO [facility] [[email or web address]] Gastroenterologist.      Referral Orders      Normal Orders This Visit    GASTRO - INTERNAL [12 TAKE 10 ML BY MOUTH FOUR TIMES DAILY BEFORE A MEAL AND NIGHTLY           diphenoxylate-atropine 2.5-0.025 MG Tabs   Take 1 tablet by mouth 3 (three) times daily as needed for Diarrhea.    Commonly known as:  LOMOTIL           FLUoxetine HCl 20 MG Caps   [place]ia [email or web address].tn

## (undated) NOTE — LETTER
[facility]  Authorization for Invasive Procedures  1.  I hereby authorize Dr. Lesia Zavala** , my physician and whomever may be designated as the doctor's assistant, to perform the following operation and/or procedure:  *In performed for the purposes of advancing medicine, science, and/or education, provided my identity is not revealed. If the procedure has been videotaped, the physician/surgeon will obtain the original videotape.  The hospital will not be responsible for stor My signature below affirms that prior to the time of the procedure, I have explained to the patient and/or her legal representative, the risks and benefits involved in the proposed treatment and any reasonable alternative to the proposed treatment.  I have

## (undated) NOTE — MR AVS SNAPSHOT
[facility]  [address] [place] [address], [address] [place] [address]               Thank you for choosing us for your health care visit with Jayne Baez. DO Jemal.   We are glad to serve you and happy to provide you with this summary clinic staff will provide you with the phone number you should call to schedule your appointment.      If you are confident that your benefit plan will not require a referral or authorization, such as [place], please feel free to schedule your ozzy Take 1 tablet (40 mg total) by mouth every morning before breakfast.   Commonly known as:  PROTONIX           potassium chloride 40 meq/30 ml (10%) Soln   15 mL (20 mEq total) by Per G Tube route daily.    Commonly known as:  K-SOL           TraMADol HCl 50 ? Place light switches within reach of your bed and a night light between the bedroom and bathroom. ? Get up slowly from lying down or sitting if you get dizzy. ? Keep a working flashlight near your bed.   STAIRS:  ? Keep stairwells well lit with light sw

## (undated) NOTE — LETTER
[address], [place], [place]     AUTHORIZATION FOR SURGICAL OPERATION OR PROCEDURE    1.    I hereby authorize Dr. Lukas Márquez MD, my Physician(s) and whomever may be designated as the doctor's Assistant, to perform th donor transfusion, I will discuss this with my         Physician. 5.   I consent to the photographing of procedure(s) to be performed for the purposes of advancing medicine, science         and/or education, provided my identity is not revealed.  If the pr (Relationship to Patient)      _______________________________________________________________ ____________________________  (Witness signature)                                                                                                  (Date)

## (undated) NOTE — Clinical Note
Pt discharged on 5/2/17. She has a peg in place. Feedings are not covered by Medicare because she is eating food. She is taking Ensure. Home Health will be started for peg teaching of bolus feeding of ensure. TCM appt with you is scheduled for 5/8/17.